# Patient Record
Sex: FEMALE | Race: BLACK OR AFRICAN AMERICAN | NOT HISPANIC OR LATINO | Employment: OTHER | URBAN - METROPOLITAN AREA
[De-identification: names, ages, dates, MRNs, and addresses within clinical notes are randomized per-mention and may not be internally consistent; named-entity substitution may affect disease eponyms.]

---

## 2019-10-25 DIAGNOSIS — Z76.89 ENCOUNTER TO ESTABLISH CARE: Primary | ICD-10-CM

## 2019-11-27 ENCOUNTER — TELEPHONE (OUTPATIENT)
Dept: INTERNAL MEDICINE | Facility: CLINIC | Age: 59
End: 2019-11-27

## 2019-11-27 NOTE — TELEPHONE ENCOUNTER
Working PHN jp. Left a message for pt to call and reschedule her appointment to establish care with Dr. Lul Alvarez.

## 2019-12-05 ENCOUNTER — OFFICE VISIT (OUTPATIENT)
Dept: INTERNAL MEDICINE | Facility: CLINIC | Age: 59
End: 2019-12-05
Payer: MEDICARE

## 2019-12-05 ENCOUNTER — LAB VISIT (OUTPATIENT)
Dept: LAB | Facility: HOSPITAL | Age: 59
End: 2019-12-05
Attending: FAMILY MEDICINE
Payer: MEDICARE

## 2019-12-05 VITALS
WEIGHT: 195.56 LBS | BODY MASS INDEX: 34.65 KG/M2 | SYSTOLIC BLOOD PRESSURE: 130 MMHG | HEIGHT: 63 IN | TEMPERATURE: 98 F | OXYGEN SATURATION: 94 % | DIASTOLIC BLOOD PRESSURE: 84 MMHG | HEART RATE: 89 BPM

## 2019-12-05 DIAGNOSIS — I25.2 OLD MYOCARDIAL INFARCTION: Chronic | ICD-10-CM

## 2019-12-05 DIAGNOSIS — I10 ESSENTIAL HYPERTENSION: Chronic | ICD-10-CM

## 2019-12-05 DIAGNOSIS — R73.9 ELEVATED SERUM GLUCOSE: ICD-10-CM

## 2019-12-05 DIAGNOSIS — Z86.010 HISTORY OF COLON POLYPS: ICD-10-CM

## 2019-12-05 DIAGNOSIS — Z01.419 ENCOUNTER FOR GYNECOLOGICAL EXAMINATION: ICD-10-CM

## 2019-12-05 DIAGNOSIS — Z12.11 SCREENING FOR COLON CANCER: ICD-10-CM

## 2019-12-05 DIAGNOSIS — Z23 NEED FOR TDAP VACCINATION: ICD-10-CM

## 2019-12-05 DIAGNOSIS — I10 ESSENTIAL HYPERTENSION: Primary | Chronic | ICD-10-CM

## 2019-12-05 PROBLEM — G89.29 CHRONIC PAIN: Status: RESOLVED | Noted: 2019-12-05 | Resolved: 2019-12-05

## 2019-12-05 PROBLEM — M25.519 SHOULDER JOINT PAIN: Status: RESOLVED | Noted: 2017-06-19 | Resolved: 2019-12-05

## 2019-12-05 PROBLEM — M54.50 LOW BACK PAIN: Status: RESOLVED | Noted: 2019-12-05 | Resolved: 2019-12-05

## 2019-12-05 PROBLEM — M54.50 LOW BACK PAIN: Status: ACTIVE | Noted: 2019-12-05

## 2019-12-05 PROBLEM — G89.29 CHRONIC PAIN: Status: ACTIVE | Noted: 2019-12-05

## 2019-12-05 PROBLEM — M25.519 SHOULDER JOINT PAIN: Status: ACTIVE | Noted: 2017-06-19

## 2019-12-05 PROBLEM — A64 SEXUALLY TRANSMITTED DISEASE: Status: RESOLVED | Noted: 2017-03-09 | Resolved: 2019-12-05

## 2019-12-05 PROBLEM — R10.2 PELVIC AND PERINEAL PAIN: Status: RESOLVED | Noted: 2017-02-13 | Resolved: 2019-12-05

## 2019-12-05 PROBLEM — A64 SEXUALLY TRANSMITTED DISEASE: Status: ACTIVE | Noted: 2017-03-09

## 2019-12-05 PROBLEM — R10.2 PELVIC AND PERINEAL PAIN: Status: ACTIVE | Noted: 2017-02-13

## 2019-12-05 PROBLEM — Z86.0100 HISTORY OF COLON POLYPS: Status: ACTIVE | Noted: 2019-12-05

## 2019-12-05 LAB
ALBUMIN SERPL BCP-MCNC: 4.4 G/DL (ref 3.5–5.2)
ALP SERPL-CCNC: 71 U/L (ref 55–135)
ALT SERPL W/O P-5'-P-CCNC: 26 U/L (ref 10–44)
ANION GAP SERPL CALC-SCNC: 11 MMOL/L (ref 8–16)
AST SERPL-CCNC: 32 U/L (ref 10–40)
BILIRUB SERPL-MCNC: 0.5 MG/DL (ref 0.1–1)
BUN SERPL-MCNC: 11 MG/DL (ref 8–23)
CALCIUM SERPL-MCNC: 10.3 MG/DL (ref 8.7–10.5)
CHLORIDE SERPL-SCNC: 101 MMOL/L (ref 95–110)
CHOLEST SERPL-MCNC: 196 MG/DL (ref 120–199)
CHOLEST/HDLC SERPL: 4.3 {RATIO} (ref 2–5)
CO2 SERPL-SCNC: 28 MMOL/L (ref 23–29)
CREAT SERPL-MCNC: 1 MG/DL (ref 0.5–1.4)
ERYTHROCYTE [DISTWIDTH] IN BLOOD BY AUTOMATED COUNT: 14.2 % (ref 11.5–14.5)
EST. GFR  (AFRICAN AMERICAN): >60 ML/MIN/1.73 M^2
EST. GFR  (NON AFRICAN AMERICAN): >60 ML/MIN/1.73 M^2
GLUCOSE SERPL-MCNC: 105 MG/DL (ref 70–110)
HCT VFR BLD AUTO: 45.1 % (ref 37–48.5)
HDLC SERPL-MCNC: 46 MG/DL (ref 40–75)
HDLC SERPL: 23.5 % (ref 20–50)
HGB BLD-MCNC: 14.4 G/DL (ref 12–16)
LDLC SERPL CALC-MCNC: 126.8 MG/DL (ref 63–159)
MCH RBC QN AUTO: 28.2 PG (ref 27–31)
MCHC RBC AUTO-ENTMCNC: 31.9 G/DL (ref 32–36)
MCV RBC AUTO: 88 FL (ref 82–98)
NONHDLC SERPL-MCNC: 150 MG/DL
PLATELET # BLD AUTO: 371 K/UL (ref 150–350)
PMV BLD AUTO: 9.8 FL (ref 9.2–12.9)
POTASSIUM SERPL-SCNC: 4.3 MMOL/L (ref 3.5–5.1)
PROT SERPL-MCNC: 8.2 G/DL (ref 6–8.4)
RBC # BLD AUTO: 5.11 M/UL (ref 4–5.4)
SODIUM SERPL-SCNC: 140 MMOL/L (ref 136–145)
TRIGL SERPL-MCNC: 116 MG/DL (ref 30–150)
WBC # BLD AUTO: 8.11 K/UL (ref 3.9–12.7)

## 2019-12-05 PROCEDURE — 36415 COLL VENOUS BLD VENIPUNCTURE: CPT

## 2019-12-05 PROCEDURE — 3075F SYST BP GE 130 - 139MM HG: CPT | Mod: CPTII,S$GLB,, | Performed by: FAMILY MEDICINE

## 2019-12-05 PROCEDURE — 99999 PR PBB SHADOW E&M-EST. PATIENT-LVL III: CPT | Mod: PBBFAC,,, | Performed by: FAMILY MEDICINE

## 2019-12-05 PROCEDURE — 99204 OFFICE O/P NEW MOD 45 MIN: CPT | Mod: S$GLB,,, | Performed by: FAMILY MEDICINE

## 2019-12-05 PROCEDURE — 80061 LIPID PANEL: CPT

## 2019-12-05 PROCEDURE — 84443 ASSAY THYROID STIM HORMONE: CPT

## 2019-12-05 PROCEDURE — 83036 HEMOGLOBIN GLYCOSYLATED A1C: CPT

## 2019-12-05 PROCEDURE — 99204 PR OFFICE/OUTPT VISIT, NEW, LEVL IV, 45-59 MIN: ICD-10-PCS | Mod: S$GLB,,, | Performed by: FAMILY MEDICINE

## 2019-12-05 PROCEDURE — 3008F BODY MASS INDEX DOCD: CPT | Mod: CPTII,S$GLB,, | Performed by: FAMILY MEDICINE

## 2019-12-05 PROCEDURE — 99499 UNLISTED E&M SERVICE: CPT | Mod: S$GLB,,, | Performed by: FAMILY MEDICINE

## 2019-12-05 PROCEDURE — 3075F PR MOST RECENT SYSTOLIC BLOOD PRESS GE 130-139MM HG: ICD-10-PCS | Mod: CPTII,S$GLB,, | Performed by: FAMILY MEDICINE

## 2019-12-05 PROCEDURE — 3079F PR MOST RECENT DIASTOLIC BLOOD PRESSURE 80-89 MM HG: ICD-10-PCS | Mod: CPTII,S$GLB,, | Performed by: FAMILY MEDICINE

## 2019-12-05 PROCEDURE — 99999 PR PBB SHADOW E&M-EST. PATIENT-LVL III: ICD-10-PCS | Mod: PBBFAC,,, | Performed by: FAMILY MEDICINE

## 2019-12-05 PROCEDURE — 85027 COMPLETE CBC AUTOMATED: CPT

## 2019-12-05 PROCEDURE — 99499 RISK ADDL DX/OHS AUDIT: ICD-10-PCS | Mod: S$GLB,,, | Performed by: FAMILY MEDICINE

## 2019-12-05 PROCEDURE — 3079F DIAST BP 80-89 MM HG: CPT | Mod: CPTII,S$GLB,, | Performed by: FAMILY MEDICINE

## 2019-12-05 PROCEDURE — 3008F PR BODY MASS INDEX (BMI) DOCUMENTED: ICD-10-PCS | Mod: CPTII,S$GLB,, | Performed by: FAMILY MEDICINE

## 2019-12-05 PROCEDURE — 80053 COMPREHEN METABOLIC PANEL: CPT

## 2019-12-05 RX ORDER — AMLODIPINE BESYLATE 5 MG/1
5 TABLET ORAL DAILY
Refills: 3 | COMMUNITY
Start: 2019-11-04 | End: 2020-02-05 | Stop reason: SDUPTHER

## 2019-12-05 RX ORDER — CARVEDILOL 25 MG/1
TABLET ORAL
COMMUNITY
End: 2020-02-05 | Stop reason: SDUPTHER

## 2019-12-05 RX ORDER — LANCETS
EACH MISCELLANEOUS
COMMUNITY
Start: 2015-04-21 | End: 2022-03-08

## 2019-12-05 RX ORDER — TRIAMTERENE AND HYDROCHLOROTHIAZIDE 37.5; 25 MG/1; MG/1
CAPSULE ORAL
COMMUNITY
End: 2020-01-29 | Stop reason: SDUPTHER

## 2019-12-05 RX ORDER — NAPROXEN SODIUM 220 MG/1
TABLET, FILM COATED ORAL
Status: ON HOLD | COMMUNITY
End: 2020-06-20

## 2019-12-05 RX ORDER — INSULIN PUMP SYRINGE, 3 ML
EACH MISCELLANEOUS
COMMUNITY
Start: 2015-04-21 | End: 2019-12-05

## 2019-12-05 NOTE — PROGRESS NOTES
Subjective:   Patient ID:  Demetrice Gaines is a 59 y.o. female.    Chief Complaint:  Establish Care    Past Medical History:   Diagnosis Date    Colon polyp     Diabetes mellitus type I     Hypertension     Myocardial infarction      Past Surgical History:   Procedure Laterality Date    Benign Cyst Right      SECTION      COLONOSCOPY       Family History   Problem Relation Age of Onset    Cancer Mother     Pacemaker/defibrilator Mother     Diabetes Father     Hypertension Sister     Hypertension Brother     Cancer Paternal Grandmother      Review of patient's allergies indicates:   Allergen Reactions    Lisinopril-hydrochlorothiazide      Other reaction(s): Reaction:Throat swelling;       Current Outpatient Medications:     amLODIPine (NORVASC) 5 MG tablet, Take 5 mg by mouth once daily., Disp: , Rfl: 3    aspirin 81 MG Chew, 1 tablet, Disp: , Rfl:     carvedilol (COREG) 25 MG tablet, One Tablet, Disp: , Rfl:     triamterene-hydrochlorothiazide 37.5-25 mg (DYAZIDE) 37.5-25 mg per capsule, 1 capsule in the morning, Disp: , Rfl:     58 yo F here to establish care.   Previous PCP Dr Charisma Vo  HTN and is on amlodipine, carvedilol, and dyazide.  History of MI? in 2018 but we do not have records. She reports that she was hospitalized for 6 days in [place] ([facility]). Had a coronary angiogram done with no stents. She had a cardiologist previously (Dr. Marie) but she does not want to go back to him. She is scheduled to establish care with a new cardiologist here.   She is not currently on a statin.  Unclear why.    She has glucose test strips in her med chart but claims she was never diagnosed with diabetes and only had elevated sugars in the hospital during her MI.   Does not check her sugars at home and cannot remember when her last A1C was.  A1c  6.9%, 2016 6.2%.    Would like to be scheduled with Gynecology.   Has a history of DUB in  where an U/S showed a thickened  "endometrial stripe but subsequent biopsies were "in the wrong area" and "insufficient sample" and patient got fed up and never returned.  Has not had breakthrough bleeding since.   Nacho Nguyen was her gynecologist at the time.    Endorses low back pain and pelvic pain, described as a heaviness, denies bleeding, fevers, weight changes, night sweats, chills, urinary/bowel changes.    Needs referral for colonoscopy due to findings of polyp previously  Has not had a pap smear since 2011.     Reported Mammogram this April and was normal.   DEXA was in 2015 and was normal.  Refuses flu shot and shingles vaccine.  Would like tetanus booster. Has not had a pap smear since 2011.         Review of Systems   Constitutional: Negative for activity change, appetite change, chills, diaphoresis, fatigue and fever.   HENT: Negative for congestion, dental problem, ear pain, postnasal drip, rhinorrhea, sinus pressure and sore throat.    Eyes: Negative for visual disturbance.   Respiratory: Positive for shortness of breath. Negative for cough, chest tightness and wheezing.    Cardiovascular: Negative for chest pain, palpitations and leg swelling.   Gastrointestinal: Negative for abdominal distention, abdominal pain, blood in stool, constipation, diarrhea, nausea and vomiting.   Endocrine: Negative for polydipsia, polyphagia and polyuria.   Genitourinary: Positive for pelvic pain. Negative for difficulty urinating, dysuria, flank pain, frequency, hematuria and urgency.   Musculoskeletal: Positive for back pain. Negative for myalgias.   Skin: Negative for rash.   Neurological: Negative for dizziness, syncope, weakness, light-headedness, numbness and headaches.   Hematological: Negative for adenopathy.   Psychiatric/Behavioral: Negative for decreased concentration, dysphoric mood and sleep disturbance. The patient is not nervous/anxious.        Objective:   /84 (BP Location: Right arm, Patient Position: Sitting, BP Method: Medium " "(Manual))   Pulse 89   Temp 98.1 °F (36.7 °C) (Oral)   Ht 5' 3" (1.6 m)   Wt 88.7 kg (195 lb 8.8 oz)   SpO2 (!) 94%   BMI 34.64 kg/m²     Physical Exam   Constitutional: Vital signs are normal. She appears well-developed and well-nourished. No distress.   HENT:   Head: Normocephalic and atraumatic.   Mouth/Throat: Oropharynx is clear and moist. No oropharyngeal exudate.   Eyes: Pupils are equal, round, and reactive to light. Conjunctivae and EOM are normal. Right conjunctiva is not injected. Left conjunctiva is not injected. No scleral icterus.   Neck: No JVD present. Carotid bruit is not present. No thyroid mass and no thyromegaly present.   Cardiovascular: Normal rate, regular rhythm and normal heart sounds. Exam reveals no gallop and no friction rub.   No murmur heard.  Pulmonary/Chest: Effort normal and breath sounds normal. No stridor. No respiratory distress. She has no wheezes. She has no rhonchi. She has no rales.   Abdominal: Soft. Bowel sounds are normal. She exhibits no distension. There is no hepatosplenomegaly. There is no tenderness. There is no rebound and no guarding.   Musculoskeletal: Normal range of motion. She exhibits no edema, tenderness or deformity.   Neurological: She displays a negative Romberg sign. Coordination and gait normal.   Skin: Skin is warm, dry and intact. No rash noted. She is not diaphoretic.   Psychiatric: She has a normal mood and affect. Judgment normal.   Nursing note and vitals reviewed.    Assessment:     1. Essential hypertension    2. Old myocardial infarction    3. Elevated serum glucose    4. Encounter for gynecological examination    5. History of colon polyps    6. Screening for colon cancer    7. Need for Tdap vaccination      Plan:   Essential hypertension  -     CBC Without Differential; Future; Expected date: 12/05/2019  -     Comprehensive metabolic panel; Future; Expected date: 12/05/2019  -     Lipid panel; Future; Expected date: 12/05/2019  -     TSH; " Future; Expected date: 12/05/2019  Controlled.  BP at goal.    Continue present medications.    Check labs above.  Treat as indicated.      Old myocardial infarction  Continue aspirin 81 mg daily  Follow up Cardiology as scheduled.    Statin if recommended by Cardiology.      Elevated serum glucose  -     Hemoglobin A1c; Future; Expected date: 12/05/2019  Treat for prediabetes diabetes if indicated.      RHM  -     Ambulatory referral to Gynecology  -     Case request GI: COLONOSCOPY  -     diphth,pertus,acell,,tetanus (BOOSTRIX) 2.5-8-5 Lf-mcg-Lf/0.5mL Syrg injection; Inject 0.5 mLs into the muscle once. for 1 dose  Dispense: 0.5 mL; Refill: 0  Declines flu vaccine  Declines shingles vaccine    Old records requested from Dr. Charisma Vo  Return to clinic 1 month    I hereby acknowledge that I am relying upon documentation authored by a medical student working under my supervision and further I hereby attest that I have verified the student documentation or findings by personally re-performing the physical exam and medical decision making activities of the Evaluation and Management service to be billed.  Lul Alvarez

## 2019-12-06 ENCOUNTER — TELEPHONE (OUTPATIENT)
Dept: ENDOSCOPY | Facility: HOSPITAL | Age: 59
End: 2019-12-06

## 2019-12-06 DIAGNOSIS — Z12.39 BREAST CANCER SCREENING: ICD-10-CM

## 2019-12-06 LAB
ESTIMATED AVG GLUCOSE: 151 MG/DL (ref 68–131)
HBA1C MFR BLD HPLC: 6.9 % (ref 4–5.6)
TSH SERPL DL<=0.005 MIU/L-ACNC: 0.42 UIU/ML (ref 0.4–4)

## 2019-12-06 RX ORDER — SODIUM, POTASSIUM,MAG SULFATES 17.5-3.13G
1 SOLUTION, RECONSTITUTED, ORAL ORAL DAILY
Qty: 1 KIT | Refills: 0 | Status: SHIPPED | OUTPATIENT
Start: 2019-12-06 | End: 2019-12-08

## 2019-12-06 NOTE — TELEPHONE ENCOUNTER

## 2019-12-12 ENCOUNTER — TELEPHONE (OUTPATIENT)
Dept: INTERNAL MEDICINE | Facility: CLINIC | Age: 59
End: 2019-12-12

## 2019-12-12 RX ORDER — INSULIN PUMP SYRINGE, 3 ML
EACH MISCELLANEOUS
Status: CANCELLED | OUTPATIENT
Start: 2019-12-12

## 2019-12-12 NOTE — TELEPHONE ENCOUNTER
----- Message from Yang Hummel sent at 12/12/2019  8:51 AM CST -----  Contact: Pt  Please give pt a call at [phone] she is returning the nurse call with her results

## 2019-12-12 NOTE — TELEPHONE ENCOUNTER
Will discuss and order if appropriate at follow up visit she should have scheduled in January  May not be covered if we only treat for Prediabetes and not Diabetes

## 2019-12-13 ENCOUNTER — TELEPHONE (OUTPATIENT)
Dept: INTERNAL MEDICINE | Facility: CLINIC | Age: 59
End: 2019-12-13

## 2019-12-13 NOTE — TELEPHONE ENCOUNTER
----- Message from Citlalli Melchor sent at 12/13/2019  4:23 PM CST -----  Contact: pt   Type:  Patient Returning Call    Who Called: pt   Who Left Message for Patient:Dr Alvarez  Does the patient know what this is regarding?:medication  Would the patient rather a call back or a response via [facility]? Call back  Best Call Back Number: [phone]  Additional Information:

## 2019-12-17 ENCOUNTER — TELEPHONE (OUTPATIENT)
Dept: CARDIOLOGY | Facility: CLINIC | Age: 59
End: 2019-12-17

## 2019-12-17 DIAGNOSIS — I10 ESSENTIAL HYPERTENSION: Primary | Chronic | ICD-10-CM

## 2019-12-17 NOTE — TELEPHONE ENCOUNTER
Left a voice mail message for return call  Attempting to secure medical records prior to visit.        ----- Message from Randee Gutiérrez sent at 12/17/2019  3:23 PM CST -----  Contact: self [phone]  .Type:  Patient Returning Call    Who Called:Demetrice Gaines  Who Left Message for Patient:Zayda  Does the patient know what this is regarding?:no  Would the patient rather a call back or a response via [facility]? Call back  Best Call Back Number:[phone]  Additional Information:

## 2019-12-17 NOTE — TELEPHONE ENCOUNTER
I called pt to see if she has seen any cardiologist in the past to request records prior to her appt if needed.   No answer. Left message.

## 2019-12-18 ENCOUNTER — OFFICE VISIT (OUTPATIENT)
Dept: CARDIOLOGY | Facility: CLINIC | Age: 59
End: 2019-12-18
Payer: MEDICARE

## 2019-12-18 ENCOUNTER — IMMUNIZATION (OUTPATIENT)
Dept: PHARMACY | Facility: CLINIC | Age: 59
End: 2019-12-18
Payer: MEDICARE

## 2019-12-18 ENCOUNTER — OFFICE VISIT (OUTPATIENT)
Dept: OBSTETRICS AND GYNECOLOGY | Facility: CLINIC | Age: 59
End: 2019-12-18
Payer: MEDICARE

## 2019-12-18 ENCOUNTER — CLINICAL SUPPORT (OUTPATIENT)
Dept: CARDIOLOGY | Facility: CLINIC | Age: 59
End: 2019-12-18
Payer: MEDICARE

## 2019-12-18 VITALS
DIASTOLIC BLOOD PRESSURE: 78 MMHG | SYSTOLIC BLOOD PRESSURE: 128 MMHG | WEIGHT: 197.56 LBS | BODY MASS INDEX: 34.99 KG/M2

## 2019-12-18 VITALS
HEART RATE: 66 BPM | DIASTOLIC BLOOD PRESSURE: 82 MMHG | BODY MASS INDEX: 34.9 KG/M2 | SYSTOLIC BLOOD PRESSURE: 118 MMHG | WEIGHT: 197 LBS

## 2019-12-18 DIAGNOSIS — I10 ESSENTIAL HYPERTENSION: Chronic | ICD-10-CM

## 2019-12-18 DIAGNOSIS — R10.2 PELVIC PAIN IN FEMALE: ICD-10-CM

## 2019-12-18 DIAGNOSIS — Z01.419 ENCOUNTER FOR WELL WOMAN EXAM WITH ROUTINE GYNECOLOGICAL EXAM: Primary | ICD-10-CM

## 2019-12-18 DIAGNOSIS — I25.2 OLD MYOCARDIAL INFARCTION: Primary | Chronic | ICD-10-CM

## 2019-12-18 PROCEDURE — 99999 PR PBB SHADOW E&M-EST. PATIENT-LVL III: ICD-10-PCS | Mod: PBBFAC,,, | Performed by: OBSTETRICS & GYNECOLOGY

## 2019-12-18 PROCEDURE — 99204 OFFICE O/P NEW MOD 45 MIN: CPT | Mod: S$GLB,,, | Performed by: INTERNAL MEDICINE

## 2019-12-18 PROCEDURE — G0101 CA SCREEN;PELVIC/BREAST EXAM: HCPCS | Mod: S$GLB,,, | Performed by: OBSTETRICS & GYNECOLOGY

## 2019-12-18 PROCEDURE — G0101 PR CA SCREEN;PELVIC/BREAST EXAM: ICD-10-PCS | Mod: S$GLB,,, | Performed by: OBSTETRICS & GYNECOLOGY

## 2019-12-18 PROCEDURE — 99499 UNLISTED E&M SERVICE: CPT | Mod: S$GLB,,, | Performed by: INTERNAL MEDICINE

## 2019-12-18 PROCEDURE — 3008F PR BODY MASS INDEX (BMI) DOCUMENTED: ICD-10-PCS | Mod: CPTII,S$GLB,, | Performed by: INTERNAL MEDICINE

## 2019-12-18 PROCEDURE — 99999 PR PBB SHADOW E&M-EST. PATIENT-LVL III: ICD-10-PCS | Mod: PBBFAC,,, | Performed by: INTERNAL MEDICINE

## 2019-12-18 PROCEDURE — 99499 RISK ADDL DX/OHS AUDIT: ICD-10-PCS | Mod: S$GLB,,, | Performed by: INTERNAL MEDICINE

## 2019-12-18 PROCEDURE — 93010 ELECTROCARDIOGRAM REPORT: CPT | Mod: S$GLB,,, | Performed by: INTERNAL MEDICINE

## 2019-12-18 PROCEDURE — 93010 EKG 12-LEAD: ICD-10-PCS | Mod: S$GLB,,, | Performed by: INTERNAL MEDICINE

## 2019-12-18 PROCEDURE — 99999 PR PBB SHADOW E&M-EST. PATIENT-LVL III: CPT | Mod: PBBFAC,,, | Performed by: OBSTETRICS & GYNECOLOGY

## 2019-12-18 PROCEDURE — 93005 EKG 12-LEAD: ICD-10-PCS | Mod: S$GLB,,, | Performed by: INTERNAL MEDICINE

## 2019-12-18 PROCEDURE — 93005 ELECTROCARDIOGRAM TRACING: CPT | Mod: S$GLB,,, | Performed by: INTERNAL MEDICINE

## 2019-12-18 PROCEDURE — 99999 PR PBB SHADOW E&M-EST. PATIENT-LVL III: CPT | Mod: PBBFAC,,, | Performed by: INTERNAL MEDICINE

## 2019-12-18 PROCEDURE — 99204 PR OFFICE/OUTPT VISIT, NEW, LEVL IV, 45-59 MIN: ICD-10-PCS | Mod: S$GLB,,, | Performed by: INTERNAL MEDICINE

## 2019-12-18 PROCEDURE — 3008F BODY MASS INDEX DOCD: CPT | Mod: CPTII,S$GLB,, | Performed by: INTERNAL MEDICINE

## 2019-12-18 RX ORDER — SODIUM, POTASSIUM,MAG SULFATES 17.5-3.13G
1 SOLUTION, RECONSTITUTED, ORAL ORAL DAILY
Qty: 1 KIT | Refills: 0 | Status: SHIPPED | OUTPATIENT
Start: 2019-12-18 | End: 2019-12-20

## 2019-12-18 NOTE — LETTER
December 22, 2019      Lul Alvarez MD  [address] [facility]  [place] [address]           [facility] [place]  [address] [facility] [place] [place] [address]  Phone: [phone]  Fax: [phone]          Patient: Demetrice Gaines   MR Number: [id number]   YOB: 1960   Date of Visit: 12/18/2019       Dear Dr. Lul Alvarez:    Thank you for referring Demetrice Gaines to me for evaluation. Attached you will find relevant portions of my assessment and plan of care.    If you have questions, please do not hesitate to call me. I look forward to following Demetrice Gaines along with you.    Sincerely,    Ines Summers MD    Enclosure  CC:  No Recipients    If you would like to receive this communication electronically, please contact [email or web address] or [phone] to request more information on [facility] access.    For providers and/or their staff who would like to refer a patient to [facility], please contact us through our one-stop-shop provider referral line, [facility], at [phone].    If you feel you have received this communication in error or would no longer like to receive these types of communications, please e-mail [email or web address]

## 2019-12-18 NOTE — PROGRESS NOTES
Subjective:   Patient ID:  Demetrice Gaines is a 59 y.o. female who presents for evaluation of Establish Care (heart attack in 2018); Hair Loss (possibly due to medication); Shortness of Breath (on exertion, still a struggle after rest, tightness around neck ); and Chest Pain (dull pain occasionally lasting few hours)      HPI  A 58 yo diabetic female  htn who had an mi in 2018 had sweating dull chest pain associated with shortness of breath was admitted on the [facility] had a 6 day stay had heart cath that was unrevealing. She has had no more similar symptoms. She however has shortness of breath and tired. She feels a choking sensation in her throat . In addition she has been having arm pain at nite and is having difficulty raising items with her hands. She gets tired easily. No exertional chest pain. She ahs insomnia not sleep well at nite sleeps during the day feels tired has gained weight.she snores a1c is borderline high.  Past Medical History:   Diagnosis Date    Colon polyp     Diabetes mellitus type I     Hypertension     Myocardial infarction        Past Surgical History:   Procedure Laterality Date    Benign Cyst Right      SECTION      COLONOSCOPY         Social History     Tobacco Use    Smoking status: Never Smoker    Smokeless tobacco: Never Used   Substance Use Topics    Alcohol use: Never     Frequency: Never    Drug use: Never       Family History   Problem Relation Age of Onset    Cancer Mother     Pacemaker/defibrilator Mother     Diabetes Father     Hypertension Sister     Hypertension Brother     Cancer Paternal Grandmother        Current Outpatient Medications   Medication Sig    amLODIPine (NORVASC) 5 MG tablet Take 5 mg by mouth once daily.    aspirin 81 MG Chew 1 tablet    carvedilol (COREG) 25 MG tablet One Tablet    lancets Misc Check blood sugar once daily.    triamterene-hydrochlorothiazide 37.5-25 mg (DYAZIDE) 37.5-25 mg per capsule 1 capsule in the  morning    sodium,potassium,mag sulfates (SUPREP BOWEL PREP KIT) 17.5-3.13-1.6 gram SolR Take 177 mLs by mouth once daily. for 2 days (Patient not taking: Reported on 12/18/2019)     No current facility-administered medications for this visit.      Current Outpatient Medications on File Prior to Visit   Medication Sig    amLODIPine (NORVASC) 5 MG tablet Take 5 mg by mouth once daily.    aspirin 81 MG Chew 1 tablet    carvedilol (COREG) 25 MG tablet One Tablet    lancets Misc Check blood sugar once daily.    triamterene-hydrochlorothiazide 37.5-25 mg (DYAZIDE) 37.5-25 mg per capsule 1 capsule in the morning     No current facility-administered medications on file prior to visit.        Review of patient's allergies indicates:   Allergen Reactions    Lisinopril-hydrochlorothiazide      Other reaction(s): Reaction:Throat swelling;       Review of Systems   Constitution: Positive for weight gain. Negative for diaphoresis and malaise/fatigue.   HENT: Negative for hoarse voice.    Eyes: Negative for double vision and visual disturbance.   Cardiovascular: Positive for chest pain. Negative for claudication, cyanosis, dyspnea on exertion, irregular heartbeat, leg swelling, near-syncope, orthopnea, palpitations, paroxysmal nocturnal dyspnea and syncope.   Respiratory: Positive for shortness of breath and snoring. Negative for cough and hemoptysis.         Choking sensation around neck.   Hematologic/Lymphatic: Negative for bleeding problem. Does not bruise/bleed easily.   Skin: Negative for color change and poor wound healing.   Musculoskeletal: Negative for muscle cramps, muscle weakness and myalgias.        Arm pain bilateral    Gastrointestinal: Negative for bloating, abdominal pain, change in bowel habit, diarrhea, heartburn, hematemesis, hematochezia, melena and nausea.   Neurological: Negative for excessive daytime sleepiness, dizziness, headaches, light-headedness, loss of balance, numbness and weakness.    Psychiatric/Behavioral: Negative for memory loss. The patient does not have insomnia.    Allergic/Immunologic: Negative for hives.       Objective:   Physical Exam   Constitutional: She is oriented to person, place, and time. She appears well-developed and well-nourished. She does not appear ill. No distress.   HENT:   Head: Normocephalic and atraumatic.   Eyes: Pupils are equal, round, and reactive to light. EOM are normal. No scleral icterus.   Neck: Normal range of motion. Neck supple. Normal carotid pulses, no hepatojugular reflux and no JVD present. Carotid bruit is not present. No tracheal deviation present. No thyromegaly present.   Cardiovascular: Normal rate, regular rhythm, normal heart sounds and normal pulses. Exam reveals no gallop and no friction rub.   No murmur heard.  Pulses:       Carotid pulses are 2+ on the right side, and 2+ on the left side.       Radial pulses are 2+ on the right side, and 2+ on the left side.        Femoral pulses are 2+ on the right side, and 2+ on the left side.       Popliteal pulses are 2+ on the right side, and 2+ on the left side.        Dorsalis pedis pulses are 2+ on the right side, and 2+ on the left side.        Posterior tibial pulses are 2+ on the right side, and 2+ on the left side.   Pulmonary/Chest: Effort normal and breath sounds normal. No respiratory distress. She has no wheezes. She has no rhonchi. She has no rales. She exhibits no tenderness.   Abdominal: Soft. Normal appearance, normal aorta and bowel sounds are normal. She exhibits no distension, no abdominal bruit, no ascites and no pulsatile midline mass. There is no hepatomegaly. There is no tenderness.   obese   Musculoskeletal: She exhibits no edema.        Right shoulder: She exhibits no deformity.   Neurological: She is alert and oriented to person, place, and time. She has normal strength. No cranial nerve deficit. Coordination normal.   Skin: Skin is warm and dry. No rash noted. She is not  diaphoretic. No cyanosis or erythema. Nails show no clubbing.   Psychiatric: She has a normal mood and affect. Her speech is normal and behavior is normal.   Nursing note and vitals reviewed.    Vitals:    12/18/19 1620   BP: 118/82   BP Location: Left arm   Patient Position: Sitting   BP Method: Medium (Manual)   Pulse: 66   Weight: 89.4 kg (197 lb)     Lab Results   Component Value Date    CHOL 196 12/05/2019    CHOL 192 08/22/2005     Lab Results   Component Value Date    HDL 46 12/05/2019    HDL 41.0 08/22/2005     Lab Results   Component Value Date    LDLCALC 126.8 12/05/2019    LDLCALC 110.0 08/22/2005     Lab Results   Component Value Date    TRIG 116 12/05/2019    TRIG 205 (H) 08/22/2005     Lab Results   Component Value Date    CHOLHDL 23.5 12/05/2019    CHOLHDL 21.4 08/22/2005       Chemistry        Component Value Date/Time     12/05/2019 1508    K 4.3 12/05/2019 1508     12/05/2019 1508    CO2 28 12/05/2019 1508    BUN 11 12/05/2019 1508    CREATININE 1.0 12/05/2019 1508     12/05/2019 1508        Component Value Date/Time    CALCIUM 10.3 12/05/2019 1508    ALKPHOS 71 12/05/2019 1508    AST 32 12/05/2019 1508    ALT 26 12/05/2019 1508    BILITOT 0.5 12/05/2019 1508    ESTGFRAFRICA >60.0 12/05/2019 1508    EGFRNONAA >60.0 12/05/2019 1508        Lab Results   Component Value Date    HGBA1C 6.9 (H) 12/05/2019       Lab Results   Component Value Date    TSH 0.421 12/05/2019     No results found for: INR, PROTIME  Lab Results   Component Value Date    WBC 8.11 12/05/2019    HGB 14.4 12/05/2019    HCT 45.1 12/05/2019    MCV 88 12/05/2019     (H) 12/05/2019     BNP  @LABRCNTIP(BNP,BNPTRIAGEBLO)@  CrCl cannot be calculated (Patient's most recent lab result is older than the maximum 7 days allowed.).  Assessment:     1. Old myocardial infarction    2. Essential hypertension      Arm pain is musculoskeletal.   Chocking sensation and shortness of breath fatigue needs better diabetes  control weight loss exercise and r/o sleep apnea and coronary ischemia.  Plan:   Continue current therapy  Cardiac low salt diet.  Risk factor modification and excercise program./weight loss  Sleep eval   Echo   Stress cardiolite.

## 2019-12-18 NOTE — PRE-PROCEDURE INSTRUCTIONS
I called patient because she was on the schedule today for a PAT visit here at the [facility], but had already been scheduled at [facility] for screening colonoscopy on February 4th. Pt says that she was unaware of scheduled PAT visit, but was aware and had already received instructions for colonoscopy. Pt requests an earlier appt and at the [facility] instead. R/s'd to December 31st with Dr. Holcomb. PAT call completed and patient educated on the bowel prep, clear liquid diet and procedure instructions. Patient has rec'd instructions for suprep prior to call, in which I reviewed with her thoroughly. Pt verbalized full understanding and informed of OTC meds to purchase. Medical history discussed and pt instructed to be NPO after 2nd bowel prep. Tentative arrival and 2nd prep times discussed. Patient will be accommodated by her  and is informed of policy to remain inhouse during entire visit. All questions and concerns addressed. Callback number provided for any future questions. Pt has history of htn/MI in 2008 and takes coreg, norvasc and triamterene and has a cardiology visit today to establish care. Pt instructed to take all blood pressure medications to bring to facility with her. Pt denies a history of DM. Will f/u at 72 hours and 1 day before procedure.

## 2019-12-18 NOTE — PROGRESS NOTES
Subjective:      Demetrice Gaines is a 59 y.o. female who presents for annual exam. The patient has no complaints today. The patient is not currently sexually active. GYN screening history: last pap: approximate date 7 years ago and was normal and last mammogram: approximate date 1 year out and was normal. The patient is not taking hormone replacement therapy. Patient denies post-menopausal vaginal bleeding.. The patient wears seatbelts: yes. The patient participates in regular exercise: yes. Has the patient ever been transfused or tattooed?: not asked. The patient reports that there is not domestic violence in her life.    Patient hasn't had a gynecological exam in about 8 years.  Patient stopped having a period at age 50.  Patient doesn not have any hot flashes or night sweats.  Patient is no longer sexually active. No vaginal dryness or vaginal pain.  No urinary leakage.  Last mammogram was this year.  Colonoscopy scheduled for 12/31/2019.  Patient sometimes feels some soreness in her left breast, especially after holding a baby up to her chest.  Patient complains of a heavy dull pain, like she is carrying rocks in her pelvis.  Patient was seen by Dr. Pabon, was told that she made have endometrial cancer, but he did not get a good endometrial sample. This was 2011.  Did not go back.  Has not had an gynecological exam since then.  Patient has not had any post menopausal bleeding since then either.                Menstrual History:  OB History    None        Menarche age: 10  No LMP recorded.         Review of Systems  Constitutional: negative  Eyes: negative  Ears, nose, mouth, throat, and face: negative  Respiratory: negative  Cardiovascular: negative  Gastrointestinal: negative  Genitourinary:negative  Integument/breast: negative  Hematologic/lymphatic: negative  Musculoskeletal:negative  Neurological: negative  Behavioral/Psych: negative  Endocrine: negative  Allergic/Immunologic: negative       Objective:      /78   Wt 89.6 kg (197 lb 8.5 oz)   BMI 34.99 kg/m²   General appearance: alert, appears stated age and cooperative  Head: Normocephalic, without obvious abnormality, atraumatic  Eyes: negative  Nose: no discharge  Neck: no adenopathy, no carotid bruit, no JVD, supple, symmetrical, trachea midline and thyroid not enlarged, symmetric, no tenderness/mass/nodules  Lungs: clear to auscultation bilaterally  Breasts: Inspection negative, No nipple retraction or dimpling, No nipple discharge or bleeding, No axillary or supraclavicular adenopathy, Normal to palpation without dominant masses  Heart: S1, S2 normal and no S3 or S4  Abdomen: soft, non-tender; bowel sounds normal; no masses,  no organomegaly  Pelvic: cervix normal in appearance, external genitalia normal, no adnexal masses or tenderness, no cervical motion tenderness, perianal skin: no external genital warts noted, rectovaginal septum normal, urethra without abnormality or discharge, uterus normal size, shape, and consistency and vagina normal without discharge  Extremities: extremities normal, atraumatic, no cyanosis or edema  Neurologic: Grossly normal      Assessment:      Menopause      Plan:      Abdominal ultrasound.  Await pap smear results.  Pelvic ultrasound.  Thin prep Pap smear.

## 2019-12-24 ENCOUNTER — TELEPHONE (OUTPATIENT)
Dept: RADIOLOGY | Facility: HOSPITAL | Age: 59
End: 2019-12-24

## 2019-12-26 ENCOUNTER — CLINICAL SUPPORT (OUTPATIENT)
Dept: CARDIOLOGY | Facility: CLINIC | Age: 59
End: 2019-12-26
Attending: INTERNAL MEDICINE
Payer: MEDICARE

## 2019-12-26 ENCOUNTER — TELEPHONE (OUTPATIENT)
Dept: CARDIOLOGY | Facility: CLINIC | Age: 59
End: 2019-12-26

## 2019-12-26 ENCOUNTER — HOSPITAL ENCOUNTER (OUTPATIENT)
Dept: RADIOLOGY | Facility: HOSPITAL | Age: 59
Discharge: HOME | End: 2019-12-26
Attending: INTERNAL MEDICINE
Payer: MEDICARE

## 2019-12-26 ENCOUNTER — HOSPITAL ENCOUNTER (OUTPATIENT)
Dept: RADIOLOGY | Facility: HOSPITAL | Age: 59
Discharge: HOME | End: 2019-12-26
Attending: OBSTETRICS & GYNECOLOGY
Payer: MEDICARE

## 2019-12-26 DIAGNOSIS — I25.2 OLD MYOCARDIAL INFARCTION: ICD-10-CM

## 2019-12-26 DIAGNOSIS — R10.2 PELVIC PAIN IN FEMALE: ICD-10-CM

## 2019-12-26 DIAGNOSIS — I10 ESSENTIAL HYPERTENSION: Chronic | ICD-10-CM

## 2019-12-26 DIAGNOSIS — I10 ESSENTIAL HYPERTENSION: ICD-10-CM

## 2019-12-26 DIAGNOSIS — I25.2 OLD MYOCARDIAL INFARCTION: Chronic | ICD-10-CM

## 2019-12-26 LAB
AORTIC VALVE REGURGITATION: NORMAL
DIASTOLIC DYSFUNCTION: NO
DIASTOLIC DYSFUNCTION: NO
ESTIMATED PA SYSTOLIC PRESSURE: 24.72
RETIRED EF AND QEF - SEE NOTES: 60 (ref 55–65)

## 2019-12-26 PROCEDURE — 93306 TTE W/DOPPLER COMPLETE: CPT | Mod: S$GLB,,, | Performed by: NUCLEAR MEDICINE

## 2019-12-26 PROCEDURE — 93015 NM MULTI PHARM STRESS CARDIAC COMPONENT: ICD-10-PCS | Mod: S$GLB,,, | Performed by: NUCLEAR MEDICINE

## 2019-12-26 PROCEDURE — 78452 HT MUSCLE IMAGE SPECT MULT: CPT | Mod: 26,,, | Performed by: NUCLEAR MEDICINE

## 2019-12-26 PROCEDURE — 76830 US PELVIS COMP WITH TRANSVAG NON-OB (XPD): ICD-10-PCS | Mod: 26,,, | Performed by: RADIOLOGY

## 2019-12-26 PROCEDURE — 78452 NM MULTI PHARM STRESS CARDIAC COMPONENT: ICD-10-PCS | Mod: 26,,, | Performed by: NUCLEAR MEDICINE

## 2019-12-26 PROCEDURE — A9502 TC99M TETROFOSMIN: HCPCS

## 2019-12-26 PROCEDURE — 76830 TRANSVAGINAL US NON-OB: CPT | Mod: 26,,, | Performed by: RADIOLOGY

## 2019-12-26 PROCEDURE — 93015 CV STRESS TEST SUPVJ I&R: CPT | Mod: S$GLB,,, | Performed by: NUCLEAR MEDICINE

## 2019-12-26 PROCEDURE — 76856 US EXAM PELVIC COMPLETE: CPT | Mod: 26,,, | Performed by: RADIOLOGY

## 2019-12-26 PROCEDURE — 76856 US PELVIS COMP WITH TRANSVAG NON-OB (XPD): ICD-10-PCS | Mod: 26,,, | Performed by: RADIOLOGY

## 2019-12-26 PROCEDURE — 76830 TRANSVAGINAL US NON-OB: CPT | Mod: TC

## 2019-12-26 PROCEDURE — 93306 2D ECHO WITH COLOR FLOW DOPPLER: ICD-10-PCS | Mod: S$GLB,,, | Performed by: NUCLEAR MEDICINE

## 2019-12-26 NOTE — TELEPHONE ENCOUNTER
The patient has been notified of this information and all questions answered. Stress test negative. Verbalizes understanding.

## 2019-12-26 NOTE — TELEPHONE ENCOUNTER
----- Message from Yang Hummel sent at 12/26/2019  4:15 PM CST -----  Contact: Pt  Please give pt a call at [phone] she is returning the nurse call with her results

## 2019-12-26 NOTE — PRE-PROCEDURE INSTRUCTIONS
Called to make 72 hour f/u call with patient, no answer. VM left to return call with any questions or concerns.

## 2019-12-30 RX ORDER — SODIUM CHLORIDE, SODIUM LACTATE, POTASSIUM CHLORIDE, CALCIUM CHLORIDE 600; 310; 30; 20 MG/100ML; MG/100ML; MG/100ML; MG/100ML
INJECTION, SOLUTION INTRAVENOUS CONTINUOUS
Status: CANCELLED | OUTPATIENT
Start: 2019-12-30

## 2019-12-30 NOTE — PRE-PROCEDURE INSTRUCTIONS
Called to notify patient of final arrival time of 0700 at [facility], but patient did not answer. VM left with final time and location. Callback number left for any questions or concerns.

## 2019-12-31 ENCOUNTER — HOSPITAL ENCOUNTER (OUTPATIENT)
Facility: HOSPITAL | Age: 59
Discharge: HOME | End: 2019-12-31
Attending: INTERNAL MEDICINE | Admitting: INTERNAL MEDICINE
Payer: MEDICARE

## 2019-12-31 ENCOUNTER — ANESTHESIA (OUTPATIENT)
Dept: ENDOSCOPY | Facility: HOSPITAL | Age: 59
End: 2019-12-31
Payer: MEDICARE

## 2019-12-31 ENCOUNTER — ANESTHESIA EVENT (OUTPATIENT)
Dept: ENDOSCOPY | Facility: HOSPITAL | Age: 59
End: 2019-12-31
Payer: MEDICARE

## 2019-12-31 VITALS
DIASTOLIC BLOOD PRESSURE: 86 MMHG | BODY MASS INDEX: 33.49 KG/M2 | TEMPERATURE: 98 F | RESPIRATION RATE: 20 BRPM | HEART RATE: 75 BPM | WEIGHT: 196.19 LBS | HEIGHT: 64 IN | OXYGEN SATURATION: 99 % | SYSTOLIC BLOOD PRESSURE: 128 MMHG

## 2019-12-31 DIAGNOSIS — Z12.11 COLON CANCER SCREENING: Primary | ICD-10-CM

## 2019-12-31 LAB — POCT GLUCOSE: 136 MG/DL (ref 70–110)

## 2019-12-31 PROCEDURE — D9220A PRA ANESTHESIA: ICD-10-PCS | Mod: CRNA,,, | Performed by: NURSE ANESTHETIST, CERTIFIED REGISTERED

## 2019-12-31 PROCEDURE — D9220A PRA ANESTHESIA: ICD-10-PCS | Mod: ANES,,, | Performed by: ANESTHESIOLOGY

## 2019-12-31 PROCEDURE — 63600175 PHARM REV CODE 636 W HCPCS: Performed by: NURSE ANESTHETIST, CERTIFIED REGISTERED

## 2019-12-31 PROCEDURE — 37000009 HC ANESTHESIA EA ADD 15 MINS: Performed by: INTERNAL MEDICINE

## 2019-12-31 PROCEDURE — G0105 COLORECTAL SCRN; HI RISK IND: HCPCS | Performed by: INTERNAL MEDICINE

## 2019-12-31 PROCEDURE — G0105 COLORECTAL SCRN; HI RISK IND: HCPCS | Mod: ,,, | Performed by: INTERNAL MEDICINE

## 2019-12-31 PROCEDURE — G0105 COLORECTAL SCRN; HI RISK IND: ICD-10-PCS | Mod: ,,, | Performed by: INTERNAL MEDICINE

## 2019-12-31 PROCEDURE — 37000008 HC ANESTHESIA 1ST 15 MINUTES: Performed by: INTERNAL MEDICINE

## 2019-12-31 PROCEDURE — D9220A PRA ANESTHESIA: Mod: CRNA,,, | Performed by: NURSE ANESTHETIST, CERTIFIED REGISTERED

## 2019-12-31 PROCEDURE — D9220A PRA ANESTHESIA: Mod: ANES,,, | Performed by: ANESTHESIOLOGY

## 2019-12-31 RX ORDER — LIDOCAINE HCL/PF 100 MG/5ML
SYRINGE (ML) INTRAVENOUS
Status: DISCONTINUED | OUTPATIENT
Start: 2019-12-31 | End: 2019-12-31

## 2019-12-31 RX ORDER — SODIUM CHLORIDE, SODIUM LACTATE, POTASSIUM CHLORIDE, CALCIUM CHLORIDE 600; 310; 30; 20 MG/100ML; MG/100ML; MG/100ML; MG/100ML
INJECTION, SOLUTION INTRAVENOUS CONTINUOUS PRN
Status: DISCONTINUED | OUTPATIENT
Start: 2019-12-31 | End: 2019-12-31

## 2019-12-31 RX ORDER — PROPOFOL 10 MG/ML
VIAL (ML) INTRAVENOUS
Status: DISCONTINUED | OUTPATIENT
Start: 2019-12-31 | End: 2019-12-31

## 2019-12-31 RX ADMIN — PROPOFOL 80 MG: 10 INJECTION, EMULSION INTRAVENOUS at 07:12

## 2019-12-31 RX ADMIN — PROPOFOL 30 MG: 10 INJECTION, EMULSION INTRAVENOUS at 07:12

## 2019-12-31 RX ADMIN — PROPOFOL 50 MG: 10 INJECTION, EMULSION INTRAVENOUS at 07:12

## 2019-12-31 RX ADMIN — SODIUM CHLORIDE, SODIUM LACTATE, POTASSIUM CHLORIDE, AND CALCIUM CHLORIDE: 600; 310; 30; 20 INJECTION, SOLUTION INTRAVENOUS at 07:12

## 2019-12-31 RX ADMIN — LIDOCAINE HYDROCHLORIDE 80 MG: 20 INJECTION, SOLUTION INTRAVENOUS at 07:12

## 2019-12-31 NOTE — DISCHARGE INSTRUCTIONS
Colonoscopy     A camera attached to a flexible tube with a viewing lens is used to take video pictures.     Colonoscopy is a test to view the inside of your lower digestive tract (colon and rectum). Sometimes it can show the last part of the small intestine (ileum). During the test, small pieces of tissue may be removed for testing. This is called a biopsy. Small growths, such as polyps, may also be removed.   Why is colonoscopy done?  The test is done to help look for colon cancer. And it can help find the source of abdominal pain, bleeding, and changes in bowel habits. It may be needed once a year, depending on factors such as your:  · Age  · Health history  · Family health history  · Symptoms  · Results from any prior colonoscopy  Risks and possible complications  These include:  · Bleeding               · A puncture or tear in the colon   · Risks of anesthesia  · A cancer lesion not being seen  Getting ready   To prepare for the test:  · Talk with your healthcare provider about the risks of the test (see below). Also ask your healthcare provider about alternatives to the test.  · Tell your healthcare provider about any medicines you take. Also tell him or her about any health conditions you may have.  · Make sure your rectum and colon are empty for the test. Follow the diet and bowel prep instructions exactly. If you dont, the test may need to be rescheduled.  · Plan for a friend or family member to drive you home after the test.     Colonoscopy provides an inside view of the entire colon.     You may discuss the results with your doctor right away or at a future visit.  During the test   The test is usually done in the hospital on an outpatient basis. This means you go home the same day. The procedure takes about 30 minutes. During that time:  · You are given relaxing (sedating) medicine through an IV line. You may be drowsy, or fully asleep.  · The healthcare provider will first give you a physical exam to  check for anal and rectal problems.  · Then the anus is lubricated and the scope inserted.  · If you are awake, you may have a feeling similar to needing to have a bowel movement. You may also feel pressure as air is pumped into the colon. Its OK to pass gas during the procedure.  · Biopsy, polyp removal, or other treatments may be done during the test.  After the test   You may have gas right after the test. It can help to try to pass it to help prevent later bloating. Your healthcare provider may discuss the results with you right away. Or you may need to schedule a follow-up visit to talk about the results. After the test, you can go back to your normal eating and other activities. You may be tired from the sedation and need to rest for a few hours.  Date Last Reviewed: 11/1/2016 © 2000-2017 [employer]. [address] [place] [address]. All rights reserved. This information is not intended as a substitute for professional medical care. Always follow your healthcare professional's instructions.                                                                                                                   Pain Post Injection     1. Side effects may include: headache, restlessness at night, weakness to upper or lower extremities (arms or legs), flushing of the face and/ or neck. None are life threatening and will subside within 2-3 days.   2. If you have SEVERE headache with nausea and extreme pain, please call office ([phone]). Unless you usually have this type of migraine headache.   3. If you develop fever (greater than 101 F) or have any redness, swelling, or drainage at the injection site(s), please call off ([phone]). This may be a sign of infection.   4. If a rash or whelps (like hives) occur, please call the office ([phone]).  5. If you are a heart patient, please watch for symptoms such as swelling in your hands and feet and shortness of breath. If any of these  symptoms occur, please notify your primary care/ heart doctor and go to the nearest emergency room.  6. If you have high blood pressure, you may experience an increase in your blood pressure over the next two weeks. Please continue to monitor your blood pressure and contact your primary care provider if your blood pressure does not return to baseline.    7. If you are a diabetic or you monitor your blood sugar, you may have an increase in your blood sugar over the next two weeks. If your blood sugar does not return to your baseline, please contact your primary care provider.  8. NO HEAT TO THE INJECTION SITE for the next 24 hours including: bath or shower, heating pad, moist heat, and / or hot tubs.   9. May use ice pack to injection site for any pain or discomfort. Apply ice pack for 20 minutes then remove for 20 minutes before re- applying to site. (recipe for homemade frozen gel pack below)  10. DO NOT DRIVE OR OPERATE HEAVY MACHINERY UNTIL TOMORROW MORNING.   11. OK to resume all medications unless otherwise directed by your doctor.  12. Injection(s) may take up to two weeks until full effects are noted.  13. You may return to normal activity/ work the following day.  14. Please do not receive a flu or pneumonia vaccine until one week after your procedure.  .  Homemade Frozen Gel Ice Pack:  1 bottle of rubbing alcohol  3 bottles of water (use rubbing alcohol bottle to measure)  2 large zip lock bags    Instructions:  1. Pour alcohol and water into zip lock bag. Make sure bag is large enough to allow for expansion.  2. Try to get as much air out of the freezer bag before sealing it shut.   3. Place the bag and its contents inside a second freezer bag to contain any leakage.   4. Leave the bag in the freezer for at least one hour.  5. When it's ready, place a towel between the gel pack and bare skin to avoid burning the skin.

## 2019-12-31 NOTE — TRANSFER OF CARE
"Anesthesia Transfer of Care Note    Patient: Demetrice Gaines    Procedure(s) Performed: Procedure(s) (LRB):  COLONOSCOPY (N/A)    Patient location: PACU    Anesthesia Type: general    Transport from OR: Transported from OR on room air with adequate spontaneous ventilation    Post pain: adequate analgesia    Post assessment: no apparent anesthetic complications and tolerated procedure well    Post vital signs: stable    Level of consciousness: lethargic    Nausea/Vomiting: no nausea/vomiting    Complications: none    Transfer of care protocol was followed      Last vitals:   Visit Vitals  BP (!) 155/96 (BP Location: Right arm, Patient Position: Sitting)   Pulse 83   Temp (P) 36.3 °C (97.4 °F) (Temporal)   Resp (P) 16   Ht 5' 4" (1.626 m)   Wt 89 kg (196 lb 3.4 oz)   SpO2 98%   Breastfeeding? No   BMI 33.68 kg/m²     "

## 2019-12-31 NOTE — PLAN OF CARE
Reviewed and completed all orders. Printed AVS. I encouraged questions, answered them thoroughly, and evaluated my instructions via teach-back method. I have disconnected patient from monitoring equipment and prepared them for discharge. Patient has met all discharge criteria at this point.

## 2019-12-31 NOTE — ANESTHESIA POSTPROCEDURE EVALUATION
Anesthesia Post Evaluation    Patient: Demetrice Gaines    Procedure(s) Performed: Procedure(s) (LRB):  COLONOSCOPY (N/A)    Final Anesthesia Type: general    Patient location during evaluation: GI PACU  Patient participation: Yes- Able to Participate  Level of consciousness: awake and alert and oriented  Post-procedure vital signs: reviewed and stable  Pain management: adequate  Airway patency: patent    PONV status at discharge: No PONV  Anesthetic complications: no      Cardiovascular status: hemodynamically stable  Respiratory status: unassisted, spontaneous ventilation and room air  Hydration status: euvolemic  Follow-up not needed.          Vitals Value Taken Time   /87 12/31/2019  7:56 AM   Temp 36.3 °C (97.4 °F) 12/31/2019  7:39 AM   Pulse 71 12/31/2019  7:58 AM   Resp 22 12/31/2019  7:58 AM   SpO2 98 % 12/31/2019  7:58 AM   Vitals shown include unvalidated device data.      No case tracking events are documented in the log.      Pain/Bryce Score: Bryce Score: 8 (12/31/2019  7:39 AM)

## 2019-12-31 NOTE — DISCHARGE SUMMARY
Endoscopy Discharge Summary      Admit Date: 12/31/2019    Discharge Date and Time:  12/31/2019 7:35 AM    Attending Physician: Ileana Holcomb MD     Discharge Physician: Ileana Holcomb MD     Principal Admitting Diagnoses: <principal problem not specified>         Discharge Diagnosis: The encounter diagnosis was Colon cancer screening.     Discharged Condition: Good    Indication for Admission: <principal problem not specified>     Hospital Course: Patient was admitted for an inpatient procedure and tolerated the procedure well with no complications.    Significant Diagnostic Studies: Colonoscopy     Pathology (if any):  Specimen (12h ago, onward)    None          Estimated Blood Loss: 0 ml.    Discussed with: patient.    Disposition: Home.    Follow Up/Patient Instructions:   Current Discharge Medication List      CONTINUE these medications which have NOT CHANGED    Details   amLODIPine (NORVASC) 5 MG tablet Take 5 mg by mouth once daily.  Refills: 3      aspirin 81 MG Chew 1 tablet      carvedilol (COREG) 25 MG tablet One Tablet      triamterene-hydrochlorothiazide 37.5-25 mg (DYAZIDE) 37.5-25 mg per capsule 1 capsule in the morning      lancets Misc Check blood sugar once daily.             Discharge Procedure Orders   Diet general     Call MD for:  temperature >100.4     Call MD for:  persistent nausea and vomiting     Call MD for:  severe uncontrolled pain     Call MD for:  difficulty breathing, headache or visual disturbances     Activity as tolerated

## 2019-12-31 NOTE — H&P
PRE PROCEDURE H&P    Patient Name: Demetrice Gaines  MRN: [id number]  : 1960  Date of Procedure:  2019  Referring Physician: Lul Alvarez MD  Primary Physician: Lul Alvarez MD  Procedure Physician: Ileana Holcomb MD       Planned Procedure: Colonoscopy  Diagnosis: previous adenomatous polyp  Chief Complaint: Same as above    HPI: Patient is an 59 y.o. female is here for the above.     Last colonoscopy:   Family history: negative   Anticoagulation: none     Past Medical History:   Past Medical History:   Diagnosis Date    Colon polyp     Diabetes mellitus type I     Hypertension     Myocardial infarction         Past Surgical History:  Past Surgical History:   Procedure Laterality Date    Benign Cyst Right      SECTION      COLONOSCOPY          Home Medications:  Prior to Admission medications    Medication Sig Start Date End Date Taking? Authorizing Provider   amLODIPine (NORVASC) 5 MG tablet Take 5 mg by mouth once daily. 19  Yes Historical Provider, MD   aspirin 81 MG Chew 1 tablet   Yes Historical Provider, MD   carvedilol (COREG) 25 MG tablet One Tablet   Yes Historical Provider, MD   triamterene-hydrochlorothiazide 37.5-25 mg (DYAZIDE) 37.5-25 mg per capsule 1 capsule in the morning   Yes Historical Provider, MD   lancets Misc Check blood sugar once daily. 4/21/15   Historical Provider, MD        Allergies:  Review of patient's allergies indicates:   Allergen Reactions    Lisinopril-hydrochlorothiazide      Other reaction(s): Reaction:Throat swelling;        Social History:   Social History     Socioeconomic History    Marital status:      Spouse name: Not on file    Number of children: Not on file    Years of education: Not on file    Highest education level: Not on file   Occupational History    Not on file   Social Needs    Financial resource strain: Not on file    Food insecurity:     Worry: Not on file     Inability: Not on file     "Transportation needs:     Medical: Not on file     Non-medical: Not on file   Tobacco Use    Smoking status: Never Smoker    Smokeless tobacco: Never Used   Substance and Sexual Activity    Alcohol use: Never     Frequency: Never    Drug use: Never    Sexual activity: Not Currently   Lifestyle    Physical activity:     Days per week: Not on file     Minutes per session: Not on file    Stress: Not on file   Relationships    Social connections:     Talks on phone: Not on file     Gets together: Not on file     Attends [employer] service: Not on file     Active member of club or organization: Not on file     Attends meetings of clubs or organizations: Not on file     Relationship status: Not on file   Other Topics Concern    Not on file   Social History Narrative    Not on file       Family History:  Family History   Problem Relation Age of Onset    Cancer Mother     Pacemaker/defibrilator Mother     Diabetes Father     Hypertension Sister     Hypertension Brother     Cancer Paternal Grandmother        ROS: No acute cardiac events, no acute respiratory complaints.     Physical Exam (all patients):    BP (!) 155/96 (BP Location: Right arm, Patient Position: Sitting)   Pulse 83   Temp 97.8 °F (36.6 °C) (Oral)   Resp 18   Ht 5' 4" (1.626 m)   Wt 89 kg (196 lb 3.4 oz)   SpO2 98%   Breastfeeding? No   BMI 33.68 kg/m²   Lungs: Clear to auscultation bilaterally, respirations unlabored  Heart: Regular rate and rhythm, S1 and S2 normal, no obvious murmurs  Abdomen:         Soft, non-tender, bowel sounds normal, no masses, no organomegaly    Lab Results   Component Value Date    WBC 8.11 12/05/2019    MCV 88 12/05/2019    RDW 14.2 12/05/2019     (H) 12/05/2019     12/05/2019    HGBA1C 6.9 (H) 12/05/2019    BUN 11 12/05/2019     12/05/2019    K 4.3 12/05/2019     12/05/2019        SEDATION PLAN: per anesthesia      History reviewed, vital signs satisfactory, cardiopulmonary status " satisfactory, sedation options, risks and plans have been discussed with the patient  All their questions were answered and the patient agrees to the sedation procedures as planned and the patient is deemed an appropriate candidate for the sedation as planned.    Procedure explained to patient, informed consent obtained and placed in chart.    Ileana Holcomb  12/31/2019  6:52 AM

## 2019-12-31 NOTE — [employer] PATIENT INSTRUCTIONS
Discharge Summary/Instructions after an Endoscopic Procedure  Patient Name: Demetrice Gaines  Patient MRN: [id number]  Patient YOB: 1960 Tuesday, December 31, 2019  Ileana Holcomb MD  RESTRICTIONS:  During your procedure today, you received medications for sedation.  These   medications may affect your judgment, balance and coordination.  Therefore,   for 24 hours, you have the following restrictions:   - DO NOT drive a car, operate machinery, make legal/financial decisions,   sign important papers or drink alcohol.    ACTIVITY:  Today: no heavy lifting, straining or running due to procedural   sedation/anesthesia.  The following day: return to full activity including work.  DIET:  Eat and drink normally unless instructed otherwise.     TREATMENT FOR COMMON SIDE EFFECTS:  - Mild abdominal pain, nausea, belching, bloating or excessive gas:  rest,   eat lightly and use a heating pad.  - Sore Throat: treat with throat lozenges and/or gargle with warm salt   water.  - Because air was used during the procedure, expelling large amounts of air   from your rectum or belching is normal.  - If a bowel prep was taken, you may not have a bowel movement for 1-3 days.    This is normal.  SYMPTOMS TO WATCH FOR AND REPORT TO YOUR PHYSICIAN:  1. Abdominal pain or bloating, other than gas cramps.  2. Chest pain.  3. Back pain.  4. Signs of infection such as: chills or fever occurring within 24 hours   after the procedure.  5. Rectal bleeding, which would show as bright red, maroon, or black stools.   (A tablespoon of blood from the rectum is not serious, especially if   hemorrhoids are present.)  6. Vomiting.  7. Weakness or dizziness.  GO DIRECTLY TO THE NEAREST EMERGENCY ROOM IF YOU HAVE ANY OF THE FOLLOWING:      Difficulty breathing              Chills and/or fever over 101 F   Persistent vomiting and/or vomiting blood   Severe abdominal pain   Severe chest pain   Black, tarry stools   Bleeding- more than one  tablespoon   Any other symptom or condition that you feel may need urgent attention  Your doctor recommends these additional instructions:  If any biopsies were taken, your doctors clinic will contact you in 1 to 2   weeks with any results.  - Patient has a contact number available for emergencies.  The signs and   symptoms of potential delayed complications were discussed with the   patient.  Return to normal activities tomorrow.  Written discharge   instructions were provided to the patient.   - Discharge patient to home (via wheelchair).   - Resume previous diet today.   - Continue present medications.   - Repeat colonoscopy in 10 years for screening purposes.  For questions, problems or results please call your physician Ileana Holcomb MD at Work:  [phone]  If you have any questions about the above instructions, call the GI   department at [phone] or call the endoscopy unit at [phone]   from 7am until 3 pm.  [facility] [place], EMERGENCY ROOM PHONE NUMBER:   [phone]  IF A COMPLICATION OR EMERGENCY SITUATION ARISES AND YOU ARE UNABLE TO REACH   YOUR PHYSICIAN - GO DIRECTLY TO THE EMERGENCY ROOM.  I have read or have had read to me these discharge instructions for my   procedure and have received a written copy.  I understand these   instructions and will follow-up with my physician if I have any questions.     __________________________________       _____________________________________  Nurse Signature                                          Patient/Designated   Responsible Party Signature  MD Ileana Pablo MD  12/31/2019 7:39:57 AM  This report has been verified and signed electronically.  [employer]

## 2019-12-31 NOTE — ANESTHESIA PREPROCEDURE EVALUATION
12/31/2019  Demetrice Gaines is a 59 y.o., female.    Anesthesia Evaluation    I have reviewed the Patient Summary Reports.    I have reviewed the Nursing Notes.   I have reviewed the Medications.     Review of Systems  Anesthesia Hx:  History of prior surgery of interest to airway management or planning:   Social:  Non-Smoker    Cardiovascular:   Hypertension Past MI  ECG has been reviewed.        Physical Exam  General:  Obesity            Mental Status:  Mental Status Findings:  Alert and Oriented, Cooperative         Anesthesia Plan  Type of Anesthesia, risks & benefits discussed:  Anesthesia Type:  general  Patient's Preference:   Intra-op Monitoring Plan: standard ASA monitors  Intra-op Monitoring Plan Comments:   Post Op Pain Control Plan:   Post Op Pain Control Plan Comments:   Induction:   IV  Beta Blocker:  Patient is on a Beta-Blocker and has received one dose within the past 24 hours (No further documentation required).       Informed Consent: Patient understands risks and agrees with Anesthesia plan.  Questions answered. Anesthesia consent signed with patient.  ASA Score: 3     Day of Surgery Review of History & Physical:    H&P update referred to the surgeon.         Ready For Surgery From Anesthesia Perspective.

## 2020-01-06 ENCOUNTER — TELEPHONE (OUTPATIENT)
Dept: OBSTETRICS AND GYNECOLOGY | Facility: CLINIC | Age: 60
End: 2020-01-06

## 2020-01-06 NOTE — TELEPHONE ENCOUNTER
----- Message from Lisandra Hanley sent at 1/6/2020  9:47 AM CST -----  Contact: pt   She's calling in regards to RS APPT       pls call pt back at [phone] (home)

## 2020-01-27 ENCOUNTER — OFFICE VISIT (OUTPATIENT)
Dept: OBSTETRICS AND GYNECOLOGY | Facility: CLINIC | Age: 60
End: 2020-01-27
Payer: MEDICARE

## 2020-01-27 VITALS
SYSTOLIC BLOOD PRESSURE: 148 MMHG | BODY MASS INDEX: 33.46 KG/M2 | HEIGHT: 64 IN | DIASTOLIC BLOOD PRESSURE: 88 MMHG | WEIGHT: 196 LBS

## 2020-01-27 DIAGNOSIS — Z12.4 PAP SMEAR FOR CERVICAL CANCER SCREENING: Primary | ICD-10-CM

## 2020-01-27 DIAGNOSIS — D21.9 FIBROIDS: ICD-10-CM

## 2020-01-27 PROCEDURE — 88175 CYTOPATH C/V AUTO FLUID REDO: CPT

## 2020-01-27 PROCEDURE — 87624 HPV HI-RISK TYP POOLED RSLT: CPT

## 2020-01-27 PROCEDURE — 3077F PR MOST RECENT SYSTOLIC BLOOD PRESSURE >= 140 MM HG: ICD-10-PCS | Mod: CPTII,S$GLB,, | Performed by: OBSTETRICS & GYNECOLOGY

## 2020-01-27 PROCEDURE — 3077F SYST BP >= 140 MM HG: CPT | Mod: CPTII,S$GLB,, | Performed by: OBSTETRICS & GYNECOLOGY

## 2020-01-27 PROCEDURE — 3079F DIAST BP 80-89 MM HG: CPT | Mod: CPTII,S$GLB,, | Performed by: OBSTETRICS & GYNECOLOGY

## 2020-01-27 PROCEDURE — 99213 OFFICE O/P EST LOW 20 MIN: CPT | Mod: S$GLB,,, | Performed by: OBSTETRICS & GYNECOLOGY

## 2020-01-27 PROCEDURE — 3008F BODY MASS INDEX DOCD: CPT | Mod: CPTII,S$GLB,, | Performed by: OBSTETRICS & GYNECOLOGY

## 2020-01-27 PROCEDURE — 3079F PR MOST RECENT DIASTOLIC BLOOD PRESSURE 80-89 MM HG: ICD-10-PCS | Mod: CPTII,S$GLB,, | Performed by: OBSTETRICS & GYNECOLOGY

## 2020-01-27 PROCEDURE — 3008F PR BODY MASS INDEX (BMI) DOCUMENTED: ICD-10-PCS | Mod: CPTII,S$GLB,, | Performed by: OBSTETRICS & GYNECOLOGY

## 2020-01-27 PROCEDURE — 99999 PR PBB SHADOW E&M-EST. PATIENT-LVL II: CPT | Mod: PBBFAC,,, | Performed by: OBSTETRICS & GYNECOLOGY

## 2020-01-27 PROCEDURE — 99999 PR PBB SHADOW E&M-EST. PATIENT-LVL II: ICD-10-PCS | Mod: PBBFAC,,, | Performed by: OBSTETRICS & GYNECOLOGY

## 2020-01-27 PROCEDURE — 99213 PR OFFICE/OUTPT VISIT, EST, LEVL III, 20-29 MIN: ICD-10-PCS | Mod: S$GLB,,, | Performed by: OBSTETRICS & GYNECOLOGY

## 2020-01-27 NOTE — PROGRESS NOTES
Subjective:       Patient ID: Demetrice Gaines is a 59 y.o. female.    Chief Complaint:  Follow-up (repeat pap)      History of Present Illness  HPI  Pt is her for follow up.  Was seeing Dr. Summers for her fibroids.  Reports she continues to have symptoms of lower abdominal/pelvic discomfort but is coping well with these symptoms.  Denies any vaginal bleeding.  Would like to discuss results and options.  Pt was also told that something happened to her last pap smear and that she needed to have another one performed.    GYN & OB History  No LMP recorded. Patient is postmenopausal.   Date of Last Pap: No result found    OB History    Para Term  AB Living   2 2 2     2   SAB TAB Ectopic Multiple Live Births           2      # Outcome Date GA Lbr Enrique/2nd Weight Sex Delivery Anes PTL Lv   2 Term     M CS-LTranv   MAGDA   1 Term     M Vag-Spont   MAGDA       Review of Systems  Review of Systems   Constitutional: Negative for activity change, appetite change, chills, fatigue, fever and unexpected weight change.   Respiratory: Negative for shortness of breath.    Cardiovascular: Negative for chest pain, palpitations and leg swelling.   Gastrointestinal: Positive for abdominal pain. Negative for bloating, blood in stool, constipation, diarrhea, nausea and vomiting.   Genitourinary: Positive for pelvic pain. Negative for dyspareunia, dysuria, flank pain, frequency, genital sores, hematuria, hot flashes, urgency, vaginal bleeding, vaginal discharge, vaginal pain, urinary incontinence, postmenopausal bleeding, vaginal dryness and vaginal odor.   Musculoskeletal: Positive for back pain.   Integumentary:  Negative for breast mass, nipple discharge, breast skin changes and breast tenderness.   Neurological: Negative for syncope and headaches.   Breast: Negative for asymmetry, lump, mass, mastodynia, nipple discharge, skin changes and tenderness          Objective:    Physical Exam:   Constitutional: She is oriented to  person, place, and time. She appears well-developed and well-nourished. No distress.       Cardiovascular: Normal rate and regular rhythm.     Pulmonary/Chest: Effort normal and breath sounds normal.        Abdominal: Soft. Bowel sounds are normal. She exhibits no distension. There is no tenderness.     Genitourinary: Vagina normal. Pelvic exam was performed with patient supine. There is no rash, tenderness, lesion or injury on the right labia. There is no rash, tenderness, lesion or injury on the left labia. Uterus is enlarged and hosting fibroids. Uterus is not tender. Cervix is normal. Right adnexum displays no mass, no tenderness and no fullness. Left adnexum displays no mass, no tenderness and no fullness. No erythema, tenderness or bleeding in the vagina. No foreign body in the vagina. No signs of injury around the vagina. No vaginal discharge found. Cervix exhibits no motion tenderness, no discharge and no friability. Additional cervical findings: pap smear done       Uterus Size: 10 cm   Musculoskeletal: Normal range of motion and moves all extremeties. She exhibits no edema or tenderness.       Neurological: She is alert and oriented to person, place, and time.    Skin: Skin is warm and dry.    Psychiatric: She has a normal mood and affect. Her behavior is normal. Thought content normal.          US pelvis 12/26/19:  Uterus: Size: 10 x 8.4 x 7.1 cm Masses: Multiple fibroids are seen.  For example, there is a 3.4 cm intramural nodule within the anterior upper body of the uterus near the junction with the fundus.  A 2nd fibroid is seen measuring 2.8 cm located in the anterior fundus.  A 3rd fibroid measuring 3.9 cm is seen arising from the posterior fundus.  Endometrium: Thickened in this post menopausal patient, measuring 8.6 mm.  Evaluation is limited.  Measurement given is transabdominal.  Right ovary: Size: 4.1 x 2.5 x 2.5 cm.  Slightly heterogeneous. No ovarian lesions are seen.  Left ovary: Could not be  visualized  Free Fluid: None.  Note is made of nabothian cysts.     Assessment:        1. Pap smear for cervical cancer screening    2. Fibroids              Plan:      Pap smear for cervical cancer screening  -     Liquid-Based Pap Smear, Screening  -     HPV High Risk Genotypes, PCR  -     Pt was counseled on cervical/vaginal screening guidelines and recommendations.  If today's pap smear result is negative, next pap smear will be due in 2023.  -     Follow up with PCP for routine health maintenance needs.    Fibroids  -     Pt was counseled on fibroids, including common signs and symptoms.  Symptoms are moderately disruptive and are well tolerated.  Pt is done with her fertility and has no desire for future childbearing.  Treatment options were reviewed with pt, including expectant vs medical vs fibroid embolization vs myomectomy vs hysterectomy.  Pt voiced understanding and desires to proceed with expectant management.      Follow up for Annual exam.

## 2020-01-28 ENCOUNTER — PATIENT MESSAGE (OUTPATIENT)
Dept: INTERNAL MEDICINE | Facility: CLINIC | Age: 60
End: 2020-01-28

## 2020-01-28 ENCOUNTER — PATIENT MESSAGE (OUTPATIENT)
Dept: CARDIOLOGY | Facility: CLINIC | Age: 60
End: 2020-01-28

## 2020-01-29 ENCOUNTER — OFFICE VISIT (OUTPATIENT)
Dept: CARDIOLOGY | Facility: CLINIC | Age: 60
End: 2020-01-29
Payer: MEDICARE

## 2020-01-29 VITALS
DIASTOLIC BLOOD PRESSURE: 98 MMHG | SYSTOLIC BLOOD PRESSURE: 132 MMHG | OXYGEN SATURATION: 97 % | HEART RATE: 75 BPM | WEIGHT: 198.44 LBS | BODY MASS INDEX: 34.06 KG/M2

## 2020-01-29 DIAGNOSIS — I10 ESSENTIAL HYPERTENSION: Primary | Chronic | ICD-10-CM

## 2020-01-29 DIAGNOSIS — Z86.010 HISTORY OF COLON POLYPS: ICD-10-CM

## 2020-01-29 DIAGNOSIS — I25.2 OLD MYOCARDIAL INFARCTION: Chronic | ICD-10-CM

## 2020-01-29 PROCEDURE — 99499 UNLISTED E&M SERVICE: CPT | Mod: S$GLB,,, | Performed by: INTERNAL MEDICINE

## 2020-01-29 PROCEDURE — 99999 PR PBB SHADOW E&M-EST. PATIENT-LVL III: CPT | Mod: PBBFAC,,, | Performed by: INTERNAL MEDICINE

## 2020-01-29 PROCEDURE — 99999 PR PBB SHADOW E&M-EST. PATIENT-LVL III: ICD-10-PCS | Mod: PBBFAC,,, | Performed by: INTERNAL MEDICINE

## 2020-01-29 PROCEDURE — 3008F BODY MASS INDEX DOCD: CPT | Mod: CPTII,S$GLB,, | Performed by: INTERNAL MEDICINE

## 2020-01-29 PROCEDURE — 3080F PR MOST RECENT DIASTOLIC BLOOD PRESSURE >= 90 MM HG: ICD-10-PCS | Mod: CPTII,S$GLB,, | Performed by: INTERNAL MEDICINE

## 2020-01-29 PROCEDURE — 3080F DIAST BP >= 90 MM HG: CPT | Mod: CPTII,S$GLB,, | Performed by: INTERNAL MEDICINE

## 2020-01-29 PROCEDURE — 3075F SYST BP GE 130 - 139MM HG: CPT | Mod: CPTII,S$GLB,, | Performed by: INTERNAL MEDICINE

## 2020-01-29 PROCEDURE — 99214 OFFICE O/P EST MOD 30 MIN: CPT | Mod: S$GLB,,, | Performed by: INTERNAL MEDICINE

## 2020-01-29 PROCEDURE — 99499 RISK ADDL DX/OHS AUDIT: ICD-10-PCS | Mod: S$GLB,,, | Performed by: INTERNAL MEDICINE

## 2020-01-29 PROCEDURE — 99214 PR OFFICE/OUTPT VISIT, EST, LEVL IV, 30-39 MIN: ICD-10-PCS | Mod: S$GLB,,, | Performed by: INTERNAL MEDICINE

## 2020-01-29 PROCEDURE — 3008F PR BODY MASS INDEX (BMI) DOCUMENTED: ICD-10-PCS | Mod: CPTII,S$GLB,, | Performed by: INTERNAL MEDICINE

## 2020-01-29 PROCEDURE — 3075F PR MOST RECENT SYSTOLIC BLOOD PRESS GE 130-139MM HG: ICD-10-PCS | Mod: CPTII,S$GLB,, | Performed by: INTERNAL MEDICINE

## 2020-01-29 RX ORDER — TRIAMTERENE AND HYDROCHLOROTHIAZIDE 37.5; 25 MG/1; MG/1
CAPSULE ORAL
Qty: 90 CAPSULE | Refills: 3 | Status: SHIPPED | OUTPATIENT
Start: 2020-01-29 | End: 2020-02-06 | Stop reason: SDUPTHER

## 2020-01-29 NOTE — PROGRESS NOTES
Subjective:   Patient ID:  Demetrice Gaines is a 59 y.o. female who presents for follow up of Follow-up (echo nm pharm stress)      HPI  A 58 yo female with diabetes htn old mi carth unrevealing is here for f/u has been seen in december for atypical symptoms.she HAd a cardiolite that was negative and had  And echo showing normal r lvf and mild ai. She ran out of her diuretics that is why bp is Elevated otherwisE no change in symptoms or ros.SLEEP STUDY PENDING. HER  REPORTS SHE SNORES.   Past Medical History:   Diagnosis Date    Colon polyp     Diabetes mellitus type I     Hypertension     Myocardial infarction        Past Surgical History:   Procedure Laterality Date    Benign Cyst Right      SECTION      COLONOSCOPY      COLONOSCOPY N/A 2019    Procedure: COLONOSCOPY;  Surgeon: Ileana Holcomb MD;  Location: [facility];  Service: Endoscopy;  Laterality: N/A;       Social History     Tobacco Use    Smoking status: Never Smoker    Smokeless tobacco: Never Used   Substance Use Topics    Alcohol use: Never     Frequency: Never    Drug use: Never       Family History   Problem Relation Age of Onset    Cancer Mother     Pacemaker/defibrilator Mother     Diabetes Father     Hypertension Sister     Hypertension Brother     Cancer Paternal Grandmother        Current Outpatient Medications   Medication Sig    amLODIPine (NORVASC) 5 MG tablet Take 5 mg by mouth once daily.    aspirin 81 MG Chew 1 tablet    carvedilol (COREG) 25 MG tablet One Tablet    lancets Misc Check blood sugar once daily.    triamterene-hydrochlorothiazide 37.5-25 mg (DYAZIDE) 37.5-25 mg per capsule 1 capsule in the morning     No current facility-administered medications for this visit.      Current Outpatient Medications on File Prior to Visit   Medication Sig    amLODIPine (NORVASC) 5 MG tablet Take 5 mg by mouth once daily.    aspirin 81 MG Chew 1 tablet    carvedilol (COREG) 25 MG tablet One Tablet     lancets Misc Check blood sugar once daily.    triamterene-hydrochlorothiazide 37.5-25 mg (DYAZIDE) 37.5-25 mg per capsule 1 capsule in the morning     No current facility-administered medications on file prior to visit.      Review of patient's allergies indicates:   Allergen Reactions    Lisinopril-hydrochlorothiazide      Other reaction(s): Reaction:Throat swelling;     ROS ALL REPORTED SYMPTOMS IMPROVED.  Constitution: Positive for weight gain. Negative for diaphoresis and malaise/fatigue.   HENT: Negative for hoarse voice.    Eyes: Negative for double vision and visual disturbance.   Cardiovascular: Positive for chest pain. Negative for claudication, cyanosis, dyspnea on exertion, irregular heartbeat, leg swelling, near-syncope, orthopnea, palpitations, paroxysmal nocturnal dyspnea and syncope.   Respiratory: Positive for shortness of breath and snoring. Negative for cough and hemoptysis.         Choking sensation around neck.   Hematologic/Lymphatic: Negative for bleeding problem. Does not bruise/bleed easily.   Skin: Negative for color change and poor wound healing.   Musculoskeletal: Negative for muscle cramps, muscle weakness and myalgias.        Arm pain bilateral    Gastrointestinal: Negative for bloating, abdominal pain, change in bowel habit, diarrhea, heartburn, hematemesis, hematochezia, melena and nausea.   Neurological: Negative for excessive daytime sleepiness, dizziness, headaches, light-headedness, loss of balance, numbness and weakness.   Psychiatric/Behavioral: Negative for memory loss. The patient does not have insomnia.    Allergic/Immunologic: Negative for hives.  Objective:   Physical Exam  Vitals:    01/29/20 1544 01/29/20 1548   BP: (!) 132/90 (!) 132/98   BP Location: Right arm Left arm   Patient Position: Sitting Sitting   BP Method: Large (Manual) Large (Manual)   Pulse: 75    SpO2: 97%    Weight: 90 kg (198 lb 6.6 oz)    Constitutional: She is oriented to person, place, and time.  She appears well-developed and well-nourished. She does not appear ill. No distress.   HENT:   Head: Normocephalic and atraumatic.   Eyes: Pupils are equal, round, and reactive to light. EOM are normal. No scleral icterus.   Neck: Normal range of motion. Neck supple. Normal carotid pulses, no hepatojugular reflux and no JVD present. Carotid bruit is not present. No tracheal deviation present. No thyromegaly present.   Cardiovascular: Normal rate, regular rhythm, normal heart sounds and normal pulses. Exam reveals no gallop and no friction rub.   No murmur heard.  Pulses:       Carotid pulses are 2+ on the right side, and 2+ on the left side.       Radial pulses are 2+ on the right side, and 2+ on the left side.        Femoral pulses are 2+ on the right side, and 2+ on the left side.       Popliteal pulses are 2+ on the right side, and 2+ on the left side.        Dorsalis pedis pulses are 2+ on the right side, and 2+ on the left side.        Posterior tibial pulses are 2+ on the right side, and 2+ on the left side.   Pulmonary/Chest: Effort normal and breath sounds normal. No respiratory distress. She has no wheezes. She has no rhonchi. She has no rales. She exhibits no tenderness.   Abdominal: Soft. Normal appearance, normal aorta and bowel sounds are normal. She exhibits no distension, no abdominal bruit, no ascites and no pulsatile midline mass. There is no hepatomegaly. There is no tenderness.   obese   Musculoskeletal: She exhibits no edema.        Right shoulder: She exhibits no deformity.   Neurological: She is alert and oriented to person, place, and time. She has normal strength. No cranial nerve deficit. Coordination normal.   Skin: Skin is warm and dry. No rash noted. She is not diaphoretic. No cyanosis or erythema. Nails show no clubbing.   Psychiatric: She has a normal mood and affect. Her speech is normal and behavior is normal.   Nursing note and vitals reviewed.  Lab Results   Component Value Date     CHOL 196 12/05/2019    CHOL 192 08/22/2005     Lab Results   Component Value Date    HDL 46 12/05/2019    HDL 41.0 08/22/2005     Lab Results   Component Value Date    LDLCALC 126.8 12/05/2019    LDLCALC 110.0 08/22/2005     Lab Results   Component Value Date    TRIG 116 12/05/2019    TRIG 205 (H) 08/22/2005     Lab Results   Component Value Date    CHOLHDL 23.5 12/05/2019    CHOLHDL 21.4 08/22/2005       Chemistry        Component Value Date/Time     12/05/2019 1508    K 4.3 12/05/2019 1508     12/05/2019 1508    CO2 28 12/05/2019 1508    BUN 11 12/05/2019 1508    CREATININE 1.0 12/05/2019 1508     12/05/2019 1508        Component Value Date/Time    CALCIUM 10.3 12/05/2019 1508    ALKPHOS 71 12/05/2019 1508    AST 32 12/05/2019 1508    ALT 26 12/05/2019 1508    BILITOT 0.5 12/05/2019 1508    ESTGFRAFRICA >60.0 12/05/2019 1508    EGFRNONAA >60.0 12/05/2019 1508          Lab Results   Component Value Date    TSH 0.421 12/05/2019     No results found for: INR, PROTIME  Lab Results   Component Value Date    WBC 8.11 12/05/2019    HGB 14.4 12/05/2019    HCT 45.1 12/05/2019    MCV 88 12/05/2019     (H) 12/05/2019     BMP  Sodium   Date Value Ref Range Status   12/05/2019 140 136 - 145 mmol/L Final     Potassium   Date Value Ref Range Status   12/05/2019 4.3 3.5 - 5.1 mmol/L Final     Chloride   Date Value Ref Range Status   12/05/2019 101 95 - 110 mmol/L Final     CO2   Date Value Ref Range Status   12/05/2019 28 23 - 29 mmol/L Final     BUN, Bld   Date Value Ref Range Status   12/05/2019 11 8 - 23 mg/dL Final     Creatinine   Date Value Ref Range Status   12/05/2019 1.0 0.5 - 1.4 mg/dL Final     Calcium   Date Value Ref Range Status   12/05/2019 10.3 8.7 - 10.5 mg/dL Final     Anion Gap   Date Value Ref Range Status   12/05/2019 11 8 - 16 mmol/L Final     eGFR if    Date Value Ref Range Status   12/05/2019 >60.0 >60 mL/min/1.73 m^2 Final     eGFR if non    Date  Value Ref Range Status   12/05/2019 >60.0 >60 mL/min/1.73 m^2 Final     Comment:     Calculation used to obtain the estimated glomerular filtration  rate (eGFR) is the CKD-EPI equation.        CrCl cannot be calculated (Patient's most recent lab result is older than the maximum 7 days allowed.).    Assessment:     1. Essential hypertension    2. Old myocardial infarction    3. History of colon polyps      NEGATIVE W/U. NEEDS COMPLIANCE NO CARDIAC SYMPTOMATOLOGY.  NEEDS HER DIURETICS REFILLED.  COUNSELED ABOUT DIET EXERCISE WEIGHT LOSS COMPLIANCE  Plan:   Continue current therapy  Cardiac low salt diet.  Risk factor modification and excercise program/ WEIGHT LOSS.  F/U PRN.

## 2020-02-03 LAB
HPV HR 12 DNA SPEC QL NAA+PROBE: NEGATIVE
HPV16 AG SPEC QL: NEGATIVE
HPV18 DNA SPEC QL NAA+PROBE: NEGATIVE

## 2020-02-05 DIAGNOSIS — I10 ESSENTIAL HYPERTENSION: Chronic | ICD-10-CM

## 2020-02-05 DIAGNOSIS — I25.2 OLD MYOCARDIAL INFARCTION: Chronic | ICD-10-CM

## 2020-02-05 NOTE — TELEPHONE ENCOUNTER
----- Message from Sim Flores sent at 2/5/2020  8:32 AM CST -----  Contact: pt  Type:  RX Refill Request    Who Called: SHAY CAST S   Refill or New Rx: refill   RX Name and Strength: amLODIPine    How is the patient currently taking it? (ex. 1XDay): 1 daily   Is this a 30 day or 90 day RX: 90 day   Preferred Pharmacy with phone number:     [facility] [address] [place] [address] ROAD  [address] [place] [address]  Phone: [phone] Fax: [phone]    Local or Mail Order: local   Ordering Provider:  Antonio   Would the patient rather a call back or a response via [facility]? Call   Best Call Back Number: [phone] (home)   Additional Information:  Pt needs this refilled to day              Type:  RX Refill Request    Who Called: SHAY CAST S   Refill or New Rx: refill   RX Name and Strength: carvedilol     How is the patient currently taking it? (ex. 1XDay): twice daily   Is this a 30 day or 90 day RX: 90 day   Preferred Pharmacy with phone number:     [address]  [address] [place] [address]  Phone: [phone] Fax: [phone]    Local or Mail Order: local   Ordering Provider:  Antonio   Would the patient rather a call back or a response via [facility]? Call   Best Call Back Number: [phone] (home)   Additional Information:                Type:  RX Refill Request    Who Called: SHAY CAST S   Refill or New Rx: refill   RX Name and Strength: triamterene-hydrochlorothiazide     How is the patient currently taking it? (ex. 1XDay): 1 daily   Is this a 30 day or 90 day RX: 90 day   Preferred Pharmacy with phone number:     [address] [address] [address]  [id number] [place] [address]  Phone: [phone] Fax: [phone]    Local or Mail Order: local   Ordering Provider:  Antonio   Would the patient rather a call back or a response via My  [facility]? Call   Best Call Back Number: [phone] (home)   Additional Information:

## 2020-02-06 ENCOUNTER — PATIENT MESSAGE (OUTPATIENT)
Dept: INTERNAL MEDICINE | Facility: CLINIC | Age: 60
End: 2020-02-06

## 2020-02-06 DIAGNOSIS — I10 ESSENTIAL HYPERTENSION: Chronic | ICD-10-CM

## 2020-02-06 DIAGNOSIS — I25.2 OLD MYOCARDIAL INFARCTION: Chronic | ICD-10-CM

## 2020-02-06 RX ORDER — CARVEDILOL 25 MG/1
25 TABLET ORAL 2 TIMES DAILY
Qty: 180 TABLET | Refills: 0 | Status: SHIPPED | OUTPATIENT
Start: 2020-02-06 | End: 2020-05-05 | Stop reason: SDUPTHER

## 2020-02-06 RX ORDER — AMLODIPINE BESYLATE 5 MG/1
5 TABLET ORAL DAILY
Qty: 90 TABLET | Refills: 0 | Status: SHIPPED | OUTPATIENT
Start: 2020-02-06 | End: 2020-05-05

## 2020-02-06 RX ORDER — TRIAMTERENE AND HYDROCHLOROTHIAZIDE 37.5; 25 MG/1; MG/1
CAPSULE ORAL
Qty: 90 CAPSULE | Refills: 3 | Status: SHIPPED | OUTPATIENT
Start: 2020-02-06 | End: 2020-02-06 | Stop reason: SDUPTHER

## 2020-02-06 RX ORDER — TRIAMTERENE AND HYDROCHLOROTHIAZIDE 37.5; 25 MG/1; MG/1
1 CAPSULE ORAL DAILY
Qty: 90 CAPSULE | Refills: 0 | Status: SHIPPED | OUTPATIENT
Start: 2020-02-06 | End: 2020-05-05 | Stop reason: SDUPTHER

## 2020-02-06 NOTE — TELEPHONE ENCOUNTER
Approved but for a three month supply only   Patient needs a follow up appointment.  No additional refills without a visit.  Please call and schedule.

## 2020-02-19 LAB
FINAL PATHOLOGIC DIAGNOSIS: NORMAL
Lab: NORMAL

## 2020-05-05 DIAGNOSIS — I25.2 OLD MYOCARDIAL INFARCTION: Chronic | ICD-10-CM

## 2020-05-05 DIAGNOSIS — I10 ESSENTIAL HYPERTENSION: Chronic | ICD-10-CM

## 2020-05-05 RX ORDER — TRIAMTERENE AND HYDROCHLOROTHIAZIDE 37.5; 25 MG/1; MG/1
1 CAPSULE ORAL DAILY
Qty: 90 CAPSULE | Refills: 0 | Status: SHIPPED | OUTPATIENT
Start: 2020-05-05 | End: 2020-05-26 | Stop reason: SDUPTHER

## 2020-05-05 RX ORDER — AMLODIPINE BESYLATE 5 MG/1
5 TABLET ORAL DAILY
Qty: 90 TABLET | Refills: 0 | Status: CANCELLED | OUTPATIENT
Start: 2020-05-05 | End: 2020-08-03

## 2020-05-05 RX ORDER — AMLODIPINE BESYLATE 5 MG/1
5 TABLET ORAL DAILY
Qty: 90 TABLET | Refills: 0 | Status: CANCELLED | OUTPATIENT
Start: 2020-05-05

## 2020-05-05 RX ORDER — CARVEDILOL 25 MG/1
25 TABLET ORAL 2 TIMES DAILY
Qty: 180 TABLET | Refills: 0 | Status: SHIPPED | OUTPATIENT
Start: 2020-05-05 | End: 2020-05-26 | Stop reason: SDUPTHER

## 2020-05-05 RX ORDER — AMLODIPINE BESYLATE 5 MG/1
TABLET ORAL
Qty: 90 TABLET | Refills: 0 | Status: SHIPPED | OUTPATIENT
Start: 2020-05-05 | End: 2020-05-26 | Stop reason: SDUPTHER

## 2020-05-05 NOTE — TELEPHONE ENCOUNTER
Spoke with pharmacist in regards to patient medication being refill, medication filled with no refills.//KM

## 2020-05-05 NOTE — TELEPHONE ENCOUNTER
Phone patient in to refill of medications and notified patient that Dr. Alvarez recommended she scheduled up a follow up appointment. Patient stated that she will call back once she knows a date.//KM

## 2020-05-26 DIAGNOSIS — I25.2 OLD MYOCARDIAL INFARCTION: Chronic | ICD-10-CM

## 2020-05-26 DIAGNOSIS — I10 ESSENTIAL HYPERTENSION: Chronic | ICD-10-CM

## 2020-05-26 RX ORDER — TRIAMTERENE AND HYDROCHLOROTHIAZIDE 37.5; 25 MG/1; MG/1
1 CAPSULE ORAL DAILY
Qty: 90 CAPSULE | Refills: 3 | Status: ON HOLD | OUTPATIENT
Start: 2020-05-26 | End: 2020-06-15 | Stop reason: HOSPADM

## 2020-05-26 RX ORDER — CARVEDILOL 25 MG/1
25 TABLET ORAL 2 TIMES DAILY
Qty: 180 TABLET | Refills: 3 | Status: SHIPPED | OUTPATIENT
Start: 2020-05-26 | End: 2020-11-16 | Stop reason: SDUPTHER

## 2020-05-26 RX ORDER — AMLODIPINE BESYLATE 5 MG/1
5 TABLET ORAL DAILY
Qty: 90 TABLET | Refills: 3 | Status: SHIPPED | OUTPATIENT
Start: 2020-05-26 | End: 2020-07-23 | Stop reason: SDUPTHER

## 2020-06-13 ENCOUNTER — HOSPITAL ENCOUNTER (OUTPATIENT)
Facility: HOSPITAL | Age: 60
Discharge: HOME | End: 2020-06-15
Attending: EMERGENCY MEDICINE | Admitting: INTERNAL MEDICINE
Payer: MEDICARE

## 2020-06-13 DIAGNOSIS — R79.89 ELEVATED TROPONIN: ICD-10-CM

## 2020-06-13 DIAGNOSIS — R06.02 SOB (SHORTNESS OF BREATH): ICD-10-CM

## 2020-06-13 DIAGNOSIS — I21.4 NSTEMI (NON-ST ELEVATED MYOCARDIAL INFARCTION): Primary | ICD-10-CM

## 2020-06-13 DIAGNOSIS — E87.70 HYPERVOLEMIA, UNSPECIFIED HYPERVOLEMIA TYPE: ICD-10-CM

## 2020-06-13 DIAGNOSIS — I50.9 ACUTE CONGESTIVE HEART FAILURE, UNSPECIFIED HEART FAILURE TYPE: ICD-10-CM

## 2020-06-13 PROBLEM — E11.9 DIABETES MELLITUS, TYPE II: Status: ACTIVE | Noted: 2020-06-13

## 2020-06-13 PROBLEM — E86.1 INTRAVASCULAR VOLUME DEPLETION: Status: ACTIVE | Noted: 2020-06-13

## 2020-06-13 LAB
ALBUMIN SERPL BCP-MCNC: 2.7 G/DL (ref 3.5–5.2)
ALP SERPL-CCNC: 182 U/L (ref 55–135)
ALT SERPL W/O P-5'-P-CCNC: 38 U/L (ref 10–44)
ANION GAP SERPL CALC-SCNC: 13 MMOL/L (ref 8–16)
ANION GAP SERPL CALC-SCNC: 16 MMOL/L (ref 8–16)
AORTIC ROOT ANNULUS: 3.29 CM
APTT BLDCRRT: 34.5 SEC (ref 21–32)
AST SERPL-CCNC: 33 U/L (ref 10–40)
AV INDEX (PROSTH): 0.86
AV MEAN GRADIENT: 3 MMHG
AV PEAK GRADIENT: 5 MMHG
AV VALVE AREA: 2.83 CM2
AV VELOCITY RATIO: 0.82
BACTERIA #/AREA URNS HPF: ABNORMAL /HPF
BASOPHILS # BLD AUTO: 0.04 K/UL (ref 0–0.2)
BASOPHILS NFR BLD: 0.4 % (ref 0–1.9)
BILIRUB SERPL-MCNC: 3.6 MG/DL (ref 0.1–1)
BILIRUB UR QL STRIP: ABNORMAL
BNP SERPL-MCNC: 1037 PG/ML (ref 0–99)
BUN SERPL-MCNC: 25 MG/DL (ref 8–23)
BUN SERPL-MCNC: 26 MG/DL (ref 8–23)
CALCIUM SERPL-MCNC: 8.3 MG/DL (ref 8.7–10.5)
CALCIUM SERPL-MCNC: 8.9 MG/DL (ref 8.7–10.5)
CHLORIDE SERPL-SCNC: 92 MMOL/L (ref 95–110)
CHLORIDE SERPL-SCNC: 96 MMOL/L (ref 95–110)
CLARITY UR: CLEAR
CO2 SERPL-SCNC: 23 MMOL/L (ref 23–29)
CO2 SERPL-SCNC: 23 MMOL/L (ref 23–29)
COLOR UR: YELLOW
CREAT SERPL-MCNC: 1.4 MG/DL (ref 0.5–1.4)
CREAT SERPL-MCNC: 1.5 MG/DL (ref 0.5–1.4)
CV ECHO LV RWT: 1.01 CM
D DIMER PPP IA.FEU-MCNC: 4.03 MG/L FEU
DIFFERENTIAL METHOD: ABNORMAL
DOP CALC AO PEAK VEL: 1.16 M/S
DOP CALC AO VTI: 19.3 CM
DOP CALC LVOT AREA: 3.3 CM2
DOP CALC LVOT DIAMETER: 2.05 CM
DOP CALC LVOT PEAK VEL: 0.95 M/S
DOP CALC LVOT STROKE VOLUME: 54.7 CM3
DOP CALCLVOT PEAK VEL VTI: 16.58 CM
ECHO LV POSTERIOR WALL: 1.65 CM (ref 0.6–1.1)
EOSINOPHIL # BLD AUTO: 0.3 K/UL (ref 0–0.5)
EOSINOPHIL NFR BLD: 2.9 % (ref 0–8)
ERYTHROCYTE [DISTWIDTH] IN BLOOD BY AUTOMATED COUNT: 14.3 % (ref 11.5–14.5)
EST. GFR  (AFRICAN AMERICAN): 43 ML/MIN/1.73 M^2
EST. GFR  (AFRICAN AMERICAN): 46 ML/MIN/1.73 M^2
EST. GFR  (NON AFRICAN AMERICAN): 37 ML/MIN/1.73 M^2
EST. GFR  (NON AFRICAN AMERICAN): 40 ML/MIN/1.73 M^2
FRACTIONAL SHORTENING: 25 % (ref 28–44)
GLUCOSE SERPL-MCNC: 152 MG/DL (ref 70–110)
GLUCOSE SERPL-MCNC: 161 MG/DL (ref 70–110)
GLUCOSE UR QL STRIP: ABNORMAL
HCT VFR BLD AUTO: 43.8 % (ref 37–48.5)
HCV AB SERPL QL IA: NEGATIVE
HGB BLD-MCNC: 14.6 G/DL (ref 12–16)
HGB UR QL STRIP: NEGATIVE
HIV 1+2 AB+HIV1 P24 AG SERPL QL IA: NEGATIVE
HYALINE CASTS #/AREA URNS LPF: 0 /LPF
IMM GRANULOCYTES # BLD AUTO: 0.13 K/UL (ref 0–0.04)
IMM GRANULOCYTES NFR BLD AUTO: 1.3 % (ref 0–0.5)
INR PPP: 1.3 (ref 0.8–1.2)
INTERVENTRICULAR SEPTUM: 2.16 CM (ref 0.6–1.1)
KETONES UR QL STRIP: ABNORMAL
LA MAJOR: 4.8 CM
LA MINOR: 4.89 CM
LA WIDTH: 3.18 CM
LACTATE SERPL-SCNC: 1.8 MMOL/L (ref 0.5–2.2)
LEFT ATRIUM SIZE: 3.63 CM
LEFT ATRIUM VOLUME INDEX: 24.3 ML/M2
LEFT ATRIUM VOLUME: 47.53 CM3
LEFT INTERNAL DIMENSION IN SYSTOLE: 2.44 CM (ref 2.1–4)
LEFT VENTRICLE DIASTOLIC VOLUME INDEX: 22.03 ML/M2
LEFT VENTRICLE DIASTOLIC VOLUME: 43.19 ML
LEFT VENTRICLE MASS INDEX: 136 G/M2
LEFT VENTRICLE SYSTOLIC VOLUME INDEX: 10.7 ML/M2
LEFT VENTRICLE SYSTOLIC VOLUME: 20.98 ML
LEFT VENTRICULAR INTERNAL DIMENSION IN DIASTOLE: 3.27 CM (ref 3.5–6)
LEFT VENTRICULAR MASS: 266.78 G
LEUKOCYTE ESTERASE UR QL STRIP: NEGATIVE
LYMPHOCYTES # BLD AUTO: 0.4 K/UL (ref 1–4.8)
LYMPHOCYTES NFR BLD: 4 % (ref 18–48)
MAGNESIUM SERPL-MCNC: 1.8 MG/DL (ref 1.6–2.6)
MCH RBC QN AUTO: 28.5 PG (ref 27–31)
MCHC RBC AUTO-ENTMCNC: 33.3 G/DL (ref 32–36)
MCV RBC AUTO: 86 FL (ref 82–98)
MICROSCOPIC COMMENT: ABNORMAL
MONOCYTES # BLD AUTO: 0.3 K/UL (ref 0.3–1)
MONOCYTES NFR BLD: 3.2 % (ref 4–15)
NEUTROPHILS # BLD AUTO: 8.9 K/UL (ref 1.8–7.7)
NEUTROPHILS NFR BLD: 88.2 % (ref 38–73)
NITRITE UR QL STRIP: POSITIVE
NRBC BLD-RTO: 0 /100 WBC
PH UR STRIP: 5 [PH] (ref 5–8)
PISA TR MAX VEL: 1.62 M/S
PLATELET # BLD AUTO: 285 K/UL (ref 150–350)
PMV BLD AUTO: 9.8 FL (ref 9.2–12.9)
POTASSIUM SERPL-SCNC: 4 MMOL/L (ref 3.5–5.1)
POTASSIUM SERPL-SCNC: 4.4 MMOL/L (ref 3.5–5.1)
PROT SERPL-MCNC: 7.1 G/DL (ref 6–8.4)
PROT UR QL STRIP: ABNORMAL
PROTHROMBIN TIME: 13.5 SEC (ref 9–12.5)
PV PEAK VELOCITY: 0.55 CM/S
RA MAJOR: 4.74 CM
RA PRESSURE: 3 MMHG
RA WIDTH: 3.29 CM
RBC # BLD AUTO: 5.12 M/UL (ref 4–5.4)
RBC #/AREA URNS HPF: 0 /HPF (ref 0–4)
RIGHT VENTRICULAR END-DIASTOLIC DIMENSION: 2.63 CM
SARS-COV-2 RDRP RESP QL NAA+PROBE: NEGATIVE
SINUS: 3.28 CM
SODIUM SERPL-SCNC: 131 MMOL/L (ref 136–145)
SODIUM SERPL-SCNC: 132 MMOL/L (ref 136–145)
SP GR UR STRIP: >=1.03 (ref 1–1.03)
TDI LATERAL: 0.09 M/S
TDI SEPTAL: 0.03 M/S
TDI: 0.06 M/S
TR MAX PG: 10 MMHG
TRICUSPID ANNULAR PLANE SYSTOLIC EXCURSION: 2.01 CM
TROPONIN I SERPL DL<=0.01 NG/ML-MCNC: 1.58 NG/ML (ref 0–0.03)
TROPONIN I SERPL DL<=0.01 NG/ML-MCNC: 1.8 NG/ML (ref 0–0.03)
TROPONIN I SERPL DL<=0.01 NG/ML-MCNC: 1.87 NG/ML (ref 0–0.03)
TSH SERPL DL<=0.005 MIU/L-ACNC: 0.82 UIU/ML (ref 0.4–4)
TV REST PULMONARY ARTERY PRESSURE: 13 MMHG
URN SPEC COLLECT METH UR: ABNORMAL
UROBILINOGEN UR STRIP-ACNC: ABNORMAL EU/DL
WBC # BLD AUTO: 10.05 K/UL (ref 3.9–12.7)
WBC #/AREA URNS HPF: 5 /HPF (ref 0–5)
WBC CLUMPS URNS QL MICRO: ABNORMAL

## 2020-06-13 PROCEDURE — 80048 BASIC METABOLIC PNL TOTAL CA: CPT

## 2020-06-13 PROCEDURE — 93010 ELECTROCARDIOGRAM REPORT: CPT | Mod: ,,, | Performed by: INTERNAL MEDICINE

## 2020-06-13 PROCEDURE — G0378 HOSPITAL OBSERVATION PER HR: HCPCS

## 2020-06-13 PROCEDURE — 99219 PR INITIAL OBSERVATION CARE,LEVL II: CPT | Mod: ,,, | Performed by: INTERNAL MEDICINE

## 2020-06-13 PROCEDURE — 25500020 PHARM REV CODE 255: Performed by: EMERGENCY MEDICINE

## 2020-06-13 PROCEDURE — 63600175 PHARM REV CODE 636 W HCPCS: Performed by: PHYSICIAN ASSISTANT

## 2020-06-13 PROCEDURE — 96361 HYDRATE IV INFUSION ADD-ON: CPT

## 2020-06-13 PROCEDURE — 25000003 PHARM REV CODE 250: Performed by: NURSE PRACTITIONER

## 2020-06-13 PROCEDURE — 25000003 PHARM REV CODE 250: Performed by: EMERGENCY MEDICINE

## 2020-06-13 PROCEDURE — 85610 PROTHROMBIN TIME: CPT

## 2020-06-13 PROCEDURE — 84484 ASSAY OF TROPONIN QUANT: CPT | Mod: 91

## 2020-06-13 PROCEDURE — 85025 COMPLETE CBC W/AUTO DIFF WBC: CPT

## 2020-06-13 PROCEDURE — 93010 EKG 12-LEAD: ICD-10-PCS | Mod: ,,, | Performed by: INTERNAL MEDICINE

## 2020-06-13 PROCEDURE — 36415 COLL VENOUS BLD VENIPUNCTURE: CPT

## 2020-06-13 PROCEDURE — 84443 ASSAY THYROID STIM HORMONE: CPT

## 2020-06-13 PROCEDURE — 84484 ASSAY OF TROPONIN QUANT: CPT

## 2020-06-13 PROCEDURE — 81000 URINALYSIS NONAUTO W/SCOPE: CPT

## 2020-06-13 PROCEDURE — 63600175 PHARM REV CODE 636 W HCPCS: Performed by: NURSE PRACTITIONER

## 2020-06-13 PROCEDURE — 96374 THER/PROPH/DIAG INJ IV PUSH: CPT | Mod: 59

## 2020-06-13 PROCEDURE — 83605 ASSAY OF LACTIC ACID: CPT

## 2020-06-13 PROCEDURE — 96376 TX/PRO/DX INJ SAME DRUG ADON: CPT

## 2020-06-13 PROCEDURE — 27000221 HC OXYGEN, UP TO 24 HOURS

## 2020-06-13 PROCEDURE — 85730 THROMBOPLASTIN TIME PARTIAL: CPT

## 2020-06-13 PROCEDURE — U0002 COVID-19 LAB TEST NON-CDC: HCPCS

## 2020-06-13 PROCEDURE — 99291 CRITICAL CARE FIRST HOUR: CPT | Mod: 25

## 2020-06-13 PROCEDURE — 96375 TX/PRO/DX INJ NEW DRUG ADDON: CPT

## 2020-06-13 PROCEDURE — 86803 HEPATITIS C AB TEST: CPT

## 2020-06-13 PROCEDURE — 83735 ASSAY OF MAGNESIUM: CPT

## 2020-06-13 PROCEDURE — 83880 ASSAY OF NATRIURETIC PEPTIDE: CPT

## 2020-06-13 PROCEDURE — 85379 FIBRIN DEGRADATION QUANT: CPT

## 2020-06-13 PROCEDURE — 96365 THER/PROPH/DIAG IV INF INIT: CPT | Mod: 59

## 2020-06-13 PROCEDURE — 99219 PR INITIAL OBSERVATION CARE,LEVL II: ICD-10-PCS | Mod: ,,, | Performed by: INTERNAL MEDICINE

## 2020-06-13 PROCEDURE — 93005 ELECTROCARDIOGRAM TRACING: CPT

## 2020-06-13 PROCEDURE — 63600175 PHARM REV CODE 636 W HCPCS: Performed by: EMERGENCY MEDICINE

## 2020-06-13 PROCEDURE — 80053 COMPREHEN METABOLIC PANEL: CPT

## 2020-06-13 PROCEDURE — 86703 HIV-1/HIV-2 1 RESULT ANTBDY: CPT

## 2020-06-13 PROCEDURE — 25000003 PHARM REV CODE 250: Performed by: PHYSICIAN ASSISTANT

## 2020-06-13 PROCEDURE — 87086 URINE CULTURE/COLONY COUNT: CPT

## 2020-06-13 RX ORDER — ASPIRIN 325 MG
325 TABLET ORAL
Status: COMPLETED | OUTPATIENT
Start: 2020-06-13 | End: 2020-06-13

## 2020-06-13 RX ORDER — FUROSEMIDE 10 MG/ML
40 INJECTION INTRAMUSCULAR; INTRAVENOUS 2 TIMES DAILY
Status: DISCONTINUED | OUTPATIENT
Start: 2020-06-13 | End: 2020-06-14

## 2020-06-13 RX ORDER — FUROSEMIDE 10 MG/ML
40 INJECTION INTRAMUSCULAR; INTRAVENOUS
Status: COMPLETED | OUTPATIENT
Start: 2020-06-13 | End: 2020-06-13

## 2020-06-13 RX ORDER — ONDANSETRON 2 MG/ML
4 INJECTION INTRAMUSCULAR; INTRAVENOUS EVERY 8 HOURS PRN
Status: DISCONTINUED | OUTPATIENT
Start: 2020-06-13 | End: 2020-06-13

## 2020-06-13 RX ORDER — INSULIN ASPART 100 [IU]/ML
0-5 INJECTION, SOLUTION INTRAVENOUS; SUBCUTANEOUS
Status: DISCONTINUED | OUTPATIENT
Start: 2020-06-13 | End: 2020-06-15 | Stop reason: HOSPADM

## 2020-06-13 RX ORDER — DIPHENHYDRAMINE HCL 25 MG
25 CAPSULE ORAL EVERY 6 HOURS PRN
Status: DISCONTINUED | OUTPATIENT
Start: 2020-06-13 | End: 2020-06-15 | Stop reason: HOSPADM

## 2020-06-13 RX ORDER — PANTOPRAZOLE SODIUM 40 MG/1
40 TABLET, DELAYED RELEASE ORAL DAILY
Status: DISCONTINUED | OUTPATIENT
Start: 2020-06-13 | End: 2020-06-15 | Stop reason: HOSPADM

## 2020-06-13 RX ORDER — HEPARIN SODIUM,PORCINE/D5W 25000/250
12 INTRAVENOUS SOLUTION INTRAVENOUS CONTINUOUS
Status: DISCONTINUED | OUTPATIENT
Start: 2020-06-13 | End: 2020-06-13

## 2020-06-13 RX ORDER — SODIUM CHLORIDE 0.9 % (FLUSH) 0.9 %
10 SYRINGE (ML) INJECTION
Status: DISCONTINUED | OUTPATIENT
Start: 2020-06-13 | End: 2020-06-15 | Stop reason: HOSPADM

## 2020-06-13 RX ORDER — GLUCAGON 1 MG
1 KIT INJECTION
Status: DISCONTINUED | OUTPATIENT
Start: 2020-06-13 | End: 2020-06-15 | Stop reason: HOSPADM

## 2020-06-13 RX ORDER — ATORVASTATIN CALCIUM 40 MG/1
40 TABLET, FILM COATED ORAL NIGHTLY
Status: DISCONTINUED | OUTPATIENT
Start: 2020-06-13 | End: 2020-06-15 | Stop reason: HOSPADM

## 2020-06-13 RX ORDER — ONDANSETRON 2 MG/ML
4 INJECTION INTRAMUSCULAR; INTRAVENOUS EVERY 8 HOURS PRN
Status: DISCONTINUED | OUTPATIENT
Start: 2020-06-13 | End: 2020-06-15 | Stop reason: HOSPADM

## 2020-06-13 RX ORDER — IBUPROFEN 200 MG
16 TABLET ORAL
Status: DISCONTINUED | OUTPATIENT
Start: 2020-06-13 | End: 2020-06-15 | Stop reason: HOSPADM

## 2020-06-13 RX ORDER — ASPIRIN 81 MG/1
81 TABLET ORAL DAILY
Status: DISCONTINUED | OUTPATIENT
Start: 2020-06-14 | End: 2020-06-15 | Stop reason: HOSPADM

## 2020-06-13 RX ORDER — ACETAMINOPHEN 325 MG/1
650 TABLET ORAL EVERY 6 HOURS PRN
Status: DISCONTINUED | OUTPATIENT
Start: 2020-06-13 | End: 2020-06-15 | Stop reason: HOSPADM

## 2020-06-13 RX ORDER — IBUPROFEN 200 MG
24 TABLET ORAL
Status: DISCONTINUED | OUTPATIENT
Start: 2020-06-13 | End: 2020-06-15 | Stop reason: HOSPADM

## 2020-06-13 RX ORDER — CARVEDILOL 12.5 MG/1
25 TABLET ORAL 2 TIMES DAILY
Status: DISCONTINUED | OUTPATIENT
Start: 2020-06-13 | End: 2020-06-15 | Stop reason: HOSPADM

## 2020-06-13 RX ADMIN — PANTOPRAZOLE SODIUM 40 MG: 40 TABLET, DELAYED RELEASE ORAL at 01:06

## 2020-06-13 RX ADMIN — CEFTRIAXONE 1 G: 1 INJECTION, SOLUTION INTRAVENOUS at 06:06

## 2020-06-13 RX ADMIN — ASPIRIN 325 MG: 325 TABLET, FILM COATED ORAL at 06:06

## 2020-06-13 RX ADMIN — ONDANSETRON 4 MG: 2 INJECTION INTRAMUSCULAR; INTRAVENOUS at 10:06

## 2020-06-13 RX ADMIN — SODIUM CHLORIDE, SODIUM LACTATE, POTASSIUM CHLORIDE, AND CALCIUM CHLORIDE 1000 ML: .6; .31; .03; .02 INJECTION, SOLUTION INTRAVENOUS at 05:06

## 2020-06-13 RX ADMIN — FUROSEMIDE 40 MG: 10 INJECTION, SOLUTION INTRAMUSCULAR; INTRAVENOUS at 06:06

## 2020-06-13 RX ADMIN — ONDANSETRON 4 MG: 2 INJECTION INTRAMUSCULAR; INTRAVENOUS at 01:06

## 2020-06-13 RX ADMIN — FUROSEMIDE 40 MG: 10 INJECTION, SOLUTION INTRAMUSCULAR; INTRAVENOUS at 07:06

## 2020-06-13 RX ADMIN — IOHEXOL 100 ML: 350 INJECTION, SOLUTION INTRAVENOUS at 07:06

## 2020-06-13 RX ADMIN — ATORVASTATIN CALCIUM 40 MG: 40 TABLET, FILM COATED ORAL at 09:06

## 2020-06-13 RX ADMIN — SODIUM CHLORIDE 1000 ML: 0.9 INJECTION, SOLUTION INTRAVENOUS at 05:06

## 2020-06-13 NOTE — ED PROVIDER NOTES
"SCRIBE #1 NOTE: I, Drea Sweet, am scribing for, and in the presence of, Sylvester Cota MD. I have scribed the HPI, ROS, and PEx.     SCRIBE #2 NOTE: I, Steffi Valentine, am scribing for, and in the presence of,  Sydnie Briscoe MD. I have scribed the remaining portions of the note not scribed by Scribe #1.      History     Chief Complaint   Patient presents with    Shortness of Breath     pt reports having sore throat since last week, nausea/vomiting, SOB and states "my heart feels different"     Nausea     Review of patient's allergies indicates:   Allergen Reactions    Aspirin     Lisinopril-hydrochlorothiazide      Other reaction(s): Reaction:Throat swelling;         History of Present Illness     HPI    6/13/2020, 4:59 AM  History obtained from the patient      History of Present Illness: Demetrice Gaines is a 60 y.o. female patient with a PMHx of colon polyp, DM, HTN, and MI who presents to the Emergency Department for evaluation of SOB which onset earlier today while she was lying down in bed. Symptoms are constant and moderate in severity. Symptoms are worse with exertion. No mitigating factors reported. Associated sxs include lightheadedness, nausea, vomiting, diarrhea, cough, dysuria, dark-colored urine, and chest discomfort. She states "my chest feels funny" and is unable to describe symptoms further. Patient also reports sore throat, congestion, HA, and generalized myalgias for 1 week. Patient denies any abdominal pain, fever, chills, diaphoresis, palpitations, leg swelling, dizziness, blood in stool, hematuria, confusion, and all other sxs at this time. Prior Tx includes Naproxen 50 mg and Nyquil with no relief of symptoms. Patient notes that she has been having difficulty doing things for herself lately due to symptoms. No further complaints or concerns at this time.       Arrival mode: Personal vehicle    PCP: Lul Alvarez MD        Past Medical History:  Past Medical History:   Diagnosis " Date    Colon polyp     Diabetes mellitus type I     Hypertension     Myocardial infarction        Past Surgical History:  Past Surgical History:   Procedure Laterality Date    Benign Cyst Right      SECTION      COLONOSCOPY      COLONOSCOPY N/A 2019    Procedure: COLONOSCOPY;  Surgeon: Ileana Holcomb MD;  Location: [facility];  Service: Endoscopy;  Laterality: N/A;         Family History:  Family History   Problem Relation Age of Onset    Cancer Mother     Pacemaker/defibrilator Mother     Diabetes Father     Hypertension Sister     Hypertension Brother     Cancer Paternal Grandmother        Social History:  Social History     Tobacco Use    Smoking status: Never Smoker    Smokeless tobacco: Never Used   Substance and Sexual Activity    Alcohol use: Never     Frequency: Never    Drug use: Never    Sexual activity: Not Currently        Review of Systems     Review of Systems   Constitutional: Negative for activity change, appetite change, chills, diaphoresis, fatigue and fever.   HENT: Positive for congestion and sore throat. Negative for ear pain, nosebleeds, rhinorrhea, sinus pain and trouble swallowing.    Eyes: Negative for pain and discharge.   Respiratory: Positive for cough and shortness of breath. Negative for chest tightness, wheezing and stridor.         (+) chest discomfort   Cardiovascular: Negative for chest pain, palpitations and leg swelling.   Gastrointestinal: Positive for diarrhea, nausea and vomiting. Negative for abdominal distention, abdominal pain, blood in stool and constipation.   Genitourinary: Positive for dysuria. Negative for difficulty urinating, flank pain, frequency, hematuria and urgency.        (+) dark-colored urine   Musculoskeletal: Positive for myalgias (generalized). Negative for arthralgias, back pain and neck pain.   Skin: Negative for pallor, rash and wound.   Neurological: Positive for light-headedness and headaches. Negative for dizziness,  syncope, weakness and numbness.   Hematological: Does not bruise/bleed easily.   Psychiatric/Behavioral: Negative for confusion and self-injury.   All other systems reviewed and are negative.     Physical Exam     Initial Vitals [06/13/20 0359]   BP Pulse Resp Temp SpO2   (!) 99/57 80 20 97.5 °F (36.4 °C) 95 %      MAP       --          Physical Exam  Nursing Notes and Vital Signs Reviewed.  Constitutional: Patient is in no acute distress. Well-developed and well-nourished.  Head: Atraumatic. Normocephalic.  Eyes: PERRL. EOM intact. Conjunctivae are not pale. No scleral icterus.  ENT: Mucous membranes are moist. Oropharynx is clear and symmetric.    Neck: Supple. Full ROM. No lymphadenopathy.  Cardiovascular: Regular rate. Regular rhythm. No murmurs, rubs, or gallops. Distal pulses are 2+ and symmetric.  Pulmonary/Chest: No respiratory distress. Clear to auscultation bilaterally. No wheezing or rales.  Abdominal: Soft and non-distended.  There is no tenderness.  No rebound, guarding, or rigidity. Good bowel sounds.  Genitourinary: No CVA tenderness  Musculoskeletal: Moves all extremities. No obvious deformities. No edema. No calf tenderness. No unilateral leg width discrepancy.   Skin: Warm and dry.  Neurological:  Alert, awake, and appropriate.  Normal speech.  No acute focal neurological deficits are appreciated.  Psychiatric: Normal affect. Good eye contact. Appropriate in content.     ED Course   Critical Care    Date/Time: 6/13/2020 5:45 AM  Performed by: Sylvester Cota MD  Authorized by: Sylvester Cota MD   Direct patient critical care time: 13 minutes  Additional history critical care time: 9 minutes  Ordering / reviewing critical care time: 16 minutes  Documentation critical care time: 7 minutes  Consulting other physicians critical care time: 0 minutes  Consult with family critical care time: 0 minutes  Other critical care time: 0 minutes  Total critical care time (exclusive of procedural time) : 45  "minutes  Critical care time was exclusive of separately billable procedures and treating other patients and teaching time.  Critical care was necessary to treat or prevent imminent or life-threatening deterioration of the following conditions: elevated troponin.  Critical care was time spent personally by me on the following activities: blood draw for specimens, development of treatment plan with patient or surrogate, discussions with consultants, interpretation of cardiac output measurements, evaluation of patient's response to treatment, examination of patient, obtaining history from patient or surrogate, ordering and performing treatments and interventions, ordering and review of laboratory studies, ordering and review of radiographic studies, pulse oximetry, re-evaluation of patient's condition and review of old charts.        ED Vital Signs:  Vitals:    06/13/20 0359 06/13/20 0415 06/13/20 0424 06/13/20 0444   BP: (!) 99/57  107/67 100/68   Pulse: 80 85 89 86   Resp: 20   (!) 26   Temp: 97.5 °F (36.4 °C)      TempSrc: Oral      SpO2: 95%  96% (!) 94%   Weight:       Height: 5' 4" (1.626 m)       06/13/20 0446 06/13/20 0455 06/13/20 0507 06/13/20 0510   BP: (!) 82/59 102/65 103/71    Pulse: 90 86 89    Resp: (!) 23 (!) 25 (!) 30    Temp:       TempSrc:       SpO2: (!) 94% 95% 95%    Weight:    91.2 kg (201 lb)   Height:        06/13/20 0601 06/13/20 0630 06/13/20 0646 06/13/20 0751   BP: (!) 116/57 98/68  105/66   Pulse: 81 83 84 88   Resp: (!) 23 (!) 24 (!) 22 (!) 26   Temp:       TempSrc:       SpO2: (!) 92% 96% 97% 97%   Weight:       Height:           Abnormal Lab Results:  Labs Reviewed   CBC W/ AUTO DIFFERENTIAL - Abnormal; Notable for the following components:       Result Value    Immature Granulocytes 1.3 (*)     Gran # (ANC) 8.9 (*)     Immature Grans (Abs) 0.13 (*)     Lymph # 0.4 (*)     Gran% 88.2 (*)     Lymph% 4.0 (*)     Mono% 3.2 (*)     All other components within normal limits   COMPREHENSIVE " METABOLIC PANEL - Abnormal; Notable for the following components:    Sodium 131 (*)     Chloride 92 (*)     Glucose 161 (*)     BUN, Bld 26 (*)     Creatinine 1.5 (*)     Albumin 2.7 (*)     Total Bilirubin 3.6 (*)     Alkaline Phosphatase 182 (*)     eGFR if  43 (*)     eGFR if non  37 (*)     All other components within normal limits   B-TYPE NATRIURETIC PEPTIDE - Abnormal; Notable for the following components:    BNP 1,037 (*)     All other components within normal limits   TROPONIN I - Abnormal; Notable for the following components:    Troponin I 1.804 (*)     All other components within normal limits   D DIMER, QUANTITATIVE - Abnormal; Notable for the following components:    D-Dimer 4.03 (*)     All other components within normal limits   URINALYSIS, REFLEX TO URINE CULTURE - Abnormal; Notable for the following components:    Specific Gravity, UA >=1.030 (*)     Protein, UA 1+ (*)     Glucose, UA Trace (*)     Ketones, UA Trace (*)     Bilirubin (UA) 3+ (*)     Nitrite, UA Positive (*)     Urobilinogen, UA 4.0-6.0 (*)     All other components within normal limits    Narrative:     Preferred Collection Type->Urine, Clean Catch  Specimen Source->Urine   BASIC METABOLIC PANEL - Abnormal; Notable for the following components:    Sodium 132 (*)     Glucose 152 (*)     BUN, Bld 25 (*)     Calcium 8.3 (*)     eGFR if  46 (*)     eGFR if non  40 (*)     All other components within normal limits   URINALYSIS MICROSCOPIC - Abnormal; Notable for the following components:    WBC Clumps, UA Occasional (*)     Bacteria Few (*)     All other components within normal limits    Narrative:     Preferred Collection Type->Urine, Clean Catch  Specimen Source->Urine   APTT - Abnormal; Notable for the following components:    aPTT 34.5 (*)     All other components within normal limits    Narrative:     (if patient is on warfarin prior to heparin therapy)   PROTIME-INR -  Abnormal; Notable for the following components:    Prothrombin Time 13.5 (*)     INR 1.3 (*)     All other components within normal limits    Narrative:     (if patient is on warfarin prior to heparin therapy)   HIV 1 / 2 ANTIBODY   HEPATITIS C ANTIBODY   SARS-COV-2 RNA AMPLIFICATION, QUAL   MAGNESIUM   TSH   LACTIC ACID, PLASMA   PROTIME-INR   APTT   CK        All Lab Results:  Results for orders placed or performed during the hospital encounter of 06/13/20   HIV 1/2 Ag/Ab (4th Gen)   Result Value Ref Range    HIV 1/2 Ag/Ab Negative Negative   Hepatitis C antibody   Result Value Ref Range    Hepatitis C Ab Negative Negative   COVID-19 Rapid Screening   Result Value Ref Range    SARS-CoV-2 RNA, Amplification, Qual Negative Negative   CBC auto differential   Result Value Ref Range    WBC 10.05 3.90 - 12.70 K/uL    RBC 5.12 4.00 - 5.40 M/uL    Hemoglobin 14.6 12.0 - 16.0 g/dL    Hematocrit 43.8 37.0 - 48.5 %    Mean Corpuscular Volume 86 82 - 98 fL    Mean Corpuscular Hemoglobin 28.5 27.0 - 31.0 pg    Mean Corpuscular Hemoglobin Conc 33.3 32.0 - 36.0 g/dL    RDW 14.3 11.5 - 14.5 %    Platelets 285 150 - 350 K/uL    MPV 9.8 9.2 - 12.9 fL    Immature Granulocytes 1.3 (H) 0.0 - 0.5 %    Gran # (ANC) 8.9 (H) 1.8 - 7.7 K/uL    Immature Grans (Abs) 0.13 (H) 0.00 - 0.04 K/uL    Lymph # 0.4 (L) 1.0 - 4.8 K/uL    Mono # 0.3 0.3 - 1.0 K/uL    Eos # 0.3 0.0 - 0.5 K/uL    Baso # 0.04 0.00 - 0.20 K/uL    nRBC 0 0 /100 WBC    Gran% 88.2 (H) 38.0 - 73.0 %    Lymph% 4.0 (L) 18.0 - 48.0 %    Mono% 3.2 (L) 4.0 - 15.0 %    Eosinophil% 2.9 0.0 - 8.0 %    Basophil% 0.4 0.0 - 1.9 %    Differential Method Automated    Comprehensive metabolic panel   Result Value Ref Range    Sodium 131 (L) 136 - 145 mmol/L    Potassium 4.0 3.5 - 5.1 mmol/L    Chloride 92 (L) 95 - 110 mmol/L    CO2 23 23 - 29 mmol/L    Glucose 161 (H) 70 - 110 mg/dL    BUN, Bld 26 (H) 8 - 23 mg/dL    Creatinine 1.5 (H) 0.5 - 1.4 mg/dL    Calcium 8.9 8.7 - 10.5 mg/dL    Total  Protein 7.1 6.0 - 8.4 g/dL    Albumin 2.7 (L) 3.5 - 5.2 g/dL    Total Bilirubin 3.6 (H) 0.1 - 1.0 mg/dL    Alkaline Phosphatase 182 (H) 55 - 135 U/L    AST 33 10 - 40 U/L    ALT 38 10 - 44 U/L    Anion Gap 16 8 - 16 mmol/L    eGFR if African American 43 (A) >60 mL/min/1.73 m^2    eGFR if non African American 37 (A) >60 mL/min/1.73 m^2   Brain natriuretic peptide   Result Value Ref Range    BNP 1,037 (H) 0 - 99 pg/mL   Troponin I   Result Value Ref Range    Troponin I 1.804 (H) 0.000 - 0.026 ng/mL   Magnesium   Result Value Ref Range    Magnesium 1.8 1.6 - 2.6 mg/dL   D dimer, quantitative   Result Value Ref Range    D-Dimer 4.03 (H) <0.50 mg/L FEU   TSH   Result Value Ref Range    TSH 0.818 0.400 - 4.000 uIU/mL   Urinalysis, Reflex to Urine Culture Urine, Clean Catch    Specimen: Urine   Result Value Ref Range    Specimen UA Urine, Clean Catch     Color, UA Yellow Yellow, Straw, [place]    Appearance, UA Clear Clear    pH, UA 5.0 5.0 - 8.0    Specific Gravity, UA >=1.030 (A) 1.005 - 1.030    Protein, UA 1+ (A) Negative    Glucose, UA Trace (A) Negative    Ketones, UA Trace (A) Negative    Bilirubin (UA) 3+ (A) Negative    Occult Blood UA Negative Negative    Nitrite, UA Positive (A) Negative    Urobilinogen, UA 4.0-6.0 (A) <2.0 EU/dL    Leukocytes, UA Negative Negative   Lactic acid, plasma   Result Value Ref Range    Lactate (Lactic Acid) 1.8 0.5 - 2.2 mmol/L   Basic metabolic panel   Result Value Ref Range    Sodium 132 (L) 136 - 145 mmol/L    Potassium 4.4 3.5 - 5.1 mmol/L    Chloride 96 95 - 110 mmol/L    CO2 23 23 - 29 mmol/L    Glucose 152 (H) 70 - 110 mg/dL    BUN, Bld 25 (H) 8 - 23 mg/dL    Creatinine 1.4 0.5 - 1.4 mg/dL    Calcium 8.3 (L) 8.7 - 10.5 mg/dL    Anion Gap 13 8 - 16 mmol/L    eGFR if African American 46 (A) >60 mL/min/1.73 m^2    eGFR if non African American 40 (A) >60 mL/min/1.73 m^2   Urinalysis Microscopic   Result Value Ref Range    RBC, UA 0 0 - 4 /hpf    WBC, UA 5 0 - 5 /hpf    WBC Clumps,  UA Occasional (A) None-Rare    Bacteria Few (A) None-Occ /hpf    Hyaline Casts, UA 0 0-1/lpf /lpf    Microscopic Comment SEE COMMENT    APTT   Result Value Ref Range    aPTT 34.5 (H) 21.0 - 32.0 sec   Protime-INR   Result Value Ref Range    Prothrombin Time 13.5 (H) 9.0 - 12.5 sec    INR 1.3 (H) 0.8 - 1.2   Echo Color Flow Doppler? Yes   Result Value Ref Range    TDI SEPTAL 0.03 m/s    LA WIDTH 3.18 cm    TDI LATERAL 0.09 m/s    PV PEAK VELOCITY 0.55 cm/s    LVIDD 3.27 (A) 3.5 - 6.0 cm    IVS 2.16 (A) 0.6 - 1.1 cm    PW 1.65 (A) 0.6 - 1.1 cm    Ao root annulus 3.29 cm    LVIDS 2.44 2.1 - 4.0 cm    FS 25 28 - 44 %    LA volume 47.53 cm3    Sinus 3.28 cm    LV mass 266.78 g    LA size 3.63 cm    RVDD 2.63 cm    TAPSE 2.01 cm    Left Ventricle Relative Wall Thickness 1.01 cm    AV mean gradient 3 mmHg    AV valve area 2.83 cm2    AV Velocity Ratio 0.82     AV index (prosthetic) 0.86     Mean e' 0.06 m/s    LVOT diameter 2.05 cm    LVOT area 3.3 cm2    LVOT peak shaun 0.95 m/s    LVOT peak VTI 16.58 cm    Ao peak shaun 1.16 m/s    Ao VTI 19.30 cm    LVOT stroke volume 54.70 cm3    AV peak gradient 5 mmHg    TR Max Shaun 1.62 m/s    LV Systolic Volume 20.98 mL    LV Systolic Volume Index 10.7 mL/m2    LV Diastolic Volume 43.19 mL    LV Diastolic Volume Index 22.03 mL/m2    LA Volume Index 24.3 mL/m2    LV Mass Index 136 g/m2    RA Major Axis 4.74 cm    Left Atrium Minor Axis 4.89 cm    Left Atrium Major Axis 4.80 cm    Triscuspid Valve Regurgitation Peak Gradient 10 mmHg    RA Width 3.29 cm         Imaging Results:  Imaging Results          CTA Chest Non-Coronary (PE Study) (Final result)  Result time 06/13/20 07:44:11    Final result by Sylvester Sands III, MD (06/13/20 07:44:11)                 Impression:      1.  The overall pattern is most suspect for congestion/edema with associated effusions.    2.  Negative for acute pulmonary emboli.  Negative for thoracic aortic aneurysm or dissection..    Other findings as  described above.    All CT scans at this facility are performed  using dose modulation techniques as appropriate to performed exam including the following:  automated exposure control; adjustment of mA and/or kV according to the patients size (this includes techniques or standardized protocols for targeted exams where dose is matched to indication/reason for exam: i.e. extremities or head);  iterative reconstruction technique.      Electronically signed by: Sylvester Sands MD  Date:    06/13/2020  Time:    07:44             Narrative:    EXAMINATION:  CTA CHEST NON CORONARY    CLINICAL HISTORY:  PE suspected, intermediate prob, positive D-dimer;    TECHNIQUE:  Axial images. Multiplanar thick slab MIP images were performed utilizing MPR reformats.    Contrast:  Omnipaque <350> <100> ml    COMPARISON:  none    FINDINGS:  Multiplanar imaging through the chest reveals no mediastinal mass or bulky adenopathy.  There are scattered small nodes.  The heart size is at least borderline to mildly enlarged.  There are small effusions greater on the right.    Within the abdomen the visualized segments of the liver and spleen are unremarkable.  There is no acute bony abnormality suggested on the scan.    The aorta shows atheromatous change but no aneurysm formation or dissection.  Pulmonary arterial segments are moderately well opacified.  No gross filling defects.    There is thickening of the interlobular septae out, greater on the right.  There is right mid to lower lung field infiltrate or congestion with adjacent atelectatic change.  Minimal/less severe changes on the left                               X-Ray Chest AP Portable (Final result)  Result time 06/13/20 08:11:09    Final result by Sylvester Sands III, MD (06/13/20 08:11:09)                 Impression:      Borderline cardiomegaly.  Cannot exclude mild congestion, greater on the right.  Follow-up recommended.      Electronically signed by: Sylvester Sands  MD  Date:    06/13/2020  Time:    08:11             Narrative:    EXAMINATION:  XR CHEST AP PORTABLE    CLINICAL HISTORY:  Suspected Covid-19 Virus Infection;    COMPARISON:  None    FINDINGS:  Heart size is borderline enlarged with mild tortuosity of the thoracic aorta.  There is accentuation of the lung markings greater on the right and greater toward the base.  Cannot exclude slight congestion.  No consolidation.  Cannot definitely visualize evidence of an effusion on this AP projection.                                 The EKG was ordered, reviewed, and independently interpreted by the ED provider.  Interpretation time: 4:15  Rate: 91 BPM  Rhythm: Sinus rhythm with marked sinus arrhythmia  Interpretation: Low voltage QRS. RBBB. Septal infarct. No STEMI.           The Emergency Provider reviewed the vital signs and test results, which are outlined above.     ED Discussion     5:59 AM: Dr. Cota transfers care of pt to Dr. Briscoe pending lab results.    6:45 AM: Dr. Briscoe is at the bedside to evaluate the patient.    7:05 AM: Discussed pt's case with Dr. Antwon Coffman (Cardiology) who recommends starting the patient on heparin and repeating the echo.    8:50 AM: Discussed pt's case with Dr. Antwon Coffman (Cardiology) at bedside who after his evaluation recommends cancelling heparin drip.    8:57 AM: Discussed case with Donna Wright NP (Hospital Medicine).Donna Wright NP agrees with current care and management of pt and accepts admission.   Admitting Service: Internal Medicine  Admitting Physician: Dr. Chris Ramires  Admit to: Observation - Telemetry     10:03 AM: Re-evaluated pt. Pt is resting comfortably and is in no acute distress.  Pt states an understanding that she will be admitted for observation.  D/w pt all pertinent results. D/w pt any concerns expressed at this time. Answered all questions. Pt expresses understanding at this time.         Medical Decision Making:   Clinical Tests:   Lab  Tests: Reviewed and Ordered  Radiological Study: Reviewed and Ordered  Medical Tests: Reviewed and Ordered           ED Medication(s):  Medications   heparin 25,000 units in dextrose 5% (100 units/ml) IV bolus from bag INITIAL BOLUS (max bolus 4000 units) (4,000 Units Intravenous Incomplete 6/13/20 0815)   heparin 25,000 units in dextrose 5% 250 mL (100 units/mL) infusion LOW INTENSITY nomogram - [facility] (has no administration in time range)   heparin 25,000 units in dextrose 5% (100 units/ml) IV bolus from bag - ADDITIONAL PRN BOLUS - 60 units/kg (max bolus 4000 units) (has no administration in time range)   heparin 25,000 units in dextrose 5% (100 units/ml) IV bolus from bag - ADDITIONAL PRN BOLUS - 30 units/kg (max bolus 4000 units) (has no administration in time range)   sodium chloride 0.9% bolus 1,000 mL (0 mLs Intravenous Stopped 6/13/20 0544)   aspirin tablet 325 mg (325 mg Oral Given 6/13/20 0605)   cefTRIAXone (ROCEPHIN) 1 g/50 mL D5W IVPB (1 g Intravenous New Bag 6/13/20 0633)   iohexoL (OMNIPAQUE 350) injection 100 mL (100 mLs Intravenous Given 6/13/20 0702)   furosemide injection 40 mg (40 mg Intravenous Given 6/13/20 0748)       New Prescriptions    No medications on file               Scribe Attestation:   Scribe #1: I performed the above scribed service and the documentation accurately describes the services I performed. I attest to the accuracy of the note.     Attending:   Physician Attestation Statement for Scribe #1: I, Sylvester Cota MD, personally performed the services described in this documentation, as scribed by Drea Sweet, in my presence, and it is both accurate and complete.       Scribe Attestation:   Scribe #2: I performed the above scribed service and the documentation accurately describes the services I performed. I attest to the accuracy of the note.    Attending Attestation:           Physician Attestation for Scribe:    Physician Attestation Statement for Scribe #2: ISydnie  MD Rachna, reviewed documentation, as scribed by Steffi Valentine in my presence, and it is both accurate and complete. I also acknowledge and confirm the content of the note done by Scribe #1.           Clinical Impression       ICD-10-CM ICD-9-CM   1. NSTEMI (non-ST elevated myocardial infarction)  I21.4 410.70   2. SOB (shortness of breath)  R06.02 786.05   3. Acute congestive heart failure, unspecified heart failure type  I50.9 428.0       Disposition:   Disposition: Placed in Observation  Condition: Stable         Sylvester Cota MD  06/22/20 0652

## 2020-06-13 NOTE — ED NOTES
purewick places between labia & connected to suction, Pt. Tolerated. Urine successfully suctioned into container.

## 2020-06-13 NOTE — ASSESSMENT & PLAN NOTE
Lab Results   Component Value Date    HGBA1C 6.9 (H) 12/05/2019   diabetic diet  accuchecks and sliding scale insulin

## 2020-06-13 NOTE — HPI
Pt is a 61 yo female with PMHx of DM, hx of MI, HTN who presents to the ED with reports of YANG onset x 2 days. Associated symptoms include generalized weakness, dry cough, heart fluttering sensation, poor appetite, nausea and dark urine. Approx 1 week ago pt developed viral symptoms including subjective fever, sore throat, headache, myalgias and head congestion. These symptoms have improved but patient has been mostly in the bed over the last week. YANG started 2 days ago. On arrival, B/P relatively low and 2 L IV fluids given. V/S on arrival: Temp 97.5, pulse 80, resp 20 and B/P 99/57, SpO2 = 95 %. CTA of Chest ruled out PE and notes congestion/edema with associated effusions (d dimer = 4.03). CXR - borderline cardiomegaly - ? Mild congestion. Labs find left shift of 88 %, Na 132, BUN 25, gluc 152, bilirubin 3.6, troponin 1.804 and BNP = 1037. U/a notes elevated specific gravity, positive nitrites, negative leukocytes and occasional WBC. Pt is placed on Observation to rule out NSTEMI, treat for volume overload and symptomatic treatment of symptoms. COVID NEGATIVE

## 2020-06-13 NOTE — HPI
Ms. Gaines is a 60 year old female patient whose current medical conditions include DM type II, history of prior MI s/p LHC which showed normal coronaries, and HTN who presented to [facility] ED today with a chief complaint of worsening SOB over the past 2-3 days. Associated symptoms included generalized weakness, dry cough, palpitations, decreased appetite, and nausea. Patient also reported experiencing subjective fever, sore throat, headache, and congestion a week prior. She denied any associated yeyo chest pain, near syncope, or syncope. Initial workup in ED revealed BNP > 1,000, troponin of 1.804, creatinine of 1.5, and Tbili of 3.6. CTA of chest negative for PE but did show bilateral effusions/congestion and patient was subsequently admitted for further evaluation and treatment. Cardiology consulted to assist with management. Patient seen and examined today in ED. Remains SOB, with conversational dyspnea noted. Denies chest pain. Previously tried taking OTC cold medicines without any relief. She reports compliance with her medications. Followed in clinic by Dr. Arango. Previously had stress test in 12/19 which was negative for ischemia/injury. Echo 12/19 showed normal EF. Chart reviewed. Troponin 1.804>1.583, echo pending. Rapid COVID-19 test negative.

## 2020-06-13 NOTE — ASSESSMENT & PLAN NOTE
-Presents with acute decompensated CHF, likely triggered by viral illness  -Lasix IV 40 mg BID  -Strict I's/O's  -Add BB when BP permits  -No ACEi/ARB given allergy  -Check echo  -Further rec's to follow

## 2020-06-13 NOTE — ASSESSMENT & PLAN NOTE
Pt has had decreased appetite for 1 week  Blood pressure borderline low on arrival  U/A notes elevated specific gravity  2 liters of IV fluid given in the ED

## 2020-06-13 NOTE — ASSESSMENT & PLAN NOTE
-Troponin 1.804>1.583  -Elevation likely secondary to demand ischemia from acute CHF  -Prior LHC in 2018 showed no significant disease per patient  -No complaints of chest pain  -Continue to trend  -Continue ASA  -Resume heparin gtt pending echo review by Dr. Coffman  -Start BB when BP permits  -No ACEi/ARB given allergy/intolerance  -Add atorvastatin 40 mg nightly  -Check echo  -Further rec's to follow

## 2020-06-13 NOTE — ASSESSMENT & PLAN NOTE
Troponin 1.804, trend troponins   Cardiology consult  Heparin infusion on hold as patient has a moderate size pericardial infusion  Serial cardiac enzymes, ASA  Continue BB  Pt is allergic to ACE/ARB (throat swelling)  Nitrates prn, Oxygen therapy to maintain sats > 92 %  Lipid panel,  Hgb A1 c pending,   2 D ECHO pending

## 2020-06-13 NOTE — ED NOTES
Pt lying in bed. Bed in lowest position. Side rails up x 2. Pt is alert and orientated. Pt is tachypenic, has course lung sounds on left lung. Pt is on 3L via Nasal canula. Pt has audible and active bowel sounds. Pt has no edema noted. Pt stated that she feeling a little better since arrival to ED. Family member is at bedside.

## 2020-06-13 NOTE — ASSESSMENT & PLAN NOTE
Blood pressure borderline low on arrival  Will hold home medications and resume when appropriate

## 2020-06-13 NOTE — ASSESSMENT & PLAN NOTE
As noted on physical exam and imaging  Telemetry monitoring, daily weights, Cardiac diet,  fluid restriction, 2 D ECHO pending, Lasix IV  Pt is allergic to ACE/ARB  Supplemental oxygen to maintain sats > 92 %

## 2020-06-13 NOTE — SUBJECTIVE & OBJECTIVE
Past Medical History:   Diagnosis Date    Colon polyp     Diabetes mellitus type I     Hypertension     Myocardial infarction        Past Surgical History:   Procedure Laterality Date    Benign Cyst Right      SECTION      COLONOSCOPY      COLONOSCOPY N/A 2019    Procedure: COLONOSCOPY;  Surgeon: Ileaan Holcomb MD;  Location: [facility];  Service: Endoscopy;  Laterality: N/A;       Review of patient's allergies indicates:   Allergen Reactions    Aspirin     Lisinopril-hydrochlorothiazide      Other reaction(s): Reaction:Throat swelling;       No current facility-administered medications on file prior to encounter.      Current Outpatient Medications on File Prior to Encounter   Medication Sig    amLODIPine (NORVASC) 5 MG tablet Take 1 tablet (5 mg total) by mouth once daily.    aspirin 81 MG Chew 1 tablet    carvediloL (COREG) 25 MG tablet Take 1 tablet (25 mg total) by mouth 2 (two) times daily.    lancets Misc Check blood sugar once daily.    triamterene-hydrochlorothiazide 37.5-25 mg (DYAZIDE) 37.5-25 mg per capsule Take 1 capsule by mouth once daily.     Family History     Problem Relation (Age of Onset)    Cancer Mother, Paternal Grandmother    Diabetes Father    Hypertension Sister, Brother    Pacemaker/defibrilator Mother        Tobacco Use    Smoking status: Never Smoker    Smokeless tobacco: Never Used   Substance and Sexual Activity    Alcohol use: Never     Frequency: Never    Drug use: Never    Sexual activity: Not Currently     Review of Systems   Constitutional: Positive for activity change, appetite change, fatigue and fever.   HENT: Negative.    Eyes: Negative.    Respiratory: Positive for cough and shortness of breath. Negative for chest tightness and wheezing.    Cardiovascular: Positive for palpitations. Negative for chest pain and leg swelling.   Gastrointestinal: Positive for diarrhea (2 episodes) and nausea. Negative for abdominal pain.   Genitourinary:  Positive for decreased urine volume.        Dark urine   Musculoskeletal: Positive for myalgias.   Skin: Negative for pallor, rash and wound.   Neurological: Positive for weakness.   Psychiatric/Behavioral: Negative.      Objective:     Vital Signs (Most Recent):  Temp: 97.5 °F (36.4 °C) (06/13/20 0359)  Pulse: 92 (06/13/20 1002)  Resp: 16 (06/13/20 0902)  BP: 117/77 (06/13/20 1002)  SpO2: 97 % (06/13/20 1002) Vital Signs (24h Range):  Temp:  [97.5 °F (36.4 °C)] 97.5 °F (36.4 °C)  Pulse:  [80-92] 92  Resp:  [16-30] 16  SpO2:  [92 %-98 %] 97 %  BP: ()/(57-77) 117/77     Weight: 91.2 kg (201 lb)  Body mass index is 34.5 kg/m².    Physical Exam  Vitals signs and nursing note reviewed.   Constitutional:       Appearance: She is well-developed. She is ill-appearing.   HENT:      Head: Normocephalic and atraumatic.      Nose: Nose normal.   Eyes:      General: No scleral icterus.     Conjunctiva/sclera: Conjunctivae normal.   Neck:      Musculoskeletal: Normal range of motion and neck supple.   Cardiovascular:      Rate and Rhythm: Normal rate and regular rhythm.      Heart sounds: No murmur. No friction rub. No gallop.    Pulmonary:      Effort: Pulmonary effort is normal.      Breath sounds: Examination of the right-lower field reveals rales. Rales present.      Comments: Mild increased work of breathing  Abdominal:      General: Bowel sounds are normal.      Palpations: Abdomen is soft.      Tenderness: There is no abdominal tenderness.   Musculoskeletal: Normal range of motion.         General: No tenderness.      Comments: Generalized weakness   Skin:     General: Skin is warm and dry.   Neurological:      General: No focal deficit present.      Mental Status: She is alert and oriented to person, place, and time.      Motor: Weakness present.   Psychiatric:         Behavior: Behavior normal.             Significant Labs:   CBC:   Recent Labs   Lab 06/13/20  0440   WBC 10.05   HGB 14.6   HCT 43.8         CMP:   Recent Labs   Lab 06/13/20  0440 06/13/20  0558   * 132*   K 4.0 4.4   CL 92* 96   CO2 23 23   * 152*   BUN 26* 25*   CREATININE 1.5* 1.4   CALCIUM 8.9 8.3*   PROT 7.1  --    ALBUMIN 2.7*  --    BILITOT 3.6*  --    ALKPHOS 182*  --    AST 33  --    ALT 38  --    ANIONGAP 16 13   EGFRNONAA 37* 40*     Cardiac Markers:   Recent Labs   Lab 06/13/20  0440   BNP 1,037*     Coagulation:   Recent Labs   Lab 06/13/20  0738   INR 1.3*   APTT 34.5*     Urine Studies:   Recent Labs   Lab 06/13/20  0533   COLORU Yellow   APPEARANCEUA Clear   PHUR 5.0   SPECGRAV >=1.030*   PROTEINUA 1+*   GLUCUA Trace*   KETONESU Trace*   BILIRUBINUA 3+*   OCCULTUA Negative   NITRITE Positive*   UROBILINOGEN 4.0-6.0*   LEUKOCYTESUR Negative   RBCUA 0   WBCUA 5   BACTERIA Few*   HYALINECASTS 0     All pertinent labs within the past 24 hours have been reviewed.    Significant Imaging: I have reviewed all pertinent imaging results/findings within the past 24 hours.

## 2020-06-13 NOTE — H&P
"[facility] Hospital Medicine  History & Physical    Patient Name: Demetrice Gaines  MRN: [id number]  Admission Date: 2020  Attending Physician: Chris Ramires, *   Primary Care Provider: Lul Alvarez MD         Patient information was obtained from patient, past medical records and ER records.     Subjective:     Principal Problem:NSTEMI (non-ST elevated myocardial infarction)    Chief Complaint:   Chief Complaint   Patient presents with    Shortness of Breath     pt reports having sore throat since last week, nausea/vomiting, SOB and states "my heart feels different"     Nausea        HPI: Pt is a 61 yo female with PMHx of DM, hx of MI, HTN who presents to the ED with reports of YANG onset x 2 days. Associated symptoms include generalized weakness, dry cough, heart fluttering sensation, poor appetite, nausea and dark urine. Approx 1 week ago pt developed viral symptoms including subjective fever, sore throat, headache, myalgias and head congestion. These symptoms have improved but patient has been mostly in the bed over the last week. YANG started 2 days ago. On arrival, B/P relatively low and 2 L IV fluids given. V/S on arrival: Temp 97.5, pulse 80, resp 20 and B/P 99/57, SpO2 = 95 %. CTA of Chest ruled out PE and notes congestion/edema with associated effusions (d dimer = 4.03). CXR - borderline cardiomegaly - ? Mild congestion. Labs find left shift of 88 %, Na 132, BUN 25, gluc 152, bilirubin 3.6, troponin 1.804 and BNP = 1037. U/a notes elevated specific gravity, positive nitrites, negative leukocytes and occasional WBC. Pt is placed on Observation to rule out NSTEMI, treat for volume overload and symptomatic treatment of symptoms. COVID NEGATIVE    Past Medical History:   Diagnosis Date    Colon polyp     Diabetes mellitus type I     Hypertension     Myocardial infarction        Past Surgical History:   Procedure Laterality Date    Benign Cyst Right      SECTION  "     COLONOSCOPY      COLONOSCOPY N/A 12/31/2019    Procedure: COLONOSCOPY;  Surgeon: Ileana Holcomb MD;  Location: [facility];  Service: Endoscopy;  Laterality: N/A;       Review of patient's allergies indicates:   Allergen Reactions    Aspirin     Lisinopril-hydrochlorothiazide      Other reaction(s): Reaction:Throat swelling;       No current facility-administered medications on file prior to encounter.      Current Outpatient Medications on File Prior to Encounter   Medication Sig    amLODIPine (NORVASC) 5 MG tablet Take 1 tablet (5 mg total) by mouth once daily.    aspirin 81 MG Chew 1 tablet    carvediloL (COREG) 25 MG tablet Take 1 tablet (25 mg total) by mouth 2 (two) times daily.    lancets Misc Check blood sugar once daily.    triamterene-hydrochlorothiazide 37.5-25 mg (DYAZIDE) 37.5-25 mg per capsule Take 1 capsule by mouth once daily.     Family History     Problem Relation (Age of Onset)    Cancer Mother, Paternal Grandmother    Diabetes Father    Hypertension Sister, Brother    Pacemaker/defibrilator Mother        Tobacco Use    Smoking status: Never Smoker    Smokeless tobacco: Never Used   Substance and Sexual Activity    Alcohol use: Never     Frequency: Never    Drug use: Never    Sexual activity: Not Currently     Review of Systems   Constitutional: Positive for activity change, appetite change, fatigue and fever.   HENT: Negative.    Eyes: Negative.    Respiratory: Positive for cough and shortness of breath. Negative for chest tightness and wheezing.    Cardiovascular: Positive for palpitations. Negative for chest pain and leg swelling.   Gastrointestinal: Positive for diarrhea (2 episodes) and nausea. Negative for abdominal pain.   Genitourinary: Positive for decreased urine volume.        Dark urine   Musculoskeletal: Positive for myalgias.   Skin: Negative for pallor, rash and wound.   Neurological: Positive for weakness.   Psychiatric/Behavioral: Negative.      Objective:      Vital Signs (Most Recent):  Temp: 97.5 °F (36.4 °C) (06/13/20 0359)  Pulse: 92 (06/13/20 1002)  Resp: 16 (06/13/20 0902)  BP: 117/77 (06/13/20 1002)  SpO2: 97 % (06/13/20 1002) Vital Signs (24h Range):  Temp:  [97.5 °F (36.4 °C)] 97.5 °F (36.4 °C)  Pulse:  [80-92] 92  Resp:  [16-30] 16  SpO2:  [92 %-98 %] 97 %  BP: ()/(57-77) 117/77     Weight: 91.2 kg (201 lb)  Body mass index is 34.5 kg/m².    Physical Exam  Vitals signs and nursing note reviewed.   Constitutional:       Appearance: She is well-developed. She is ill-appearing.   HENT:      Head: Normocephalic and atraumatic.      Nose: Nose normal.   Eyes:      General: No scleral icterus.     Conjunctiva/sclera: Conjunctivae normal.   Neck:      Musculoskeletal: Normal range of motion and neck supple.   Cardiovascular:      Rate and Rhythm: Normal rate and regular rhythm.      Heart sounds: No murmur. No friction rub. No gallop.    Pulmonary:      Effort: Pulmonary effort is normal.      Breath sounds: Examination of the right-lower field reveals rales. Rales present.      Comments: Mild increased work of breathing  Abdominal:      General: Bowel sounds are normal.      Palpations: Abdomen is soft.      Tenderness: There is no abdominal tenderness.   Musculoskeletal: Normal range of motion.         General: No tenderness.      Comments: Generalized weakness   Skin:     General: Skin is warm and dry.   Neurological:      General: No focal deficit present.      Mental Status: She is alert and oriented to person, place, and time.      Motor: Weakness present.   Psychiatric:         Behavior: Behavior normal.             Significant Labs:   CBC:   Recent Labs   Lab 06/13/20  0440   WBC 10.05   HGB 14.6   HCT 43.8        CMP:   Recent Labs   Lab 06/13/20  0440 06/13/20  0558   * 132*   K 4.0 4.4   CL 92* 96   CO2 23 23   * 152*   BUN 26* 25*   CREATININE 1.5* 1.4   CALCIUM 8.9 8.3*   PROT 7.1  --    ALBUMIN 2.7*  --    BILITOT 3.6*  --     ALKPHOS 182*  --    AST 33  --    ALT 38  --    ANIONGAP 16 13   EGFRNONAA 37* 40*     Cardiac Markers:   Recent Labs   Lab 06/13/20  0440   BNP 1,037*     Coagulation:   Recent Labs   Lab 06/13/20  0738   INR 1.3*   APTT 34.5*     Urine Studies:   Recent Labs   Lab 06/13/20  0533   COLORU Yellow   APPEARANCEUA Clear   PHUR 5.0   SPECGRAV >=1.030*   PROTEINUA 1+*   GLUCUA Trace*   KETONESU Trace*   BILIRUBINUA 3+*   OCCULTUA Negative   NITRITE Positive*   UROBILINOGEN 4.0-6.0*   LEUKOCYTESUR Negative   RBCUA 0   WBCUA 5   BACTERIA Few*   HYALINECASTS 0     All pertinent labs within the past 24 hours have been reviewed.    Significant Imaging: I have reviewed all pertinent imaging results/findings within the past 24 hours.    Assessment/Plan:     * NSTEMI (non-ST elevated myocardial infarction)  Troponin 1.804, trend troponins   Cardiology consult  Heparin infusion on hold as patient has a moderate size pericardial infusion  Serial cardiac enzymes, ASA  Continue BB  Pt is allergic to ACE/ARB (throat swelling)  Nitrates prn, Oxygen therapy to maintain sats > 92 %  Lipid panel,  Hgb A1 c pending,   2 D ECHO pending      Diabetes mellitus, type II  Lab Results   Component Value Date    HGBA1C 6.9 (H) 12/05/2019   diabetic diet  accuchecks and sliding scale insulin      Volume overload  As noted on physical exam and imaging  Telemetry monitoring, daily weights, Cardiac diet,  fluid restriction, 2 D ECHO pending, Lasix IV  Pt is allergic to ACE/ARB  Supplemental oxygen to maintain sats > 92 %        Intravascular volume depletion  Pt has had decreased appetite for 1 week  Blood pressure borderline low on arrival  U/A notes elevated specific gravity  2 liters of IV fluid given in the ED      Essential hypertension  Blood pressure borderline low on arrival  Will hold home medications and resume when appropriate        VTE Risk Mitigation (From admission, onward)         Ordered     heparin 25,000 units in dextrose 5% (100  units/ml) IV bolus from bag INITIAL BOLUS (max bolus 4000 units)  Once     Question:  Heparin Infusion Adjustment (DO NOT MODIFY ANSWER)  Answer:  [email or web address]\Images\Pharmacy\HeparinInfusions\heparin LOW INTENSITY nomogram for [facility] [id number]    06/13/20 0707     heparin 25,000 units in dextrose 5% 250 mL (100 units/mL) infusion LOW INTENSITY nomogram - [facility]  Continuous     Question:  Heparin Infusion Adjustment (DO NOT MODIFY ANSWER)  Answer:  [email or web address]\Images\Pharmacy\HeparinInfusions\heparin LOW INTENSITY nomogram for [facility] [id number]    06/13/20 0707     heparin 25,000 units in dextrose 5% (100 units/ml) IV bolus from bag - ADDITIONAL PRN BOLUS - 60 units/kg (max bolus 4000 units)  As needed (PRN)     Question:  Heparin Infusion Adjustment (DO NOT MODIFY ANSWER)  Answer:  [email or web address]\Images\Pharmacy\HeparinInfusions\heparin LOW INTENSITY nomogram for [facility] [id number]    06/13/20 0707     heparin 25,000 units in dextrose 5% (100 units/ml) IV bolus from bag - ADDITIONAL PRN BOLUS - 30 units/kg (max bolus 4000 units)  As needed (PRN)     Question:  Heparin Infusion Adjustment (DO NOT MODIFY ANSWER)  Answer:  [email or web address]\Images\Pharmacy\HeparinInfusions\heparin LOW INTENSITY nomogram for [facility] [id number]    06/13/20 0707                   Donna Wright NP  Department of Hospital Medicine   [facility]

## 2020-06-13 NOTE — PLAN OF CARE
Pt admitted this shift.  Pt is AAOX4, VSS, NSR/ST(low 100s) on tele monitor.   O2 sats wnl on 2-3L NC.   Diuresed with IV lasix; pt has good urine output.   Pt had no c/o pain.   Pt free from falls this shift; safety precautions in place.  Frequent weight shifting encouraged; pt is ambulatory with stand-by assist.  PIV remains clean, dry and intact; no complications noted.   Plan of care reviewed with pt and her  at bedside; both verbalized understanding.   Pt currently resting comfortably in bed.  Hourly rounding complete.  Will continue to monitor.

## 2020-06-13 NOTE — CONSULTS
[facility]  Cardiology  Consult Note    Patient Name: Demetrice Gaines  MRN: [id number]  Admission Date: 6/13/2020  Hospital Length of Stay: 0 days  Code Status: No Order   Attending Provider: Chris Ramires, *   Consulting Provider: Monica Abad PA-C  Primary Care Physician: Lul Alvarez MD  Principal Problem:NSTEMI (non-ST elevated myocardial infarction)    Patient information was obtained from patient, past medical records and ER records.     Inpatient consult to Cardiology  Consult performed by: Monica Abad PA-C  Consult ordered by: Sydnie Briscoe MD        Subjective:     Chief Complaint:  Shortness of breath    HPI:   Ms. Gaines is a 60 year old female patient whose current medical conditions include DM type II, history of prior MI s/p LHC which showed normal coronaries, and HTN who presented to [facility] ED today with a chief complaint of worsening SOB over the past 2-3 days. Associated symptoms included generalized weakness, dry cough, palpitations, decreased appetite, and nausea. Patient also reported experiencing subjective fever, sore throat, headache, and congestion a week prior. She denied any associated yeyo chest pain, near syncope, or syncope. Initial workup in ED revealed BNP > 1,000, troponin of 1.804, creatinine of 1.5, and Tbili of 3.6. CTA of chest negative for PE but did show bilateral effusions/congestion and patient was subsequently admitted for further evaluation and treatment. Cardiology consulted to assist with management. Patient seen and examined today in ED. Remains SOB, with conversational dyspnea noted. Denies chest pain. Previously tried taking OTC cold medicines without any relief. She reports compliance with her medications. Followed in clinic by Dr. Arango. Previously had stress test in 12/19 which was negative for ischemia/injury. Echo 12/19 showed normal EF. Chart reviewed. Troponin 1.804>1.583, echo pending. Rapid COVID-19 test  negative.    Past Medical History:   Diagnosis Date    Colon polyp     Diabetes mellitus type I     Hypertension     Myocardial infarction        Past Surgical History:   Procedure Laterality Date    Benign Cyst Right      SECTION      COLONOSCOPY      COLONOSCOPY N/A 2019    Procedure: COLONOSCOPY;  Surgeon: Ileana Holcomb MD;  Location: [facility];  Service: Endoscopy;  Laterality: N/A;       Review of patient's allergies indicates:   Allergen Reactions    Aspirin     Lisinopril-hydrochlorothiazide      Other reaction(s): Reaction:Throat swelling;       No current facility-administered medications on file prior to encounter.      Current Outpatient Medications on File Prior to Encounter   Medication Sig    amLODIPine (NORVASC) 5 MG tablet Take 1 tablet (5 mg total) by mouth once daily.    aspirin 81 MG Chew 1 tablet    carvediloL (COREG) 25 MG tablet Take 1 tablet (25 mg total) by mouth 2 (two) times daily.    lancets Misc Check blood sugar once daily.    triamterene-hydrochlorothiazide 37.5-25 mg (DYAZIDE) 37.5-25 mg per capsule Take 1 capsule by mouth once daily.     Family History     Problem Relation (Age of Onset)    Cancer Mother, Paternal Grandmother    Diabetes Father    Hypertension Sister, Brother    Pacemaker/defibrilator Mother        Tobacco Use    Smoking status: Never Smoker    Smokeless tobacco: Never Used   Substance and Sexual Activity    Alcohol use: Never     Frequency: Never    Drug use: Never    Sexual activity: Not Currently     Review of Systems   Constitution: Positive for decreased appetite and malaise/fatigue.   HENT: Positive for congestion.    Eyes: Negative.    Cardiovascular: Positive for dyspnea on exertion.   Respiratory: Positive for cough and shortness of breath.    Endocrine: Negative.    Hematologic/Lymphatic: Negative.    Skin: Negative.    Musculoskeletal: Negative.    Gastrointestinal: Negative.    Genitourinary: Negative.    Neurological:  Negative.    Psychiatric/Behavioral: Negative.    Allergic/Immunologic: Negative.      Objective:     Vital Signs (Most Recent):  Temp: 97.8 °F (36.6 °C) (06/13/20 1102)  Pulse: 91 (06/13/20 1102)  Resp: (!) 22 (06/13/20 1102)  BP: 93/66 (06/13/20 1102)  SpO2: 95 % (06/13/20 1102) Vital Signs (24h Range):  Temp:  [97.5 °F (36.4 °C)-97.8 °F (36.6 °C)] 97.8 °F (36.6 °C)  Pulse:  [80-92] 91  Resp:  [16-30] 22  SpO2:  [92 %-98 %] 95 %  BP: ()/(57-77) 93/66     Weight: 91.2 kg (201 lb)  Body mass index is 34.5 kg/m².    SpO2: 95 %  O2 Device (Oxygen Therapy): nasal cannula      Intake/Output Summary (Last 24 hours) at 6/13/2020 1216  Last data filed at 6/13/2020 0703  Gross per 24 hour   Intake 1150 ml   Output --   Net 1150 ml       Lines/Drains/Airways     Peripheral Intravenous Line                 Peripheral IV - Single Lumen 06/13/20 0440 20 G Left Antecubital less than 1 day         Peripheral IV - Single Lumen 06/13/20 0738 18 G Right Hand less than 1 day                Physical Exam   Constitutional: She is oriented to person, place, and time. She appears well-developed and well-nourished. She appears distressed.   Ill appearing  On supplemental O2   HENT:   Head: Normocephalic and atraumatic.   Eyes: Pupils are equal, round, and reactive to light. Right eye exhibits no discharge. Left eye exhibits no discharge.   Neck: Neck supple. JVD present.   Cardiovascular: Normal rate, regular rhythm, S1 normal, S2 normal and normal heart sounds.   No murmur heard.  Pulmonary/Chest: She is in respiratory distress (mild). She has no wheezes. She has rales.   Abdominal: Soft. She exhibits no distension.   Musculoskeletal:         General: No edema.   Neurological: She is alert and oriented to person, place, and time.   Skin: Skin is warm and dry. She is not diaphoretic. No erythema.   Psychiatric: She has a normal mood and affect. Her behavior is normal. Thought content normal.   Nursing note and vitals  reviewed.      Significant Labs:   CMP   Recent Labs   Lab 06/13/20  0440 06/13/20  0558   * 132*   K 4.0 4.4   CL 92* 96   CO2 23 23   * 152*   BUN 26* 25*   CREATININE 1.5* 1.4   CALCIUM 8.9 8.3*   PROT 7.1  --    ALBUMIN 2.7*  --    BILITOT 3.6*  --    ALKPHOS 182*  --    AST 33  --    ALT 38  --    ANIONGAP 16 13   ESTGFRAFRICA 43* 46*   EGFRNONAA 37* 40*   , CBC   Recent Labs   Lab 06/13/20  0440   WBC 10.05   HGB 14.6   HCT 43.8      , Troponin   Recent Labs   Lab 06/13/20  0440 06/13/20  1043   TROPONINI 1.804* 1.583*   , All pertinent lab results from the last 24 hours have been reviewed. and   Recent Lab Results       06/13/20  1043   06/13/20  0738   06/13/20  0558   06/13/20  0533   06/13/20  0455        Albumin               Alkaline Phosphatase               ALT               Anion Gap     13         Appearance, UA       Clear       aPTT   34.5  Comment:  aPTT therapeutic range = 39-69 seconds           AST               Bacteria, UA       Few       Baso #               Basophil%               Bilirubin (UA)       3+  Comment:  Positive urine bilirubin is not confirmed. Correlate with   serum bilirubin and clinical presentation.         BILIRUBIN TOTAL               BNP               BUN, Bld     25         Calcium     8.3         Chloride     96         CO2     23         Color, UA       Yellow       Creatinine     1.4         D-Dimer               Differential Method               eGFR if      46         eGFR if non      40  Comment:  Calculation used to obtain the estimated glomerular filtration  rate (eGFR) is the CKD-EPI equation.            Eos #               Eosinophil%               Glucose     152         Glucose, UA       Trace       Gran # (ANC)               Gran%               Hematocrit               Hemoglobin               Hepatitis C Ab               HIV 1/2 Ag/Ab               Hyaline Casts, UA       0       Immature Grans (Abs)                Immature Granulocytes               INR   1.3  Comment:  Coumadin Therapy:  2.0 - 3.0 for INR for all indicators except mechanical heart valves  and antiphospholipid syndromes which should use 2.5 - 3.5.             Ketones, UA       Trace       Lactate, Francois         1.8  Comment:  Falsely low lactic acid results can be found in samples   containing >=13.0 mg/dL total bilirubin and/or >=3.5 mg/dL   direct bilirubin.       Leukocytes, UA       Negative       Lymph #               Lymph%               Magnesium               MCH               MCHC               MCV               Microscopic Comment       SEE COMMENT  Comment:  Other formed elements not mentioned in the report are not   present in the microscopic examination.          Mono #               Mono%               MPV               NITRITE UA       Positive       nRBC               Occult Blood UA       Negative       pH, UA       5.0       Platelets               Potassium     4.4         PROTEIN TOTAL               Protein, UA       1+  Comment:  Recommend a 24 hour urine protein or a urine   protein/creatinine ratio if globulin induced proteinuria is  clinically suspected.         Protime   13.5           RBC               RBC, UA       0       RDW               SARS-CoV-2 RNA, Amplification, Qual               Sodium     132         Specific Gravity, UA       >=1.030       Specimen UA       Urine, Clean Catch       Troponin I 1.583  Comment:  The reference interval for Troponin I represents the 99th percentile   cutoff   for our facility and is consistent with 3rd generation assay   performance.               TSH               UROBILINOGEN UA       4.0-6.0       WBC Clumps, UA       Occasional       WBC, UA       5       WBC                                06/13/20  0440        Albumin 2.7     Alkaline Phosphatase 182     ALT 38     Anion Gap 16     Appearance, UA       aPTT       AST 33     Bacteria, UA       Baso # 0.04     Basophil% 0.4      Bilirubin (UA)       BILIRUBIN TOTAL 3.6  Comment:  For infants and newborns, interpretation of results should be based  on gestational age, weight and in agreement with clinical  observations.  Premature Infant recommended reference ranges:  Up to 24 hours.............<8.0 mg/dL  Up to 48 hours............<12.0 mg/dL  3-5 days..................<15.0 mg/dL  6-29 days.................<15.0 mg/dL       BNP 1,037  Comment:  Values of less than 100 pg/ml are consistent with non-CHF populations.     BUN, Bld 26     Calcium 8.9     Chloride 92     CO2 23     Color, UA       Creatinine 1.5     D-Dimer 4.03  Comment:  The quantitative D-dimer assay should be used as an aid in   the diagnosis of deep vein thrombosis and pulmonary embolism  in patients with the appropriate presentation and clinical  history. The upper limit of the reference interval and the clinical   cut off   point are identical. Causes of a positive (>0.50 mg/L FEU) D-Dimer   test  include, but are not limited to: DVT, PE, DIC, thrombolytic   therapy, anticoagulant therapy, recent surgery, trauma, or   pregnancy, disseminated malignancy, aortic aneurysm, cirrhosis,  and severe infection. False negative results may occur in   patients with distal DVT.       Differential Method Automated     eGFR if  43     eGFR if non  37  Comment:  Calculation used to obtain the estimated glomerular filtration  rate (eGFR) is the CKD-EPI equation.        Eos # 0.3     Eosinophil% 2.9     Glucose 161     Glucose, UA       Gran # (ANC) 8.9     Gran% 88.2     Hematocrit 43.8     Hemoglobin 14.6     Hepatitis C Ab Negative     HIV 1/2 Ag/Ab Negative     Hyaline Casts, UA       Immature Grans (Abs) 0.13  Comment:  Mild elevation in immature granulocytes is non specific and   can be seen in a variety of conditions including stress response,   acute inflammation, trauma and pregnancy. Correlation with other   laboratory and clinical findings is  essential.       Immature Granulocytes 1.3     INR       Ketones, UA       Lactate, Francois       Leukocytes, UA       Lymph # 0.4     Lymph% 4.0     Magnesium 1.8     MCH 28.5     MCHC 33.3     MCV 86     Microscopic Comment       Mono # 0.3     Mono% 3.2     MPV 9.8     NITRITE UA       nRBC 0     Occult Blood UA       pH, UA       Platelets 285     Potassium 4.0     PROTEIN TOTAL 7.1     Protein, UA       Protime       RBC 5.12     RBC, UA       RDW 14.3     SARS-CoV-2 RNA, Amplification, Qual Negative  Comment:  This test utilizes isothermal nucleic acid amplification   technology to detect the SARS-CoV-2 RdRp nucleic acid segment.   The analytical sensitivity (limit of detection) is 125 genome   equivalents/mL.   A POSITIVE result implies infection with the SARS-CoV-2 virus;  the patient is presumed to be contagious.    A NEGATIVE result means that SARS-CoV-2 nucleic acids are not  present above the limit of detection. A NEGATIVE result should be   treated as presumptive. It does not rule out the possibility of   COVID-19 and should not be the sole basis for treatment decisions.   If COVID-19 is strongly suspected based on clinical and exposure   history, re-testing using an alternate molecular assay should be   considered.   This test is only for use under the Food and Drug   Administration s Emergency Use Authorization (EUA).   Commercial kits are provided by [employer].   Performance characteristics of the EUA have been independently  verified by [facility] Department of  Pathology and Laboratory Medicine.   _________________________________________________________________  The ID NOW COVID-19 Letter of Authorization, along with the   authorized Fact Sheet for Healthcare Providers, the authorized Fact  Sheet for Patients, and authorized labeling are available on the FDA   website:  www.fda.gov/MedicalDevices/Safety/EmergencySituations/evh010840.htm       Sodium 131     Specific [place], UA        Specimen UA       Troponin I 1.804  Comment:  The reference interval for Troponin I represents the 99th percentile   cutoff   for our facility and is consistent with 3rd generation assay   performance.       TSH 0.818     UROBILINOGEN UA       WBC Clumps, UA       WBC, UA       WBC 10.05           Significant Imaging: CT scan: CT ABDOMEN PELVIS WITH CONTRAST: No results found for this visit on 06/13/20., Echocardiogram:   2D echo with color flow doppler:   Results for orders placed or performed in visit on 12/26/19   2D echo with color flow doppler   Result Value Ref Range    QEF 60 55 - 65    Diastolic Dysfunction No     Aortic Valve Regurgitation MILD     Est. PA Systolic Pressure 24.72     Narrative    Date of Procedure: 12/26/2019        TEST DESCRIPTION       Aorta: The aortic root is normal in size, measuring 3.3 cm at sinotubular junction. The proximal ascending aorta is normal in size, measuring 3.4 cm across.     Left Atrium: The left atrial volume index is normal, measuring 15.40 cc/m2.     Left Ventricle: The left ventricle is normal in size, with an end-diastolic diameter of 4.2 cm, and an end-systolic diameter of 2.4 cm. Wall thickness is mildly increased, with the septum measuring 1.5 cm and the posterior wall measuring 1.3 cm across.   Relative wall thickness was increased at 0.62, and the LV mass index was increased at 138.6 g/m2 consistent with mild concentric left ventricular hypertrophy. There are no regional wall motion abnormalities. Left ventricular systolic function appears   normal. Visually estimated ejection fraction is 60-65%. The LV Doppler derived stroke volume equals 66.0 ccs.     Diastolic indices: E wave velocity 0.7 m/s, E/A ratio 0.8,  msec., E/e' ratio(avg) 9. Diastolic function is normal.     Right Atrium: The right atrium is normal in size, measuring 4.3 cm in length and 2.9 cm in width in the apical view.     Right Ventricle: The right ventricle is normal in size  measuring 2.5 cm at the base in the apical right ventricle-focused view. Global right ventricular systolic function appears normal. Tricuspid annular plane systolic excursion (TAPSE) is 2.3 cm. The   estimated PA systolic pressure is 25 mmHg.     Aortic Valve:  Aortic valve is normal in structure with normal leaflet mobility. Additionally, there is mild aortic regurgitation.     Mitral Valve:  Mitral valve is normal in structure with normal leaflet mobility.     Tricuspid Valve:  Tricuspid valve is normal in structure with normal leaflet mobility.     Pulmonary Valve:  Pulmonary valve is normal in structure with normal leaflet mobility.     IVC: IVC is normal in size and collapses > 50% with a sniff, suggesting normal right atrial pressure of 3 mmHg.     Intracavitary: There is no evidence of pericardial effusion, intracavity mass, thrombi, or vegetation.         CONCLUSIONS     1 - Concentric hypertrophy.     2 - No wall motion abnormalities.     3 - Normal left ventricular systolic function (EF 60-65%).     4 - Normal left ventricular diastolic function.     5 - Normal right ventricular systolic function .     6 - The estimated PA systolic pressure is 25 mmHg.     7 - Mild aortic regurgitation.             This document has been electronically    SIGNED BY: Rafa Mejia MD On: 12/26/2019 13:10   , EKG: Reviewed and X-Ray: CXR: X-Ray Chest 1 View (CXR): No results found for this visit on 06/13/20. and X-Ray Chest PA and Lateral (CXR): No results found for this visit on 06/13/20.    Assessment and Plan:   Patient who presents with acute decompensated CHF, likely triggered by viral illness. Elevated troponin seems secondary to demand ischemia. Continue to trend. Optimize regimen as clinical condition permits.  Diurese. Resume heparin drip, pending echo report. Reassess in AM.    * NSTEMI (non-ST elevated myocardial infarction)  -Troponin 1.804>1.583  -Elevation likely secondary to demand ischemia from acute  CHF  -Prior C in 2018 showed no significant disease per patient  -No complaints of chest pain  -Continue to trend  -Continue ASA  -Resume heparin gtt pending echo review by Dr. Coffman  -Start BB when BP permits  -No ACEi/ARB given allergy/intolerance  -Add atorvastatin 40 mg nightly  -Check echo  -Further rec's to follow    Diabetes mellitus, type II  -Mgmt as per primary team    Volume overload  -Presents with acute decompensated CHF, likely triggered by viral illness  -Lasix IV 40 mg BID  -Strict I's/O's  -Add BB when BP permits  -No ACEi/ARB given allergy  -Check echo  -Further rec's to follow    Essential hypertension  -BP borderline low  -Resume home meds as tolerated        VTE Risk Mitigation (From admission, onward)         Ordered     heparin 25,000 units in dextrose 5% (100 units/ml) IV bolus from bag INITIAL BOLUS (max bolus 4000 units)  Once     Question:  Heparin Infusion Adjustment (DO NOT MODIFY ANSWER)  Answer:  \[email or web address]\epic\Images\Pharmacy\HeparinInfusions\heparin LOW INTENSITY nomogram for [facility] [id number]    06/13/20 0707     heparin 25,000 units in dextrose 5% 250 mL (100 units/mL) infusion LOW INTENSITY nomogram - [facility]  Continuous     Question:  Heparin Infusion Adjustment (DO NOT MODIFY ANSWER)  Answer:  \[email or web address]\epic\Images\Pharmacy\HeparinInfusions\heparin LOW INTENSITY nomogram for [facility] [id number]    06/13/20 0707     heparin 25,000 units in dextrose 5% (100 units/ml) IV bolus from bag - ADDITIONAL PRN BOLUS - 60 units/kg (max bolus 4000 units)  As needed (PRN)     Question:  Heparin Infusion Adjustment (DO NOT MODIFY ANSWER)  Answer:  \[email or web address]\epic\Images\Pharmacy\HeparinInfusions\heparin LOW INTENSITY nomogram for [facility] [id number]    06/13/20 0707     heparin 25,000 units in dextrose 5% (100 units/ml) IV bolus from bag - ADDITIONAL PRN BOLUS - 30 units/kg (max bolus 4000 units)  As needed (PRN)     Question:  Heparin Infusion Adjustment (DO NOT MODIFY ANSWER)  Answer:   [email or web address]\epic\Images\Pharmacy\HeparinInfusions\heparin LOW INTENSITY nomogram for [facility] [id number]    06/13/20 0707                Thank you for your consult. I will follow-up with patient. Please contact us if you have any additional questions.    Monica Abad PA-C  Cardiology   [facility]

## 2020-06-13 NOTE — SUBJECTIVE & OBJECTIVE
Past Medical History:   Diagnosis Date    Colon polyp     Diabetes mellitus type I     Hypertension     Myocardial infarction        Past Surgical History:   Procedure Laterality Date    Benign Cyst Right      SECTION      COLONOSCOPY      COLONOSCOPY N/A 2019    Procedure: COLONOSCOPY;  Surgeon: Ileana Holcomb MD;  Location: [facility];  Service: Endoscopy;  Laterality: N/A;       Review of patient's allergies indicates:   Allergen Reactions    Aspirin     Lisinopril-hydrochlorothiazide      Other reaction(s): Reaction:Throat swelling;       No current facility-administered medications on file prior to encounter.      Current Outpatient Medications on File Prior to Encounter   Medication Sig    amLODIPine (NORVASC) 5 MG tablet Take 1 tablet (5 mg total) by mouth once daily.    aspirin 81 MG Chew 1 tablet    carvediloL (COREG) 25 MG tablet Take 1 tablet (25 mg total) by mouth 2 (two) times daily.    lancets Misc Check blood sugar once daily.    triamterene-hydrochlorothiazide 37.5-25 mg (DYAZIDE) 37.5-25 mg per capsule Take 1 capsule by mouth once daily.     Family History     Problem Relation (Age of Onset)    Cancer Mother, Paternal Grandmother    Diabetes Father    Hypertension Sister, Brother    Pacemaker/defibrilator Mother        Tobacco Use    Smoking status: Never Smoker    Smokeless tobacco: Never Used   Substance and Sexual Activity    Alcohol use: Never     Frequency: Never    Drug use: Never    Sexual activity: Not Currently     Review of Systems   Constitution: Positive for decreased appetite and malaise/fatigue.   HENT: Positive for congestion.    Eyes: Negative.    Cardiovascular: Positive for dyspnea on exertion.   Respiratory: Positive for cough and shortness of breath.    Endocrine: Negative.    Hematologic/Lymphatic: Negative.    Skin: Negative.    Musculoskeletal: Negative.    Gastrointestinal: Negative.    Genitourinary: Negative.    Neurological: Negative.     Psychiatric/Behavioral: Negative.    Allergic/Immunologic: Negative.      Objective:     Vital Signs (Most Recent):  Temp: 97.8 °F (36.6 °C) (06/13/20 1102)  Pulse: 91 (06/13/20 1102)  Resp: (!) 22 (06/13/20 1102)  BP: 93/66 (06/13/20 1102)  SpO2: 95 % (06/13/20 1102) Vital Signs (24h Range):  Temp:  [97.5 °F (36.4 °C)-97.8 °F (36.6 °C)] 97.8 °F (36.6 °C)  Pulse:  [80-92] 91  Resp:  [16-30] 22  SpO2:  [92 %-98 %] 95 %  BP: ()/(57-77) 93/66     Weight: 91.2 kg (201 lb)  Body mass index is 34.5 kg/m².    SpO2: 95 %  O2 Device (Oxygen Therapy): nasal cannula      Intake/Output Summary (Last 24 hours) at 6/13/2020 1216  Last data filed at 6/13/2020 0703  Gross per 24 hour   Intake 1150 ml   Output --   Net 1150 ml       Lines/Drains/Airways     Peripheral Intravenous Line                 Peripheral IV - Single Lumen 06/13/20 0440 20 G Left Antecubital less than 1 day         Peripheral IV - Single Lumen 06/13/20 0738 18 G Right Hand less than 1 day                Physical Exam   Constitutional: She is oriented to person, place, and time. She appears well-developed and well-nourished. She appears distressed.   Ill appearing  On supplemental O2   HENT:   Head: Normocephalic and atraumatic.   Eyes: Pupils are equal, round, and reactive to light. Right eye exhibits no discharge. Left eye exhibits no discharge.   Neck: Neck supple. JVD present.   Cardiovascular: Normal rate, regular rhythm, S1 normal, S2 normal and normal heart sounds.   No murmur heard.  Pulmonary/Chest: She is in respiratory distress (mild). She has no wheezes. She has rales.   Abdominal: Soft. She exhibits no distension.   Musculoskeletal:         General: No edema.   Neurological: She is alert and oriented to person, place, and time.   Skin: Skin is warm and dry. She is not diaphoretic. No erythema.   Psychiatric: She has a normal mood and affect. Her behavior is normal. Thought content normal.   Nursing note and vitals reviewed.      Significant  Labs:   CMP   Recent Labs   Lab 06/13/20  0440 06/13/20  0558   * 132*   K 4.0 4.4   CL 92* 96   CO2 23 23   * 152*   BUN 26* 25*   CREATININE 1.5* 1.4   CALCIUM 8.9 8.3*   PROT 7.1  --    ALBUMIN 2.7*  --    BILITOT 3.6*  --    ALKPHOS 182*  --    AST 33  --    ALT 38  --    ANIONGAP 16 13   ESTGFRAFRICA 43* 46*   EGFRNONAA 37* 40*   , CBC   Recent Labs   Lab 06/13/20  0440   WBC 10.05   HGB 14.6   HCT 43.8      , Troponin   Recent Labs   Lab 06/13/20  0440 06/13/20  1043   TROPONINI 1.804* 1.583*   , All pertinent lab results from the last 24 hours have been reviewed. and   Recent Lab Results       06/13/20  1043   06/13/20  0738   06/13/20  0558   06/13/20  0533   06/13/20  0455        Albumin               Alkaline Phosphatase               ALT               Anion Gap     13         Appearance, UA       Clear       aPTT   34.5  Comment:  aPTT therapeutic range = 39-69 seconds           AST               Bacteria, UA       Few       Baso #               Basophil%               Bilirubin (UA)       3+  Comment:  Positive urine bilirubin is not confirmed. Correlate with   serum bilirubin and clinical presentation.         BILIRUBIN TOTAL               BNP               BUN, Bld     25         Calcium     8.3         Chloride     96         CO2     23         Color, UA       Yellow       Creatinine     1.4         D-Dimer               Differential Method               eGFR if      46         eGFR if non      40  Comment:  Calculation used to obtain the estimated glomerular filtration  rate (eGFR) is the CKD-EPI equation.            Eos #               Eosinophil%               Glucose     152         Glucose, UA       Trace       Gran # (ANC)               Gran%               Hematocrit               Hemoglobin               Hepatitis C Ab               HIV 1/2 Ag/Ab               Hyaline Casts, UA       0       Immature Grans (Abs)               Immature  Granulocytes               INR   1.3  Comment:  Coumadin Therapy:  2.0 - 3.0 for INR for all indicators except mechanical heart valves  and antiphospholipid syndromes which should use 2.5 - 3.5.             Ketones, UA       Trace       Lactate, Francois         1.8  Comment:  Falsely low lactic acid results can be found in samples   containing >=13.0 mg/dL total bilirubin and/or >=3.5 mg/dL   direct bilirubin.       Leukocytes, UA       Negative       Lymph #               Lymph%               Magnesium               MCH               MCHC               MCV               Microscopic Comment       SEE COMMENT  Comment:  Other formed elements not mentioned in the report are not   present in the microscopic examination.          Mono #               Mono%               MPV               NITRITE UA       Positive       nRBC               Occult Blood UA       Negative       pH, UA       5.0       Platelets               Potassium     4.4         PROTEIN TOTAL               Protein, UA       1+  Comment:  Recommend a 24 hour urine protein or a urine   protein/creatinine ratio if globulin induced proteinuria is  clinically suspected.         Protime   13.5           RBC               RBC, UA       0       RDW               SARS-CoV-2 RNA, Amplification, Qual               Sodium     132         Specific Gravity, UA       >=1.030       Specimen UA       Urine, Clean Catch       Troponin I 1.583  Comment:  The reference interval for Troponin I represents the 99th percentile   cutoff   for our facility and is consistent with 3rd generation assay   performance.               TSH               UROBILINOGEN UA       4.0-6.0       WBC Clumps, UA       Occasional       WBC, UA       5       WBC                                06/13/20  0440        Albumin 2.7     Alkaline Phosphatase 182     ALT 38     Anion Gap 16     Appearance, UA       aPTT       AST 33     Bacteria, UA       Baso # 0.04     Basophil% 0.4     Bilirubin (UA)        BILIRUBIN TOTAL 3.6  Comment:  For infants and newborns, interpretation of results should be based  on gestational age, weight and in agreement with clinical  observations.  Premature Infant recommended reference ranges:  Up to 24 hours.............<8.0 mg/dL  Up to 48 hours............<12.0 mg/dL  3-5 days..................<15.0 mg/dL  6-29 days.................<15.0 mg/dL       BNP 1,037  Comment:  Values of less than 100 pg/ml are consistent with non-CHF populations.     BUN, Bld 26     Calcium 8.9     Chloride 92     CO2 23     Color, UA       Creatinine 1.5     D-Dimer 4.03  Comment:  The quantitative D-dimer assay should be used as an aid in   the diagnosis of deep vein thrombosis and pulmonary embolism  in patients with the appropriate presentation and clinical  history. The upper limit of the reference interval and the clinical   cut off   point are identical. Causes of a positive (>0.50 mg/L FEU) D-Dimer   test  include, but are not limited to: DVT, PE, DIC, thrombolytic   therapy, anticoagulant therapy, recent surgery, trauma, or   pregnancy, disseminated malignancy, aortic aneurysm, cirrhosis,  and severe infection. False negative results may occur in   patients with distal DVT.       Differential Method Automated     eGFR if  43     eGFR if non  37  Comment:  Calculation used to obtain the estimated glomerular filtration  rate (eGFR) is the CKD-EPI equation.        Eos # 0.3     Eosinophil% 2.9     Glucose 161     Glucose, UA       Gran # (ANC) 8.9     Gran% 88.2     Hematocrit 43.8     Hemoglobin 14.6     Hepatitis C Ab Negative     HIV 1/2 Ag/Ab Negative     Hyaline Casts, UA       Immature Grans (Abs) 0.13  Comment:  Mild elevation in immature granulocytes is non specific and   can be seen in a variety of conditions including stress response,   acute inflammation, trauma and pregnancy. Correlation with other   laboratory and clinical findings is essential.        Immature Granulocytes 1.3     INR       Ketones, UA       Lactate, Francois       Leukocytes, UA       Lymph # 0.4     Lymph% 4.0     Magnesium 1.8     MCH 28.5     MCHC 33.3     MCV 86     Microscopic Comment       Mono # 0.3     Mono% 3.2     MPV 9.8     NITRITE UA       nRBC 0     Occult Blood UA       pH, UA       Platelets 285     Potassium 4.0     PROTEIN TOTAL 7.1     Protein, UA       Protime       RBC 5.12     RBC, UA       RDW 14.3     SARS-CoV-2 RNA, Amplification, Qual Negative  Comment:  This test utilizes isothermal nucleic acid amplification   technology to detect the SARS-CoV-2 RdRp nucleic acid segment.   The analytical sensitivity (limit of detection) is 125 genome   equivalents/mL.   A POSITIVE result implies infection with the SARS-CoV-2 virus;  the patient is presumed to be contagious.    A NEGATIVE result means that SARS-CoV-2 nucleic acids are not  present above the limit of detection. A NEGATIVE result should be   treated as presumptive. It does not rule out the possibility of   COVID-19 and should not be the sole basis for treatment decisions.   If COVID-19 is strongly suspected based on clinical and exposure   history, re-testing using an alternate molecular assay should be   considered.   This test is only for use under the Food and Drug   Administration s Emergency Use Authorization (EUA).   Commercial kits are provided by [employer].   Performance characteristics of the EUA have been independently  verified by [facility] Department of  Pathology and Laboratory Medicine.   _________________________________________________________________  The ID NOW COVID-19 Letter of Authorization, along with the   authorized Fact Sheet for Healthcare Providers, the authorized Fact  Sheet for Patients, and authorized labeling are available on the FDA   website:  www.fda.gov/MedicalDevices/Safety/EmergencySituations/vxq476041.htm       Sodium 131     Specific Gravity, UA       Specimen UA        Troponin I 1.804  Comment:  The reference interval for Troponin I represents the 99th percentile   cutoff   for our facility and is consistent with 3rd generation assay   performance.       TSH 0.818     UROBILINOGEN UA       WBC Clumps, UA       WBC, UA       WBC 10.05           Significant Imaging: CT scan: CT ABDOMEN PELVIS WITH CONTRAST: No results found for this visit on 06/13/20., Echocardiogram:   2D echo with color flow doppler:   Results for orders placed or performed in visit on 12/26/19   2D echo with color flow doppler   Result Value Ref Range    QEF 60 55 - 65    Diastolic Dysfunction No     Aortic Valve Regurgitation MILD     Est. PA Systolic Pressure 24.72     Narrative    Date of Procedure: 12/26/2019        TEST DESCRIPTION       Aorta: The aortic root is normal in size, measuring 3.3 cm at sinotubular junction. The proximal ascending aorta is normal in size, measuring 3.4 cm across.     Left Atrium: The left atrial volume index is normal, measuring 15.40 cc/m2.     Left Ventricle: The left ventricle is normal in size, with an end-diastolic diameter of 4.2 cm, and an end-systolic diameter of 2.4 cm. Wall thickness is mildly increased, with the septum measuring 1.5 cm and the posterior wall measuring 1.3 cm across.   Relative wall thickness was increased at 0.62, and the LV mass index was increased at 138.6 g/m2 consistent with mild concentric left ventricular hypertrophy. There are no regional wall motion abnormalities. Left ventricular systolic function appears   normal. Visually estimated ejection fraction is 60-65%. The LV Doppler derived stroke volume equals 66.0 ccs.     Diastolic indices: E wave velocity 0.7 m/s, E/A ratio 0.8,  msec., E/e' ratio(avg) 9. Diastolic function is normal.     Right Atrium: The right atrium is normal in size, measuring 4.3 cm in length and 2.9 cm in width in the apical view.     Right Ventricle: The right ventricle is normal in size measuring 2.5 cm at the  base in the apical right ventricle-focused view. Global right ventricular systolic function appears normal. Tricuspid annular plane systolic excursion (TAPSE) is 2.3 cm. The   estimated PA systolic pressure is 25 mmHg.     Aortic Valve:  Aortic valve is normal in structure with normal leaflet mobility. Additionally, there is mild aortic regurgitation.     Mitral Valve:  Mitral valve is normal in structure with normal leaflet mobility.     Tricuspid Valve:  Tricuspid valve is normal in structure with normal leaflet mobility.     Pulmonary Valve:  Pulmonary valve is normal in structure with normal leaflet mobility.     IVC: IVC is normal in size and collapses > 50% with a sniff, suggesting normal right atrial pressure of 3 mmHg.     Intracavitary: There is no evidence of pericardial effusion, intracavity mass, thrombi, or vegetation.         CONCLUSIONS     1 - Concentric hypertrophy.     2 - No wall motion abnormalities.     3 - Normal left ventricular systolic function (EF 60-65%).     4 - Normal left ventricular diastolic function.     5 - Normal right ventricular systolic function .     6 - The estimated PA systolic pressure is 25 mmHg.     7 - Mild aortic regurgitation.             This document has been electronically    SIGNED BY: Rafa Mejia MD On: 12/26/2019 13:10   , EKG: Reviewed and X-Ray: CXR: X-Ray Chest 1 View (CXR): No results found for this visit on 06/13/20. and X-Ray Chest PA and Lateral (CXR): No results found for this visit on 06/13/20.

## 2020-06-14 PROBLEM — R79.89 ELEVATED TROPONIN: Status: ACTIVE | Noted: 2020-06-13

## 2020-06-14 LAB
ALBUMIN SERPL BCP-MCNC: 2.5 G/DL (ref 3.5–5.2)
ALP SERPL-CCNC: 154 U/L (ref 55–135)
ALT SERPL W/O P-5'-P-CCNC: 37 U/L (ref 10–44)
ANION GAP SERPL CALC-SCNC: 16 MMOL/L (ref 8–16)
AST SERPL-CCNC: 29 U/L (ref 10–40)
BACTERIA UR CULT: NO GROWTH
BASOPHILS # BLD AUTO: 0.04 K/UL (ref 0–0.2)
BASOPHILS NFR BLD: 0.3 % (ref 0–1.9)
BILIRUB SERPL-MCNC: 1.2 MG/DL (ref 0.1–1)
BUN SERPL-MCNC: 33 MG/DL (ref 8–23)
CALCIUM SERPL-MCNC: 8.4 MG/DL (ref 8.7–10.5)
CHLORIDE SERPL-SCNC: 96 MMOL/L (ref 95–110)
CHOLEST SERPL-MCNC: 119 MG/DL (ref 120–199)
CHOLEST/HDLC SERPL: 7.9 {RATIO} (ref 2–5)
CO2 SERPL-SCNC: 22 MMOL/L (ref 23–29)
CREAT SERPL-MCNC: 1.4 MG/DL (ref 0.5–1.4)
DIFFERENTIAL METHOD: ABNORMAL
EOSINOPHIL # BLD AUTO: 0.6 K/UL (ref 0–0.5)
EOSINOPHIL NFR BLD: 4.5 % (ref 0–8)
ERYTHROCYTE [DISTWIDTH] IN BLOOD BY AUTOMATED COUNT: 14.4 % (ref 11.5–14.5)
EST. GFR  (AFRICAN AMERICAN): 46 ML/MIN/1.73 M^2
EST. GFR  (NON AFRICAN AMERICAN): 40 ML/MIN/1.73 M^2
ESTIMATED AVG GLUCOSE: 148 MG/DL (ref 68–131)
GLUCOSE SERPL-MCNC: 141 MG/DL (ref 70–110)
HBA1C MFR BLD HPLC: 6.8 % (ref 4–5.6)
HCT VFR BLD AUTO: 40.9 % (ref 37–48.5)
HDLC SERPL-MCNC: 15 MG/DL (ref 40–75)
HDLC SERPL: 12.6 % (ref 20–50)
HGB BLD-MCNC: 14 G/DL (ref 12–16)
IMM GRANULOCYTES # BLD AUTO: 0.18 K/UL (ref 0–0.04)
IMM GRANULOCYTES NFR BLD AUTO: 1.4 % (ref 0–0.5)
LDLC SERPL CALC-MCNC: 73 MG/DL (ref 63–159)
LYMPHOCYTES # BLD AUTO: 1.1 K/UL (ref 1–4.8)
LYMPHOCYTES NFR BLD: 8.5 % (ref 18–48)
MCH RBC QN AUTO: 28.6 PG (ref 27–31)
MCHC RBC AUTO-ENTMCNC: 34.2 G/DL (ref 32–36)
MCV RBC AUTO: 84 FL (ref 82–98)
MONOCYTES # BLD AUTO: 0.7 K/UL (ref 0.3–1)
MONOCYTES NFR BLD: 5.3 % (ref 4–15)
NEUTROPHILS # BLD AUTO: 10.1 K/UL (ref 1.8–7.7)
NEUTROPHILS NFR BLD: 80 % (ref 38–73)
NONHDLC SERPL-MCNC: 104 MG/DL
NRBC BLD-RTO: 0 /100 WBC
PLATELET # BLD AUTO: 332 K/UL (ref 150–350)
PMV BLD AUTO: 10 FL (ref 9.2–12.9)
POCT GLUCOSE: 141 MG/DL (ref 70–110)
POCT GLUCOSE: 157 MG/DL (ref 70–110)
POCT GLUCOSE: 162 MG/DL (ref 70–110)
POTASSIUM SERPL-SCNC: 3.8 MMOL/L (ref 3.5–5.1)
PROT SERPL-MCNC: 6.7 G/DL (ref 6–8.4)
RBC # BLD AUTO: 4.89 M/UL (ref 4–5.4)
SODIUM SERPL-SCNC: 134 MMOL/L (ref 136–145)
TRIGL SERPL-MCNC: 155 MG/DL (ref 30–150)
TROPONIN I SERPL DL<=0.01 NG/ML-MCNC: 1.69 NG/ML (ref 0–0.03)
WBC # BLD AUTO: 12.67 K/UL (ref 3.9–12.7)

## 2020-06-14 PROCEDURE — 85025 COMPLETE CBC W/AUTO DIFF WBC: CPT

## 2020-06-14 PROCEDURE — 97802 MEDICAL NUTRITION INDIV IN: CPT

## 2020-06-14 PROCEDURE — 27000221 HC OXYGEN, UP TO 24 HOURS

## 2020-06-14 PROCEDURE — 83036 HEMOGLOBIN GLYCOSYLATED A1C: CPT

## 2020-06-14 PROCEDURE — 80053 COMPREHEN METABOLIC PANEL: CPT

## 2020-06-14 PROCEDURE — 84484 ASSAY OF TROPONIN QUANT: CPT

## 2020-06-14 PROCEDURE — 25000003 PHARM REV CODE 250: Performed by: PHYSICIAN ASSISTANT

## 2020-06-14 PROCEDURE — 25000003 PHARM REV CODE 250: Performed by: NURSE PRACTITIONER

## 2020-06-14 PROCEDURE — 99225 PR SUBSEQUENT OBSERVATION CARE,LEVEL II: ICD-10-PCS | Mod: ,,, | Performed by: INTERNAL MEDICINE

## 2020-06-14 PROCEDURE — 96376 TX/PRO/DX INJ SAME DRUG ADON: CPT

## 2020-06-14 PROCEDURE — 99225 PR SUBSEQUENT OBSERVATION CARE,LEVEL II: CPT | Mod: ,,, | Performed by: INTERNAL MEDICINE

## 2020-06-14 PROCEDURE — 63600175 PHARM REV CODE 636 W HCPCS: Performed by: NURSE PRACTITIONER

## 2020-06-14 PROCEDURE — 36415 COLL VENOUS BLD VENIPUNCTURE: CPT

## 2020-06-14 PROCEDURE — 80061 LIPID PANEL: CPT

## 2020-06-14 PROCEDURE — 63600175 PHARM REV CODE 636 W HCPCS: Performed by: PHYSICIAN ASSISTANT

## 2020-06-14 PROCEDURE — G0378 HOSPITAL OBSERVATION PER HR: HCPCS

## 2020-06-14 RX ORDER — FUROSEMIDE 10 MG/ML
40 INJECTION INTRAMUSCULAR; INTRAVENOUS DAILY
Status: DISCONTINUED | OUTPATIENT
Start: 2020-06-15 | End: 2020-06-15 | Stop reason: HOSPADM

## 2020-06-14 RX ADMIN — ACETAMINOPHEN 650 MG: 325 TABLET ORAL at 10:06

## 2020-06-14 RX ADMIN — ASPIRIN 81 MG: 81 TABLET, COATED ORAL at 08:06

## 2020-06-14 RX ADMIN — PANTOPRAZOLE SODIUM 40 MG: 40 TABLET, DELAYED RELEASE ORAL at 08:06

## 2020-06-14 RX ADMIN — ATORVASTATIN CALCIUM 40 MG: 40 TABLET, FILM COATED ORAL at 09:06

## 2020-06-14 RX ADMIN — FUROSEMIDE 40 MG: 10 INJECTION, SOLUTION INTRAMUSCULAR; INTRAVENOUS at 08:06

## 2020-06-14 RX ADMIN — CEFTRIAXONE 1 G: 1 INJECTION, SOLUTION INTRAVENOUS at 06:06

## 2020-06-14 NOTE — ASSESSMENT & PLAN NOTE
-Presents with acute decompensated CHF, likely triggered by viral illness  -Lasix IV 40 mg BID  -Strict I's/O's  -Add BB when BP permits  -No ACEi/ARB given allergy  -Check echo  -Further rec's to follow    6/14/2020  -Improving  -Echo showed normal EF  -Continue additional day of IV diuresis  -Repeat BNP in AM  -Continue BB  -Strict I's/O's

## 2020-06-14 NOTE — ASSESSMENT & PLAN NOTE
Lab Results   Component Value Date    HGBA1C 6.8 (H) 06/14/2020   diabetic diet  accuchecks and sliding scale insulin

## 2020-06-14 NOTE — PLAN OF CARE
Treatment ongoing.  Pt is AAOX4, VSS, NSR on tele monitor.   Pt remained afebrile.  O2 sats wnl on 1-2L NC.  Diuresed with IV lasix; pt has good urine output.   PRN medication given for mild headache.  Pt free from falls this shift; safety precautions in place.  Frequent weight shifting encouraged; pt is ambulatory with stand-by assist.  PIV remains clean, dry and intact; no complications noted.   Plan of care reviewed with pt and her  at bedside; both verbalized understanding.   Pt currently resting comfortably in bed.  Hourly rounding complete.  Will continue to monitor

## 2020-06-14 NOTE — PLAN OF CARE
Patient in bed resting quiet. Easily aroused. AAOX4. Denies chest pain, SOB, palpitations, dizziness, weakness, numbness, tingling. No acute distress noted. Cardiac monitoring HR 90s A.fib. Ambulates with assist; unsteady gait. Complains of nausea, PRN zofran administered with full relief. Blood glucose monitoring. Plan of care reviewed, verbalized understanding. Patient educated on frequent weight shifting to prevent pressure injuries. Safety measures in place, bed in lowest position, wheels locked, call light with in reach, non-skid socks in use, SCDs in use. Bed alarm in use. Will continue to monitor.

## 2020-06-14 NOTE — PROGRESS NOTES
[facility] Hospital Medicine  Progress Note    Patient Name: Demetrice Gaines  MRN: [id number]  Patient Class: OP- Observation   Admission Date: 6/13/2020  Length of Stay: 0 days  Attending Physician: Chris Ramires, *  Primary Care Provider: Lul Alvarez MD        Subjective:     Principal Problem:Elevated troponin        HPI:  Pt is a 61 yo female with PMHx of DM, hx of MI, HTN who presents to the ED with reports of YANG onset x 2 days. Associated symptoms include generalized weakness, dry cough, heart fluttering sensation, poor appetite, nausea and dark urine. Approx 1 week ago pt developed viral symptoms including subjective fever, sore throat, headache, myalgias and head congestion. These symptoms have improved but patient has been mostly in the bed over the last week. YANG started 2 days ago. On arrival, B/P relatively low and 2 L IV fluids given. V/S on arrival: Temp 97.5, pulse 80, resp 20 and B/P 99/57, SpO2 = 95 %. CTA of Chest ruled out PE and notes congestion/edema with associated effusions (d dimer = 4.03). CXR - borderline cardiomegaly - ? Mild congestion. Labs find left shift of 88 %, Na 132, BUN 25, gluc 152, bilirubin 3.6, troponin 1.804 and BNP = 1037. U/a notes elevated specific gravity, positive nitrites, negative leukocytes and occasional WBC. Pt is placed on Observation to rule out NSTEMI, treat for volume overload and symptomatic treatment of symptoms. COVID NEGATIVE    Overview/Hospital Course:  59 y/o aaf admitted with a dx of acute on chronic CHF . She has been complaining of  Nausea  And myalgia for 1 week . The COVID-19  Test was negative . The NICHELLE show normal EF  And No wma. She  Report feeling better after  Diuresis .     Interval History:     Review of Systems   Constitutional: Positive for activity change and appetite change. Negative for fatigue and fever.   HENT: Negative.    Eyes: Negative.    Respiratory: Positive for cough and shortness of breath.  Negative for chest tightness and wheezing.    Cardiovascular: Negative for chest pain, palpitations and leg swelling.   Gastrointestinal: Positive for nausea. Negative for abdominal pain and diarrhea.   Genitourinary: Negative for decreased urine volume.        Dark urine   Musculoskeletal: Positive for myalgias.   Skin: Negative for pallor, rash and wound.   Neurological: Positive for weakness.   Psychiatric/Behavioral: Negative.      Objective:     Vital Signs (Most Recent):  Temp: 98.3 °F (36.8 °C) (06/14/20 1610)  Pulse: 87 (06/14/20 1610)  Resp: 20 (06/14/20 0730)  BP: 114/70 (06/14/20 1610)  SpO2: 95 % (06/14/20 1610) Vital Signs (24h Range):  Temp:  [98.1 °F (36.7 °C)-98.6 °F (37 °C)] 98.3 °F (36.8 °C)  Pulse:  [] 87  Resp:  [18-20] 20  SpO2:  [94 %-97 %] 95 %  BP: ()/(64-75) 114/70     Weight: 85.5 kg (188 lb 7.9 oz)  Body mass index is 32.35 kg/m².    Intake/Output Summary (Last 24 hours) at 6/14/2020 1725  Last data filed at 6/14/2020 1600  Gross per 24 hour   Intake 270 ml   Output 2250 ml   Net -1980 ml      Physical Exam  Vitals signs and nursing note reviewed.   Constitutional:       Appearance: She is well-developed. She is not ill-appearing.   HENT:      Head: Normocephalic and atraumatic.      Nose: Nose normal.   Eyes:      General: No scleral icterus.     Conjunctiva/sclera: Conjunctivae normal.   Neck:      Musculoskeletal: Normal range of motion and neck supple.   Cardiovascular:      Rate and Rhythm: Normal rate and regular rhythm.      Heart sounds: No murmur. No friction rub. No gallop.    Pulmonary:      Effort: Pulmonary effort is normal.      Breath sounds: No rales.   Abdominal:      General: Bowel sounds are normal.      Palpations: Abdomen is soft.      Tenderness: There is no abdominal tenderness.   Musculoskeletal: Normal range of motion.         General: No tenderness.      Comments: Generalized weakness   Skin:     General: Skin is warm and dry.   Neurological:       General: No focal deficit present.      Mental Status: She is alert and oriented to person, place, and time.      Motor: Weakness present.   Psychiatric:         Behavior: Behavior normal.         Significant Labs: All pertinent labs within the past 24 hours have been reviewed.    Significant Imaging: I have reviewed all pertinent imaging results/findings within the past 24 hours.      Assessment/Plan:      * Elevated troponin  Cardiology consulted . Think is related due to demand ischemia       Diabetes mellitus, type II  Lab Results   Component Value Date    HGBA1C 6.8 (H) 06/14/2020   diabetic diet  accuchecks and sliding scale insulin      Volume overload  TTE show normal EF   CTA chest was negative for PE  And show pulmonary congestion         Intravascular volume depletion  Pt has had decreased appetite for 1 week  Blood pressure borderline low on arrival  U/A notes elevated specific gravity  2 liters of IV fluid given in the ED      Essential hypertension  -Cont lasix         VTE Risk Mitigation (From admission, onward)         Ordered     IP VTE HIGH RISK PATIENT  Once      06/13/20 1244     Place sequential compression device  Until discontinued      06/13/20 1244                      Chris Ramires MD  Department of Hospital Medicine   [facility]

## 2020-06-14 NOTE — HOSPITAL COURSE
6/14/2020-Patient seen and examined today, resting in bed. Feeling better. SOB improving. Remains chest pain free. Labs reviewed. Creatinine 1.4. Troponin trending down. Echo showed normal EF, no WMA.

## 2020-06-14 NOTE — SUBJECTIVE & OBJECTIVE
Interval History:     Review of Systems   Constitutional: Positive for activity change and appetite change. Negative for fatigue and fever.   HENT: Negative.    Eyes: Negative.    Respiratory: Positive for cough and shortness of breath. Negative for chest tightness and wheezing.    Cardiovascular: Negative for chest pain, palpitations and leg swelling.   Gastrointestinal: Positive for nausea. Negative for abdominal pain and diarrhea.   Genitourinary: Negative for decreased urine volume.        Dark urine   Musculoskeletal: Positive for myalgias.   Skin: Negative for pallor, rash and wound.   Neurological: Positive for weakness.   Psychiatric/Behavioral: Negative.      Objective:     Vital Signs (Most Recent):  Temp: 98.3 °F (36.8 °C) (06/14/20 1610)  Pulse: 87 (06/14/20 1610)  Resp: 20 (06/14/20 0730)  BP: 114/70 (06/14/20 1610)  SpO2: 95 % (06/14/20 1610) Vital Signs (24h Range):  Temp:  [98.1 °F (36.7 °C)-98.6 °F (37 °C)] 98.3 °F (36.8 °C)  Pulse:  [] 87  Resp:  [18-20] 20  SpO2:  [94 %-97 %] 95 %  BP: ()/(64-75) 114/70     Weight: 85.5 kg (188 lb 7.9 oz)  Body mass index is 32.35 kg/m².    Intake/Output Summary (Last 24 hours) at 6/14/2020 1725  Last data filed at 6/14/2020 1600  Gross per 24 hour   Intake 270 ml   Output 2250 ml   Net -1980 ml      Physical Exam  Vitals signs and nursing note reviewed.   Constitutional:       Appearance: She is well-developed. She is not ill-appearing.   HENT:      Head: Normocephalic and atraumatic.      Nose: Nose normal.   Eyes:      General: No scleral icterus.     Conjunctiva/sclera: Conjunctivae normal.   Neck:      Musculoskeletal: Normal range of motion and neck supple.   Cardiovascular:      Rate and Rhythm: Normal rate and regular rhythm.      Heart sounds: No murmur. No friction rub. No gallop.    Pulmonary:      Effort: Pulmonary effort is normal.      Breath sounds: No rales.   Abdominal:      General: Bowel sounds are normal.      Palpations: Abdomen is  soft.      Tenderness: There is no abdominal tenderness.   Musculoskeletal: Normal range of motion.         General: No tenderness.      Comments: Generalized weakness   Skin:     General: Skin is warm and dry.   Neurological:      General: No focal deficit present.      Mental Status: She is alert and oriented to person, place, and time.      Motor: Weakness present.   Psychiatric:         Behavior: Behavior normal.         Significant Labs: All pertinent labs within the past 24 hours have been reviewed.    Significant Imaging: I have reviewed all pertinent imaging results/findings within the past 24 hours.

## 2020-06-14 NOTE — CONSULTS
"  [facility]  Adult Nutrition  Consult Note    SUMMARY     Recommendations    Recommendation:   1. Continue current cardiac, diabetic diet order with fluid restriction per MD   2. Addition of Boost Glucose Control BID if pt consuming 50% or less of meals.   3. RD to provide fluid and salt restricted heart failure edu on follow up    Goals:   Pt intake >/= 50% EEN/EPN by RD folllow up    Nutrition Goal Status: new  Communication of RD Recs: other (comment)(POC)    Reason for Assessment    Reason For Assessment: consult(heart failure education)  Diagnosis: other (see comments)(elevated troponin)  Relevant Medical History: HTN, DMT2, MI, colon polyp  Interdisciplinary Rounds: did not attend  General Information Comments: Pt admited with poor appetite, and nausea for 2 days PTA. Diet advanced last evening to diabetic cardiac with 1500 mL fluid restriction. COVID negative. Onsite RD will provide heart failure edu, for salt and fluid restriction. A1C of 6.8 showing good control of DMT1. NFPE not completed today 2/2 remote coverage.  Nutrition Discharge Planning: d/c on cardiac diabetic diet with fluid restricition per MD    Nutrition Risk Screen    Nutrition Risk Screen: no indicators present    Nutrition/Diet History    Food Preferences: MILAGROS  Food Allergies: NKFA  Factors Affecting Nutritional Intake: None identified at this time    Anthropometrics    Temp: 98.3 °F (36.8 °C)  Height Method: Stated  Height: 5' 4" (162.6 cm)  Height (inches): 64 in  Weight Method: Bed Scale  Weight: 85.5 kg (188 lb 7.9 oz)  Weight (lb): 188.5 lb  Ideal Body Weight (IBW), Female: 120 lb  % Ideal Body Weight, Female (lb): 157.08 %  BMI (Calculated): 32.3  BMI Grade: 30 - 34.9- obesity - grade I       Lab/Procedures/Meds    Pertinent Labs Reviewed: reviewed  Pertinent Labs Comments: Na 134, CO2 22, BUN 33, eGFR 46, Glu 144, Ca 8.4, Alk Phos 154, Alb 2.5, Bili 1.2, trig 155, Chol 119, HDL 15, A1C 6.8  Pertinent Medications " Reviewed: reviewed  Pertinent Medications Comments: aspirin, statin, carvedilol, rocephin, furosemide, pantoprazole    Physical Findings/Assessment    Davy Score: 20    Estimated/Assessed Needs    Weight Used For Calorie Calculations: 85.5 kg (188 lb 7.9 oz)  Energy Calorie Requirements (kcal): 1750  Energy Need Method: Dawson-St Jeor(x 1.25)  Protein Requirements: 86(1.0 gm/kg)  Weight Used For Protein Calculations: 85.5 kg (188 lb 7.9 oz)     Estimated Fluid Requirement Method: other (see comments)(1500 mL per MD)  RDA Method (mL): 1750         Nutrition Prescription Ordered    Current Diet Order: Diabetic, Cardiac 1500 mL    Evaluation of Received Nutrient/Fluid Intake    I/O: 290/1400  Energy Calories Required: other (see comments)(MILAGROS)  Protein Required: other (see comments)(MILAGROS)  Fluid Required: other (see comments)(MILAGROS)  Comments: LBM 6/13  Tolerance: tolerating  % Intake of Estimated Energy Needs: Other: MILAGROS  % Meal Intake: Other: MILAGROS    Nutrition Risk    Level of Risk/Frequency of Follow-up: (1x week)     Assessment and Plan    Nutrition Problem  Food and nutrition related knowledge deficet     Related to (etiology):   Heart failure     Signs and Symptoms (as evidenced by):   New onset heart failure, no prior need for fluid restriction      Interventions:  Nutrition Education     Nutrition Diagnosis Status:   New         Monitor and Evaluation    Food and Nutrient Intake: energy intake, food and beverage intake  Food and Nutrient Adminstration: diet order  Knowledge/Beliefs/Attitudes: food and nutrition knowledge/skill  Physical Activity and Function: nutrition-related ADLs and IADLs  Anthropometric Measurements: weight, weight change, body mass index  Biochemical Data, Medical Tests and Procedures: electrolyte and renal panel, gastrointestinal profile, glucose/endocrine profile, inflammatory profile, lipid profile     Malnutrition Assessment     NFPE not completed today 2/2 remote coverage             Nutrition Follow-Up    RD Follow-up?: Yes

## 2020-06-14 NOTE — SUBJECTIVE & OBJECTIVE
Review of Systems   Constitution: Positive for malaise/fatigue.   HENT: Negative.    Eyes: Negative.    Cardiovascular: Positive for dyspnea on exertion (improving).   Respiratory: Positive for cough and shortness of breath.    Endocrine: Negative.    Hematologic/Lymphatic: Negative.    Skin: Negative.    Musculoskeletal: Negative.    Psychiatric/Behavioral: Negative.      Objective:     Vital Signs (Most Recent):  Temp: 98.6 °F (37 °C) (06/14/20 0730)  Pulse: 91 (06/14/20 1101)  Resp: 20 (06/14/20 0730)  BP: 106/67 (06/14/20 0730)  SpO2: 97 % (06/14/20 0730) Vital Signs (24h Range):  Temp:  [98.1 °F (36.7 °C)-98.6 °F (37 °C)] 98.6 °F (37 °C)  Pulse:  [] 91  Resp:  [18-20] 20  SpO2:  [94 %-97 %] 97 %  BP: ()/(60-75) 106/67     Weight: 85.5 kg (188 lb 7.9 oz)  Body mass index is 32.35 kg/m².     SpO2: 97 %  O2 Device (Oxygen Therapy): nasal cannula      Intake/Output Summary (Last 24 hours) at 6/14/2020 1154  Last data filed at 6/14/2020 0500  Gross per 24 hour   Intake 240 ml   Output 1400 ml   Net -1160 ml       Lines/Drains/Airways     Peripheral Intravenous Line                 Peripheral IV - Single Lumen 06/13/20 0440 20 G Left Antecubital 1 day         Peripheral IV - Single Lumen 06/13/20 0738 18 G Right Hand 1 day                Physical Exam   Constitutional: She is oriented to person, place, and time. She appears well-developed and well-nourished. No distress.   On supplemental O2   HENT:   Head: Normocephalic and atraumatic.   Eyes: Pupils are equal, round, and reactive to light. Right eye exhibits no discharge. Left eye exhibits no discharge.   Neck: Neck supple. No JVD present.   Cardiovascular: Normal rate, regular rhythm, S1 normal, S2 normal and normal heart sounds.   No murmur heard.  Pulmonary/Chest: Effort normal. No respiratory distress.   Faint rales left base; diminished BS right base   Abdominal: Soft. She exhibits no distension.   Musculoskeletal:         General: No edema.    Neurological: She is alert and oriented to person, place, and time.   Skin: Skin is warm and dry. She is not diaphoretic. No erythema.   Psychiatric: She has a normal mood and affect. Her behavior is normal. Thought content normal.   Nursing note and vitals reviewed.      Significant Labs:   CMP   Recent Labs   Lab 06/13/20  0440 06/13/20  0558 06/14/20  0416   * 132* 134*   K 4.0 4.4 3.8   CL 92* 96 96   CO2 23 23 22*   * 152* 141*   BUN 26* 25* 33*   CREATININE 1.5* 1.4 1.4   CALCIUM 8.9 8.3* 8.4*   PROT 7.1  --  6.7   ALBUMIN 2.7*  --  2.5*   BILITOT 3.6*  --  1.2*   ALKPHOS 182*  --  154*   AST 33  --  29   ALT 38  --  37   ANIONGAP 16 13 16   ESTGFRAFRICA 43* 46* 46*   EGFRNONAA 37* 40* 40*   , CBC   Recent Labs   Lab 06/13/20  0440 06/14/20  0416   WBC 10.05 12.67   HGB 14.6 14.0   HCT 43.8 40.9    332   , Troponin   Recent Labs   Lab 06/13/20  1043 06/13/20  1643 06/14/20  0822   TROPONINI 1.583* 1.870* 1.687*    and All pertinent lab results from the last 24 hours have been reviewed.    Significant Imaging: Echocardiogram:   2D echo with color flow doppler:   Results for orders placed or performed in visit on 12/26/19   2D echo with color flow doppler   Result Value Ref Range    QEF 60 55 - 65    Diastolic Dysfunction No     Aortic Valve Regurgitation MILD     Est. PA Systolic Pressure 24.72     Narrative    Date of Procedure: 12/26/2019        TEST DESCRIPTION       Aorta: The aortic root is normal in size, measuring 3.3 cm at sinotubular junction. The proximal ascending aorta is normal in size, measuring 3.4 cm across.     Left Atrium: The left atrial volume index is normal, measuring 15.40 cc/m2.     Left Ventricle: The left ventricle is normal in size, with an end-diastolic diameter of 4.2 cm, and an end-systolic diameter of 2.4 cm. Wall thickness is mildly increased, with the septum measuring 1.5 cm and the posterior wall measuring 1.3 cm across.   Relative wall thickness was  increased at 0.62, and the LV mass index was increased at 138.6 g/m2 consistent with mild concentric left ventricular hypertrophy. There are no regional wall motion abnormalities. Left ventricular systolic function appears   normal. Visually estimated ejection fraction is 60-65%. The LV Doppler derived stroke volume equals 66.0 ccs.     Diastolic indices: E wave velocity 0.7 m/s, E/A ratio 0.8,  msec., E/e' ratio(avg) 9. Diastolic function is normal.     Right Atrium: The right atrium is normal in size, measuring 4.3 cm in length and 2.9 cm in width in the apical view.     Right Ventricle: The right ventricle is normal in size measuring 2.5 cm at the base in the apical right ventricle-focused view. Global right ventricular systolic function appears normal. Tricuspid annular plane systolic excursion (TAPSE) is 2.3 cm. The   estimated PA systolic pressure is 25 mmHg.     Aortic Valve:  Aortic valve is normal in structure with normal leaflet mobility. Additionally, there is mild aortic regurgitation.     Mitral Valve:  Mitral valve is normal in structure with normal leaflet mobility.     Tricuspid Valve:  Tricuspid valve is normal in structure with normal leaflet mobility.     Pulmonary Valve:  Pulmonary valve is normal in structure with normal leaflet mobility.     IVC: IVC is normal in size and collapses > 50% with a sniff, suggesting normal right atrial pressure of 3 mmHg.     Intracavitary: There is no evidence of pericardial effusion, intracavity mass, thrombi, or vegetation.         CONCLUSIONS     1 - Concentric hypertrophy.     2 - No wall motion abnormalities.     3 - Normal left ventricular systolic function (EF 60-65%).     4 - Normal left ventricular diastolic function.     5 - Normal right ventricular systolic function .     6 - The estimated PA systolic pressure is 25 mmHg.     7 - Mild aortic regurgitation.             This document has been electronically    SIGNED BY: Rafa Mejia MD On:  12/26/2019 13:10   , EKG: Reviewed and X-Ray: CXR: X-Ray Chest 1 View (CXR): No results found for this visit on 06/13/20. and X-Ray Chest PA and Lateral (CXR): No results found for this visit on 06/13/20.

## 2020-06-14 NOTE — PLAN OF CARE
Recommendation:   1. Continue current cardiac, diabetic diet order with fluid restriction per MD   2. Addition of Boost Glucose Control BID if pt consuming 50% or less of meals.   3. RD to provide fluid and salt restricted heart failure edu on follow up    Goals:   Pt intake >/= 50% EEN/EPN by EDD hussein up    Nutrition Goal Status: new  Communication of EDD Recs: other (comment)(POC)

## 2020-06-14 NOTE — PROGRESS NOTES
[facility]  Cardiology  Progress Note    Patient Name: Demetrice Gaines  MRN: [id number]  Admission Date: 6/13/2020  Hospital Length of Stay: 0 days  Code Status: Full Code   Attending Physician: Chris Ramires, *   Primary Care Physician: Lul Alvarez MD  Expected Discharge Date:   Principal Problem:Elevated troponin    Subjective:   HPI:  Ms. Gaines is a 60 year old female patient whose current medical conditions include DM type II, history of prior MI s/p LHC which showed normal coronaries, and HTN who presented to [facility] ED today with a chief complaint of worsening SOB over the past 2-3 days. Associated symptoms included generalized weakness, dry cough, palpitations, decreased appetite, and nausea. Patient also reported experiencing subjective fever, sore throat, headache, and congestion a week prior. She denied any associated yeyo chest pain, near syncope, or syncope. Initial workup in ED revealed BNP > 1,000, troponin of 1.804, creatinine of 1.5, and Tbili of 3.6. CTA of chest negative for PE but did show bilateral effusions/congestion and patient was subsequently admitted for further evaluation and treatment. Cardiology consulted to assist with management. Patient seen and examined today in ED. Remains SOB, with conversational dyspnea noted. Denies chest pain. Previously tried taking OTC cold medicines without any relief. She reports compliance with her medications. Followed in clinic by Dr. Arango. Previously had stress test in 12/19 which was negative for ischemia/injury. Echo 12/19 showed normal EF. Chart reviewed. Troponin 1.804>1.583, echo pending. Rapid COVID-19 test negative.    Hospital Course:   6/14/2020-Patient seen and examined today, resting in bed. Feeling better. SOB improving. Remains chest pain free. Labs reviewed. Creatinine 1.4. Troponin trending down. Echo showed normal EF, no WMA.        Review of Systems   Constitution: Positive for malaise/fatigue.   HENT:  Negative.    Eyes: Negative.    Cardiovascular: Positive for dyspnea on exertion (improving).   Respiratory: Positive for cough and shortness of breath.    Endocrine: Negative.    Hematologic/Lymphatic: Negative.    Skin: Negative.    Musculoskeletal: Negative.    Psychiatric/Behavioral: Negative.      Objective:     Vital Signs (Most Recent):  Temp: 98.6 °F (37 °C) (06/14/20 0730)  Pulse: 91 (06/14/20 1101)  Resp: 20 (06/14/20 0730)  BP: 106/67 (06/14/20 0730)  SpO2: 97 % (06/14/20 0730) Vital Signs (24h Range):  Temp:  [98.1 °F (36.7 °C)-98.6 °F (37 °C)] 98.6 °F (37 °C)  Pulse:  [] 91  Resp:  [18-20] 20  SpO2:  [94 %-97 %] 97 %  BP: ()/(60-75) 106/67     Weight: 85.5 kg (188 lb 7.9 oz)  Body mass index is 32.35 kg/m².     SpO2: 97 %  O2 Device (Oxygen Therapy): nasal cannula      Intake/Output Summary (Last 24 hours) at 6/14/2020 1154  Last data filed at 6/14/2020 0500  Gross per 24 hour   Intake 240 ml   Output 1400 ml   Net -1160 ml       Lines/Drains/Airways     Peripheral Intravenous Line                 Peripheral IV - Single Lumen 06/13/20 0440 20 G Left Antecubital 1 day         Peripheral IV - Single Lumen 06/13/20 0738 18 G Right Hand 1 day                Physical Exam   Constitutional: She is oriented to person, place, and time. She appears well-developed and well-nourished. No distress.   On supplemental O2   HENT:   Head: Normocephalic and atraumatic.   Eyes: Pupils are equal, round, and reactive to light. Right eye exhibits no discharge. Left eye exhibits no discharge.   Neck: Neck supple. No JVD present.   Cardiovascular: Normal rate, regular rhythm, S1 normal, S2 normal and normal heart sounds.   No murmur heard.  Pulmonary/Chest: Effort normal. No respiratory distress.   Faint rales left base; diminished BS right base   Abdominal: Soft. She exhibits no distension.   Musculoskeletal:         General: No edema.   Neurological: She is alert and oriented to person, place, and time.   Skin:  Skin is warm and dry. She is not diaphoretic. No erythema.   Psychiatric: She has a normal mood and affect. Her behavior is normal. Thought content normal.   Nursing note and vitals reviewed.      Significant Labs:   CMP   Recent Labs   Lab 06/13/20  0440 06/13/20  0558 06/14/20  0416   * 132* 134*   K 4.0 4.4 3.8   CL 92* 96 96   CO2 23 23 22*   * 152* 141*   BUN 26* 25* 33*   CREATININE 1.5* 1.4 1.4   CALCIUM 8.9 8.3* 8.4*   PROT 7.1  --  6.7   ALBUMIN 2.7*  --  2.5*   BILITOT 3.6*  --  1.2*   ALKPHOS 182*  --  154*   AST 33  --  29   ALT 38  --  37   ANIONGAP 16 13 16   ESTGFRAFRICA 43* 46* 46*   EGFRNONAA 37* 40* 40*   , CBC   Recent Labs   Lab 06/13/20  0440 06/14/20  0416   WBC 10.05 12.67   HGB 14.6 14.0   HCT 43.8 40.9    332   , Troponin   Recent Labs   Lab 06/13/20  1043 06/13/20  1643 06/14/20  0822   TROPONINI 1.583* 1.870* 1.687*    and All pertinent lab results from the last 24 hours have been reviewed.    Significant Imaging: Echocardiogram:   2D echo with color flow doppler:   Results for orders placed or performed in visit on 12/26/19   2D echo with color flow doppler   Result Value Ref Range    QEF 60 55 - 65    Diastolic Dysfunction No     Aortic Valve Regurgitation MILD     Est. PA Systolic Pressure 24.72     Narrative    Date of Procedure: 12/26/2019        TEST DESCRIPTION       Aorta: The aortic root is normal in size, measuring 3.3 cm at sinotubular junction. The proximal ascending aorta is normal in size, measuring 3.4 cm across.     Left Atrium: The left atrial volume index is normal, measuring 15.40 cc/m2.     Left Ventricle: The left ventricle is normal in size, with an end-diastolic diameter of 4.2 cm, and an end-systolic diameter of 2.4 cm. Wall thickness is mildly increased, with the septum measuring 1.5 cm and the posterior wall measuring 1.3 cm across.   Relative wall thickness was increased at 0.62, and the LV mass index was increased at 138.6 g/m2 consistent  with mild concentric left ventricular hypertrophy. There are no regional wall motion abnormalities. Left ventricular systolic function appears   normal. Visually estimated ejection fraction is 60-65%. The LV Doppler derived stroke volume equals 66.0 ccs.     Diastolic indices: E wave velocity 0.7 m/s, E/A ratio 0.8,  msec., E/e' ratio(avg) 9. Diastolic function is normal.     Right Atrium: The right atrium is normal in size, measuring 4.3 cm in length and 2.9 cm in width in the apical view.     Right Ventricle: The right ventricle is normal in size measuring 2.5 cm at the base in the apical right ventricle-focused view. Global right ventricular systolic function appears normal. Tricuspid annular plane systolic excursion (TAPSE) is 2.3 cm. The   estimated PA systolic pressure is 25 mmHg.     Aortic Valve:  Aortic valve is normal in structure with normal leaflet mobility. Additionally, there is mild aortic regurgitation.     Mitral Valve:  Mitral valve is normal in structure with normal leaflet mobility.     Tricuspid Valve:  Tricuspid valve is normal in structure with normal leaflet mobility.     Pulmonary Valve:  Pulmonary valve is normal in structure with normal leaflet mobility.     IVC: IVC is normal in size and collapses > 50% with a sniff, suggesting normal right atrial pressure of 3 mmHg.     Intracavitary: There is no evidence of pericardial effusion, intracavity mass, thrombi, or vegetation.         CONCLUSIONS     1 - Concentric hypertrophy.     2 - No wall motion abnormalities.     3 - Normal left ventricular systolic function (EF 60-65%).     4 - Normal left ventricular diastolic function.     5 - Normal right ventricular systolic function .     6 - The estimated PA systolic pressure is 25 mmHg.     7 - Mild aortic regurgitation.             This document has been electronically    SIGNED BY: Rafa Mejia MD On: 12/26/2019 13:10   , EKG: Reviewed and X-Ray: CXR: X-Ray Chest 1 View (CXR): No  results found for this visit on 06/13/20. and X-Ray Chest PA and Lateral (CXR): No results found for this visit on 06/13/20.    Assessment and Plan:   Patient who presents with elevated troponin in setting of decompensated diastolic CHF and recent viral illness. Improving, continue IV diuresis and other meds. IP vs OP ischemic evaluation with MPI stress test.     * Elevated troponin  -Troponin 1.804>1.583  -Elevation likely secondary to demand ischemia from acute CHF  -Prior [facility] in 2018 showed no significant disease per patient  -No complaints of chest pain  -Continue to trend  -Continue ASA  -Resume heparin gtt pending echo review by Dr. Coffman  -Start BB when BP permits  -No ACEi/ARB given allergy/intolerance  -Add atorvastatin 40 mg nightly  -Check echo  -Further rec's to follow    6/14/2020  -Stable overnight, SOB improving with diuresis  -No chest pain  -Continue ASA, BB, statin  -NO ACEi/ARB due to severe allergy  -Echo showed normal EF, no WMA  -Elevated troponin secondary to demand ischemia from acute CHF/viral illness  -Ischemic evaluation IP vs OP with MPI stress test     Diabetes mellitus, type II  -Mgmt as per primary team    Volume overload  -Presents with acute decompensated CHF, likely triggered by viral illness  -Lasix IV 40 mg BID  -Strict I's/O's  -Add BB when BP permits  -No ACEi/ARB given allergy  -Check echo  -Further rec's to follow    6/14/2020  -Improving  -Echo showed normal EF  -Continue additional day of IV diuresis  -Repeat BNP in AM  -Continue BB  -Strict I's/O's    Essential hypertension  -BP borderline low  -Resume home meds as tolerated        VTE Risk Mitigation (From admission, onward)         Ordered     IP VTE HIGH RISK PATIENT  Once      06/13/20 1244     Place sequential compression device  Until discontinued      06/13/20 1244                Monica Abad PA-C  Cardiology  [facility]

## 2020-06-14 NOTE — HOSPITAL COURSE
59 y/o aaf admitted with a dx of acute on chronic CHF . She has been complaining of  Nausea  And myalgia for 1 week . The COVID-19  Test was negative . The NICHELLE show normal EF  And No wma. She  Report feeling better after  Diuresis .   6/15 Pt was seen and examined at bedside . She  Was determined to be suitable for d/c  -She was  Started on lasix with an improvement  of her respiratory sx.  -She was started o rocephin since admission . The CXR sow RLL infiltrate . She was d/con doxycycline .  -She did not qualify for home o2  -She tolerate a diet before d/c

## 2020-06-14 NOTE — ASSESSMENT & PLAN NOTE
-Troponin 1.804>1.583  -Elevation likely secondary to demand ischemia from acute CHF  -Prior LHC in 2018 showed no significant disease per patient  -No complaints of chest pain  -Continue to trend  -Continue ASA  -Resume heparin gtt pending echo review by Dr. Coffman  -Start BB when BP permits  -No ACEi/ARB given allergy/intolerance  -Add atorvastatin 40 mg nightly  -Check echo  -Further rec's to follow    6/14/2020  -Stable overnight, SOB improving with diuresis  -No chest pain  -Continue ASA, BB, statin  -NO ACEi/ARB due to severe allergy  -Echo showed normal EF, no WMA  -Elevated troponin secondary to demand ischemia from acute CHF/viral illness  -Ischemic evaluation IP vs OP with MPI stress test

## 2020-06-15 VITALS
RESPIRATION RATE: 18 BRPM | BODY MASS INDEX: 31.69 KG/M2 | TEMPERATURE: 99 F | DIASTOLIC BLOOD PRESSURE: 82 MMHG | HEART RATE: 92 BPM | OXYGEN SATURATION: 93 % | SYSTOLIC BLOOD PRESSURE: 136 MMHG | WEIGHT: 185.63 LBS | HEIGHT: 64 IN

## 2020-06-15 PROBLEM — E87.70 VOLUME OVERLOAD: Status: RESOLVED | Noted: 2020-06-13 | Resolved: 2020-06-15

## 2020-06-15 PROBLEM — R79.89 ELEVATED TROPONIN: Status: RESOLVED | Noted: 2020-06-13 | Resolved: 2020-06-15

## 2020-06-15 LAB
ALBUMIN SERPL BCP-MCNC: 2.4 G/DL (ref 3.5–5.2)
ALP SERPL-CCNC: 133 U/L (ref 55–135)
ALT SERPL W/O P-5'-P-CCNC: 33 U/L (ref 10–44)
ANION GAP SERPL CALC-SCNC: 12 MMOL/L (ref 8–16)
AST SERPL-CCNC: 23 U/L (ref 10–40)
BASOPHILS # BLD AUTO: 0.06 K/UL (ref 0–0.2)
BASOPHILS NFR BLD: 0.4 % (ref 0–1.9)
BILIRUB SERPL-MCNC: 0.8 MG/DL (ref 0.1–1)
BNP SERPL-MCNC: 357 PG/ML (ref 0–99)
BUN SERPL-MCNC: 25 MG/DL (ref 8–23)
CALCIUM SERPL-MCNC: 8.3 MG/DL (ref 8.7–10.5)
CHLORIDE SERPL-SCNC: 96 MMOL/L (ref 95–110)
CO2 SERPL-SCNC: 27 MMOL/L (ref 23–29)
CREAT SERPL-MCNC: 1 MG/DL (ref 0.5–1.4)
DIFFERENTIAL METHOD: ABNORMAL
EOSINOPHIL # BLD AUTO: 0.7 K/UL (ref 0–0.5)
EOSINOPHIL NFR BLD: 5 % (ref 0–8)
ERYTHROCYTE [DISTWIDTH] IN BLOOD BY AUTOMATED COUNT: 14.6 % (ref 11.5–14.5)
EST. GFR  (AFRICAN AMERICAN): >60 ML/MIN/1.73 M^2
EST. GFR  (NON AFRICAN AMERICAN): >60 ML/MIN/1.73 M^2
GLUCOSE SERPL-MCNC: 139 MG/DL (ref 70–110)
HCT VFR BLD AUTO: 38.7 % (ref 37–48.5)
HGB BLD-MCNC: 12.8 G/DL (ref 12–16)
IMM GRANULOCYTES # BLD AUTO: 0.58 K/UL (ref 0–0.04)
IMM GRANULOCYTES NFR BLD AUTO: 4.2 % (ref 0–0.5)
LYMPHOCYTES # BLD AUTO: 1.6 K/UL (ref 1–4.8)
LYMPHOCYTES NFR BLD: 11.4 % (ref 18–48)
MAGNESIUM SERPL-MCNC: 2.4 MG/DL (ref 1.6–2.6)
MCH RBC QN AUTO: 28.1 PG (ref 27–31)
MCHC RBC AUTO-ENTMCNC: 33.1 G/DL (ref 32–36)
MCV RBC AUTO: 85 FL (ref 82–98)
MONOCYTES # BLD AUTO: 0.8 K/UL (ref 0.3–1)
MONOCYTES NFR BLD: 5.7 % (ref 4–15)
NEUTROPHILS # BLD AUTO: 10 K/UL (ref 1.8–7.7)
NEUTROPHILS NFR BLD: 73.3 % (ref 38–73)
NRBC BLD-RTO: 0 /100 WBC
PLATELET # BLD AUTO: 329 K/UL (ref 150–350)
PMV BLD AUTO: 9.4 FL (ref 9.2–12.9)
POCT GLUCOSE: 103 MG/DL (ref 70–110)
POCT GLUCOSE: 149 MG/DL (ref 70–110)
POCT GLUCOSE: 166 MG/DL (ref 70–110)
POTASSIUM SERPL-SCNC: 3.2 MMOL/L (ref 3.5–5.1)
PROT SERPL-MCNC: 6.6 G/DL (ref 6–8.4)
RBC # BLD AUTO: 4.55 M/UL (ref 4–5.4)
SODIUM SERPL-SCNC: 135 MMOL/L (ref 136–145)
WBC # BLD AUTO: 13.68 K/UL (ref 3.9–12.7)

## 2020-06-15 PROCEDURE — 25000003 PHARM REV CODE 250: Performed by: NURSE PRACTITIONER

## 2020-06-15 PROCEDURE — G0378 HOSPITAL OBSERVATION PER HR: HCPCS

## 2020-06-15 PROCEDURE — 83735 ASSAY OF MAGNESIUM: CPT

## 2020-06-15 PROCEDURE — 80053 COMPREHEN METABOLIC PANEL: CPT

## 2020-06-15 PROCEDURE — 36415 COLL VENOUS BLD VENIPUNCTURE: CPT

## 2020-06-15 PROCEDURE — 63600175 PHARM REV CODE 636 W HCPCS: Performed by: INTERNAL MEDICINE

## 2020-06-15 PROCEDURE — 63600175 PHARM REV CODE 636 W HCPCS: Performed by: NURSE PRACTITIONER

## 2020-06-15 PROCEDURE — 85025 COMPLETE CBC W/AUTO DIFF WBC: CPT

## 2020-06-15 PROCEDURE — 25000003 PHARM REV CODE 250: Performed by: PHYSICIAN ASSISTANT

## 2020-06-15 PROCEDURE — 25000003 PHARM REV CODE 250: Performed by: INTERNAL MEDICINE

## 2020-06-15 PROCEDURE — 83880 ASSAY OF NATRIURETIC PEPTIDE: CPT

## 2020-06-15 PROCEDURE — 96376 TX/PRO/DX INJ SAME DRUG ADON: CPT

## 2020-06-15 RX ORDER — BUTALBITAL, ACETAMINOPHEN AND CAFFEINE 50; 325; 40 MG/1; MG/1; MG/1
1 TABLET ORAL EVERY 6 HOURS PRN
Status: DISCONTINUED | OUTPATIENT
Start: 2020-06-15 | End: 2020-06-15 | Stop reason: HOSPADM

## 2020-06-15 RX ORDER — ATORVASTATIN CALCIUM 40 MG/1
40 TABLET, FILM COATED ORAL NIGHTLY
Qty: 90 TABLET | Refills: 1 | Status: SHIPPED | OUTPATIENT
Start: 2020-06-15 | End: 2020-07-23

## 2020-06-15 RX ORDER — POTASSIUM CHLORIDE 20 MEQ/1
40 TABLET, EXTENDED RELEASE ORAL ONCE
Status: COMPLETED | OUTPATIENT
Start: 2020-06-15 | End: 2020-06-15

## 2020-06-15 RX ORDER — FUROSEMIDE 20 MG/1
20 TABLET ORAL
Qty: 28 TABLET | Refills: 1 | Status: SHIPPED | OUTPATIENT
Start: 2020-06-15 | End: 2020-11-16

## 2020-06-15 RX ORDER — ASPIRIN 81 MG/1
81 TABLET ORAL DAILY
Qty: 90 TABLET | Refills: 1 | Status: SHIPPED | OUTPATIENT
Start: 2020-06-16 | End: 2022-03-08 | Stop reason: SDUPTHER

## 2020-06-15 RX ORDER — DOXYCYCLINE 100 MG/1
100 CAPSULE ORAL EVERY 12 HOURS
Qty: 10 CAPSULE | Refills: 0 | Status: ON HOLD | OUTPATIENT
Start: 2020-06-15 | End: 2020-06-22 | Stop reason: HOSPADM

## 2020-06-15 RX ORDER — BUTALBITAL, ACETAMINOPHEN AND CAFFEINE 50; 325; 40 MG/1; MG/1; MG/1
1 TABLET ORAL EVERY 6 HOURS PRN
Qty: 20 TABLET | Refills: 0 | Status: SHIPPED | OUTPATIENT
Start: 2020-06-15 | End: 2020-07-10 | Stop reason: SDUPTHER

## 2020-06-15 RX ADMIN — CARVEDILOL 25 MG: 12.5 TABLET, FILM COATED ORAL at 09:06

## 2020-06-15 RX ADMIN — ASPIRIN 81 MG: 81 TABLET, COATED ORAL at 09:06

## 2020-06-15 RX ADMIN — PANTOPRAZOLE SODIUM 40 MG: 40 TABLET, DELAYED RELEASE ORAL at 09:06

## 2020-06-15 RX ADMIN — BUTALBITAL, ACETAMINOPHEN AND CAFFEINE 1 TABLET: 50; 325; 40 TABLET ORAL at 08:06

## 2020-06-15 RX ADMIN — POTASSIUM CHLORIDE 40 MEQ: 20 TABLET, EXTENDED RELEASE ORAL at 12:06

## 2020-06-15 RX ADMIN — FUROSEMIDE 40 MG: 10 INJECTION, SOLUTION INTRAMUSCULAR; INTRAVENOUS at 09:06

## 2020-06-15 RX ADMIN — CEFTRIAXONE 1 G: 1 INJECTION, SOLUTION INTRAVENOUS at 06:06

## 2020-06-15 NOTE — NURSING
Attempted to assess for possible diabetes needs following chart review  She reports is not a diabetic  No further action required

## 2020-06-15 NOTE — PLAN OF CARE
Jun22 Established Patient Visit with Lul Alvarez MD  Monday Jun 22, 2020 10:00 AM  Arrive at check-in approximately 15 minutes before your scheduled appointment time. Bring all outside medical records and imaging, along with a list of your current medications and insurance card.  2nd Floor - From [address], take the [facility] exit ([address]). Enter the facility from the [address] [facility]  [address] [facility] [address]  [phone]      06/15/20 1442   Final Note   Assessment Type Final Discharge Note   Anticipated Discharge Disposition Home   Hospital Follow Up  Appt(s) scheduled? Yes   Right Care Referral Info   Post Acute Recommendation No Care

## 2020-06-15 NOTE — PLAN OF CARE
CM met with patient at the bedside to assess for discharge needs.  Patient lives at home with her  and is independent with needs.  Her  can help her at home if needed.  She does not anticipate any discharge needs at this time.  CM provided a transitional care folder, information on advanced directives, information on pharmacy bedside delivery, and discharge planning begins on admission with contact information for any needs/questions.     D/C Plan: Home  PCP: Dr Alvarez  Preferred Pharmacy: [facility]  Discharge transportation: son  My Melaniener: Active  Pharmacy Bedside Delivery: yes       06/15/20 1039   Discharge Assessment   Assessment Type Discharge Planning Assessment   Confirmed/corrected address and phone number on facesheet? Yes   Assessment information obtained from? Patient;Caregiver;Medical Record   Expected Length of Stay (days)   (2-3)   Communicated expected length of stay with patient/caregiver yes   Prior to hospitilization cognitive status: Alert/Oriented   Prior to hospitalization functional status: Independent   Current cognitive status: Alert/Oriented   Current Functional Status: Independent   Facility Arrived From: Home   Lives With spouse   Able to Return to Prior Arrangements yes   Is patient able to care for self after discharge? Yes   Who are your caregiver(s) and their phone number(s)? Nacho Gaines, spouse [phone]   Patient's perception of discharge disposition home or selfcare   Patient currently being followed by outpatient case management? No   Patient currently receives any other outside agency services? No   Equipment Currently Used at Home none   Do you have any problems affording any of your prescribed medications? No   Is the patient taking medications as prescribed? yes   Does the patient have transportation home? Yes   Transportation Anticipated family or friend will provide   Dialysis Name and Scheduled days NA   Does the patient receive services at the  [facility]? No   Discharge Plan A Home with family   Discharge Plan B Home with family   DME Needed Upon Discharge  none   Patient/Family in Agreement with Plan yes

## 2020-06-15 NOTE — NURSING
Discharge summary, health teachings and instructions given to patient with spouse at bedside--verbalized understanding. IV removed-no complications noted. Tele monitor removed and returned to the monitor room. All questions answered with regards to discharge instructions. Reminded of the importance of follow-up appointments--patient to call for an appointment. Prescribed meds at bedside. Left the unit per wheelchair with prescribed meds, personal belongings at hand accompanied by spouse.

## 2020-06-15 NOTE — PLAN OF CARE
POC reviewed, verbalized understanding. Pt remained free from falls, fall precautions in place. Pt is NSR on monitor, 8648. VSS. Complains of headache, but refused tylenol. PIV intact. Spouse at bedside. 2L NC. Home O2 eval today. Refused coreg. Call bell and personal belongings within reach. Hourly rounding complete. Reminded to call for assistance. Will continue to monitor.     less than 2 seconds

## 2020-06-15 NOTE — PROGRESS NOTES
Home Oxygen Evaluation    Date Performed: 6/15/2020    1) Patient's Home O2 Sat on room air, while at rest: 93%.        If O2 sats on room air at rest are 88% or below, patient qualifies. No additional testing needed. Document N/A in steps 2 and 3. If 89% or above, complete steps 2.      2) Patient's O2 Sat on room air while exercisin%.        If O2 sats on room air while exercising remain 89% or above patient does not qualify, no further testing needed Document N/A in step 3. If O2 sats on room air while exercising are 88% or below, continue to step 3.      3) Patient's O2 Sat while exercising on O2:  at  LPM         (Must show improvement from #2 for patients to qualify)    If O2 sats improve on oxygen, patient qualifies for portable oxygen. If not, the patient does not qualify.

## 2020-06-18 ENCOUNTER — PATIENT MESSAGE (OUTPATIENT)
Dept: INTERNAL MEDICINE | Facility: CLINIC | Age: 60
End: 2020-06-18

## 2020-06-18 NOTE — DISCHARGE SUMMARY
[facility] Hospital Medicine  Discharge Summary      Patient Name: Demetrice Gaines  MRN: [id number]  Admission Date: 6/13/2020  Hospital Length of Stay: 0 days  Discharge Date and Time: 6/15/2020  1:05 PM  Attending Physician: No att. providers found   Discharging Provider: Chris Ramires MD  Primary Care Provider: Lul Alvarez MD      HPI:   Pt is a 59 yo female with PMHx of DM, hx of MI, HTN who presents to the ED with reports of YANG onset x 2 days. Associated symptoms include generalized weakness, dry cough, heart fluttering sensation, poor appetite, nausea and dark urine. Approx 1 week ago pt developed viral symptoms including subjective fever, sore throat, headache, myalgias and head congestion. These symptoms have improved but patient has been mostly in the bed over the last week. YANG started 2 days ago. On arrival, B/P relatively low and 2 L IV fluids given. V/S on arrival: Temp 97.5, pulse 80, resp 20 and B/P 99/57, SpO2 = 95 %. CTA of Chest ruled out PE and notes congestion/edema with associated effusions (d dimer = 4.03). CXR - borderline cardiomegaly - ? Mild congestion. Labs find left shift of 88 %, Na 132, BUN 25, gluc 152, bilirubin 3.6, troponin 1.804 and BNP = 1037. U/a notes elevated specific gravity, positive nitrites, negative leukocytes and occasional WBC. Pt is placed on Observation to rule out NSTEMI, treat for volume overload and symptomatic treatment of symptoms. COVID NEGATIVE    * No surgery found *      Hospital Course:   59 y/o aaf admitted with a dx of acute on chronic CHF . She has been complaining of  Nausea  And myalgia for 1 week . The COVID-19  Test was negative . The NICHELLE show normal EF  And No wma. She  Report feeling better after  Diuresis .   6/15 Pt was seen and examined at bedside . She  Was determined to be suitable for d/c  -She was  Started on lasix with an improvement  of her respiratory sx.  -She was started o rocephin since admission . The  CXR sow RLL infiltrate . She was d/con doxycycline .  -She did not qualify for home o2  -She tolerate a diet before d/c        Consults:   Consults (From admission, onward)        Status Ordering Provider     Inpatient consult to Cardiology  Once     Provider:  Antwon Coffman MD    Completed REX ARREOLA     Inpatient consult to Registered Dietitian/Nutritionist  Once     Provider:  (Not yet assigned)    Completed PAULINO HUTSON     Inpatient consult to Social Work/Case Management  Once     Provider:  (Not yet assigned)    Completed PAULINO HUTSON          No new Assessment & Plan notes have been filed under this hospital service since the last note was generated.  Service: Hospital Medicine    Final Active Diagnoses:    Diagnosis Date Noted POA    Diabetes mellitus, type II [E11.9] 06/13/2020 Yes    Essential hypertension [I10] 02/13/2017 Yes     Chronic      Problems Resolved During this Admission:    Diagnosis Date Noted Date Resolved POA    PRINCIPAL PROBLEM:  Elevated troponin [R79.89] 06/13/2020 06/15/2020 Yes    Volume overload [E87.70] 06/13/2020 06/15/2020 Yes       Discharged Condition: stable    Disposition: Home or Self Care    Follow Up:  Follow-up Information     Lul Alvarez MD In 1 week.    Specialty: Family Medicine  Contact information:  [address]  [place] [address]  [phone]             Antwon Coffman MD In 2 weeks.    Specialties: Cardiology, Cardiovascular Disease  Contact information:  [address]  [place] [address]  [phone]                 Patient Instructions:      Diet Cardiac     Notify your health care provider if you experience any of the following:  temperature >100.4     Notify your health care provider if you experience any of the following:  persistent nausea and vomiting or diarrhea     Notify your health care provider if you experience any of the following:  severe uncontrolled pain     Notify your health care provider if you experience  any of the following:  redness, tenderness, or signs of infection (pain, swelling, redness, odor or green/yellow discharge around incision site)     Notify your health care provider if you experience any of the following:  difficulty breathing or increased cough     Notify your health care provider if you experience any of the following:  severe persistent headache     Notify your health care provider if you experience any of the following:  worsening rash     Notify your health care provider if you experience any of the following:  persistent dizziness, light-headedness, or visual disturbances     Notify your health care provider if you experience any of the following:  increased confusion or weakness     Notify your health care provider if you experience any of the following:     Activity as tolerated       Significant Diagnostic Studies: Labs: BMP: No results for input(s): GLU, NA, K, CL, CO2, BUN, CREATININE, CALCIUM, MG in the last 48 hours., CMP No results for input(s): NA, K, CL, CO2, GLU, BUN, CREATININE, CALCIUM, PROT, ALBUMIN, BILITOT, ALKPHOS, AST, ALT, ANIONGAP, ESTGFRAFRICA, EGFRNONAA in the last 48 hours. and CBC No results for input(s): WBC, HGB, HCT, PLT in the last 48 hours.  Microbiology: Blood Culture No results found for: LABBLOO    Pending Diagnostic Studies:     None         Medications:  Reconciled Home Medications:      Medication List      START taking these medications    atorvastatin 40 MG tablet  Commonly known as: LIPITOR  Take 1 tablet (40 mg total) by mouth every evening.     butalbital-acetaminophen-caffeine -40 mg -40 mg per tablet  Commonly known as: FIORICET, ESGIC  Take 1 tablet by mouth every 6 (six) hours as needed for Headaches.     doxycycline 100 MG Cap  Commonly known as: VIBRAMYCIN  Take 1 capsule (100 mg total) by mouth every 12 (twelve) hours. for 5 days     furosemide 20 MG tablet  Commonly known as: LASIX  Take 1 tablet (20 mg total) by mouth every Mon, Wed,  Fri.        CHANGE how you take these medications    * aspirin 81 MG Chew  1 tablet  What changed: Another medication with the same name was added. Make sure you understand how and when to take each.     * aspirin 81 MG EC tablet  Commonly known as: ECOTRIN  Take 1 tablet (81 mg total) by mouth once daily.  What changed: You were already taking a medication with the same name, and this prescription was added. Make sure you understand how and when to take each.         * This list has 2 medication(s) that are the same as other medications prescribed for you. Read the directions carefully, and ask your doctor or other care provider to review them with you.            CONTINUE taking these medications    amLODIPine 5 MG tablet  Commonly known as: NORVASC  Take 1 tablet (5 mg total) by mouth once daily.     carvediloL 25 MG tablet  Commonly known as: COREG  Take 1 tablet (25 mg total) by mouth 2 (two) times daily.     lancets Misc  Check blood sugar once daily.        STOP taking these medications    triamterene-hydrochlorothiazide 37.5-25 mg 37.5-25 mg per capsule  Commonly known as: DYAZIDE            Indwelling Lines/Drains at time of discharge:   Lines/Drains/Airways     None                 Time spent on the discharge of patient: 35 minutes  Patient was seen and examined on the date of discharge and determined to be suitable for discharge.         Chris Ramires MD  Department of Hospital Medicine  [facility]

## 2020-06-19 ENCOUNTER — HOSPITAL ENCOUNTER (OUTPATIENT)
Facility: HOSPITAL | Age: 60
Discharge: HOME | End: 2020-06-22
Attending: EMERGENCY MEDICINE | Admitting: INTERNAL MEDICINE
Payer: MEDICARE

## 2020-06-19 DIAGNOSIS — R07.9 CHEST PAIN: ICD-10-CM

## 2020-06-19 DIAGNOSIS — R05.9 COUGH: ICD-10-CM

## 2020-06-19 DIAGNOSIS — J18.9 PNEUMONIA OF RIGHT LOWER LOBE DUE TO INFECTIOUS ORGANISM: Primary | ICD-10-CM

## 2020-06-19 LAB
ALBUMIN SERPL BCP-MCNC: 2.6 G/DL (ref 3.5–5.2)
ALP SERPL-CCNC: 175 U/L (ref 55–135)
ALT SERPL W/O P-5'-P-CCNC: 39 U/L (ref 10–44)
ANION GAP SERPL CALC-SCNC: 12 MMOL/L (ref 8–16)
ANISOCYTOSIS BLD QL SMEAR: SLIGHT
AST SERPL-CCNC: 42 U/L (ref 10–40)
BASOPHILS # BLD AUTO: ABNORMAL K/UL (ref 0–0.2)
BASOPHILS NFR BLD: 0 % (ref 0–1.9)
BILIRUB SERPL-MCNC: 0.6 MG/DL (ref 0.1–1)
BILIRUB UR QL STRIP: NEGATIVE
BUN SERPL-MCNC: 7 MG/DL (ref 8–23)
CALCIUM SERPL-MCNC: 8.9 MG/DL (ref 8.7–10.5)
CHLORIDE SERPL-SCNC: 97 MMOL/L (ref 95–110)
CLARITY UR: CLEAR
CO2 SERPL-SCNC: 26 MMOL/L (ref 23–29)
COLOR UR: YELLOW
CREAT SERPL-MCNC: 0.8 MG/DL (ref 0.5–1.4)
DACRYOCYTES BLD QL SMEAR: ABNORMAL
DIFFERENTIAL METHOD: ABNORMAL
EOSINOPHIL # BLD AUTO: ABNORMAL K/UL (ref 0–0.5)
EOSINOPHIL NFR BLD: 10 % (ref 0–8)
ERYTHROCYTE [DISTWIDTH] IN BLOOD BY AUTOMATED COUNT: 14.6 % (ref 11.5–14.5)
EST. GFR  (AFRICAN AMERICAN): >60 ML/MIN/1.73 M^2
EST. GFR  (NON AFRICAN AMERICAN): >60 ML/MIN/1.73 M^2
GLUCOSE SERPL-MCNC: 140 MG/DL (ref 70–110)
GLUCOSE UR QL STRIP: NEGATIVE
HCT VFR BLD AUTO: 38.8 % (ref 37–48.5)
HGB BLD-MCNC: 12.6 G/DL (ref 12–16)
HGB UR QL STRIP: NEGATIVE
IMM GRANULOCYTES # BLD AUTO: ABNORMAL K/UL (ref 0–0.04)
IMM GRANULOCYTES NFR BLD AUTO: ABNORMAL % (ref 0–0.5)
KETONES UR QL STRIP: ABNORMAL
LEUKOCYTE ESTERASE UR QL STRIP: NEGATIVE
LIPASE SERPL-CCNC: 85 U/L (ref 4–60)
LYMPHOCYTES # BLD AUTO: ABNORMAL K/UL (ref 1–4.8)
LYMPHOCYTES NFR BLD: 15 % (ref 18–48)
MCH RBC QN AUTO: 28.4 PG (ref 27–31)
MCHC RBC AUTO-ENTMCNC: 32.5 G/DL (ref 32–36)
MCV RBC AUTO: 87 FL (ref 82–98)
METAMYELOCYTES NFR BLD MANUAL: 4 %
MONOCYTES # BLD AUTO: ABNORMAL K/UL (ref 0.3–1)
MONOCYTES NFR BLD: 6 % (ref 4–15)
MYELOCYTES NFR BLD MANUAL: 2 %
NEUTROPHILS NFR BLD: 60 % (ref 38–73)
NEUTS BAND NFR BLD MANUAL: 3 %
NITRITE UR QL STRIP: NEGATIVE
NRBC BLD-RTO: 0 /100 WBC
PH UR STRIP: 7 [PH] (ref 5–8)
PLATELET # BLD AUTO: 405 K/UL (ref 150–350)
PLATELET BLD QL SMEAR: ABNORMAL
PMV BLD AUTO: 9.2 FL (ref 9.2–12.9)
POIKILOCYTOSIS BLD QL SMEAR: SLIGHT
POLYCHROMASIA BLD QL SMEAR: ABNORMAL
POTASSIUM SERPL-SCNC: 3.6 MMOL/L (ref 3.5–5.1)
PROT SERPL-MCNC: 7.5 G/DL (ref 6–8.4)
PROT UR QL STRIP: NEGATIVE
RBC # BLD AUTO: 4.44 M/UL (ref 4–5.4)
SARS-COV-2 RDRP RESP QL NAA+PROBE: NEGATIVE
SODIUM SERPL-SCNC: 135 MMOL/L (ref 136–145)
SP GR UR STRIP: 1.01 (ref 1–1.03)
URN SPEC COLLECT METH UR: ABNORMAL
UROBILINOGEN UR STRIP-ACNC: NEGATIVE EU/DL
WBC # BLD AUTO: 22.35 K/UL (ref 3.9–12.7)
WBC TOXIC VACUOLES BLD QL SMEAR: PRESENT

## 2020-06-19 PROCEDURE — 63600175 PHARM REV CODE 636 W HCPCS: Performed by: EMERGENCY MEDICINE

## 2020-06-19 PROCEDURE — 93005 ELECTROCARDIOGRAM TRACING: CPT

## 2020-06-19 PROCEDURE — 99285 EMERGENCY DEPT VISIT HI MDM: CPT | Mod: 25

## 2020-06-19 PROCEDURE — U0002 COVID-19 LAB TEST NON-CDC: HCPCS

## 2020-06-19 PROCEDURE — 96361 HYDRATE IV INFUSION ADD-ON: CPT

## 2020-06-19 PROCEDURE — 93010 ELECTROCARDIOGRAM REPORT: CPT | Mod: ,,, | Performed by: INTERNAL MEDICINE

## 2020-06-19 PROCEDURE — 96374 THER/PROPH/DIAG INJ IV PUSH: CPT

## 2020-06-19 PROCEDURE — 93010 EKG 12-LEAD: ICD-10-PCS | Mod: ,,, | Performed by: INTERNAL MEDICINE

## 2020-06-19 PROCEDURE — 81003 URINALYSIS AUTO W/O SCOPE: CPT

## 2020-06-19 PROCEDURE — 96375 TX/PRO/DX INJ NEW DRUG ADDON: CPT | Mod: 59

## 2020-06-19 PROCEDURE — 85007 BL SMEAR W/DIFF WBC COUNT: CPT | Mod: NCS

## 2020-06-19 PROCEDURE — 83690 ASSAY OF LIPASE: CPT

## 2020-06-19 PROCEDURE — 85027 COMPLETE CBC AUTOMATED: CPT

## 2020-06-19 PROCEDURE — 25000003 PHARM REV CODE 250: Performed by: EMERGENCY MEDICINE

## 2020-06-19 PROCEDURE — 80053 COMPREHEN METABOLIC PANEL: CPT

## 2020-06-19 RX ORDER — ONDANSETRON 2 MG/ML
4 INJECTION INTRAMUSCULAR; INTRAVENOUS
Status: COMPLETED | OUTPATIENT
Start: 2020-06-19 | End: 2020-06-19

## 2020-06-19 RX ORDER — KETOROLAC TROMETHAMINE 30 MG/ML
15 INJECTION, SOLUTION INTRAMUSCULAR; INTRAVENOUS
Status: COMPLETED | OUTPATIENT
Start: 2020-06-19 | End: 2020-06-19

## 2020-06-19 RX ADMIN — SODIUM CHLORIDE 1000 ML: 0.9 INJECTION, SOLUTION INTRAVENOUS at 10:06

## 2020-06-19 RX ADMIN — ONDANSETRON 4 MG: 2 INJECTION INTRAMUSCULAR; INTRAVENOUS at 10:06

## 2020-06-19 RX ADMIN — KETOROLAC TROMETHAMINE 15 MG: 30 INJECTION, SOLUTION INTRAMUSCULAR at 10:06

## 2020-06-20 PROBLEM — I51.7 CARDIOMEGALY: Status: ACTIVE | Noted: 2020-06-20

## 2020-06-20 PROBLEM — J18.9 PNEUMONIA OF RIGHT LOWER LOBE DUE TO INFECTIOUS ORGANISM: Status: ACTIVE | Noted: 2020-06-20

## 2020-06-20 PROBLEM — J90 BILATERAL PLEURAL EFFUSION: Status: ACTIVE | Noted: 2020-06-20

## 2020-06-20 PROBLEM — E87.1 HYPONATREMIA: Status: ACTIVE | Noted: 2020-06-20

## 2020-06-20 PROBLEM — I25.10 CAD (CORONARY ARTERY DISEASE): Status: ACTIVE | Noted: 2020-06-20

## 2020-06-20 PROBLEM — R11.2 NAUSEA WITH VOMITING: Status: ACTIVE | Noted: 2020-06-20

## 2020-06-20 PROBLEM — E43 SEVERE MALNUTRITION: Status: ACTIVE | Noted: 2020-06-20

## 2020-06-20 PROBLEM — R74.8 ELEVATED LIPASE: Status: ACTIVE | Noted: 2020-06-20

## 2020-06-20 LAB
BNP SERPL-MCNC: 293 PG/ML (ref 0–99)
CK SERPL-CCNC: 54 U/L (ref 20–180)
POCT GLUCOSE: 117 MG/DL (ref 70–110)
POCT GLUCOSE: 125 MG/DL (ref 70–110)
POCT GLUCOSE: 134 MG/DL (ref 70–110)
POCT GLUCOSE: 146 MG/DL (ref 70–110)
PREALB SERPL-MCNC: 8 MG/DL (ref 20–43)
TROPONIN I SERPL DL<=0.01 NG/ML-MCNC: 0.04 NG/ML (ref 0–0.03)
TROPONIN I SERPL DL<=0.01 NG/ML-MCNC: 0.05 NG/ML (ref 0–0.03)
TROPONIN I SERPL DL<=0.01 NG/ML-MCNC: 0.07 NG/ML (ref 0–0.03)

## 2020-06-20 PROCEDURE — 25500020 PHARM REV CODE 255: Performed by: EMERGENCY MEDICINE

## 2020-06-20 PROCEDURE — 84134 ASSAY OF PREALBUMIN: CPT

## 2020-06-20 PROCEDURE — 63600175 PHARM REV CODE 636 W HCPCS: Performed by: NURSE PRACTITIONER

## 2020-06-20 PROCEDURE — 96376 TX/PRO/DX INJ SAME DRUG ADON: CPT

## 2020-06-20 PROCEDURE — 82550 ASSAY OF CK (CPK): CPT

## 2020-06-20 PROCEDURE — 87040 BLOOD CULTURE FOR BACTERIA: CPT

## 2020-06-20 PROCEDURE — 96372 THER/PROPH/DIAG INJ SC/IM: CPT

## 2020-06-20 PROCEDURE — 96361 HYDRATE IV INFUSION ADD-ON: CPT

## 2020-06-20 PROCEDURE — 96372 THER/PROPH/DIAG INJ SC/IM: CPT | Mod: 59

## 2020-06-20 PROCEDURE — 36415 COLL VENOUS BLD VENIPUNCTURE: CPT

## 2020-06-20 PROCEDURE — 63600175 PHARM REV CODE 636 W HCPCS: Performed by: PHYSICIAN ASSISTANT

## 2020-06-20 PROCEDURE — 84484 ASSAY OF TROPONIN QUANT: CPT | Mod: 91

## 2020-06-20 PROCEDURE — 96375 TX/PRO/DX INJ NEW DRUG ADDON: CPT

## 2020-06-20 PROCEDURE — G0378 HOSPITAL OBSERVATION PER HR: HCPCS

## 2020-06-20 PROCEDURE — 25000003 PHARM REV CODE 250: Performed by: PHYSICIAN ASSISTANT

## 2020-06-20 PROCEDURE — 83880 ASSAY OF NATRIURETIC PEPTIDE: CPT

## 2020-06-20 PROCEDURE — 84484 ASSAY OF TROPONIN QUANT: CPT

## 2020-06-20 RX ORDER — AMLODIPINE BESYLATE 5 MG/1
5 TABLET ORAL DAILY
Status: DISCONTINUED | OUTPATIENT
Start: 2020-06-20 | End: 2020-06-22 | Stop reason: HOSPADM

## 2020-06-20 RX ORDER — KETOROLAC TROMETHAMINE 30 MG/ML
15 INJECTION, SOLUTION INTRAMUSCULAR; INTRAVENOUS ONCE
Status: COMPLETED | OUTPATIENT
Start: 2020-06-20 | End: 2020-06-20

## 2020-06-20 RX ORDER — CARVEDILOL 12.5 MG/1
25 TABLET ORAL 2 TIMES DAILY
Status: DISCONTINUED | OUTPATIENT
Start: 2020-06-20 | End: 2020-06-22 | Stop reason: HOSPADM

## 2020-06-20 RX ORDER — SODIUM CHLORIDE 9 MG/ML
INJECTION, SOLUTION INTRAVENOUS CONTINUOUS
Status: DISCONTINUED | OUTPATIENT
Start: 2020-06-20 | End: 2020-06-20

## 2020-06-20 RX ORDER — GLUCAGON 1 MG
1 KIT INJECTION
Status: DISCONTINUED | OUTPATIENT
Start: 2020-06-20 | End: 2020-06-22 | Stop reason: HOSPADM

## 2020-06-20 RX ORDER — ASPIRIN 81 MG/1
81 TABLET ORAL DAILY
Status: DISCONTINUED | OUTPATIENT
Start: 2020-06-20 | End: 2020-06-22 | Stop reason: HOSPADM

## 2020-06-20 RX ORDER — INSULIN ASPART 100 [IU]/ML
0-5 INJECTION, SOLUTION INTRAVENOUS; SUBCUTANEOUS
Status: DISCONTINUED | OUTPATIENT
Start: 2020-06-20 | End: 2020-06-22 | Stop reason: HOSPADM

## 2020-06-20 RX ORDER — ACETAMINOPHEN 325 MG/1
650 TABLET ORAL EVERY 6 HOURS PRN
Status: DISCONTINUED | OUTPATIENT
Start: 2020-06-20 | End: 2020-06-22 | Stop reason: HOSPADM

## 2020-06-20 RX ORDER — ONDANSETRON 2 MG/ML
4 INJECTION INTRAMUSCULAR; INTRAVENOUS EVERY 6 HOURS PRN
Status: DISCONTINUED | OUTPATIENT
Start: 2020-06-20 | End: 2020-06-22 | Stop reason: HOSPADM

## 2020-06-20 RX ORDER — ENOXAPARIN SODIUM 100 MG/ML
40 INJECTION SUBCUTANEOUS EVERY 24 HOURS
Status: DISCONTINUED | OUTPATIENT
Start: 2020-06-20 | End: 2020-06-22 | Stop reason: HOSPADM

## 2020-06-20 RX ORDER — HYDRALAZINE HYDROCHLORIDE 20 MG/ML
10 INJECTION INTRAMUSCULAR; INTRAVENOUS EVERY 6 HOURS PRN
Status: DISCONTINUED | OUTPATIENT
Start: 2020-06-20 | End: 2020-06-22 | Stop reason: HOSPADM

## 2020-06-20 RX ORDER — IBUPROFEN 200 MG
16 TABLET ORAL
Status: DISCONTINUED | OUTPATIENT
Start: 2020-06-20 | End: 2020-06-22 | Stop reason: HOSPADM

## 2020-06-20 RX ORDER — IPRATROPIUM BROMIDE AND ALBUTEROL SULFATE 2.5; .5 MG/3ML; MG/3ML
3 SOLUTION RESPIRATORY (INHALATION) EVERY 6 HOURS PRN
Status: DISCONTINUED | OUTPATIENT
Start: 2020-06-20 | End: 2020-06-22 | Stop reason: HOSPADM

## 2020-06-20 RX ORDER — ATORVASTATIN CALCIUM 40 MG/1
40 TABLET, FILM COATED ORAL NIGHTLY
Status: DISCONTINUED | OUTPATIENT
Start: 2020-06-20 | End: 2020-06-22 | Stop reason: HOSPADM

## 2020-06-20 RX ORDER — LEVOFLOXACIN 5 MG/ML
750 INJECTION, SOLUTION INTRAVENOUS
Status: DISCONTINUED | OUTPATIENT
Start: 2020-06-20 | End: 2020-06-22 | Stop reason: HOSPADM

## 2020-06-20 RX ORDER — HYDROCODONE BITARTRATE AND ACETAMINOPHEN 5; 325 MG/1; MG/1
1 TABLET ORAL EVERY 6 HOURS PRN
Status: DISCONTINUED | OUTPATIENT
Start: 2020-06-20 | End: 2020-06-22 | Stop reason: HOSPADM

## 2020-06-20 RX ORDER — IBUPROFEN 200 MG
24 TABLET ORAL
Status: DISCONTINUED | OUTPATIENT
Start: 2020-06-20 | End: 2020-06-22 | Stop reason: HOSPADM

## 2020-06-20 RX ADMIN — HYDROCODONE BITARTRATE AND ACETAMINOPHEN 1 TABLET: 5; 325 TABLET ORAL at 06:06

## 2020-06-20 RX ADMIN — CARVEDILOL 25 MG: 12.5 TABLET, FILM COATED ORAL at 09:06

## 2020-06-20 RX ADMIN — ENOXAPARIN SODIUM 40 MG: 100 INJECTION SUBCUTANEOUS at 05:06

## 2020-06-20 RX ADMIN — SODIUM CHLORIDE: 0.9 INJECTION, SOLUTION INTRAVENOUS at 03:06

## 2020-06-20 RX ADMIN — IOHEXOL 100 ML: 350 INJECTION, SOLUTION INTRAVENOUS at 12:06

## 2020-06-20 RX ADMIN — AMLODIPINE BESYLATE 5 MG: 5 TABLET ORAL at 09:06

## 2020-06-20 RX ADMIN — ASPIRIN 81 MG: 81 TABLET, COATED ORAL at 09:06

## 2020-06-20 RX ADMIN — LEVOFLOXACIN 750 MG: 750 INJECTION, SOLUTION INTRAVENOUS at 01:06

## 2020-06-20 RX ADMIN — ATORVASTATIN CALCIUM 40 MG: 40 TABLET, FILM COATED ORAL at 09:06

## 2020-06-20 RX ADMIN — KETOROLAC TROMETHAMINE 15 MG: 30 INJECTION, SOLUTION INTRAMUSCULAR at 10:06

## 2020-06-20 NOTE — PROGRESS NOTES
Patient seen and examined. Complained of  headache earlier. Resolved after a dose of Iv toradol.No further complaints of nausea or vomiting for now. Continue Iv Abx for pneumonia. Repeat labs in am.     Awake, alert, oriented person, place, time, and situation  HRR  BBS diminished  Abdomen soft, nontender, nondistended, bowel sounds present  Extremities no edema

## 2020-06-20 NOTE — PLAN OF CARE
Recommendations    Recommendation:  1. Continue Diabetic Diet.  2. If N/V persists consider Boost Breeze with meals.  3. RD to provide Diabetic diet education at time of RD f/u.     Goals: % of EEN and EPN at RD f/u  Nutrition Goal Status: new  Communication of RD Recs: Enoc GAMBINO, MS, RD, LDN

## 2020-06-20 NOTE — ASSESSMENT & PLAN NOTE
-Unclear if infiltrate represents a new or untreated pneumonia.   -IV antibiotics, inhaled medications and oxygen therapy as needed.   -Check sputum culture.

## 2020-06-20 NOTE — SUBJECTIVE & OBJECTIVE
Past Medical History:   Diagnosis Date    CAD (coronary artery disease)     Colon polyp     Diabetes mellitus type I     Hypertension        Past Surgical History:   Procedure Laterality Date    Benign Cyst Right      SECTION      COLONOSCOPY      COLONOSCOPY N/A 2019    Procedure: COLONOSCOPY;  Surgeon: Ileana Holcomb MD;  Location: [facility];  Service: Endoscopy;  Laterality: N/A;       Review of patient's allergies indicates:   Allergen Reactions    Lisinopril-hydrochlorothiazide      Other reaction(s): Reaction:Throat swelling;       No current facility-administered medications on file prior to encounter.      Current Outpatient Medications on File Prior to Encounter   Medication Sig    amLODIPine (NORVASC) 5 MG tablet Take 1 tablet (5 mg total) by mouth once daily.    aspirin (ECOTRIN) 81 MG EC tablet Take 1 tablet (81 mg total) by mouth once daily.    atorvastatin (LIPITOR) 40 MG tablet Take 1 tablet (40 mg total) by mouth every evening.    carvediloL (COREG) 25 MG tablet Take 1 tablet (25 mg total) by mouth 2 (two) times daily.    doxycycline (VIBRAMYCIN) 100 MG Cap Take 1 capsule (100 mg total) by mouth every 12 (twelve) hours. for 5 days    furosemide (LASIX) 20 MG tablet Take 1 tablet (20 mg total) by mouth every Mon, Wed, Fri.    aspirin 81 MG Chew 1 tablet    butalbital-acetaminophen-caffeine -40 mg (FIORICET, ESGIC) -40 mg per tablet Take 1 tablet by mouth every 6 (six) hours as needed for Headaches.    lancets Misc Check blood sugar once daily.     Family History     Problem Relation (Age of Onset)    Cancer Mother, Paternal Grandmother    Diabetes Father    Hypertension Sister, Brother    Pacemaker/defibrilator Mother        Tobacco Use    Smoking status: Never Smoker    Smokeless tobacco: Never Used   Substance and Sexual Activity    Alcohol use: Never     Frequency: Never    Drug use: Never    Sexual activity: Not Currently     Review of Systems    Constitutional: Positive for appetite change (decreased). Negative for chills, diaphoresis, fatigue and fever.   HENT: Negative for congestion, ear pain, mouth sores, sore throat and trouble swallowing.    Eyes: Negative for pain and visual disturbance.   Respiratory: Positive for cough and shortness of breath. Negative for chest tightness.    Cardiovascular: Negative for chest pain, palpitations and leg swelling.   Gastrointestinal: Positive for nausea and vomiting. Negative for abdominal pain and constipation.   Endocrine: Negative for cold intolerance, heat intolerance, polydipsia and polyuria.   Genitourinary: Negative for dysuria, frequency and hematuria.   Musculoskeletal: Positive for arthralgias. Negative for back pain, myalgias and neck pain.   Skin: Negative for pallor, rash and wound.   Allergic/Immunologic: Negative for environmental allergies and immunocompromised state.   Neurological: Positive for headaches. Negative for dizziness, seizures, syncope, weakness and numbness.   Hematological: Negative for adenopathy. Does not bruise/bleed easily.   Psychiatric/Behavioral: Negative for agitation, confusion and sleep disturbance.     Objective:     Vital Signs (Most Recent):  Temp: 97.8 °F (36.6 °C) (06/20/20 0233)  Pulse: 84 (06/20/20 0317)  Resp: 18 (06/20/20 0233)  BP: (!) 168/93 (06/20/20 0317)  SpO2: 96 % (06/20/20 0233) Vital Signs (24h Range):  Temp:  [97.8 °F (36.6 °C)-98.9 °F (37.2 °C)] 97.8 °F (36.6 °C)  Pulse:  [79-93] 84  Resp:  [18-24] 18  SpO2:  [94 %-96 %] 96 %  BP: (160-178)/(85-99) 168/93     Weight: 92.5 kg (203 lb 14.4 oz)  Body mass index is 35 kg/m².    Physical Exam  Vitals signs and nursing note reviewed.   Constitutional:       General: She is not in acute distress.     Appearance: She is well-developed.   HENT:      Head: Normocephalic and atraumatic.   Eyes:      Conjunctiva/sclera: Conjunctivae normal.      Comments: PERRL   Neck:      Musculoskeletal: Neck supple.       Vascular: No JVD.   Cardiovascular:      Rate and Rhythm: Normal rate and regular rhythm.      Heart sounds: Normal heart sounds.   Pulmonary:      Effort: Pulmonary effort is normal.      Breath sounds: Normal breath sounds.   Abdominal:      General: Bowel sounds are normal. There is no distension.      Palpations: Abdomen is soft.      Tenderness: There is no abdominal tenderness.   Musculoskeletal: Normal range of motion.   Skin:     General: Skin is warm and dry.   Neurological:      Mental Status: She is alert and oriented to person, place, and time.   Psychiatric:         Behavior: Behavior normal.         Thought Content: Thought content normal.             Significant Labs:   CBC:   Recent Labs   Lab 06/19/20  2229   WBC 22.35*   HGB 12.6   HCT 38.8   *     CMP:   Recent Labs   Lab 06/19/20  2229   *   K 3.6   CL 97   CO2 26   *   BUN 7*   CREATININE 0.8   CALCIUM 8.9   PROT 7.5   ALBUMIN 2.6*   BILITOT 0.6   ALKPHOS 175*   AST 42*   ALT 39   ANIONGAP 12   EGFRNONAA >60     All pertinent labs within the past 24 hours have been reviewed.    Significant Imaging: I have reviewed all pertinent imaging results/findings within the past 24 hours.

## 2020-06-20 NOTE — PLAN OF CARE
IVF started during shift. NSR on cardiac monitor. Supplies at bedside for res. Sputum sample.   Fall precautions in place. Call light and personal items within reach. Educated patient on side effects of medication administered. Pt verbalized understanding. 24 hour order check done.  Will continue to monitor.

## 2020-06-20 NOTE — PLAN OF CARE
Pt remains free from falls/injuries this shift. Safety precautions maintained. Pain managed with IV medication. No s/s of acute distress noted. Will continue to monitor. Chart check completed.

## 2020-06-20 NOTE — ED PROVIDER NOTES
"SCRIBE #1 NOTE: I, Torsten Stanley, am scribing for, and in the presence of, Donal Ames Jr., MD. I have scribed the HPI, ROS, and PEx.     SCRIBE #2 NOTE: I, Nabil Gotti, am scribing for, and in the presence of,  Alycia Villafuerte MD. I have scribed the remaining portions of the note not scribed by Scribe #1.     History      Chief Complaint   Patient presents with    General Illness     pt reports generalized body pain "all over", since DC from hospital with a heart attack; pt reports cough onset before hospitalization       Review of patient's allergies indicates:   Allergen Reactions    Lisinopril-hydrochlorothiazide      Other reaction(s): Reaction:Throat swelling;        HPI   HPI    2020, 10:06 PM   History obtained from the patient      History of Present Illness: Demetrice Gaines is a 60 y.o. female patient who presents to the Emergency Department for general illness, onset 6 days PTA. Pt reports cough, rhinorrhea, headache, n/v, and generalized abdominal pain. Pt was discharged on 20 after being admitted for an NSTEMI. Symptoms are constant and moderate in severity. No mitigating or exacerbating factors reported. Patient denies any fever, chills, SOB, CP, weakness, numbness, dizziness, and all other sxs at this time. No prior Tx reported. No further complaints or concerns at this time.     Arrival mode: Personal vehicle    PCP: Lul Alvarez MD       Past Medical History:  Past Medical History:   Diagnosis Date    CAD (coronary artery disease)     Colon polyp     Diabetes mellitus type I     Hypertension        Past Surgical History:  Past Surgical History:   Procedure Laterality Date    Benign Cyst Right      SECTION      COLONOSCOPY      COLONOSCOPY N/A 2019    Procedure: COLONOSCOPY;  Surgeon: Ileana Holcomb MD;  Location: [facility];  Service: Endoscopy;  Laterality: N/A;         Family History:  Family History   Problem Relation Age of Onset    Cancer " Mother     Pacemaker/defibrilator Mother     Diabetes Father     Hypertension Sister     Hypertension Brother     Cancer Paternal Grandmother        Social History:  Social History     Tobacco Use    Smoking status: Never Smoker    Smokeless tobacco: Never Used   Substance and Sexual Activity    Alcohol use: Never     Frequency: Never    Drug use: Never    Sexual activity: Not Currently       ROS   Review of Systems   Constitutional: Negative for chills, diaphoresis, fatigue and fever.   HENT: Positive for rhinorrhea. Negative for sore throat.    Respiratory: Positive for cough. Negative for shortness of breath.    Cardiovascular: Negative for chest pain.   Gastrointestinal: Positive for abdominal pain (generalized), nausea and vomiting. Negative for diarrhea.   Genitourinary: Negative for dysuria.   Musculoskeletal: Negative for back pain.   Skin: Negative for rash.   Neurological: Positive for headaches. Negative for dizziness, seizures, weakness, light-headedness and numbness.   Hematological: Does not bruise/bleed easily.   All other systems reviewed and are negative.    Physical Exam      Initial Vitals [06/19/20 2150]   BP Pulse Resp Temp SpO2   (!) 175/85 93 20 98.7 °F (37.1 °C) (!) 94 %      MAP       --          Physical Exam  Nursing Notes and Vital Signs Reviewed.  Constitutional: Patient is in no acute distress. Well-developed and well-nourished.  Head: Atraumatic. Normocephalic.  Eyes: PERRL. EOM intact. Conjunctivae are not pale. No scleral icterus.  ENT: Mucous membranes are moist. Oropharynx is clear and symmetric.    Neck: Supple. Full ROM. No lymphadenopathy.  Cardiovascular: Regular rate. Regular rhythm. No murmurs, rubs, or gallops. Distal pulses are 2+ and symmetric.  Pulmonary/Chest: No respiratory distress. Clear to auscultation bilaterally. No wheezing or rales.  Abdominal: Soft and non-distended.  There is no tenderness.  No rebound, guarding, or rigidity.   Musculoskeletal: Moves  "all extremities. No obvious deformities. No edema.  Skin: Warm and dry.  Neurological:  Alert, awake, and appropriate.  Normal speech.  No acute focal neurological deficits are appreciated.  Psychiatric: Normal affect. Good eye contact. Appropriate in content.    ED Course    Procedures  ED Vital Signs:  Vitals:    06/20/20 0233 06/20/20 0245 06/20/20 0317 06/20/20 0339   BP: (!) 175/99 (!) 175/94 (!) 168/93 (!) 172/91   Pulse: 87 81 84 84   Resp: 18   16   Temp: 97.8 °F (36.6 °C)      TempSrc: Oral   Oral   SpO2: 96%      Weight:       Height: 5' 4" (1.626 m)       06/20/20 0340 06/20/20 0423 06/20/20 0530 06/20/20 0709   BP:  (!) 165/89  (!) 154/85   Pulse:  80 92 79   Resp:    16   Temp:    97.6 °F (36.4 °C)   TempSrc:    Oral   SpO2: (!) 94%   95%   Weight:       Height:        06/20/20 0948 06/20/20 1115 06/20/20 1319 06/20/20 1520   BP:       Pulse: 91 76 82 77   Resp:       Temp:       TempSrc:       SpO2:       Weight:       Height:        06/20/20 1704 06/20/20 1728 06/20/20 1915   BP: (!) 157/89  (!) 143/83   Pulse: 82 81 80   Resp: 20  14   Temp: 99.3 °F (37.4 °C)  98.5 °F (36.9 °C)   TempSrc: Oral  Oral   SpO2: 95%  (!) 92%   Weight:      Height:          Abnormal Lab Results:  Labs Reviewed   CBC W/ AUTO DIFFERENTIAL - Abnormal; Notable for the following components:       Result Value    WBC 22.35 (*)     RDW 14.6 (*)     Platelets 405 (*)     Lymph% 15.0 (*)     Eosinophil% 10.0 (*)     Platelet Estimate Increased (*)     All other components within normal limits   COMPREHENSIVE METABOLIC PANEL - Abnormal; Notable for the following components:    Sodium 135 (*)     Glucose 140 (*)     BUN, Bld 7 (*)     Albumin 2.6 (*)     Alkaline Phosphatase 175 (*)     AST 42 (*)     All other components within normal limits   LIPASE - Abnormal; Notable for the following components:    Lipase 85 (*)     All other components within normal limits   URINALYSIS, REFLEX TO URINE CULTURE - Abnormal; Notable for the " following components:    Ketones, UA Trace (*)     All other components within normal limits    Narrative:     Preferred Collection Type->Urine, Clean Catch  Specimen Source->Urine   B-TYPE NATRIURETIC PEPTIDE - Abnormal; Notable for the following components:     (*)     All other components within normal limits   TROPONIN I - Abnormal; Notable for the following components:    Troponin I 0.073 (*)     All other components within normal limits   SARS-COV-2 RNA AMPLIFICATION, QUAL        All Lab Results:  Results for orders placed or performed during the hospital encounter of 06/19/20   Blood culture    Specimen: Peripheral, Antecubital, Left; Blood   Result Value Ref Range    Blood Culture, Routine No Growth to date    Blood culture    Specimen: Peripheral, Hand, Left; Blood   Result Value Ref Range    Blood Culture, Routine No Growth to date    CBC auto differential   Result Value Ref Range    WBC 22.35 (H) 3.90 - 12.70 K/uL    RBC 4.44 4.00 - 5.40 M/uL    Hemoglobin 12.6 12.0 - 16.0 g/dL    Hematocrit 38.8 37.0 - 48.5 %    Mean Corpuscular Volume 87 82 - 98 fL    Mean Corpuscular Hemoglobin 28.4 27.0 - 31.0 pg    Mean Corpuscular Hemoglobin Conc 32.5 32.0 - 36.0 g/dL    RDW 14.6 (H) 11.5 - 14.5 %    Platelets 405 (H) 150 - 350 K/uL    MPV 9.2 9.2 - 12.9 fL    Immature Granulocytes CANCELED 0.0 - 0.5 %    Immature Grans (Abs) CANCELED 0.00 - 0.04 K/uL    Lymph # CANCELED 1.0 - 4.8 K/uL    Mono # CANCELED 0.3 - 1.0 K/uL    Eos # CANCELED 0.0 - 0.5 K/uL    Baso # CANCELED 0.00 - 0.20 K/uL    nRBC 0 0 /100 WBC    Gran% 60.0 38.0 - 73.0 %    Lymph% 15.0 (L) 18.0 - 48.0 %    Mono% 6.0 4.0 - 15.0 %    Eosinophil% 10.0 (H) 0.0 - 8.0 %    Basophil% 0.0 0.0 - 1.9 %    Bands 3.0 %    Metamyelocytes 4.0 %    Myelocytes 2.0 %    Platelet Estimate Increased (A)     Aniso Slight     Poik Slight     Poly Occasional     Tear Drop Cells Occasional     Vacuolated Granulocytes Present     Differential Method Manual     Comprehensive metabolic panel   Result Value Ref Range    Sodium 135 (L) 136 - 145 mmol/L    Potassium 3.6 3.5 - 5.1 mmol/L    Chloride 97 95 - 110 mmol/L    CO2 26 23 - 29 mmol/L    Glucose 140 (H) 70 - 110 mg/dL    BUN, Bld 7 (L) 8 - 23 mg/dL    Creatinine 0.8 0.5 - 1.4 mg/dL    Calcium 8.9 8.7 - 10.5 mg/dL    Total Protein 7.5 6.0 - 8.4 g/dL    Albumin 2.6 (L) 3.5 - 5.2 g/dL    Total Bilirubin 0.6 0.1 - 1.0 mg/dL    Alkaline Phosphatase 175 (H) 55 - 135 U/L    AST 42 (H) 10 - 40 U/L    ALT 39 10 - 44 U/L    Anion Gap 12 8 - 16 mmol/L    eGFR if African American >60 >60 mL/min/1.73 m^2    eGFR if non African American >60 >60 mL/min/1.73 m^2   Lipase   Result Value Ref Range    Lipase 85 (H) 4 - 60 U/L   Urinalysis, Reflex to Urine Culture Urine, Clean Catch    Specimen: Urine   Result Value Ref Range    Specimen UA Urine, Clean Catch     Color, UA Yellow Yellow, Straw, [place]    Appearance, UA Clear Clear    pH, UA 7.0 5.0 - 8.0    Specific Gravity, UA 1.010 1.005 - 1.030    Protein, UA Negative Negative    Glucose, UA Negative Negative    Ketones, UA Trace (A) Negative    Bilirubin (UA) Negative Negative    Occult Blood UA Negative Negative    Nitrite, UA Negative Negative    Urobilinogen, UA Negative <2.0 EU/dL    Leukocytes, UA Negative Negative   COVID-19 Rapid Screening   Result Value Ref Range    SARS-CoV-2 RNA, Amplification, Qual Negative Negative   Brain natriuretic peptide   Result Value Ref Range     (H) 0 - 99 pg/mL   Troponin I   Result Value Ref Range    Troponin I 0.073 (H) 0.000 - 0.026 ng/mL   CK   Result Value Ref Range    CPK 54 20 - 180 U/L   Prealbumin   Result Value Ref Range    Prealbumin 8 (L) 20 - 43 mg/dL   Troponin I   Result Value Ref Range    Troponin I 0.045 (H) 0.000 - 0.026 ng/mL   Troponin I   Result Value Ref Range    Troponin I 0.047 (H) 0.000 - 0.026 ng/mL   POCT glucose   Result Value Ref Range    POCT Glucose 117 (H) 70 - 110 mg/dL   POCT glucose   Result Value Ref  Range    POCT Glucose 146 (H) 70 - 110 mg/dL   POCT glucose   Result Value Ref Range    POCT Glucose 125 (H) 70 - 110 mg/dL   POCT glucose   Result Value Ref Range    POCT Glucose 134 (H) 70 - 110 mg/dL     Imaging Results:  Imaging Results          CTA Chest Non-Coronary - PE Study (Final result)  Result time 06/20/20 08:40:18    Final result by Valdez Lira Jr., MD (06/20/20 08:40:18)                 Impression:      1. No evidence of pulmonary embolism.  2. Bilateral pleural effusions, right larger than left, with overlying atelectasis of the lungs.  3. Cardiomegaly.      Electronically signed by: Valdez Lira Jr., MD  Date:    06/20/2020  Time:    08:40             Narrative:    EXAMINATION:  CTA CHEST NON CORONARY    CLINICAL HISTORY:  PE suspected, high pretest prob;    TECHNIQUE:  CT angiogram of the chest protocol performed after the IV administration of 100 mL Omnipaque 350. 3D reconstructions/reformats/MIPs obtained. All CT scans at this facility use dose modulation, iterative reconstruction, and/or weight based dosing when appropriate to reduce radiation dose to as low as reasonably achievable.    COMPARISON:  None    FINDINGS:  There is no evidence of pulmonary embolism. Small bilateral pleural effusions.  This is greatest on the right.  Overlying atelectasis of portions of the right middle lobe, right lower lobe, and left lower lobe.  No pleural thickening.  Normal size lymph nodes within the chest.  Normal caliber of the aorta.  Heart is enlarged.  No pericardial effusion.  No acute osseous abnormalities.  Limited images through the upper abdomen demonstrate no acute findings.                               X-Ray Chest PA And Lateral (Final result)  Result time 06/19/20 23:09:32    Final result by Paulino Loya III, MD (06/19/20 23:09:32)                 Impression:      See above.      Electronically signed by: Paulino Loya MD  Date:    06/19/2020  Time:    23:09             Narrative:     EXAMINATION:  XR CHEST PA AND LATERAL    CLINICAL HISTORY:  Cough    COMPARISON:  06/15/2020    FINDINGS:  Stable cardiomegaly.  There has been mild interval increase in size of a small right pleural effusion and adjacent infiltrate or compressive atelectasis in the right lung base.  There is a new trace left pleural effusion.  The remainder of the lungs appear clear of active disease.  No pneumothorax identified.  No acute osseous abnormality identified.                            12:39 AM: Per STAT radiology, pt's CT chest results: 1. No pulmonary embolus. 2. Small left and moderate to large right pleural effusions with adjacent atelectasis/consolidation.        The EKG was ordered, reviewed, and independently interpreted by the ED provider.  Interpretation time: 21:56  Rate: 91 BPM  Rhythm: normal sinus rhythm  Interpretation: No acute ST changes. No STEMI.           The Emergency Provider reviewed the vital signs and test results, which are outlined above.    ED Discussion     11:39 PM: Dr. Ames transfers care of pt to Dr. Villafuerte pending imaging results.    12:48 AM: Discussed case with DEBBIE Timmons ([facility]). Dr. Coto agrees with current care and management of pt and accepts admission.   Admitting Service: Hospital Medicine  Admit to: obs / med tele    Re-evaluated pt. I have discussed test results, shared treatment plan, and the need for admission with patient and family at bedside. Pt and family express understanding at this time and agree with all information. All questions answered. Pt and family have no further questions or concerns at this time. Pt is ready for admit.           ED Medication(s):  Medications   levoFLOXacin 750 mg/150 mL IVPB 750 mg (750 mg Intravenous New Bag 6/20/20 0152)   hydrALAZINE injection 10 mg (10 mg Intravenous Not Given 6/20/20 0310)   amLODIPine tablet 5 mg (5 mg Oral Given 6/20/20 0948)   aspirin EC tablet 81 mg (81 mg Oral Given 6/20/20 0948)    atorvastatin tablet 40 mg (40 mg Oral Given 6/20/20 2113)   carvediloL tablet 25 mg (25 mg Oral Given 6/20/20 2113)   acetaminophen tablet 650 mg (has no administration in time range)   HYDROcodone-acetaminophen 5-325 mg per tablet 1 tablet (1 tablet Oral Given 6/20/20 1801)   albuterol-ipratropium 2.5 mg-0.5 mg/3 mL nebulizer solution 3 mL (has no administration in time range)   enoxaparin injection 40 mg (40 mg Subcutaneous Given 6/20/20 1728)   ondansetron injection 4 mg (has no administration in time range)   promethazine (PHENERGAN) 6.25 mg in dextrose 5 % 50 mL IVPB (has no administration in time range)   glucose chewable tablet 16 g (has no administration in time range)   glucose chewable tablet 24 g (has no administration in time range)   dextrose 50% injection 12.5 g (has no administration in time range)   dextrose 50% injection 25 g (has no administration in time range)   glucagon (human recombinant) injection 1 mg (has no administration in time range)   sodium chloride 0.9% bolus 1,000 mL (0 mLs Intravenous Stopped 6/20/20 0032)   ondansetron injection 4 mg (4 mg Intravenous Given 6/19/20 2237)   ketorolac injection 15 mg (15 mg Intravenous Given 6/19/20 2234)   iohexoL (OMNIPAQUE 350) injection 100 mL (100 mLs Intravenous Given 6/20/20 0003)   ketorolac injection 15 mg (15 mg Intravenous Given 6/20/20 1000)          Current Discharge Medication List            Medical Decision Making    Medical Decision Making:   Clinical Tests:   Lab Tests: Ordered and Reviewed  Radiological Study: Ordered and Reviewed  Medical Tests: Ordered and Reviewed           Scribe Attestation:   Scribe #1: I performed the above scribed service and the documentation accurately describes the services I performed. I attest to the accuracy of the note.    Attending:   Physician Attestation Statement for Scribe #1: I, Donal Ames Jr., MD, personally performed the services described in this documentation, as scribed by Torsten  Jaden, in my presence, and it is both accurate and complete.       Scribe Attestation:   Scribe #2: I performed the above scribed service and the documentation accurately describes the services I performed. I attest to the accuracy of the note.    Attending Attestation:           Physician Attestation for Scribe:    Physician Attestation Statement for Scribe #2: I, Alycia Villafuerte MD, reviewed documentation, as scribed by Nabil Gotti in my presence, and it is both accurate and complete. I also acknowledge and confirm the content of the note done by Scribe #1.          Clinical Impression       ICD-10-CM ICD-9-CM   1. Pneumonia of right lower lobe due to infectious organism  J18.1 486   2. Chest pain  R07.9 786.50   3. Cough  R05 786.2       Disposition:   Disposition: Placed in Observation  Condition: Fair         Alycia Villafuerte MD  06/20/20 7126

## 2020-06-20 NOTE — CONSULTS
"  [facility] -   Adult Nutrition  Consult Note    SUMMARY     Recommendations    Recommendation:  1. Continue Diabetic Diet.  2. If N/V persists consider Boost Breeze with meals.  3. RD to provide Diabetic diet education at time of RD f/u.     Goals: % of EEN and EPN at RD f/u  Nutrition Goal Status: new  Communication of RD Recs: POC, Sticky    Reason for Assessment    Reason For Assessment: consult  Diagnosis:Pneumonia, N/V  Relevant Medical History: T2DM, HTN, CAD  Interdisciplinary Rounds: did not attend  General Information Comments:   6/20/20: RD consulted for severe malnutrition. Pt diagnosed with severe malnutrition per NP on 6/20/20. Patient reports a weight gain over the last few months due to COVID, but complains of a lack of appetite over the last 2 days 2' N/V. She reports good intake this AM; PO intake 100% of breakfast and no associated N/V. She states she usually eats fast food and has someone bring her groceries when at home. Noted she is hyponatremic at time of visit. Elevated A1C at time of visit. RD to educate on diabetic diet at f/u when medically appropriate.   Nutrition Discharge Planning: Diabetic   Nutrition Risk Screen    Nutrition Risk Screen: no indicators present    Nutrition/Diet History         Anthropometrics    Temp: 97.6 °F (36.4 °C)  Height Method: Stated  Height: 5' 4" (162.6 cm)  Height (inches): 64 in  Weight Method: Bed Scale  Weight: 92.5 kg (203 lb 14.4 oz)  Weight (lb): 203.9 lb  Ideal Body Weight (IBW), Female: 120 lb  BMI Grade: 35 - 39.9 - obesity - grade II       Lab/Procedures/Meds    Pertinent Labs Reviewed:  BMP  Lab Results   Component Value Date     (L) 06/19/2020    K 3.6 06/19/2020    CL 97 06/19/2020    CO2 26 06/19/2020    BUN 7 (L) 06/19/2020    CREATININE 0.8 06/19/2020    CALCIUM 8.9 06/19/2020    ANIONGAP 12 06/19/2020    ESTGFRAFRICA >60 06/19/2020    EGFRNONAA >60 06/19/2020     Lab Results   Component Value Date    CALCIUM 8.9 " 06/19/2020     Lab Results   Component Value Date    ALBUMIN 2.6 (L) 06/19/2020       Lab Results   Component Value Date    HGBA1C 6.8 (H) 06/14/2020       Pertinent Medications Reviewed: Amlodipine, Levofloxacin    Physical Findings/Assessment     No evidence of wasting at this time.     Estimated/Assessed Needs    Weight Used For Calorie Calculations: 90.5 kg (199 lb 8.3 oz)  Energy Calorie Requirements (kcal): 1450  Energy Need Method: [place]-St Jeor  Protein Requirements: 92-112g (1.0-1.2 g/kg)  Weight Used For Protein Calculations: 92.5 kg (203 lb 14.8 oz)     Estimated Fluid Requirement Method: RDA Method  RDA Method (mL): 1450  CHO Requirement: 181 g      Nutrition Prescription Ordered    Current Diet Order: Diabetic    Evaluation of Received Nutrient/Fluid Intake    I/O: .     Intake/Output Summary (Last 24 hours) at 6/20/2020 1200  Last data filed at 6/20/2020 0600  Gross per 24 hour   Intake 1463.75 ml   Output --   Net 1463.75 ml   % Intake of Estimated Energy Needs: 75 - 100 %  % Meal Intake: 75 - 100 %    Nutrition Risk    Level of Risk/Frequency of Follow-up: high     Assessment and Plan    Nutrition Problem  Altered nutrition related value- A1C    Related to (etiology):   Endocrine dysfunction- T2DM    Signs and Symptoms (as evidenced by):   [place] A1C- 6.8    Interventions/Recommendations (treatment strategy):  Nutrition Education Content    Nutrition Diagnosis Status:   New         Monitor and Evaluation          Malnutrition Assessment       Patient does not meet ASPEN/AND malnutrition risk criteria at this time. RD to reassess at f/u.     Nutrition Follow-Up    RD Follow-up?: Yes     Phoebe Cortez RD, LDN

## 2020-06-20 NOTE — H&P
"[facility] Hospital Medicine  History & Physical    Patient Name: Demetrice Gaines  MRN: [id number]  Admission Date: 6/19/2020  Attending Physician: Nacho Coto MD   Primary Care Provider: Lul Alvarez MD         Patient information was obtained from patient, past medical records and ER records.     Subjective:     Principal Problem:Pneumonia of right lower lobe due to infectious organism    Chief Complaint:   Chief Complaint   Patient presents with    General Illness     pt reports generalized body pain "all over", since DC from hospital with a heart attack; pt reports cough onset before hospitalization        HPI: Demetrice Gaines is a 60 year old female with DM II, CAD and hypertension who presented to the ED with multiple complaints including fatigue and bodyaches that began several days prior to presentation. She specifically notes pain in her legs, right pelvis and headache. The patient also reports nausea, vomiting, decreased appetite, cough and shortness of breath that began prior to discharge from her recent hospitalization. She was hospitalized from 6/13/20 to 6/15/20 during which time ACS was ruled out and she was treated for volume overload as well as possible right lower lobe pneumonia and discharged on doxycycline. She states that her SOB has improved over time. It is positional and with exertion. The patient denies diarrhea, fever, chills and COVID contacts. In the ED, the patient was hypertensive. Labs were significant for a WBC count of 22,350, BNP Of 293 which is decreased from 1037 during her prior hospitalization and albumin of 2.6. Initial troponin was 0.073 which is down from previous elevation. Official read of a CTA chest shows a persistent right lower lobe infiltrate. Code status was discussed with the patient. She is a DNR. Her  is her surrogate medical decision maker.     Past Medical History:   Diagnosis Date    CAD (coronary artery disease)     Colon " polyp     Diabetes mellitus type I     Hypertension        Past Surgical History:   Procedure Laterality Date    Benign Cyst Right      SECTION      COLONOSCOPY      COLONOSCOPY N/A 2019    Procedure: COLONOSCOPY;  Surgeon: Ileana Holcomb MD;  Location: [facility];  Service: Endoscopy;  Laterality: N/A;       Review of patient's allergies indicates:   Allergen Reactions    Lisinopril-hydrochlorothiazide      Other reaction(s): Reaction:Throat swelling;       No current facility-administered medications on file prior to encounter.      Current Outpatient Medications on File Prior to Encounter   Medication Sig    amLODIPine (NORVASC) 5 MG tablet Take 1 tablet (5 mg total) by mouth once daily.    aspirin (ECOTRIN) 81 MG EC tablet Take 1 tablet (81 mg total) by mouth once daily.    atorvastatin (LIPITOR) 40 MG tablet Take 1 tablet (40 mg total) by mouth every evening.    carvediloL (COREG) 25 MG tablet Take 1 tablet (25 mg total) by mouth 2 (two) times daily.    doxycycline (VIBRAMYCIN) 100 MG Cap Take 1 capsule (100 mg total) by mouth every 12 (twelve) hours. for 5 days    furosemide (LASIX) 20 MG tablet Take 1 tablet (20 mg total) by mouth every Mon, Wed, Fri.    aspirin 81 MG Chew 1 tablet    butalbital-acetaminophen-caffeine -40 mg (FIORICET, ESGIC) -40 mg per tablet Take 1 tablet by mouth every 6 (six) hours as needed for Headaches.    lancets Misc Check blood sugar once daily.     Family History     Problem Relation (Age of Onset)    Cancer Mother, Paternal Grandmother    Diabetes Father    Hypertension Sister, Brother    Pacemaker/defibrilator Mother        Tobacco Use    Smoking status: Never Smoker    Smokeless tobacco: Never Used   Substance and Sexual Activity    Alcohol use: Never     Frequency: Never    Drug use: Never    Sexual activity: Not Currently     Review of Systems   Constitutional: Positive for appetite change (decreased). Negative for chills,  diaphoresis, fatigue and fever.   HENT: Negative for congestion, ear pain, mouth sores, sore throat and trouble swallowing.    Eyes: Negative for pain and visual disturbance.   Respiratory: Positive for cough and shortness of breath. Negative for chest tightness.    Cardiovascular: Negative for chest pain, palpitations and leg swelling.   Gastrointestinal: Positive for nausea and vomiting. Negative for abdominal pain and constipation.   Endocrine: Negative for cold intolerance, heat intolerance, polydipsia and polyuria.   Genitourinary: Negative for dysuria, frequency and hematuria.   Musculoskeletal: Positive for arthralgias. Negative for back pain, myalgias and neck pain.   Skin: Negative for pallor, rash and wound.   Allergic/Immunologic: Negative for environmental allergies and immunocompromised state.   Neurological: Positive for headaches. Negative for dizziness, seizures, syncope, weakness and numbness.   Hematological: Negative for adenopathy. Does not bruise/bleed easily.   Psychiatric/Behavioral: Negative for agitation, confusion and sleep disturbance.     Objective:     Vital Signs (Most Recent):  Temp: 97.8 °F (36.6 °C) (06/20/20 0233)  Pulse: 84 (06/20/20 0317)  Resp: 18 (06/20/20 0233)  BP: (!) 168/93 (06/20/20 0317)  SpO2: 96 % (06/20/20 0233) Vital Signs (24h Range):  Temp:  [97.8 °F (36.6 °C)-98.9 °F (37.2 °C)] 97.8 °F (36.6 °C)  Pulse:  [79-93] 84  Resp:  [18-24] 18  SpO2:  [94 %-96 %] 96 %  BP: (160-178)/(85-99) 168/93     Weight: 92.5 kg (203 lb 14.4 oz)  Body mass index is 35 kg/m².    Physical Exam  Vitals signs and nursing note reviewed.   Constitutional:       General: She is not in acute distress.     Appearance: She is well-developed.   HENT:      Head: Normocephalic and atraumatic.   Eyes:      Conjunctiva/sclera: Conjunctivae normal.      Comments: PERRL   Neck:      Musculoskeletal: Neck supple.      Vascular: No JVD.   Cardiovascular:      Rate and Rhythm: Normal rate and regular  rhythm.      Heart sounds: Normal heart sounds.   Pulmonary:      Effort: Pulmonary effort is normal.      Breath sounds: Normal breath sounds.   Abdominal:      General: Bowel sounds are normal. There is no distension.      Palpations: Abdomen is soft.      Tenderness: There is no abdominal tenderness.   Musculoskeletal: Normal range of motion.   Skin:     General: Skin is warm and dry.   Neurological:      Mental Status: She is alert and oriented to person, place, and time.   Psychiatric:         Behavior: Behavior normal.         Thought Content: Thought content normal.             Significant Labs:   CBC:   Recent Labs   Lab 06/19/20  2229   WBC 22.35*   HGB 12.6   HCT 38.8   *     CMP:   Recent Labs   Lab 06/19/20  2229   *   K 3.6   CL 97   CO2 26   *   BUN 7*   CREATININE 0.8   CALCIUM 8.9   PROT 7.5   ALBUMIN 2.6*   BILITOT 0.6   ALKPHOS 175*   AST 42*   ALT 39   ANIONGAP 12   EGFRNONAA >60     All pertinent labs within the past 24 hours have been reviewed.    Significant Imaging: I have reviewed all pertinent imaging results/findings within the past 24 hours.    Assessment/Plan:     * Pneumonia of right lower lobe due to infectious organism  -Unclear if infiltrate represents a new or untreated pneumonia.   -IV antibiotics, inhaled medications and oxygen therapy as needed.   -Check sputum culture.         Nausea with vomiting  -Symptomatic care.   -Consider imaging in symptoms persist.       Intravascular volume depletion  -Due to GI losses, decreased appetite and diuresis.   -Hemoconcentration may be the source of the patient's leukocytosis.   -Cautious IV fluids.   -Monitor.       Elevated troponin  -Likely trending down from recent elevation.   -Continue ASA, statin and Coreg.       Diabetes mellitus, type II  -NISS.   -ADA diet.   -Hemoglobin A1C was 6.8 on 6/14/20.     Uncontrolled hypertension  -Continue Norvasc and Coreg.   -Hold Lasix due to volume depletion.   -Hydralazine as  needed.       Severe malnutrition  -Check a prealbumin.   -Treat nausea and vomiting.       VTE Risk Mitigation (From admission, onward)         Ordered     enoxaparin injection 40 mg  Every 24 hours      06/20/20 0334     Place sequential compression device  Until discontinued      06/20/20 0334                   DEBBIE Timmons  Department of Hospital Medicine   [facility]

## 2020-06-20 NOTE — HPI
Demetrice Gaines is a 60 year old female with DM II, CAD and hypertension who presented to the ED with multiple complaints including fatigue and bodyaches that began several days prior to presentation. She specifically notes pain in her legs, right pelvis and headache. The patient also reports nausea, vomiting, decreased appetite, cough and shortness of breath that began prior to discharge from her recent hospitalization. She was hospitalized from 6/13/20 to 6/15/20 during which time ACS was ruled out and she was treated for volume overload as well as possible right lower lobe pneumonia and discharged on doxycycline. She states that her SOB has improved over time. It is positional and with exertion. The patient denies diarrhea, fever, chills and COVID contacts. In the ED, the patient was hypertensive. Labs were significant for a WBC count of 22,350, BNP Of 293 which is decreased from 1037 during her prior hospitalization and albumin of 2.6. Initial troponin was 0.073 which is down from previous elevation. Official read of a CTA chest shows a persistent right lower lobe infiltrate. Code status was discussed with the patient. She is a DNR. Her  is her surrogate medical decision maker.

## 2020-06-20 NOTE — ASSESSMENT & PLAN NOTE
-Due to GI losses, decreased appetite and diuresis.   -Hemoconcentration may be the source of the patient's leukocytosis.   -Cautious IV fluids.   -Monitor.

## 2020-06-21 PROBLEM — R11.2 NAUSEA WITH VOMITING: Status: RESOLVED | Noted: 2020-06-20 | Resolved: 2020-06-21

## 2020-06-21 PROBLEM — R51.9 HEADACHE: Status: ACTIVE | Noted: 2020-06-21

## 2020-06-21 PROBLEM — E87.1 HYPONATREMIA: Status: RESOLVED | Noted: 2020-06-20 | Resolved: 2020-06-21

## 2020-06-21 LAB
ANION GAP SERPL CALC-SCNC: 8 MMOL/L (ref 8–16)
ANISOCYTOSIS BLD QL SMEAR: SLIGHT
BASOPHILS # BLD AUTO: 0.16 K/UL (ref 0–0.2)
BASOPHILS NFR BLD: 0.8 % (ref 0–1.9)
BNP SERPL-MCNC: 489 PG/ML (ref 0–99)
BUN SERPL-MCNC: 9 MG/DL (ref 8–23)
CALCIUM SERPL-MCNC: 8.5 MG/DL (ref 8.7–10.5)
CHLORIDE SERPL-SCNC: 101 MMOL/L (ref 95–110)
CO2 SERPL-SCNC: 26 MMOL/L (ref 23–29)
CREAT SERPL-MCNC: 0.8 MG/DL (ref 0.5–1.4)
DACRYOCYTES BLD QL SMEAR: ABNORMAL
DIFFERENTIAL METHOD: ABNORMAL
EOSINOPHIL # BLD AUTO: 2.9 K/UL (ref 0–0.5)
EOSINOPHIL NFR BLD: 15.1 % (ref 0–8)
ERYTHROCYTE [DISTWIDTH] IN BLOOD BY AUTOMATED COUNT: 14.6 % (ref 11.5–14.5)
EST. GFR  (AFRICAN AMERICAN): >60 ML/MIN/1.73 M^2
EST. GFR  (NON AFRICAN AMERICAN): >60 ML/MIN/1.73 M^2
GLUCOSE SERPL-MCNC: 150 MG/DL (ref 70–110)
HCT VFR BLD AUTO: 36.6 % (ref 37–48.5)
HGB BLD-MCNC: 11.4 G/DL (ref 12–16)
IMM GRANULOCYTES # BLD AUTO: 0.7 K/UL (ref 0–0.04)
IMM GRANULOCYTES NFR BLD AUTO: 3.7 % (ref 0–0.5)
LYMPHOCYTES # BLD AUTO: 1.9 K/UL (ref 1–4.8)
LYMPHOCYTES NFR BLD: 10.2 % (ref 18–48)
MAGNESIUM SERPL-MCNC: 1.7 MG/DL (ref 1.6–2.6)
MCH RBC QN AUTO: 27.9 PG (ref 27–31)
MCHC RBC AUTO-ENTMCNC: 31.1 G/DL (ref 32–36)
MCV RBC AUTO: 90 FL (ref 82–98)
MONOCYTES # BLD AUTO: 1.4 K/UL (ref 0.3–1)
MONOCYTES NFR BLD: 7.1 % (ref 4–15)
NEUTROPHILS # BLD AUTO: 12 K/UL (ref 1.8–7.7)
NEUTROPHILS NFR BLD: 63.1 % (ref 38–73)
NRBC BLD-RTO: 0 /100 WBC
OVALOCYTES BLD QL SMEAR: ABNORMAL
PHOSPHATE SERPL-MCNC: 3.6 MG/DL (ref 2.7–4.5)
PLATELET # BLD AUTO: 403 K/UL (ref 150–350)
PLATELET BLD QL SMEAR: ABNORMAL
PMV BLD AUTO: 9.3 FL (ref 9.2–12.9)
POCT GLUCOSE: 110 MG/DL (ref 70–110)
POCT GLUCOSE: 119 MG/DL (ref 70–110)
POCT GLUCOSE: 134 MG/DL (ref 70–110)
POCT GLUCOSE: 144 MG/DL (ref 70–110)
POIKILOCYTOSIS BLD QL SMEAR: SLIGHT
POLYCHROMASIA BLD QL SMEAR: ABNORMAL
POTASSIUM SERPL-SCNC: 3.9 MMOL/L (ref 3.5–5.1)
RBC # BLD AUTO: 4.08 M/UL (ref 4–5.4)
SODIUM SERPL-SCNC: 135 MMOL/L (ref 136–145)
WBC # BLD AUTO: 19.07 K/UL (ref 3.9–12.7)

## 2020-06-21 PROCEDURE — 96376 TX/PRO/DX INJ SAME DRUG ADON: CPT

## 2020-06-21 PROCEDURE — 63600175 PHARM REV CODE 636 W HCPCS: Performed by: PHYSICIAN ASSISTANT

## 2020-06-21 PROCEDURE — 85025 COMPLETE CBC W/AUTO DIFF WBC: CPT

## 2020-06-21 PROCEDURE — 94640 AIRWAY INHALATION TREATMENT: CPT

## 2020-06-21 PROCEDURE — 84100 ASSAY OF PHOSPHORUS: CPT

## 2020-06-21 PROCEDURE — 83735 ASSAY OF MAGNESIUM: CPT

## 2020-06-21 PROCEDURE — G0378 HOSPITAL OBSERVATION PER HR: HCPCS

## 2020-06-21 PROCEDURE — 25000003 PHARM REV CODE 250: Performed by: NURSE PRACTITIONER

## 2020-06-21 PROCEDURE — 80048 BASIC METABOLIC PNL TOTAL CA: CPT

## 2020-06-21 PROCEDURE — 36415 COLL VENOUS BLD VENIPUNCTURE: CPT

## 2020-06-21 PROCEDURE — 83880 ASSAY OF NATRIURETIC PEPTIDE: CPT

## 2020-06-21 PROCEDURE — 25000242 PHARM REV CODE 250 ALT 637 W/ HCPCS: Performed by: NURSE PRACTITIONER

## 2020-06-21 PROCEDURE — 63600175 PHARM REV CODE 636 W HCPCS: Performed by: NURSE PRACTITIONER

## 2020-06-21 PROCEDURE — 25000003 PHARM REV CODE 250: Performed by: PHYSICIAN ASSISTANT

## 2020-06-21 PROCEDURE — 96372 THER/PROPH/DIAG INJ SC/IM: CPT | Mod: 59

## 2020-06-21 RX ORDER — CYANOCOBALAMIN 1000 UG/ML
1000 INJECTION, SOLUTION INTRAMUSCULAR; SUBCUTANEOUS DAILY
Status: DISCONTINUED | OUTPATIENT
Start: 2020-06-21 | End: 2020-06-22 | Stop reason: HOSPADM

## 2020-06-21 RX ORDER — BUDESONIDE 0.5 MG/2ML
0.5 INHALANT ORAL EVERY 12 HOURS
Status: DISCONTINUED | OUTPATIENT
Start: 2020-06-21 | End: 2020-06-22 | Stop reason: HOSPADM

## 2020-06-21 RX ORDER — ARFORMOTEROL TARTRATE 15 UG/2ML
15 SOLUTION RESPIRATORY (INHALATION) 2 TIMES DAILY
Status: DISCONTINUED | OUTPATIENT
Start: 2020-06-21 | End: 2020-06-22 | Stop reason: HOSPADM

## 2020-06-21 RX ORDER — ASCORBIC ACID 500 MG
1000 TABLET ORAL DAILY
Status: DISCONTINUED | OUTPATIENT
Start: 2020-06-21 | End: 2020-06-22 | Stop reason: HOSPADM

## 2020-06-21 RX ADMIN — ARFORMOTEROL TARTRATE 15 MCG: 15 SOLUTION RESPIRATORY (INHALATION) at 08:06

## 2020-06-21 RX ADMIN — CARVEDILOL 25 MG: 12.5 TABLET, FILM COATED ORAL at 08:06

## 2020-06-21 RX ADMIN — OXYCODONE HYDROCHLORIDE AND ACETAMINOPHEN 1000 MG: 500 TABLET ORAL at 11:06

## 2020-06-21 RX ADMIN — HYDROCODONE BITARTRATE AND ACETAMINOPHEN 1 TABLET: 5; 325 TABLET ORAL at 12:06

## 2020-06-21 RX ADMIN — ASPIRIN 81 MG: 81 TABLET, COATED ORAL at 09:06

## 2020-06-21 RX ADMIN — CARVEDILOL 25 MG: 12.5 TABLET, FILM COATED ORAL at 09:06

## 2020-06-21 RX ADMIN — ATORVASTATIN CALCIUM 40 MG: 40 TABLET, FILM COATED ORAL at 08:06

## 2020-06-21 RX ADMIN — BUDESONIDE 0.5 MG: 0.5 SUSPENSION RESPIRATORY (INHALATION) at 08:06

## 2020-06-21 RX ADMIN — LEVOFLOXACIN 750 MG: 750 INJECTION, SOLUTION INTRAVENOUS at 01:06

## 2020-06-21 RX ADMIN — AMLODIPINE BESYLATE 5 MG: 5 TABLET ORAL at 09:06

## 2020-06-21 RX ADMIN — HYDROCODONE BITARTRATE AND ACETAMINOPHEN 1 TABLET: 5; 325 TABLET ORAL at 06:06

## 2020-06-21 RX ADMIN — HYDROCODONE BITARTRATE AND ACETAMINOPHEN 1 TABLET: 5; 325 TABLET ORAL at 07:06

## 2020-06-21 RX ADMIN — CYANOCOBALAMIN 1000 MCG: 1000 INJECTION, SOLUTION INTRAMUSCULAR; SUBCUTANEOUS at 11:06

## 2020-06-21 RX ADMIN — ENOXAPARIN SODIUM 40 MG: 100 INJECTION SUBCUTANEOUS at 04:06

## 2020-06-21 NOTE — HOSPITAL COURSE
Patient was admitted for pneumonia under the care of hospital medicine. She was started on IV abx. Patient c/o headache X 2 days - relieved temporarily by pain medication. After discussion, patient usu drinks 6+ sodas/day - Patient given Diet coke and no more c/o headache. WBC decreasing. Will likely dc home tomorrow. 06/22: Patient with stable VS and labs. WBC continues to trend down. PO levaquin and albuterol inhaler will be delivered bedside. Patient instructed on using inhaler and verbalized understanding.

## 2020-06-21 NOTE — ASSESSMENT & PLAN NOTE
-Likely trending down from recent elevation.   -Continue ASA, statin and Coreg.   06/21:  --trending down  --ASA, statin, coreg  --denies chest pain

## 2020-06-21 NOTE — ASSESSMENT & PLAN NOTE
--pt c/o headache X 2 days, only temporarily relieved with norco  --given diet coke (caffiene), headache resolved.   --pt reports she drinks 6+ diet cokes/day

## 2020-06-21 NOTE — SUBJECTIVE & OBJECTIVE
Interval History: Patient c/o headache X 2 days - relieved temporarily by pain medication. After discussion, patient usu drinks 6+ sodas/day - Patient given Diet coke and no more c/o headache. WBC decreasing. Will likely dc home tomorrow.    Review of Systems   Constitutional: Positive for fatigue. Negative for chills and fever.   Respiratory: Negative for cough and shortness of breath.    Cardiovascular: Negative for chest pain, palpitations and leg swelling.   Gastrointestinal: Negative for abdominal pain, diarrhea, nausea and vomiting.   Genitourinary: Negative for dysuria, frequency and hematuria.   Musculoskeletal: Positive for arthralgias. Negative for back pain, myalgias and neck pain.   Skin: Negative for pallor, rash and wound.   Allergic/Immunologic: Negative.    Neurological: Positive for weakness and headaches. Negative for seizures and numbness.   Hematological: Negative.    Psychiatric/Behavioral: Negative for agitation, behavioral problems and confusion. The patient is not nervous/anxious.      Objective:     Vital Signs (Most Recent):  Temp: 99 °F (37.2 °C) (06/21/20 1716)  Pulse: 76 (06/21/20 1716)  Resp: 20 (06/21/20 1716)  BP: (!) 155/87 (06/21/20 1716)  SpO2: 96 % (06/21/20 1716) Vital Signs (24h Range):  Temp:  [97.8 °F (36.6 °C)-99 °F (37.2 °C)] 99 °F (37.2 °C)  Pulse:  [68-86] 76  Resp:  [14-20] 20  SpO2:  [92 %-96 %] 96 %  BP: (138-156)/(82-92) 155/87     Weight: 92.5 kg (203 lb 14.4 oz)  Body mass index is 35 kg/m².    Intake/Output Summary (Last 24 hours) at 6/21/2020 1801  Last data filed at 6/21/2020 0114  Gross per 24 hour   Intake 150 ml   Output --   Net 150 ml      Physical Exam  Vitals signs and nursing note reviewed.   Constitutional:       General: She is not in acute distress.     Appearance: Normal appearance. She is well-developed.   HENT:      Head: Normocephalic and atraumatic.      Mouth/Throat:      Pharynx: Oropharynx is clear.   Eyes:      General: No scleral icterus.      Extraocular Movements: Extraocular movements intact.      Conjunctiva/sclera: Conjunctivae normal.      Comments: PERRL   Neck:      Musculoskeletal: Neck supple.      Vascular: No JVD.   Cardiovascular:      Rate and Rhythm: Normal rate and regular rhythm.      Heart sounds: Normal heart sounds.   Pulmonary:      Effort: Pulmonary effort is normal.      Breath sounds: Normal breath sounds.   Abdominal:      General: Bowel sounds are normal. There is no distension.      Palpations: Abdomen is soft.      Tenderness: There is no abdominal tenderness.   Genitourinary:     Comments: deferred  Musculoskeletal: Normal range of motion.   Skin:     General: Skin is warm and dry.   Neurological:      Mental Status: She is alert and oriented to person, place, and time.   Psychiatric:         Behavior: Behavior normal.         Thought Content: Thought content normal.         Significant Labs:   Recent Lab Results       06/21/20  1649   06/21/20  1136   06/21/20  0511   06/21/20  0508   06/20/20  2115        Anion Gap     8         Aniso     Slight         Baso #     0.16         Basophil%     0.8         BNP     489  Comment:  Values of less than 100 pg/ml are consistent with non-CHF populations.         BUN, Bld     9         Calcium     8.5         Chloride     101         CO2     26         Creatinine     0.8         Differential Method     Automated         eGFR if      >60         eGFR if non      >60  Comment:  Calculation used to obtain the estimated glomerular filtration  rate (eGFR) is the CKD-EPI equation.            Eos #     2.9         Eosinophil%     15.1         Glucose     150         Gran # (ANC)     12.0         Gran%     63.1         Hematocrit     36.6         Hemoglobin     11.4         Immature Grans (Abs)     0.70  Comment:  Mild elevation in immature granulocytes is non specific and   can be seen in a variety of conditions including stress response,   acute inflammation,  trauma and pregnancy. Correlation with other   laboratory and clinical findings is essential.           Immature Granulocytes     3.7         Lymph #     1.9         Lymph%     10.2         Magnesium     1.7         MCH     27.9         MCHC     31.1         MCV     90         Mono #     1.4         Mono%     7.1         MPV     9.3         nRBC     0         Ovalocytes     Occasional         Phosphorus     3.6         Platelet Estimate     Increased         Platelets     403         POCT Glucose 110 119   144 134     Poik     Slight         Poly     Occasional         Potassium     3.9         RBC     4.08         RDW     14.6         Sodium     135         Tear Drop Cells     Occasional         WBC     19.07                            All pertinent labs within the past 24 hours have been reviewed.    Significant Imaging: I have reviewed all pertinent imaging results/findings within the past 24 hours.

## 2020-06-21 NOTE — PLAN OF CARE
Pt remains free from falls/injuries this shift. Safety precautions maintained. Pain managed with oral medications. No s/s of acute distress noted. Will continue to monitor. Chart check completed.

## 2020-06-21 NOTE — PROGRESS NOTES
[facility] Hospital Medicine  Progress Note    Patient Name: Demetrice Gaines  MRN: [id number]  Patient Class: OP- Observation   Admission Date: 6/19/2020  Length of Stay: 0 days  Attending Physician: Salvador Dale MD  Primary Care Provider: Lul Alvarez MD        Subjective:     Principal Problem:Pneumonia of right lower lobe due to infectious organism        HPI:  Demetrice Gaines is a 60 year old female with DM II, CAD and hypertension who presented to the ED with multiple complaints including fatigue and bodyaches that began several days prior to presentation. She specifically notes pain in her legs, right pelvis and headache. The patient also reports nausea, vomiting, decreased appetite, cough and shortness of breath that began prior to discharge from her recent hospitalization. She was hospitalized from 6/13/20 to 6/15/20 during which time ACS was ruled out and she was treated for volume overload as well as possible right lower lobe pneumonia and discharged on doxycycline. She states that her SOB has improved over time. It is positional and with exertion. The patient denies diarrhea, fever, chills and COVID contacts. In the ED, the patient was hypertensive. Labs were significant for a WBC count of 22,350, BNP Of 293 which is decreased from 1037 during her prior hospitalization and albumin of 2.6. Initial troponin was 0.073 which is down from previous elevation. Official read of a CTA chest shows a persistent right lower lobe infiltrate. Code status was discussed with the patient. She is a DNR. Her  is her surrogate medical decision maker.     Overview/Hospital Course:  Patient was admitted for pneumonia under the care of hospital medicine. She was started on IV abx.    Interval History: Patient c/o headache X 2 days - relieved temporarily by pain medication. After discussion, patient usu drinks 6+ sodas/day - Patient given Diet coke and no more c/o headache. WBC decreasing. Will  likely dc home tomorrow.    Review of Systems   Constitutional: Positive for fatigue. Negative for chills and fever.   Respiratory: Negative for cough and shortness of breath.    Cardiovascular: Negative for chest pain, palpitations and leg swelling.   Gastrointestinal: Negative for abdominal pain, diarrhea, nausea and vomiting.   Genitourinary: Negative for dysuria, frequency and hematuria.   Musculoskeletal: Positive for arthralgias. Negative for back pain, myalgias and neck pain.   Skin: Negative for pallor, rash and wound.   Allergic/Immunologic: Negative.    Neurological: Positive for weakness and headaches. Negative for seizures and numbness.   Hematological: Negative.    Psychiatric/Behavioral: Negative for agitation, behavioral problems and confusion. The patient is not nervous/anxious.      Objective:     Vital Signs (Most Recent):  Temp: 99 °F (37.2 °C) (06/21/20 1716)  Pulse: 76 (06/21/20 1716)  Resp: 20 (06/21/20 1716)  BP: (!) 155/87 (06/21/20 1716)  SpO2: 96 % (06/21/20 1716) Vital Signs (24h Range):  Temp:  [97.8 °F (36.6 °C)-99 °F (37.2 °C)] 99 °F (37.2 °C)  Pulse:  [68-86] 76  Resp:  [14-20] 20  SpO2:  [92 %-96 %] 96 %  BP: (138-156)/(82-92) 155/87     Weight: 92.5 kg (203 lb 14.4 oz)  Body mass index is 35 kg/m².    Intake/Output Summary (Last 24 hours) at 6/21/2020 1801  Last data filed at 6/21/2020 0114  Gross per 24 hour   Intake 150 ml   Output --   Net 150 ml      Physical Exam  Vitals signs and nursing note reviewed.   Constitutional:       General: She is not in acute distress.     Appearance: Normal appearance. She is well-developed.   HENT:      Head: Normocephalic and atraumatic.      Mouth/Throat:      Pharynx: Oropharynx is clear.   Eyes:      General: No scleral icterus.     Extraocular Movements: Extraocular movements intact.      Conjunctiva/sclera: Conjunctivae normal.      Comments: PERRL   Neck:      Musculoskeletal: Neck supple.      Vascular: No JVD.   Cardiovascular:      Rate  and Rhythm: Normal rate and regular rhythm.      Heart sounds: Normal heart sounds.   Pulmonary:      Effort: Pulmonary effort is normal.      Breath sounds: Normal breath sounds.   Abdominal:      General: Bowel sounds are normal. There is no distension.      Palpations: Abdomen is soft.      Tenderness: There is no abdominal tenderness.   Genitourinary:     Comments: deferred  Musculoskeletal: Normal range of motion.   Skin:     General: Skin is warm and dry.   Neurological:      Mental Status: She is alert and oriented to person, place, and time.   Psychiatric:         Behavior: Behavior normal.         Thought Content: Thought content normal.         Significant Labs:   Recent Lab Results       06/21/20  1649   06/21/20  1136   06/21/20  0511   06/21/20  0508   06/20/20  2115        Anion Gap     8         Aniso     Slight         Baso #     0.16         Basophil%     0.8         BNP     489  Comment:  Values of less than 100 pg/ml are consistent with non-CHF populations.         BUN, Bld     9         Calcium     8.5         Chloride     101         CO2     26         Creatinine     0.8         Differential Method     Automated         eGFR if      >60         eGFR if non      >60  Comment:  Calculation used to obtain the estimated glomerular filtration  rate (eGFR) is the CKD-EPI equation.            Eos #     2.9         Eosinophil%     15.1         Glucose     150         Gran # (ANC)     12.0         Gran%     63.1         Hematocrit     36.6         Hemoglobin     11.4         Immature Grans (Abs)     0.70  Comment:  Mild elevation in immature granulocytes is non specific and   can be seen in a variety of conditions including stress response,   acute inflammation, trauma and pregnancy. Correlation with other   laboratory and clinical findings is essential.           Immature Granulocytes     3.7         Lymph #     1.9         Lymph%     10.2         Magnesium     1.7          MCH     27.9         MCHC     31.1         MCV     90         Mono #     1.4         Mono%     7.1         MPV     9.3         nRBC     0         Ovalocytes     Occasional         Phosphorus     3.6         Platelet Estimate     Increased         Platelets     403         POCT Glucose 110 119   144 134     Poik     Slight         Poly     Occasional         Potassium     3.9         RBC     4.08         RDW     14.6         Sodium     135         Tear Drop Cells     Occasional         WBC     19.07                            All pertinent labs within the past 24 hours have been reviewed.    Significant Imaging: I have reviewed all pertinent imaging results/findings within the past 24 hours.      Assessment/Plan:      * Pneumonia of right lower lobe due to infectious organism  -Unclear if infiltrate represents a new or untreated pneumonia.   -IV antibiotics, inhaled medications and oxygen therapy as needed.   -Check sputum culture.   06/21:  --sputum cx pending  --continue IV levofloxacin  --add breathing tx: duo nebs, budesonide, brovana  --supplemental oxygen to maintain sats >92%          Headache  --pt c/o headache X 2 days, only temporarily relieved with norco  --given diet coke (caffiene), headache resolved.   --pt reports she drinks 6+ diet cokes/day      Bilateral pleural effusion  Right > Left    Severe malnutrition  -Check a prealbumin.   -Treat nausea and vomiting.       Diabetes mellitus, type II  -NISS.   -ADA diet.   -Hemoglobin A1C was 6.8 on 6/14/20.     Intravascular volume depletion  -Due to GI losses, decreased appetite and diuresis.   -Hemoconcentration may be the source of the patient's leukocytosis.   -Cautious IV fluids.   -Monitor.       Elevated troponin  -Likely trending down from recent elevation.   -Continue ASA, statin and Coreg.   06/21:  --trending down  --ASA, statin, coreg  --denies chest pain      Uncontrolled hypertension  -Continue Norvasc and Coreg.   -Hold Lasix due to volume  depletion.   -Hydralazine as needed.       VTE Risk Mitigation (From admission, onward)         Ordered     enoxaparin injection 40 mg  Every 24 hours      06/20/20 0334     Place sequential compression device  Until discontinued      06/20/20 0334                      MARC Higgins  Department of Hospital Medicine   [facility]

## 2020-06-21 NOTE — ASSESSMENT & PLAN NOTE
-Unclear if infiltrate represents a new or untreated pneumonia.   -IV antibiotics, inhaled medications and oxygen therapy as needed.   -Check sputum culture.   06/21:  --sputum cx pending  --continue IV levofloxacin  --add breathing tx: duo nebs, budesonide, brovana  --supplemental oxygen to maintain sats >92%

## 2020-06-22 VITALS
TEMPERATURE: 99 F | RESPIRATION RATE: 16 BRPM | HEIGHT: 64 IN | HEART RATE: 80 BPM | WEIGHT: 203.88 LBS | BODY MASS INDEX: 34.81 KG/M2 | SYSTOLIC BLOOD PRESSURE: 147 MMHG | OXYGEN SATURATION: 93 % | DIASTOLIC BLOOD PRESSURE: 78 MMHG

## 2020-06-22 PROBLEM — R74.8 ELEVATED LIPASE: Status: RESOLVED | Noted: 2020-06-20 | Resolved: 2020-06-22

## 2020-06-22 PROBLEM — R51.9 HEADACHE: Status: RESOLVED | Noted: 2020-06-21 | Resolved: 2020-06-22

## 2020-06-22 PROBLEM — E86.1 INTRAVASCULAR VOLUME DEPLETION: Status: RESOLVED | Noted: 2020-06-13 | Resolved: 2020-06-22

## 2020-06-22 LAB
ANION GAP SERPL CALC-SCNC: 9 MMOL/L (ref 8–16)
BASOPHILS # BLD AUTO: 0.16 K/UL (ref 0–0.2)
BASOPHILS NFR BLD: 0.9 % (ref 0–1.9)
BUN SERPL-MCNC: 6 MG/DL (ref 8–23)
CALCIUM SERPL-MCNC: 8.6 MG/DL (ref 8.7–10.5)
CHLORIDE SERPL-SCNC: 100 MMOL/L (ref 95–110)
CO2 SERPL-SCNC: 25 MMOL/L (ref 23–29)
CREAT SERPL-MCNC: 0.7 MG/DL (ref 0.5–1.4)
DIFFERENTIAL METHOD: ABNORMAL
EOSINOPHIL # BLD AUTO: 2.5 K/UL (ref 0–0.5)
EOSINOPHIL NFR BLD: 14.6 % (ref 0–8)
ERYTHROCYTE [DISTWIDTH] IN BLOOD BY AUTOMATED COUNT: 14.6 % (ref 11.5–14.5)
EST. GFR  (AFRICAN AMERICAN): >60 ML/MIN/1.73 M^2
EST. GFR  (NON AFRICAN AMERICAN): >60 ML/MIN/1.73 M^2
GLUCOSE SERPL-MCNC: 149 MG/DL (ref 70–110)
HCT VFR BLD AUTO: 39.1 % (ref 37–48.5)
HGB BLD-MCNC: 12.2 G/DL (ref 12–16)
IMM GRANULOCYTES # BLD AUTO: 0.41 K/UL (ref 0–0.04)
IMM GRANULOCYTES NFR BLD AUTO: 2.4 % (ref 0–0.5)
LIPASE SERPL-CCNC: 50 U/L (ref 4–60)
LYMPHOCYTES # BLD AUTO: 1.9 K/UL (ref 1–4.8)
LYMPHOCYTES NFR BLD: 11.4 % (ref 18–48)
MAGNESIUM SERPL-MCNC: 1.8 MG/DL (ref 1.6–2.6)
MCH RBC QN AUTO: 27.7 PG (ref 27–31)
MCHC RBC AUTO-ENTMCNC: 31.2 G/DL (ref 32–36)
MCV RBC AUTO: 89 FL (ref 82–98)
MONOCYTES # BLD AUTO: 1.4 K/UL (ref 0.3–1)
MONOCYTES NFR BLD: 8.5 % (ref 4–15)
NEUTROPHILS # BLD AUTO: 10.5 K/UL (ref 1.8–7.7)
NEUTROPHILS NFR BLD: 62.2 % (ref 38–73)
NRBC BLD-RTO: 0 /100 WBC
PHOSPHATE SERPL-MCNC: 4 MG/DL (ref 2.7–4.5)
PLATELET # BLD AUTO: 464 K/UL (ref 150–350)
PMV BLD AUTO: 9.2 FL (ref 9.2–12.9)
POCT GLUCOSE: 136 MG/DL (ref 70–110)
POCT GLUCOSE: 150 MG/DL (ref 70–110)
POTASSIUM SERPL-SCNC: 4 MMOL/L (ref 3.5–5.1)
PROCALCITONIN SERPL IA-MCNC: 0.17 NG/ML
RBC # BLD AUTO: 4.4 M/UL (ref 4–5.4)
SODIUM SERPL-SCNC: 134 MMOL/L (ref 136–145)
WBC # BLD AUTO: 16.96 K/UL (ref 3.9–12.7)

## 2020-06-22 PROCEDURE — 84145 PROCALCITONIN (PCT): CPT

## 2020-06-22 PROCEDURE — 25000242 PHARM REV CODE 250 ALT 637 W/ HCPCS: Performed by: NURSE PRACTITIONER

## 2020-06-22 PROCEDURE — G0378 HOSPITAL OBSERVATION PER HR: HCPCS

## 2020-06-22 PROCEDURE — 63600175 PHARM REV CODE 636 W HCPCS: Performed by: PHYSICIAN ASSISTANT

## 2020-06-22 PROCEDURE — 36415 COLL VENOUS BLD VENIPUNCTURE: CPT

## 2020-06-22 PROCEDURE — 83690 ASSAY OF LIPASE: CPT

## 2020-06-22 PROCEDURE — 85025 COMPLETE CBC W/AUTO DIFF WBC: CPT

## 2020-06-22 PROCEDURE — 84100 ASSAY OF PHOSPHORUS: CPT

## 2020-06-22 PROCEDURE — 63600175 PHARM REV CODE 636 W HCPCS: Performed by: NURSE PRACTITIONER

## 2020-06-22 PROCEDURE — 25000003 PHARM REV CODE 250: Performed by: NURSE PRACTITIONER

## 2020-06-22 PROCEDURE — 94640 AIRWAY INHALATION TREATMENT: CPT

## 2020-06-22 PROCEDURE — 83735 ASSAY OF MAGNESIUM: CPT

## 2020-06-22 PROCEDURE — 96376 TX/PRO/DX INJ SAME DRUG ADON: CPT

## 2020-06-22 PROCEDURE — 25000003 PHARM REV CODE 250: Performed by: PHYSICIAN ASSISTANT

## 2020-06-22 PROCEDURE — 80048 BASIC METABOLIC PNL TOTAL CA: CPT

## 2020-06-22 PROCEDURE — 96372 THER/PROPH/DIAG INJ SC/IM: CPT | Mod: 59

## 2020-06-22 PROCEDURE — 94761 N-INVAS EAR/PLS OXIMETRY MLT: CPT

## 2020-06-22 RX ORDER — LEVOFLOXACIN 750 MG/1
750 TABLET ORAL DAILY
Qty: 3 TABLET | Refills: 0 | Status: SHIPPED | OUTPATIENT
Start: 2020-06-22 | End: 2020-09-21

## 2020-06-22 RX ORDER — ALBUTEROL SULFATE 90 UG/1
2 AEROSOL, METERED RESPIRATORY (INHALATION) EVERY 6 HOURS PRN
Qty: 8.5 G | Refills: 0 | Status: SHIPPED | OUTPATIENT
Start: 2020-06-22 | End: 2020-11-16

## 2020-06-22 RX ADMIN — LEVOFLOXACIN 750 MG: 750 INJECTION, SOLUTION INTRAVENOUS at 03:06

## 2020-06-22 RX ADMIN — ASPIRIN 81 MG: 81 TABLET, COATED ORAL at 08:06

## 2020-06-22 RX ADMIN — ARFORMOTEROL TARTRATE 15 MCG: 15 SOLUTION RESPIRATORY (INHALATION) at 07:06

## 2020-06-22 RX ADMIN — AMLODIPINE BESYLATE 5 MG: 5 TABLET ORAL at 08:06

## 2020-06-22 RX ADMIN — OXYCODONE HYDROCHLORIDE AND ACETAMINOPHEN 1000 MG: 500 TABLET ORAL at 08:06

## 2020-06-22 RX ADMIN — CYANOCOBALAMIN 1000 MCG: 1000 INJECTION, SOLUTION INTRAMUSCULAR; SUBCUTANEOUS at 08:06

## 2020-06-22 RX ADMIN — CARVEDILOL 25 MG: 12.5 TABLET, FILM COATED ORAL at 08:06

## 2020-06-22 RX ADMIN — BUDESONIDE 0.5 MG: 0.5 SUSPENSION RESPIRATORY (INHALATION) at 07:06

## 2020-06-22 NOTE — DISCHARGE SUMMARY
[facility] Hospital Medicine  Discharge Summary      Patient Name: Demetrice Gaines  MRN: [id number]  Admission Date: 6/19/2020  Hospital Length of Stay: 0 days  Discharge Date and Time:  06/22/2020 12:10 PM  Attending Physician: Salvador Dale MD   Discharging Provider: MARC Higgins  Primary Care Provider: Lul Alvarez MD      HPI:   Demetrice Gaines is a 60 year old female with DM II, CAD and hypertension who presented to the ED with multiple complaints including fatigue and bodyaches that began several days prior to presentation. She specifically notes pain in her legs, right pelvis and headache. The patient also reports nausea, vomiting, decreased appetite, cough and shortness of breath that began prior to discharge from her recent hospitalization. She was hospitalized from 6/13/20 to 6/15/20 during which time ACS was ruled out and she was treated for volume overload as well as possible right lower lobe pneumonia and discharged on doxycycline. She states that her SOB has improved over time. It is positional and with exertion. The patient denies diarrhea, fever, chills and COVID contacts. In the ED, the patient was hypertensive. Labs were significant for a WBC count of 22,350, BNP Of 293 which is decreased from 1037 during her prior hospitalization and albumin of 2.6. Initial troponin was 0.073 which is down from previous elevation. Official read of a CTA chest shows a persistent right lower lobe infiltrate. Code status was discussed with the patient. She is a DNR. Her  is her surrogate medical decision maker.     * No surgery found *      Hospital Course:   Patient was admitted for pneumonia under the care of hospital medicine. She was started on IV abx. Patient c/o headache X 2 days - relieved temporarily by pain medication. After discussion, patient usu drinks 6+ sodas/day - Patient given Diet coke and no more c/o headache. WBC decreasing. Will likely dc home tomorrow.  06/22: Patient with stable VS and labs. WBC continues to trend down. PO levaquin and albuterol inhaler will be delivered bedside. Patient instructed on using inhaler and verbalized understanding.     Consults:   Consults (From admission, onward)        Status Ordering Provider     Inpatient consult to Registered Dietitian/Nutritionist  Once     Provider:  (Not yet assigned)    Completed GURPREET SELLERS          No new Assessment & Plan notes have been filed under this hospital service since the last note was generated.  Service: Hospital Medicine    Final Active Diagnoses:    Diagnosis Date Noted POA    PRINCIPAL PROBLEM:  Pneumonia of right lower lobe due to infectious organism [J18.1] 06/20/2020 Yes    Severe malnutrition [E43] 06/20/2020 Yes    Bilateral pleural effusion [J90] 06/20/2020 Yes    Cardiomegaly [I51.7] 06/20/2020 Yes    CAD (coronary artery disease) [I25.10] 06/20/2020 Yes    Diabetes mellitus, type II [E11.9] 06/13/2020 Yes    Elevated troponin [R79.89] 06/13/2020 Yes    Uncontrolled hypertension [I10] 02/13/2017 Yes      Problems Resolved During this Admission:    Diagnosis Date Noted Date Resolved POA    Headache [R51] 06/21/2020 06/22/2020 Yes    Nausea with vomiting [R11.2] 06/20/2020 06/21/2020 Yes    Hyponatremia [E87.1] 06/20/2020 06/21/2020 Yes    Elevated lipase [R74.8] 06/20/2020 06/22/2020 Yes    Intravascular volume depletion [E86.1] 06/13/2020 06/22/2020 Yes       Discharged Condition: stable    Disposition: Home or Self Care    Follow Up:  Follow-up Information     Lul Alvarez MD In 1 week.    Specialty: Family Medicine  Why: hospital follow up  Contact information:  [address]  [place] [address] [phone]                 Patient Instructions:      Diet Cardiac     Notify your health care provider if you experience any of the following:  temperature >100.4     Notify your health care provider if you experience any of the following:  severe  uncontrolled pain     Notify your health care provider if you experience any of the following:  difficulty breathing or increased cough     Activity as tolerated       Significant Diagnostic Studies: Labs:   BMP:   Recent Labs   Lab 06/21/20  0511 06/22/20  0539   * 149*   * 134*   K 3.9 4.0    100   CO2 26 25   BUN 9 6*   CREATININE 0.8 0.7   CALCIUM 8.5* 8.6*   MG 1.7 1.8   , CMP   Recent Labs   Lab 06/21/20  0511 06/22/20  0539   * 134*   K 3.9 4.0    100   CO2 26 25   * 149*   BUN 9 6*   CREATININE 0.8 0.7   CALCIUM 8.5* 8.6*   ANIONGAP 8 9   ESTGFRAFRICA >60 >60   EGFRNONAA >60 >60   , CBC   Recent Labs   Lab 06/21/20  0511 06/22/20  0540   WBC 19.07* 16.96*   HGB 11.4* 12.2   HCT 36.6* 39.1   * 464*    and All labs within the past 24 hours have been reviewed    Pending Diagnostic Studies:     None         Medications:  Reconciled Home Medications:      Medication List      START taking these medications    albuterol 90 mcg/actuation inhaler  Commonly known as: PROVENTIL HFA  Inhale 2 puffs into the lungs every 6 (six) hours as needed for Wheezing. Rescue     levoFLOXacin 750 MG tablet  Commonly known as: LEVAQUIN  Take 1 tablet (750 mg total) by mouth once daily.        CHANGE how you take these medications    aspirin 81 MG EC tablet  Commonly known as: ECOTRIN  Take 1 tablet (81 mg total) by mouth once daily.  What changed: Another medication with the same name was removed. Continue taking this medication, and follow the directions you see here.        CONTINUE taking these medications    amLODIPine 5 MG tablet  Commonly known as: NORVASC  Take 1 tablet (5 mg total) by mouth once daily.     atorvastatin 40 MG tablet  Commonly known as: LIPITOR  Take 1 tablet (40 mg total) by mouth every evening.     butalbital-acetaminophen-caffeine -40 mg -40 mg per tablet  Commonly known as: FIORICET, ESGIC  Take 1 tablet by mouth every 6 (six) hours as needed for  Headaches.     carvediloL 25 MG tablet  Commonly known as: COREG  Take 1 tablet (25 mg total) by mouth 2 (two) times daily.     furosemide 20 MG tablet  Commonly known as: LASIX  Take 1 tablet (20 mg total) by mouth every Mon, Wed, Fri.     lancets Misc  Check blood sugar once daily.        STOP taking these medications    doxycycline 100 MG Cap  Commonly known as: VIBRAMYCIN            Indwelling Lines/Drains at time of discharge:   Lines/Drains/Airways     None                 Time spent on the discharge of patient: 50 minutes  Patient was seen and examined on the date of discharge and determined to be suitable for discharge.         CHEVY Higgins-C  Department of Hospital Medicine  [facility]

## 2020-06-22 NOTE — SUBJECTIVE & OBJECTIVE
Interval History: Patient to [place] home today    Review of Systems   Constitutional: Positive for fatigue. Negative for chills and fever.   Respiratory: Negative for cough and shortness of breath.    Cardiovascular: Negative for chest pain, palpitations and leg swelling.   Gastrointestinal: Negative for abdominal pain, diarrhea, nausea and vomiting.   Genitourinary: Negative for dysuria, frequency and hematuria.   Musculoskeletal: Positive for arthralgias. Negative for back pain, myalgias and neck pain.   Skin: Negative for pallor, rash and wound.   Allergic/Immunologic: Negative.    Neurological: Positive for weakness and headaches. Negative for seizures and numbness.   Hematological: Negative.    Psychiatric/Behavioral: Negative for agitation, behavioral problems and confusion. The patient is not nervous/anxious.      Objective:     Vital Signs (Most Recent):  Temp: 99.1 °F (37.3 °C) (06/22/20 0717)  Pulse: 80 (06/22/20 0717)  Resp: 16 (06/22/20 0717)  BP: (!) 147/78 (06/22/20 0717)  SpO2: (!) 93 % (06/22/20 0717) Vital Signs (24h Range):  Temp:  [97.8 °F (36.6 °C)-99.9 °F (37.7 °C)] 99.1 °F (37.3 °C)  Pulse:  [68-84] 80  Resp:  [16-20] 16  SpO2:  [92 %-96 %] 93 %  BP: (140-160)/(77-91) 147/78     Weight: 92.5 kg (203 lb 14.4 oz)  Body mass index is 35 kg/m².    Intake/Output Summary (Last 24 hours) at 6/22/2020 0843  Last data filed at 6/22/2020 0328  Gross per 24 hour   Intake 150 ml   Output --   Net 150 ml      Physical Exam  Vitals signs and nursing note reviewed.   Constitutional:       General: She is not in acute distress.     Appearance: Normal appearance. She is well-developed.   HENT:      Head: Normocephalic and atraumatic.      Mouth/Throat:      Pharynx: Oropharynx is clear.   Eyes:      General: No scleral icterus.     Extraocular Movements: Extraocular movements intact.      Conjunctiva/sclera: Conjunctivae normal.      Comments: PERRL   Neck:      Musculoskeletal: Neck supple.      Vascular: No JVD.    Cardiovascular:      Rate and Rhythm: Normal rate and regular rhythm.      Heart sounds: Normal heart sounds.   Pulmonary:      Effort: Pulmonary effort is normal.      Breath sounds: Normal breath sounds.   Abdominal:      General: Bowel sounds are normal. There is no distension.      Palpations: Abdomen is soft.      Tenderness: There is no abdominal tenderness.   Genitourinary:     Comments: deferred  Musculoskeletal: Normal range of motion.   Skin:     General: Skin is warm and dry.   Neurological:      Mental Status: She is alert and oriented to person, place, and time.   Psychiatric:         Behavior: Behavior normal.         Thought Content: Thought content normal.         Significant Labs:   Recent Lab Results       06/22/20  1113   06/22/20  0911   06/22/20  0540   06/22/20  0539   06/22/20  0517        Procalcitonin   0.17  Comment:  A concentration < 0.25 ng/mL represents a low risk bacterial   infection.  Procalcitonin may not be accurate among patients with localized   infection, recent trauma or major surgery, immunosuppressed state,   invasive fungal infection, renal dysfunction. Decisions regarding   initiation or continuation of antibiotic therapy should not be based   solely on procalcitonin levels.             Anion Gap       9       Baso #     0.16         Basophil%     0.9         BUN, Bld       6       Calcium       8.6       Chloride       100       CO2       25       Creatinine       0.7       Differential Method     Automated         eGFR if        >60       eGFR if non        >60  Comment:  Calculation used to obtain the estimated glomerular filtration  rate (eGFR) is the CKD-EPI equation.          Eos #     2.5         Eosinophil%     14.6         Glucose       149       Gran # (ANC)     10.5         Gran%     62.2         Hematocrit     39.1         Hemoglobin     12.2         Immature Grans (Abs)     0.41  Comment:  Mild elevation in immature  granulocytes is non specific and   can be seen in a variety of conditions including stress response,   acute inflammation, trauma and pregnancy. Correlation with other   laboratory and clinical findings is essential.           Immature Granulocytes     2.4         Lipase       50       Lymph #     1.9         Lymph%     11.4         Magnesium       1.8       MCH     27.7         MCHC     31.2         MCV     89         Mono #     1.4         Mono%     8.5         MPV     9.2         nRBC     0         Phosphorus       4.0       Platelets     464         POCT Glucose 136       150     Potassium       4.0       RBC     4.40         RDW     14.6         Sodium       134       WBC     16.96                          06/21/20 2004 06/21/20  1649        Procalcitonin         Anion Gap         Baso #         Basophil%         BUN, Bld         Calcium         Chloride         CO2         Creatinine         Differential Method         eGFR if          eGFR if non          Eos #         Eosinophil%         Glucose         Gran # (ANC)         Gran%         Hematocrit         Hemoglobin         Immature Grans (Abs)         Immature Granulocytes         Lipase         Lymph #         Lymph%         Magnesium         MCH         MCHC         MCV         Mono #         Mono%         MPV         nRBC         Phosphorus         Platelets         POCT Glucose 134 110     Potassium         RBC         RDW         Sodium         WBC             All pertinent labs within the past 24 hours have been reviewed.    Significant Imaging: I have reviewed all pertinent imaging results/findings within the past 24 hours.

## 2020-06-22 NOTE — PLAN OF CARE
No acute events overnight. Pt denies SOB. On room air tolerating well. Afebrile. Continues to c/o HA with adequate relief from prn medication. IV abx infused as ordered. NSR on the monitor. Ulices WDL. Chart reviewed. Will monitor.

## 2020-06-25 LAB
BACTERIA BLD CULT: NORMAL
BACTERIA BLD CULT: NORMAL

## 2020-06-26 NOTE — TELEPHONE ENCOUNTER
Since message, patient was readmitted / evaluated in the hospital.    Please follow-up with her and see how she is doing.

## 2020-07-02 ENCOUNTER — TELEPHONE (OUTPATIENT)
Dept: CARDIOLOGY | Facility: CLINIC | Age: 60
End: 2020-07-02

## 2020-07-09 ENCOUNTER — TELEPHONE (OUTPATIENT)
Dept: OPHTHALMOLOGY | Facility: CLINIC | Age: 60
End: 2020-07-09

## 2020-07-09 ENCOUNTER — PATIENT OUTREACH (OUTPATIENT)
Dept: ADMINISTRATIVE | Facility: HOSPITAL | Age: 60
End: 2020-07-09

## 2020-07-09 DIAGNOSIS — I10 UNCONTROLLED HYPERTENSION: Primary | ICD-10-CM

## 2020-07-09 NOTE — PROGRESS NOTES
Pre Visit Chart Audit Complete: Spoke with pt regarding overdue HM.     Health Maintenance: Updated  Immunizations: Reconciled  Care Everywhere: Abstracted  [facility] Search:No New Results    Health Maintenance Due   Topic    Foot Exam  DUE    Urine Microalbumin DUE    Pneumococcal Vaccine (Medium Risk) (1 of 1 - PPSV23)    Mammogram IAN Needed    Shingles Vaccine (1 of 2)    Eye Exam Requested to be scheduled       IAN: Pt thinks she had Mammogram done within the last year. Is unsure where but will bring info with her to her appt.    Care Team: Updated   Digital Medicine:Not Indicated   OPCM: Not idnicated   AWV: Declined, needs appts on same day in afternoon. Has 2 appts already scheduled. She will defer for now.    CC Note: Updated   Eye Exam: Pt wants to be scheduled 2pm 7/23 with Dr. Nielsen. Unable to schedule d/t private slot, called ext [phone], no answer. Sent message for staff to assist with scheduling.

## 2020-07-09 NOTE — TELEPHONE ENCOUNTER
Left VM stating that she is scheduled with  at 2 on 7/23 and to please arrive on time so she can be on time for her next appt at 2:40

## 2020-07-09 NOTE — TELEPHONE ENCOUNTER
----- Message from Donna Smith LPN sent at 7/9/2020 11:46 AM CDT -----  Good Morning, Can you assist pt in scheduling Diabetic eye exam with Dr. Ellison? Pt requesting 7/23/2020 @ 2pm. D/T transportation she is requesting appts on same day. She has 2 appts scheduled that day already. Thank You

## 2020-07-09 NOTE — Clinical Note
Good Morning,     Can you assist pt in scheduling Diabetic eye exam with Dr. Ellison? Pt requesting 7/23/2020 @ 2pm. D/T transportation she is requesting appts on same day. She has 2 appts scheduled that day already.     Thank You

## 2020-07-10 RX ORDER — BUTALBITAL, ACETAMINOPHEN AND CAFFEINE 50; 325; 40 MG/1; MG/1; MG/1
1 TABLET ORAL EVERY 6 HOURS PRN
Qty: 20 TABLET | Refills: 0 | Status: SHIPPED | OUTPATIENT
Start: 2020-07-10 | End: 2020-08-09

## 2020-07-17 DIAGNOSIS — E11.9 TYPE 2 DIABETES MELLITUS WITHOUT COMPLICATION: ICD-10-CM

## 2020-07-23 ENCOUNTER — OFFICE VISIT (OUTPATIENT)
Dept: CARDIOLOGY | Facility: CLINIC | Age: 60
End: 2020-07-23
Payer: MEDICARE

## 2020-07-23 VITALS
DIASTOLIC BLOOD PRESSURE: 82 MMHG | BODY MASS INDEX: 32.2 KG/M2 | SYSTOLIC BLOOD PRESSURE: 142 MMHG | HEART RATE: 90 BPM | WEIGHT: 187.63 LBS | OXYGEN SATURATION: 97 %

## 2020-07-23 DIAGNOSIS — I10 UNCONTROLLED HYPERTENSION: ICD-10-CM

## 2020-07-23 DIAGNOSIS — I25.10 CORONARY ARTERY DISEASE, ANGINA PRESENCE UNSPECIFIED, UNSPECIFIED VESSEL OR LESION TYPE, UNSPECIFIED WHETHER NATIVE OR TRANSPLANTED HEART: ICD-10-CM

## 2020-07-23 DIAGNOSIS — R79.89 ELEVATED TROPONIN: Primary | ICD-10-CM

## 2020-07-23 DIAGNOSIS — I50.32 CHRONIC DIASTOLIC CONGESTIVE HEART FAILURE: ICD-10-CM

## 2020-07-23 PROCEDURE — 3077F PR MOST RECENT SYSTOLIC BLOOD PRESSURE >= 140 MM HG: ICD-10-PCS | Mod: CPTII,S$GLB,, | Performed by: INTERNAL MEDICINE

## 2020-07-23 PROCEDURE — 99999 PR PBB SHADOW E&M-EST. PATIENT-LVL III: ICD-10-PCS | Mod: PBBFAC,,, | Performed by: INTERNAL MEDICINE

## 2020-07-23 PROCEDURE — 3008F BODY MASS INDEX DOCD: CPT | Mod: CPTII,S$GLB,, | Performed by: INTERNAL MEDICINE

## 2020-07-23 PROCEDURE — 99499 UNLISTED E&M SERVICE: CPT | Mod: S$GLB,,, | Performed by: INTERNAL MEDICINE

## 2020-07-23 PROCEDURE — 3079F DIAST BP 80-89 MM HG: CPT | Mod: CPTII,S$GLB,, | Performed by: INTERNAL MEDICINE

## 2020-07-23 PROCEDURE — 99499 RISK ADDL DX/OHS AUDIT: ICD-10-PCS | Mod: S$GLB,,, | Performed by: INTERNAL MEDICINE

## 2020-07-23 PROCEDURE — 99215 OFFICE O/P EST HI 40 MIN: CPT | Mod: S$GLB,,, | Performed by: INTERNAL MEDICINE

## 2020-07-23 PROCEDURE — 99999 PR PBB SHADOW E&M-EST. PATIENT-LVL III: CPT | Mod: PBBFAC,,, | Performed by: INTERNAL MEDICINE

## 2020-07-23 PROCEDURE — 3077F SYST BP >= 140 MM HG: CPT | Mod: CPTII,S$GLB,, | Performed by: INTERNAL MEDICINE

## 2020-07-23 PROCEDURE — 3008F PR BODY MASS INDEX (BMI) DOCUMENTED: ICD-10-PCS | Mod: CPTII,S$GLB,, | Performed by: INTERNAL MEDICINE

## 2020-07-23 PROCEDURE — 99215 PR OFFICE/OUTPT VISIT, EST, LEVL V, 40-54 MIN: ICD-10-PCS | Mod: S$GLB,,, | Performed by: INTERNAL MEDICINE

## 2020-07-23 PROCEDURE — 3079F PR MOST RECENT DIASTOLIC BLOOD PRESSURE 80-89 MM HG: ICD-10-PCS | Mod: CPTII,S$GLB,, | Performed by: INTERNAL MEDICINE

## 2020-07-23 RX ORDER — AMLODIPINE BESYLATE 10 MG/1
10 TABLET ORAL DAILY
Qty: 30 TABLET | Refills: 5 | Status: SHIPPED | OUTPATIENT
Start: 2020-07-23 | End: 2020-11-16 | Stop reason: SDUPTHER

## 2020-07-23 NOTE — PROGRESS NOTES
Subjective:   Patient ID:  Demetrice Gaines is a 60 y.o. female who presents for follow up of No chief complaint on file.        59 yo female, came in for hospitalization f/u  PMH DM type II, history of prior MI s/p C which showed normal coronaries, and HTN.    admitted for weakness and palpitation. BNP > 1,000, troponin of 1.804, creatinine of 1.5, and Tbili of 3.6. CTA of chest negative for PE.Stress test in  which was negative for ischemia/injury. Echo  showed normal EF. COVID  negative. Rx as CHF exacerbation and demand ischemia.  Today states that has had chest pain twice after discharged in . Occurred at rest  Lasted seconds. Dull and pressure. No radiating pain. No exercise related pain. NO SOB palpitation and orthopnea  No smoking/dronking  BP A1c LDL controlled        Past Medical History:   Diagnosis Date    CAD (coronary artery disease)     Colon polyp     Diabetes mellitus type I     Hypertension     Hyponatremia 2020       Past Surgical History:   Procedure Laterality Date    Benign Cyst Right      SECTION      COLONOSCOPY      COLONOSCOPY N/A 2019    Procedure: COLONOSCOPY;  Surgeon: Ileana Holcomb MD;  [facility];  Service: Endoscopy;  Laterality: N/A;       Social History     Tobacco Use    Smoking status: Never Smoker    Smokeless tobacco: Never Used   Substance Use Topics    Alcohol use: Never     Frequency: Never    Drug use: Never       Family History   Problem Relation Age of Onset    Cancer Mother     Pacemaker/defibrilator Mother     Diabetes Father     Hypertension Sister     Hypertension Brother     Cancer Paternal Grandmother          Review of Systems   Constitution: Negative for decreased appetite, diaphoresis, fever, malaise/fatigue and night sweats.   HENT: Negative for nosebleeds.    Eyes: Negative for blurred vision and double vision.   Cardiovascular: Positive for dyspnea on exertion. Negative for chest  pain, claudication, irregular heartbeat, leg swelling, near-syncope, orthopnea, palpitations, paroxysmal nocturnal dyspnea and syncope.   Respiratory: Negative for cough, shortness of breath, sleep disturbances due to breathing, snoring, sputum production and wheezing.    Endocrine: Negative for cold intolerance and polyuria.   Hematologic/Lymphatic: Does not bruise/bleed easily.   Skin: Negative for rash.   Musculoskeletal: Negative for back pain, falls, joint pain, joint swelling and neck pain.   Gastrointestinal: Negative for abdominal pain, heartburn, nausea and vomiting.   Genitourinary: Negative for dysuria, frequency and hematuria.   Neurological: Negative for difficulty with concentration, dizziness, focal weakness, headaches, light-headedness, numbness, seizures and weakness.   Psychiatric/Behavioral: Negative for depression, memory loss and substance abuse. The patient does not have insomnia.    Allergic/Immunologic: Negative for HIV exposure and hives.       Objective:   Physical Exam   Constitutional: She is oriented to person, place, and time. She appears well-nourished.   HENT:   Head: Normocephalic.   Eyes: Pupils are equal, round, and reactive to light.   Neck: Normal carotid pulses and no JVD present. Carotid bruit is not present. No thyromegaly present.   Cardiovascular: Normal rate, regular rhythm, normal heart sounds and normal pulses.  No extrasystoles are present. PMI is not displaced. Exam reveals no gallop and no S3.   No murmur heard.  Pulmonary/Chest: Breath sounds normal. No stridor. No respiratory distress.   Abdominal: Soft. Bowel sounds are normal. There is no abdominal tenderness. There is no rebound.   Musculoskeletal: Normal range of motion.   Neurological: She is alert and oriented to person, place, and time.   Skin: Skin is intact. No rash noted.   Psychiatric: Her behavior is normal.       Lab Results   Component Value Date    CHOL 119 (L) 06/14/2020    CHOL 196 12/05/2019    CHOL  192 08/22/2005     Lab Results   Component Value Date    HDL 15 (L) 06/14/2020    HDL 46 12/05/2019    HDL 41.0 08/22/2005     Lab Results   Component Value Date    LDLCALC 73.0 06/14/2020    LDLCALC 126.8 12/05/2019    LDLCALC 110.0 08/22/2005     Lab Results   Component Value Date    TRIG 155 (H) 06/14/2020    TRIG 116 12/05/2019    TRIG 205 (H) 08/22/2005     Lab Results   Component Value Date    CHOLHDL 12.6 (L) 06/14/2020    CHOLHDL 23.5 12/05/2019    CHOLHDL 21.4 08/22/2005       Chemistry        Component Value Date/Time     (L) 06/22/2020 0539    K 4.0 06/22/2020 0539     06/22/2020 0539    CO2 25 06/22/2020 0539    BUN 6 (L) 06/22/2020 0539    CREATININE 0.7 06/22/2020 0539     (H) 06/22/2020 0539        Component Value Date/Time    CALCIUM 8.6 (L) 06/22/2020 0539    ALKPHOS 175 (H) 06/19/2020 2229    AST 42 (H) 06/19/2020 2229    ALT 39 06/19/2020 2229    BILITOT 0.6 06/19/2020 2229    ESTGFRAFRICA >60 06/22/2020 0539    EGFRNONAA >60 06/22/2020 0539          Lab Results   Component Value Date    HGBA1C 6.8 (H) 06/14/2020     Lab Results   Component Value Date    TSH 0.818 06/13/2020     Lab Results   Component Value Date    INR 1.3 (H) 06/13/2020     Lab Results   Component Value Date    WBC 16.96 (H) 06/22/2020    HGB 12.2 06/22/2020    HCT 39.1 06/22/2020    MCV 89 06/22/2020     (H) 06/22/2020     BMP  Sodium   Date Value Ref Range Status   06/22/2020 134 (L) 136 - 145 mmol/L Final     Potassium   Date Value Ref Range Status   06/22/2020 4.0 3.5 - 5.1 mmol/L Final     Chloride   Date Value Ref Range Status   06/22/2020 100 95 - 110 mmol/L Final     CO2   Date Value Ref Range Status   06/22/2020 25 23 - 29 mmol/L Final     BUN, Bld   Date Value Ref Range Status   06/22/2020 6 (L) 8 - 23 mg/dL Final     Creatinine   Date Value Ref Range Status   06/22/2020 0.7 0.5 - 1.4 mg/dL Final     Calcium   Date Value Ref Range Status   06/22/2020 8.6 (L) 8.7 - 10.5 mg/dL Final     Anion  Gap   Date Value Ref Range Status   06/22/2020 9 8 - 16 mmol/L Final     eGFR if    Date Value Ref Range Status   06/22/2020 >60 >60 mL/min/1.73 m^2 Final     eGFR if non    Date Value Ref Range Status   06/22/2020 >60 >60 mL/min/1.73 m^2 Final     Comment:     Calculation used to obtain the estimated glomerular filtration  rate (eGFR) is the CKD-EPI equation.        BNP  @LABRCNTIP(BNP,BNPTRIAGEBLO)@  @LABRCNTIP(troponini)@  CrCl cannot be calculated (Patient's most recent lab result is older than the maximum 7 days allowed.).  No results found in the last 24 hours.  No results found in the last 24 hours.  No results found in the last 24 hours.    Assessment:      1. Elevated troponin    2. Chronic diastolic congestive heart failure    3. Coronary artery disease, angina presence unspecified, unspecified vessel or lesion type, unspecified whether native or transplanted heart    4. Uncontrolled hypertension        Plan:   Treadmill stress ekg ordered  Ok  to d/c lipitor  Increase Amlodpine to 10 mg for HTN  Continue ASA Coreg and lasix as needed  Counseled DASH  Check Lipid profile in 6 months  Recommend heart-healthy diet, weight control and regular exercise.  Fabiola. Risk modification.   RTC in 6 months    I have reviewed all pertinent labs and cardiac studies independently. Plans and recommendations have been formulated under my direct supervision. All questions answered and patient voiced understanding.   If symptoms persist go to the ED

## 2020-07-27 ENCOUNTER — PATIENT MESSAGE (OUTPATIENT)
Dept: INTERNAL MEDICINE | Facility: CLINIC | Age: 60
End: 2020-07-27

## 2020-07-27 ENCOUNTER — HOSPITAL ENCOUNTER (OUTPATIENT)
Dept: CARDIOLOGY | Facility: HOSPITAL | Age: 60
Discharge: HOME | End: 2020-07-27
Attending: INTERNAL MEDICINE
Payer: MEDICARE

## 2020-07-27 DIAGNOSIS — R79.89 ELEVATED TROPONIN: ICD-10-CM

## 2020-07-27 DIAGNOSIS — I25.10 CORONARY ARTERY DISEASE, ANGINA PRESENCE UNSPECIFIED, UNSPECIFIED VESSEL OR LESION TYPE, UNSPECIFIED WHETHER NATIVE OR TRANSPLANTED HEART: ICD-10-CM

## 2020-07-27 LAB
CV STRESS BASE HR: 87 BPM
DIASTOLIC BLOOD PRESSURE: 87 MMHG
OHS CV CPX 1 MINUTE RECOVERY HEART RATE: 137 BPM
OHS CV CPX 85 PERCENT MAX PREDICTED HEART RATE MALE: 129
OHS CV CPX ESTIMATED METS: 7
OHS CV CPX MAX PREDICTED HEART RATE: 151
OHS CV CPX PATIENT IS FEMALE: 1
OHS CV CPX PATIENT IS MALE: 0
OHS CV CPX PEAK DIASTOLIC BLOOD PRESSURE: 116 MMHG
OHS CV CPX PEAK HEAR RATE: 146 BPM
OHS CV CPX PEAK RATE PRESSURE PRODUCT: NORMAL
OHS CV CPX PEAK SYSTOLIC BLOOD PRESSURE: 209 MMHG
OHS CV CPX PERCENT MAX PREDICTED HEART RATE ACHIEVED: 96
OHS CV CPX RATE PRESSURE PRODUCT PRESENTING: NORMAL
STRESS ECHO POST EXERCISE DUR MIN: 6 MINUTES
STRESS ECHO POST EXERCISE DUR SEC: 0 SECONDS
SYSTOLIC BLOOD PRESSURE: 122 MMHG

## 2020-07-27 PROCEDURE — 93018 CV STRESS TEST I&R ONLY: CPT | Mod: ,,, | Performed by: INTERNAL MEDICINE

## 2020-07-27 PROCEDURE — 93018 EXERCISE STRESS - EKG (CUPID ONLY): ICD-10-PCS | Mod: ,,, | Performed by: INTERNAL MEDICINE

## 2020-07-27 PROCEDURE — 93017 CV STRESS TEST TRACING ONLY: CPT

## 2020-07-27 PROCEDURE — 93016 EXERCISE STRESS - EKG (CUPID ONLY): ICD-10-PCS | Mod: ,,, | Performed by: INTERNAL MEDICINE

## 2020-07-27 PROCEDURE — 93016 CV STRESS TEST SUPVJ ONLY: CPT | Mod: ,,, | Performed by: INTERNAL MEDICINE

## 2020-07-28 ENCOUNTER — TELEPHONE (OUTPATIENT)
Dept: CARDIOLOGY | Facility: CLINIC | Age: 60
End: 2020-07-28

## 2020-07-28 NOTE — TELEPHONE ENCOUNTER
Patient contact, left voicemail to return the call to the office.  Stress test showed no ischemia

## 2020-09-17 ENCOUNTER — PATIENT OUTREACH (OUTPATIENT)
Dept: ADMINISTRATIVE | Facility: HOSPITAL | Age: 60
End: 2020-09-17

## 2020-09-17 NOTE — PROGRESS NOTES
I have contacted patient to schedule awv. Virtual appointment scheduled 9-21-20 with Stefany Polo.

## 2020-09-21 ENCOUNTER — TELEPHONE (OUTPATIENT)
Dept: INTERNAL MEDICINE | Facility: CLINIC | Age: 60
End: 2020-09-21

## 2020-09-21 ENCOUNTER — OFFICE VISIT (OUTPATIENT)
Dept: INTERNAL MEDICINE | Facility: CLINIC | Age: 60
End: 2020-09-21
Payer: MEDICARE

## 2020-09-21 ENCOUNTER — PATIENT MESSAGE (OUTPATIENT)
Dept: INTERNAL MEDICINE | Facility: CLINIC | Age: 60
End: 2020-09-21

## 2020-09-21 DIAGNOSIS — K04.7 DENTAL ABSCESS: Primary | ICD-10-CM

## 2020-09-21 DIAGNOSIS — I50.32 CHRONIC DIASTOLIC CONGESTIVE HEART FAILURE: ICD-10-CM

## 2020-09-21 DIAGNOSIS — Z12.31 ENCOUNTER FOR SCREENING MAMMOGRAM FOR MALIGNANT NEOPLASM OF BREAST: ICD-10-CM

## 2020-09-21 DIAGNOSIS — E11.9 TYPE 2 DIABETES MELLITUS WITHOUT COMPLICATION, WITHOUT LONG-TERM CURRENT USE OF INSULIN: ICD-10-CM

## 2020-09-21 DIAGNOSIS — Z12.39 SCREENING FOR BREAST CANCER: ICD-10-CM

## 2020-09-21 PROBLEM — J18.9 PNEUMONIA OF RIGHT LOWER LOBE DUE TO INFECTIOUS ORGANISM: Status: RESOLVED | Noted: 2020-06-20 | Resolved: 2020-09-21

## 2020-09-21 PROBLEM — J90 BILATERAL PLEURAL EFFUSION: Status: RESOLVED | Noted: 2020-06-20 | Resolved: 2020-09-21

## 2020-09-21 PROCEDURE — 99442 PR PHYSICIAN TELEPHONE EVALUATION 11-20 MIN: CPT | Mod: 95,,, | Performed by: PHYSICIAN ASSISTANT

## 2020-09-21 PROCEDURE — 99442 PR PHYSICIAN TELEPHONE EVALUATION 11-20 MIN: ICD-10-PCS | Mod: 95,,, | Performed by: PHYSICIAN ASSISTANT

## 2020-09-21 RX ORDER — AMOXICILLIN AND CLAVULANATE POTASSIUM 875; 125 MG/1; MG/1
1 TABLET, FILM COATED ORAL 2 TIMES DAILY
Qty: 20 TABLET | Refills: 0 | Status: SHIPPED | OUTPATIENT
Start: 2020-09-21 | End: 2020-10-01

## 2020-09-21 NOTE — TELEPHONE ENCOUNTER
----- Message from Preethi Hand sent at 9/21/2020  1:59 PM CDT -----  Regarding: Virtual visit  Contact: Patient  Patient is online and waiting on virtual visit to begin, Please call to advise at Ph .[phone] (home)

## 2020-09-21 NOTE — PROGRESS NOTES
Established Patient - Audio Only Telehealth Visit     The patient location is: at home in [place], [place]  The chief complaint leading to consultation is: dental abscess   Visit type: Virtual visit with audio only (telephone)  Total time spent with patient: 12 minutes       The reason for the audio only service rather than synchronous audio and video virtual visit was related to technical difficulties or patient preference/necessity.     Each patient to whom I provide medical services by telemedicine is:  (1) informed of the relationship between the physician and patient and the respective role of any other health care provider with respect to management of the patient; and (2) notified that they may decline to receive medical services by telemedicine and may withdraw from such care at any time. Patient verbally consented to receive this service via voice-only telephone call.        This service was not originating from a related E/M service provided within the previous 7 days nor will  to an E/M service or procedure within the next 24 hours or my soonest available appointment.  Prevailing standard of care was able to be met in this audio-only visit.        Subjective:      Patient ID: Demetrice Gaines is a 60 y.o. female.    Chief Complaint: Dental Pain    Pain on her right upper and lower teeth. Feels an abscess on her lower teeth.    Dental Pain   This is a new problem. Episode onset: 2 days. The problem occurs constantly. The problem has been gradually worsening. Associated symptoms include facial pain, oral bleeding (when brushing teeth) and sinus pressure. Pertinent negatives include no difficulty swallowing, fever or thermal sensitivity. Treatments tried: tramadol.     Patient Active Problem List   Diagnosis    Uncontrolled hypertension    Old myocardial infarction    History of colon polyps    Elevated troponin    Diabetes mellitus, type II    Pneumonia of right lower lobe due to infectious  organism    Severe malnutrition    Bilateral pleural effusion    Cardiomegaly    CAD (coronary artery disease)    Chronic diastolic congestive heart failure       Current Outpatient Medications:     albuterol (PROVENTIL HFA) 90 mcg/actuation inhaler, Inhale 2 puffs into the lungs every 6 (six) hours as needed for Wheezing. Rescue (Patient not taking: Reported on 7/23/2020), Disp: 8.5 g, Rfl: 0    amLODIPine (NORVASC) 10 MG tablet, Take 1 tablet (10 mg total) by mouth once daily., Disp: 30 tablet, Rfl: 5    amoxicillin-clavulanate 875-125mg (AUGMENTIN) 875-125 mg per tablet, Take 1 tablet by mouth 2 (two) times daily. for 10 days, Disp: 20 tablet, Rfl: 0    aspirin (ECOTRIN) 81 MG EC tablet, Take 1 tablet (81 mg total) by mouth once daily., Disp: 90 tablet, Rfl: 1    carvediloL (COREG) 25 MG tablet, Take 1 tablet (25 mg total) by mouth 2 (two) times daily., Disp: 180 tablet, Rfl: 3    furosemide (LASIX) 20 MG tablet, Take 1 tablet (20 mg total) by mouth every Mon, Wed, Fri., Disp: 28 tablet, Rfl: 1    lancets Misc, Check blood sugar once daily., Disp: , Rfl:     Health Maintenance Due   Topic Date Due    Foot Exam  02/17/1968    Pneumococcal Vaccine (Medium Risk) (1 of 1 - PPSV23) 02/17/1977    High Dose Statin  02/17/1979    Mammogram  02/17/1998    Shingles Vaccine (1 of 2) 02/17/2008    Eye Exam  06/14/2017    Influenza Vaccine (1) 08/01/2020       Review of Systems   Constitutional: Negative for activity change, appetite change, chills, diaphoresis, fatigue, fever and unexpected weight change.   HENT: Positive for dental problem, facial swelling, sinus pressure and sinus pain. Negative for congestion, drooling, ear discharge, ear pain, hearing loss, mouth sores, nosebleeds, postnasal drip, rhinorrhea, sore throat, trouble swallowing and voice change.    Eyes: Negative.  Negative for visual disturbance.   Respiratory: Negative.  Negative for cough, choking, chest tightness and shortness of  breath.    Cardiovascular: Negative for chest pain, palpitations and leg swelling.   Gastrointestinal: Negative for abdominal distention, abdominal pain, blood in stool, constipation, diarrhea, nausea and vomiting.   Endocrine: Negative for cold intolerance, heat intolerance, polydipsia and polyuria.   Genitourinary: Negative.  Negative for difficulty urinating and frequency.   Musculoskeletal: Negative for arthralgias, back pain, gait problem, joint swelling and myalgias.   Skin: Negative for color change, pallor, rash and wound.   Neurological: Negative for dizziness, tremors, weakness, light-headedness, numbness and headaches.   Hematological: Negative for adenopathy.   Psychiatric/Behavioral: Negative for behavioral problems, confusion, self-injury, sleep disturbance and suicidal ideas. The patient is not nervous/anxious.      Objective:   There were no vitals taken for this visit.    Physical Exam    Assessment:     1. Dental abscess    2. Screening for breast cancer    3. Type 2 diabetes mellitus without complication, without long-term current use of insulin    4. Encounter for screening mammogram for malignant neoplasm of breast     5. Chronic diastolic congestive heart failure      Plan:   Dental abscess  -     amoxicillin-clavulanate 875-125mg (AUGMENTIN) 875-125 mg per tablet; Take 1 tablet by mouth 2 (two) times daily. for 10 days  Dispense: 20 tablet; Refill: 0  -follow up with dentist asap    Screening for breast cancer  -     Mammo Digital Screening Bilat; Future; Expected date: 09/21/2020    Type 2 diabetes mellitus without complication, without long-term current use of insulin  -     CBC auto differential; Future; Expected date: 09/21/2020  -     Comprehensive metabolic panel; Future  -     Hemoglobin A1C; Future; Expected date: 09/21/2020  -     Lipid Panel; Future; Expected date: 09/21/2020    Encounter for screening mammogram for malignant neoplasm of breast   -     Mammo Digital Screening Bilat;  Future; Expected date: 09/21/2020    -pt needs to come in for a recheck and to do her annual exam with labs. Will schedule labs to be done prior to her follow up.     Chronic diastolic congestive heart failure  --Stable and controlled. Continue current treatment plan as previously prescribed with your cardiologist.     Follow up in about 1 week (around 9/28/2020), or if symptoms worsen or fail to improve.

## 2020-09-21 NOTE — TELEPHONE ENCOUNTER
Pt has an appt scheduled for a awv for a virtual visit, unfortunately awv can not be done as a virtual visit.Please contact the pt to reschedule or make corrections thank you

## 2020-09-21 NOTE — PATIENT INSTRUCTIONS
Dental Abscess  An abscess is a sac of pus. A dental abscess forms when a tooth or the tissue around it becomes infected with bacteria. The bacteria can enter through a cavity or a crack in a tooth. It can also infect the gum tissue or bone around a tooth. An untreated abscess can cause the loss of the tooth. It can even spread to other parts of the body and become life-threatening.    Symptoms of a dental abscess   Signs of a dental abscess include:  · Toothache, often severe  · Tooth pain with hot, cold, or pressure  · Pain in the gums, cheek, or jaw  · Bad breath or bitter taste in the mouth  · Trouble swallowing or opening the mouth  · Fever  · Swollen or enlarged glands in the neck  Diagnosing a dental abscess  An abscess is diagnosed by looking at your teeth and gums. You will be told if any tests, like dental X-rays, are needed.  Treating a dental abscess  Treatments for a dental abscess may include the following:  · Antibiotic medicines to treat the underlying infection.  · Pain relievers to help you feel more comfortable. Your health care provider may prescribe a medicine for you. Or, use over-the-counter pain relievers, like acetaminophen or ibuprofen.  · Warm saltwater rinses to soothe discomfort and help clear away pus.  · Root canal surgery if needed to save the tooth. With a root canal, the infected part of the tooth is removed. A special substance is then used to fill the empty space in the tooth.  · Drainage of the abscess if needed. Incisions are made to allow the infected material to drain from the tooth.  · Removal of the tooth in cases of severe infection that cant be treated another way.  If the infection is severe, has spread, or doesnt respond to treatment, you may need to be admitted to a hospital.        When to call the dentist  Call your dentist right away if you have any of the following:  · Fever of 100.4°F (38°C) or higher  · Increased pain, redness, drainage, or swelling in the  treated area  · Swelling of the face or jawbone  · Pain that cannot be controlled with medicines   Preventing dental abscess  To prevent another abscess in the future, keep your teeth clean and healthy. Brush twice a day and floss at least once daily. See your dentist for regular tooth cleanings. And avoid sugary foods and drinks that can lead to tooth decay.  Date Last Reviewed: 7/14/2015  © 4914-1250 [employer]. [address] [place] [address], [place] [address]. All rights reserved. This information is not intended as a substitute for professional medical care. Always follow your healthcare professional's instructions.

## 2020-09-28 ENCOUNTER — LAB VISIT (OUTPATIENT)
Dept: LAB | Facility: HOSPITAL | Age: 60
End: 2020-09-28
Attending: PHYSICIAN ASSISTANT
Payer: MEDICARE

## 2020-09-28 DIAGNOSIS — E11.9 TYPE 2 DIABETES MELLITUS WITHOUT COMPLICATION, WITHOUT LONG-TERM CURRENT USE OF INSULIN: ICD-10-CM

## 2020-09-28 LAB
ALBUMIN SERPL BCP-MCNC: 4.2 G/DL (ref 3.5–5.2)
ALP SERPL-CCNC: 61 U/L (ref 55–135)
ALT SERPL W/O P-5'-P-CCNC: 20 U/L (ref 10–44)
ANION GAP SERPL CALC-SCNC: 11 MMOL/L (ref 8–16)
AST SERPL-CCNC: 21 U/L (ref 10–40)
BASOPHILS # BLD AUTO: 0.07 K/UL (ref 0–0.2)
BASOPHILS NFR BLD: 1 % (ref 0–1.9)
BILIRUB SERPL-MCNC: 0.5 MG/DL (ref 0.1–1)
BUN SERPL-MCNC: 15 MG/DL (ref 8–23)
CALCIUM SERPL-MCNC: 9.6 MG/DL (ref 8.7–10.5)
CHLORIDE SERPL-SCNC: 100 MMOL/L (ref 95–110)
CHOLEST SERPL-MCNC: 198 MG/DL (ref 120–199)
CHOLEST/HDLC SERPL: 4.5 {RATIO} (ref 2–5)
CO2 SERPL-SCNC: 27 MMOL/L (ref 23–29)
CREAT SERPL-MCNC: 0.9 MG/DL (ref 0.5–1.4)
DIFFERENTIAL METHOD: ABNORMAL
EOSINOPHIL # BLD AUTO: 0.2 K/UL (ref 0–0.5)
EOSINOPHIL NFR BLD: 2.2 % (ref 0–8)
ERYTHROCYTE [DISTWIDTH] IN BLOOD BY AUTOMATED COUNT: 14.5 % (ref 11.5–14.5)
EST. GFR  (AFRICAN AMERICAN): >60 ML/MIN/1.73 M^2
EST. GFR  (NON AFRICAN AMERICAN): >60 ML/MIN/1.73 M^2
GLUCOSE SERPL-MCNC: 117 MG/DL (ref 70–110)
HCT VFR BLD AUTO: 42.8 % (ref 37–48.5)
HDLC SERPL-MCNC: 44 MG/DL (ref 40–75)
HDLC SERPL: 22.2 % (ref 20–50)
HGB BLD-MCNC: 14.3 G/DL (ref 12–16)
IMM GRANULOCYTES # BLD AUTO: 0.02 K/UL (ref 0–0.04)
IMM GRANULOCYTES NFR BLD AUTO: 0.3 % (ref 0–0.5)
LDLC SERPL CALC-MCNC: 126 MG/DL (ref 63–159)
LYMPHOCYTES # BLD AUTO: 2.9 K/UL (ref 1–4.8)
LYMPHOCYTES NFR BLD: 39.3 % (ref 18–48)
MCH RBC QN AUTO: 28.8 PG (ref 27–31)
MCHC RBC AUTO-ENTMCNC: 33.4 G/DL (ref 32–36)
MCV RBC AUTO: 86 FL (ref 82–98)
MONOCYTES # BLD AUTO: 0.7 K/UL (ref 0.3–1)
MONOCYTES NFR BLD: 9.3 % (ref 4–15)
NEUTROPHILS # BLD AUTO: 3.5 K/UL (ref 1.8–7.7)
NEUTROPHILS NFR BLD: 48.2 % (ref 38–73)
NONHDLC SERPL-MCNC: 154 MG/DL
NRBC BLD-RTO: 0 /100 WBC
PLATELET # BLD AUTO: 338 K/UL (ref 150–350)
PMV BLD AUTO: 8.6 FL (ref 9.2–12.9)
POTASSIUM SERPL-SCNC: 4 MMOL/L (ref 3.5–5.1)
PROT SERPL-MCNC: 8 G/DL (ref 6–8.4)
RBC # BLD AUTO: 4.96 M/UL (ref 4–5.4)
SODIUM SERPL-SCNC: 138 MMOL/L (ref 136–145)
TRIGL SERPL-MCNC: 140 MG/DL (ref 30–150)
WBC # BLD AUTO: 7.3 K/UL (ref 3.9–12.7)

## 2020-09-28 PROCEDURE — 36415 COLL VENOUS BLD VENIPUNCTURE: CPT

## 2020-09-28 PROCEDURE — 83036 HEMOGLOBIN GLYCOSYLATED A1C: CPT

## 2020-09-28 PROCEDURE — 85025 COMPLETE CBC W/AUTO DIFF WBC: CPT

## 2020-09-28 PROCEDURE — 80053 COMPREHEN METABOLIC PANEL: CPT

## 2020-09-28 PROCEDURE — 80061 LIPID PANEL: CPT

## 2020-09-29 LAB
ESTIMATED AVG GLUCOSE: 143 MG/DL (ref 68–131)
HBA1C MFR BLD HPLC: 6.6 % (ref 4–5.6)

## 2020-10-06 ENCOUNTER — PATIENT MESSAGE (OUTPATIENT)
Dept: ADMINISTRATIVE | Facility: HOSPITAL | Age: 60
End: 2020-10-06

## 2020-10-19 ENCOUNTER — PATIENT OUTREACH (OUTPATIENT)
Dept: ADMINISTRATIVE | Facility: HOSPITAL | Age: 60
End: 2020-10-19

## 2020-10-19 NOTE — PROGRESS NOTES
Last PCP visit 10 months ago. Pt due for HTN F/U    Called pt to discuss scheduling. No answer, left message.

## 2020-10-26 ENCOUNTER — PATIENT OUTREACH (OUTPATIENT)
Dept: ADMINISTRATIVE | Facility: OTHER | Age: 60
End: 2020-10-26

## 2020-11-04 ENCOUNTER — TELEPHONE (OUTPATIENT)
Dept: ADMINISTRATIVE | Facility: HOSPITAL | Age: 60
End: 2020-11-04

## 2020-11-16 ENCOUNTER — OFFICE VISIT (OUTPATIENT)
Dept: INTERNAL MEDICINE | Facility: CLINIC | Age: 60
End: 2020-11-16
Payer: MEDICARE

## 2020-11-16 VITALS
HEART RATE: 81 BPM | OXYGEN SATURATION: 98 % | TEMPERATURE: 98 F | DIASTOLIC BLOOD PRESSURE: 64 MMHG | HEIGHT: 64 IN | SYSTOLIC BLOOD PRESSURE: 130 MMHG | WEIGHT: 196.88 LBS | BODY MASS INDEX: 33.61 KG/M2

## 2020-11-16 DIAGNOSIS — Z23 NEED FOR INFLUENZA VACCINATION: ICD-10-CM

## 2020-11-16 DIAGNOSIS — I25.2 OLD MYOCARDIAL INFARCTION: ICD-10-CM

## 2020-11-16 DIAGNOSIS — I10 ESSENTIAL HYPERTENSION: ICD-10-CM

## 2020-11-16 DIAGNOSIS — E11.9 TYPE 2 DIABETES MELLITUS WITHOUT COMPLICATION, WITHOUT LONG-TERM CURRENT USE OF INSULIN: Primary | ICD-10-CM

## 2020-11-16 DIAGNOSIS — I50.32 CHRONIC DIASTOLIC CONGESTIVE HEART FAILURE: ICD-10-CM

## 2020-11-16 DIAGNOSIS — Z23 NEED FOR PNEUMOCOCCAL VACCINATION: ICD-10-CM

## 2020-11-16 PROCEDURE — 3044F PR MOST RECENT HEMOGLOBIN A1C LEVEL <7.0%: ICD-10-PCS | Mod: CPTII,S$GLB,, | Performed by: FAMILY MEDICINE

## 2020-11-16 PROCEDURE — 90686 IIV4 VACC NO PRSV 0.5 ML IM: CPT | Mod: S$GLB,,, | Performed by: FAMILY MEDICINE

## 2020-11-16 PROCEDURE — 3075F PR MOST RECENT SYSTOLIC BLOOD PRESS GE 130-139MM HG: ICD-10-PCS | Mod: CPTII,S$GLB,, | Performed by: FAMILY MEDICINE

## 2020-11-16 PROCEDURE — G0009 ADMIN PNEUMOCOCCAL VACCINE: HCPCS | Mod: S$GLB,,, | Performed by: FAMILY MEDICINE

## 2020-11-16 PROCEDURE — 99214 PR OFFICE/OUTPT VISIT, EST, LEVL IV, 30-39 MIN: ICD-10-PCS | Mod: 25,S$GLB,, | Performed by: FAMILY MEDICINE

## 2020-11-16 PROCEDURE — 99999 PR PBB SHADOW E&M-EST. PATIENT-LVL III: ICD-10-PCS | Mod: PBBFAC,,, | Performed by: FAMILY MEDICINE

## 2020-11-16 PROCEDURE — G0009 PNEUMOCOCCAL POLYSACCHARIDE VACCINE 23-VALENT =>2YO SQ IM: ICD-10-PCS | Mod: S$GLB,,, | Performed by: FAMILY MEDICINE

## 2020-11-16 PROCEDURE — 3078F DIAST BP <80 MM HG: CPT | Mod: CPTII,S$GLB,, | Performed by: FAMILY MEDICINE

## 2020-11-16 PROCEDURE — 90732 PPSV23 VACC 2 YRS+ SUBQ/IM: CPT | Mod: S$GLB,,, | Performed by: FAMILY MEDICINE

## 2020-11-16 PROCEDURE — G0008 FLU VACCINE (QUAD) GREATER THAN OR EQUAL TO 3YO PRESERVATIVE FREE IM: ICD-10-PCS | Mod: S$GLB,,, | Performed by: FAMILY MEDICINE

## 2020-11-16 PROCEDURE — 90686 FLU VACCINE (QUAD) GREATER THAN OR EQUAL TO 3YO PRESERVATIVE FREE IM: ICD-10-PCS | Mod: S$GLB,,, | Performed by: FAMILY MEDICINE

## 2020-11-16 PROCEDURE — 3044F HG A1C LEVEL LT 7.0%: CPT | Mod: CPTII,S$GLB,, | Performed by: FAMILY MEDICINE

## 2020-11-16 PROCEDURE — 1126F AMNT PAIN NOTED NONE PRSNT: CPT | Mod: S$GLB,,, | Performed by: FAMILY MEDICINE

## 2020-11-16 PROCEDURE — 99214 OFFICE O/P EST MOD 30 MIN: CPT | Mod: 25,S$GLB,, | Performed by: FAMILY MEDICINE

## 2020-11-16 PROCEDURE — 99999 PR PBB SHADOW E&M-EST. PATIENT-LVL III: CPT | Mod: PBBFAC,,, | Performed by: FAMILY MEDICINE

## 2020-11-16 PROCEDURE — 3008F BODY MASS INDEX DOCD: CPT | Mod: CPTII,S$GLB,, | Performed by: FAMILY MEDICINE

## 2020-11-16 PROCEDURE — 3075F SYST BP GE 130 - 139MM HG: CPT | Mod: CPTII,S$GLB,, | Performed by: FAMILY MEDICINE

## 2020-11-16 PROCEDURE — 3078F PR MOST RECENT DIASTOLIC BLOOD PRESSURE < 80 MM HG: ICD-10-PCS | Mod: CPTII,S$GLB,, | Performed by: FAMILY MEDICINE

## 2020-11-16 PROCEDURE — G0008 ADMIN INFLUENZA VIRUS VAC: HCPCS | Mod: S$GLB,,, | Performed by: FAMILY MEDICINE

## 2020-11-16 PROCEDURE — 1126F PR PAIN SEVERITY QUANTIFIED, NO PAIN PRESENT: ICD-10-PCS | Mod: S$GLB,,, | Performed by: FAMILY MEDICINE

## 2020-11-16 PROCEDURE — 3008F PR BODY MASS INDEX (BMI) DOCUMENTED: ICD-10-PCS | Mod: CPTII,S$GLB,, | Performed by: FAMILY MEDICINE

## 2020-11-16 PROCEDURE — 90732 PNEUMOCOCCAL POLYSACCHARIDE VACCINE 23-VALENT =>2YO SQ IM: ICD-10-PCS | Mod: S$GLB,,, | Performed by: FAMILY MEDICINE

## 2020-11-16 RX ORDER — TRIAMTERENE AND HYDROCHLOROTHIAZIDE 37.5; 25 MG/1; MG/1
1 CAPSULE ORAL EVERY MORNING
COMMUNITY
Start: 2020-08-28 | End: 2020-11-16 | Stop reason: SDUPTHER

## 2020-11-16 RX ORDER — TRIAMTERENE AND HYDROCHLOROTHIAZIDE 37.5; 25 MG/1; MG/1
1 CAPSULE ORAL EVERY MORNING
Qty: 90 CAPSULE | Refills: 3 | Status: SHIPPED | OUTPATIENT
Start: 2020-11-16 | End: 2022-01-26

## 2020-11-16 RX ORDER — AMLODIPINE BESYLATE 10 MG/1
10 TABLET ORAL DAILY
Qty: 90 TABLET | Refills: 3 | Status: SHIPPED | OUTPATIENT
Start: 2020-11-16 | End: 2021-06-10 | Stop reason: SDUPTHER

## 2020-11-16 RX ORDER — CARVEDILOL 25 MG/1
25 TABLET ORAL 2 TIMES DAILY
Qty: 180 TABLET | Refills: 3 | Status: SHIPPED | OUTPATIENT
Start: 2020-11-16 | End: 2021-06-01

## 2020-11-16 NOTE — PROGRESS NOTES
Subjective:   Patient ID: Demetrice Gaines is a 60 y.o. female.  Chief Complaint:  Annual Exam    Patient presents for annual physical exam/ overdue follow-up visit.    Last visit December 2019   Labs showed:   Normal CBC, CMP, TSH.    A1c 6.9%    with 10 year risk approximating 8%.  Admitted hospital June 2020 for pneumonia with possible NSTEMI.    Medical history for:  Hypertension.  Amlodipine 10 mg daily, Coreg 25 mg twice a day, Dyazide 37.5/25 mg daily.  Reports compliance.  Denies side effects.  No shortness of breath or swelling.   Recent CMP stable.   Questionable history of myocardial infarction.  Followed by cards.  September 2020 lipid panel with .  Not presently on statin.  Unclear why.  Denies recurrent chest pain.   Diabetes/prediabetes.  September 2020 A1c 6.6%.  Denies symptoms hypo or hyperglycemia.  All A1c less than 7 personIf treated as diabetic due for foot and eye exam.    Routine health maintenance needs include:  Pneumonia,, shingles, flu vaccine  Mammogram         Current Outpatient Medications:     amLODIPine (NORVASC) 10 MG tablet, Take 1 tablet (10 mg total) by mouth once daily., Disp: 90 tablet, Rfl: 3    aspirin (ECOTRIN) 81 MG EC tablet, Take 1 tablet (81 mg total) by mouth once daily., Disp: 90 tablet, Rfl: 1    carvediloL (COREG) 25 MG tablet, Take 1 tablet (25 mg total) by mouth 2 (two) times daily., Disp: 180 tablet, Rfl: 3    lancets Misc, Check blood sugar once daily., Disp: , Rfl:     triamterene-hydrochlorothiazide 37.5-25 mg (DYAZIDE) 37.5-25 mg per capsule, Take 1 capsule by mouth every morning., Disp: 90 capsule, Rfl: 3    Review of Systems   Constitutional: Negative for chills, diaphoresis, fatigue and fever.   Eyes: Negative for visual disturbance.   Respiratory: Negative for cough, chest tightness, shortness of breath and wheezing.    Cardiovascular: Negative for chest pain, palpitations and leg swelling.   Gastrointestinal: Negative for abdominal  "distention, abdominal pain, constipation, diarrhea, nausea and vomiting.   Endocrine: Negative for polydipsia, polyphagia and polyuria.   Genitourinary: Negative for difficulty urinating, dysuria, flank pain, frequency, hematuria and urgency.   Musculoskeletal: Negative for myalgias.   Skin: Negative for rash.   Neurological: Negative for dizziness, syncope, weakness, light-headedness, numbness and headaches.   Psychiatric/Behavioral: Negative for sleep disturbance. The patient is not nervous/anxious.      Objective:   /64 (BP Location: Left arm, Patient Position: Sitting, BP Method: Large (Manual))   Pulse 81   Temp 98.3 °F (36.8 °C) (Tympanic)   Ht 5' 4" (1.626 m)   Wt 89.3 kg (196 lb 13.9 oz)   SpO2 98%   BMI 33.79 kg/m²     Physical Exam  Vitals signs and nursing note reviewed.   Constitutional:       Appearance: Normal appearance. She is well-developed and normal weight.   Eyes:      General: No scleral icterus.     Conjunctiva/sclera: Conjunctivae normal.      Right eye: Right conjunctiva is not injected.      Left eye: Left conjunctiva is not injected.   Neck:      Thyroid: No thyroid mass, thyromegaly or thyroid tenderness.      Vascular: No carotid bruit or JVD.   Cardiovascular:      Rate and Rhythm: Normal rate and regular rhythm.      Pulses:           Radial pulses are 2+ on the right side and 2+ on the left side.        Dorsalis pedis pulses are 2+ on the right side and 2+ on the left side.        Posterior tibial pulses are 2+ on the right side and 2+ on the left side.      Heart sounds: Normal heart sounds. No murmur. No friction rub. No gallop.    Pulmonary:      Effort: Pulmonary effort is normal.      Breath sounds: Normal breath sounds. No wheezing, rhonchi or rales.   Abdominal:      General: There is no distension.      Palpations: Abdomen is soft. There is no hepatomegaly.      Tenderness: There is no abdominal tenderness. There is no guarding or rebound.   Musculoskeletal:      " Right lower leg: No edema.      Left lower leg: No edema.      Right foot: Normal range of motion. No deformity or bunion.      Left foot: Normal range of motion. No deformity or bunion.   Feet:      Right foot:      Protective Sensation: 5 sites tested. 5 sites sensed.      Skin integrity: No ulcer, blister, skin breakdown, erythema, warmth, callus, dry skin or fissure.      Toenail Condition: Right toenails are normal.      Left foot:      Protective Sensation: 5 sites tested. 5 sites sensed.      Skin integrity: No ulcer, blister, skin breakdown, erythema, warmth, callus, dry skin or fissure.      Toenail Condition: Left toenails are normal.   Skin:     General: Skin is warm and dry.      Capillary Refill: Capillary refill takes less than 2 seconds.      Findings: No rash.   Neurological:      Mental Status: She is alert.      Coordination: Coordination is intact. Coordination normal.      Gait: Gait is intact. Gait normal.   Psychiatric:         Attention and Perception: Attention normal.         Mood and Affect: Mood and affect normal.         Speech: Speech normal.         Behavior: Behavior normal. Behavior is cooperative.         Thought Content: Thought content normal.         Cognition and Memory: Cognition normal.         Judgment: Judgment normal.       Assessment:       ICD-10-CM ICD-9-CM   1. Type 2 diabetes mellitus without complication, without long-term current use of insulin  E11.9 250.00   2. Essential hypertension  I10 401.9   3. Chronic diastolic congestive heart failure  I50.32 428.32     428.0   4. Old myocardial infarction  I25.2 412   5. Need for pneumococcal vaccination  Z23 V03.82   6. Need for influenza vaccination  Z23 V04.81     Plan:   Type 2 diabetes mellitus without complication, without long-term current use of insulin  A1c less than 7%.    Okay to remain off medication.    Declines referral for eye exam today.      Essential hypertension  Chronic diastolic congestive heart  failure  -     amLODIPine (NORVASC) 10 MG tablet; Take 1 tablet (10 mg total) by mouth once daily.  Dispense: 90 tablet; Refill: 3  -     carvediloL (COREG) 25 MG tablet; Take 1 tablet (25 mg total) by mouth 2 (two) times daily.  Dispense: 180 tablet; Refill: 3  -     triamterene-hydrochlorothiazide 37.5-25 mg (DYAZIDE) 37.5-25 mg per capsule; Take 1 capsule by mouth every morning.  Dispense: 90 capsule; Refill: 3  Controlled.  BP at goal.    Continue current medications.      Old myocardial infarction  Based on potential diabetes, history probable STEMI, in increase 10 year cardiovascular risk on lipid panel recommend she start statin.    Patient declines at this time   Plans to rediscuss with cardiology at next visit    RHM  -     Pneumococcal Polysaccharide Vaccine (23 Valent) (SQ/IM)  -     Influenza - Quadrivalent (PF)  Plans to obtain shingles vaccine through pharmacy.    Mammogram scheduled for 12/9/2020     Return to clinic 6 months sooner as needed

## 2020-11-24 ENCOUNTER — OFFICE VISIT (OUTPATIENT)
Dept: CARDIOLOGY | Facility: CLINIC | Age: 60
End: 2020-11-24
Payer: MEDICARE

## 2020-11-24 ENCOUNTER — OFFICE VISIT (OUTPATIENT)
Dept: OPHTHALMOLOGY | Facility: CLINIC | Age: 60
End: 2020-11-24
Payer: MEDICARE

## 2020-11-24 VITALS
SYSTOLIC BLOOD PRESSURE: 126 MMHG | RESPIRATION RATE: 16 BRPM | DIASTOLIC BLOOD PRESSURE: 92 MMHG | HEIGHT: 64 IN | OXYGEN SATURATION: 97 % | BODY MASS INDEX: 33.27 KG/M2 | WEIGHT: 194.88 LBS | HEART RATE: 79 BPM

## 2020-11-24 DIAGNOSIS — R73.03 PRE-DIABETES: ICD-10-CM

## 2020-11-24 DIAGNOSIS — I51.7 CARDIOMEGALY: ICD-10-CM

## 2020-11-24 DIAGNOSIS — I25.2 OLD MYOCARDIAL INFARCTION: Chronic | ICD-10-CM

## 2020-11-24 DIAGNOSIS — H35.371 EPIRETINAL MEMBRANE (ERM) OF RIGHT EYE: Primary | ICD-10-CM

## 2020-11-24 DIAGNOSIS — I10 UNCONTROLLED HYPERTENSION: ICD-10-CM

## 2020-11-24 DIAGNOSIS — Z83.511 FAMILY HISTORY OF GLAUCOMA: ICD-10-CM

## 2020-11-24 DIAGNOSIS — I25.10 CORONARY ARTERY DISEASE, ANGINA PRESENCE UNSPECIFIED, UNSPECIFIED VESSEL OR LESION TYPE, UNSPECIFIED WHETHER NATIVE OR TRANSPLANTED HEART: ICD-10-CM

## 2020-11-24 DIAGNOSIS — I50.32 CHRONIC DIASTOLIC CONGESTIVE HEART FAILURE: Primary | ICD-10-CM

## 2020-11-24 PROCEDURE — 99999 PR PBB SHADOW E&M-EST. PATIENT-LVL III: CPT | Mod: PBBFAC,,, | Performed by: INTERNAL MEDICINE

## 2020-11-24 PROCEDURE — 99214 OFFICE O/P EST MOD 30 MIN: CPT | Mod: S$GLB,,, | Performed by: INTERNAL MEDICINE

## 2020-11-24 PROCEDURE — 99999 PR PBB SHADOW E&M-EST. PATIENT-LVL II: CPT | Mod: PBBFAC,,, | Performed by: OPHTHALMOLOGY

## 2020-11-24 PROCEDURE — 3074F SYST BP LT 130 MM HG: CPT | Mod: CPTII,S$GLB,, | Performed by: INTERNAL MEDICINE

## 2020-11-24 PROCEDURE — 1126F AMNT PAIN NOTED NONE PRSNT: CPT | Mod: S$GLB,,, | Performed by: INTERNAL MEDICINE

## 2020-11-24 PROCEDURE — 99999 PR PBB SHADOW E&M-EST. PATIENT-LVL III: ICD-10-PCS | Mod: PBBFAC,,, | Performed by: INTERNAL MEDICINE

## 2020-11-24 PROCEDURE — 2023F PR DILATED RETINAL EXAM W/O EVID OF RETINOPATHY: ICD-10-PCS | Mod: S$GLB,,, | Performed by: OPHTHALMOLOGY

## 2020-11-24 PROCEDURE — 3008F BODY MASS INDEX DOCD: CPT | Mod: CPTII,S$GLB,, | Performed by: INTERNAL MEDICINE

## 2020-11-24 PROCEDURE — 99214 PR OFFICE/OUTPT VISIT, EST, LEVL IV, 30-39 MIN: ICD-10-PCS | Mod: S$GLB,,, | Performed by: INTERNAL MEDICINE

## 2020-11-24 PROCEDURE — 1126F PR PAIN SEVERITY QUANTIFIED, NO PAIN PRESENT: ICD-10-PCS | Mod: S$GLB,,, | Performed by: INTERNAL MEDICINE

## 2020-11-24 PROCEDURE — 3080F DIAST BP >= 90 MM HG: CPT | Mod: CPTII,S$GLB,, | Performed by: INTERNAL MEDICINE

## 2020-11-24 PROCEDURE — 2023F DILAT RTA XM W/O RTNOPTHY: CPT | Mod: S$GLB,,, | Performed by: OPHTHALMOLOGY

## 2020-11-24 PROCEDURE — 3008F PR BODY MASS INDEX (BMI) DOCUMENTED: ICD-10-PCS | Mod: CPTII,S$GLB,, | Performed by: INTERNAL MEDICINE

## 2020-11-24 PROCEDURE — 92004 COMPRE OPH EXAM NEW PT 1/>: CPT | Mod: S$GLB,,, | Performed by: OPHTHALMOLOGY

## 2020-11-24 PROCEDURE — 3074F PR MOST RECENT SYSTOLIC BLOOD PRESSURE < 130 MM HG: ICD-10-PCS | Mod: CPTII,S$GLB,, | Performed by: INTERNAL MEDICINE

## 2020-11-24 PROCEDURE — 3080F PR MOST RECENT DIASTOLIC BLOOD PRESSURE >= 90 MM HG: ICD-10-PCS | Mod: CPTII,S$GLB,, | Performed by: INTERNAL MEDICINE

## 2020-11-24 PROCEDURE — 92004 PR EYE EXAM, NEW PATIENT,COMPREHESV: ICD-10-PCS | Mod: S$GLB,,, | Performed by: OPHTHALMOLOGY

## 2020-11-24 PROCEDURE — 99999 PR PBB SHADOW E&M-EST. PATIENT-LVL II: ICD-10-PCS | Mod: PBBFAC,,, | Performed by: OPHTHALMOLOGY

## 2020-11-24 RX ORDER — OLMESARTAN MEDOXOMIL 40 MG/1
40 TABLET ORAL DAILY
Qty: 30 TABLET | Refills: 5 | Status: SHIPPED | OUTPATIENT
Start: 2020-11-24 | End: 2021-01-05 | Stop reason: SDUPTHER

## 2020-11-24 NOTE — PATIENT INSTRUCTIONS
Now taking coreg 25 mg once a day  Wean off 1 pill on M W F for a week, then 1/2 pill on M W F for a week, then stop

## 2020-11-24 NOTE — PROGRESS NOTES
Subjective:   Patient ID:  Demetrice Gaines is a 60 y.o. female who presents for follow up of Follow-up (pt wants to know if she can change her carvedilol due to hair loss)        59 yo female, came in for hospitalization f/u  PMH DM type II, CAD history of prior MI s/p C which showed normal coronaries in  OSH,  and HTN.    admitted for weakness and palpitation. BNP > 1,000, troponin of 1.804, creatinine of 1.5, and Tbili of 3.6. CTA of chest negative for PE.Stress test in  which was negative for ischemia/injury. Echo  showed normal EF. COVID  negative. Rx as CHF exacerbation and demand ischemia.     ECHO normal EF, LVH mild AI and small pericardial effusion   ETT METS 9 and peak  mmHG and no ischemia  States that has had chest pain twice after discharged in . Occurred at rest  Lasted seconds. Dull and pressure. No radiating pain. No exercise related pain. NO SOB palpitation and orthopnea  No smoking/dronking  BP A1c LDL controlled              Past Medical History:   Diagnosis Date    Bilateral pleural effusion 2020    CAD (coronary artery disease)     Colon polyp     Diabetes mellitus type I     Hypertension     Hyponatremia 2020       Past Surgical History:   Procedure Laterality Date    Benign Cyst Right      SECTION      COLONOSCOPY      COLONOSCOPY N/A 2019    Procedure: COLONOSCOPY;  Surgeon: Ileana Holcomb MD;  Location[facility];  Service: Endoscopy;  Laterality: N/A;       Social History     Tobacco Use    Smoking status: Never Smoker    Smokeless tobacco: Never Used   Substance Use Topics    Alcohol use: Never     Frequency: Never    Drug use: Never       Family History   Problem Relation Age of Onset    Cancer Mother     Pacemaker/defibrilator Mother     Glaucoma Mother     Diabetes Father     Hypertension Sister     Glaucoma Sister     Hypertension Brother     Cancer Paternal Grandmother           Review of Systems   Constitution: Negative for decreased appetite, diaphoresis, fever, malaise/fatigue and night sweats.   HENT: Negative for nosebleeds.    Eyes: Negative for blurred vision and double vision.   Cardiovascular: Positive for dyspnea on exertion. Negative for chest pain, claudication, irregular heartbeat, leg swelling, near-syncope, orthopnea, palpitations, paroxysmal nocturnal dyspnea and syncope.   Respiratory: Negative for cough, shortness of breath, sleep disturbances due to breathing, snoring, sputum production and wheezing.    Endocrine: Negative for cold intolerance and polyuria.   Hematologic/Lymphatic: Does not bruise/bleed easily.   Skin: Negative for rash.   Musculoskeletal: Negative for back pain, falls, joint pain, joint swelling and neck pain.   Gastrointestinal: Negative for abdominal pain, heartburn, nausea and vomiting.   Genitourinary: Negative for dysuria, frequency and hematuria.   Neurological: Negative for difficulty with concentration, dizziness, focal weakness, headaches, light-headedness, numbness, seizures and weakness.   Psychiatric/Behavioral: Negative for depression, memory loss and substance abuse. The patient does not have insomnia.    Allergic/Immunologic: Negative for HIV exposure and hives.       Objective:   Physical Exam   Constitutional: She is oriented to person, place, and time. She appears well-nourished.   HENT:   Head: Normocephalic.   Eyes: Pupils are equal, round, and reactive to light.   Neck: Normal carotid pulses and no JVD present. Carotid bruit is not present. No thyromegaly present.   Cardiovascular: Normal rate, regular rhythm, normal heart sounds and normal pulses.  No extrasystoles are present. PMI is not displaced. Exam reveals no gallop and no S3.   No murmur heard.  Pulmonary/Chest: Breath sounds normal. No stridor. No respiratory distress.   Abdominal: Soft. Bowel sounds are normal. There is no abdominal tenderness. There is no rebound.    Musculoskeletal: Normal range of motion.   Neurological: She is alert and oriented to person, place, and time.   Skin: Skin is intact. No rash noted.   Psychiatric: Her behavior is normal.       Lab Results   Component Value Date    CHOL 198 09/28/2020    CHOL 119 (L) 06/14/2020    CHOL 196 12/05/2019     Lab Results   Component Value Date    HDL 44 09/28/2020    HDL 15 (L) 06/14/2020    HDL 46 12/05/2019     Lab Results   Component Value Date    LDLCALC 126.0 09/28/2020    LDLCALC 73.0 06/14/2020    LDLCALC 126.8 12/05/2019     Lab Results   Component Value Date    TRIG 140 09/28/2020    TRIG 155 (H) 06/14/2020    TRIG 116 12/05/2019     Lab Results   Component Value Date    CHOLHDL 22.2 09/28/2020    CHOLHDL 12.6 (L) 06/14/2020    CHOLHDL 23.5 12/05/2019       Chemistry        Component Value Date/Time     09/28/2020 1119    K 4.0 09/28/2020 1119     09/28/2020 1119    CO2 27 09/28/2020 1119    BUN 15 09/28/2020 1119    CREATININE 0.9 09/28/2020 1119     (H) 09/28/2020 1119        Component Value Date/Time    CALCIUM 9.6 09/28/2020 1119    ALKPHOS 61 09/28/2020 1119    AST 21 09/28/2020 1119    ALT 20 09/28/2020 1119    BILITOT 0.5 09/28/2020 1119    ESTGFRAFRICA >60 09/28/2020 1119    EGFRNONAA >60 09/28/2020 1119          Lab Results   Component Value Date    HGBA1C 6.6 (H) 09/28/2020     Lab Results   Component Value Date    TSH 0.818 06/13/2020     Lab Results   Component Value Date    INR 1.3 (H) 06/13/2020     Lab Results   Component Value Date    WBC 7.30 09/28/2020    HGB 14.3 09/28/2020    HCT 42.8 09/28/2020    MCV 86 09/28/2020     09/28/2020     BMP  Sodium   Date Value Ref Range Status   09/28/2020 138 136 - 145 mmol/L Final     Potassium   Date Value Ref Range Status   09/28/2020 4.0 3.5 - 5.1 mmol/L Final     Chloride   Date Value Ref Range Status   09/28/2020 100 95 - 110 mmol/L Final     CO2   Date Value Ref Range Status   09/28/2020 27 23 - 29 mmol/L Final     BUN    Date Value Ref Range Status   09/28/2020 15 8 - 23 mg/dL Final     Creatinine   Date Value Ref Range Status   09/28/2020 0.9 0.5 - 1.4 mg/dL Final     Calcium   Date Value Ref Range Status   09/28/2020 9.6 8.7 - 10.5 mg/dL Final     Anion Gap   Date Value Ref Range Status   09/28/2020 11 8 - 16 mmol/L Final     eGFR if    Date Value Ref Range Status   09/28/2020 >60 >60 mL/min/1.73 m^2 Final     eGFR if non    Date Value Ref Range Status   09/28/2020 >60 >60 mL/min/1.73 m^2 Final     Comment:     Calculation used to obtain the estimated glomerular filtration  rate (eGFR) is the CKD-EPI equation.        BNP  @LABRCNTIP(BNP,BNPTRIAGEBLO)@  @LABRCNTIP(troponini)@  CrCl cannot be calculated (Patient's most recent lab result is older than the maximum 7 days allowed.).  No results found in the last 24 hours.  No results found in the last 24 hours.  No results found in the last 24 hours.    Assessment:      1. Chronic diastolic congestive heart failure    2. Coronary artery disease, angina presence unspecified, unspecified vessel or lesion type, unspecified whether native or transplanted heart    3. Cardiomegaly    4. Uncontrolled hypertension    5. Old myocardial infarction    6. Pre-diabetes        Plan:   Wean off coreg due to hair loss  Add Benicar 40 mg daily  Continue amlodipine 10 mg and Dayzide  DM Rx per PCP  Counseled DASH  Check Lipid profile in 6 months  Recommend heart-healthy diet, weight control and regular exercise.  Fabiola. Risk modification.   I have reviewed all pertinent labs and cardiac studies independently. Plans and recommendations have been formulated under my direct supervision. All questions answered and patient voiced understanding.   If symptoms persist go to the ED  RTC in 3 months

## 2020-11-24 NOTE — PROGRESS NOTES
===============================  Demetrice Gaines,  11/24/2020 today   60 y.o. female   Last visit [facility]: :Visit date not found   Last visit eye dept. Visit date not found  [facility]:  Uncorrected distance visual acuity was 20/20 in the right eye and 20/20 in the left eye.  Tonometry     Tonometry (Applanation, 2:43 PM)       Right Left    Pressure 18 19               Not recorded        Manifest Refraction     Manifest Refraction       Sphere Cylinder Axis Dist [facility]    Right -0.25   20/20    Left -0.50 +0.25 180 20/20               Not recorded        Chief Complaint   Patient presents with    Diabetic Eye Exam     Last A!C 6.6 09/29/2020       ________________  11/24/2020 today  HPI     Diabetic Eye Exam      Additional comments: Last A!C 6.6 09/29/2020              Comments     Patient is here to establish care, patient was diagnosed with  Pre   diabetes approximately one year ago, patient never checks b.s.  But states   her vision does fluctuarte.      NP  +DM 2019 approx.  +FamHx-glaucoma- mom &Sis          Last edited by LUIS MANUEL Luz on 11/24/2020  2:43 PM. (History)      Problem List Items Addressed This Visit        Eye/Vision problems    Epiretinal membrane (ERM) of right eye - Primary       Other    Family history of glaucoma    Pre-diabetes      Fhx glaucoma   OU/and RNFL today  ERM noted by OCT today-follow  RTC 1 year              ===============================

## 2021-01-04 ENCOUNTER — PATIENT OUTREACH (OUTPATIENT)
Dept: ADMINISTRATIVE | Facility: HOSPITAL | Age: 61
End: 2021-01-04

## 2021-01-05 RX ORDER — OLMESARTAN MEDOXOMIL 40 MG/1
40 TABLET ORAL DAILY
Qty: 90 TABLET | Refills: 2 | Status: SHIPPED | OUTPATIENT
Start: 2021-01-05 | End: 2021-06-10 | Stop reason: SDUPTHER

## 2021-04-19 ENCOUNTER — PATIENT OUTREACH (OUTPATIENT)
Dept: ADMINISTRATIVE | Facility: HOSPITAL | Age: 61
End: 2021-04-19

## 2021-04-23 ENCOUNTER — PATIENT OUTREACH (OUTPATIENT)
Dept: ADMINISTRATIVE | Facility: HOSPITAL | Age: 61
End: 2021-04-23

## 2021-04-28 ENCOUNTER — PATIENT MESSAGE (OUTPATIENT)
Dept: RESEARCH | Facility: HOSPITAL | Age: 61
End: 2021-04-28

## 2021-05-07 ENCOUNTER — PATIENT OUTREACH (OUTPATIENT)
Dept: ADMINISTRATIVE | Facility: HOSPITAL | Age: 61
End: 2021-05-07

## 2021-05-07 DIAGNOSIS — Z12.31 ENCOUNTER FOR SCREENING MAMMOGRAM FOR MALIGNANT NEOPLASM OF BREAST: Primary | ICD-10-CM

## 2021-05-17 ENCOUNTER — OFFICE VISIT (OUTPATIENT)
Dept: INTERNAL MEDICINE | Facility: CLINIC | Age: 61
End: 2021-05-17
Payer: MEDICARE

## 2021-05-17 ENCOUNTER — HOSPITAL ENCOUNTER (OUTPATIENT)
Dept: RADIOLOGY | Facility: HOSPITAL | Age: 61
Discharge: HOME | End: 2021-05-17
Attending: FAMILY MEDICINE
Payer: MEDICARE

## 2021-05-17 VITALS
OXYGEN SATURATION: 96 % | DIASTOLIC BLOOD PRESSURE: 78 MMHG | BODY MASS INDEX: 33.98 KG/M2 | WEIGHT: 199.06 LBS | SYSTOLIC BLOOD PRESSURE: 118 MMHG | HEART RATE: 113 BPM | HEIGHT: 64 IN | TEMPERATURE: 99 F

## 2021-05-17 DIAGNOSIS — I25.2 OLD MYOCARDIAL INFARCTION: ICD-10-CM

## 2021-05-17 DIAGNOSIS — E11.9 TYPE 2 DIABETES MELLITUS WITHOUT COMPLICATION, WITHOUT LONG-TERM CURRENT USE OF INSULIN: ICD-10-CM

## 2021-05-17 DIAGNOSIS — Z12.31 ENCOUNTER FOR SCREENING MAMMOGRAM FOR MALIGNANT NEOPLASM OF BREAST: ICD-10-CM

## 2021-05-17 DIAGNOSIS — M19.071 ARTHRITIS OF MIDTARSAL JOINT OF RIGHT FOOT: Primary | ICD-10-CM

## 2021-05-17 DIAGNOSIS — E11.59 HYPERTENSION ASSOCIATED WITH DIABETES: ICD-10-CM

## 2021-05-17 DIAGNOSIS — I15.2 HYPERTENSION ASSOCIATED WITH DIABETES: ICD-10-CM

## 2021-05-17 DIAGNOSIS — M79.671 PAIN OF RIGHT MIDFOOT: ICD-10-CM

## 2021-05-17 DIAGNOSIS — I50.32 CHRONIC DIASTOLIC CONGESTIVE HEART FAILURE: ICD-10-CM

## 2021-05-17 PROCEDURE — 73630 XR FOOT COMPLETE 3 VIEW RIGHT: ICD-10-PCS | Mod: 26,RT,, | Performed by: RADIOLOGY

## 2021-05-17 PROCEDURE — 99999 PR PBB SHADOW E&M-EST. PATIENT-LVL IV: CPT | Mod: PBBFAC,,, | Performed by: FAMILY MEDICINE

## 2021-05-17 PROCEDURE — 99999 PR PBB SHADOW E&M-EST. PATIENT-LVL IV: ICD-10-PCS | Mod: PBBFAC,,, | Performed by: FAMILY MEDICINE

## 2021-05-17 PROCEDURE — 99214 PR OFFICE/OUTPT VISIT, EST, LEVL IV, 30-39 MIN: ICD-10-PCS | Mod: S$GLB,,, | Performed by: FAMILY MEDICINE

## 2021-05-17 PROCEDURE — 3072F PR LOW RISK FOR RETINOPATHY: ICD-10-PCS | Mod: S$GLB,,, | Performed by: FAMILY MEDICINE

## 2021-05-17 PROCEDURE — 73630 X-RAY EXAM OF FOOT: CPT | Mod: 26,RT,, | Performed by: RADIOLOGY

## 2021-05-17 PROCEDURE — 99214 OFFICE O/P EST MOD 30 MIN: CPT | Mod: S$GLB,,, | Performed by: FAMILY MEDICINE

## 2021-05-17 PROCEDURE — 3008F PR BODY MASS INDEX (BMI) DOCUMENTED: ICD-10-PCS | Mod: CPTII,S$GLB,, | Performed by: FAMILY MEDICINE

## 2021-05-17 PROCEDURE — 73630 X-RAY EXAM OF FOOT: CPT | Mod: TC,RT

## 2021-05-17 PROCEDURE — 3072F LOW RISK FOR RETINOPATHY: CPT | Mod: S$GLB,,, | Performed by: FAMILY MEDICINE

## 2021-05-17 PROCEDURE — 99499 UNLISTED E&M SERVICE: CPT | Mod: S$GLB,,, | Performed by: FAMILY MEDICINE

## 2021-05-17 PROCEDURE — 1125F AMNT PAIN NOTED PAIN PRSNT: CPT | Mod: S$GLB,,, | Performed by: FAMILY MEDICINE

## 2021-05-17 PROCEDURE — 99499 RISK ADDL DX/OHS AUDIT: ICD-10-PCS | Mod: S$GLB,,, | Performed by: FAMILY MEDICINE

## 2021-05-17 PROCEDURE — 3008F BODY MASS INDEX DOCD: CPT | Mod: CPTII,S$GLB,, | Performed by: FAMILY MEDICINE

## 2021-05-17 PROCEDURE — 1125F PR PAIN SEVERITY QUANTIFIED, PAIN PRESENT: ICD-10-PCS | Mod: S$GLB,,, | Performed by: FAMILY MEDICINE

## 2021-05-17 RX ORDER — PREDNISONE 50 MG/1
50 TABLET ORAL DAILY
Qty: 5 TABLET | Refills: 0 | Status: SHIPPED | OUTPATIENT
Start: 2021-05-17 | End: 2021-05-22

## 2021-05-24 ENCOUNTER — PATIENT MESSAGE (OUTPATIENT)
Dept: INTERNAL MEDICINE | Facility: CLINIC | Age: 61
End: 2021-05-24

## 2021-05-25 ENCOUNTER — HOSPITAL ENCOUNTER (OUTPATIENT)
Dept: RADIOLOGY | Facility: HOSPITAL | Age: 61
Discharge: HOME | End: 2021-05-25
Attending: FAMILY MEDICINE
Payer: MEDICARE

## 2021-05-25 DIAGNOSIS — Z12.31 ENCOUNTER FOR SCREENING MAMMOGRAM FOR MALIGNANT NEOPLASM OF BREAST: ICD-10-CM

## 2021-05-25 PROCEDURE — 77063 BREAST TOMOSYNTHESIS BI: CPT | Mod: 26,,, | Performed by: RADIOLOGY

## 2021-05-25 PROCEDURE — 77067 SCR MAMMO BI INCL CAD: CPT | Mod: TC

## 2021-05-25 PROCEDURE — 77067 SCR MAMMO BI INCL CAD: CPT | Mod: 26,,, | Performed by: RADIOLOGY

## 2021-05-25 PROCEDURE — 77063 MAMMO DIGITAL SCREENING BILAT WITH TOMO: ICD-10-PCS | Mod: 26,,, | Performed by: RADIOLOGY

## 2021-05-25 PROCEDURE — 77067 MAMMO DIGITAL SCREENING BILAT WITH TOMO: ICD-10-PCS | Mod: 26,,, | Performed by: RADIOLOGY

## 2021-05-28 DIAGNOSIS — E11.59 HYPERTENSION ASSOCIATED WITH DIABETES: Primary | ICD-10-CM

## 2021-05-28 DIAGNOSIS — I15.2 HYPERTENSION ASSOCIATED WITH DIABETES: Primary | ICD-10-CM

## 2021-06-01 ENCOUNTER — OFFICE VISIT (OUTPATIENT)
Dept: CARDIOLOGY | Facility: CLINIC | Age: 61
End: 2021-06-01
Payer: MEDICARE

## 2021-06-01 ENCOUNTER — HOSPITAL ENCOUNTER (OUTPATIENT)
Dept: CARDIOLOGY | Facility: HOSPITAL | Age: 61
Discharge: HOME | End: 2021-06-01
Attending: INTERNAL MEDICINE
Payer: MEDICARE

## 2021-06-01 VITALS
BODY MASS INDEX: 33.99 KG/M2 | HEART RATE: 96 BPM | WEIGHT: 198 LBS | OXYGEN SATURATION: 97 % | DIASTOLIC BLOOD PRESSURE: 84 MMHG | SYSTOLIC BLOOD PRESSURE: 142 MMHG

## 2021-06-01 DIAGNOSIS — E11.9 TYPE 2 DIABETES MELLITUS WITHOUT COMPLICATION, WITHOUT LONG-TERM CURRENT USE OF INSULIN: ICD-10-CM

## 2021-06-01 DIAGNOSIS — I25.10 CORONARY ARTERY DISEASE, ANGINA PRESENCE UNSPECIFIED, UNSPECIFIED VESSEL OR LESION TYPE, UNSPECIFIED WHETHER NATIVE OR TRANSPLANTED HEART: Primary | ICD-10-CM

## 2021-06-01 DIAGNOSIS — I25.10 CORONARY ARTERY DISEASE, ANGINA PRESENCE UNSPECIFIED, UNSPECIFIED VESSEL OR LESION TYPE, UNSPECIFIED WHETHER NATIVE OR TRANSPLANTED HEART: ICD-10-CM

## 2021-06-01 DIAGNOSIS — E78.2 MIXED HYPERLIPIDEMIA: ICD-10-CM

## 2021-06-01 DIAGNOSIS — I15.2 HYPERTENSION ASSOCIATED WITH DIABETES: ICD-10-CM

## 2021-06-01 DIAGNOSIS — I50.32 CHRONIC DIASTOLIC CONGESTIVE HEART FAILURE: ICD-10-CM

## 2021-06-01 DIAGNOSIS — E11.59 HYPERTENSION ASSOCIATED WITH DIABETES: ICD-10-CM

## 2021-06-01 DIAGNOSIS — R06.02 SOB (SHORTNESS OF BREATH): ICD-10-CM

## 2021-06-01 PROCEDURE — 99214 OFFICE O/P EST MOD 30 MIN: CPT | Mod: S$GLB,,, | Performed by: INTERNAL MEDICINE

## 2021-06-01 PROCEDURE — 3008F BODY MASS INDEX DOCD: CPT | Mod: CPTII,S$GLB,, | Performed by: INTERNAL MEDICINE

## 2021-06-01 PROCEDURE — 99999 PR PBB SHADOW E&M-EST. PATIENT-LVL III: CPT | Mod: PBBFAC,,, | Performed by: INTERNAL MEDICINE

## 2021-06-01 PROCEDURE — 93010 EKG 12-LEAD: ICD-10-PCS | Mod: ,,, | Performed by: INTERNAL MEDICINE

## 2021-06-01 PROCEDURE — 3051F HG A1C>EQUAL 7.0%<8.0%: CPT | Mod: CPTII,S$GLB,, | Performed by: INTERNAL MEDICINE

## 2021-06-01 PROCEDURE — 3008F PR BODY MASS INDEX (BMI) DOCUMENTED: ICD-10-PCS | Mod: CPTII,S$GLB,, | Performed by: INTERNAL MEDICINE

## 2021-06-01 PROCEDURE — 3072F LOW RISK FOR RETINOPATHY: CPT | Mod: S$GLB,,, | Performed by: INTERNAL MEDICINE

## 2021-06-01 PROCEDURE — 93010 ELECTROCARDIOGRAM REPORT: CPT | Mod: ,,, | Performed by: INTERNAL MEDICINE

## 2021-06-01 PROCEDURE — 99214 PR OFFICE/OUTPT VISIT, EST, LEVL IV, 30-39 MIN: ICD-10-PCS | Mod: S$GLB,,, | Performed by: INTERNAL MEDICINE

## 2021-06-01 PROCEDURE — 93005 ELECTROCARDIOGRAM TRACING: CPT

## 2021-06-01 PROCEDURE — 3072F PR LOW RISK FOR RETINOPATHY: ICD-10-PCS | Mod: S$GLB,,, | Performed by: INTERNAL MEDICINE

## 2021-06-01 PROCEDURE — 3051F PR MOST RECENT HEMOGLOBIN A1C LEVEL 7.0 - < 8.0%: ICD-10-PCS | Mod: CPTII,S$GLB,, | Performed by: INTERNAL MEDICINE

## 2021-06-01 PROCEDURE — 99999 PR PBB SHADOW E&M-EST. PATIENT-LVL III: ICD-10-PCS | Mod: PBBFAC,,, | Performed by: INTERNAL MEDICINE

## 2021-06-10 ENCOUNTER — HOSPITAL ENCOUNTER (OUTPATIENT)
Dept: RADIOLOGY | Facility: HOSPITAL | Age: 61
Discharge: HOME | End: 2021-06-10
Attending: PODIATRIST
Payer: MEDICARE

## 2021-06-10 ENCOUNTER — OFFICE VISIT (OUTPATIENT)
Dept: PODIATRY | Facility: CLINIC | Age: 61
End: 2021-06-10
Payer: MEDICARE

## 2021-06-10 ENCOUNTER — PATIENT MESSAGE (OUTPATIENT)
Dept: INTERNAL MEDICINE | Facility: CLINIC | Age: 61
End: 2021-06-10

## 2021-06-10 VITALS
DIASTOLIC BLOOD PRESSURE: 87 MMHG | BODY MASS INDEX: 33.8 KG/M2 | SYSTOLIC BLOOD PRESSURE: 123 MMHG | WEIGHT: 198 LBS | HEIGHT: 64 IN | HEART RATE: 100 BPM

## 2021-06-10 DIAGNOSIS — I10 ESSENTIAL HYPERTENSION: ICD-10-CM

## 2021-06-10 DIAGNOSIS — E11.9 TYPE 2 DIABETES MELLITUS WITHOUT COMPLICATION, WITHOUT LONG-TERM CURRENT USE OF INSULIN: ICD-10-CM

## 2021-06-10 DIAGNOSIS — M79.672 LEFT FOOT PAIN: ICD-10-CM

## 2021-06-10 DIAGNOSIS — M19.079 ARTHRITIS OF FOOT: Primary | ICD-10-CM

## 2021-06-10 PROCEDURE — 99203 PR OFFICE/OUTPT VISIT, NEW, LEVL III, 30-44 MIN: ICD-10-PCS | Mod: S$GLB,,, | Performed by: PODIATRIST

## 2021-06-10 PROCEDURE — 73630 XR FOOT COMPLETE 3 VIEW LEFT: ICD-10-PCS | Mod: 26,LT,, | Performed by: RADIOLOGY

## 2021-06-10 PROCEDURE — 3008F BODY MASS INDEX DOCD: CPT | Mod: CPTII,S$GLB,, | Performed by: PODIATRIST

## 2021-06-10 PROCEDURE — 99999 PR PBB SHADOW E&M-EST. PATIENT-LVL III: ICD-10-PCS | Mod: PBBFAC,,, | Performed by: PODIATRIST

## 2021-06-10 PROCEDURE — 3072F LOW RISK FOR RETINOPATHY: CPT | Mod: S$GLB,,, | Performed by: PODIATRIST

## 2021-06-10 PROCEDURE — 1125F AMNT PAIN NOTED PAIN PRSNT: CPT | Mod: S$GLB,,, | Performed by: PODIATRIST

## 2021-06-10 PROCEDURE — 1125F PR PAIN SEVERITY QUANTIFIED, PAIN PRESENT: ICD-10-PCS | Mod: S$GLB,,, | Performed by: PODIATRIST

## 2021-06-10 PROCEDURE — 99999 PR PBB SHADOW E&M-EST. PATIENT-LVL III: CPT | Mod: PBBFAC,,, | Performed by: PODIATRIST

## 2021-06-10 PROCEDURE — 73630 X-RAY EXAM OF FOOT: CPT | Mod: TC,LT

## 2021-06-10 PROCEDURE — 99499 RISK ADDL DX/OHS AUDIT: ICD-10-PCS | Mod: S$GLB,,, | Performed by: PODIATRIST

## 2021-06-10 PROCEDURE — 73630 X-RAY EXAM OF FOOT: CPT | Mod: 26,LT,, | Performed by: RADIOLOGY

## 2021-06-10 PROCEDURE — 99203 OFFICE O/P NEW LOW 30 MIN: CPT | Mod: S$GLB,,, | Performed by: PODIATRIST

## 2021-06-10 PROCEDURE — 3051F HG A1C>EQUAL 7.0%<8.0%: CPT | Mod: CPTII,S$GLB,, | Performed by: PODIATRIST

## 2021-06-10 PROCEDURE — 3008F PR BODY MASS INDEX (BMI) DOCUMENTED: ICD-10-PCS | Mod: CPTII,S$GLB,, | Performed by: PODIATRIST

## 2021-06-10 PROCEDURE — 3051F PR MOST RECENT HEMOGLOBIN A1C LEVEL 7.0 - < 8.0%: ICD-10-PCS | Mod: CPTII,S$GLB,, | Performed by: PODIATRIST

## 2021-06-10 PROCEDURE — 99499 UNLISTED E&M SERVICE: CPT | Mod: S$GLB,,, | Performed by: PODIATRIST

## 2021-06-10 PROCEDURE — 3072F PR LOW RISK FOR RETINOPATHY: ICD-10-PCS | Mod: S$GLB,,, | Performed by: PODIATRIST

## 2021-06-10 RX ORDER — CELECOXIB 100 MG/1
100 CAPSULE ORAL 2 TIMES DAILY
Qty: 14 CAPSULE | Refills: 0 | Status: SHIPPED | OUTPATIENT
Start: 2021-06-10 | End: 2021-06-17

## 2021-06-10 RX ORDER — OLMESARTAN MEDOXOMIL 40 MG/1
40 TABLET ORAL DAILY
Qty: 90 TABLET | Refills: 3 | Status: SHIPPED | OUTPATIENT
Start: 2021-06-10 | End: 2022-01-31

## 2021-06-10 RX ORDER — AMLODIPINE BESYLATE 10 MG/1
10 TABLET ORAL DAILY
Qty: 90 TABLET | Refills: 3 | Status: SHIPPED | OUTPATIENT
Start: 2021-06-10 | End: 2022-01-31

## 2021-07-23 ENCOUNTER — PATIENT OUTREACH (OUTPATIENT)
Dept: ADMINISTRATIVE | Facility: OTHER | Age: 61
End: 2021-07-23

## 2021-12-30 ENCOUNTER — PATIENT OUTREACH (OUTPATIENT)
Dept: ADMINISTRATIVE | Facility: OTHER | Age: 61
End: 2021-12-30
Payer: MEDICARE

## 2021-12-30 DIAGNOSIS — E11.9 DIABETES MELLITUS WITHOUT COMPLICATION: Primary | ICD-10-CM

## 2022-01-05 ENCOUNTER — OFFICE VISIT (OUTPATIENT)
Dept: PODIATRY | Facility: CLINIC | Age: 62
End: 2022-01-05
Payer: MEDICARE

## 2022-01-05 DIAGNOSIS — E11.9 TYPE 2 DIABETES MELLITUS WITHOUT COMPLICATION, WITHOUT LONG-TERM CURRENT USE OF INSULIN: ICD-10-CM

## 2022-01-05 DIAGNOSIS — M19.079 ARTHRITIS OF FOOT: Primary | ICD-10-CM

## 2022-01-05 PROCEDURE — 99999 PR PBB SHADOW E&M-EST. PATIENT-LVL II: ICD-10-PCS | Mod: PBBFAC,HCNC,, | Performed by: PODIATRIST

## 2022-01-05 PROCEDURE — 99999 PR PBB SHADOW E&M-EST. PATIENT-LVL II: CPT | Mod: PBBFAC,HCNC,, | Performed by: PODIATRIST

## 2022-01-05 PROCEDURE — 3051F PR MOST RECENT HEMOGLOBIN A1C LEVEL 7.0 - < 8.0%: ICD-10-PCS | Mod: HCNC,CPTII,S$GLB, | Performed by: PODIATRIST

## 2022-01-05 PROCEDURE — 1159F MED LIST DOCD IN RCRD: CPT | Mod: HCNC,CPTII,S$GLB, | Performed by: PODIATRIST

## 2022-01-05 PROCEDURE — 99499 RISK ADDL DX/OHS AUDIT: ICD-10-PCS | Mod: S$GLB,,, | Performed by: PODIATRIST

## 2022-01-05 PROCEDURE — 1160F RVW MEDS BY RX/DR IN RCRD: CPT | Mod: HCNC,CPTII,S$GLB, | Performed by: PODIATRIST

## 2022-01-05 PROCEDURE — 99499 UNLISTED E&M SERVICE: CPT | Mod: S$GLB,,, | Performed by: PODIATRIST

## 2022-01-05 PROCEDURE — 99214 OFFICE O/P EST MOD 30 MIN: CPT | Mod: HCNC,S$GLB,, | Performed by: PODIATRIST

## 2022-01-05 PROCEDURE — 1160F PR REVIEW ALL MEDS BY PRESCRIBER/CLIN PHARMACIST DOCUMENTED: ICD-10-PCS | Mod: HCNC,CPTII,S$GLB, | Performed by: PODIATRIST

## 2022-01-05 PROCEDURE — 1159F PR MEDICATION LIST DOCUMENTED IN MEDICAL RECORD: ICD-10-PCS | Mod: HCNC,CPTII,S$GLB, | Performed by: PODIATRIST

## 2022-01-05 PROCEDURE — 99214 PR OFFICE/OUTPT VISIT, EST, LEVL IV, 30-39 MIN: ICD-10-PCS | Mod: HCNC,S$GLB,, | Performed by: PODIATRIST

## 2022-01-05 PROCEDURE — 3051F HG A1C>EQUAL 7.0%<8.0%: CPT | Mod: HCNC,CPTII,S$GLB, | Performed by: PODIATRIST

## 2022-01-05 NOTE — PROGRESS NOTES
Subjective:       Patient ID: Demetrice Gaines is a 61 y.o. female.    Chief Complaint: Foot Pain (Patient is a diabetic and denies pain at present. She states she has bilateral dorsal foot pain that radiates to the toes. She describes the pain as sharp, dull and electrical at times. She is wearing closed toe shoes. )      HPI:  Demetrice Gaines presents to the office afternoon, with complaints of discomfort right and left foot at the 1st metatarsal cuneiform joint.  She relates 0/10 pain today but states this does cause sharp shooting sensation to the dorsal aspect of the feet at times.  She denies any recent trauma or injury.  She does have history of diabetes mellitus type 2 without complications.  Patient's PMD is Lul Alvarez MD.     Review of patient's allergies indicates:   Allergen Reactions    Coreg [carvedilol]      Hair loss    Lisinopril-hydrochlorothiazide      Other reaction(s): Reaction:Throat swelling;       Past Medical History:   Diagnosis Date    Bilateral pleural effusion 2020    CAD (coronary artery disease)     Colon polyp     Diabetes mellitus type I     Hypertension     Hyponatremia 2020       Family History   Problem Relation Age of Onset    Cancer Mother     Pacemaker/defibrilator Mother     Glaucoma Mother     Diabetes Father     Hypertension Sister     Glaucoma Sister     Hypertension Brother     Cancer Paternal Grandmother        Social History     Socioeconomic History    Marital status:    Tobacco Use    Smoking status: Never Smoker    Smokeless tobacco: Never Used   Substance and Sexual Activity    Alcohol use: Never    Drug use: Never    Sexual activity: Not Currently       Past Surgical History:   Procedure Laterality Date    Benign Cyst Right     BREAST CYST EXCISION Right     pt states benign     SECTION      COLONOSCOPY      COLONOSCOPY N/A 2019    Procedure: COLONOSCOPY;  Surgeon: Ileana Holcomb MD;   Location: [facility];  Service: Endoscopy;  Laterality: N/A;       Review of Systems       Objective:   There were no vitals taken for this visit.    X-Ray Foot Complete Left  Narrative: EXAMINATION:  XR FOOT COMPLETE 3 VIEW LEFT    CLINICAL HISTORY:  .  Pain in left foot    TECHNIQUE:  AP, lateral and oblique views of the left foot were performed.    COMPARISON:  None    FINDINGS:  There is bunion formation with metatarsus varus and hallux valgus.  Degenerative changes are noted at the mid tarsal joints.  A small heel spur is noted.  Soft tissue swelling or foreign bodies are not seen.  A fracture is not noted.  Impression: Bunion formation with metatarsus varus and hallux valgus.  Degenerative changes at the mid tarsal joints.  Small heel    Electronically signed by: Sylvester Martinez MD  Date:    06/11/2021  Time:    08:37       Physical Exam  LOWER EXTREMITY PHYSICAL EXAMINATION    VASCULAR: The right DP pulse is 2/4 and the left DP is 2/4. The right PT pulse is 2/4 and the left PT pulse is 2/4. Proximal to distal, warm to warm. No dependent rubor or elevation palor is noted. Capillary refill time is less than 3 seconds. Hair growth is appreciated to the dorsal foot and digits.    NEUROLOGY: Proprioception is intact. Sensation to light touch is intact. Protective sensation is intact via 5.07 [place] Emily monofilament. Straight leg raise is negative. Negative Tinel's Sign and negative Valleix sign. No neurological sensations with compression of the area of Esparza's Nerve in the area of the Abductor Hallucis muscle belly. Upon palpation of the interspaces, there are no neurological sensations stated that radiate proximal or distal. Upon compression of the metatarsal heads from medial to lateral, no neurological sensations or symptoms are stated.    DERMATOLOGY: Skin is supple, dry and intact. No ecchymosis is noted. No hypertrophic skin formation. No erythema or cellulitis is noted.     ORTHOPEDIC:  Pain on  palpation to the dorsal aspect of the right and left foot at the 1st tarsometatarsal joint.  There is crepitus noted with dorsal plantar translation of the joint which is reproduced when patient symptoms.  There is also some spurring present to the dorsal aspect of the 1st metatarsal base which is reproducing patient's symptoms.    Assessment:     1. Arthritis of foot    2. Type 2 diabetes mellitus without complication, without long-term current use of insulin        Plan:     Arthritis of foot    Type 2 diabetes mellitus without complication, without long-term current use of insulin      Thorough discussion is had with the patient this afternoon, concerning the diagnosis, its etiology, and the treatment algorithm at present.    Discussed treatment options regarding arthritis of the 1st tarsometatarsal joint of the right foot and left foot.  We discussed steroid injection to help decrease pain inflammation at this area versus orthotic therapy to elevate the medial longitudinal arch and reduce trampoline effect and translation of the mid tarsal joint causing patient's pain and discomfort.    A prescription was provided to the patient for orthotics.  We discussed the importance of wearing the orthotics to help elevate the arch which will allow for tension to be lessened to the plantar fascia and posterior heel cord.  Discussed the importance of the break-in period with the inserts by wearing them 2-3 hours for the 1st day and increasing their use an hour each day until able to tolerate a full work period.    Thorough discussion is had with the patient this afternoon, concerning the diagnosis, its etiology, and the treatment algorithm at present.  Greater than 50% of this visit spent on counseling and coordination of care. Greater than 15 minutes of a 20 minute appointment spent on education about the diabetic foot, neuropathy, and prevention of limb loss.  Shoe inspection. Diabetic Foot Education. Patient reminded of  the importance of good nutrition and blood sugar control to help prevent podiatric complications of diabetes. Patient instructed on proper foot hygeine. We discussed wearing proper and supportive shoe gear, daily foot inspections, never walking barefooted or sock footed, never putting sharp instruments to feet which can cause major complications associated with infection, ulcers, lacerations.          No future appointments.

## 2022-01-26 DIAGNOSIS — I10 ESSENTIAL HYPERTENSION: ICD-10-CM

## 2022-01-26 RX ORDER — TRIAMTERENE AND HYDROCHLOROTHIAZIDE 37.5; 25 MG/1; MG/1
1 CAPSULE ORAL EVERY MORNING
Qty: 90 CAPSULE | Refills: 0 | Status: SHIPPED | OUTPATIENT
Start: 2022-01-26 | End: 2022-03-08 | Stop reason: SDUPTHER

## 2022-01-26 RX ORDER — CARVEDILOL 25 MG/1
25 TABLET ORAL 2 TIMES DAILY
COMMUNITY
End: 2022-03-08

## 2022-01-26 NOTE — TELEPHONE ENCOUNTER
Care Due:                  Date            Visit Type   Department     Provider  --------------------------------------------------------------------------------                                             [facility]  Last Visit: 05-      None         MEDICINE       Lul Alvarez  Next Visit: None Scheduled  None         None Found                                                            Last  Test          Frequency    Reason                     Performed    Due Date  --------------------------------------------------------------------------------    CMP.........  12 months..  olmesartan...............  Not Found    Overdue    Powered by [employer]. Reference number: [id number].   1/26/2022 12:36:06 PM CST

## 2022-02-02 ENCOUNTER — PATIENT OUTREACH (OUTPATIENT)
Dept: ADMINISTRATIVE | Facility: OTHER | Age: 62
End: 2022-02-02
Payer: MEDICARE

## 2022-03-04 DIAGNOSIS — R06.02 SOB (SHORTNESS OF BREATH): Primary | ICD-10-CM

## 2022-03-04 DIAGNOSIS — I50.32 CHRONIC DIASTOLIC CONGESTIVE HEART FAILURE: ICD-10-CM

## 2022-03-08 ENCOUNTER — PATIENT OUTREACH (OUTPATIENT)
Dept: ADMINISTRATIVE | Facility: OTHER | Age: 62
End: 2022-03-08
Payer: MEDICARE

## 2022-03-08 ENCOUNTER — HOSPITAL ENCOUNTER (OUTPATIENT)
Dept: CARDIOLOGY | Facility: HOSPITAL | Age: 62
Discharge: HOME | End: 2022-03-08
Attending: INTERNAL MEDICINE
Payer: MEDICARE

## 2022-03-08 ENCOUNTER — OFFICE VISIT (OUTPATIENT)
Dept: OBSTETRICS AND GYNECOLOGY | Facility: CLINIC | Age: 62
End: 2022-03-08
Payer: MEDICARE

## 2022-03-08 ENCOUNTER — OFFICE VISIT (OUTPATIENT)
Dept: CARDIOLOGY | Facility: CLINIC | Age: 62
End: 2022-03-08
Payer: MEDICARE

## 2022-03-08 ENCOUNTER — OFFICE VISIT (OUTPATIENT)
Dept: INTERNAL MEDICINE | Facility: CLINIC | Age: 62
End: 2022-03-08
Payer: MEDICARE

## 2022-03-08 ENCOUNTER — OFFICE VISIT (OUTPATIENT)
Dept: DERMATOLOGY | Facility: CLINIC | Age: 62
End: 2022-03-08
Payer: MEDICARE

## 2022-03-08 VITALS
SYSTOLIC BLOOD PRESSURE: 192 MMHG | OXYGEN SATURATION: 98 % | DIASTOLIC BLOOD PRESSURE: 124 MMHG | WEIGHT: 198 LBS | BODY MASS INDEX: 33.99 KG/M2 | HEART RATE: 83 BPM

## 2022-03-08 VITALS
DIASTOLIC BLOOD PRESSURE: 90 MMHG | WEIGHT: 199.06 LBS | BODY MASS INDEX: 33.98 KG/M2 | SYSTOLIC BLOOD PRESSURE: 140 MMHG | HEIGHT: 64 IN

## 2022-03-08 VITALS
WEIGHT: 199.5 LBS | BODY MASS INDEX: 34.06 KG/M2 | SYSTOLIC BLOOD PRESSURE: 150 MMHG | HEIGHT: 64 IN | DIASTOLIC BLOOD PRESSURE: 92 MMHG | HEART RATE: 60 BPM | OXYGEN SATURATION: 98 %

## 2022-03-08 DIAGNOSIS — Z00.00 WELL WOMAN EXAM WITHOUT GYNECOLOGICAL EXAM: Primary | ICD-10-CM

## 2022-03-08 DIAGNOSIS — I25.2 OLD MYOCARDIAL INFARCTION: ICD-10-CM

## 2022-03-08 DIAGNOSIS — R06.02 SOB (SHORTNESS OF BREATH): ICD-10-CM

## 2022-03-08 DIAGNOSIS — I51.7 CARDIOMEGALY: ICD-10-CM

## 2022-03-08 DIAGNOSIS — L30.9 ECZEMA, UNSPECIFIED TYPE: ICD-10-CM

## 2022-03-08 DIAGNOSIS — I50.32 CHRONIC DIASTOLIC CONGESTIVE HEART FAILURE: Chronic | ICD-10-CM

## 2022-03-08 DIAGNOSIS — I50.32 CHRONIC DIASTOLIC CONGESTIVE HEART FAILURE: ICD-10-CM

## 2022-03-08 DIAGNOSIS — D21.9 FIBROIDS: ICD-10-CM

## 2022-03-08 DIAGNOSIS — E11.59 HYPERTENSION ASSOCIATED WITH DIABETES: ICD-10-CM

## 2022-03-08 DIAGNOSIS — E11.9 TYPE 2 DIABETES MELLITUS WITHOUT COMPLICATION, WITHOUT LONG-TERM CURRENT USE OF INSULIN: Chronic | ICD-10-CM

## 2022-03-08 DIAGNOSIS — I15.2 HYPERTENSION ASSOCIATED WITH DIABETES: ICD-10-CM

## 2022-03-08 DIAGNOSIS — R19.5 DARK STOOLS: ICD-10-CM

## 2022-03-08 DIAGNOSIS — I25.10 CORONARY ARTERY DISEASE INVOLVING NATIVE CORONARY ARTERY OF NATIVE HEART WITHOUT ANGINA PECTORIS: Primary | Chronic | ICD-10-CM

## 2022-03-08 DIAGNOSIS — L65.9 HAIR LOSS: Primary | ICD-10-CM

## 2022-03-08 DIAGNOSIS — Z12.31 ENCOUNTER FOR SCREENING MAMMOGRAM FOR MALIGNANT NEOPLASM OF BREAST: ICD-10-CM

## 2022-03-08 DIAGNOSIS — Z00.00 ANNUAL PHYSICAL EXAM: Primary | ICD-10-CM

## 2022-03-08 DIAGNOSIS — I15.2 HYPERTENSION ASSOCIATED WITH DIABETES: Chronic | ICD-10-CM

## 2022-03-08 DIAGNOSIS — E11.9 TYPE 2 DIABETES MELLITUS WITHOUT COMPLICATION, WITHOUT LONG-TERM CURRENT USE OF INSULIN: ICD-10-CM

## 2022-03-08 DIAGNOSIS — E11.59 HYPERTENSION ASSOCIATED WITH DIABETES: Chronic | ICD-10-CM

## 2022-03-08 DIAGNOSIS — I25.10 CORONARY ARTERY DISEASE INVOLVING NATIVE CORONARY ARTERY OF NATIVE HEART WITHOUT ANGINA PECTORIS: ICD-10-CM

## 2022-03-08 PROBLEM — Z83.511 FAMILY HISTORY OF GLAUCOMA: Status: RESOLVED | Noted: 2020-11-24 | Resolved: 2022-03-08

## 2022-03-08 PROBLEM — E43 SEVERE MALNUTRITION: Status: RESOLVED | Noted: 2020-06-20 | Resolved: 2022-03-08

## 2022-03-08 PROBLEM — R79.89 ELEVATED TROPONIN: Status: RESOLVED | Noted: 2020-06-13 | Resolved: 2022-03-08

## 2022-03-08 PROBLEM — R73.03 PRE-DIABETES: Status: RESOLVED | Noted: 2020-11-24 | Resolved: 2022-03-08

## 2022-03-08 PROCEDURE — 99999 PR PBB SHADOW E&M-EST. PATIENT-LVL IV: ICD-10-PCS | Mod: PBBFAC,,, | Performed by: FAMILY MEDICINE

## 2022-03-08 PROCEDURE — 4010F ACE/ARB THERAPY RXD/TAKEN: CPT | Mod: CPTII,S$GLB,, | Performed by: OBSTETRICS & GYNECOLOGY

## 2022-03-08 PROCEDURE — 3008F PR BODY MASS INDEX (BMI) DOCUMENTED: ICD-10-PCS | Mod: CPTII,S$GLB,, | Performed by: OBSTETRICS & GYNECOLOGY

## 2022-03-08 PROCEDURE — 1160F PR REVIEW ALL MEDS BY PRESCRIBER/CLIN PHARMACIST DOCUMENTED: ICD-10-PCS | Mod: CPTII,S$GLB,, | Performed by: FAMILY MEDICINE

## 2022-03-08 PROCEDURE — 3080F PR MOST RECENT DIASTOLIC BLOOD PRESSURE >= 90 MM HG: ICD-10-PCS | Mod: CPTII,S$GLB,, | Performed by: FAMILY MEDICINE

## 2022-03-08 PROCEDURE — 4010F ACE/ARB THERAPY RXD/TAKEN: CPT | Mod: CPTII,S$GLB,, | Performed by: FAMILY MEDICINE

## 2022-03-08 PROCEDURE — 3008F PR BODY MASS INDEX (BMI) DOCUMENTED: ICD-10-PCS | Mod: CPTII,S$GLB,, | Performed by: INTERNAL MEDICINE

## 2022-03-08 PROCEDURE — 3080F DIAST BP >= 90 MM HG: CPT | Mod: CPTII,S$GLB,, | Performed by: OBSTETRICS & GYNECOLOGY

## 2022-03-08 PROCEDURE — 4010F PR ACE/ARB THEARPY RXD/TAKEN: ICD-10-PCS | Mod: CPTII,S$GLB,, | Performed by: OBSTETRICS & GYNECOLOGY

## 2022-03-08 PROCEDURE — 3051F HG A1C>EQUAL 7.0%<8.0%: CPT | Mod: CPTII,S$GLB,, | Performed by: INTERNAL MEDICINE

## 2022-03-08 PROCEDURE — 1159F MED LIST DOCD IN RCRD: CPT | Mod: CPTII,S$GLB,, | Performed by: OBSTETRICS & GYNECOLOGY

## 2022-03-08 PROCEDURE — 3080F DIAST BP >= 90 MM HG: CPT | Mod: CPTII,S$GLB,, | Performed by: FAMILY MEDICINE

## 2022-03-08 PROCEDURE — 3052F HG A1C>EQUAL 8.0%<EQUAL 9.0%: CPT | Mod: CPTII,S$GLB,, | Performed by: STUDENT IN AN ORGANIZED HEALTH CARE EDUCATION/TRAINING PROGRAM

## 2022-03-08 PROCEDURE — 1160F PR REVIEW ALL MEDS BY PRESCRIBER/CLIN PHARMACIST DOCUMENTED: ICD-10-PCS | Mod: CPTII,S$GLB,, | Performed by: INTERNAL MEDICINE

## 2022-03-08 PROCEDURE — 3077F PR MOST RECENT SYSTOLIC BLOOD PRESSURE >= 140 MM HG: ICD-10-PCS | Mod: CPTII,S$GLB,, | Performed by: INTERNAL MEDICINE

## 2022-03-08 PROCEDURE — 3080F PR MOST RECENT DIASTOLIC BLOOD PRESSURE >= 90 MM HG: ICD-10-PCS | Mod: CPTII,S$GLB,, | Performed by: INTERNAL MEDICINE

## 2022-03-08 PROCEDURE — 99999 PR PBB SHADOW E&M-EST. PATIENT-LVL III: CPT | Mod: PBBFAC,,, | Performed by: STUDENT IN AN ORGANIZED HEALTH CARE EDUCATION/TRAINING PROGRAM

## 2022-03-08 PROCEDURE — 3008F BODY MASS INDEX DOCD: CPT | Mod: CPTII,S$GLB,, | Performed by: INTERNAL MEDICINE

## 2022-03-08 PROCEDURE — 3077F SYST BP >= 140 MM HG: CPT | Mod: CPTII,S$GLB,, | Performed by: FAMILY MEDICINE

## 2022-03-08 PROCEDURE — 1160F PR REVIEW ALL MEDS BY PRESCRIBER/CLIN PHARMACIST DOCUMENTED: ICD-10-PCS | Mod: CPTII,S$GLB,, | Performed by: STUDENT IN AN ORGANIZED HEALTH CARE EDUCATION/TRAINING PROGRAM

## 2022-03-08 PROCEDURE — 3080F PR MOST RECENT DIASTOLIC BLOOD PRESSURE >= 90 MM HG: ICD-10-PCS | Mod: CPTII,S$GLB,, | Performed by: OBSTETRICS & GYNECOLOGY

## 2022-03-08 PROCEDURE — 1160F RVW MEDS BY RX/DR IN RCRD: CPT | Mod: CPTII,S$GLB,, | Performed by: INTERNAL MEDICINE

## 2022-03-08 PROCEDURE — 3052F HG A1C>EQUAL 8.0%<EQUAL 9.0%: CPT | Mod: CPTII,S$GLB,, | Performed by: FAMILY MEDICINE

## 2022-03-08 PROCEDURE — 93005 ELECTROCARDIOGRAM TRACING: CPT

## 2022-03-08 PROCEDURE — 99214 PR OFFICE/OUTPT VISIT, EST, LEVL IV, 30-39 MIN: ICD-10-PCS | Mod: S$GLB,,, | Performed by: FAMILY MEDICINE

## 2022-03-08 PROCEDURE — 99499 UNLISTED E&M SERVICE: CPT | Mod: S$GLB,,, | Performed by: FAMILY MEDICINE

## 2022-03-08 PROCEDURE — 1159F PR MEDICATION LIST DOCUMENTED IN MEDICAL RECORD: ICD-10-PCS | Mod: CPTII,S$GLB,, | Performed by: OBSTETRICS & GYNECOLOGY

## 2022-03-08 PROCEDURE — 93010 EKG 12-LEAD: ICD-10-PCS | Mod: ,,, | Performed by: STUDENT IN AN ORGANIZED HEALTH CARE EDUCATION/TRAINING PROGRAM

## 2022-03-08 PROCEDURE — 1159F MED LIST DOCD IN RCRD: CPT | Mod: CPTII,S$GLB,, | Performed by: INTERNAL MEDICINE

## 2022-03-08 PROCEDURE — 3077F SYST BP >= 140 MM HG: CPT | Mod: CPTII,S$GLB,, | Performed by: OBSTETRICS & GYNECOLOGY

## 2022-03-08 PROCEDURE — 1160F PR REVIEW ALL MEDS BY PRESCRIBER/CLIN PHARMACIST DOCUMENTED: ICD-10-PCS | Mod: CPTII,S$GLB,, | Performed by: OBSTETRICS & GYNECOLOGY

## 2022-03-08 PROCEDURE — 99499 RISK ADDL DX/OHS AUDIT: ICD-10-PCS | Mod: S$GLB,,, | Performed by: FAMILY MEDICINE

## 2022-03-08 PROCEDURE — 99999 PR PBB SHADOW E&M-EST. PATIENT-LVL IV: CPT | Mod: PBBFAC,,, | Performed by: FAMILY MEDICINE

## 2022-03-08 PROCEDURE — 99204 PR OFFICE/OUTPT VISIT, NEW, LEVL IV, 45-59 MIN: ICD-10-PCS | Mod: S$GLB,,, | Performed by: STUDENT IN AN ORGANIZED HEALTH CARE EDUCATION/TRAINING PROGRAM

## 2022-03-08 PROCEDURE — 1159F PR MEDICATION LIST DOCUMENTED IN MEDICAL RECORD: ICD-10-PCS | Mod: CPTII,S$GLB,, | Performed by: FAMILY MEDICINE

## 2022-03-08 PROCEDURE — 3077F PR MOST RECENT SYSTOLIC BLOOD PRESSURE >= 140 MM HG: ICD-10-PCS | Mod: CPTII,S$GLB,, | Performed by: FAMILY MEDICINE

## 2022-03-08 PROCEDURE — 99999 PR PBB SHADOW E&M-EST. PATIENT-LVL IV: ICD-10-PCS | Mod: PBBFAC,,, | Performed by: INTERNAL MEDICINE

## 2022-03-08 PROCEDURE — 4010F PR ACE/ARB THEARPY RXD/TAKEN: ICD-10-PCS | Mod: CPTII,S$GLB,, | Performed by: STUDENT IN AN ORGANIZED HEALTH CARE EDUCATION/TRAINING PROGRAM

## 2022-03-08 PROCEDURE — 1159F PR MEDICATION LIST DOCUMENTED IN MEDICAL RECORD: ICD-10-PCS | Mod: CPTII,S$GLB,, | Performed by: STUDENT IN AN ORGANIZED HEALTH CARE EDUCATION/TRAINING PROGRAM

## 2022-03-08 PROCEDURE — 3080F DIAST BP >= 90 MM HG: CPT | Mod: CPTII,S$GLB,, | Performed by: INTERNAL MEDICINE

## 2022-03-08 PROCEDURE — 3051F PR MOST RECENT HEMOGLOBIN A1C LEVEL 7.0 - < 8.0%: ICD-10-PCS | Mod: CPTII,S$GLB,, | Performed by: INTERNAL MEDICINE

## 2022-03-08 PROCEDURE — 4010F PR ACE/ARB THEARPY RXD/TAKEN: ICD-10-PCS | Mod: CPTII,S$GLB,, | Performed by: FAMILY MEDICINE

## 2022-03-08 PROCEDURE — 1160F RVW MEDS BY RX/DR IN RCRD: CPT | Mod: CPTII,S$GLB,, | Performed by: OBSTETRICS & GYNECOLOGY

## 2022-03-08 PROCEDURE — 3052F PR MOST RECENT HEMOGLOBIN A1C LEVEL 8.0 - < 9.0%: ICD-10-PCS | Mod: CPTII,S$GLB,, | Performed by: FAMILY MEDICINE

## 2022-03-08 PROCEDURE — G0101 PR CA SCREEN;PELVIC/BREAST EXAM: ICD-10-PCS | Mod: S$GLB,,, | Performed by: OBSTETRICS & GYNECOLOGY

## 2022-03-08 PROCEDURE — 3052F PR MOST RECENT HEMOGLOBIN A1C LEVEL 8.0 - < 9.0%: ICD-10-PCS | Mod: CPTII,S$GLB,, | Performed by: STUDENT IN AN ORGANIZED HEALTH CARE EDUCATION/TRAINING PROGRAM

## 2022-03-08 PROCEDURE — 99214 OFFICE O/P EST MOD 30 MIN: CPT | Mod: S$GLB,,, | Performed by: INTERNAL MEDICINE

## 2022-03-08 PROCEDURE — 99214 PR OFFICE/OUTPT VISIT, EST, LEVL IV, 30-39 MIN: ICD-10-PCS | Mod: S$GLB,,, | Performed by: INTERNAL MEDICINE

## 2022-03-08 PROCEDURE — 4010F PR ACE/ARB THEARPY RXD/TAKEN: ICD-10-PCS | Mod: CPTII,S$GLB,, | Performed by: INTERNAL MEDICINE

## 2022-03-08 PROCEDURE — 3077F SYST BP >= 140 MM HG: CPT | Mod: CPTII,S$GLB,, | Performed by: INTERNAL MEDICINE

## 2022-03-08 PROCEDURE — 1159F MED LIST DOCD IN RCRD: CPT | Mod: CPTII,S$GLB,, | Performed by: FAMILY MEDICINE

## 2022-03-08 PROCEDURE — 1160F RVW MEDS BY RX/DR IN RCRD: CPT | Mod: CPTII,S$GLB,, | Performed by: STUDENT IN AN ORGANIZED HEALTH CARE EDUCATION/TRAINING PROGRAM

## 2022-03-08 PROCEDURE — 1160F RVW MEDS BY RX/DR IN RCRD: CPT | Mod: CPTII,S$GLB,, | Performed by: FAMILY MEDICINE

## 2022-03-08 PROCEDURE — 99999 PR PBB SHADOW E&M-EST. PATIENT-LVL IV: CPT | Mod: PBBFAC,,, | Performed by: INTERNAL MEDICINE

## 2022-03-08 PROCEDURE — 3008F PR BODY MASS INDEX (BMI) DOCUMENTED: ICD-10-PCS | Mod: CPTII,S$GLB,, | Performed by: FAMILY MEDICINE

## 2022-03-08 PROCEDURE — 1159F PR MEDICATION LIST DOCUMENTED IN MEDICAL RECORD: ICD-10-PCS | Mod: CPTII,S$GLB,, | Performed by: INTERNAL MEDICINE

## 2022-03-08 PROCEDURE — G0101 CA SCREEN;PELVIC/BREAST EXAM: HCPCS | Mod: S$GLB,,, | Performed by: OBSTETRICS & GYNECOLOGY

## 2022-03-08 PROCEDURE — 1159F MED LIST DOCD IN RCRD: CPT | Mod: CPTII,S$GLB,, | Performed by: STUDENT IN AN ORGANIZED HEALTH CARE EDUCATION/TRAINING PROGRAM

## 2022-03-08 PROCEDURE — 3077F PR MOST RECENT SYSTOLIC BLOOD PRESSURE >= 140 MM HG: ICD-10-PCS | Mod: CPTII,S$GLB,, | Performed by: OBSTETRICS & GYNECOLOGY

## 2022-03-08 PROCEDURE — 99999 PR PBB SHADOW E&M-EST. PATIENT-LVL III: ICD-10-PCS | Mod: PBBFAC,,, | Performed by: STUDENT IN AN ORGANIZED HEALTH CARE EDUCATION/TRAINING PROGRAM

## 2022-03-08 PROCEDURE — 99214 OFFICE O/P EST MOD 30 MIN: CPT | Mod: S$GLB,,, | Performed by: FAMILY MEDICINE

## 2022-03-08 PROCEDURE — 4010F ACE/ARB THERAPY RXD/TAKEN: CPT | Mod: CPTII,S$GLB,, | Performed by: STUDENT IN AN ORGANIZED HEALTH CARE EDUCATION/TRAINING PROGRAM

## 2022-03-08 PROCEDURE — 99999 PR PBB SHADOW E&M-EST. PATIENT-LVL IV: CPT | Mod: PBBFAC,,, | Performed by: OBSTETRICS & GYNECOLOGY

## 2022-03-08 PROCEDURE — 4010F ACE/ARB THERAPY RXD/TAKEN: CPT | Mod: CPTII,S$GLB,, | Performed by: INTERNAL MEDICINE

## 2022-03-08 PROCEDURE — 93010 ELECTROCARDIOGRAM REPORT: CPT | Mod: ,,, | Performed by: STUDENT IN AN ORGANIZED HEALTH CARE EDUCATION/TRAINING PROGRAM

## 2022-03-08 PROCEDURE — 3008F BODY MASS INDEX DOCD: CPT | Mod: CPTII,S$GLB,, | Performed by: OBSTETRICS & GYNECOLOGY

## 2022-03-08 PROCEDURE — 99999 PR PBB SHADOW E&M-EST. PATIENT-LVL IV: ICD-10-PCS | Mod: PBBFAC,,, | Performed by: OBSTETRICS & GYNECOLOGY

## 2022-03-08 PROCEDURE — 3008F BODY MASS INDEX DOCD: CPT | Mod: CPTII,S$GLB,, | Performed by: FAMILY MEDICINE

## 2022-03-08 PROCEDURE — 99204 OFFICE O/P NEW MOD 45 MIN: CPT | Mod: S$GLB,,, | Performed by: STUDENT IN AN ORGANIZED HEALTH CARE EDUCATION/TRAINING PROGRAM

## 2022-03-08 RX ORDER — TRIAMCINOLONE ACETONIDE 1 MG/G
OINTMENT TOPICAL 2 TIMES DAILY
Qty: 80 G | Refills: 1 | Status: SHIPPED | OUTPATIENT
Start: 2022-03-08 | End: 2023-03-17

## 2022-03-08 RX ORDER — SPIRONOLACTONE 100 MG/1
100 TABLET, FILM COATED ORAL DAILY
Qty: 30 TABLET | Refills: 5 | Status: SHIPPED | OUTPATIENT
Start: 2022-03-08 | End: 2022-03-08

## 2022-03-08 RX ORDER — METFORMIN HYDROCHLORIDE 500 MG/1
500 TABLET ORAL 2 TIMES DAILY WITH MEALS
Qty: 180 TABLET | Refills: 3 | Status: SHIPPED | OUTPATIENT
Start: 2022-03-08 | End: 2022-06-17 | Stop reason: ALTCHOICE

## 2022-03-08 RX ORDER — TRIAMTERENE AND HYDROCHLOROTHIAZIDE 37.5; 25 MG/1; MG/1
1 CAPSULE ORAL EVERY MORNING
Qty: 90 CAPSULE | Refills: 3 | Status: SHIPPED | OUTPATIENT
Start: 2022-03-08 | End: 2022-03-14

## 2022-03-08 RX ORDER — HYDRALAZINE HYDROCHLORIDE 50 MG/1
50 TABLET, FILM COATED ORAL 3 TIMES DAILY
Qty: 90 TABLET | Refills: 11 | Status: SHIPPED | OUTPATIENT
Start: 2022-03-08 | End: 2022-06-17 | Stop reason: ALTCHOICE

## 2022-03-08 RX ORDER — ASPIRIN 81 MG/1
81 TABLET ORAL DAILY
Qty: 90 TABLET | Refills: 3
Start: 2022-03-08 | End: 2023-08-23 | Stop reason: SDUPTHER

## 2022-03-08 RX ORDER — ATORVASTATIN CALCIUM 20 MG/1
20 TABLET, FILM COATED ORAL DAILY
Qty: 90 TABLET | Refills: 3 | Status: SHIPPED | OUTPATIENT
Start: 2022-03-08 | End: 2023-03-17

## 2022-03-08 NOTE — PROGRESS NOTES
Subjective:       Patient ID: Demetrice Gaines is a 62 y.o. female.    Chief Complaint:  Annual Exam and Fibroids      History of Present Illness  HPI  Annual Exam-Postmenopausal  Patient presents for annual exam. The patient has no complaints today. The patient is not sexually active. GYN screening history: last pap: approximate date  and was normal and last mammogram: approximate date  and was normal. The patient is not taking hormone replacement therapy. Patient denies post-menopausal vaginal bleeding. The patient wears seatbelts: yes. The patient participates in regular exercise: no. Has the patient ever been transfused or tattooed?: no. The patient reports that there is not domestic violence in her life.  Pt reports history of fibroids and has been having increasing lower back pains.  Desires to evaluate fibroids.    GYN & OB History  No LMP recorded. Patient is postmenopausal.   Date of Last Pap: 2020    OB History    Para Term  AB Living   2 2 2     2   SAB IAB Ectopic Multiple Live Births           2      # Outcome Date GA Lbr Enrique/2nd Weight Sex Delivery Anes PTL Lv   2 Term     M CS-LTranv   MAGDA   1 Term     M Vag-Spont   MAGDA       Review of Systems  Review of Systems   Constitutional: Negative for activity change, appetite change, chills, fatigue, fever and unexpected weight change.   Respiratory: Negative for shortness of breath.    Cardiovascular: Negative for chest pain, palpitations and leg swelling.   Gastrointestinal: Negative for abdominal pain, bloating, blood in stool, constipation, diarrhea, nausea and vomiting.   Genitourinary: Negative for dysuria, flank pain, frequency, genital sores, hematuria, hot flashes, pelvic pain, urgency, vaginal bleeding, vaginal discharge, vaginal pain, urinary incontinence, vaginal dryness and vaginal odor.   Musculoskeletal: Positive for back pain.   Integumentary:  Negative for breast mass, nipple discharge, breast skin changes and  breast tenderness.   Neurological: Negative for syncope and headaches.   Breast: Negative for asymmetry, lump, mass, mastodynia, nipple discharge, skin changes and tenderness          Objective:    Physical Exam:   Constitutional: She is oriented to person, place, and time. She appears well-developed and well-nourished. No distress.    HENT:   Head: Normocephalic and atraumatic.    Eyes: Pupils are equal, round, and reactive to light. EOM are normal.     Cardiovascular: Normal rate, regular rhythm and normal heart sounds.     Pulmonary/Chest: Effort normal and breath sounds normal.        Abdominal: Soft. Bowel sounds are normal. She exhibits no distension. There is no abdominal tenderness.     Genitourinary:    Vagina, uterus, right adnexa and left adnexa normal.      Pelvic exam was performed with patient supine.   There is no rash, tenderness, lesion or injury on the right labia. There is no rash, tenderness, lesion or injury on the left labia. Cervix is normal. Right adnexum displays no mass, no tenderness and no fullness. Left adnexum displays no mass, no tenderness and no fullness. No erythema,  no vaginal discharge, tenderness or bleeding in the vagina.    No foreign body in the vagina.      No signs of injury in the vagina.   Cervix exhibits no motion tenderness, no discharge and no friability. Uterus is not deviated, not enlarged and not tender.           Musculoskeletal: Normal range of motion and moves all extremeties. No tenderness or edema.       Neurological: She is alert and oriented to person, place, and time.    Skin: Skin is warm and dry.    Psychiatric: She has a normal mood and affect. Her behavior is normal. Thought content normal.          Assessment:        1. Well woman exam without gynecological exam    2. Fibroids    3. Encounter for screening mammogram for malignant neoplasm of breast               Plan:      Well woman exam without gynecological exam  -     Mammo Digital Screening Bilat;  Future; Expected date: 03/08/2022  -     Pt was counseled on cervical/vaginal screening guidelines and recommendations.  Last pap NILM HPV neg on 2020.  As per current ASCCP guidelines, next pap is due 2023.  -     Pt was advised on current breast cancer screening recommendations.  Pt desires to proceed with screening MMG.  -     Follow up with PCP for routine health maintenance needs.    Fibroids  -     US Pelvis Comp with Transvag NON-OB (xpd; Future; Expected date: 03/08/2022  -     Exam today unremarkable.  Although fibroids can be associated with back pain symptoms, it is more likely that pt's symptoms are due to chronic back issues.  Will await ultrasound for interval comparison.  Recommend follow up with PCP for management of back pain if no significant changes on results.      Follow up in about 1 year (around 3/8/2023).

## 2022-03-08 NOTE — PROGRESS NOTES
Patient Information  Name: Demetrice Gaines  : 1960  MRN: [id number]     Referring Physician:  Dr. Hugo   Primary Care Physician:  Dr. Lul Alvarez MD   Date of Visit: 2022      Subjective:       Demetrice Gaines is a 62 y.o. female who presents for   Chief Complaint   Patient presents with    Hair Loss     X years. Tx none( otc shampoos)      History of Present Illness: The patient presents with chief complaint of hair loss .  Location:scalp   Duration: 3 years   Signs/Symptoms: hair thinning and shedding  Prior treatments: otc shampoos     Denies hx of surgeries, major weight loss, dietary changes. Denies any itching. Reports sister has similar pattern of hair loss.     Also c/o rash on bilateral dorsal hands. Itching. No tx tried.    Patient was last seen:Visit date not found     Prior notes by myself reviewed.   Clinical documentation obtained by nursing staff reviewed.    Review of Systems   Skin: Negative for itching and rash.        Objective:    Physical Exam   Constitutional: She appears well-developed and well-nourished. No distress.   Neurological: She is alert and oriented to person, place, and time. She is not disoriented.   Psychiatric: She has a normal mood and affect.   Skin:   Areas Examined (abnormalities noted in diagram):   Scalp / Hair Palpated and Inspected  Head / Face Inspection Performed  RUE Inspected  LUE Inspection Performed                  Diagram Legend     Erythematous scaling macule/papule c/w actinic keratosis       Vascular papule c/w angioma      Pigmented verrucoid papule/plaque c/w seborrheic keratosis      Yellow umbilicated papule c/w sebaceous hyperplasia      Irregularly shaped tan macule c/w lentigo     1-2 mm smooth white papules consistent with Milia      Movable subcutaneous cyst with punctum c/w epidermal inclusion cyst      Subcutaneous movable cyst c/w pilar cyst      Firm pink to brown papule c/w dermatofibroma      Pedunculated fleshy papule(s)  c/w skin tag(s)      Evenly pigmented macule c/w junctional nevus     Mildly variegated pigmented, slightly irregular-bordered macule c/w mildly atypical nevus      Flesh colored to evenly pigmented papule c/w intradermal nevus       Pink pearly papule/plaque c/w basal cell carcinoma      Erythematous hyperkeratotic cursted plaque c/w SCC      Surgical scar with no sign of skin cancer recurrence      Open and closed comedones      Inflammatory papules and pustules      Verrucoid papule consistent consistent with wart     Erythematous eczematous patches and plaques     Dystrophic onycholytic nail with subungual debris c/w onychomycosis     Umbilicated papule    Erythematous-base heme-crusted tan verrucoid plaque consistent with inflamed seborrheic keratosis     Erythematous Silvery Scaling Plaque c/w Psoriasis     See annotation      No images are attached to the encounter or orders placed in the encounter.    [] Data reviewed  [] Independent review of test  [] Management discussed with another provider    Assessment / Plan:        Hair loss, suspect FPHL  -     ZINC; Future; Expected date: 03/08/2022  -     spironolactone (ALDACTONE) 100 MG tablet; Take 1 tablet (100 mg total) by mouth once daily.  Dispense: 30 tablet; Refill: 5  - Recommend topical rogaine  - Plan to repeat potassium in 6 months    Eczema, unspecified type  -     triamcinolone acetonide 0.1% (KENALOG) 0.1 % ointment; Apply topically 2 (two) times daily. Use up to 2 weeks at a time.  Dispense: 80 g; Refill: 1  Counseling on topical steroids:  Patient counseled that the prolonged use of topical steroids can result in the increased appearance superficial blood vessels (telangiectasias) lightening (hypopigmentation), and   thinning of the skin ( atrophy).  Patient understands to avoid using high potency steroids in skin folds, the groin or the face.  The patient verbalized understanding of proper use and possible adverse effects of topical steroids.   All patient's questions and concerns were addressed.             LOS NUMBER AND COMPLEXITY OF PROBLEMS    COMPLEXITY OF DATA RISK TOTAL TIME (m)   01233  [id number] [] 1 self-limited or minor problem [] Minimal to none [] No treatment recommended or patient to monitor 15-29  10-19   67556  04591 Low  [] 2 or > self limited or minor problems  [] 1 stable chronic illness  [] 1 acute, uncomplicated illness or injury Limited (2)  [] Prior external notes from each unique source  [] Review result of each unique test  [x] Order each unique test []  Low  OTC medications, minor skin biopsy 30-44  20-29   43185  [id number] Moderate  [x]  1 or > chronic illness with progression, exacerbation or SE of treatment  []  2 or more stable chronic illnesses  []  1 acute illness with systemic symptoms  []  1 acute complicated injury  []  1 undiagnosed new problem with uncertain prognosis Moderate (1/3 below)  []  3 or more data items        *Now includes assessment requiring independent historian  []  Independent interpretation of a test  []  Discuss management/test with another provider Moderate  [x]  Prescription drug mgmt  []  Minor surgery with risk discussed  []  Mgmt limited by social determinates 45-59  30-39   22088  78773 High  []  1 or more chronic illness with severe exacerbation, progression or SE of treatment  []  1 acute or chronic illness/injury that poses a threat to life or bodily function Extensive (2/3 below)  []  3 or more data items        *Now includes assessment requiring independent historian.  []  Independent interpretation of a test  []  Discuss management/test with another provider High  []  Major surgery with risk discussed  []  Drug therapy requiring intensive monitoring for toxicity  []  Hospitalization  []  Decision for DNR 60-74  40-54      Follow up in about 6 months (around 9/8/2022).    Adela Burks MD, FAAD  [facility]

## 2022-03-08 NOTE — PROGRESS NOTES
Subjective:   Patient ID:  Demetrice Gaines is a 62 y.o. female who presents for follow up of No chief complaint on file.      63 yo female, came in for 6 months  PMH DM type II, CAD history of prior MI s/p [facility] which showed normal coronaries in  OSH,  and HTN.    admitted for weakness and palpitation. BNP > 1,000, troponin of 1.804, creatinine of 1.5, and Tbili of 3.6. CTA of chest negative for PE.Stress test in  which was negative for ischemia/injury. Echo  showed normal EF. COVID 19 negative. Rx as CHF exacerbation and demand ischemia.     ECHO normal EF, LVH mild AI and small pericardial effusion   ETT METS 9 and peak  mmHG and no ischemia  States that has had chest pain twice after discharged in . Occurred at rest  Lasted seconds. Dull and pressure. No radiating pain. No exercise related pain. NO SOB palpitation and orthopnea  No smoking/dronking  BP A1c LDL controlled     visit  Not taking the med this morning. BP controlled at home  No chest pain dyspnea faint, dizziness and syncope.  Foot OA  No regular exercise. Sedentary style.    Interval history   BP high in the office, and rechecked 170/110 mmHg, pt denied chest pain dyspnea headache palpitation and dizziness  Not took med this morning  ekg NSR  Lisinopril caused throat edema and Coreg caused hair loss                Past Medical History:   Diagnosis Date    Bilateral pleural effusion 2020    CAD (coronary artery disease)     Colon polyp     Diabetes mellitus type I     Hypertension     Hyponatremia 2020       Past Surgical History:   Procedure Laterality Date    Benign Cyst Right     BREAST CYST EXCISION Right     pt states benign     SECTION      COLONOSCOPY      COLONOSCOPY N/A 2019    Procedure: COLONOSCOPY;  Surgeon: Ileana Holcomb MD;  Location: [facility];  Service: Endoscopy;  Laterality: N/A;       Social History     Tobacco Use    Smoking status:  Never Smoker    Smokeless tobacco: Never Used   Substance Use Topics    Alcohol use: Never    Drug use: Never       Family History   Problem Relation Age of Onset    Cancer Mother     Pacemaker/defibrilator Mother     Glaucoma Mother     Diabetes Father     Hypertension Sister     Glaucoma Sister     Hypertension Brother     Cancer Paternal Grandmother          Review of Systems   Constitutional: Negative for decreased appetite, diaphoresis, fever, malaise/fatigue and night sweats.   HENT: Negative for nosebleeds.    Eyes: Negative for blurred vision and double vision.   Cardiovascular: Positive for dyspnea on exertion. Negative for chest pain, claudication, irregular heartbeat, leg swelling, near-syncope, orthopnea, palpitations, paroxysmal nocturnal dyspnea and syncope.   Respiratory: Negative for cough, shortness of breath, sleep disturbances due to breathing, snoring, sputum production and wheezing.    Endocrine: Negative for cold intolerance and polyuria.   Hematologic/Lymphatic: Does not bruise/bleed easily.   Skin: Negative for rash.   Musculoskeletal: Negative for back pain, falls, joint pain, joint swelling and neck pain.   Gastrointestinal: Negative for abdominal pain, heartburn, nausea and vomiting.   Genitourinary: Negative for dysuria, frequency and hematuria.   Neurological: Negative for difficulty with concentration, dizziness, focal weakness, headaches, light-headedness, numbness, seizures and weakness.   Psychiatric/Behavioral: Negative for depression, memory loss and substance abuse. The patient does not have insomnia.    Allergic/Immunologic: Negative for HIV exposure and hives.       Objective:   Physical Exam  HENT:      Head: Normocephalic.   Eyes:      Pupils: Pupils are equal, round, and reactive to light.   Neck:      Thyroid: No thyromegaly.      Vascular: Normal carotid pulses. No carotid bruit or JVD.   Cardiovascular:      Rate and Rhythm: Normal rate and regular rhythm.  No  extrasystoles are present.     Chest Wall: PMI is not displaced.      Pulses: Normal pulses.      Heart sounds: Normal heart sounds. No murmur heard.    No gallop. No S3 sounds.   Pulmonary:      Effort: No respiratory distress.      Breath sounds: Normal breath sounds. No stridor.   Abdominal:      General: Bowel sounds are normal.      Palpations: Abdomen is soft.      Tenderness: There is no abdominal tenderness. There is no rebound.   Musculoskeletal:         General: Normal range of motion.   Skin:     Findings: No rash.   Neurological:      Mental Status: She is alert and oriented to person, place, and time.   Psychiatric:         Behavior: Behavior normal.         Lab Results   Component Value Date    CHOL 198 09/28/2020    CHOL 119 (L) 06/14/2020    CHOL 196 12/05/2019     Lab Results   Component Value Date    HDL 44 09/28/2020    HDL 15 (L) 06/14/2020    HDL 46 12/05/2019     Lab Results   Component Value Date    LDLCALC 126.0 09/28/2020    LDLCALC 73.0 06/14/2020    LDLCALC 126.8 12/05/2019     Lab Results   Component Value Date    TRIG 140 09/28/2020    TRIG 155 (H) 06/14/2020    TRIG 116 12/05/2019     Lab Results   Component Value Date    CHOLHDL 22.2 09/28/2020    CHOLHDL 12.6 (L) 06/14/2020    CHOLHDL 23.5 12/05/2019       Chemistry        Component Value Date/Time     09/28/2020 1119    K 4.0 09/28/2020 1119     09/28/2020 1119    CO2 27 09/28/2020 1119    BUN 15 09/28/2020 1119    CREATININE 0.9 09/28/2020 1119     (H) 09/28/2020 1119        Component Value Date/Time    CALCIUM 9.6 09/28/2020 1119    ALKPHOS 61 09/28/2020 1119    AST 21 09/28/2020 1119    ALT 20 09/28/2020 1119    BILITOT 0.5 09/28/2020 1119    ESTGFRAFRICA >60 09/28/2020 1119    EGFRNONAA >60 09/28/2020 1119          Lab Results   Component Value Date    HGBA1C 7.8 (H) 05/25/2021     Lab Results   Component Value Date    TSH 0.818 06/13/2020     Lab Results   Component Value Date    INR 1.3 (H) 06/13/2020     Lab  Results   Component Value Date    WBC 7.30 09/28/2020    HGB 14.3 09/28/2020    HCT 42.8 09/28/2020    MCV 86 09/28/2020     09/28/2020     BMP  Sodium   Date Value Ref Range Status   09/28/2020 138 136 - 145 mmol/L Final     Potassium   Date Value Ref Range Status   09/28/2020 4.0 3.5 - 5.1 mmol/L Final     Chloride   Date Value Ref Range Status   09/28/2020 100 95 - 110 mmol/L Final     CO2   Date Value Ref Range Status   09/28/2020 27 23 - 29 mmol/L Final     BUN   Date Value Ref Range Status   09/28/2020 15 8 - 23 mg/dL Final     Creatinine   Date Value Ref Range Status   09/28/2020 0.9 0.5 - 1.4 mg/dL Final     Calcium   Date Value Ref Range Status   09/28/2020 9.6 8.7 - 10.5 mg/dL Final     Anion Gap   Date Value Ref Range Status   09/28/2020 11 8 - 16 mmol/L Final     eGFR if    Date Value Ref Range Status   09/28/2020 >60 >60 mL/min/1.73 m^2 Final     eGFR if non    Date Value Ref Range Status   09/28/2020 >60 >60 mL/min/1.73 m^2 Final     Comment:     Calculation used to obtain the estimated glomerular filtration  rate (eGFR) is the CKD-EPI equation.        BNP  @LABRCNTIP(BNP,BNPTRIAGEBLO)@  @LABRCNTIP(troponini)@  CrCl cannot be calculated (Patient's most recent lab result is older than the maximum 7 days allowed.).  No results found in the last 24 hours.  No results found in the last 24 hours.  No results found in the last 24 hours.    Assessment:      1. Coronary artery disease involving native coronary artery of native heart without angina pectoris    2. Chronic diastolic congestive heart failure    3. Hypertension associated with diabetes    4. Type 2 diabetes mellitus without complication, without long-term current use of insulin        Plan:   Add Hydralazine 50 mg tid for HTN  Continue olmesartan dyazide and amlodipine  Check BP at home  DM Rx per PCP  Counseled DASH  Check Lipid profile in 6 months  Recommend heart-healthy diet, weight control and regular  exercise.  Fabiola. Risk modification.   I have reviewed all pertinent labs and cardiac studies independently. Plans and recommendations have been formulated under my direct supervision. All questions answered and patient voiced understanding.   If symptoms persist go to the ED  RTC in 6 weeks

## 2022-03-08 NOTE — PROGRESS NOTES
Subjective:   Patient ID: Demetrice Gaines is a 62 y.o. female.  Chief Complaint:  Follow-up    Patient presents for annual physical exam/ overdue follow-up visit.         Medical history:  - Hypertension, CHF, and cardiomegaly..  Amlodipine 10 mg daily,Benicar 40 mg daily, hydralazine 50 mg 3 times a day, Aldactone 100 mg daily, and Dyazide 37.5/25 mg daily.  Reports compliance.  Denies side effects.  No shortness of breath or swelling.    - coronary artery disease with history of myocardial infarction.  Followed by Cardiology.  On aspirin 81 mg and Lipitor 20 mg daily.  Reports compliance.  Denies side effects.  Denies chest pain or claudication.  Needs repeat lipid panel  - Diabetes/prediabetes.  Most recent A1c 7.8%.  On metformin 500 mg twice a day.  Reports compliance.  Denies side effects. Denies symptoms hypo or hyperglycemia.  Needs repeat A1c in eye exam.      Routine health maintenance needs include:  - Shingles vaccine  - Flu vaccine    Complains today of dark black stools  No abdominal pain or cramping  No weakness or lightheadedness  No significant NSAID use  Not on any current iron supplement or taking Pepto-Bismol on a regular basis  Colonoscopy 2019 normal  No previous EGD on file            Current Outpatient Medications:     amLODIPine (NORVASC) 10 MG tablet, Take 1 tablet (10 mg total) by mouth once daily., Disp: 90 tablet, Rfl: 2    olmesartan (BENICAR) 40 MG tablet, Take 1 tablet (40 mg total) by mouth once daily., Disp: 90 tablet, Rfl: 2    aspirin (ECOTRIN) 81 MG EC tablet, Take 1 tablet (81 mg total) by mouth once daily., Disp: 90 tablet, Rfl: 3    atorvastatin (LIPITOR) 20 MG tablet, Take 1 tablet (20 mg total) by mouth once daily., Disp: 90 tablet, Rfl: 3    hydrALAZINE (APRESOLINE) 50 MG tablet, Take 1 tablet (50 mg total) by mouth 3 (three) times daily., Disp: 90 tablet, Rfl: 11    metFORMIN (GLUCOPHAGE) 500 MG tablet, Take 1 tablet (500 mg total) by mouth 2 (two) times daily with  "meals., Disp: 180 tablet, Rfl: 3    spironolactone (ALDACTONE) 100 MG tablet, TAKE 1 TABLET BY MOUTH ONCE DAILY, Disp: 30 tablet, Rfl: 5    triamcinolone acetonide 0.1% (KENALOG) 0.1 % ointment, Apply topically 2 (two) times daily. Use up to 2 weeks at a time., Disp: 80 g, Rfl: 1    triamterene-hydrochlorothiazide 37.5-25 mg (DYAZIDE) 37.5-25 mg per capsule, Take 1 capsule by mouth every morning., Disp: 90 capsule, Rfl: 3    Review of Systems   Constitutional: Negative for chills, diaphoresis, fatigue and fever.   Eyes: Negative for visual disturbance.   Respiratory: Negative for cough, chest tightness, shortness of breath and wheezing.    Cardiovascular: Negative for chest pain, palpitations and leg swelling.   Gastrointestinal: Positive for blood in stool. Negative for abdominal distention, abdominal pain, constipation, diarrhea, nausea and vomiting.   Endocrine: Negative for polydipsia, polyphagia and polyuria.   Genitourinary: Negative for difficulty urinating, dysuria, flank pain, frequency, hematuria and urgency.   Musculoskeletal: Negative for myalgias.   Skin: Negative for rash.   Neurological: Negative for dizziness, syncope, weakness, light-headedness, numbness and headaches.   Psychiatric/Behavioral: Negative for sleep disturbance. The patient is not nervous/anxious.      Objective:   BP (!) 150/92   Pulse 60   Ht 5' 4" (1.626 m)   Wt 90.5 kg (199 lb 8.3 oz)   SpO2 98%   BMI 34.25 kg/m²     Physical Exam  Vitals and nursing note reviewed.   Constitutional:       Appearance: Normal appearance. She is well-developed. She is obese.   Eyes:      General: No scleral icterus.     Conjunctiva/sclera: Conjunctivae normal.   Neck:      Thyroid: No thyroid mass, thyromegaly or thyroid tenderness.      Vascular: Normal carotid pulses. No carotid bruit or JVD.   Cardiovascular:      Rate and Rhythm: Normal rate and regular rhythm.      Pulses:           Radial pulses are 2+ on the right side and 2+ on the " left side.      Heart sounds: Normal heart sounds. No murmur heard.    No friction rub. No gallop.   Pulmonary:      Effort: Pulmonary effort is normal.      Breath sounds: Normal breath sounds. No wheezing, rhonchi or rales.   Abdominal:      General: There is no distension.      Palpations: Abdomen is soft. There is no hepatomegaly.      Tenderness: There is no abdominal tenderness. There is no right CVA tenderness, left CVA tenderness, guarding or rebound.   Musculoskeletal:      Right lower leg: No edema.      Left lower leg: No edema.   Skin:     General: Skin is warm and dry.      Capillary Refill: Capillary refill takes less than 2 seconds.      Findings: No rash.   Neurological:      Mental Status: She is alert.      Coordination: Coordination is intact.      Gait: Gait is intact.   Psychiatric:         Attention and Perception: Attention normal.         Mood and Affect: Mood and affect normal.         Speech: Speech normal.         Behavior: Behavior normal.         Thought Content: Thought content normal.         Cognition and Memory: Cognition normal.         Judgment: Judgment normal.       Assessment:       ICD-10-CM ICD-9-CM   1. Annual physical exam  Z00.00 V70.0   2. Type 2 diabetes mellitus without complication, without long-term current use of insulin  E11.9 250.00   3. Hypertension associated with diabetes  E11.59 250.80    I15.2 401.9   4. Chronic diastolic congestive heart failure  I50.32 428.32     428.0   5. Cardiomegaly  I51.7 429.3   6. Coronary artery disease involving native coronary artery of native heart without angina pectoris  I25.10 414.01   7. Old myocardial infarction  I25.2 412   8. Dark stools  R19.5 792.1     Plan:   Annual physical exam  -     CBC Without Differential; Future; Expected date: 03/08/2022  -     Comprehensive Metabolic Panel; Future; Expected date: 03/08/2022  -     Lipid Panel; Future; Expected date: 03/08/2022  -     TSH; Future; Expected date: 03/08/2022  -      Hemoglobin A1C; Future; Expected date: 03/08/2022  Blood pressure elevated.  Body mass 34.  Remainder exam acceptable.  Check labs.  Treat as indicated.  Colon cancer screening up-to-date  Mammogram due May 2022  Tetanus booster up-to-date  Considering shingles vaccine through pharmacy  Declines flu vaccine    Type 2 diabetes mellitus without complication, without long-term current use of insulin  -     metFORMIN (GLUCOPHAGE) 500 MG tablet; Take 1 tablet (500 mg total) by mouth 2 (two) times daily with meals.  Dispense: 180 tablet; Refill: 3  -     Ambulatory referral/consult to Ophthalmology; Future; Expected date: 03/15/2022  If A1c less than 8%, continue metformin 500 mg twice a day  If A1c greater than 8%, will need additional medication in addition to metformin 500 mg twice a day    Hypertension associated with diabetes  Chronic diastolic congestive heart failure  Cardiomegaly  -     triamterene-hydrochlorothiazide 37.5-25 mg (DYAZIDE) 37.5-25 mg per capsule; Take 1 capsule by mouth every morning.  Dispense: 90 capsule; Refill: 3  Blood pressure elevated.  Questionable compliance.  Might not be currently taking Maxzide.  Reviewed 5 medication she should be taking  Reassess blood pressure 1 month  CHF and cardiomegaly stable with no signs of volume overload    Coronary artery disease involving native coronary artery of native heart without angina pectoris  Old myocardial infarction  -     aspirin (ECOTRIN) 81 MG EC tablet; Take 1 tablet (81 mg total) by mouth once daily.  Dispense: 90 tablet; Refill: 3  -     atorvastatin (LIPITOR) 20 MG tablet; Take 1 tablet (20 mg total) by mouth once daily.  Dispense: 90 tablet; Refill: 3  Stable.  Asymptomatic.  Check lipid panel  Adjust Lipitor 20 mg daily if needed    Dark stools  -     Ambulatory referral/consult to Gastroenterology; Future; Expected date: 03/15/2022  Referral to GI    Follow up with any/all specialists scheduled  Return to clinic 4-6 weeks for follow-up  on blood pressure

## 2022-03-10 ENCOUNTER — PATIENT MESSAGE (OUTPATIENT)
Dept: DERMATOLOGY | Facility: CLINIC | Age: 62
End: 2022-03-10
Payer: MEDICARE

## 2022-03-10 ENCOUNTER — TELEPHONE (OUTPATIENT)
Dept: CARDIOLOGY | Facility: CLINIC | Age: 62
End: 2022-03-10
Payer: MEDICARE

## 2022-03-10 NOTE — TELEPHONE ENCOUNTER
----- Message from Caitlyn Bob MA sent at 3/9/2022  9:20 AM CST -----  Contact: pt  Please advise.  ----- Message -----  From: Luna Fields  Sent: 3/9/2022   8:28 AM CST  To: Augustus Kapoor Staff, Vitaliy AGARWAL Staff    Per pt she prefers  Aldactone 100mg instead of Hydralazine 50mg, please call into [facility], no additional info given and can be reached at [phone]///[email or web address]

## 2022-03-14 DIAGNOSIS — L65.9 HAIR LOSS: ICD-10-CM

## 2022-03-14 RX ORDER — SPIRONOLACTONE 100 MG/1
100 TABLET, FILM COATED ORAL DAILY
Qty: 30 TABLET | Refills: 5 | Status: SHIPPED | OUTPATIENT
Start: 2022-03-14 | End: 2022-03-24 | Stop reason: SDUPTHER

## 2022-03-23 ENCOUNTER — PATIENT MESSAGE (OUTPATIENT)
Dept: DERMATOLOGY | Facility: CLINIC | Age: 62
End: 2022-03-23
Payer: MEDICARE

## 2022-03-23 ENCOUNTER — PATIENT MESSAGE (OUTPATIENT)
Dept: INTERNAL MEDICINE | Facility: CLINIC | Age: 62
End: 2022-03-23
Payer: MEDICARE

## 2022-03-24 DIAGNOSIS — M25.562 CHRONIC PAIN OF BOTH KNEES: Primary | ICD-10-CM

## 2022-03-24 DIAGNOSIS — M25.561 CHRONIC PAIN OF BOTH KNEES: Primary | ICD-10-CM

## 2022-03-24 DIAGNOSIS — G89.29 CHRONIC PAIN OF BOTH KNEES: Primary | ICD-10-CM

## 2022-03-24 DIAGNOSIS — L65.9 HAIR LOSS: ICD-10-CM

## 2022-03-24 RX ORDER — SPIRONOLACTONE 100 MG/1
100 TABLET, FILM COATED ORAL DAILY
Qty: 30 TABLET | Refills: 5 | Status: SHIPPED | OUTPATIENT
Start: 2022-03-24 | End: 2022-10-21

## 2022-04-03 ENCOUNTER — PATIENT MESSAGE (OUTPATIENT)
Dept: DERMATOLOGY | Facility: CLINIC | Age: 62
End: 2022-04-03
Payer: MEDICARE

## 2022-04-04 ENCOUNTER — HOSPITAL ENCOUNTER (EMERGENCY)
Facility: HOSPITAL | Age: 62
Discharge: HOME | End: 2022-04-04
Attending: EMERGENCY MEDICINE
Payer: MEDICARE

## 2022-04-04 VITALS
HEIGHT: 64 IN | HEART RATE: 93 BPM | DIASTOLIC BLOOD PRESSURE: 85 MMHG | BODY MASS INDEX: 36.77 KG/M2 | WEIGHT: 215.38 LBS | SYSTOLIC BLOOD PRESSURE: 132 MMHG | OXYGEN SATURATION: 99 % | TEMPERATURE: 98 F | RESPIRATION RATE: 20 BRPM

## 2022-04-04 DIAGNOSIS — J68.0 PNEUMONITIS DUE TO FUMES: ICD-10-CM

## 2022-04-04 DIAGNOSIS — R07.9 CHEST PAIN: ICD-10-CM

## 2022-04-04 DIAGNOSIS — R06.02 SOB (SHORTNESS OF BREATH): ICD-10-CM

## 2022-04-04 DIAGNOSIS — R52 PAIN: ICD-10-CM

## 2022-04-04 DIAGNOSIS — Z77.098 EXPOSURE TO CHEMICAL INHALATION: Primary | ICD-10-CM

## 2022-04-04 DIAGNOSIS — J04.0 LARYNGITIS: ICD-10-CM

## 2022-04-04 LAB
ALBUMIN SERPL BCP-MCNC: 4.3 G/DL (ref 3.5–5.2)
ALP SERPL-CCNC: 61 U/L (ref 55–135)
ALT SERPL W/O P-5'-P-CCNC: 33 U/L (ref 10–44)
ANION GAP SERPL CALC-SCNC: 14 MMOL/L (ref 8–16)
AST SERPL-CCNC: 38 U/L (ref 10–40)
BASOPHILS # BLD AUTO: 0.05 K/UL (ref 0–0.2)
BASOPHILS NFR BLD: 0.6 % (ref 0–1.9)
BILIRUB SERPL-MCNC: 0.4 MG/DL (ref 0.1–1)
BNP SERPL-MCNC: <10 PG/ML (ref 0–99)
BUN SERPL-MCNC: 22 MG/DL (ref 8–23)
CALCIUM SERPL-MCNC: 9.2 MG/DL (ref 8.7–10.5)
CHLORIDE SERPL-SCNC: 102 MMOL/L (ref 95–110)
CO2 SERPL-SCNC: 20 MMOL/L (ref 23–29)
CREAT SERPL-MCNC: 1.5 MG/DL (ref 0.5–1.4)
DIFFERENTIAL METHOD: ABNORMAL
EOSINOPHIL # BLD AUTO: 0.3 K/UL (ref 0–0.5)
EOSINOPHIL NFR BLD: 3.2 % (ref 0–8)
ERYTHROCYTE [DISTWIDTH] IN BLOOD BY AUTOMATED COUNT: 14.2 % (ref 11.5–14.5)
EST. GFR  (AFRICAN AMERICAN): 42 ML/MIN/1.73 M^2
EST. GFR  (NON AFRICAN AMERICAN): 37 ML/MIN/1.73 M^2
GLUCOSE SERPL-MCNC: 202 MG/DL (ref 70–110)
HCT VFR BLD AUTO: 39.7 % (ref 37–48.5)
HCV AB SERPL QL IA: NEGATIVE
HEP C VIRUS HOLD SPECIMEN: NORMAL
HGB BLD-MCNC: 13.3 G/DL (ref 12–16)
HIV 1+2 AB+HIV1 P24 AG SERPL QL IA: NEGATIVE
IMM GRANULOCYTES # BLD AUTO: 0.03 K/UL (ref 0–0.04)
IMM GRANULOCYTES NFR BLD AUTO: 0.3 % (ref 0–0.5)
LYMPHOCYTES # BLD AUTO: 3 K/UL (ref 1–4.8)
LYMPHOCYTES NFR BLD: 34.3 % (ref 18–48)
MCH RBC QN AUTO: 28.2 PG (ref 27–31)
MCHC RBC AUTO-ENTMCNC: 33.5 G/DL (ref 32–36)
MCV RBC AUTO: 84 FL (ref 82–98)
MONOCYTES # BLD AUTO: 0.9 K/UL (ref 0.3–1)
MONOCYTES NFR BLD: 10.3 % (ref 4–15)
NEUTROPHILS # BLD AUTO: 4.6 K/UL (ref 1.8–7.7)
NEUTROPHILS NFR BLD: 51.3 % (ref 38–73)
NRBC BLD-RTO: 0 /100 WBC
PLATELET # BLD AUTO: 405 K/UL (ref 150–450)
PMV BLD AUTO: 9.1 FL (ref 9.2–12.9)
POTASSIUM SERPL-SCNC: 4.4 MMOL/L (ref 3.5–5.1)
PROT SERPL-MCNC: 8.6 G/DL (ref 6–8.4)
RBC # BLD AUTO: 4.71 M/UL (ref 4–5.4)
SODIUM SERPL-SCNC: 136 MMOL/L (ref 136–145)
TROPONIN I SERPL DL<=0.01 NG/ML-MCNC: <0.006 NG/ML (ref 0–0.03)
WBC # BLD AUTO: 8.87 K/UL (ref 3.9–12.7)

## 2022-04-04 PROCEDURE — 84484 ASSAY OF TROPONIN QUANT: CPT | Performed by: EMERGENCY MEDICINE

## 2022-04-04 PROCEDURE — 87389 HIV-1 AG W/HIV-1&-2 AB AG IA: CPT | Performed by: EMERGENCY MEDICINE

## 2022-04-04 PROCEDURE — 93010 EKG 12-LEAD: ICD-10-PCS | Mod: ,,, | Performed by: INTERNAL MEDICINE

## 2022-04-04 PROCEDURE — 83880 ASSAY OF NATRIURETIC PEPTIDE: CPT | Performed by: EMERGENCY MEDICINE

## 2022-04-04 PROCEDURE — 25000003 PHARM REV CODE 250: Performed by: EMERGENCY MEDICINE

## 2022-04-04 PROCEDURE — 85025 COMPLETE CBC W/AUTO DIFF WBC: CPT | Performed by: EMERGENCY MEDICINE

## 2022-04-04 PROCEDURE — 99285 EMERGENCY DEPT VISIT HI MDM: CPT | Mod: 25

## 2022-04-04 PROCEDURE — 96374 THER/PROPH/DIAG INJ IV PUSH: CPT

## 2022-04-04 PROCEDURE — 80053 COMPREHEN METABOLIC PANEL: CPT | Performed by: EMERGENCY MEDICINE

## 2022-04-04 PROCEDURE — 86803 HEPATITIS C AB TEST: CPT | Performed by: EMERGENCY MEDICINE

## 2022-04-04 PROCEDURE — 96375 TX/PRO/DX INJ NEW DRUG ADDON: CPT

## 2022-04-04 PROCEDURE — 93010 ELECTROCARDIOGRAM REPORT: CPT | Mod: ,,, | Performed by: INTERNAL MEDICINE

## 2022-04-04 PROCEDURE — 63600175 PHARM REV CODE 636 W HCPCS: Performed by: EMERGENCY MEDICINE

## 2022-04-04 PROCEDURE — 96361 HYDRATE IV INFUSION ADD-ON: CPT

## 2022-04-04 PROCEDURE — 93005 ELECTROCARDIOGRAM TRACING: CPT

## 2022-04-04 RX ORDER — PREDNISONE 50 MG/1
50 TABLET ORAL DAILY
Qty: 5 TABLET | Refills: 0 | Status: SHIPPED | OUTPATIENT
Start: 2022-04-04 | End: 2022-04-09

## 2022-04-04 RX ORDER — KETOROLAC TROMETHAMINE 30 MG/ML
15 INJECTION, SOLUTION INTRAMUSCULAR; INTRAVENOUS
Status: COMPLETED | OUTPATIENT
Start: 2022-04-04 | End: 2022-04-04

## 2022-04-04 RX ORDER — DIPHENHYDRAMINE HYDROCHLORIDE 50 MG/ML
50 INJECTION INTRAMUSCULAR; INTRAVENOUS
Status: COMPLETED | OUTPATIENT
Start: 2022-04-04 | End: 2022-04-04

## 2022-04-04 RX ORDER — FAMOTIDINE 20 MG/1
20 TABLET, FILM COATED ORAL 2 TIMES DAILY
Qty: 20 TABLET | Refills: 0 | Status: SHIPPED | OUTPATIENT
Start: 2022-04-04 | End: 2023-03-17

## 2022-04-04 RX ORDER — FAMOTIDINE 10 MG/ML
20 INJECTION INTRAVENOUS
Status: COMPLETED | OUTPATIENT
Start: 2022-04-04 | End: 2022-04-04

## 2022-04-04 RX ORDER — IBUPROFEN 600 MG/1
600 TABLET ORAL EVERY 6 HOURS PRN
Qty: 20 TABLET | Refills: 0 | Status: SHIPPED | OUTPATIENT
Start: 2022-04-04 | End: 2022-06-17 | Stop reason: ALTCHOICE

## 2022-04-04 RX ORDER — DEXAMETHASONE SODIUM PHOSPHATE 4 MG/ML
4 INJECTION, SOLUTION INTRA-ARTICULAR; INTRALESIONAL; INTRAMUSCULAR; INTRAVENOUS; SOFT TISSUE
Status: COMPLETED | OUTPATIENT
Start: 2022-04-04 | End: 2022-04-04

## 2022-04-04 RX ADMIN — DEXAMETHASONE SODIUM PHOSPHATE 4 MG: 4 INJECTION INTRA-ARTICULAR; INTRALESIONAL; INTRAMUSCULAR; INTRAVENOUS; SOFT TISSUE at 09:04

## 2022-04-04 RX ADMIN — KETOROLAC TROMETHAMINE 15 MG: 30 INJECTION, SOLUTION INTRAMUSCULAR at 09:04

## 2022-04-04 RX ADMIN — FAMOTIDINE 20 MG: 10 INJECTION INTRAVENOUS at 09:04

## 2022-04-04 RX ADMIN — DIPHENHYDRAMINE HYDROCHLORIDE 50 MG: 50 INJECTION, SOLUTION INTRAMUSCULAR; INTRAVENOUS at 09:04

## 2022-04-04 RX ADMIN — SODIUM CHLORIDE 1000 ML: 0.9 INJECTION, SOLUTION INTRAVENOUS at 09:04

## 2022-04-05 NOTE — ED PROVIDER NOTES
"SCRIBE #1 NOTE: I, Donna Malhotra, am scribing for, and in the presence of, Toni Patel Do, MD. I have scribed the entire note.       History     Chief Complaint   Patient presents with    Shortness of Breath     Mixed bleach and ammonia last night; began with sob after and has gotten worse; obvious difficulty breathing     Review of patient's allergies indicates:   Allergen Reactions    Coreg [carvedilol]      Hair loss    Lisinopril-hydrochlorothiazide      Other reaction(s): Reaction:Throat swelling;         History of Present Illness     HPI    2022, 9:24 PM  History obtained from the patient      History of Present Illness: Demetrice Gaines is a 62 y.o. female patient with a PMHx of HTN who presents to the Emergency Department for evaluation of SOB which onset several hours pta. Pt mixed bleach and ammonia last night and stared to feel SOB. Pt c/o SOB and "feeling like throat is closing" at bedside.  She reports coughing all night last night.  Has mild sore throat at this time and she is hoarse.   Symptoms are constant and moderate in severity. No mitigating or exacerbating factors reported. Associated sxs include sore throat along with lots of coughing and sneezing last night. Patient denies any fever, chills, CP, HA, dizziness, and all other sxs at this time. No prior Tx reported. No further complaints or concerns at this time.       Arrival mode: Personal vehicle      PCP: Lul Alvarez MD        Past Medical History:  Past Medical History:   Diagnosis Date    Bilateral pleural effusion 2020    CAD (coronary artery disease)     Colon polyp     Diabetes mellitus type I     Hypertension     Hyponatremia 2020       Past Surgical History:  Past Surgical History:   Procedure Laterality Date    Benign Cyst Right     BREAST CYST EXCISION Right     pt states benign     SECTION      COLONOSCOPY      COLONOSCOPY N/A 2019    Procedure: COLONOSCOPY;  Surgeon: Ileana" BRICE Holcomb MD;  Location: [facility];  Service: Endoscopy;  Laterality: N/A;         Family History:  Family History   Problem Relation Age of Onset    Cancer Mother     Pacemaker/defibrilator Mother     Glaucoma Mother     Diabetes Father     Hypertension Sister     Glaucoma Sister     Hypertension Brother     Cancer Paternal Grandmother        Social History:  Social History     Tobacco Use    Smoking status: Never Smoker    Smokeless tobacco: Never Used   Substance and Sexual Activity    Alcohol use: Never    Drug use: Never    Sexual activity: Not Currently        Review of Systems     Review of Systems   Constitutional: Negative for chills and fever.   HENT: Positive for sneezing. Negative for sore throat.    Respiratory: Positive for cough and shortness of breath.    Cardiovascular: Negative for chest pain.   Gastrointestinal: Negative for nausea.   Genitourinary: Negative for dysuria.   Musculoskeletal: Negative for back pain.   Skin: Negative for rash.   Neurological: Negative for dizziness, weakness and headaches.   Hematological: Does not bruise/bleed easily.   All other systems reviewed and are negative.       Physical Exam     Initial Vitals [04/04/22 2022]   BP Pulse Resp Temp SpO2   (!) 177/98 (!) 121 (!) 30 98.2 °F (36.8 °C) 95 %      MAP       --          Physical Exam  Nursing Notes and Vital Signs Reviewed.  Constitutional: Patient is in no acute distress. Well-developed and well-nourished.  Head: Atraumatic. Normocephalic.  Eyes: PERRL. EOM intact. Conjunctivae are not pale. No scleral icterus.  ENT: Mucous membranes are moist. Oropharynx is clear and symmetric. No lymph node swelling or neck swelling. No Yeison's angina Tracheae is midline.  Mallampati score is 2. Pt is hoarse  Neck: Supple. Full ROM. No lymphadenopathy.  Cardiovascular: Regular rate. Regular rhythm. No murmurs, rubs, or gallops. Distal pulses are 2+ and symmetric.  Pulmonary/Chest: No respiratory distress. Clear to  "auscultation bilaterally. No wheezing or rales.  Abdominal: Soft and non-distended.  There is no tenderness.  No rebound, guarding, or rigidity. Good bowel sounds.  Genitourinary: No CVA tenderness  Musculoskeletal: Moves all extremities. No obvious deformities. No edema. No calf tenderness.  Skin: Warm and dry.  Neurological:  Alert, awake, and appropriate.  Normal speech.  No acute focal neurological deficits are appreciated.  Psychiatric: Normal affect. Good eye contact. Appropriate in content.     ED Course   Procedures  ED Vital Signs:  Vitals:    04/04/22 2022 04/04/22 2217 04/04/22 2300 04/04/22 2338   BP: (!) 177/98 (!) 147/88 132/85 132/85   Pulse: (!) 121 85 93 93   Resp: (!) 30  (!) 24 20   Temp: 98.2 °F (36.8 °C) 97.9 °F (36.6 °C)  98 °F (36.7 °C)   TempSrc: Oral Oral     SpO2: 95% 100% 99% 99%   Weight: 97.7 kg (215 lb 6.2 oz)      Height: 5' 4" (1.626 m)          Abnormal Lab Results:  Labs Reviewed   CBC W/ AUTO DIFFERENTIAL - Abnormal; Notable for the following components:       Result Value    MPV 9.1 (*)     All other components within normal limits    Narrative:     Release to patient->Immediate   COMPREHENSIVE METABOLIC PANEL - Abnormal; Notable for the following components:    CO2 20 (*)     Glucose 202 (*)     Creatinine 1.5 (*)     Total Protein 8.6 (*)     eGFR if  42 (*)     eGFR if non  37 (*)     All other components within normal limits    Narrative:     Release to patient->Immediate   HIV 1 / 2 ANTIBODY    Narrative:     Release to patient->Immediate   HEPATITIS C ANTIBODY    Narrative:     Release to patient->Immediate   HEP C VIRUS HOLD SPECIMEN    Narrative:     Release to patient->Immediate   TROPONIN I    Narrative:     Release to patient->Immediate   B-TYPE NATRIURETIC PEPTIDE    Narrative:     Release to patient->Immediate        All Lab Results:  Results for orders placed or performed during the hospital encounter of 04/04/22   HIV 1/2 Ag/Ab (4th " Gen)   Result Value Ref Range    HIV 1/2 Ag/Ab Negative Negative   Hepatitis C Antibody   Result Value Ref Range    Hepatitis C Ab Negative Negative   HCV Virus Hold Specimen   Result Value Ref Range    HEP C Virus Hold Specimen Hold for HCV sendout    CBC auto differential   Result Value Ref Range    WBC 8.87 3.90 - 12.70 K/uL    RBC 4.71 4.00 - 5.40 M/uL    Hemoglobin 13.3 12.0 - 16.0 g/dL    Hematocrit 39.7 37.0 - 48.5 %    MCV 84 82 - 98 fL    MCH 28.2 27.0 - 31.0 pg    MCHC 33.5 32.0 - 36.0 g/dL    RDW 14.2 11.5 - 14.5 %    Platelets 405 150 - 450 K/uL    MPV 9.1 (L) 9.2 - 12.9 fL    Immature Granulocytes 0.3 0.0 - 0.5 %    Gran # (ANC) 4.6 1.8 - 7.7 K/uL    Immature Grans (Abs) 0.03 0.00 - 0.04 K/uL    Lymph # 3.0 1.0 - 4.8 K/uL    Mono # 0.9 0.3 - 1.0 K/uL    Eos # 0.3 0.0 - 0.5 K/uL    Baso # 0.05 0.00 - 0.20 K/uL    nRBC 0 0 /100 WBC    Gran % 51.3 38.0 - 73.0 %    Lymph % 34.3 18.0 - 48.0 %    Mono % 10.3 4.0 - 15.0 %    Eosinophil % 3.2 0.0 - 8.0 %    Basophil % 0.6 0.0 - 1.9 %    Differential Method Automated    Comprehensive metabolic panel   Result Value Ref Range    Sodium 136 136 - 145 mmol/L    Potassium 4.4 3.5 - 5.1 mmol/L    Chloride 102 95 - 110 mmol/L    CO2 20 (L) 23 - 29 mmol/L    Glucose 202 (H) 70 - 110 mg/dL    BUN 22 8 - 23 mg/dL    Creatinine 1.5 (H) 0.5 - 1.4 mg/dL    Calcium 9.2 8.7 - 10.5 mg/dL    Total Protein 8.6 (H) 6.0 - 8.4 g/dL    Albumin 4.3 3.5 - 5.2 g/dL    Total Bilirubin 0.4 0.1 - 1.0 mg/dL    Alkaline Phosphatase 61 55 - 135 U/L    AST 38 10 - 40 U/L    ALT 33 10 - 44 U/L    Anion Gap 14 8 - 16 mmol/L    eGFR if African American 42 (A) >60 mL/min/1.73 m^2    eGFR if non African American 37 (A) >60 mL/min/1.73 m^2   Troponin I #1   Result Value Ref Range    Troponin I <0.006 0.000 - 0.026 ng/mL   BNP   Result Value Ref Range    BNP <10 0 - 99 pg/mL       Imaging Results:  Imaging Results          X-Ray Neck Soft Tissue (Final result)  Result time 04/04/22 22:40:15    Final  result by Terrell Valdez MD (04/04/22 22:40:15)                 Impression:      No definite acute abnormality.  Further evaluation as clinically warranted.      Electronically signed by: Terrell Valdez  Date:    04/04/2022  Time:    22:40             Narrative:    EXAMINATION:  XR NECK SOFT TISSUE    CLINICAL HISTORY:  Pain, unspecified    TECHNIQUE:  AP and lateral soft tissue views the neck were performed.    COMPARISON:  None.    FINDINGS:  Cervical spine degenerative changes.    No prevertebral soft tissue swelling.  No radiopaque foreign body identified.                               X-Ray Chest AP Portable (Final result)  Result time 04/04/22 21:37:22    Final result by Terrell Valdez MD (04/04/22 21:37:22)                 Impression:      No acute abnormality.      Electronically signed by: Terrell Valdez  Date:    04/04/2022  Time:    21:37             Narrative:    EXAMINATION:  XR CHEST AP PORTABLE    CLINICAL HISTORY:  Chest Pain;    TECHNIQUE:  Single frontal view of the chest was performed.    COMPARISON:  Multiple priors.    FINDINGS:  The lungs are clear, with normal appearance of pulmonary vasculature and no pleural effusion or pneumothorax.    The cardiac silhouette is normal in size. The hilar and mediastinal contours are unremarkable.  Aortic atherosclerosis.    Bones are intact.                                 The EKG was ordered, reviewed, and independently interpreted by the ED provider.  Interpretation time: 2021  Rate: 110 BPM  Rhythm: sinus tachycardia  Interpretation: Anterior infarct, age undetermined. No STEMI.             The Emergency Provider reviewed the vital signs and test results, which are outlined above.     ED Discussion     10:00 PM: Pt states she is feeling better. Throat does not feel tight anymore, but still feels voice is hoarse due to coughing last night.     11:01 PM: Reassessed pt at this time. Discussed with pt all pertinent ED information and results. Discussed pt  dx and plan of tx. Gave pt all f/u and return to the ED instructions. All questions and concerns were addressed at this time. Pt expresses understanding of information and instructions, and is comfortable with plan to discharge. Pt is stable for discharge.    I discussed with patient and/or family/caretaker that evaluation in the ED does not suggest any emergent or life threatening medical conditions requiring immediate intervention beyond what was provided in the ED, and I believe patient is safe for discharge.  Regardless, an unremarkable evaluation in the ED does not preclude the development or presence of a serious of life threatening condition. As such, patient was instructed to return immediately for any worsening or change in current symptoms.       Medical Decision Making:   Clinical Tests:   Lab Tests: Ordered and Reviewed  Radiological Study: Ordered and Reviewed  Medical Tests: Ordered and Reviewed           ED Medication(s):  Medications   dexamethasone injection 4 mg (4 mg Intravenous Given 4/4/22 2131)   famotidine (PF) injection 20 mg (20 mg Intravenous Given 4/4/22 2135)   diphenhydrAMINE injection 50 mg (50 mg Intravenous Given 4/4/22 2132)   ketorolac injection 15 mg (15 mg Intravenous Given 4/4/22 2129)   sodium chloride 0.9% bolus 1,000 mL (0 mLs Intravenous Stopped 4/4/22 2335)       Discharge Medication List as of 4/4/2022 10:54 PM      START taking these medications    Details   famotidine (PEPCID) 20 MG tablet Take 1 tablet (20 mg total) by mouth 2 (two) times daily., Starting Mon 4/4/2022, Until Tue 4/4/2023, Normal      ibuprofen (ADVIL,MOTRIN) 600 MG tablet Take 1 tablet (600 mg total) by mouth every 6 (six) hours as needed for Pain., Starting Mon 4/4/2022, Normal      predniSONE (DELTASONE) 50 MG Tab Take 1 tablet (50 mg total) by mouth once daily. for 5 days, Starting Mon 4/4/2022, Until Sat 4/9/2022, Normal              Follow-up Information     Lul Alvarez MD In 1 day.     Specialty: Family Medicine  Contact information:  [address]  [place] [address]  [phone]                             Scribe Attestation:   Scribe #1: I performed the above scribed service and the documentation accurately describes the services I performed. I attest to the accuracy of the note.     Attending:   Physician Attestation Statement for Scribe #1: I, Toni Patel Do, MD, personally performed the services described in this documentation, as scribed by Donna Malhotra, in my presence, and it is both accurate and complete.           Clinical Impression       ICD-10-CM ICD-9-CM   1. Exposure to chemical inhalation  Z77.098 V87.39   2. SOB (shortness of breath)  R06.02 786.05   3. Chest pain  R07.9 786.50   4. Pain  R52 780.96   5. Laryngitis  J04.0 464.00   6. Pneumonitis due to fumes  J68.0 506.0       Disposition:   Disposition: Discharged  Condition: Stable       Toni Patel Do, MD  04/05/22 0003

## 2022-04-05 NOTE — ED NOTES
Pt reports that she was cleaning when she mixed bleach, ammonia, and pine soil together. Pt reports that since then she has been experiencing a violent cough, some shortness of breath, and some swelling in the throat. Pt spo2 stable, on continuous monitoring.

## 2022-06-17 ENCOUNTER — OFFICE VISIT (OUTPATIENT)
Dept: PRIMARY CARE CLINIC | Facility: CLINIC | Age: 62
End: 2022-06-17
Payer: MEDICARE

## 2022-06-17 ENCOUNTER — TELEPHONE (OUTPATIENT)
Dept: PRIMARY CARE CLINIC | Facility: CLINIC | Age: 62
End: 2022-06-17
Payer: MEDICARE

## 2022-06-17 ENCOUNTER — TELEPHONE (OUTPATIENT)
Dept: PRIMARY CARE CLINIC | Facility: CLINIC | Age: 62
End: 2022-06-17

## 2022-06-17 VITALS
TEMPERATURE: 98 F | SYSTOLIC BLOOD PRESSURE: 122 MMHG | HEART RATE: 85 BPM | WEIGHT: 189 LBS | BODY MASS INDEX: 32.27 KG/M2 | HEIGHT: 64 IN | OXYGEN SATURATION: 98 % | DIASTOLIC BLOOD PRESSURE: 84 MMHG

## 2022-06-17 DIAGNOSIS — G89.29 CHRONIC PAIN OF LEFT KNEE: ICD-10-CM

## 2022-06-17 DIAGNOSIS — L65.9 ALOPECIA: ICD-10-CM

## 2022-06-17 DIAGNOSIS — M70.50 PES ANSERINE BURSITIS: ICD-10-CM

## 2022-06-17 DIAGNOSIS — H53.8 BLURRY VISION, BILATERAL: ICD-10-CM

## 2022-06-17 DIAGNOSIS — I25.10 CORONARY ARTERY DISEASE INVOLVING NATIVE CORONARY ARTERY OF NATIVE HEART WITHOUT ANGINA PECTORIS: ICD-10-CM

## 2022-06-17 DIAGNOSIS — I10 ESSENTIAL HYPERTENSION: Primary | ICD-10-CM

## 2022-06-17 DIAGNOSIS — M25.562 CHRONIC PAIN OF LEFT KNEE: ICD-10-CM

## 2022-06-17 DIAGNOSIS — E11.65 UNCONTROLLED TYPE 2 DIABETES MELLITUS WITH HYPERGLYCEMIA: ICD-10-CM

## 2022-06-17 PROCEDURE — 3008F PR BODY MASS INDEX (BMI) DOCUMENTED: ICD-10-PCS | Mod: CPTII,S$GLB,, | Performed by: FAMILY MEDICINE

## 2022-06-17 PROCEDURE — 99999 PR PBB SHADOW E&M-EST. PATIENT-LVL V: CPT | Mod: PBBFAC,,, | Performed by: FAMILY MEDICINE

## 2022-06-17 PROCEDURE — 4010F ACE/ARB THERAPY RXD/TAKEN: CPT | Mod: CPTII,S$GLB,, | Performed by: FAMILY MEDICINE

## 2022-06-17 PROCEDURE — 99214 PR OFFICE/OUTPT VISIT, EST, LEVL IV, 30-39 MIN: ICD-10-PCS | Mod: S$GLB,,, | Performed by: FAMILY MEDICINE

## 2022-06-17 PROCEDURE — 3079F DIAST BP 80-89 MM HG: CPT | Mod: CPTII,S$GLB,, | Performed by: FAMILY MEDICINE

## 2022-06-17 PROCEDURE — 99214 OFFICE O/P EST MOD 30 MIN: CPT | Mod: S$GLB,,, | Performed by: FAMILY MEDICINE

## 2022-06-17 PROCEDURE — 99999 PR PBB SHADOW E&M-EST. PATIENT-LVL V: ICD-10-PCS | Mod: PBBFAC,,, | Performed by: FAMILY MEDICINE

## 2022-06-17 PROCEDURE — 4010F PR ACE/ARB THEARPY RXD/TAKEN: ICD-10-PCS | Mod: CPTII,S$GLB,, | Performed by: FAMILY MEDICINE

## 2022-06-17 PROCEDURE — 3079F PR MOST RECENT DIASTOLIC BLOOD PRESSURE 80-89 MM HG: ICD-10-PCS | Mod: CPTII,S$GLB,, | Performed by: FAMILY MEDICINE

## 2022-06-17 PROCEDURE — 1159F MED LIST DOCD IN RCRD: CPT | Mod: CPTII,S$GLB,, | Performed by: FAMILY MEDICINE

## 2022-06-17 PROCEDURE — 99499 UNLISTED E&M SERVICE: CPT | Mod: S$GLB,,, | Performed by: FAMILY MEDICINE

## 2022-06-17 PROCEDURE — 99499 RISK ADDL DX/OHS AUDIT: ICD-10-PCS | Mod: S$GLB,,, | Performed by: FAMILY MEDICINE

## 2022-06-17 PROCEDURE — 1159F PR MEDICATION LIST DOCUMENTED IN MEDICAL RECORD: ICD-10-PCS | Mod: CPTII,S$GLB,, | Performed by: FAMILY MEDICINE

## 2022-06-17 PROCEDURE — 3074F PR MOST RECENT SYSTOLIC BLOOD PRESSURE < 130 MM HG: ICD-10-PCS | Mod: CPTII,S$GLB,, | Performed by: FAMILY MEDICINE

## 2022-06-17 PROCEDURE — 3008F BODY MASS INDEX DOCD: CPT | Mod: CPTII,S$GLB,, | Performed by: FAMILY MEDICINE

## 2022-06-17 PROCEDURE — 3074F SYST BP LT 130 MM HG: CPT | Mod: CPTII,S$GLB,, | Performed by: FAMILY MEDICINE

## 2022-06-17 PROCEDURE — 3052F PR MOST RECENT HEMOGLOBIN A1C LEVEL 8.0 - < 9.0%: ICD-10-PCS | Mod: CPTII,S$GLB,, | Performed by: FAMILY MEDICINE

## 2022-06-17 PROCEDURE — 3052F HG A1C>EQUAL 8.0%<EQUAL 9.0%: CPT | Mod: CPTII,S$GLB,, | Performed by: FAMILY MEDICINE

## 2022-06-17 RX ORDER — METFORMIN HYDROCHLORIDE 1000 MG/1
1000 TABLET, FILM COATED, EXTENDED RELEASE ORAL
Qty: 90 TABLET | Refills: 1 | Status: SHIPPED | OUTPATIENT
Start: 2022-06-17 | End: 2023-07-18 | Stop reason: SDUPTHER

## 2022-06-17 RX ORDER — IBUPROFEN 600 MG/1
600 TABLET ORAL EVERY 6 HOURS PRN
Qty: 40 TABLET | Refills: 0 | Status: SHIPPED | OUTPATIENT
Start: 2022-06-17 | End: 2022-06-27

## 2022-06-17 RX ORDER — AMLODIPINE AND OLMESARTAN MEDOXOMIL 10; 40 MG/1; MG/1
1 TABLET ORAL DAILY
Qty: 90 TABLET | Refills: 1 | Status: SHIPPED | OUTPATIENT
Start: 2022-06-17 | End: 2023-07-18 | Stop reason: SDUPTHER

## 2022-06-17 RX ORDER — TRIAMTERENE AND HYDROCHLOROTHIAZIDE 37.5; 25 MG/1; MG/1
1 CAPSULE ORAL EVERY MORNING
COMMUNITY
End: 2022-08-01 | Stop reason: SDUPTHER

## 2022-06-17 NOTE — PROGRESS NOTES
Subjective:       Patient ID: Demetrice Gaines is a 62 y.o. female.    Chief Complaint: Establish Care and Hypertension (Dizzy spells, light head and light spots in eyes,knee pain and swelling)      HPI   History of Present Illness:   Demetrice Gaines 62 y.o. female presents today with   She is worried about her medications, thinks it is too much. Complaint of dizziness-light headed.   C/O blurry vision-lens are cloudy.  Dizzy spells, light head and light spots in   Eyes, Left knee pain and swelling   Left knee pain: present for over a year. Worsening. And now giving up on her. Xray-not done. Ortho referrral: did not go.  Past Medical History:   Diagnosis Date    Bilateral pleural effusion 6/20/2020    CAD (coronary artery disease)     Colon polyp     Diabetes mellitus type I     Hypertension     Hyponatremia 6/20/2020     Family History   Problem Relation Age of Onset    Cancer Mother     Pacemaker/defibrilator Mother     Glaucoma Mother     Diabetes Father     Hypertension Sister     Glaucoma Sister     Hypertension Brother     Cancer Paternal Grandmother      Social History     Socioeconomic History    Marital status:    Tobacco Use    Smoking status: Never Smoker    Smokeless tobacco: Never Used   Substance and Sexual Activity    Alcohol use: Never    Drug use: Never    Sexual activity: Not Currently     Outpatient Encounter Medications as of 6/17/2022   Medication Sig Dispense Refill    aspirin (ECOTRIN) 81 MG EC tablet Take 1 tablet (81 mg total) by mouth once daily. 90 tablet 3    spironolactone (ALDACTONE) 100 MG tablet Take 1 tablet (100 mg total) by mouth once daily. 30 tablet 5    triamterene-hydrochlorothiazide 37.5-25 mg (DYAZIDE) 37.5-25 mg per capsule Take 1 capsule by mouth every morning.      [DISCONTINUED] amLODIPine (NORVASC) 10 MG tablet Take 1 tablet (10 mg total) by mouth once daily. 90 tablet 2    [DISCONTINUED] metFORMIN (GLUCOPHAGE) 500 MG tablet Take 1  "tablet (500 mg total) by mouth 2 (two) times daily with meals. 180 tablet 3    [DISCONTINUED] olmesartan (BENICAR) 40 MG tablet Take 1 tablet (40 mg total) by mouth once daily. 90 tablet 2    amlodipine-olmesartan (LEATHA) 10-40 mg per tablet Take 1 tablet by mouth once daily. 90 tablet 1    atorvastatin (LIPITOR) 20 MG tablet Take 1 tablet (20 mg total) by mouth once daily. (Patient not taking: Reported on 6/17/2022) 90 tablet 3    famotidine (PEPCID) 20 MG tablet Take 1 tablet (20 mg total) by mouth 2 (two) times daily. (Patient not taking: Reported on 6/17/2022) 20 tablet 0    ibuprofen (ADVIL,MOTRIN) 600 MG tablet Take 1 tablet (600 mg total) by mouth every 6 (six) hours as needed for Pain. 40 tablet 0    metFORMIN (GLUMETZA) 1000 MG (MOD) 24hr tablet Take 1 tablet (1,000 mg total) by mouth daily with breakfast. 90 tablet 1    triamcinolone acetonide 0.1% (KENALOG) 0.1 % ointment Apply topically 2 (two) times daily. Use up to 2 weeks at a time. 80 g 1    [DISCONTINUED] hydrALAZINE (APRESOLINE) 50 MG tablet Take 1 tablet (50 mg total) by mouth 3 (three) times daily. 90 tablet 11    [DISCONTINUED] ibuprofen (ADVIL,MOTRIN) 600 MG tablet Take 1 tablet (600 mg total) by mouth every 6 (six) hours as needed for Pain. 20 tablet 0     No facility-administered encounter medications on file as of 6/17/2022.       Review of Systems    Review of Systems      A complete 10 point ROS was completed and are positive as per above HPI.    Otherwise negative for fever, diplopia, chest pain, shortness of breath, vomiting, blood in urine, joint pain, skin rash, seizures and unusual bleeding.     Objective:      /84 (BP Location: Right arm, Patient Position: Sitting, BP Method: Medium (Manual))   Pulse 85   Temp 97.5 °F (36.4 °C) (Temporal)   Ht 5' 4" (1.626 m)   Wt 85.7 kg (189 lb)   SpO2 98%   BMI 32.44 kg/m²   Physical Exam    CONSTITUTIONAL: No apparent distress. Appears comfortable.  CARDIOVASCULAR: No perioral " cyanosis  PULMONARY: Breathing unlabored. No retractions Chest expansion grossly normal.  PSYCHIATRIC: Alert and conversant and grossly oriented. Mood is grossly neutral. Affect appropriate.   NEUROLOGIC: No focal sensory deficits reported.   Results for orders placed or performed during the hospital encounter of 04/04/22   HIV 1/2 Ag/Ab (4th Gen)   Result Value Ref Range    HIV 1/2 Ag/Ab Negative Negative   Hepatitis C Antibody   Result Value Ref Range    Hepatitis C Ab Negative Negative   HCV Virus Hold Specimen   Result Value Ref Range    HEP C Virus Hold Specimen Hold for HCV sendout    CBC auto differential   Result Value Ref Range    WBC 8.87 3.90 - 12.70 K/uL    RBC 4.71 4.00 - 5.40 M/uL    Hemoglobin 13.3 12.0 - 16.0 g/dL    Hematocrit 39.7 37.0 - 48.5 %    MCV 84 82 - 98 fL    MCH 28.2 27.0 - 31.0 pg    MCHC 33.5 32.0 - 36.0 g/dL    RDW 14.2 11.5 - 14.5 %    Platelets 405 150 - 450 K/uL    MPV 9.1 (L) 9.2 - 12.9 fL    Immature Granulocytes 0.3 0.0 - 0.5 %    Gran # (ANC) 4.6 1.8 - 7.7 K/uL    Immature Grans (Abs) 0.03 0.00 - 0.04 K/uL    Lymph # 3.0 1.0 - 4.8 K/uL    Mono # 0.9 0.3 - 1.0 K/uL    Eos # 0.3 0.0 - 0.5 K/uL    Baso # 0.05 0.00 - 0.20 K/uL    nRBC 0 0 /100 WBC    Gran % 51.3 38.0 - 73.0 %    Lymph % 34.3 18.0 - 48.0 %    Mono % 10.3 4.0 - 15.0 %    Eosinophil % 3.2 0.0 - 8.0 %    Basophil % 0.6 0.0 - 1.9 %    Differential Method Automated    Comprehensive metabolic panel   Result Value Ref Range    Sodium 136 136 - 145 mmol/L    Potassium 4.4 3.5 - 5.1 mmol/L    Chloride 102 95 - 110 mmol/L    CO2 20 (L) 23 - 29 mmol/L    Glucose 202 (H) 70 - 110 mg/dL    BUN 22 8 - 23 mg/dL    Creatinine 1.5 (H) 0.5 - 1.4 mg/dL    Calcium 9.2 8.7 - 10.5 mg/dL    Total Protein 8.6 (H) 6.0 - 8.4 g/dL    Albumin 4.3 3.5 - 5.2 g/dL    Total Bilirubin 0.4 0.1 - 1.0 mg/dL    Alkaline Phosphatase 61 55 - 135 U/L    AST 38 10 - 40 U/L    ALT 33 10 - 44 U/L    Anion Gap 14 8 - 16 mmol/L    eGFR if African American 42  (A) >60 mL/min/1.73 m^2    eGFR if non African American 37 (A) >60 mL/min/1.73 m^2   Troponin I #1   Result Value Ref Range    Troponin I <0.006 0.000 - 0.026 ng/mL   BNP   Result Value Ref Range    BNP <10 0 - 99 pg/mL     Assessment:       1. Essential hypertension    2. Blurry vision, bilateral    3. Coronary artery disease involving native coronary artery of native heart without angina pectoris    4. Uncontrolled type 2 diabetes mellitus with hyperglycemia    5. Chronic pain of left knee    6. Pes anserine bursitis    7. Alopecia        Plan:   Essential hypertension  -     amlodipine-olmesartan (LEATHA) 10-40 mg per tablet; Take 1 tablet by mouth once daily.  Dispense: 90 tablet; Refill: 1    Blurry vision, bilateral  -     Ambulatory referral/consult to Optometry; Future; Expected date: 06/24/2022    Coronary artery disease involving native coronary artery of native heart without angina pectoris  -     EKG 12-lead; Future; Expected date: 06/17/2022    Uncontrolled type 2 diabetes mellitus with hyperglycemia  -     metFORMIN (GLUMETZA) 1000 MG (MOD) 24hr tablet; Take 1 tablet (1,000 mg total) by mouth daily with breakfast.  Dispense: 90 tablet; Refill: 1  -     Hemoglobin A1C; Future; Expected date: 06/17/2022    Chronic pain of left knee  -     X-ray Knee Ortho Left; Future; Expected date: 06/17/2022  -     Ambulatory referral/consult to Physical/Occupational Therapy; Future; Expected date: 06/24/2022    Pes anserine bursitis  -     Ambulatory referral/consult to Physical/Occupational Therapy; Future; Expected date: 06/24/2022      Sxs likely due to Uncontrolled BG which will be worse if steroid is given for the knee, therefore will check A1c, review and add to her hyperglycemic and consider injection. Meanwhile, ibuprofen prn as requested and PT.     HTN meds combined for complaince.    Treatment options and alternatives were discussed with the patient. Patient was given ample time to ask questions. All  questions were answered. Voices understanding and acceptance of this advice. Will call back if any further questions or concerns.    Sandra Bean MD

## 2022-06-17 NOTE — TELEPHONE ENCOUNTER
Returned call to patient regarding forms. No answer unable to leave message. Per Dr Bean patient must provide a blank form that is to be completed by the provider..     ----- Message from Marc Wagner sent at 6/17/2022  3:29 PM CDT -----  Good afternoon,     I just spoke with patient to schedule Mammo, she would like to know if the paper work she dropped off today for her son's extended leave will be completed by Monday? Her appointments are scheduled around her son because he's her transportation. Please advise.

## 2022-06-19 ENCOUNTER — PATIENT MESSAGE (OUTPATIENT)
Dept: PRIMARY CARE CLINIC | Facility: CLINIC | Age: 62
End: 2022-06-19
Payer: MEDICARE

## 2022-06-20 ENCOUNTER — TELEPHONE (OUTPATIENT)
Dept: PRIMARY CARE CLINIC | Facility: CLINIC | Age: 62
End: 2022-06-20
Payer: MEDICARE

## 2022-06-23 ENCOUNTER — PATIENT MESSAGE (OUTPATIENT)
Dept: PRIMARY CARE CLINIC | Facility: CLINIC | Age: 62
End: 2022-06-23
Payer: MEDICARE

## 2022-06-24 ENCOUNTER — PATIENT OUTREACH (OUTPATIENT)
Dept: ADMINISTRATIVE | Facility: HOSPITAL | Age: 62
End: 2022-06-24
Payer: MEDICARE

## 2022-06-27 ENCOUNTER — TELEPHONE (OUTPATIENT)
Dept: PRIMARY CARE CLINIC | Facility: CLINIC | Age: 62
End: 2022-06-27
Payer: MEDICARE

## 2022-08-01 RX ORDER — TRIAMTERENE AND HYDROCHLOROTHIAZIDE 37.5; 25 MG/1; MG/1
1 CAPSULE ORAL EVERY MORNING
Qty: 30 CAPSULE | Refills: 2 | Status: SHIPPED | OUTPATIENT
Start: 2022-08-01 | End: 2023-03-17

## 2022-08-01 NOTE — TELEPHONE ENCOUNTER
----- Message from Citlalli Garcia sent at 7/29/2022  3:38 PM CDT -----  Contact: Phoebe  .Type:  RX Refill Request    Who Called: Phoebe   Refill or New Rx:  refill  RX Name and Strength:triamterene-hydrochlorothiazide 37.5-25 mg (DYAZIDE) 37.5-25 mg per capsule    How is the patient currently taking it? (ex. 1XDay):  I  s this a 30 day or 90 day RX:    Preferred Pharmacy with phone number:   [facility] (Now [facility]) - [place], [place] - [address]  [address] [address]  Phone: [phone] Fax: [phone]     Local or Mail Order: mail     Ordering Provider: Antonio     Would the patient rather a call back or a response via [facility]? Call     Best Call Back Number: [phone]     Additional Information:  the caller is form [facility] calling for a receipt status on a fax send on 07/22/2022. Thanks        
Refill Routing Note   Medication(s) are not appropriate for processing by [facility] for the following reason(s):      - Required laboratory values are abnormal    [facility] action(s):  Defer          Medication reconciliation completed: No     Appointments  past 12m or future 3m with PCP    Date Provider   Last Visit   3/8/2022 Lul Alvarez MD   Next Visit   Visit date not found Lul Alvarez MD   ED visits in past 90 days: 0        Note composed:11:37 AM 08/01/2022          
20-Mar-2020 20:09

## 2022-08-09 ENCOUNTER — PATIENT MESSAGE (OUTPATIENT)
Dept: ADMINISTRATIVE | Facility: HOSPITAL | Age: 62
End: 2022-08-09
Payer: MEDICARE

## 2022-08-17 ENCOUNTER — PATIENT OUTREACH (OUTPATIENT)
Dept: ADMINISTRATIVE | Facility: HOSPITAL | Age: 62
End: 2022-08-17
Payer: MEDICARE

## 2022-08-17 NOTE — PROGRESS NOTES
[facility] Report:  Pt on amlodipine-olmesartan, [facility] informs never picked up it was cancelled due to either high price or pharmacy transfer, called Pt to verify Pt informs she gets it from [facility] mailed to her 2 weeks ago at [facility].  No DM urine or Nephro appt found for 2022.

## 2022-08-17 NOTE — PROGRESS NOTES
[employer] [facility] Med [facility] Report: Per chart review, patient is taking Metformin 1000 mg, 90 day supply dispensed on 6/17/22.

## 2022-08-19 ENCOUNTER — PATIENT MESSAGE (OUTPATIENT)
Dept: ADMINISTRATIVE | Facility: HOSPITAL | Age: 62
End: 2022-08-19
Payer: MEDICARE

## 2022-08-19 ENCOUNTER — PATIENT OUTREACH (OUTPATIENT)
Dept: ADMINISTRATIVE | Facility: HOSPITAL | Age: 62
End: 2022-08-19
Payer: MEDICARE

## 2022-09-06 ENCOUNTER — PATIENT MESSAGE (OUTPATIENT)
Dept: ADMINISTRATIVE | Facility: HOSPITAL | Age: 62
End: 2022-09-06
Payer: MEDICARE

## 2022-09-07 DIAGNOSIS — E11.9 TYPE 2 DIABETES MELLITUS WITHOUT COMPLICATION: ICD-10-CM

## 2022-10-04 ENCOUNTER — PATIENT MESSAGE (OUTPATIENT)
Dept: ADMINISTRATIVE | Facility: HOSPITAL | Age: 62
End: 2022-10-04
Payer: MEDICARE

## 2022-10-11 ENCOUNTER — PATIENT OUTREACH (OUTPATIENT)
Dept: ADMINISTRATIVE | Facility: HOSPITAL | Age: 62
End: 2022-10-11
Payer: MEDICARE

## 2022-10-11 NOTE — PROGRESS NOTES
Working Medication Compliance Report:     Spoke with [facility], they state metformin was never picked up from 06/17/2022. It was transferred out, they did not state which pharm it was transferred to. Called pt to discuss, tigre message.

## 2022-10-12 ENCOUNTER — PATIENT OUTREACH (OUTPATIENT)
Dept: ADMINISTRATIVE | Facility: HOSPITAL | Age: 62
End: 2022-10-12
Payer: MEDICARE

## 2022-10-12 NOTE — PROGRESS NOTES
Working the [employer] eye Report: Called patient to discuss scheduling an eye exam. Left a message

## 2022-10-18 ENCOUNTER — PATIENT OUTREACH (OUTPATIENT)
Dept: ADMINISTRATIVE | Facility: HOSPITAL | Age: 62
End: 2022-10-18
Payer: MEDICARE

## 2022-10-18 ENCOUNTER — PATIENT MESSAGE (OUTPATIENT)
Dept: ADMINISTRATIVE | Facility: HOSPITAL | Age: 62
End: 2022-10-18
Payer: MEDICARE

## 2022-10-18 NOTE — PROGRESS NOTES
OHN A1c Report: Patient notified to schedule overdue recheck of Hemoglobin A1c. Patient states she is out of town but will call back to schedule appointment when she returns.

## 2022-11-16 ENCOUNTER — PATIENT OUTREACH (OUTPATIENT)
Dept: ADMINISTRATIVE | Facility: HOSPITAL | Age: 62
End: 2022-11-16
Payer: MEDICARE

## 2022-11-16 NOTE — PROGRESS NOTES
A1C REPORT: Called and spoke with patient. Notified that she is due for labs and appt with PCP. She declined scheduling at this time. She stated that she is still in [facility]. That once she comes back in to town that she will call back to get scheduled.

## 2022-12-06 ENCOUNTER — PATIENT MESSAGE (OUTPATIENT)
Dept: ADMINISTRATIVE | Facility: HOSPITAL | Age: 62
End: 2022-12-06
Payer: MEDICARE

## 2022-12-14 ENCOUNTER — PATIENT MESSAGE (OUTPATIENT)
Dept: INTERNAL MEDICINE | Facility: CLINIC | Age: 62
End: 2022-12-14
Payer: MEDICARE

## 2022-12-20 ENCOUNTER — PATIENT OUTREACH (OUTPATIENT)
Dept: ADMINISTRATIVE | Facility: HOSPITAL | Age: 62
End: 2022-12-20
Payer: MEDICARE

## 2022-12-30 ENCOUNTER — TELEPHONE (OUTPATIENT)
Dept: DERMATOLOGY | Facility: CLINIC | Age: 62
End: 2022-12-30
Payer: MEDICARE

## 2022-12-30 NOTE — TELEPHONE ENCOUNTER
Called patient in regards to appointment being canceled. Due to location change of that day. Offered patient to give me and call back in regards to rescheduling

## 2023-01-13 ENCOUNTER — PATIENT MESSAGE (OUTPATIENT)
Dept: ADMINISTRATIVE | Facility: HOSPITAL | Age: 63
End: 2023-01-13
Payer: MEDICARE

## 2023-01-13 ENCOUNTER — PATIENT OUTREACH (OUTPATIENT)
Dept: ADMINISTRATIVE | Facility: HOSPITAL | Age: 63
End: 2023-01-13
Payer: MEDICARE

## 2023-01-13 NOTE — PROGRESS NOTES
BR DM eye exam:  Per chart review last DM eye exam 11/24/20, attempted to contact pt in regards to scheduling exam, no ans, lvm, will also send pt portal message.

## 2023-01-25 ENCOUNTER — PATIENT MESSAGE (OUTPATIENT)
Dept: ADMINISTRATIVE | Facility: HOSPITAL | Age: 63
End: 2023-01-25
Payer: MEDICARE

## 2023-02-15 ENCOUNTER — TELEPHONE (OUTPATIENT)
Dept: FAMILY MEDICINE | Facility: CLINIC | Age: 63
End: 2023-02-15
Payer: MEDICARE

## 2023-02-15 NOTE — TELEPHONE ENCOUNTER
Spoke to patient and advised that we can order xray at visit tomorrow if provider feels that is necessary. Verbalized understanding.

## 2023-02-15 NOTE — TELEPHONE ENCOUNTER
----- Message from Antoinette Colin sent at 2/15/2023  4:07 PM CST -----  Contact: self  Type: Sooner Appointment Request        Caller is requesting a sooner appointment. Caller declined first available appointment listed below. Caller will not accept being placed on the waitlist and is requesting a message be sent to doctor.        Name of Caller: Patient   Best Call Back Number: [phone]  Additional Information: Patient is being seen tomorrow as a NP. Pt fell 2 weeks ago and wants to have her left leg X-ray'd seen a doctor in [facility] but is now in [place] and states her leg is still hurting. Plz put in a  order for pt to be X-rayed. Thanks

## 2023-02-16 ENCOUNTER — PATIENT MESSAGE (OUTPATIENT)
Dept: INTERNAL MEDICINE | Facility: CLINIC | Age: 63
End: 2023-02-16
Payer: MEDICARE

## 2023-02-16 ENCOUNTER — TELEPHONE (OUTPATIENT)
Dept: ORTHOPEDICS | Facility: CLINIC | Age: 63
End: 2023-02-16
Payer: MEDICARE

## 2023-02-16 ENCOUNTER — OFFICE VISIT (OUTPATIENT)
Dept: FAMILY MEDICINE | Facility: CLINIC | Age: 63
End: 2023-02-16
Payer: MEDICARE

## 2023-02-16 ENCOUNTER — HOSPITAL ENCOUNTER (OUTPATIENT)
Dept: RADIOLOGY | Facility: HOSPITAL | Age: 63
Discharge: HOME | End: 2023-02-16
Attending: STUDENT IN AN ORGANIZED HEALTH CARE EDUCATION/TRAINING PROGRAM
Payer: MEDICARE

## 2023-02-16 VITALS
HEIGHT: 64 IN | SYSTOLIC BLOOD PRESSURE: 124 MMHG | BODY MASS INDEX: 32.14 KG/M2 | RESPIRATION RATE: 18 BRPM | WEIGHT: 188.25 LBS | HEART RATE: 88 BPM | OXYGEN SATURATION: 96 % | DIASTOLIC BLOOD PRESSURE: 78 MMHG

## 2023-02-16 DIAGNOSIS — M79.605 LEFT LEG PAIN: Primary | ICD-10-CM

## 2023-02-16 DIAGNOSIS — Z00.00 ANNUAL PHYSICAL EXAM: Primary | ICD-10-CM

## 2023-02-16 DIAGNOSIS — M85.671 OTHER CYST OF BONE, RIGHT ANKLE AND FOOT: ICD-10-CM
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
## 2023-02-16 DIAGNOSIS — Z86.39 PERSONAL HISTORY OF OTHER ENDOCRINE, NUTRITIONAL AND METABOLIC DISEASE: ICD-10-CM

## 2023-02-16 DIAGNOSIS — I25.10 CORONARY ARTERY DISEASE INVOLVING NATIVE CORONARY ARTERY OF NATIVE HEART WITHOUT ANGINA PECTORIS: Chronic | ICD-10-CM

## 2023-02-16 DIAGNOSIS — M25.462 EFFUSION OF LEFT KNEE: ICD-10-CM

## 2023-02-16 DIAGNOSIS — M79.605 LEFT LEG PAIN: ICD-10-CM

## 2023-02-16 DIAGNOSIS — E11.9 TYPE 2 DIABETES MELLITUS WITHOUT COMPLICATION, WITHOUT LONG-TERM CURRENT USE OF INSULIN: Chronic | ICD-10-CM

## 2023-02-16 PROCEDURE — 3008F BODY MASS INDEX DOCD: CPT | Mod: CPTII,S$GLB,, | Performed by: STUDENT IN AN ORGANIZED HEALTH CARE EDUCATION/TRAINING PROGRAM

## 2023-02-16 PROCEDURE — 99214 PR OFFICE/OUTPT VISIT, EST, LEVL IV, 30-39 MIN: ICD-10-PCS | Mod: S$GLB,,, | Performed by: STUDENT IN AN ORGANIZED HEALTH CARE EDUCATION/TRAINING PROGRAM

## 2023-02-16 PROCEDURE — 3074F PR MOST RECENT SYSTOLIC BLOOD PRESSURE < 130 MM HG: ICD-10-PCS | Mod: CPTII,S$GLB,, | Performed by: STUDENT IN AN ORGANIZED HEALTH CARE EDUCATION/TRAINING PROGRAM

## 2023-02-16 PROCEDURE — 3008F PR BODY MASS INDEX (BMI) DOCUMENTED: ICD-10-PCS | Mod: CPTII,S$GLB,, | Performed by: STUDENT IN AN ORGANIZED HEALTH CARE EDUCATION/TRAINING PROGRAM

## 2023-02-16 PROCEDURE — 73610 XR ANKLE COMPLETE 3 VIEW LEFT: ICD-10-PCS | Mod: 26,LT,, | Performed by: RADIOLOGY

## 2023-02-16 PROCEDURE — 73562 X-RAY EXAM OF KNEE 3: CPT | Mod: 26,LT,, | Performed by: RADIOLOGY

## 2023-02-16 PROCEDURE — 3074F SYST BP LT 130 MM HG: CPT | Mod: CPTII,S$GLB,, | Performed by: STUDENT IN AN ORGANIZED HEALTH CARE EDUCATION/TRAINING PROGRAM

## 2023-02-16 PROCEDURE — 73610 X-RAY EXAM OF ANKLE: CPT | Mod: TC,PN,LT

## 2023-02-16 PROCEDURE — 99214 OFFICE O/P EST MOD 30 MIN: CPT | Mod: S$GLB,,, | Performed by: STUDENT IN AN ORGANIZED HEALTH CARE EDUCATION/TRAINING PROGRAM

## 2023-02-16 PROCEDURE — 73590 X-RAY EXAM OF LOWER LEG: CPT | Mod: TC,PN,LT

## 2023-02-16 PROCEDURE — 3078F PR MOST RECENT DIASTOLIC BLOOD PRESSURE < 80 MM HG: ICD-10-PCS | Mod: CPTII,S$GLB,, | Performed by: STUDENT IN AN ORGANIZED HEALTH CARE EDUCATION/TRAINING PROGRAM

## 2023-02-16 PROCEDURE — 73610 X-RAY EXAM OF ANKLE: CPT | Mod: 26,LT,, | Performed by: RADIOLOGY

## 2023-02-16 PROCEDURE — 3078F DIAST BP <80 MM HG: CPT | Mod: CPTII,S$GLB,, | Performed by: STUDENT IN AN ORGANIZED HEALTH CARE EDUCATION/TRAINING PROGRAM

## 2023-02-16 PROCEDURE — 99999 PR PBB SHADOW E&M-EST. PATIENT-LVL V: CPT | Mod: PBBFAC,,, | Performed by: STUDENT IN AN ORGANIZED HEALTH CARE EDUCATION/TRAINING PROGRAM

## 2023-02-16 PROCEDURE — 99999 PR PBB SHADOW E&M-EST. PATIENT-LVL V: ICD-10-PCS | Mod: PBBFAC,,, | Performed by: STUDENT IN AN ORGANIZED HEALTH CARE EDUCATION/TRAINING PROGRAM

## 2023-02-16 PROCEDURE — 1160F PR REVIEW ALL MEDS BY PRESCRIBER/CLIN PHARMACIST DOCUMENTED: ICD-10-PCS | Mod: CPTII,S$GLB,, | Performed by: STUDENT IN AN ORGANIZED HEALTH CARE EDUCATION/TRAINING PROGRAM

## 2023-02-16 PROCEDURE — 73562 X-RAY EXAM OF KNEE 3: CPT | Mod: TC,PN,LT

## 2023-02-16 PROCEDURE — 1159F PR MEDICATION LIST DOCUMENTED IN MEDICAL RECORD: ICD-10-PCS | Mod: CPTII,S$GLB,, | Performed by: STUDENT IN AN ORGANIZED HEALTH CARE EDUCATION/TRAINING PROGRAM

## 2023-02-16 PROCEDURE — 73562 XR KNEE 3 VIEW LEFT: ICD-10-PCS | Mod: 26,LT,, | Performed by: RADIOLOGY

## 2023-02-16 PROCEDURE — 73590 X-RAY EXAM OF LOWER LEG: CPT | Mod: 26,LT,, | Performed by: RADIOLOGY

## 2023-02-16 PROCEDURE — 73590 XR TIBIA FIBULA 2 VIEW LEFT: ICD-10-PCS | Mod: 26,LT,, | Performed by: RADIOLOGY

## 2023-02-16 PROCEDURE — 1159F MED LIST DOCD IN RCRD: CPT | Mod: CPTII,S$GLB,, | Performed by: STUDENT IN AN ORGANIZED HEALTH CARE EDUCATION/TRAINING PROGRAM

## 2023-02-16 PROCEDURE — 1160F RVW MEDS BY RX/DR IN RCRD: CPT | Mod: CPTII,S$GLB,, | Performed by: STUDENT IN AN ORGANIZED HEALTH CARE EDUCATION/TRAINING PROGRAM

## 2023-02-16 RX ORDER — METHYLPREDNISOLONE 4 MG/1
TABLET ORAL
Qty: 21 EACH | Refills: 0 | Status: SHIPPED | OUTPATIENT
Start: 2023-02-16 | End: 2023-03-09

## 2023-02-16 NOTE — PROGRESS NOTES
Plan:      Demetrice was seen today for leg pain.    Diagnoses and all orders for this visit:    Left leg pain  -     X-Ray Tibia Fibula 2 View Left; Future  -     X-Ray Knee 3 View Left; Future  -     X-Ray Ankle Complete 3 View Left; Future  -     methylPREDNISolone (MEDROL DOSEPACK) 4 mg tablet; use as directed  -     Ambulatory referral/consult to Orthopedics; Future    Fall from ground level    Effusion of left knee  -     methylPREDNISolone (MEDROL DOSEPACK) 4 mg tablet; use as directed  -     Ambulatory referral/consult to Orthopedics; Future    Other cyst of bone, right ankle and foot  -     methylPREDNISolone (MEDROL DOSEPACK) 4 mg tablet; use as directed  -     Ambulatory referral/consult to Podiatry; Future      Follow up in about 2 years (around 2/16/2025), or if symptoms worsen or fail to improve.    Courtney Ochoa MD  02/16/2023    Subjective:      Patient ID: Demetrice Gaines is a 62 y.o. female    Chief Complaint   Patient presents with    Leg Pain     Pt stated she fell 2 weeks ago      HPI  62 y.o. female with a PMHx as documented below presents to clinic today for the following:    Pt reports left leg pain from the left knee towards the ankle for the past 2 weeks after ground level fall (tripped while walking). No LOC, no head trauma. Pt reports pain as throbbing w/ intermittent stabbing. Associated symptoms include generalized swelling of the knee and ankle. Additionally, pt reports swelling and discomfort on the top of the right foot. She has been icing on/off every 20 minutes without significant relief.    Review of Systems   Constitutional:  Negative for chills and fever.   Respiratory:  Negative for shortness of breath.    Cardiovascular:  Negative for chest pain.   Gastrointestinal:  Negative for abdominal pain, constipation, diarrhea, nausea and vomiting.   Genitourinary:  Negative for dysuria.   Musculoskeletal:  Positive for falls, joint pain and myalgias.     Current Outpatient  "Medications   Medication Instructions    amlodipine-olmesartan (LEATHA) 10-40 mg per tablet 1 tablet, Oral, Daily    aspirin (ECOTRIN) 81 mg, Oral, Daily    atorvastatin (LIPITOR) 20 mg, Oral, Daily    famotidine (PEPCID) 20 mg, Oral, 2 times daily    metFORMIN (GLUMETZA) 1,000 mg, Oral, With breakfast    methylPREDNISolone (MEDROL DOSEPACK) 4 mg tablet use as directed    spironolactone (ALDACTONE) 100 MG tablet TAKE 1 TABLET EVERY DAY    triamcinolone acetonide 0.1% (KENALOG) 0.1 % ointment Topical (Top), 2 times daily, Use up to 2 weeks at a time.    triamterene-hydrochlorothiazide 37.5-25 mg (DYAZIDE) 37.5-25 mg per capsule 1 capsule, Oral, Every morning      Past Medical History:   Diagnosis Date    Bilateral pleural effusion 6/20/2020    CAD (coronary artery disease)     Colon polyp     Diabetes mellitus type I     Hypertension     Hyponatremia 6/20/2020      Objective:      Vitals:    02/16/23 0837   BP: 124/78   BP Location: Right arm   Patient Position: Sitting   Pulse: 88   Resp: 18   SpO2: 96%   Weight: 85.4 kg (188 lb 4.4 oz)   Height: 5' 4" (1.626 m)     Body mass index is 32.32 kg/m².    Physical Exam  Vitals reviewed.   Constitutional:       General: She is not in acute distress.  HENT:      Head: Normocephalic and atraumatic.   Cardiovascular:      Rate and Rhythm: Normal rate.      Pulses:           Dorsalis pedis pulses are 2+ on the right side and 2+ on the left side.   Pulmonary:      Effort: Pulmonary effort is normal. No respiratory distress.   Musculoskeletal:      Left knee: Swelling, effusion (medial joint line) and bony tenderness present. Decreased range of motion. Tenderness present over the medial joint line.      Right foot: Normal range of motion.      Left foot: Normal range of motion.        Legs:         Feet:       Comments: Localized area of swelling (non-fluctuant) with overlying, healing abrasion as noted on diagram.   Feet:      Right foot:      Skin integrity: Skin integrity " normal.      Left foot:      Skin integrity: Skin integrity normal.      Comments: 2-3 cm, non-mobile, well-circumscribed area of swelling on the dorsum of foot.  Neurological:      General: No focal deficit present.      Mental Status: She is alert and oriented to person, place, and time. Mental status is at baseline.     Assessment:       1. Left leg pain    2. Fall from ground level    3. Effusion of left knee    4. Other cyst of bone, right ankle and foot        Courtney Ochoa MD  [facility]  Office: [phone]   Fax: [phone]  02/16/2023      Disclaimer: This note was partly generated using dictation software which may occasionally result in transcription errors.    Total time spent on this encounter includes face to face time and non-face to face time preparing to see the patient (eg, review of tests), obtaining and/or reviewing separately obtained history, documenting clinical information in the electronic or other health record, independently interpreting results, and communicating results to the patient/family/caregiver, or care coordinator.

## 2023-02-16 NOTE — TELEPHONE ENCOUNTER
Additional labs ordered  Please schedule   When you inform patient that there scheduled, please verify if she wants me or Dr. Bean to be her PCP

## 2023-02-17 ENCOUNTER — TELEPHONE (OUTPATIENT)
Dept: PRIMARY CARE CLINIC | Facility: CLINIC | Age: 63
End: 2023-02-17
Payer: MEDICARE

## 2023-02-17 NOTE — PROGRESS NOTES
Patient, Demetrice Gaines (MRN #[id number]), presented with a recent Estimated Glumerular Filtration Rate (EGFR) between 30 and 45 consistent with the definition of chronic kidney disease stage 3 - moderate (ICD10 - N18.3).    eGFR if    Date Value Ref Range Status   04/04/2022 42 (A) >60 mL/min/1.73 m^2 Final       The patient's chronic kidney disease stage 3 was monitored, evaluated, addressed and/or treated. This addendum to the medical record is made on 02/17/2023.

## 2023-02-17 NOTE — TELEPHONE ENCOUNTER
Patient called to schedule appt , care gaps reviewed with patient during call patient stated that she will call back and schedule her appt when she can be available

## 2023-02-22 ENCOUNTER — PATIENT OUTREACH (OUTPATIENT)
Dept: ADMINISTRATIVE | Facility: HOSPITAL | Age: 63
End: 2023-02-22
Payer: MEDICARE

## 2023-03-17 ENCOUNTER — OFFICE VISIT (OUTPATIENT)
Dept: FAMILY MEDICINE | Facility: CLINIC | Age: 63
End: 2023-03-17
Payer: MEDICARE

## 2023-03-17 ENCOUNTER — HOSPITAL ENCOUNTER (OUTPATIENT)
Dept: RADIOLOGY | Facility: HOSPITAL | Age: 63
Discharge: HOME | End: 2023-03-17
Payer: MEDICARE

## 2023-03-17 VITALS
DIASTOLIC BLOOD PRESSURE: 78 MMHG | SYSTOLIC BLOOD PRESSURE: 106 MMHG | BODY MASS INDEX: 32.29 KG/M2 | HEIGHT: 64 IN | WEIGHT: 189.13 LBS | HEART RATE: 76 BPM

## 2023-03-17 DIAGNOSIS — M25.551 RIGHT HIP PAIN: ICD-10-CM

## 2023-03-17 DIAGNOSIS — Z12.31 ENCOUNTER FOR SCREENING MAMMOGRAM FOR MALIGNANT NEOPLASM OF BREAST: ICD-10-CM

## 2023-03-17 DIAGNOSIS — E11.9 TYPE 2 DIABETES MELLITUS WITHOUT COMPLICATION, WITHOUT LONG-TERM CURRENT USE OF INSULIN: Chronic | ICD-10-CM

## 2023-03-17 DIAGNOSIS — Z78.0 ASYMPTOMATIC MENOPAUSAL STATE: ICD-10-CM

## 2023-03-17 DIAGNOSIS — Z13.820 OSTEOPOROSIS SCREENING: ICD-10-CM

## 2023-03-17 DIAGNOSIS — K92.1 BLOOD IN STOOL: ICD-10-CM

## 2023-03-17 DIAGNOSIS — M85.671 OTHER CYST OF BONE, RIGHT ANKLE AND FOOT: ICD-10-CM

## 2023-03-17 DIAGNOSIS — M79.605 LEFT LEG PAIN: Primary | ICD-10-CM
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
## 2023-03-17 PROCEDURE — 73502 X-RAY EXAM HIP UNI 2-3 VIEWS: CPT | Mod: TC,PN,RT

## 2023-03-17 PROCEDURE — 3061F PR NEG MICROALBUMINURIA RESULT DOCUMENTED/REVIEW: ICD-10-PCS | Mod: CPTII,S$GLB,,

## 2023-03-17 PROCEDURE — 99999 PR PBB SHADOW E&M-EST. PATIENT-LVL IV: ICD-10-PCS | Mod: PBBFAC,,,

## 2023-03-17 PROCEDURE — 1159F MED LIST DOCD IN RCRD: CPT | Mod: CPTII,S$GLB,,

## 2023-03-17 PROCEDURE — 99999 PR PBB SHADOW E&M-EST. PATIENT-LVL IV: CPT | Mod: PBBFAC,,,

## 2023-03-17 PROCEDURE — 3078F DIAST BP <80 MM HG: CPT | Mod: CPTII,S$GLB,,

## 2023-03-17 PROCEDURE — 3078F PR MOST RECENT DIASTOLIC BLOOD PRESSURE < 80 MM HG: ICD-10-PCS | Mod: CPTII,S$GLB,,

## 2023-03-17 PROCEDURE — 3051F PR MOST RECENT HEMOGLOBIN A1C LEVEL 7.0 - < 8.0%: ICD-10-PCS | Mod: CPTII,S$GLB,,

## 2023-03-17 PROCEDURE — 99214 PR OFFICE/OUTPT VISIT, EST, LEVL IV, 30-39 MIN: ICD-10-PCS | Mod: S$GLB,,,

## 2023-03-17 PROCEDURE — 73502 XR HIP WITH PELVIS WHEN PERFORMED, 2 OR 3  VIEWS RIGHT: ICD-10-PCS | Mod: 26,RT,, | Performed by: RADIOLOGY

## 2023-03-17 PROCEDURE — 3288F PR FALLS RISK ASSESSMENT DOCUMENTED: ICD-10-PCS | Mod: CPTII,S$GLB,,

## 2023-03-17 PROCEDURE — 3074F SYST BP LT 130 MM HG: CPT | Mod: CPTII,S$GLB,,

## 2023-03-17 PROCEDURE — 1100F PR PT FALLS ASSESS DOC 2+ FALLS/FALL W/INJURY/YR: ICD-10-PCS | Mod: CPTII,S$GLB,,

## 2023-03-17 PROCEDURE — 1159F PR MEDICATION LIST DOCUMENTED IN MEDICAL RECORD: ICD-10-PCS | Mod: CPTII,S$GLB,,

## 2023-03-17 PROCEDURE — 1100F PTFALLS ASSESS-DOCD GE2>/YR: CPT | Mod: CPTII,S$GLB,,

## 2023-03-17 PROCEDURE — 99499 UNLISTED E&M SERVICE: CPT | Mod: S$GLB,,,

## 2023-03-17 PROCEDURE — 99214 OFFICE O/P EST MOD 30 MIN: CPT | Mod: S$GLB,,,

## 2023-03-17 PROCEDURE — 3008F PR BODY MASS INDEX (BMI) DOCUMENTED: ICD-10-PCS | Mod: CPTII,S$GLB,,

## 2023-03-17 PROCEDURE — 3066F NEPHROPATHY DOC TX: CPT | Mod: CPTII,S$GLB,,

## 2023-03-17 PROCEDURE — 3051F HG A1C>EQUAL 7.0%<8.0%: CPT | Mod: CPTII,S$GLB,,

## 2023-03-17 PROCEDURE — 73502 X-RAY EXAM HIP UNI 2-3 VIEWS: CPT | Mod: 26,RT,, | Performed by: RADIOLOGY

## 2023-03-17 PROCEDURE — 3066F PR DOCUMENTATION OF TREATMENT FOR NEPHROPATHY: ICD-10-PCS | Mod: CPTII,S$GLB,,

## 2023-03-17 PROCEDURE — 3008F BODY MASS INDEX DOCD: CPT | Mod: CPTII,S$GLB,,

## 2023-03-17 PROCEDURE — 3288F FALL RISK ASSESSMENT DOCD: CPT | Mod: CPTII,S$GLB,,

## 2023-03-17 PROCEDURE — 3061F NEG MICROALBUMINURIA REV: CPT | Mod: CPTII,S$GLB,,

## 2023-03-17 PROCEDURE — 99499 RISK ADDL DX/OHS AUDIT: ICD-10-PCS | Mod: S$GLB,,,

## 2023-03-17 PROCEDURE — 3074F PR MOST RECENT SYSTOLIC BLOOD PRESSURE < 130 MM HG: ICD-10-PCS | Mod: CPTII,S$GLB,,

## 2023-03-17 NOTE — PROGRESS NOTES
[facility]  [address] [place] [address]    Subjective      History of Present Illness:     Demetrice Gaines is a(n) 63 y.o. female with past medical history as noted below who presents to the clinic today for follow up of left leg pain. She is also requesting lab work    Problem 1: Left leg pain  She was seen last month by Dr. Ochoa for left leg pain and feet pain after a fall from ground level. Radiographs negative for acute changes, referred to ortho and podiatry. +right hip pain that she attributes to changes in gait since left leg and foot pain. Today her left leg pain has improved since last visit, she still needs to see ortho and podiatry. No new falls      She also reports blood in her stool that has been present for 1 year. She reported this problem to her PCP and was referred to GI, did not follow through on this appointment. She is still seeing dark red blood in stool, +occasional constipation but no other GI complaints. No fevers or unintentional weight loss      Healthcare Maintenance:    DEXA: ordered  Mammogram: ordered   Diabetic Eye Exam: would like to wait on this     Requesting lab work     Problem List:   Patient Active Problem List   Diagnosis    Old myocardial infarction    History of colon polyps    Type 2 diabetes mellitus without complication, without long-term current use of insulin    Cardiomegaly    Coronary artery disease involving native coronary artery of native heart without angina pectoris    Chronic diastolic congestive heart failure    Epiretinal membrane (ERM) of right eye    Hypertension associated with diabetes       Current Outpatient Medications:   Current Outpatient Medications   Medication Instructions    amlodipine-olmesartan (LEATHA) 10-40 mg per tablet 1 tablet, Oral, Daily    aspirin (ECOTRIN) 81 mg, Oral, Daily    atorvastatin (LIPITOR) 20 mg, Oral, Daily    famotidine (PEPCID) 20 mg, Oral, 2 times daily     metFORMIN (GLUMETZA) 1,000 mg, Oral, With breakfast    spironolactone (ALDACTONE) 100 MG tablet TAKE 1 TABLET EVERY DAY    triamcinolone acetonide 0.1% (KENALOG) 0.1 % ointment Topical (Top), 2 times daily, Use up to 2 weeks at a time.    triamterene-hydrochlorothiazide 37.5-25 mg (DYAZIDE) 37.5-25 mg per capsule 1 capsule, Oral, Every morning       Surgical History:   Past Surgical History:   Procedure Laterality Date    Benign Cyst Right     BREAST CYST EXCISION Right 1986    pt states benign     SECTION      COLONOSCOPY      COLONOSCOPY N/A 2019    Procedure: COLONOSCOPY;  Surgeon: Ileana Holcomb MD;  Location: [facility];  Service: Endoscopy;  Laterality: N/A;       Family History:   Family History   Problem Relation Age of Onset    Cancer Mother     Pacemaker/defibrilator Mother     Glaucoma Mother     Diabetes Father     Hypertension Sister     Glaucoma Sister     Hypertension Brother     Cancer Paternal Grandmother        Allergies:   Review of patient's allergies indicates:   Allergen Reactions    Coreg [carvedilol]      Hair loss    Lisinopril-hydrochlorothiazide      Other reaction(s): Reaction:Throat swelling;       Tobacco Status:   Tobacco Use: Low Risk     Smoking Tobacco Use: Never    Smokeless Tobacco Use: Never    Passive Exposure: Not on file       Sexual Activity:   Social History     Substance and Sexual Activity   Sexual Activity Not Currently       Alcohol Use:   Social History     Substance and Sexual Activity   Alcohol Use Never       Depression Screening:   Depression Patient Health Questionnaire 2023 3/8/2022   Over the last two weeks how often have you been bothered by little interest or pleasure in doing things Not at all Not at all   Over the last two weeks how often have you been bothered by feeling down, depressed or hopeless Not at all Not at all   PHQ-2 Total Score 0 0      No flowsheet data found.      Objective       Vitals:    23 0848   BP: 106/78  "  Pulse: 76   Weight: 85.8 kg (189 lb 2.5 oz)   Height: 5' 4" (1.626 m)       Review of Systems   Constitutional:  Negative for fever and weight loss.   Cardiovascular:  Negative for chest pain.   Gastrointestinal:  Positive for blood in stool (x 1 year).   Musculoskeletal:  Positive for back pain (chronic, lower back pain) and joint pain. Negative for falls.     Physical Exam  Constitutional:       General: She is not in acute distress.     Appearance: Normal appearance.   HENT:      Head: Normocephalic and atraumatic.   Cardiovascular:      Rate and Rhythm: Normal rate and regular rhythm.      Heart sounds: Normal heart sounds. No murmur heard.  Pulmonary:      Effort: Pulmonary effort is normal. No respiratory distress.      Breath sounds: Normal breath sounds. No wheezing.   Abdominal:      General: Abdomen is flat. Bowel sounds are normal. There is no distension.      Palpations: Abdomen is soft.      Tenderness: There is no abdominal tenderness. There is no guarding or rebound.   Musculoskeletal:      Cervical back: Normal range of motion.      Lumbar back: Normal. No tenderness or bony tenderness.      Right hip: Normal. No deformity, tenderness, bony tenderness or crepitus. Normal range of motion. Normal strength.        Legs:       Comments: Left knee TTP and bilateral feet TTP in areas noted above. Ankle and knee ROM preserved bilaterally   Skin:     General: Skin is warm.   Neurological:      Mental Status: She is alert and oriented to person, place, and time.   Psychiatric:         Behavior: Behavior normal.           Assessment and Plan:    1. Left leg pain    2. Encounter for screening mammogram for malignant neoplasm of breast  -     Mammo Digital Screening Bilat w/ Cash; Future; Expected date: 03/17/2023    3. Osteoporosis screening  -     DXA Bone Density Axial Skeleton 1 or more sites; Future; Expected date: 03/17/2023    4. Type 2 diabetes mellitus without complication, without long-term current " use of insulin  -     Ambulatory referral/consult to Optometry; Future; Expected date: 03/24/2023    5. Asymptomatic menopausal state  -     DXA Bone Density Axial Skeleton 1 or more sites; Future; Expected date: 03/17/2023    6. Blood in stool  -     Ambulatory referral/consult to Gastroenterology; Future; Expected date: 03/24/2023    7. Right hip pain  -     X-Ray Hip 2 or 3 views Right (with Pelvis when performed); Future; Expected date: 03/17/2023    8. Fall from ground level    9. Other cyst of bone, right ankle and foot        Summary:    Demetrice Gaines presented today for follow up of left leg pain and foot pain after a fall from ground level about 6 weeks ago.     She was seen by Dr. Ochoa last month, referred to ortho and podiatry, still needs to schedule this appointment. Schedule today    Also with some right hip pain since her fall. X-ray today    Blood in stool x 1 year, repeat referral to GI placed     Labs already ordered by PCP, can have these done today or when fasting    Healthcare maintenance items addressed as above     Report to ER with severe symptoms     Offered to schedule appointment to establish care here if more convenient than [facility], she declined, recommended following up with PCP in 3 months or less     Follow up: No follow-ups on file.      Jennifer Cordova PA-C

## 2023-03-20 ENCOUNTER — LAB VISIT (OUTPATIENT)
Dept: LAB | Facility: HOSPITAL | Age: 63
End: 2023-03-20
Attending: FAMILY MEDICINE
Payer: MEDICARE

## 2023-03-20 DIAGNOSIS — E11.9 TYPE 2 DIABETES MELLITUS WITHOUT COMPLICATION, WITHOUT LONG-TERM CURRENT USE OF INSULIN: Chronic | ICD-10-CM

## 2023-03-20 DIAGNOSIS — Z00.00 ANNUAL PHYSICAL EXAM: ICD-10-CM

## 2023-03-20 DIAGNOSIS — I25.10 CORONARY ARTERY DISEASE INVOLVING NATIVE CORONARY ARTERY OF NATIVE HEART WITHOUT ANGINA PECTORIS: Chronic | ICD-10-CM

## 2023-03-20 DIAGNOSIS — Z86.39 PERSONAL HISTORY OF OTHER ENDOCRINE, NUTRITIONAL AND METABOLIC DISEASE: ICD-10-CM

## 2023-03-20 LAB
ALBUMIN SERPL BCP-MCNC: 4.3 G/DL (ref 3.5–5.2)
ALP SERPL-CCNC: 73 U/L (ref 55–135)
ALT SERPL W/O P-5'-P-CCNC: 24 U/L (ref 10–44)
ANION GAP SERPL CALC-SCNC: 10 MMOL/L (ref 8–16)
AST SERPL-CCNC: 23 U/L (ref 10–40)
BILIRUB SERPL-MCNC: 0.6 MG/DL (ref 0.1–1)
BUN SERPL-MCNC: 5 MG/DL (ref 8–23)
CALCIUM SERPL-MCNC: 10.2 MG/DL (ref 8.7–10.5)
CHLORIDE SERPL-SCNC: 95 MMOL/L (ref 95–110)
CHOLEST SERPL-MCNC: 166 MG/DL (ref 120–199)
CHOLEST/HDLC SERPL: 3.5 {RATIO} (ref 2–5)
CO2 SERPL-SCNC: 29 MMOL/L (ref 23–29)
CREAT SERPL-MCNC: 0.8 MG/DL (ref 0.5–1.4)
EST. GFR  (NO RACE VARIABLE): >60 ML/MIN/1.73 M^2
GLUCOSE SERPL-MCNC: 97 MG/DL (ref 70–110)
HDLC SERPL-MCNC: 47 MG/DL (ref 40–75)
HDLC SERPL: 28.3 % (ref 20–50)
LDLC SERPL CALC-MCNC: 101.2 MG/DL (ref 63–159)
NONHDLC SERPL-MCNC: 119 MG/DL
POTASSIUM SERPL-SCNC: 3.9 MMOL/L (ref 3.5–5.1)
PROT SERPL-MCNC: 7.7 G/DL (ref 6–8.4)
SODIUM SERPL-SCNC: 134 MMOL/L (ref 136–145)
TRIGL SERPL-MCNC: 89 MG/DL (ref 30–150)
TSH SERPL DL<=0.005 MIU/L-ACNC: 0.76 UIU/ML (ref 0.4–4)

## 2023-03-20 PROCEDURE — 84443 ASSAY THYROID STIM HORMONE: CPT | Performed by: FAMILY MEDICINE

## 2023-03-20 PROCEDURE — 80061 LIPID PANEL: CPT | Performed by: FAMILY MEDICINE

## 2023-03-20 PROCEDURE — 80053 COMPREHEN METABOLIC PANEL: CPT | Performed by: FAMILY MEDICINE

## 2023-03-20 PROCEDURE — 85027 COMPLETE CBC AUTOMATED: CPT | Performed by: FAMILY MEDICINE

## 2023-03-20 PROCEDURE — 36415 COLL VENOUS BLD VENIPUNCTURE: CPT | Mod: PN | Performed by: FAMILY MEDICINE

## 2023-03-21 LAB
ERYTHROCYTE [DISTWIDTH] IN BLOOD BY AUTOMATED COUNT: 13.5 % (ref 11.5–14.5)
HCT VFR BLD AUTO: 44.3 % (ref 37–48.5)
HGB BLD-MCNC: 14.6 G/DL (ref 12–16)
MCH RBC QN AUTO: 28.3 PG (ref 27–31)
MCHC RBC AUTO-ENTMCNC: 33 G/DL (ref 32–36)
MCV RBC AUTO: 86 FL (ref 82–98)
PLATELET # BLD AUTO: 346 K/UL (ref 150–450)
PMV BLD AUTO: 9.9 FL (ref 9.2–12.9)
RBC # BLD AUTO: 5.16 M/UL (ref 4–5.4)
WBC # BLD AUTO: 7.39 K/UL (ref 3.9–12.7)

## 2023-04-03 NOTE — PRE-PROCEDURE INSTRUCTIONS
Request for suprep routed to GI NP for approval and Suprep instructions sent to patient's email ([email or web address]) at her request.    no

## 2023-04-18 ENCOUNTER — TELEPHONE (OUTPATIENT)
Dept: PODIATRY | Facility: CLINIC | Age: 63
End: 2023-04-18
Payer: MEDICARE

## 2023-05-23 ENCOUNTER — PES CALL (OUTPATIENT)
Dept: ADMINISTRATIVE | Facility: CLINIC | Age: 63
End: 2023-05-23
Payer: MEDICARE

## 2023-07-18 ENCOUNTER — LAB VISIT (OUTPATIENT)
Dept: LAB | Facility: HOSPITAL | Age: 63
End: 2023-07-18
Attending: PHYSICIAN ASSISTANT
Payer: MEDICARE

## 2023-07-18 ENCOUNTER — OFFICE VISIT (OUTPATIENT)
Dept: INTERNAL MEDICINE | Facility: CLINIC | Age: 63
End: 2023-07-18
Payer: MEDICARE

## 2023-07-18 VITALS
DIASTOLIC BLOOD PRESSURE: 100 MMHG | HEART RATE: 84 BPM | HEIGHT: 64 IN | TEMPERATURE: 97 F | BODY MASS INDEX: 33.64 KG/M2 | OXYGEN SATURATION: 98 % | SYSTOLIC BLOOD PRESSURE: 150 MMHG | WEIGHT: 197.06 LBS

## 2023-07-18 DIAGNOSIS — E11.59 HYPERTENSION ASSOCIATED WITH DIABETES: Chronic | ICD-10-CM

## 2023-07-18 DIAGNOSIS — I25.10 CORONARY ARTERY DISEASE INVOLVING NATIVE CORONARY ARTERY OF NATIVE HEART WITHOUT ANGINA PECTORIS: Chronic | ICD-10-CM

## 2023-07-18 DIAGNOSIS — E11.9 TYPE 2 DIABETES MELLITUS WITHOUT COMPLICATION, WITHOUT LONG-TERM CURRENT USE OF INSULIN: Chronic | ICD-10-CM

## 2023-07-18 DIAGNOSIS — I50.32 CHRONIC DIASTOLIC CONGESTIVE HEART FAILURE: Chronic | ICD-10-CM

## 2023-07-18 DIAGNOSIS — E11.9 TYPE 2 DIABETES MELLITUS WITHOUT COMPLICATION, WITHOUT LONG-TERM CURRENT USE OF INSULIN: Primary | Chronic | ICD-10-CM

## 2023-07-18 DIAGNOSIS — Z78.0 ASYMPTOMATIC MENOPAUSAL STATE: ICD-10-CM

## 2023-07-18 DIAGNOSIS — Z12.31 ENCOUNTER FOR SCREENING MAMMOGRAM FOR MALIGNANT NEOPLASM OF BREAST: ICD-10-CM

## 2023-07-18 DIAGNOSIS — I15.2 HYPERTENSION ASSOCIATED WITH DIABETES: Chronic | ICD-10-CM

## 2023-07-18 DIAGNOSIS — I10 ESSENTIAL HYPERTENSION: ICD-10-CM

## 2023-07-18 LAB
ANION GAP SERPL CALC-SCNC: 13 MMOL/L (ref 8–16)
BUN SERPL-MCNC: 10 MG/DL (ref 8–23)
CALCIUM SERPL-MCNC: 9.9 MG/DL (ref 8.7–10.5)
CHLORIDE SERPL-SCNC: 102 MMOL/L (ref 95–110)
CO2 SERPL-SCNC: 24 MMOL/L (ref 23–29)
CREAT SERPL-MCNC: 0.8 MG/DL (ref 0.5–1.4)
EST. GFR  (NO RACE VARIABLE): >60 ML/MIN/1.73 M^2
ESTIMATED AVG GLUCOSE: 166 MG/DL (ref 68–131)
GLUCOSE SERPL-MCNC: 103 MG/DL (ref 70–110)
HBA1C MFR BLD: 7.4 % (ref 4–5.6)
POTASSIUM SERPL-SCNC: 4.5 MMOL/L (ref 3.5–5.1)
SODIUM SERPL-SCNC: 139 MMOL/L (ref 136–145)

## 2023-07-18 PROCEDURE — 3051F PR MOST RECENT HEMOGLOBIN A1C LEVEL 7.0 - < 8.0%: ICD-10-PCS | Mod: CPTII,S$GLB,, | Performed by: PHYSICIAN ASSISTANT

## 2023-07-18 PROCEDURE — 3061F NEG MICROALBUMINURIA REV: CPT | Mod: CPTII,S$GLB,, | Performed by: PHYSICIAN ASSISTANT

## 2023-07-18 PROCEDURE — 3061F PR NEG MICROALBUMINURIA RESULT DOCUMENTED/REVIEW: ICD-10-PCS | Mod: CPTII,S$GLB,, | Performed by: PHYSICIAN ASSISTANT

## 2023-07-18 PROCEDURE — 3080F PR MOST RECENT DIASTOLIC BLOOD PRESSURE >= 90 MM HG: ICD-10-PCS | Mod: CPTII,S$GLB,, | Performed by: PHYSICIAN ASSISTANT

## 2023-07-18 PROCEDURE — 3080F DIAST BP >= 90 MM HG: CPT | Mod: CPTII,S$GLB,, | Performed by: PHYSICIAN ASSISTANT

## 2023-07-18 PROCEDURE — 1159F PR MEDICATION LIST DOCUMENTED IN MEDICAL RECORD: ICD-10-PCS | Mod: CPTII,S$GLB,, | Performed by: PHYSICIAN ASSISTANT

## 2023-07-18 PROCEDURE — 1160F RVW MEDS BY RX/DR IN RCRD: CPT | Mod: CPTII,S$GLB,, | Performed by: PHYSICIAN ASSISTANT

## 2023-07-18 PROCEDURE — 99214 PR OFFICE/OUTPT VISIT, EST, LEVL IV, 30-39 MIN: ICD-10-PCS | Mod: S$GLB,,, | Performed by: PHYSICIAN ASSISTANT

## 2023-07-18 PROCEDURE — 1159F MED LIST DOCD IN RCRD: CPT | Mod: CPTII,S$GLB,, | Performed by: PHYSICIAN ASSISTANT

## 2023-07-18 PROCEDURE — 3077F SYST BP >= 140 MM HG: CPT | Mod: CPTII,S$GLB,, | Performed by: PHYSICIAN ASSISTANT

## 2023-07-18 PROCEDURE — 3077F PR MOST RECENT SYSTOLIC BLOOD PRESSURE >= 140 MM HG: ICD-10-PCS | Mod: CPTII,S$GLB,, | Performed by: PHYSICIAN ASSISTANT

## 2023-07-18 PROCEDURE — 99999 PR PBB SHADOW E&M-EST. PATIENT-LVL V: ICD-10-PCS | Mod: PBBFAC,,, | Performed by: PHYSICIAN ASSISTANT

## 2023-07-18 PROCEDURE — 3066F PR DOCUMENTATION OF TREATMENT FOR NEPHROPATHY: ICD-10-PCS | Mod: CPTII,S$GLB,, | Performed by: PHYSICIAN ASSISTANT

## 2023-07-18 PROCEDURE — 4010F PR ACE/ARB THEARPY RXD/TAKEN: ICD-10-PCS | Mod: CPTII,S$GLB,, | Performed by: PHYSICIAN ASSISTANT

## 2023-07-18 PROCEDURE — 1160F PR REVIEW ALL MEDS BY PRESCRIBER/CLIN PHARMACIST DOCUMENTED: ICD-10-PCS | Mod: CPTII,S$GLB,, | Performed by: PHYSICIAN ASSISTANT

## 2023-07-18 PROCEDURE — 3051F HG A1C>EQUAL 7.0%<8.0%: CPT | Mod: CPTII,S$GLB,, | Performed by: PHYSICIAN ASSISTANT

## 2023-07-18 PROCEDURE — 3008F PR BODY MASS INDEX (BMI) DOCUMENTED: ICD-10-PCS | Mod: CPTII,S$GLB,, | Performed by: PHYSICIAN ASSISTANT

## 2023-07-18 PROCEDURE — 83036 HEMOGLOBIN GLYCOSYLATED A1C: CPT | Performed by: PHYSICIAN ASSISTANT

## 2023-07-18 PROCEDURE — 99214 OFFICE O/P EST MOD 30 MIN: CPT | Mod: S$GLB,,, | Performed by: PHYSICIAN ASSISTANT

## 2023-07-18 PROCEDURE — 99999 PR PBB SHADOW E&M-EST. PATIENT-LVL V: CPT | Mod: PBBFAC,,, | Performed by: PHYSICIAN ASSISTANT

## 2023-07-18 PROCEDURE — 3008F BODY MASS INDEX DOCD: CPT | Mod: CPTII,S$GLB,, | Performed by: PHYSICIAN ASSISTANT

## 2023-07-18 PROCEDURE — 80048 BASIC METABOLIC PNL TOTAL CA: CPT | Performed by: PHYSICIAN ASSISTANT

## 2023-07-18 PROCEDURE — 3066F NEPHROPATHY DOC TX: CPT | Mod: CPTII,S$GLB,, | Performed by: PHYSICIAN ASSISTANT

## 2023-07-18 PROCEDURE — 4010F ACE/ARB THERAPY RXD/TAKEN: CPT | Mod: CPTII,S$GLB,, | Performed by: PHYSICIAN ASSISTANT

## 2023-07-18 PROCEDURE — 36415 COLL VENOUS BLD VENIPUNCTURE: CPT | Performed by: PHYSICIAN ASSISTANT

## 2023-07-18 RX ORDER — SEMAGLUTIDE 0.68 MG/ML
INJECTION, SOLUTION SUBCUTANEOUS
Qty: 3 ML | Refills: 0 | Status: SHIPPED | OUTPATIENT
Start: 2023-07-18 | End: 2023-08-15

## 2023-07-18 RX ORDER — LANCETS
EACH MISCELLANEOUS
Qty: 100 EACH | Refills: 6 | Status: SHIPPED | OUTPATIENT
Start: 2023-07-18 | End: 2024-01-24

## 2023-07-18 RX ORDER — AMLODIPINE AND OLMESARTAN MEDOXOMIL 10; 40 MG/1; MG/1
1 TABLET ORAL DAILY
Qty: 90 TABLET | Refills: 1 | Status: SHIPPED | OUTPATIENT
Start: 2023-07-18 | End: 2023-10-17 | Stop reason: SDUPTHER

## 2023-07-18 RX ORDER — METFORMIN HYDROCHLORIDE 1000 MG/1
1000 TABLET, FILM COATED, EXTENDED RELEASE ORAL
Qty: 90 TABLET | Refills: 0 | Status: SHIPPED | OUTPATIENT
Start: 2023-07-18 | End: 2023-08-23

## 2023-07-18 NOTE — PROGRESS NOTES
Subjective:      Patient ID: Demetrice Gaines is a 63 y.o. female.    Chief Complaint: Medication Refill    HPI  Here today to discuss current medications and possibly make some changes. Would like to stop metformin and take ozempic instead. Denies any fam hx of med thyroid ca or MEN. Denies any personal history of acute pancreatitis.     Overdue for mammogram, eye exam, dexa, and foot exam.   OVerdue for cardiology follow up.     Would like to stop the spironolactone. Worried it is causing memory issues. Prescribed for hair loss by derm but has not been helping.   Has not been taking the leatha because ran out about 1-2 weeks ago.     Patient Active Problem List   Diagnosis    Old myocardial infarction    History of colon polyps    Type 2 diabetes mellitus without complication, without long-term current use of insulin    Cardiomegaly    Coronary artery disease involving native coronary artery of native heart without angina pectoris    Chronic diastolic congestive heart failure    Epiretinal membrane (ERM) of right eye    Hypertension associated with diabetes         Current Outpatient Medications:     amlodipine-olmesartan (LEATHA) 10-40 mg per tablet, Take 1 tablet by mouth once daily., Disp: 90 tablet, Rfl: 1    aspirin (ECOTRIN) 81 MG EC tablet, Take 1 tablet (81 mg total) by mouth once daily., Disp: 90 tablet, Rfl: 3    blood sugar diagnostic (ONETOUCH VERIO TEST STRIPS) Strp, Use to check blood sugar twice daily, Disp: 100 each, Rfl: 6    lancets (ONETOUCH ULTRASOFT LANCETS) Misc, Use to check blood sugar twice daily, Disp: 100 each, Rfl: 6    metFORMIN (GLUMETZA) 1000 MG (MOD) 24hr tablet, Take 1 tablet (1,000 mg total) by mouth daily with breakfast., Disp: 90 tablet, Rfl: 0    semaglutide (OZEMPIC) 0.25 mg or 0.5 mg (2 mg/3 mL) pen injector, Inject 0.25 mg into the skin every 7 days for 14 days, THEN 0.5 mg every 7 days for 14 days., Disp: 3 mL, Rfl: 0    spironolactone (ALDACTONE) 100 MG tablet, TAKE 1  "TABLET EVERY DAY, Disp: 90 tablet, Rfl: 1    Health Maintenance Due   Topic Date Due    Aspirin/Antiplatelet Therapy  Never done    High Dose Statin  Never done    DEXA Scan  03/19/2018    Eye Exam  11/24/2021    Mammogram  05/25/2022    Foot Exam  01/05/2023       Review of Systems   Constitutional:  Negative for activity change, appetite change, chills, diaphoresis, fatigue, fever and unexpected weight change.   HENT: Negative.  Negative for congestion, hearing loss, postnasal drip, rhinorrhea, sore throat, trouble swallowing and voice change.    Eyes: Negative.  Negative for visual disturbance.   Respiratory: Negative.  Negative for cough, choking, chest tightness and shortness of breath.    Cardiovascular:  Negative for chest pain, palpitations and leg swelling.   Gastrointestinal:  Negative for abdominal distention, abdominal pain, blood in stool, constipation, diarrhea, nausea and vomiting.   Endocrine: Negative for cold intolerance, heat intolerance, polydipsia and polyuria.   Genitourinary: Negative.  Negative for difficulty urinating and frequency.   Musculoskeletal:  Negative for arthralgias, back pain, gait problem, joint swelling and myalgias.   Skin:  Negative for color change, pallor, rash and wound.   Neurological:  Negative for dizziness, tremors, weakness, light-headedness, numbness and headaches.   Hematological:  Negative for adenopathy.   Psychiatric/Behavioral:  Negative for behavioral problems, confusion, self-injury, sleep disturbance and suicidal ideas. The patient is not nervous/anxious.    Objective:   BP (!) 150/100 (BP Location: Left arm, Patient Position: Sitting, BP Method: Large (Manual))   Pulse 84   Temp 97.1 °F (36.2 °C) (Tympanic)   Ht 5' 4" (1.626 m)   Wt 89.4 kg (197 lb 1.5 oz)   SpO2 98%   BMI 33.83 kg/m²     Physical Exam  Vitals reviewed.   Constitutional:       General: She is not in acute distress.     Appearance: Normal appearance. She is well-developed. She is not " ill-appearing, toxic-appearing or diaphoretic.   HENT:      Head: Normocephalic and atraumatic.      Right Ear: External ear normal.      Left Ear: External ear normal.      Nose: Nose normal.   Eyes:      Conjunctiva/sclera: Conjunctivae normal.      Pupils: Pupils are equal, round, and reactive to light.   Cardiovascular:      Rate and Rhythm: Normal rate and regular rhythm.      Heart sounds: Normal heart sounds. No murmur heard.    No friction rub. No gallop.   Pulmonary:      Effort: Pulmonary effort is normal. No respiratory distress.      Breath sounds: Normal breath sounds. No wheezing or rales.   Chest:      Chest wall: No tenderness.   Abdominal:      General: There is no distension.      Palpations: Abdomen is soft.      Tenderness: There is no abdominal tenderness.   Musculoskeletal:         General: Normal range of motion.      Cervical back: Normal range of motion and neck supple.   Lymphadenopathy:      Cervical: No cervical adenopathy.   Skin:     General: Skin is warm and dry.      Capillary Refill: Capillary refill takes less than 2 seconds.      Findings: No rash.   Neurological:      Mental Status: She is alert and oriented to person, place, and time.      Motor: No weakness.      Coordination: Coordination normal.      Gait: Gait normal.   Psychiatric:         Mood and Affect: Mood normal.         Behavior: Behavior normal.         Thought Content: Thought content normal.         Judgment: Judgment normal.       Assessment:     1. Type 2 diabetes mellitus without complication, without long-term current use of insulin    2. Hypertension associated with diabetes    3. Chronic diastolic congestive heart failure    4. Coronary artery disease involving native coronary artery of native heart without angina pectoris    5. Essential hypertension    6. Asymptomatic menopausal state    7. Encounter for screening mammogram for malignant neoplasm of breast      Plan:   Type 2 diabetes mellitus without  complication, without long-term current use of insulin  -     Basic Metabolic Panel; Future; Expected date: 07/18/2023  -     Hemoglobin A1C; Future; Expected date: 07/18/2023  -     semaglutide (OZEMPIC) 0.25 mg or 0.5 mg (2 mg/3 mL) pen injector; Inject 0.25 mg into the skin every 7 days for 14 days, THEN 0.5 mg every 7 days for 14 days.  Dispense: 3 mL; Refill: 0  -     metFORMIN (GLUMETZA) 1000 MG (MOD) 24hr tablet; Take 1 tablet (1,000 mg total) by mouth daily with breakfast.  Dispense: 90 tablet; Refill: 0  -     blood sugar diagnostic (ONETOUCH VERIO TEST STRIPS) Strp; Use to check blood sugar twice daily  Dispense: 100 each; Refill: 6  -     lancets (ONETOUCH ULTRASOFT LANCETS) Misc; Use to check blood sugar twice daily  Dispense: 100 each; Refill: 6  -     Ambulatory referral/consult to Optometry; Future; Expected date: 07/25/2023    Hypertension associated with diabetes    Chronic diastolic congestive heart failure  -     Ambulatory referral/consult to Cardiology    Coronary artery disease involving native coronary artery of native heart without angina pectoris  -     Ambulatory referral/consult to Cardiology  -     semaglutide (OZEMPIC) 0.25 mg or 0.5 mg (2 mg/3 mL) pen injector; Inject 0.25 mg into the skin every 7 days for 14 days, THEN 0.5 mg every 7 days for 14 days.  Dispense: 3 mL; Refill: 0    Essential hypertension  -     amlodipine-olmesartan (LEATHA) 10-40 mg per tablet; Take 1 tablet by mouth once daily.  Dispense: 90 tablet; Refill: 1    Asymptomatic menopausal state  -     DXA Bone Density Axial Skeleton 1 or more sites; Future; Expected date: 07/18/2023    Encounter for screening mammogram for malignant neoplasm of breast  -     Mammo Digital Screening Bilat; Future; Expected date: 07/18/2023    -ok to stop spironolactone.   -overdue for visit with cardiology, will schedule.   -restart leatha  -start ozempeic. Cont metformin for now. Once we have a higher dose of ozempic we can stop the  metformin. SE discussed in detail.     Follow up in about 4 weeks (around 8/15/2023), or if symptoms worsen or fail to improve.       Anant RICKETTS

## 2023-08-03 DIAGNOSIS — Z00.00 ROUTINE HEALTH MAINTENANCE: ICD-10-CM

## 2023-08-03 DIAGNOSIS — I50.32 CHRONIC DIASTOLIC CONGESTIVE HEART FAILURE: Primary | Chronic | ICD-10-CM

## 2023-08-10 ENCOUNTER — OFFICE VISIT (OUTPATIENT)
Dept: DERMATOLOGY | Facility: CLINIC | Age: 63
End: 2023-08-10
Payer: MEDICARE

## 2023-08-10 DIAGNOSIS — L65.9 ALOPECIA: Primary | ICD-10-CM

## 2023-08-10 PROCEDURE — 3061F PR NEG MICROALBUMINURIA RESULT DOCUMENTED/REVIEW: ICD-10-PCS | Mod: CPTII,S$GLB,, | Performed by: STUDENT IN AN ORGANIZED HEALTH CARE EDUCATION/TRAINING PROGRAM

## 2023-08-10 PROCEDURE — 3051F PR MOST RECENT HEMOGLOBIN A1C LEVEL 7.0 - < 8.0%: ICD-10-PCS | Mod: CPTII,S$GLB,, | Performed by: STUDENT IN AN ORGANIZED HEALTH CARE EDUCATION/TRAINING PROGRAM

## 2023-08-10 PROCEDURE — 3051F HG A1C>EQUAL 7.0%<8.0%: CPT | Mod: CPTII,S$GLB,, | Performed by: STUDENT IN AN ORGANIZED HEALTH CARE EDUCATION/TRAINING PROGRAM

## 2023-08-10 PROCEDURE — 1160F RVW MEDS BY RX/DR IN RCRD: CPT | Mod: CPTII,S$GLB,, | Performed by: STUDENT IN AN ORGANIZED HEALTH CARE EDUCATION/TRAINING PROGRAM

## 2023-08-10 PROCEDURE — 99999 PR PBB SHADOW E&M-EST. PATIENT-LVL II: CPT | Mod: PBBFAC,,, | Performed by: STUDENT IN AN ORGANIZED HEALTH CARE EDUCATION/TRAINING PROGRAM

## 2023-08-10 PROCEDURE — 99214 PR OFFICE/OUTPT VISIT, EST, LEVL IV, 30-39 MIN: ICD-10-PCS | Mod: S$GLB,,, | Performed by: STUDENT IN AN ORGANIZED HEALTH CARE EDUCATION/TRAINING PROGRAM

## 2023-08-10 PROCEDURE — 1159F MED LIST DOCD IN RCRD: CPT | Mod: CPTII,S$GLB,, | Performed by: STUDENT IN AN ORGANIZED HEALTH CARE EDUCATION/TRAINING PROGRAM

## 2023-08-10 PROCEDURE — 1159F PR MEDICATION LIST DOCUMENTED IN MEDICAL RECORD: ICD-10-PCS | Mod: CPTII,S$GLB,, | Performed by: STUDENT IN AN ORGANIZED HEALTH CARE EDUCATION/TRAINING PROGRAM

## 2023-08-10 PROCEDURE — 4010F PR ACE/ARB THEARPY RXD/TAKEN: ICD-10-PCS | Mod: CPTII,S$GLB,, | Performed by: STUDENT IN AN ORGANIZED HEALTH CARE EDUCATION/TRAINING PROGRAM

## 2023-08-10 PROCEDURE — 1160F PR REVIEW ALL MEDS BY PRESCRIBER/CLIN PHARMACIST DOCUMENTED: ICD-10-PCS | Mod: CPTII,S$GLB,, | Performed by: STUDENT IN AN ORGANIZED HEALTH CARE EDUCATION/TRAINING PROGRAM

## 2023-08-10 PROCEDURE — 4010F ACE/ARB THERAPY RXD/TAKEN: CPT | Mod: CPTII,S$GLB,, | Performed by: STUDENT IN AN ORGANIZED HEALTH CARE EDUCATION/TRAINING PROGRAM

## 2023-08-10 PROCEDURE — 3061F NEG MICROALBUMINURIA REV: CPT | Mod: CPTII,S$GLB,, | Performed by: STUDENT IN AN ORGANIZED HEALTH CARE EDUCATION/TRAINING PROGRAM

## 2023-08-10 PROCEDURE — 99214 OFFICE O/P EST MOD 30 MIN: CPT | Mod: S$GLB,,, | Performed by: STUDENT IN AN ORGANIZED HEALTH CARE EDUCATION/TRAINING PROGRAM

## 2023-08-10 PROCEDURE — 3066F NEPHROPATHY DOC TX: CPT | Mod: CPTII,S$GLB,, | Performed by: STUDENT IN AN ORGANIZED HEALTH CARE EDUCATION/TRAINING PROGRAM

## 2023-08-10 PROCEDURE — 99999 PR PBB SHADOW E&M-EST. PATIENT-LVL II: ICD-10-PCS | Mod: PBBFAC,,, | Performed by: STUDENT IN AN ORGANIZED HEALTH CARE EDUCATION/TRAINING PROGRAM

## 2023-08-10 PROCEDURE — 3066F PR DOCUMENTATION OF TREATMENT FOR NEPHROPATHY: ICD-10-PCS | Mod: CPTII,S$GLB,, | Performed by: STUDENT IN AN ORGANIZED HEALTH CARE EDUCATION/TRAINING PROGRAM

## 2023-08-10 RX ORDER — FINASTERIDE 1 MG/1
1 TABLET, FILM COATED ORAL DAILY
Qty: 30 TABLET | Refills: 5 | Status: SHIPPED | OUTPATIENT
Start: 2023-08-10 | End: 2023-08-23

## 2023-08-10 NOTE — PROGRESS NOTES
Patient Information  Name: Demetrice Gaines  : 1960  MRN: [id number]     Referring Physician:  Dr. Fountain ref. provider found   Primary Care Physician:  Lul Garcia MD   Date of Visit: 08/10/2023      Subjective:       Demetrice Gaines is a 63 y.o. female who presents for   Chief Complaint   Patient presents with    Follow-up     Hair loss. Non improving. Minoxidil.      HPI  Patient with hx of alopecia, here for follow up. She was previously prescribed oral spironolactone however had to discontinue due to side effects. She then had been using topical minoxidil x 4 months without improvement. Interested in another option.    Patient was last seen:Visit date not found     Prior notes by myself reviewed.   Clinical documentation obtained by nursing staff reviewed.    Review of Systems   Skin:  Negative for itching and rash.        Objective:    Physical Exam   Constitutional: She appears well-developed and well-nourished. No distress.   Neurological: She is alert and oriented to person, place, and time. She is not disoriented.   Psychiatric: She has a normal mood and affect.   Skin:   Areas Examined (abnormalities noted in diagram):   Scalp / Hair Palpated and Inspected              Diagram Legend     Erythematous scaling macule/papule c/w actinic keratosis       Vascular papule c/w angioma      Pigmented verrucoid papule/plaque c/w seborrheic keratosis      Yellow umbilicated papule c/w sebaceous hyperplasia      Irregularly shaped tan macule c/w lentigo     1-2 mm smooth white papules consistent with Milia      Movable subcutaneous cyst with punctum c/w epidermal inclusion cyst      Subcutaneous movable cyst c/w pilar cyst      Firm pink to brown papule c/w dermatofibroma      Pedunculated fleshy papule(s) c/w skin tag(s)      Evenly pigmented macule c/w junctional nevus     Mildly variegated pigmented, slightly irregular-bordered macule c/w mildly atypical nevus      Flesh colored to evenly  pigmented papule c/w intradermal nevus       Pink pearly papule/plaque c/w basal cell carcinoma      Erythematous hyperkeratotic cursted plaque c/w SCC      Surgical scar with no sign of skin cancer recurrence      Open and closed comedones      Inflammatory papules and pustules      Verrucoid papule consistent consistent with wart     Erythematous eczematous patches and plaques     Dystrophic onycholytic nail with subungual debris c/w onychomycosis     Umbilicated papule    Erythematous-base heme-crusted tan verrucoid plaque consistent with inflamed seborrheic keratosis     Erythematous Silvery Scaling Plaque c/w Psoriasis     See annotation      No images are attached to the encounter or orders placed in the encounter.    [] Data reviewed  [] Independent review of test  [] Management discussed with another provider    Assessment / Plan:        Alopecia  -     finasteride (PROPECIA) 1 mg tablet; Take 1 tablet (1 mg total) by mouth once daily.  Dispense: 30 tablet; Refill: 5  -Discussed benefits and risk of Propecia including but not limited sexual dysfunction (approx 1%). There is also a not-well understood syndrome that is very rare: Post-finasteride syndrome that can result in irreversible sexual dysfunction including low libido.   - Will try trial of propecia given inability to tolerate spironolactone           LOS NUMBER AND COMPLEXITY OF PROBLEMS    COMPLEXITY OF DATA RISK TOTAL TIME (m)   02050  04092 [] 1 self-limited or minor problem [x] Minimal to none [] No treatment recommended or patient to monitor 15-29  10-19   51096  55787 Low  [] 2 or > self limited or minor problems  [] 1 stable chronic illness  [] 1 acute, uncomplicated illness or injury Limited (2)  [] Prior external notes from each unique source  [] Review result of each unique test  [] Order each unique test []  Low  OTC medications, minor skin biopsy 30-44  20-29   50918  62723 Moderate  [x]  1 or > chronic illness with progression,  exacerbation or SE of treatment  []  2 or more stable chronic illnesses  []  1 acute illness with systemic symptoms  []  1 acute complicated injury  []  1 undiagnosed new problem with uncertain prognosis Moderate (1/3 below)  []  3 or more data items        *Now includes assessment requiring independent historian  []  Independent interpretation of a test  []  Discuss management/test with another provider Moderate  [x]  Prescription drug mgmt  []  Minor surgery with risk discussed  []  Mgmt limited by social determinates 45-59  30-39   96411  64491 High  []  1 or more chronic illness with severe exacerbation, progression or SE of treatment  []  1 acute or chronic illness/injury that poses a threat to life or bodily function Extensive (2/3 below)  []  3 or more data items        *Now includes assessment requiring independent historian.  []  Independent interpretation of a test  []  Discuss management/test with another provider High  []  Major surgery with risk discussed  []  Drug therapy requiring intensive monitoring for toxicity  []  Hospitalization  []  Decision for DNR 60-74  40-54      No follow-ups on file.    Adela Burks MD, FAAD  [facility]

## 2023-08-17 NOTE — PROGRESS NOTES
===============================  Date today is 8/22/2023  Demetrice Gaines is a 63 y.o. female  Last visit [facility]: :11/24/2020   Last visit eye dept. Visit date not found    Uncorrected distance visual acuity was 20/25 in the right eye and 20/20 in the left eye.  Not recorded       Not recorded       Not recorded       Not recorded       Chief Complaint   Patient presents with    Diabetes     Diabetic eye exam       Problem List Items Addressed This Visit          Eye/Vision problems    Type 2 diabetes mellitus without complication, without long-term current use of insulin - Primary (Chronic)    Relevant Orders    Posterior Segment OCT Retina-Both eyes (Completed)    Epiretinal membrane (ERM) of right eye    Relevant Orders    Posterior Segment OCT Retina-Both eyes (Completed)     Instructed to call 24/7 for any worsening of vision, visual distortion or pain.  Check OU independently daily.    Gave my office and personal cell phone number.  ________________  8/22/2023 today  Demetrice Gaines      DM no retinopathy  OCT no change in ERM OD, OS ok  Clear lens OU  C/d 0.5  No GLP 1 associated ME  Ok to follow yearly    RTC 1 year  Instructed to call 24/7 for any worsening of vision or symptoms. Check OU daily.   Gave my office and cell phone number.    =============================

## 2023-08-21 ENCOUNTER — PATIENT OUTREACH (OUTPATIENT)
Dept: ADMINISTRATIVE | Facility: HOSPITAL | Age: 63
End: 2023-08-21
Payer: MEDICARE

## 2023-08-22 ENCOUNTER — HOSPITAL ENCOUNTER (OUTPATIENT)
Dept: RADIOLOGY | Facility: HOSPITAL | Age: 63
Discharge: HOME | End: 2023-08-22
Attending: PHYSICIAN ASSISTANT
Payer: MEDICARE

## 2023-08-22 ENCOUNTER — OFFICE VISIT (OUTPATIENT)
Dept: CARDIOLOGY | Facility: CLINIC | Age: 63
End: 2023-08-22
Payer: MEDICARE

## 2023-08-22 ENCOUNTER — HOSPITAL ENCOUNTER (OUTPATIENT)
Dept: CARDIOLOGY | Facility: HOSPITAL | Age: 63
Discharge: HOME | End: 2023-08-22
Attending: INTERNAL MEDICINE
Payer: MEDICARE

## 2023-08-22 ENCOUNTER — OFFICE VISIT (OUTPATIENT)
Dept: OPHTHALMOLOGY | Facility: CLINIC | Age: 63
End: 2023-08-22
Payer: MEDICARE

## 2023-08-22 VITALS
SYSTOLIC BLOOD PRESSURE: 142 MMHG | DIASTOLIC BLOOD PRESSURE: 94 MMHG | OXYGEN SATURATION: 98 % | HEART RATE: 88 BPM | BODY MASS INDEX: 33.42 KG/M2 | WEIGHT: 195.75 LBS | HEIGHT: 64 IN

## 2023-08-22 VITALS — HEIGHT: 64 IN | BODY MASS INDEX: 33.49 KG/M2 | WEIGHT: 196.19 LBS

## 2023-08-22 DIAGNOSIS — E11.9 TYPE 2 DIABETES MELLITUS WITHOUT COMPLICATION, WITHOUT LONG-TERM CURRENT USE OF INSULIN: Chronic | ICD-10-CM

## 2023-08-22 DIAGNOSIS — I25.10 CORONARY ARTERY DISEASE INVOLVING NATIVE CORONARY ARTERY OF NATIVE HEART WITHOUT ANGINA PECTORIS: Primary | Chronic | ICD-10-CM

## 2023-08-22 DIAGNOSIS — H35.371 EPIRETINAL MEMBRANE (ERM) OF RIGHT EYE: ICD-10-CM

## 2023-08-22 DIAGNOSIS — I15.2 HYPERTENSION ASSOCIATED WITH DIABETES: Chronic | ICD-10-CM

## 2023-08-22 DIAGNOSIS — Z12.31 ENCOUNTER FOR SCREENING MAMMOGRAM FOR MALIGNANT NEOPLASM OF BREAST: ICD-10-CM

## 2023-08-22 DIAGNOSIS — I25.2 OLD MYOCARDIAL INFARCTION: Chronic | ICD-10-CM

## 2023-08-22 DIAGNOSIS — E11.59 HYPERTENSION ASSOCIATED WITH DIABETES: Chronic | ICD-10-CM

## 2023-08-22 DIAGNOSIS — E11.9 TYPE 2 DIABETES MELLITUS WITHOUT COMPLICATION, WITHOUT LONG-TERM CURRENT USE OF INSULIN: Primary | Chronic | ICD-10-CM

## 2023-08-22 DIAGNOSIS — I50.32 CHRONIC DIASTOLIC CONGESTIVE HEART FAILURE: Chronic | ICD-10-CM

## 2023-08-22 DIAGNOSIS — Z00.00 ROUTINE HEALTH MAINTENANCE: ICD-10-CM

## 2023-08-22 PROCEDURE — 1160F PR REVIEW ALL MEDS BY PRESCRIBER/CLIN PHARMACIST DOCUMENTED: ICD-10-PCS | Mod: CPTII,S$GLB,, | Performed by: OPHTHALMOLOGY

## 2023-08-22 PROCEDURE — 3066F NEPHROPATHY DOC TX: CPT | Mod: CPTII,S$GLB,, | Performed by: INTERNAL MEDICINE

## 2023-08-22 PROCEDURE — 3061F PR NEG MICROALBUMINURIA RESULT DOCUMENTED/REVIEW: ICD-10-PCS | Mod: CPTII,S$GLB,, | Performed by: INTERNAL MEDICINE

## 2023-08-22 PROCEDURE — 3080F PR MOST RECENT DIASTOLIC BLOOD PRESSURE >= 90 MM HG: ICD-10-PCS | Mod: CPTII,S$GLB,, | Performed by: INTERNAL MEDICINE

## 2023-08-22 PROCEDURE — 92134 POSTERIOR SEGMENT OCT RETINA (OCULAR COHERENCE TOMOGRAPHY)-BOTH EYES: ICD-10-PCS | Mod: S$GLB,,, | Performed by: OPHTHALMOLOGY

## 2023-08-22 PROCEDURE — 2023F PR DILATED RETINAL EXAM W/O EVID OF RETINOPATHY: ICD-10-PCS | Mod: CPTII,S$GLB,, | Performed by: OPHTHALMOLOGY

## 2023-08-22 PROCEDURE — 93010 EKG 12-LEAD: ICD-10-PCS | Mod: ,,, | Performed by: INTERNAL MEDICINE

## 2023-08-22 PROCEDURE — 3066F PR DOCUMENTATION OF TREATMENT FOR NEPHROPATHY: ICD-10-PCS | Mod: CPTII,S$GLB,, | Performed by: OPHTHALMOLOGY

## 2023-08-22 PROCEDURE — 1160F RVW MEDS BY RX/DR IN RCRD: CPT | Mod: CPTII,S$GLB,, | Performed by: INTERNAL MEDICINE

## 2023-08-22 PROCEDURE — 3077F SYST BP >= 140 MM HG: CPT | Mod: CPTII,S$GLB,, | Performed by: INTERNAL MEDICINE

## 2023-08-22 PROCEDURE — 1159F PR MEDICATION LIST DOCUMENTED IN MEDICAL RECORD: ICD-10-PCS | Mod: CPTII,S$GLB,, | Performed by: OPHTHALMOLOGY

## 2023-08-22 PROCEDURE — 3061F NEG MICROALBUMINURIA REV: CPT | Mod: CPTII,S$GLB,, | Performed by: OPHTHALMOLOGY

## 2023-08-22 PROCEDURE — 1159F PR MEDICATION LIST DOCUMENTED IN MEDICAL RECORD: ICD-10-PCS | Mod: CPTII,S$GLB,, | Performed by: INTERNAL MEDICINE

## 2023-08-22 PROCEDURE — 4010F PR ACE/ARB THEARPY RXD/TAKEN: ICD-10-PCS | Mod: CPTII,S$GLB,, | Performed by: INTERNAL MEDICINE

## 2023-08-22 PROCEDURE — 77067 MAMMO DIGITAL SCREENING BILAT WITH TOMO: ICD-10-PCS | Mod: 26,,, | Performed by: RADIOLOGY

## 2023-08-22 PROCEDURE — 99999 PR PBB SHADOW E&M-EST. PATIENT-LVL III: ICD-10-PCS | Mod: PBBFAC,,, | Performed by: INTERNAL MEDICINE

## 2023-08-22 PROCEDURE — 3061F NEG MICROALBUMINURIA REV: CPT | Mod: CPTII,S$GLB,, | Performed by: INTERNAL MEDICINE

## 2023-08-22 PROCEDURE — 4010F ACE/ARB THERAPY RXD/TAKEN: CPT | Mod: CPTII,S$GLB,, | Performed by: INTERNAL MEDICINE

## 2023-08-22 PROCEDURE — 3080F DIAST BP >= 90 MM HG: CPT | Mod: CPTII,S$GLB,, | Performed by: INTERNAL MEDICINE

## 2023-08-22 PROCEDURE — 77063 MAMMO DIGITAL SCREENING BILAT WITH TOMO: ICD-10-PCS | Mod: 26,,, | Performed by: RADIOLOGY

## 2023-08-22 PROCEDURE — 3051F PR MOST RECENT HEMOGLOBIN A1C LEVEL 7.0 - < 8.0%: ICD-10-PCS | Mod: CPTII,S$GLB,, | Performed by: OPHTHALMOLOGY

## 2023-08-22 PROCEDURE — 3051F HG A1C>EQUAL 7.0%<8.0%: CPT | Mod: CPTII,S$GLB,, | Performed by: INTERNAL MEDICINE

## 2023-08-22 PROCEDURE — 77067 SCR MAMMO BI INCL CAD: CPT | Mod: TC

## 2023-08-22 PROCEDURE — 3066F PR DOCUMENTATION OF TREATMENT FOR NEPHROPATHY: ICD-10-PCS | Mod: CPTII,S$GLB,, | Performed by: INTERNAL MEDICINE

## 2023-08-22 PROCEDURE — 77067 SCR MAMMO BI INCL CAD: CPT | Mod: 26,,, | Performed by: RADIOLOGY

## 2023-08-22 PROCEDURE — 1160F RVW MEDS BY RX/DR IN RCRD: CPT | Mod: CPTII,S$GLB,, | Performed by: OPHTHALMOLOGY

## 2023-08-22 PROCEDURE — 99999 PR PBB SHADOW E&M-EST. PATIENT-LVL III: CPT | Mod: PBBFAC,,, | Performed by: INTERNAL MEDICINE

## 2023-08-22 PROCEDURE — 1160F PR REVIEW ALL MEDS BY PRESCRIBER/CLIN PHARMACIST DOCUMENTED: ICD-10-PCS | Mod: CPTII,S$GLB,, | Performed by: INTERNAL MEDICINE

## 2023-08-22 PROCEDURE — 92014 PR EYE EXAM, EST PATIENT,COMPREHESV: ICD-10-PCS | Mod: S$GLB,,, | Performed by: OPHTHALMOLOGY

## 2023-08-22 PROCEDURE — 92014 COMPRE OPH EXAM EST PT 1/>: CPT | Mod: S$GLB,,, | Performed by: OPHTHALMOLOGY

## 2023-08-22 PROCEDURE — 4010F PR ACE/ARB THEARPY RXD/TAKEN: ICD-10-PCS | Mod: CPTII,S$GLB,, | Performed by: OPHTHALMOLOGY

## 2023-08-22 PROCEDURE — 1159F MED LIST DOCD IN RCRD: CPT | Mod: CPTII,S$GLB,, | Performed by: INTERNAL MEDICINE

## 2023-08-22 PROCEDURE — 3008F BODY MASS INDEX DOCD: CPT | Mod: CPTII,S$GLB,, | Performed by: INTERNAL MEDICINE

## 2023-08-22 PROCEDURE — 3061F PR NEG MICROALBUMINURIA RESULT DOCUMENTED/REVIEW: ICD-10-PCS | Mod: CPTII,S$GLB,, | Performed by: OPHTHALMOLOGY

## 2023-08-22 PROCEDURE — 3051F PR MOST RECENT HEMOGLOBIN A1C LEVEL 7.0 - < 8.0%: ICD-10-PCS | Mod: CPTII,S$GLB,, | Performed by: INTERNAL MEDICINE

## 2023-08-22 PROCEDURE — 77063 BREAST TOMOSYNTHESIS BI: CPT | Mod: 26,,, | Performed by: RADIOLOGY

## 2023-08-22 PROCEDURE — 3077F PR MOST RECENT SYSTOLIC BLOOD PRESSURE >= 140 MM HG: ICD-10-PCS | Mod: CPTII,S$GLB,, | Performed by: INTERNAL MEDICINE

## 2023-08-22 PROCEDURE — 93010 ELECTROCARDIOGRAM REPORT: CPT | Mod: ,,, | Performed by: INTERNAL MEDICINE

## 2023-08-22 PROCEDURE — 3288F FALL RISK ASSESSMENT DOCD: CPT | Mod: CPTII,S$GLB,, | Performed by: INTERNAL MEDICINE

## 2023-08-22 PROCEDURE — 92134 CPTRZ OPH DX IMG PST SGM RTA: CPT | Mod: S$GLB,,, | Performed by: OPHTHALMOLOGY

## 2023-08-22 PROCEDURE — 4010F ACE/ARB THERAPY RXD/TAKEN: CPT | Mod: CPTII,S$GLB,, | Performed by: OPHTHALMOLOGY

## 2023-08-22 PROCEDURE — 99214 OFFICE O/P EST MOD 30 MIN: CPT | Mod: S$GLB,,, | Performed by: INTERNAL MEDICINE

## 2023-08-22 PROCEDURE — 1101F PR PT FALLS ASSESS DOC 0-1 FALLS W/OUT INJ PAST YR: ICD-10-PCS | Mod: CPTII,S$GLB,, | Performed by: INTERNAL MEDICINE

## 2023-08-22 PROCEDURE — 3288F PR FALLS RISK ASSESSMENT DOCUMENTED: ICD-10-PCS | Mod: CPTII,S$GLB,, | Performed by: INTERNAL MEDICINE

## 2023-08-22 PROCEDURE — 99999 PR PBB SHADOW E&M-EST. PATIENT-LVL III: ICD-10-PCS | Mod: PBBFAC,,, | Performed by: OPHTHALMOLOGY

## 2023-08-22 PROCEDURE — 93005 ELECTROCARDIOGRAM TRACING: CPT

## 2023-08-22 PROCEDURE — 1101F PT FALLS ASSESS-DOCD LE1/YR: CPT | Mod: CPTII,S$GLB,, | Performed by: INTERNAL MEDICINE

## 2023-08-22 PROCEDURE — 3051F HG A1C>EQUAL 7.0%<8.0%: CPT | Mod: CPTII,S$GLB,, | Performed by: OPHTHALMOLOGY

## 2023-08-22 PROCEDURE — 2023F DILAT RTA XM W/O RTNOPTHY: CPT | Mod: CPTII,S$GLB,, | Performed by: OPHTHALMOLOGY

## 2023-08-22 PROCEDURE — 99214 PR OFFICE/OUTPT VISIT, EST, LEVL IV, 30-39 MIN: ICD-10-PCS | Mod: S$GLB,,, | Performed by: INTERNAL MEDICINE

## 2023-08-22 PROCEDURE — 1159F MED LIST DOCD IN RCRD: CPT | Mod: CPTII,S$GLB,, | Performed by: OPHTHALMOLOGY

## 2023-08-22 PROCEDURE — 3066F NEPHROPATHY DOC TX: CPT | Mod: CPTII,S$GLB,, | Performed by: OPHTHALMOLOGY

## 2023-08-22 PROCEDURE — 99999 PR PBB SHADOW E&M-EST. PATIENT-LVL III: CPT | Mod: PBBFAC,,, | Performed by: OPHTHALMOLOGY

## 2023-08-22 PROCEDURE — 3008F PR BODY MASS INDEX (BMI) DOCUMENTED: ICD-10-PCS | Mod: CPTII,S$GLB,, | Performed by: INTERNAL MEDICINE

## 2023-08-22 RX ORDER — CHLORTHALIDONE 25 MG/1
25 TABLET ORAL DAILY
Qty: 30 TABLET | Refills: 11 | Status: SHIPPED | OUTPATIENT
Start: 2023-08-22 | End: 2023-10-17 | Stop reason: SDUPTHER

## 2023-08-22 RX ORDER — SEMAGLUTIDE 1.34 MG/ML
0.5 INJECTION, SOLUTION SUBCUTANEOUS
COMMUNITY
End: 2023-08-23 | Stop reason: SDUPTHER

## 2023-08-22 NOTE — PROGRESS NOTES
Subjective:   Patient ID:  Demetrice Gaines is a 63 y.o. female who presents for follow up of No chief complaint on file.      63 yo female, came in for 12 months  PMH DM type II, CAD history of prior MI s/p [facility] which showed normal coronaries in  OSH,  and HTN.    admitted for weakness and palpitation. BNP > 1,000, troponin of 1.804, creatinine of 1.5, and Tbili of 3.6. CTA of chest negative for PE.Stress test in  which was negative for ischemia/injury. Echo  showed normal EF. COVID 19 negative. Rx as CHF exacerbation and demand ischemia.     ECHO normal EF, LVH mild AI and small pericardial effusion   ETT METS 9 and peak  mmHG and no ischemia  States that has had chest pain twice after discharged in . Occurred at rest  Lasted seconds. Dull and pressure. No radiating pain. No exercise related pain. NO SOB palpitation and orthopnea  No smoking/dronking  BP A1c LDL controlled     visit  Not taking the med this morning. BP controlled at home  No chest pain dyspnea faint, dizziness and syncope.  Foot OA  No regular exercise. Sedentary style.     visit  BP high in the office, and rechecked 170/110 mmHg, pt denied chest pain dyspnea headache palpitation and dizziness  Not took med this morning  ekg NSR  Lisinopril caused throat edema and Coreg caused hair loss    Interval history  BP high. On ozempic Rx for 1 m. Weight loss 3 lbs  Allergic to multiple HTN meds.,  No chest pain dyspnea dizziness faint and palpitation                  Past Medical History:   Diagnosis Date    Bilateral pleural effusion 2020    CAD (coronary artery disease)     Colon polyp     Diabetes mellitus type I     Hypertension     Hyponatremia 2020       Past Surgical History:   Procedure Laterality Date    Benign Cyst Right     BREAST CYST EXCISION Right     pt states benign     SECTION      COLONOSCOPY      COLONOSCOPY N/A 2019    Procedure: COLONOSCOPY;   Surgeon: Ileana Holcomb MD;  Location: [facility];  Service: Endoscopy;  Laterality: N/A;       Social History     Tobacco Use    Smoking status: Never    Smokeless tobacco: Never   Substance Use Topics    Alcohol use: Never    Drug use: Never       Family History   Problem Relation Age of Onset    Cancer Mother     Pacemaker/defibrilator Mother     Glaucoma Mother     Diabetes Father     Hypertension Sister     Glaucoma Sister     Breast cancer Paternal Grandmother     Cancer Paternal Grandmother     Hypertension Brother          ROS    Objective:   Physical Exam  HENT:      Head: Normocephalic.   Eyes:      Pupils: Pupils are equal, round, and reactive to light.   Neck:      Thyroid: No thyromegaly.      Vascular: Normal carotid pulses. No carotid bruit or JVD.   Cardiovascular:      Rate and Rhythm: Normal rate and regular rhythm. No extrasystoles are present.     Chest Wall: PMI is not displaced.      Pulses: Normal pulses.      Heart sounds: Normal heart sounds. No murmur heard.     No gallop. No S3 sounds.   Pulmonary:      Effort: No respiratory distress.      Breath sounds: Normal breath sounds. No stridor.   Abdominal:      General: Bowel sounds are normal.      Palpations: Abdomen is soft.      Tenderness: There is no abdominal tenderness. There is no rebound.   Musculoskeletal:         General: Normal range of motion.   Skin:     Findings: No rash.   Neurological:      Mental Status: She is alert and oriented to person, place, and time.   Psychiatric:         Behavior: Behavior normal.         Lab Results   Component Value Date    CHOL 166 03/20/2023    CHOL 179 03/08/2022    CHOL 198 09/28/2020     Lab Results   Component Value Date    HDL 47 03/20/2023    HDL 45 03/08/2022    HDL 44 09/28/2020     Lab Results   Component Value Date    LDLCALC 101.2 03/20/2023    LDLCALC 117.8 03/08/2022    LDLCALC 126.0 09/28/2020     Lab Results   Component Value Date    TRIG 89 03/20/2023    TRIG 81 03/08/2022     TRIG 140 09/28/2020     Lab Results   Component Value Date    CHOLHDL 28.3 03/20/2023    CHOLHDL 25.1 03/08/2022    CHOLHDL 22.2 09/28/2020       Chemistry        Component Value Date/Time     07/18/2023 1221    K 4.5 07/18/2023 1221     07/18/2023 1221    CO2 24 07/18/2023 1221    BUN 10 07/18/2023 1221    CREATININE 0.8 07/18/2023 1221     07/18/2023 1221        Component Value Date/Time    CALCIUM 9.9 07/18/2023 1221    ALKPHOS 73 03/20/2023 1251    AST 23 03/20/2023 1251    ALT 24 03/20/2023 1251    BILITOT 0.6 03/20/2023 1251    ESTGFRAFRICA 42 (A) 04/04/2022 2105    EGFRNONAA 37 (A) 04/04/2022 2105          Lab Results   Component Value Date    HGBA1C 7.4 (H) 07/18/2023     Lab Results   Component Value Date    TSH 0.762 03/20/2023     Lab Results   Component Value Date    INR 1.3 (H) 06/13/2020     Lab Results   Component Value Date    WBC 7.39 03/20/2023    HGB 14.6 03/20/2023    HCT 44.3 03/20/2023    MCV 86 03/20/2023     03/20/2023     BMP  Sodium   Date Value Ref Range Status   07/18/2023 139 136 - 145 mmol/L Final     Potassium   Date Value Ref Range Status   07/18/2023 4.5 3.5 - 5.1 mmol/L Final     Chloride   Date Value Ref Range Status   07/18/2023 102 95 - 110 mmol/L Final     CO2   Date Value Ref Range Status   07/18/2023 24 23 - 29 mmol/L Final     BUN   Date Value Ref Range Status   07/18/2023 10 8 - 23 mg/dL Final     Creatinine   Date Value Ref Range Status   07/18/2023 0.8 0.5 - 1.4 mg/dL Final     Calcium   Date Value Ref Range Status   07/18/2023 9.9 8.7 - 10.5 mg/dL Final     Anion Gap   Date Value Ref Range Status   07/18/2023 13 8 - 16 mmol/L Final     eGFR if    Date Value Ref Range Status   04/04/2022 42 (A) >60 mL/min/1.73 m^2 Final     eGFR if non    Date Value Ref Range Status   04/04/2022 37 (A) >60 mL/min/1.73 m^2 Final     Comment:     Calculation used to obtain the estimated glomerular filtration  rate (eGFR) is the  CKD-EPI equation.        BNP  @LABRCNTIP(BNP,BNPTRIAGEBLO)@  @LABRCNTIP(troponini)@  CrCl cannot be calculated (Patient's most recent lab result is older than the maximum 7 days allowed.).  No results found in the last 24 hours.  No results found in the last 24 hours.  No results found in the last 24 hours.    Assessment:      1. Coronary artery disease involving native coronary artery of native heart without angina pectoris    2. Hypertension associated with diabetes    3. Old myocardial infarction    4. Type 2 diabetes mellitus without complication, without long-term current use of insulin      BP high   LDL and BS controlled    Plan:   Continue Amlodipine olmesartan   Add Chorthalidone for HTN  Continue ASA 81m g  DM Rx per PCP  Counseled DASH  Check Lipid profile with PCP in 6 months  Recommend heart-healthy diet, weight control and regular exercise.  Fabiola. Risk modification.   I have reviewed all pertinent labs and cardiac studies independently. Plans and recommendations have been formulated under my direct supervision. All questions answered and patient voiced understanding.   If symptoms persist go to the ED  RTC in 3 months

## 2023-08-23 ENCOUNTER — OFFICE VISIT (OUTPATIENT)
Dept: INTERNAL MEDICINE | Facility: CLINIC | Age: 63
End: 2023-08-23
Payer: MEDICARE

## 2023-08-23 ENCOUNTER — TELEPHONE (OUTPATIENT)
Dept: RADIOLOGY | Facility: HOSPITAL | Age: 63
End: 2023-08-23
Payer: MEDICARE

## 2023-08-23 VITALS
SYSTOLIC BLOOD PRESSURE: 142 MMHG | HEIGHT: 64 IN | BODY MASS INDEX: 33.31 KG/M2 | TEMPERATURE: 96 F | HEART RATE: 105 BPM | DIASTOLIC BLOOD PRESSURE: 102 MMHG | OXYGEN SATURATION: 99 % | WEIGHT: 195.13 LBS

## 2023-08-23 DIAGNOSIS — I25.2 HISTORY OF MI (MYOCARDIAL INFARCTION): ICD-10-CM

## 2023-08-23 DIAGNOSIS — I25.10 CORONARY ARTERY DISEASE INVOLVING NATIVE CORONARY ARTERY OF NATIVE HEART WITHOUT ANGINA PECTORIS: Chronic | ICD-10-CM

## 2023-08-23 DIAGNOSIS — I70.0 AORTIC ATHEROSCLEROSIS: ICD-10-CM

## 2023-08-23 DIAGNOSIS — E11.59 HYPERTENSION ASSOCIATED WITH DIABETES: Chronic | ICD-10-CM

## 2023-08-23 DIAGNOSIS — I50.32 CHRONIC DIASTOLIC CONGESTIVE HEART FAILURE: Chronic | ICD-10-CM

## 2023-08-23 DIAGNOSIS — I15.2 HYPERTENSION ASSOCIATED WITH DIABETES: Chronic | ICD-10-CM

## 2023-08-23 DIAGNOSIS — I51.7 CARDIOMEGALY: Chronic | ICD-10-CM

## 2023-08-23 DIAGNOSIS — Z53.20 REFUSAL OF STATIN MEDICATION BY PATIENT: ICD-10-CM

## 2023-08-23 DIAGNOSIS — E11.59 TYPE 2 DIABETES MELLITUS WITH OTHER CIRCULATORY COMPLICATION, WITHOUT LONG-TERM CURRENT USE OF INSULIN: Primary | ICD-10-CM

## 2023-08-23 PROCEDURE — 1160F PR REVIEW ALL MEDS BY PRESCRIBER/CLIN PHARMACIST DOCUMENTED: ICD-10-PCS | Mod: CPTII,S$GLB,, | Performed by: FAMILY MEDICINE

## 2023-08-23 PROCEDURE — 3061F NEG MICROALBUMINURIA REV: CPT | Mod: CPTII,S$GLB,, | Performed by: FAMILY MEDICINE

## 2023-08-23 PROCEDURE — 99999 PR PBB SHADOW E&M-EST. PATIENT-LVL III: CPT | Mod: PBBFAC,,, | Performed by: FAMILY MEDICINE

## 2023-08-23 PROCEDURE — 3066F PR DOCUMENTATION OF TREATMENT FOR NEPHROPATHY: ICD-10-PCS | Mod: CPTII,S$GLB,, | Performed by: FAMILY MEDICINE

## 2023-08-23 PROCEDURE — 4010F PR ACE/ARB THEARPY RXD/TAKEN: ICD-10-PCS | Mod: CPTII,S$GLB,, | Performed by: FAMILY MEDICINE

## 2023-08-23 PROCEDURE — 3061F PR NEG MICROALBUMINURIA RESULT DOCUMENTED/REVIEW: ICD-10-PCS | Mod: CPTII,S$GLB,, | Performed by: FAMILY MEDICINE

## 2023-08-23 PROCEDURE — 3051F HG A1C>EQUAL 7.0%<8.0%: CPT | Mod: CPTII,S$GLB,, | Performed by: FAMILY MEDICINE

## 2023-08-23 PROCEDURE — 99214 PR OFFICE/OUTPT VISIT, EST, LEVL IV, 30-39 MIN: ICD-10-PCS | Mod: S$GLB,,, | Performed by: FAMILY MEDICINE

## 2023-08-23 PROCEDURE — 3008F BODY MASS INDEX DOCD: CPT | Mod: CPTII,S$GLB,, | Performed by: FAMILY MEDICINE

## 2023-08-23 PROCEDURE — 4010F ACE/ARB THERAPY RXD/TAKEN: CPT | Mod: CPTII,S$GLB,, | Performed by: FAMILY MEDICINE

## 2023-08-23 PROCEDURE — 3077F PR MOST RECENT SYSTOLIC BLOOD PRESSURE >= 140 MM HG: ICD-10-PCS | Mod: CPTII,S$GLB,, | Performed by: FAMILY MEDICINE

## 2023-08-23 PROCEDURE — 3080F DIAST BP >= 90 MM HG: CPT | Mod: CPTII,S$GLB,, | Performed by: FAMILY MEDICINE

## 2023-08-23 PROCEDURE — 1159F PR MEDICATION LIST DOCUMENTED IN MEDICAL RECORD: ICD-10-PCS | Mod: CPTII,S$GLB,, | Performed by: FAMILY MEDICINE

## 2023-08-23 PROCEDURE — 3077F SYST BP >= 140 MM HG: CPT | Mod: CPTII,S$GLB,, | Performed by: FAMILY MEDICINE

## 2023-08-23 PROCEDURE — 3051F PR MOST RECENT HEMOGLOBIN A1C LEVEL 7.0 - < 8.0%: ICD-10-PCS | Mod: CPTII,S$GLB,, | Performed by: FAMILY MEDICINE

## 2023-08-23 PROCEDURE — 99999 PR PBB SHADOW E&M-EST. PATIENT-LVL III: ICD-10-PCS | Mod: PBBFAC,,, | Performed by: FAMILY MEDICINE

## 2023-08-23 PROCEDURE — 3066F NEPHROPATHY DOC TX: CPT | Mod: CPTII,S$GLB,, | Performed by: FAMILY MEDICINE

## 2023-08-23 PROCEDURE — 3080F PR MOST RECENT DIASTOLIC BLOOD PRESSURE >= 90 MM HG: ICD-10-PCS | Mod: CPTII,S$GLB,, | Performed by: FAMILY MEDICINE

## 2023-08-23 PROCEDURE — 99214 OFFICE O/P EST MOD 30 MIN: CPT | Mod: S$GLB,,, | Performed by: FAMILY MEDICINE

## 2023-08-23 PROCEDURE — 1159F MED LIST DOCD IN RCRD: CPT | Mod: CPTII,S$GLB,, | Performed by: FAMILY MEDICINE

## 2023-08-23 PROCEDURE — 3008F PR BODY MASS INDEX (BMI) DOCUMENTED: ICD-10-PCS | Mod: CPTII,S$GLB,, | Performed by: FAMILY MEDICINE

## 2023-08-23 PROCEDURE — 1160F RVW MEDS BY RX/DR IN RCRD: CPT | Mod: CPTII,S$GLB,, | Performed by: FAMILY MEDICINE

## 2023-08-23 RX ORDER — ASPIRIN 81 MG/1
81 TABLET ORAL DAILY
Qty: 90 TABLET | Refills: 3
Start: 2023-08-23 | End: 2023-12-04

## 2023-08-23 RX ORDER — SEMAGLUTIDE 1.34 MG/ML
0.5 INJECTION, SOLUTION SUBCUTANEOUS
Qty: 4.5 ML | Refills: 3 | Status: SHIPPED | OUTPATIENT
Start: 2023-08-23 | End: 2023-08-31

## 2023-08-23 NOTE — PROGRESS NOTES
Subjective:   Patient ID: Demetrice Gaines is a 63 y.o. female.  Chief Complaint:  No chief complaint on file.    Presents for follow-up on diabetes and hypertension     Last visit July 2023   A1c 7.4%, BMP normal   Bone density normal   Blood pressure not controlled.  Restarted Leatha 10/40 mg daily.  Spironolactone discontinued.  Referred to Cardiology.  Had visit blood pressure elevated despite compliance with Leatha, so prescribed chlorthalidone 25 mg daily but patient has not started the prescription yet.  Denies any shortness breast swelling.  For her diabetes which was controlled, started Ozempic.  Now on 0.5 mg weekly dose.  Reports compliance.  Denies side effects.  Continued glue meds 1000 mg daily, but questions if she can now discontinue medication.  Denies any symptoms hypo hyperglycemia.  Additional history for coronary artery disease with MI. on aspirin 81 mg daily.  Refuses statin.  Denies any chest pain claudication.      No new complaints today          Current Outpatient Medications:     amlodipine-olmesartan (LEATHA) 10-40 mg per tablet, Take 1 tablet by mouth once daily., Disp: 90 tablet, Rfl: 1    blood sugar diagnostic (ONETOUCH VERIO TEST STRIPS) Strp, Use to check blood sugar twice daily, Disp: 100 each, Rfl: 6    chlorthalidone (HYGROTEN) 25 MG Tab, Take 1 tablet (25 mg total) by mouth once daily., Disp: 30 tablet, Rfl: 11    lancets (ONETOUCH ULTRASOFT LANCETS) Misc, Use to check blood sugar twice daily, Disp: 100 each, Rfl: 6    aspirin (ECOTRIN) 81 MG EC tablet, Take 1 tablet (81 mg total) by mouth once daily., Disp: 90 tablet, Rfl: 3    semaglutide (OZEMPIC) 0.25 mg or 0.5 mg(2 mg/1.5 mL) pen injector, Inject 0.5 mg into the skin every 7 days. Once a week., Disp: 4.5 mL, Rfl: 3    Review of Systems   Constitutional:  Negative for chills, diaphoresis, fatigue and fever.   Eyes:  Negative for visual disturbance.   Respiratory:  Negative for cough, chest tightness, shortness of breath and  "wheezing.    Cardiovascular:  Negative for chest pain, palpitations and leg swelling.   Gastrointestinal:  Negative for abdominal distention, abdominal pain, constipation, diarrhea, nausea and vomiting.   Endocrine: Negative for polydipsia, polyphagia and polyuria.   Genitourinary:  Negative for difficulty urinating, dysuria, flank pain, frequency, hematuria and urgency.   Musculoskeletal:  Negative for myalgias.   Skin:  Negative for rash.   Neurological:  Negative for dizziness, syncope, weakness, light-headedness, numbness and headaches.   Psychiatric/Behavioral:  Negative for sleep disturbance. The patient is not nervous/anxious.        Objective:   BP (!) 142/102 (BP Location: Left arm, Patient Position: Sitting, BP Method: Large (Manual))   Pulse 105   Temp 96.4 °F (35.8 °C) (Tympanic)   Ht 5' 4" (1.626 m)   Wt 88.5 kg (195 lb 1.7 oz)   SpO2 99%   BMI 33.49 kg/m²     Physical Exam  Vitals and nursing note reviewed.   Constitutional:       Appearance: Normal appearance. She is well-developed. She is obese.      Comments:   Blood pressure remains elevated   Cardiovascular:      Rate and Rhythm: Normal rate and regular rhythm.   Pulmonary:      Effort: Pulmonary effort is normal.   Musculoskeletal:      Right lower leg: No edema.      Left lower leg: No edema.   Skin:     Findings: No rash.   Neurological:      Mental Status: She is alert.   Psychiatric:         Mood and Affect: Mood and affect normal.         Assessment:       ICD-10-CM ICD-9-CM   1. Type 2 diabetes mellitus with other circulatory complication, without long-term current use of insulin  E11.59 250.70   2. Hypertension associated with diabetes  E11.59 250.80    I15.2 401.9   3. Chronic diastolic congestive heart failure  I50.32 428.32     428.0   4. Cardiomegaly  I51.7 429.3   5. Coronary artery disease involving native coronary artery of native heart without angina pectoris  I25.10 414.01   6. Aortic atherosclerosis  I70.0 440.0   7. " History of MI (myocardial infarction)  I25.2 412   8. Refusal of statin medication by patient  Z53.20 V64.2     Plan:   Type 2 diabetes mellitus with other circulatory complication, without long-term current use of insulin  -     semaglutide (OZEMPIC) 0.25 mg or 0.5 mg(2 mg/1.5 mL) pen injector; Inject 0.5 mg into the skin every 7 days. Once a week.  Dispense: 4.5 mL; Refill: 3  -     Hemoglobin A1C; Future; Expected date: 11/23/2023  Controlled.  Stable.  Asymptomatic.  A1c less than 8%.    Continue Ozempic 0.5 mg weekly injection   Okay to discontinue metformin 1000 mg daily   Recheck A1c 3 months   Eye exam up-to-date  Follow-up Podiatry as planned     Hypertension associated with diabetes  Chronic diastolic congestive heart failure  Cardiomegaly  Uncontrolled.  BP elevated.  Asymptomatic.    Noncompliance with medical regimen   Continue Elisabet 10/40 mg daily   Start chlorthalidone 25 mg daily as recommended by Cardiology     Coronary artery disease involving native coronary artery of native heart without angina pectoris  Aortic atherosclerosis  History of MI (myocardial infarction)  Refusal of statin medication by patient  -     aspirin (ECOTRIN) 81 MG EC tablet; Take 1 tablet (81 mg total) by mouth once daily.  Dispense: 90 tablet; Refill: 3  Rediscussed statin indication to decrease significant cardiovascular risk due to her diabetes and to prevent another heart attack   Patient continues to refused/declined treatment   Also offered potential Repatha injections, but patient refused/declined treatment     Follow-up cardiology as scheduled     Return to clinic 3 months or sooner if needed

## 2023-08-24 ENCOUNTER — TELEPHONE (OUTPATIENT)
Dept: RADIOLOGY | Facility: HOSPITAL | Age: 63
End: 2023-08-24
Payer: MEDICARE

## 2023-08-24 ENCOUNTER — HOSPITAL ENCOUNTER (OUTPATIENT)
Dept: RADIOLOGY | Facility: HOSPITAL | Age: 63
Discharge: HOME | End: 2023-08-24
Attending: PHYSICIAN ASSISTANT
Payer: MEDICARE

## 2023-08-24 DIAGNOSIS — R92.8 ABNORMAL MAMMOGRAM: Primary | ICD-10-CM

## 2023-08-24 DIAGNOSIS — R92.8 ABNORMAL MAMMOGRAM: ICD-10-CM

## 2023-08-24 PROCEDURE — 77065 DX MAMMO INCL CAD UNI: CPT | Mod: TC,LT

## 2023-08-24 PROCEDURE — 77065 DX MAMMO INCL CAD UNI: CPT | Mod: 26,LT,, | Performed by: RADIOLOGY

## 2023-08-24 PROCEDURE — 77065 MAMMO DIGITAL DIAGNOSTIC LEFT WITH TOMO: ICD-10-PCS | Mod: 26,LT,, | Performed by: RADIOLOGY

## 2023-08-24 PROCEDURE — 77061 BREAST TOMOSYNTHESIS UNI: CPT | Mod: 26,LT,, | Performed by: RADIOLOGY

## 2023-08-24 PROCEDURE — 77061 MAMMO DIGITAL DIAGNOSTIC LEFT WITH TOMO: ICD-10-PCS | Mod: 26,LT,, | Performed by: RADIOLOGY

## 2023-08-24 NOTE — TELEPHONE ENCOUNTER
Stereotactic core biopsy scheduled for 8/29/23 at 10:30, arrival time 10am.  Biopsy instructions given with understandings verbalized. Patient has my contact information.

## 2023-08-29 ENCOUNTER — HOSPITAL ENCOUNTER (OUTPATIENT)
Dept: RADIOLOGY | Facility: HOSPITAL | Age: 63
Discharge: HOME | End: 2023-08-29
Attending: PHYSICIAN ASSISTANT
Payer: MEDICARE

## 2023-08-29 DIAGNOSIS — R92.8 ABNORMAL MAMMOGRAM: ICD-10-CM

## 2023-08-29 PROCEDURE — 88342 IMHCHEM/IMCYTCHM 1ST ANTB: CPT | Mod: 26,59,, | Performed by: PATHOLOGY

## 2023-08-29 PROCEDURE — A4648 IMPLANTABLE TISSUE MARKER: HCPCS

## 2023-08-29 PROCEDURE — 88341 IMHCHEM/IMCYTCHM EA ADD ANTB: CPT | Mod: 59 | Performed by: PATHOLOGY

## 2023-08-29 PROCEDURE — 88305 TISSUE EXAM BY PATHOLOGIST: ICD-10-PCS | Mod: 26,,, | Performed by: PATHOLOGY

## 2023-08-29 PROCEDURE — 88342 CHG IMMUNOCYTOCHEMISTRY: ICD-10-PCS | Mod: 26,59,, | Performed by: PATHOLOGY

## 2023-08-29 PROCEDURE — 88342 IMHCHEM/IMCYTCHM 1ST ANTB: CPT | Performed by: PATHOLOGY

## 2023-08-29 PROCEDURE — 88305 TISSUE EXAM BY PATHOLOGIST: CPT | Performed by: PATHOLOGY

## 2023-08-29 PROCEDURE — 88360 TUMOR IMMUNOHISTOCHEM/MANUAL: CPT | Mod: 26,,, | Performed by: PATHOLOGY

## 2023-08-29 PROCEDURE — 88360 TUMOR IMMUNOHISTOCHEM/MANUAL: CPT | Performed by: PATHOLOGY

## 2023-08-29 PROCEDURE — 88360 PR  TUMOR IMMUNOHISTOCHEM/MANUAL: ICD-10-PCS | Mod: 26,,, | Performed by: PATHOLOGY

## 2023-08-29 PROCEDURE — 88305 TISSUE EXAM BY PATHOLOGIST: CPT | Mod: 26,,, | Performed by: PATHOLOGY

## 2023-08-29 PROCEDURE — 19081 MAMMO BREAST STEREOTACTIC BREAST BIOPSY LEFT: ICD-10-PCS | Mod: LT,,, | Performed by: RADIOLOGY

## 2023-08-29 PROCEDURE — 88341 IMHCHEM/IMCYTCHM EA ADD ANTB: CPT | Mod: 26,59,, | Performed by: PATHOLOGY

## 2023-08-29 PROCEDURE — 19081 BX BREAST 1ST LESION STRTCTC: CPT | Mod: LT,,, | Performed by: RADIOLOGY

## 2023-08-29 PROCEDURE — 88341 PR IHC OR ICC EACH ADD'L SINGLE ANTIBODY  STAINPR: ICD-10-PCS | Mod: 26,59,, | Performed by: PATHOLOGY

## 2023-08-29 NOTE — NURSING
Pressure held on left breast biopsy site for 10 mins, hemostasis was achieved, steri strips were applied, and wound was covered with 4x4 guaze and a tegaderm.  Dressing clean, dry and intact with no drainage noted.  Discharge instructions given verbally and in writing, patient voiced understandings.  Patient discharged and accompanied by family member.

## 2023-08-31 ENCOUNTER — PATIENT MESSAGE (OUTPATIENT)
Dept: INTERNAL MEDICINE | Facility: CLINIC | Age: 63
End: 2023-08-31
Payer: MEDICARE

## 2023-08-31 DIAGNOSIS — E11.59 TYPE 2 DIABETES MELLITUS WITH OTHER CIRCULATORY COMPLICATION, WITHOUT LONG-TERM CURRENT USE OF INSULIN: ICD-10-CM

## 2023-08-31 RX ORDER — SEMAGLUTIDE 1.34 MG/ML
1 INJECTION, SOLUTION SUBCUTANEOUS
Qty: 9 ML | Refills: 3 | Status: SHIPPED | OUTPATIENT
Start: 2023-08-31 | End: 2023-12-04 | Stop reason: SDUPTHER

## 2023-09-06 LAB
FINAL PATHOLOGIC DIAGNOSIS: NORMAL
GROSS: NORMAL
Lab: NORMAL
SUPPLEMENTAL DIAGNOSIS: NORMAL

## 2023-09-07 ENCOUNTER — TELEPHONE (OUTPATIENT)
Dept: SURGERY | Facility: CLINIC | Age: 63
End: 2023-09-07
Payer: MEDICARE

## 2023-09-07 NOTE — TELEPHONE ENCOUNTER
Spoke with patient regarding breast biopsy results- pt verbalized understanding and has reviewed pathology report. Pt scheduled to see Dr Josue 9/11 at 9AM at [facility], encouraged to bring a support person(s) and educated on expectations of the visit. She denied any other questions at this time and given my direct contact information should she need anything.     ----- Message from Karl Fitch DO sent at 9/5/2023 12:18 PM CDT -----  Malignant and concordant.      Thank you.

## 2023-09-08 PROBLEM — D05.12 BREAST NEOPLASM, TIS (DCIS), LEFT: Status: ACTIVE | Noted: 2023-09-08

## 2023-09-08 NOTE — PROGRESS NOTES
Breast Surgical Oncology  [place]    Date of Service: 2023    SUBJECTIVE:   Chief complaint: left breast cancer    HISTORY OF PRESENT ILLNESS:   Demetrice Gaines is a 63 y.o. female who is kindly referred by Dr. Lul Alvarez for left breast cancer.    A left breast abnormality was identified on routine screening mammography.  Focused mammographic evaluation revealed 17 mm x 8 mm fine linear or fine-linear branching calcifications in a linear distribution seen in the lower outer quadrant of the left breast in the posterior depth. She denies breast concerns such as masses, skin changes, nipple discharge, nipple retraction or lumps under the arm.  She has had longstanding left breast pain that slightly improves with caffeine reduction. She currently drinks 3 cokes/day. She underwent a right breast biopsy in .     Her breast cancer risk factor profile is as follows: Menarche at 10, Menopause at 48.  She is . Age at first live birth was 21. Family history of cancer is as follows: paternal grandmother with breast cancer at unknown age,  at unknown age; mother with multiple myeloma.    FAMILY HISTORY:     Family History   Problem Relation Age of Onset    Cancer Mother     Pacemaker/defibrilator Mother     Glaucoma Mother     Arthritis Mother     Heart disease Mother         It seems that this condition runs in my family on my mother's side    Hypertension Mother     Diabetes Father     Hypertension Sister     Glaucoma Sister     Breast cancer Paternal Grandmother     Cancer Paternal Grandmother     Hypertension Brother     COPD Sister     COPD Brother     Hypertension Brother     Hypertension Sister         PAST MEDICAL HISTORY:     Past Medical History:   Diagnosis Date    Bilateral pleural effusion 2020    CAD (coronary artery disease)     Colon polyp     Diabetes mellitus type I     Hypertension     Hyponatremia 2020       SURGICAL HISTORY:     Past Surgical History:   Procedure  Laterality Date    Benign Cyst Right     BREAST CYST EXCISION Right     pt states benign     SECTION      COLONOSCOPY      COLONOSCOPY N/A 2019    Procedure: COLONOSCOPY;  Surgeon: Ileana Holcomb MD;  Location: [facility];  Service: Endoscopy;  Laterality: N/A;       SOCIAL HISTORY:     Social History     Tobacco Use    Smoking status: Never    Smokeless tobacco: Never    Tobacco comments:     NEVER   Substance Use Topics    Alcohol use: Never    Drug use: Never        MEDICATIONS/ALLERGIES:     Current Outpatient Medications:     amlodipine-olmesartan (LEATHA) 10-40 mg per tablet, Take 1 tablet by mouth once daily., Disp: 90 tablet, Rfl: 1    aspirin (ECOTRIN) 81 MG EC tablet, Take 1 tablet (81 mg total) by mouth once daily., Disp: 90 tablet, Rfl: 3    blood sugar diagnostic (ONETOUCH VERIO TEST STRIPS) Strp, Use to check blood sugar twice daily, Disp: 100 each, Rfl: 6    chlorthalidone (HYGROTEN) 25 MG Tab, Take 1 tablet (25 mg total) by mouth once daily., Disp: 30 tablet, Rfl: 11    lancets (ONETOUCH ULTRASOFT LANCETS) Misc, Use to check blood sugar twice daily, Disp: 100 each, Rfl: 6    semaglutide (OZEMPIC) 1 mg/dose (4 mg/3 mL), Inject 1 mg into the skin every 7 days., Disp: 9 mL, Rfl: 3  Review of patient's allergies indicates:   Allergen Reactions    Aldactone [spironolactone]      Memory impair and breast soreness    Coreg [carvedilol]      Hair loss    Lisinopril-hydrochlorothiazide      Other reaction(s): Reaction:Throat swelling;       REVIEW OF SYSTEMS:   I have reviewed 12 systems, including 2 points per system. Pertinent reported positives are: none    PHYSICAL EXAM:   General: The patient appears well and is in no acute distress.     Chaperon present for examination.   BREAST EXAM  No Asymmetry  Right:  - Mass: No  - Skin change: No  - Nipple Discharge: No  - Nipple retraction: No  - Axillary LAD: No  Left:   - Mass: 1 cm vague nodularity 10:00, 8 CMFN consistent with a post biopsy  hematoma  - Skin change: superficial ecchymosis UIQ  - Nipple Discharge: No  - Nipple retraction: No  - Axillary LAD: No    IMAGING:   Images were personally reviewed.     Results for orders placed during the hospital encounter of 08/24/23    Mammo Digital Diagnostic Left with Cash    Narrative  Result:  Mammo Digital Diagnostic Left with Cash    History:  Patient is 63 y.o. and is seen for diagnostic imaging.    Films Compared:  Compared to: 08/22/2023 Mammo Digital Screening Bilat w/ Cash, 05/25/2021 Mammo Digital Screening Bilat w/ Cash, and 11/05/2014 Mammo Digital Screening Bilat    Findings:  This procedure was performed using tomosynthesis. Computer-aided detection was utilized in the interpretation of this examination.  The left breast has scattered areas of fibroglandular density.    There are 17 mm x 8 mm fine linear or fine-linear branching calcifications in a linear distribution seen in the lower outer quadrant of the left breast in the posterior depth.    Impression  Left  Calcifications: Left breast 17 mm x 8 mm calcifications at the lower outer posterior position. Assessment: 4B - Suspicious finding. Stereotactic Biopsy is recommended.    BI-RADS Category:  Overall: 4 - Suspicious      Recommendation:  Stereotactic Biopsy is recommended.      Your estimated lifetime risk of breast cancer (to age 85) based on Tyrer-Cuzick risk assessment model is Tyrer-Cuzick: 7.76 %. According to the American Cancer Society, patients with a lifetime breast cancer risk of 20% or higher might benefit from supplemental screening tests.    PATHOLOGY:     Lab Results   Component Value Date    FPATHDX  08/29/2023     Breast, left, calcifications,  core biopsy  Ductal carcinoma in Situ (DCIS), high nuclear grade solid pattern with scattered central necrosis.  DCIS measures 3 mm (0.3 cm) in greatest linear dimension within the core biopsy specimen.  Microcalcifications are not identified in initial levels - Note:  Additional  levels will be completed to confirm microcalcifications and results will follow in a supplemental report.  Immunohistochemical stains for hormonal receptors are completed on the tumor cells and reveal the following:    Estrogen receptor:  Positive; strong nuclear staining over 95% of DCIS cells.  Progesterone receptor:  Immunostain for progesterone receptor will be completed and results will follow in a supplemental report.     All immunohistochemical stains have satisfactory positive and negative controls.    Comment:  Immunohistochemical stains are completed on the left breast core biopsy and reveal loss of staining in areas of DCIS with CK5/6.  The ductal epithelial cells are positive for cytokeratin 7.  Immunostain for p63 shows positive staining around all in Situ   ductal elements.  Stains have satisfactory positive and negative controls.  This staining profile supports the above diagnosis.    This case was also reviewed by Dr. JAYME Whittington, who concurs with the above diagnosis.         ASSESSMENT:     1. Breast neoplasm, Tis (DCIS), left          PLAN:     Demetrice Gaines is a 63 y.o. female who is new diagnosis of Stage 0 (Tis N0 Mx) LEFT Breast Ductal  Carcinoma in Situ, Grade 3, ER 95%, IL x%. I have reviewed her imaging and pathology reports with her and I have provided her with copies. Today, we reviewed reviewed the guidelines of the National Comprehensive Cancer Network for her diagnosis.    I am referring her for genetic counseling based on her diagnosis and family history.      We have discussed the surgical choices of lumpectomy with radiation and mastectomy with or without radiation.  I have explained that the survival is the same, regardless of the surgery chosen.  We have discussed that the local recurrence rate following mastectomy is 2-5%.  I have explained that the local recurrence rate was slightly higher following breast conservation in the large trials that compared mastectomy and breast  conservation. However, with current medical therapies, local recurrence has been reduced to as low as 6%. I did review with her the general schedule and side effects of radiation. She will be referred to radiation oncology. We briefly discussed reconstruction options, and the [facility] breast cancer treatment brochure was provided.    We reviewed the need for sentinel lymph node biopsy to further stage the axilla.       Because her cancer is hormone receptor positive, she will be recommended for endocrine therapy.  The decision for or against adjuvant chemotherapy will be made following surgery. She understands that she will be referred to a medical oncologist to discuss these points further.    She is interested in mastectomy. A referral for plastic surgery has been placed. Because she is interested in bilateral mastectomy, a breast MRI has been ordered.     I have provided her with general information regarding the supportive services available here including oncology social work, patient navigation, nutritional services, preoperative and postoperative physical therapy programs, and genetic counseling.      I spent a total of 60 minutes on this visit. This includes face to face time and non-face to face time preparing to see the patient (eg, review of tests), obtaining and/or reviewing separately obtained history, documenting clinical information in the electronic or other health record, independently interpreting results and communicating results to the patient/family/caregiver, or care coordinator.          Hemalatha Josue M.D.

## 2023-09-11 ENCOUNTER — OFFICE VISIT (OUTPATIENT)
Dept: SURGERY | Facility: CLINIC | Age: 63
End: 2023-09-11
Payer: MEDICARE

## 2023-09-11 ENCOUNTER — DOCUMENTATION ONLY (OUTPATIENT)
Dept: SURGERY | Facility: CLINIC | Age: 63
End: 2023-09-11

## 2023-09-11 DIAGNOSIS — D05.12 BREAST NEOPLASM, TIS (DCIS), LEFT: Primary | ICD-10-CM

## 2023-09-11 PROCEDURE — 4010F ACE/ARB THERAPY RXD/TAKEN: CPT | Mod: CPTII,S$GLB,, | Performed by: SURGERY

## 2023-09-11 PROCEDURE — 99205 OFFICE O/P NEW HI 60 MIN: CPT | Mod: S$GLB,,, | Performed by: SURGERY

## 2023-09-11 PROCEDURE — 3066F PR DOCUMENTATION OF TREATMENT FOR NEPHROPATHY: ICD-10-PCS | Mod: CPTII,S$GLB,, | Performed by: SURGERY

## 2023-09-11 PROCEDURE — 3061F PR NEG MICROALBUMINURIA RESULT DOCUMENTED/REVIEW: ICD-10-PCS | Mod: CPTII,S$GLB,, | Performed by: SURGERY

## 2023-09-11 PROCEDURE — 99205 PR OFFICE/OUTPT VISIT, NEW, LEVL V, 60-74 MIN: ICD-10-PCS | Mod: S$GLB,,, | Performed by: SURGERY

## 2023-09-11 PROCEDURE — 3066F NEPHROPATHY DOC TX: CPT | Mod: CPTII,S$GLB,, | Performed by: SURGERY

## 2023-09-11 PROCEDURE — 3051F PR MOST RECENT HEMOGLOBIN A1C LEVEL 7.0 - < 8.0%: ICD-10-PCS | Mod: CPTII,S$GLB,, | Performed by: SURGERY

## 2023-09-11 PROCEDURE — 3061F NEG MICROALBUMINURIA REV: CPT | Mod: CPTII,S$GLB,, | Performed by: SURGERY

## 2023-09-11 PROCEDURE — 4010F PR ACE/ARB THEARPY RXD/TAKEN: ICD-10-PCS | Mod: CPTII,S$GLB,, | Performed by: SURGERY

## 2023-09-11 PROCEDURE — 3051F HG A1C>EQUAL 7.0%<8.0%: CPT | Mod: CPTII,S$GLB,, | Performed by: SURGERY

## 2023-09-11 NOTE — PROGRESS NOTES
Genetic Note  Patient presents for genetic counseling and testing. Pt is referred by Dr. Hemalatha Josue    2023- A left breast abnormality was identified on routine screening mammography.  Focused mammographic evaluation revealed 17 mm x 8 mm fine linear or fine-linear branching calcifications in a linear distribution seen in the lower outer quadrant of the left breast in the posterior depth. She denies breast concerns such as masses, skin changes, nipple discharge, nipple retraction or lumps under the arm.  She has had longstanding left breast pain that slightly improves with caffeine reduction. She currently drinks 3 cokes/day. She underwent a right breast biopsy in .     2023   Breast, left, calcifications,  core biopsy   Ductal carcinoma in Situ (DCIS), high nuclear grade solid pattern with scattered central necrosis.   DCIS measures 3 mm (0.3 cm) in greatest linear dimension within the core biopsy specimen.   Microcalcifications are not identified in initial levels - Note:  Additional levels will be completed to confirm microcalcifications and results will follow in a supplemental report.   Immunohistochemical stains for hormonal receptors are completed on the tumor cells and reveal the following:     Estrogen receptor:  Positive; strong nuclear staining over 95% of DCIS cells.   Progesterone receptor:  Immunostain for progesterone receptor will be completed and results will follow in a supplemental report.     Her breast cancer risk factor profile is as follows: Menarche at 10, Menopause at 48.  She is . Age at first live birth was 21. Family history of cancer is as follows: paternal grandmother with breast cancer at unknown age,  at unknown age; mother with multiple myeloma.       Germline cancer-genetic testing is the testing of genes associated with cancer, known as cancer susceptibility genes.  Just as these genes are inherited from parents, mutations in these genes can be  inherited, as well.  A mutation in a cancer susceptibility gene adversely affects the gene's ability to prevent cancer; therefore, carriers of cancer susceptibility gene mutations may be at increased risk for certain cancers.     A small percentage of cancers are caused by an cancer susceptibility gene mutation, meaning the cancer is genetic/hereditary; rather, most cancers are sporadic.  Causes of sporadic cancers may include environmental risk factors, lifestyle risk factors, and non-modifiable risk factors.  It is important to note that members of a family often share not only their genetics but also risk factors including environmental and lifestyle risk factors.     Results of genetic testing include positive, negative, and variant of unknown significance (VUS).  A positive result indicates the presence of at least one clinically significant mutation, and the patient's specific cancer risks vary depending upon the tumor-suppressor gene(s) in which there is/are a mutation(s).  With a positive result, in some cases, depending upon the specific result and the patient's clinical history, modified management may be recommended, including measures for risk reduction and/or surveillance; however, modified management is not always an option.  A negative result indicates that no clinically significant mutations were identified in the gene(s) tested.  A VUS indicates that there is not presently enough data for the laboratory to make a determination as to whether the variant is clinically significant; VUSs are not typically acted upon clinically.       The ability to interpret the meaning of a negative genetic testing result in genes associated with cancer with which the patient has not personally been affected, when done prior to testing the appropriate affected relative(s), is significantly limited.  A negative result in the patient does not indicate that she cannot develop cancer, and, in fact, the patient may even be  at increased risk for cancer based on shared risk factors with affected relatives.  The most informative candidates for initial genetic testing in a family are those who have been affected with cancer.     Modified management may also be recommended, even with a result of no or unknown significance, based upon risk assessment that incorporates the family history.       Sometimes, depending upon the genetic testing result and the cancer diagnosis, additional/modified treatments may be an option, though this is not guaranteed.     If pt tests positive for a mutation, her first-degree relatives would each have a 50% chance of having the same mutation, and other, more distantly related blood-relatives would also be at risk of having the same mutation.       The Genetic Information Nondiscrimination Act (SAMANTHA) is [place] federal legislation that provides some protections against use of an individual's genetic information by their health insurer and by their employer.  Title I of SAMANTHA prohibits most health insurers from utilizing an individual's genetic information to make decisions regarding insurance eligibility, coverage, underwriting, or premium charges.  Title II of SAMANTHA prohibits covered entities, which include employers, employment agencies, labor organizations, and joint labor-management training and apprenticeship programs, from requesting, requiring, or disclosing the genetic information of employees and applicants.  SAMANTHA also prohibits health insurers and employers from asking or mandating that an individual take a genetic test.  SAMANTHA does not protect individuals from genetic discrimination toward health insurance obtained through a job with the  or through the Federal Employees Health Benefits Plan; from genetic discrimination by employers with fewer than 15 employees; or from genetic discrimination by any other type of policies/entities, including but not limited to life insurance, disability insurance,  long-term care insurance,  benefits, and  Health Services benefits.     An outside laboratory would perform the testing after a blood sample is collected.  With genetic testing, there is a potential for the patient to incur out-of-pocket costs.  Results can take several weeks.  Post-test genetic counseling can be conducted once the genetic testing results are available.     Questions were encouraged and answered to the patient's satisfaction, and she verbalized understanding of information and agreement with the plan.        ASSESSMENT/PLAN      A cancer-genetic evaluation and pre-test genetic counseling were conducted. Based on the information provided by pt, her personal and/or family history is suggestive of a potential potential hereditary predisposition to cancer. The recommendation is to proceed with germline testing to include the genes commonly associated with hereditary breast cancer, including BRCA1 and BRCA2.  Pt was given the option of proceeding with testing now, deferring testing to a later date, or declining testing and opted to proceed.     Genetic test: [facility] STAT breast cancer panel and 84 gene multicancer panel  Specimen type: blood   Collection: By [facility] on..    Consent: The patient is to provide her written informed consent.    Results are expected approximately 2-3 weeks after the genetic testing laboratory receives the specimen.       Encounter for non-procreative genetic counseling  Family history of breast cancer  Personal history of breast cancer  Proceed with germline genetic testing.  Follow up with results.     REFERENCES      National Comprehensive Cancer Network (NCCN). (2021). Genetic/familial high-risk assessment: Breast, ovarian, and pancreatic. NCCN Clinical Practice Guidelines in Oncology (NCCN Guidelines), Version 1.2022.  National Comprehensive Cancer Network (NCCN). (2021). Genetic/familial high-risk assessment: Colorectal. NCCN Clinical Practice  Guidelines in Oncology (NCCN Guidelines), Version 1.2021.  National Comprehensive Cancer Network (NCCN). (2021). Prostate cancer. NCCN Clinical Practice Guidelines in Oncology (NCCN Guidelines), Version 1.2022.    60 min spent reviewing pt record, completing application forms for testing, explaining risk vs benefits of testing and signing consent and answering all questions related to her recent diagnosis and planned treatment

## 2023-09-12 ENCOUNTER — OFFICE VISIT (OUTPATIENT)
Dept: OBSTETRICS AND GYNECOLOGY | Facility: CLINIC | Age: 63
End: 2023-09-12
Payer: MEDICARE

## 2023-09-12 VITALS
DIASTOLIC BLOOD PRESSURE: 78 MMHG | HEIGHT: 64 IN | WEIGHT: 188.69 LBS | BODY MASS INDEX: 32.21 KG/M2 | SYSTOLIC BLOOD PRESSURE: 128 MMHG

## 2023-09-12 DIAGNOSIS — D05.12 BREAST NEOPLASM, TIS (DCIS), LEFT: ICD-10-CM

## 2023-09-12 DIAGNOSIS — Z00.00 WELL WOMAN EXAM WITHOUT GYNECOLOGICAL EXAM: Primary | ICD-10-CM

## 2023-09-12 PROCEDURE — 3008F PR BODY MASS INDEX (BMI) DOCUMENTED: ICD-10-PCS | Mod: CPTII,S$GLB,, | Performed by: OBSTETRICS & GYNECOLOGY

## 2023-09-12 PROCEDURE — 99999 PR PBB SHADOW E&M-EST. PATIENT-LVL II: CPT | Mod: PBBFAC,,, | Performed by: OBSTETRICS & GYNECOLOGY

## 2023-09-12 PROCEDURE — 3051F PR MOST RECENT HEMOGLOBIN A1C LEVEL 7.0 - < 8.0%: ICD-10-PCS | Mod: CPTII,S$GLB,, | Performed by: OBSTETRICS & GYNECOLOGY

## 2023-09-12 PROCEDURE — 99999 PR PBB SHADOW E&M-EST. PATIENT-LVL II: ICD-10-PCS | Mod: PBBFAC,,, | Performed by: OBSTETRICS & GYNECOLOGY

## 2023-09-12 PROCEDURE — 3078F DIAST BP <80 MM HG: CPT | Mod: CPTII,S$GLB,, | Performed by: OBSTETRICS & GYNECOLOGY

## 2023-09-12 PROCEDURE — 3051F HG A1C>EQUAL 7.0%<8.0%: CPT | Mod: CPTII,S$GLB,, | Performed by: OBSTETRICS & GYNECOLOGY

## 2023-09-12 PROCEDURE — 1101F PT FALLS ASSESS-DOCD LE1/YR: CPT | Mod: CPTII,S$GLB,, | Performed by: OBSTETRICS & GYNECOLOGY

## 2023-09-12 PROCEDURE — 88175 CYTOPATH C/V AUTO FLUID REDO: CPT | Performed by: OBSTETRICS & GYNECOLOGY

## 2023-09-12 PROCEDURE — 4010F ACE/ARB THERAPY RXD/TAKEN: CPT | Mod: CPTII,S$GLB,, | Performed by: OBSTETRICS & GYNECOLOGY

## 2023-09-12 PROCEDURE — 1159F PR MEDICATION LIST DOCUMENTED IN MEDICAL RECORD: ICD-10-PCS | Mod: CPTII,S$GLB,, | Performed by: OBSTETRICS & GYNECOLOGY

## 2023-09-12 PROCEDURE — 1160F RVW MEDS BY RX/DR IN RCRD: CPT | Mod: CPTII,S$GLB,, | Performed by: OBSTETRICS & GYNECOLOGY

## 2023-09-12 PROCEDURE — 4010F PR ACE/ARB THEARPY RXD/TAKEN: ICD-10-PCS | Mod: CPTII,S$GLB,, | Performed by: OBSTETRICS & GYNECOLOGY

## 2023-09-12 PROCEDURE — 3074F PR MOST RECENT SYSTOLIC BLOOD PRESSURE < 130 MM HG: ICD-10-PCS | Mod: CPTII,S$GLB,, | Performed by: OBSTETRICS & GYNECOLOGY

## 2023-09-12 PROCEDURE — G0101 CA SCREEN;PELVIC/BREAST EXAM: HCPCS | Mod: GZ,S$GLB,, | Performed by: OBSTETRICS & GYNECOLOGY

## 2023-09-12 PROCEDURE — 3074F SYST BP LT 130 MM HG: CPT | Mod: CPTII,S$GLB,, | Performed by: OBSTETRICS & GYNECOLOGY

## 2023-09-12 PROCEDURE — 3061F NEG MICROALBUMINURIA REV: CPT | Mod: CPTII,S$GLB,, | Performed by: OBSTETRICS & GYNECOLOGY

## 2023-09-12 PROCEDURE — 87624 HPV HI-RISK TYP POOLED RSLT: CPT | Performed by: OBSTETRICS & GYNECOLOGY

## 2023-09-12 PROCEDURE — 3078F PR MOST RECENT DIASTOLIC BLOOD PRESSURE < 80 MM HG: ICD-10-PCS | Mod: CPTII,S$GLB,, | Performed by: OBSTETRICS & GYNECOLOGY

## 2023-09-12 PROCEDURE — 3061F PR NEG MICROALBUMINURIA RESULT DOCUMENTED/REVIEW: ICD-10-PCS | Mod: CPTII,S$GLB,, | Performed by: OBSTETRICS & GYNECOLOGY

## 2023-09-12 PROCEDURE — 1101F PR PT FALLS ASSESS DOC 0-1 FALLS W/OUT INJ PAST YR: ICD-10-PCS | Mod: CPTII,S$GLB,, | Performed by: OBSTETRICS & GYNECOLOGY

## 2023-09-12 PROCEDURE — 1160F PR REVIEW ALL MEDS BY PRESCRIBER/CLIN PHARMACIST DOCUMENTED: ICD-10-PCS | Mod: CPTII,S$GLB,, | Performed by: OBSTETRICS & GYNECOLOGY

## 2023-09-12 PROCEDURE — 3066F NEPHROPATHY DOC TX: CPT | Mod: CPTII,S$GLB,, | Performed by: OBSTETRICS & GYNECOLOGY

## 2023-09-12 PROCEDURE — 3008F BODY MASS INDEX DOCD: CPT | Mod: CPTII,S$GLB,, | Performed by: OBSTETRICS & GYNECOLOGY

## 2023-09-12 PROCEDURE — 3288F PR FALLS RISK ASSESSMENT DOCUMENTED: ICD-10-PCS | Mod: CPTII,S$GLB,, | Performed by: OBSTETRICS & GYNECOLOGY

## 2023-09-12 PROCEDURE — 1159F MED LIST DOCD IN RCRD: CPT | Mod: CPTII,S$GLB,, | Performed by: OBSTETRICS & GYNECOLOGY

## 2023-09-12 PROCEDURE — G0101 PR CA SCREEN;PELVIC/BREAST EXAM: ICD-10-PCS | Mod: GZ,S$GLB,, | Performed by: OBSTETRICS & GYNECOLOGY

## 2023-09-12 PROCEDURE — 3066F PR DOCUMENTATION OF TREATMENT FOR NEPHROPATHY: ICD-10-PCS | Mod: CPTII,S$GLB,, | Performed by: OBSTETRICS & GYNECOLOGY

## 2023-09-12 PROCEDURE — 3288F FALL RISK ASSESSMENT DOCD: CPT | Mod: CPTII,S$GLB,, | Performed by: OBSTETRICS & GYNECOLOGY

## 2023-09-12 NOTE — PROGRESS NOTES
Subjective:      Patient ID: Demetrice Gaines is a 63 y.o. female.    Chief Complaint:  Annual Exam      History of Present Illness  HPI  Annual Exam-Postmenopausal  Patient presents for annual exam. The patient has no complaints today. The patient is not sexually active. GYN screening history: last pap: patient does not recall when last pap was and last mammogram: breast cancer left breast . The patient is not taking hormone replacement therapy. Patient denies post-menopausal vaginal bleeding. The patient wears seatbelts: yes. The patient participates in regular exercise: no. Has the patient ever been transfused or tattooed?: no. The patient reports that there is not domestic violence in her life.  Has not had a pap in over 20 yrs.    GYN & OB History  No LMP recorded. Patient is postmenopausal.   Date of Last Pap: 2020    OB History    Para Term  AB Living   2 2 2     2   SAB IAB Ectopic Multiple Live Births           2      # Outcome Date GA Lbr Enrique/2nd Weight Sex Delivery Anes PTL Lv   2 Term     M CS-LTranv   MAGDA   1 Term     M Vag-Spont   MAGDA       Review of Systems  Review of Systems   Constitutional:  Negative for activity change, appetite change, chills, fatigue, fever and unexpected weight change.   Respiratory:  Negative for shortness of breath.    Cardiovascular:  Negative for chest pain, palpitations and leg swelling.   Gastrointestinal:  Negative for abdominal pain, bloating, blood in stool, constipation, diarrhea, nausea and vomiting.   Genitourinary:  Negative for dyspareunia, dysuria, flank pain, frequency, genital sores, hematuria, hot flashes, pelvic pain, urgency, vaginal bleeding, vaginal discharge, vaginal pain, urinary incontinence, postcoital bleeding, vaginal dryness and vaginal odor.   Musculoskeletal:  Negative for back pain.   Integumentary:  Negative for breast mass, nipple discharge, breast skin changes and breast tenderness.   Neurological:  Negative for syncope  and headaches.   Breast: Negative for asymmetry, lump, mass, mastodynia, nipple discharge, skin changes and tenderness         Objective:     Physical Exam:   Constitutional: She is oriented to person, place, and time. She appears well-developed and well-nourished. No distress.    HENT:   Head: Normocephalic and atraumatic.    Eyes: Pupils are equal, round, and reactive to light. EOM are normal.     Cardiovascular:  Normal rate, regular rhythm and normal heart sounds.             Pulmonary/Chest: Effort normal and breath sounds normal.        Abdominal: Soft. Bowel sounds are normal. She exhibits no distension. There is no abdominal tenderness.     Genitourinary:    Vagina, uterus, right adnexa and left adnexa normal.      Pelvic exam was performed with patient supine.   There is no rash, tenderness, lesion or injury on the right labia. There is no rash, tenderness, lesion or injury on the left labia. Cervix is normal. Right adnexum displays no mass, no tenderness and no fullness. Left adnexum displays no mass, no tenderness and no fullness. No erythema,  no vaginal discharge, tenderness or bleeding in the vagina.    No foreign body in the vagina.      No signs of injury in the vagina.   Cervix exhibits no motion tenderness, no discharge and no friability. Uterus is not deviated, not enlarged and not tender.           Musculoskeletal: Normal range of motion and moves all extremeties. No tenderness or edema.       Neurological: She is alert and oriented to person, place, and time.    Skin: Skin is warm and dry.    Psychiatric: She has a normal mood and affect. Her behavior is normal. Thought content normal.         Assessment:     1. Well woman exam without gynecological exam    2. Breast neoplasm, Tis (DCIS), left             Plan:     Well woman exam without gynecological exam  -     Liquid-Based Pap Smear, Screening  -     HPV High Risk Genotypes, PCR  -     Pt was counseled on cervical/vaginal screening  guidelines and recommendations.  If today's pap smear result is negative, next pap smear will be due in 1 yr (breast CA).  -     Follow up with PCP for routine health maintenance needs.    Breast neoplasm, Tis (DCIS), left  -     Followed by Oncology.      Follow up in about 1 year (around 9/12/2024).

## 2023-09-12 NOTE — NURSING
Nurse Navigator Note:     Met with patient, her spouse and son during her consult with Dr. Josue, including treatment options, referrals, diagnosis, and future plans for workup. Patient and I went through the new patient booklet, explained some of the information and why it is provided.   Specifically discussed shared services listed in booklet and how to request referral. Discussed the role of the nurse navigator and how I can help her through her breast cancer journey.     Patient was given a copy of her appointments, Dr. Josue's card, and my card. Encouraged her to call me if she has any questions or concerns or would like to schedule any additional appointments. Verbalized understanding of all information.      Pt verbalized wanting bilateral mastectomies with plastic reconstruction- pt referred to Dr. Keron Lynn for plastic surgery consultation. We discussed operation, recovery, and follow up care needed. We discussed that we would order home health to help with drains, etc. Referral placed for physical therapy, requested to be seen at the [facility]. Pt wants genetic counseling, scheduled with Krysta Roland NP 9/13- given [employer] pamphlet. Pt aware that should she proceed with bilateral procedures, she would need a breast MRI- ordered and scheduled for a later date.     Oncology Navigation   Intake  Date of Diagnosis: 08/29/23  Cancer Type: Breast  Internal / External Referral: Internal  Date of Referral: 09/08/23  Initial Nurse Navigator Contact: 09/08/23  Referral to Initial Contact Timeline (days): 0  Date Worked: 09/11/23  First Appointment Available: 09/11/23  Appointment Date: 09/11/23  First Available Date vs. Scheduled Date (days): 0  Multiple appointments: No     Treatment  Current Status: Staging work-up  Pending: Done    Surgery: Planned  Surgical Oncologist: Hemalatha Josue MD  Plastic Surgeon: Keron Lynn MD  Type of Surgery: Bilateral mastectomy with left sentinel lymph node  "biopsy  Consult Date: 23          Procedures: MRI; Genetic test  Genetic Testing Date Sent: 23    General Referrals: Physical Therapy; Plastic Surgery; Home Health  Physical Therapy Referral Date: 23    ER: Positive  [place]: Positive       Support Systems: Spouse/significant other; Children  Barriers of Care: Barriers to Care "Assessment completed-no barriers noted"     Acuity  Stage: 0  Surgical Procedure Complexity: 2  Treatment Tolerability: Has not started treatment yet/treatment fully completed and side effects resolved  ECO  Comorbidities in Medical History: 1  Hospitalization Within the Past Month: 0   Needed: 0  Support: 0  Verbalizes Financial Concerns: 0  Transportation: 0  Psychological Factors (+1 each): Emotional during conversation  History of noncompliance/frequent no shows and cancellations: 0  Verbalizes the need for more education: 1  Other Factors (+1 for Each): 0  Navigation Acuity: 5     Follow Up  No follow-ups on file.       " Sotyktu Pregnancy And Lactation Text: There is insufficient data to evaluate whether or not Sotyktu is safe to use during pregnancy.   It is not known if Sotyktu passes into breast milk and whether or not it is safe to use when breastfeeding.

## 2023-09-13 ENCOUNTER — HOSPITAL ENCOUNTER (OUTPATIENT)
Dept: RADIOLOGY | Facility: HOSPITAL | Age: 63
Discharge: HOME | End: 2023-09-13
Attending: SURGERY
Payer: MEDICARE

## 2023-09-13 ENCOUNTER — OFFICE VISIT (OUTPATIENT)
Dept: SURGERY | Facility: CLINIC | Age: 63
End: 2023-09-13
Payer: MEDICARE

## 2023-09-13 DIAGNOSIS — Z13.79 GENETIC TESTING: ICD-10-CM

## 2023-09-13 DIAGNOSIS — D05.12 BREAST NEOPLASM, TIS (DCIS), LEFT: Primary | ICD-10-CM

## 2023-09-13 DIAGNOSIS — D05.12 BREAST NEOPLASM, TIS (DCIS), LEFT: ICD-10-CM

## 2023-09-13 DIAGNOSIS — R59.0 LOCALIZED ENLARGED LYMPH NODES: ICD-10-CM

## 2023-09-13 PROCEDURE — 25500020 PHARM REV CODE 255: Performed by: SURGERY

## 2023-09-13 PROCEDURE — 3061F NEG MICROALBUMINURIA REV: CPT | Mod: CPTII,S$GLB,, | Performed by: NURSE PRACTITIONER

## 2023-09-13 PROCEDURE — 4010F PR ACE/ARB THEARPY RXD/TAKEN: ICD-10-PCS | Mod: CPTII,S$GLB,, | Performed by: NURSE PRACTITIONER

## 2023-09-13 PROCEDURE — 99205 OFFICE O/P NEW HI 60 MIN: CPT | Mod: S$GLB,,, | Performed by: NURSE PRACTITIONER

## 2023-09-13 PROCEDURE — 1160F PR REVIEW ALL MEDS BY PRESCRIBER/CLIN PHARMACIST DOCUMENTED: ICD-10-PCS | Mod: CPTII,S$GLB,, | Performed by: NURSE PRACTITIONER

## 2023-09-13 PROCEDURE — 77049 MRI BREAST W/WO CONTRAST, W/CAD, BILATERAL: ICD-10-PCS | Mod: 26,,, | Performed by: RADIOLOGY

## 2023-09-13 PROCEDURE — 1159F MED LIST DOCD IN RCRD: CPT | Mod: CPTII,S$GLB,, | Performed by: NURSE PRACTITIONER

## 2023-09-13 PROCEDURE — 3061F PR NEG MICROALBUMINURIA RESULT DOCUMENTED/REVIEW: ICD-10-PCS | Mod: CPTII,S$GLB,, | Performed by: NURSE PRACTITIONER

## 2023-09-13 PROCEDURE — 4010F ACE/ARB THERAPY RXD/TAKEN: CPT | Mod: CPTII,S$GLB,, | Performed by: NURSE PRACTITIONER

## 2023-09-13 PROCEDURE — 99999 PR PBB SHADOW E&M-EST. PATIENT-LVL I: ICD-10-PCS | Mod: PBBFAC,,, | Performed by: NURSE PRACTITIONER

## 2023-09-13 PROCEDURE — 77049 MRI BREAST C-+ W/CAD BI: CPT | Mod: TC

## 2023-09-13 PROCEDURE — A9577 INJ MULTIHANCE: HCPCS | Performed by: SURGERY

## 2023-09-13 PROCEDURE — 99205 PR OFFICE/OUTPT VISIT, NEW, LEVL V, 60-74 MIN: ICD-10-PCS | Mod: S$GLB,,, | Performed by: NURSE PRACTITIONER

## 2023-09-13 PROCEDURE — 3066F NEPHROPATHY DOC TX: CPT | Mod: CPTII,S$GLB,, | Performed by: NURSE PRACTITIONER

## 2023-09-13 PROCEDURE — 1159F PR MEDICATION LIST DOCUMENTED IN MEDICAL RECORD: ICD-10-PCS | Mod: CPTII,S$GLB,, | Performed by: NURSE PRACTITIONER

## 2023-09-13 PROCEDURE — 1160F RVW MEDS BY RX/DR IN RCRD: CPT | Mod: CPTII,S$GLB,, | Performed by: NURSE PRACTITIONER

## 2023-09-13 PROCEDURE — 3051F HG A1C>EQUAL 7.0%<8.0%: CPT | Mod: CPTII,S$GLB,, | Performed by: NURSE PRACTITIONER

## 2023-09-13 PROCEDURE — 99999 PR PBB SHADOW E&M-EST. PATIENT-LVL I: CPT | Mod: PBBFAC,,, | Performed by: NURSE PRACTITIONER

## 2023-09-13 PROCEDURE — 3051F PR MOST RECENT HEMOGLOBIN A1C LEVEL 7.0 - < 8.0%: ICD-10-PCS | Mod: CPTII,S$GLB,, | Performed by: NURSE PRACTITIONER

## 2023-09-13 PROCEDURE — 77049 MRI BREAST C-+ W/CAD BI: CPT | Mod: 26,,, | Performed by: RADIOLOGY

## 2023-09-13 PROCEDURE — 3066F PR DOCUMENTATION OF TREATMENT FOR NEPHROPATHY: ICD-10-PCS | Mod: CPTII,S$GLB,, | Performed by: NURSE PRACTITIONER

## 2023-09-13 RX ADMIN — GADOBENATE DIMEGLUMINE 15 ML: 529 INJECTION, SOLUTION INTRAVENOUS at 12:09

## 2023-09-14 ENCOUNTER — HOSPITAL ENCOUNTER (OUTPATIENT)
Dept: RADIOLOGY | Facility: HOSPITAL | Age: 63
Discharge: HOME | End: 2023-09-14
Attending: SURGERY
Payer: MEDICARE

## 2023-09-14 DIAGNOSIS — R59.0 LOCALIZED ENLARGED LYMPH NODES: ICD-10-CM

## 2023-09-14 DIAGNOSIS — D05.12 BREAST NEOPLASM, TIS (DCIS), LEFT: Primary | ICD-10-CM

## 2023-09-14 DIAGNOSIS — D05.12 BREAST NEOPLASM, TIS (DCIS), LEFT: ICD-10-CM

## 2023-09-14 PROCEDURE — 76882 US AXILLA ONLY (BREAST IMAGING) LEFT: ICD-10-PCS | Mod: 26,LT,, | Performed by: STUDENT IN AN ORGANIZED HEALTH CARE EDUCATION/TRAINING PROGRAM

## 2023-09-14 PROCEDURE — 76882 US LMTD JT/FCL EVL NVASC XTR: CPT | Mod: 26,LT,, | Performed by: STUDENT IN AN ORGANIZED HEALTH CARE EDUCATION/TRAINING PROGRAM

## 2023-09-14 PROCEDURE — 76882 US LMTD JT/FCL EVL NVASC XTR: CPT | Mod: TC,LT

## 2023-09-15 LAB
FINAL PATHOLOGIC DIAGNOSIS: NORMAL
Lab: NORMAL

## 2023-09-18 ENCOUNTER — HOSPITAL ENCOUNTER (OUTPATIENT)
Dept: RADIOLOGY | Facility: HOSPITAL | Age: 63
Discharge: HOME | End: 2023-09-18
Attending: SURGERY
Payer: MEDICARE

## 2023-09-18 DIAGNOSIS — D05.12 BREAST NEOPLASM, TIS (DCIS), LEFT: ICD-10-CM

## 2023-09-18 PROCEDURE — A4648 IMPLANTABLE TISSUE MARKER: HCPCS

## 2023-09-18 PROCEDURE — 38505 NEEDLE BIOPSY LYMPH NODES: CPT | Mod: LT,,, | Performed by: RADIOLOGY

## 2023-09-18 PROCEDURE — 88342 IMHCHEM/IMCYTCHM 1ST ANTB: CPT | Performed by: STUDENT IN AN ORGANIZED HEALTH CARE EDUCATION/TRAINING PROGRAM

## 2023-09-18 PROCEDURE — 77065 DX MAMMO INCL CAD UNI: CPT | Mod: TC,LT

## 2023-09-18 PROCEDURE — 88305 TISSUE EXAM BY PATHOLOGIST: CPT | Mod: 26,,, | Performed by: STUDENT IN AN ORGANIZED HEALTH CARE EDUCATION/TRAINING PROGRAM

## 2023-09-18 PROCEDURE — 88305 TISSUE EXAM BY PATHOLOGIST: CPT | Performed by: STUDENT IN AN ORGANIZED HEALTH CARE EDUCATION/TRAINING PROGRAM

## 2023-09-18 PROCEDURE — 38505 US BIOPSY LYMPH NODE AXILLA: ICD-10-PCS | Mod: LT,,, | Performed by: RADIOLOGY

## 2023-09-18 PROCEDURE — 88305 TISSUE EXAM BY PATHOLOGIST: ICD-10-PCS | Mod: 26,,, | Performed by: STUDENT IN AN ORGANIZED HEALTH CARE EDUCATION/TRAINING PROGRAM

## 2023-09-18 PROCEDURE — 88342 IMHCHEM/IMCYTCHM 1ST ANTB: CPT | Mod: 26,,, | Performed by: STUDENT IN AN ORGANIZED HEALTH CARE EDUCATION/TRAINING PROGRAM

## 2023-09-18 PROCEDURE — 77065 MAMMO DIGITAL DIAGNOSTIC LEFT: ICD-10-PCS | Mod: 26,LT,, | Performed by: RADIOLOGY

## 2023-09-18 PROCEDURE — 77065 DX MAMMO INCL CAD UNI: CPT | Mod: 26,LT,, | Performed by: RADIOLOGY

## 2023-09-18 PROCEDURE — 88342 CHG IMMUNOCYTOCHEMISTRY: ICD-10-PCS | Mod: 26,,, | Performed by: STUDENT IN AN ORGANIZED HEALTH CARE EDUCATION/TRAINING PROGRAM

## 2023-09-18 NOTE — NURSING
Pressure held on left axilla biopsy site for 10 mins, hemostasis was achieved, steri strips were applied, and wound was covered with 4x4 guaze and a tegaderm.  Dressing clean, dry and intact with no drainage noted.  Discharge instructions given verbally and in writing, patient voiced understandings.  Patient discharged and accompanied by family member.

## 2023-09-21 ENCOUNTER — PATIENT MESSAGE (OUTPATIENT)
Dept: SURGERY | Facility: CLINIC | Age: 63
End: 2023-09-21
Payer: MEDICARE

## 2023-09-21 LAB
FINAL PATHOLOGIC DIAGNOSIS: NORMAL
GROSS: NORMAL
Lab: NORMAL

## 2023-09-21 NOTE — PROGRESS NOTES
The axillary lymph node biopsy is benign and concordant.  There is however a 9 mm mass seen on prior MRI and ultrasound approximately 1.5 cm posterior to the previous biopsy site in the left breast which demonstrate suspicious features.  This lesion would likely be included in the resection margins however additional biopsy could be performed as clinically warranted.

## 2023-09-25 ENCOUNTER — DOCUMENTATION ONLY (OUTPATIENT)
Dept: SURGERY | Facility: CLINIC | Age: 63
End: 2023-09-25
Payer: MEDICARE

## 2023-09-27 ENCOUNTER — PATIENT MESSAGE (OUTPATIENT)
Dept: ADMINISTRATIVE | Facility: CLINIC | Age: 63
End: 2023-09-27
Payer: MEDICARE

## 2023-09-28 ENCOUNTER — TELEPHONE (OUTPATIENT)
Dept: ENDOSCOPY | Facility: HOSPITAL | Age: 63
End: 2023-09-28
Payer: MEDICARE

## 2023-09-28 DIAGNOSIS — D05.12 BREAST NEOPLASM, TIS (DCIS), LEFT: Primary | ICD-10-CM

## 2023-09-28 NOTE — TELEPHONE ENCOUNTER
Patient calling to schedule colonoscopy. Patient does not have any orders for colonoscopy. Per medical record last colonoscopy done on 12/31/2019 with recommended repeat in 10 years. Patient informed of this information. Patient verbalized understanding.

## 2023-09-29 ENCOUNTER — TELEPHONE (OUTPATIENT)
Dept: RADIOLOGY | Facility: HOSPITAL | Age: 63
End: 2023-09-29
Payer: MEDICARE

## 2023-09-29 DIAGNOSIS — D05.12 BREAST NEOPLASM, TIS (DCIS), LEFT: Primary | ICD-10-CM

## 2023-09-29 NOTE — TELEPHONE ENCOUNTER
Ultrasound guided biopsy scheduled for 10/4/23 at 1pm, arrival time 12:30pm. Biopsy instructions given with understandings verbalized. Patient has my contact information.

## 2023-10-02 ENCOUNTER — DOCUMENTATION ONLY (OUTPATIENT)
Dept: SURGERY | Facility: CLINIC | Age: 63
End: 2023-10-02
Payer: MEDICARE

## 2023-10-02 DIAGNOSIS — D05.12 BREAST NEOPLASM, TIS (DCIS), LEFT: Primary | ICD-10-CM

## 2023-10-02 NOTE — PROGRESS NOTES
Tried to reach pt by phone to discuss 84 gene multicancer panel - left message for pt to call to discuss. Negative for mutation. 84 gene multicancer panel - MLH1 variant of undetermined significance. Results forwarded to Hemalatha Josue MD and Minoo Coleman RN

## 2023-10-03 ENCOUNTER — TELEPHONE (OUTPATIENT)
Dept: SURGERY | Facility: CLINIC | Age: 63
End: 2023-10-03
Payer: MEDICARE

## 2023-10-03 ENCOUNTER — CLINICAL SUPPORT (OUTPATIENT)
Dept: REHABILITATION | Facility: HOSPITAL | Age: 63
End: 2023-10-03
Attending: SURGERY
Payer: MEDICARE

## 2023-10-03 DIAGNOSIS — Z91.89 AT RISK FOR SELF CARE DEFICIT: ICD-10-CM

## 2023-10-03 DIAGNOSIS — D05.12 BREAST NEOPLASM, TIS (DCIS), LEFT: Primary | ICD-10-CM

## 2023-10-03 DIAGNOSIS — D05.12 BREAST NEOPLASM, TIS (DCIS), LEFT: ICD-10-CM

## 2023-10-03 DIAGNOSIS — Z01.818 PRE-OP EXAM: Primary | ICD-10-CM

## 2023-10-03 DIAGNOSIS — Z71.9 ENCOUNTER FOR EDUCATION: ICD-10-CM

## 2023-10-03 PROCEDURE — 97165 OT EVAL LOW COMPLEX 30 MIN: CPT

## 2023-10-03 PROCEDURE — 97110 THERAPEUTIC EXERCISES: CPT

## 2023-10-03 RX ORDER — SODIUM CHLORIDE 9 MG/ML
INJECTION, SOLUTION INTRAVENOUS CONTINUOUS
Status: CANCELLED | OUTPATIENT
Start: 2023-10-03

## 2023-10-03 NOTE — PLAN OF CARE
[facility]   Outpatient Occupational Therapy Breast Cancer Pre-Operative Evaluation     Date: 10/3/2023  Name: Demetrice Gaines  Clinic Number: [id number]    Therapy Diagnosis:   Encounter Diagnoses   Name Primary?    Breast neoplasm, Tis (DCIS), left     Encounter for education     At risk for self care deficit      Physician: Hemalatha Josue MD    Physician Orders: Occupational Therapy to Evaluate and Treat  Medical Diagnosis: D05.12 (ICD-10-CM) - Breast neoplasm, Tis (DCIS), left    Evaluation Date: 10/3/2023  Insurance Authorization Period Expiration: 10/3/2023 - 11/28/2023  Plan of Care Certification Period: 10/3/2023 - 4/3/2024  Progress Note Due: N/A     Date of Return to MD: N/A    Visit # / Visits authorized: 1/1  FOTO: 1/3    Precautions:  Standard    Time In: 8:10  Time Out: 8:50  Total Appointment Time (timed & untimed codes): 40 minutes    Subjective           Date of Onset: Diagnosed in August  History of Current Condition:   Demetrice is a 63 y.o. female that presents to [facility] secondary to diagnosis of Breast neoplasm, Tis (DCIS), left breast cancer.  Patient has elected to have a bilateral mastectomy on 10/18/2023      Patient presents today to perform baseline measurements pre-surgery to be able to detect lymphedema post surgery, upper extremity muscle testing, postural and range of motion assessment along with education of risk of lymphedema and surgical precautions post surgery. Circumferential measurements will also be taken pre-surgery of bilateral upper extremities for early detection of lymphedema post surgery. Patient will also be instructed in exercises to perform pre-surgery to insure best outcomes post surgery.       Medical Diagnosis: Breast neoplasm, Tis (DCIS), left  Surgical Procedure and Date: Bilateral Mastectomy with tissue expanders for reconstruction, 10/18/2023  Chemotherapy: TBD  Radiation: TBD      Involved Side:  Left  Hand Dominance: Right    Previous Therapy: None    Patients Goals: Patient reported goals are to learn necessary education for post-surgery care, exercises and decrease risk for lymphedema and axillary cording for return to prior functional level post surgery.     Falls: None    Pain:  Pain Related Behaviors Observed: Yes   Functional Pain Scale Rating 0-10:   Average: 3/10   Best: 0/10   Worst: 4/10   Location: Left Shoulder  Description: Aching  Aggravating Factors: Lifting  Easing Factors: rest    Occupation:    Working Presently: Retired    Functional Limitations/Social History:    Current Level of Function: Independent with all ADL, IADL, community mobility and functional activities.    Limitation of Functional Status as follows:   ADLs/IADLs: See Below in Objective Section    Nutrition:  Normal  Home/Living Environment : lives with their spouse  Home Access: Single Story  Leisure: [employer]   DME: none       Past Medical History/Physical Systems Review:     Past Medical History:  Demetrice Gaines  has a past medical history of Bilateral pleural effusion, CAD (coronary artery disease), Colon polyp, Diabetes mellitus type I, Hypertension, and Hyponatremia.    Past Surgical History:  Demetrice Gaines  has a past surgical history that includes Benign Cyst (Right);  section; Colonoscopy; Colonoscopy (N/A, 2019); and Breast cyst excision (Right, ).    Current Medications:  Demetrice has a current medication list which includes the following prescription(s): amlodipine-olmesartan, aspirin, blood sugar diagnostic, chlorthalidone, lancets, and ozempic.    Allergies:  Review of patient's allergies indicates:   Allergen Reactions    Aldactone [spironolactone]      Memory impair and breast soreness    Coreg [carvedilol]      Hair loss    Lisinopril-hydrochlorothiazide      Other reaction(s): Reaction:Throat swelling;                    Objective     Activities of Daily  "Living:    Activity of Daily Living  10/3/2023   Feeding Independent   Grooming    Hygiene    Toileting    Upper Body Dressing    Lower Body Dressing    Bathing        Instrumental Activities of Daily Living:    Instrumental Activity of Daily Living 10/3/2023   Homecare Independent   Cooking    Laundry    Driving    Yard Work    Use of Telephone    Money Management    Medication Management    Handwriting    Technology Use        Functional Mobility:    Functional Mobility 10/3/2023   Bed mobility Independent   Roll to left    Roll to right    Supine to prone    Scooting to edge of bed    Supine to sit    Sit to supine    Transfers to bed    Transfers to toilet    Sit to Stand    Stand Pivot    Car Transfers        Gait Assessment:   -    Assistive Devices Used: None  -    Assistance: Independent  -    Distance: Community distances     Endurance Deficit: none    Posture/Alignment :  Postural examination/scapula alignment: Within normal limits   Joint integrity: Within functional limits   Skin integrity: Intact  Edema: None noted    Range of Motion:    Joint Evaluation  AROM  10/3/2023 AROM  10/3/2023    Left Right   Shoulder Flexion 0-180 Within normal limits  Within normal limits    Shoulder Abduction 0-180     Shoulder External Rotation 0-90     Shoulder Internal Rotation 0-90     Shoulder Extension 0-60     Shoulder Horizontal Adduction 0-90     Shoulder Internal Rotation/ 90 Degrees      Shoulder External Rotation/ 90 Degrees          Strength:    Strength 10/3/2023 10/3/2023    Left Right   Shoulder Flexion 4+/5 5/5   Shoulder Abduction     Shoulder External Rotation      Shoulder Internal Rotation     Shoulder Extension     Shoulder Horizontal Adduction     Elbow Flexion 5/5 5/5   Elbow Extension       Baseline Measurements of bilateral upper extremities for early detection of Lymphedema:     LANDMARK RIGHT LEFT   E + 8" 38 cm 36 cm   E + 6" 35 cm 33 cm   E + 4" 32.5 cm 31 cm   E + 2" 31 cm 30 cm   Elbow 28 cm " "26.5 cm   W+ 8" 28 cm 26.5 cm   W +  6" 25 cm 24 cm   W + 4" 22 cm 21 cm   Wrist 16.5 cm 16 cm   DPC 20 cm 19.5 cm   IP Thumb 6.5 cm 6.5 cm     Coordination:   -     Fine Motor Coordination: intact  -     Upper Extremity Coordination: intact  -     Lower Extremity Coordination: intact    Sensation:  Demetrice  reports slightly diminished sensation at finger tips     -    Light touch: bilateral intact    -    Sharp/Dull: bilateral intact    -    Proprioception: bilateral intact    Mental status : within normal limits     Treatment and Patient Education     Total Time Separate from Evaluation: 15 minutes    Patient participated in self care/home management for 5 minutes including the following:     -    Role of Occupational Therapy in multidisciplinary team, goals for Occupational Therapy  -    Patient was educated in lymphedema etiology and management plans.    -    Patient was provided with written risk reductions and precautions for managing lymphedema  -    Patient was educated on axillary cording and how to identify  -    Reviewed CECELIA drain care instructions.     Range of Motion/ Lifting Precautions post surgery - until drains have been removed:  -    Do not lift affected arm above 90 degrees of shoulder flexion  -    Do not lift over 5 lbs  -    Do not pull or push heavy objects  -    Do not sleep on your stomach or surgery side     Patient was instructed in and performed therapeutic exercise for 10 minutes for postural correction and alignment, stretching and soft tissue mobility.   Exercises included:     - exaggerated deep breathing and relaxation  - scapular retractions  - fist making/ball squeezes  - elbow flexion/extension  - wall walks flexion/abduction (max range shoulder level)  - pendulums      Patient was able to demonstrate and report understanding and performance    Written Home Exercises Provided: yes.  Exercises were reviewed and Demetrice was able to demonstrate them prior to the end of the session.  " Demetrice demonstrated good  understanding of the education provided.     See EMR under Patient Instructions for exercises provided 10/3/2023.    Assessment     This is a 63 y.o. female referred to outpatient occupational therapy and presents with a medical diagnosis of Breast neoplasm, Tis (DCIS), left breast cancer and was seen today pre-operatively to assess strength and range of motion of bilateral upper extremities, to take baseline circumferential measurements of bilateral upper extremities to aid in the early detection of lymphedema, educated on axillary cording and aid in its detection and provide patient education on exercises/precautions post breast surgery. Patient does not exhibit any range of motion impairments    Anticipated barriers to Occupational Therapy: Pain     Plan of care discussed with patient: Yes  Patient's spiritual, cultural and educational needs considered and patient is agreeable to the plan of care and goals as stated below:     Medical Necessity is demonstrated by the following  Occupational Profile/History  Co-morbidities and personal factors that may impact the plan of care [x] LOW: Brief chart review  [] MODERATE: Expanded chart review   [] HIGH: Extensive chart review    Moderate / High Support Documentation:   Past Medical History:   Diagnosis Date    Bilateral pleural effusion 6/20/2020    CAD (coronary artery disease)     Colon polyp     Diabetes mellitus type I     Hypertension     Hyponatremia 6/20/2020        Examination  Performance deficits relating to physical, cognitive or psychosocial skills that result in activity limitations and/or participation restrictions  [x] LOW: addressing 1-3 Performance deficits  [] MODERATE: 3-5 Performance deficits  [] HIGH: 5+ Performance deficits (please support below)    Moderate / High Support Documentation:    Physical:  Pain    Cognitive:  No Deficits    Psychosocial:    No Deficits     Treatment Options [x] LOW: Multiple options  []  MODERATE: Several options  [] HIGH: Limited options      Decision Making/ Complexity Score: low          Plan     Schedule patient for follow up with Occupational therapy post surgery. Goals for therapy post surgery will be established at that time.     Niya Paris OT ,OTD, OTR/L

## 2023-10-03 NOTE — TELEPHONE ENCOUNTER
----- Message from Antwon Coffman MD sent at 10/3/2023  2:29 PM CDT -----  Regarding: RE: Breast surgery clearance    Elevated periop risk of CV events for non-high risk procedure.  Ok to proceed the scheduled procedure without further cardiac study.    ----- Message -----  From: Minoo Coleman RN  Sent: 10/3/2023  10:54 AM CDT  To: Antwon Coffman MD; Hemalatha Josue MD  Subject: Breast surgery clearance                         Dr. Coffman,     This mutual patient is scheduled for bilateral mastectomies with reconstruction on 10/25/23 for her new breast diagnosis. We are seeking cardiac clearance for her procedure under general anesthesia. I have her scheduled for pre-op labs tomorrow 10/4/23.     Please let us know if she needs to be seen in your clinic for evaluation before surgery, thanks so much!!     Minoo Coleman   Breast Oncology Navigation   [phone]

## 2023-10-04 ENCOUNTER — HOSPITAL ENCOUNTER (OUTPATIENT)
Dept: RADIOLOGY | Facility: HOSPITAL | Age: 63
Discharge: HOME | End: 2023-10-04
Attending: SURGERY
Payer: MEDICARE

## 2023-10-04 DIAGNOSIS — D05.12 BREAST NEOPLASM, TIS (DCIS), LEFT: ICD-10-CM

## 2023-10-04 DIAGNOSIS — R92.8 ABNORMAL MAMMOGRAM: ICD-10-CM

## 2023-10-04 PROCEDURE — 88305 TISSUE EXAM BY PATHOLOGIST: CPT | Performed by: PATHOLOGY

## 2023-10-04 PROCEDURE — 88305 TISSUE EXAM BY PATHOLOGIST: CPT | Mod: 26,,, | Performed by: PATHOLOGY

## 2023-10-04 PROCEDURE — 88360 TUMOR IMMUNOHISTOCHEM/MANUAL: CPT | Performed by: PATHOLOGY

## 2023-10-04 PROCEDURE — 19083 US BREAST BIOPSY WITH IMAGING 1ST SITE LEFT: ICD-10-PCS | Mod: LT,,, | Performed by: RADIOLOGY

## 2023-10-04 PROCEDURE — 71045 X-RAY EXAM CHEST 1 VIEW: CPT | Mod: 26,,, | Performed by: RADIOLOGY

## 2023-10-04 PROCEDURE — 77065 DX MAMMO INCL CAD UNI: CPT | Mod: TC,LT

## 2023-10-04 PROCEDURE — 88341 IMHCHEM/IMCYTCHM EA ADD ANTB: CPT | Performed by: PATHOLOGY

## 2023-10-04 PROCEDURE — 19083 BX BREAST 1ST LESION US IMAG: CPT | Mod: LT

## 2023-10-04 PROCEDURE — 77065 DX MAMMO INCL CAD UNI: CPT | Mod: 26,LT,, | Performed by: RADIOLOGY

## 2023-10-04 PROCEDURE — 88305 TISSUE EXAM BY PATHOLOGIST: ICD-10-PCS | Mod: 26,,, | Performed by: PATHOLOGY

## 2023-10-04 PROCEDURE — 88360 TUMOR IMMUNOHISTOCHEM/MANUAL: CPT | Mod: 26,,, | Performed by: PATHOLOGY

## 2023-10-04 PROCEDURE — 71045 XR CHEST 1 VIEW: ICD-10-PCS | Mod: 26,,, | Performed by: RADIOLOGY

## 2023-10-04 PROCEDURE — 88342 CHG IMMUNOCYTOCHEMISTRY: ICD-10-PCS | Mod: 26,XU,, | Performed by: PATHOLOGY

## 2023-10-04 PROCEDURE — 71045 X-RAY EXAM CHEST 1 VIEW: CPT | Mod: TC

## 2023-10-04 PROCEDURE — 19083 BX BREAST 1ST LESION US IMAG: CPT | Mod: LT,,, | Performed by: RADIOLOGY

## 2023-10-04 PROCEDURE — 88342 IMHCHEM/IMCYTCHM 1ST ANTB: CPT | Mod: 59 | Performed by: PATHOLOGY

## 2023-10-04 PROCEDURE — 88341 PR IHC OR ICC EACH ADD'L SINGLE ANTIBODY  STAINPR: ICD-10-PCS | Mod: 26,59,, | Performed by: PATHOLOGY

## 2023-10-04 PROCEDURE — 77065 MAMMO DIGITAL DIAGNOSTIC LEFT: ICD-10-PCS | Mod: 26,LT,, | Performed by: RADIOLOGY

## 2023-10-04 PROCEDURE — 88360 PR  TUMOR IMMUNOHISTOCHEM/MANUAL: ICD-10-PCS | Mod: 26,,, | Performed by: PATHOLOGY

## 2023-10-04 PROCEDURE — 88342 IMHCHEM/IMCYTCHM 1ST ANTB: CPT | Mod: 26,XU,, | Performed by: PATHOLOGY

## 2023-10-04 PROCEDURE — 88341 IMHCHEM/IMCYTCHM EA ADD ANTB: CPT | Mod: 26,59,, | Performed by: PATHOLOGY

## 2023-10-09 LAB
FINAL PATHOLOGIC DIAGNOSIS: NORMAL
GROSS: NORMAL
Lab: NORMAL
MICROSCOPIC EXAM: NORMAL

## 2023-10-10 DIAGNOSIS — D05.12 BREAST NEOPLASM, TIS (DCIS), LEFT: Primary | ICD-10-CM

## 2023-10-17 ENCOUNTER — OFFICE VISIT (OUTPATIENT)
Dept: INTERNAL MEDICINE | Facility: CLINIC | Age: 63
End: 2023-10-17
Payer: MEDICARE

## 2023-10-17 VITALS
BODY MASS INDEX: 32.27 KG/M2 | HEART RATE: 90 BPM | SYSTOLIC BLOOD PRESSURE: 150 MMHG | WEIGHT: 188 LBS | OXYGEN SATURATION: 98 % | TEMPERATURE: 98 F | DIASTOLIC BLOOD PRESSURE: 95 MMHG | RESPIRATION RATE: 18 BRPM

## 2023-10-17 DIAGNOSIS — I15.2 HYPERTENSION ASSOCIATED WITH DIABETES: Chronic | ICD-10-CM

## 2023-10-17 DIAGNOSIS — Z01.818 PRE-OP EXAM: ICD-10-CM

## 2023-10-17 DIAGNOSIS — I10 ESSENTIAL HYPERTENSION: ICD-10-CM

## 2023-10-17 DIAGNOSIS — Z17.0 MALIGNANT NEOPLASM OF UPPER-OUTER QUADRANT OF LEFT BREAST IN FEMALE, ESTROGEN RECEPTOR POSITIVE: ICD-10-CM

## 2023-10-17 DIAGNOSIS — C50.412 MALIGNANT NEOPLASM OF UPPER-OUTER QUADRANT OF LEFT BREAST IN FEMALE, ESTROGEN RECEPTOR POSITIVE: ICD-10-CM

## 2023-10-17 DIAGNOSIS — I25.2 OLD MYOCARDIAL INFARCTION: Chronic | ICD-10-CM

## 2023-10-17 DIAGNOSIS — I50.32 CHRONIC DIASTOLIC CONGESTIVE HEART FAILURE: Chronic | ICD-10-CM

## 2023-10-17 DIAGNOSIS — E11.59 HYPERTENSION ASSOCIATED WITH DIABETES: Chronic | ICD-10-CM

## 2023-10-17 DIAGNOSIS — E11.59 TYPE 2 DIABETES MELLITUS WITH OTHER CIRCULATORY COMPLICATION, WITHOUT LONG-TERM CURRENT USE OF INSULIN: ICD-10-CM

## 2023-10-17 LAB
ESTIMATED AVG GLUCOSE: 137 MG/DL (ref 68–131)
HBA1C MFR BLD: 6.4 % (ref 4–5.6)

## 2023-10-17 PROCEDURE — 83036 HEMOGLOBIN GLYCOSYLATED A1C: CPT | Performed by: PHYSICIAN ASSISTANT

## 2023-10-17 PROCEDURE — 3008F PR BODY MASS INDEX (BMI) DOCUMENTED: ICD-10-PCS | Mod: CPTII,S$GLB,, | Performed by: ANESTHESIOLOGY

## 2023-10-17 PROCEDURE — 3066F PR DOCUMENTATION OF TREATMENT FOR NEPHROPATHY: ICD-10-PCS | Mod: CPTII,S$GLB,, | Performed by: ANESTHESIOLOGY

## 2023-10-17 PROCEDURE — 3080F DIAST BP >= 90 MM HG: CPT | Mod: CPTII,S$GLB,, | Performed by: ANESTHESIOLOGY

## 2023-10-17 PROCEDURE — 3077F SYST BP >= 140 MM HG: CPT | Mod: CPTII,S$GLB,, | Performed by: ANESTHESIOLOGY

## 2023-10-17 PROCEDURE — 4010F PR ACE/ARB THEARPY RXD/TAKEN: ICD-10-PCS | Mod: CPTII,S$GLB,, | Performed by: ANESTHESIOLOGY

## 2023-10-17 PROCEDURE — 4010F ACE/ARB THERAPY RXD/TAKEN: CPT | Mod: CPTII,S$GLB,, | Performed by: ANESTHESIOLOGY

## 2023-10-17 PROCEDURE — 3044F PR MOST RECENT HEMOGLOBIN A1C LEVEL <7.0%: ICD-10-PCS | Mod: CPTII,S$GLB,, | Performed by: ANESTHESIOLOGY

## 2023-10-17 PROCEDURE — 3061F PR NEG MICROALBUMINURIA RESULT DOCUMENTED/REVIEW: ICD-10-PCS | Mod: CPTII,S$GLB,, | Performed by: ANESTHESIOLOGY

## 2023-10-17 PROCEDURE — 99999 PR PBB SHADOW E&M-EST. PATIENT-LVL III: CPT | Mod: PBBFAC,,,

## 2023-10-17 PROCEDURE — 3008F BODY MASS INDEX DOCD: CPT | Mod: CPTII,S$GLB,, | Performed by: ANESTHESIOLOGY

## 2023-10-17 PROCEDURE — 99499 NO LOS: ICD-10-PCS | Mod: S$GLB,,, | Performed by: ANESTHESIOLOGY

## 2023-10-17 PROCEDURE — 3077F PR MOST RECENT SYSTOLIC BLOOD PRESSURE >= 140 MM HG: ICD-10-PCS | Mod: CPTII,S$GLB,, | Performed by: ANESTHESIOLOGY

## 2023-10-17 PROCEDURE — 3061F NEG MICROALBUMINURIA REV: CPT | Mod: CPTII,S$GLB,, | Performed by: ANESTHESIOLOGY

## 2023-10-17 PROCEDURE — 99999 PR PBB SHADOW E&M-EST. PATIENT-LVL III: ICD-10-PCS | Mod: PBBFAC,,,

## 2023-10-17 PROCEDURE — 3066F NEPHROPATHY DOC TX: CPT | Mod: CPTII,S$GLB,, | Performed by: ANESTHESIOLOGY

## 2023-10-17 PROCEDURE — 99499 UNLISTED E&M SERVICE: CPT | Mod: S$GLB,,, | Performed by: ANESTHESIOLOGY

## 2023-10-17 PROCEDURE — 3080F PR MOST RECENT DIASTOLIC BLOOD PRESSURE >= 90 MM HG: ICD-10-PCS | Mod: CPTII,S$GLB,, | Performed by: ANESTHESIOLOGY

## 2023-10-17 PROCEDURE — 3044F HG A1C LEVEL LT 7.0%: CPT | Mod: CPTII,S$GLB,, | Performed by: ANESTHESIOLOGY

## 2023-10-17 NOTE — ASSESSMENT & PLAN NOTE
- mildly elevated in clinic, will have pt monitor BP at home and folow up with PCP if readings consistently >140/90  - continue home medications  - keep follow-up with primary care

## 2023-10-17 NOTE — ASSESSMENT & PLAN NOTE
Patient presents at the request of Dr. Gatica who plans on performing a simple mastectomy on October 25th  - Known risk factors for perioperative complications: Coronary disease  Congestive heart failure    Difficulty with intubation is not anticipated.    Cardiac Risk Estimation:  Per Revised Cardiac Risk Index patient is a Class III risk with a 10% risk of a major cardiac event.      1.) Preoperative workup as follows: ECG, hematocrit, electrolytes, creatinine, glucose, liver function studies.  2.) Change in medication regimen before surgery: Hold NSAIDs 7 days preop.  3.) Prophylaxis for cardiac events with perioperative beta-blockers: not indicated.  4.) Invasive hemodynamic monitoring perioperatively: not indicated.  5.) Deep vein thrombosis prophylaxis postoperatively: intermittent pneumatic compression boots and regimen to be chosen by surgical team.  6.) Surveillance for postoperative MI with ECG immediately postoperatively and on postoperati ve days 1 and 2 AND troponin levels 24 hours postoperatively and on day 4 or hospital discharge (whichever comes first): not indicated.  7.) Current medications which may produce withdrawal symptoms if withheld perioperatively: none  8.) Other measures: pt cleared by cards 10/3/23 telephone conversation

## 2023-10-17 NOTE — ASSESSMENT & PLAN NOTE
A1c 7.4 in July of 2023  - repeat pending  - hold Ozempic 1 week prior to scheduled procedure; last dose 10/6/23  -keep follow-up with primary care

## 2023-10-17 NOTE — DISCHARGE INSTRUCTIONS
To confirm, your doctor has instructed you: Surgery is scheduled for 10/24/23.   Pre admit office will call the afternoon prior to surgery between 1PM and 3PM with arrival time.    Surgery will be at [facility],  The address is [address] [address]. [place] [address].      IMPORTANT INSTRUCTIONS!    Do not eat or drink after 12 midnight, including water. Do not smoke or use chewing tobacco after 12 midnight!  OK to brush teeth, but no gum, candy, or mints!      *Take only these medicines with a small swallow of water-morning of surgery*     none         ____ Stop taking all vitamins, herbal supplements, Aspirin, & NSAIDS (Ibuprofen, Advil, Aleve) 7 days prior to surgery, as these can thin your blood.    ____ Weight loss medication, such as Adipex and Phentermine, must be stopped 14 days prior to surgery, no exceptions!    *Diabetic Patients: If you take diabetic or weight loss medication, do NOT take morning of surgery unless instructed by Doctor. Metformin to be stopped 24 hrs prior to surgery. DO NOT take short-acting insulin the day of surgery. Only take HALF of your regular dose of long-acting insulin the night before surgery, unless instructed otherwise. Blood sugars will be checked in pre-op.   ~Ozempic/Mounjaro/Wegovy/Trulicity/Semaglutide injections must be stopped 7 days prior to surgery.     Please notify MD office if you develop an active infection, are prescribed antibiotics by someone other than the surgeon doing your surgery, or visit urgent care/ER.      Bathing Instructions:   The night before surgery and the morning prior to coming to the hospital:    - Shower & rinse your body as usual with anti-bacterial Soap (Dial or Lever 2000)   -Hibiclens (if indicated) use AFTER anti-bacterial soap; 1 packet PM/1 packet in AM on surgical site only   -Do not use hibiclens on your head, face, or genitals.    -Do not wash with anti-bacterial soap after you use the hibiclens.    -Do not shave  surgical site 5-7 days prior to surgery.    -Pubic hair 7 days prior to surgery (OBGYN/Urology only).       Additional Instructions:   __ No powder, lotions, creams, or body spray to skin   __ No deodorant if you are having: breast procedure, PORT, or upper shoulder surgery!   __ No nail polish or artificial nails       **SURGERY WILL BE CANCELLED IF ARTIFICIAL/NAIL POLISH IS PRESENT!!!**  __ Please remove all piercings and leave all jewelry at home.    **SURGERY WILL BE CANCELLED IF PIERCINGS ARE PRESENT!!!**    __ Dentures, Hearing Aids and Contact Lens need to be removed prior to the start of surgery.    __ Avoid Alcoholic beverages 3 days prior to surgery, as it can thin the blood.  __ Females: may need to give a urine sample the morning of surgery;   **Please ask  for a specimen cup if you need to use the restroom prior to being called into pre-op.**  __ Males: Stop ED medications (Viagra, Cialis) 24 hrs prior to surgery.  __ Wear clean, loose-fitting clothing. Allow for dressings/bandages/surgical equipment   __ You must have transportation, and they MUST stay the entire time.   __  Bring photo ID and insurance information to hospital [facility] Visitor/Ride Policy:   Only 1 adult allowed (over the age of 18) to accompany you and MUST STAY through the entire length of admission.     -Must have a ride home from a responsible adult that you know and trust.    -Medical Transport, Uber or Lyft can only be used if patient has a responsible adult to accompany them during ride home.    ~Your ride MUST STAY the entire time until you are discharged~        Post-Op Instructions: You will receive surgery post-op instructions by your Discharge Nurse prior to going home.   Surgical Site Infection:   Prevention of surgical site infections:   -Keep incisions clean and dry.   -Do not soak/submerge incisions in water until completely healed.   -Do not apply lotions, powders, creams, or deodorants to site.   -Always  make sure hands are cleaned with antibacterial soap/ alcohol-based  prior to touching the surgical site.       Signs and symptoms:               -Redness and pain around the area where you had surgery               -Drainage of cloudy fluid from your surgical wound               -Fever over 100.4 or chills     >>>Call Surgeon office/on-call Surgeon if you experience any of these signs & symptoms post-surgery @ [phone].    *If you are running late or have questions the morning of surgery before 7AM, please call the Pre-OP Department @ [phone].    *Please Call [facility] Pre-Admit Department for surgery instruction questions:  [phone]    *Payment questions:  [phone]    *Billing questions:  [phone]

## 2023-10-17 NOTE — ASSESSMENT & PLAN NOTE
Prior MI in2/2018   [facility] which showed normal coronaries in  ; no stents placed   Patient followed by cardiology Dr. Coffman cleared 10/3/23 at  Elevated periop risk of CV events for non-high risk procedure.Ok to proceed the scheduled procedure without further cardiac study.

## 2023-10-17 NOTE — ASSESSMENT & PLAN NOTE
- appears compensated  - no LE edema, no orthopnea , no Sob with activity   - 2020 admit for CHF   - keep follow up with cards

## 2023-10-18 DIAGNOSIS — D05.12 BREAST NEOPLASM, TIS (DCIS), LEFT: Primary | ICD-10-CM

## 2023-10-18 DIAGNOSIS — Z17.0 MALIGNANT NEOPLASM OF UPPER-OUTER QUADRANT OF LEFT BREAST IN FEMALE, ESTROGEN RECEPTOR POSITIVE: ICD-10-CM

## 2023-10-18 DIAGNOSIS — C50.412 MALIGNANT NEOPLASM OF UPPER-OUTER QUADRANT OF LEFT BREAST IN FEMALE, ESTROGEN RECEPTOR POSITIVE: ICD-10-CM

## 2023-10-18 RX ORDER — CHLORTHALIDONE 25 MG/1
25 TABLET ORAL DAILY
Qty: 90 TABLET | Refills: 3 | Status: SHIPPED | OUTPATIENT
Start: 2023-10-18 | End: 2023-12-04 | Stop reason: SDUPTHER

## 2023-10-18 RX ORDER — AMLODIPINE AND OLMESARTAN MEDOXOMIL 10; 40 MG/1; MG/1
1 TABLET ORAL DAILY
Qty: 90 TABLET | Refills: 1 | Status: SHIPPED | OUTPATIENT
Start: 2023-10-18 | End: 2023-12-04 | Stop reason: SDUPTHER

## 2023-10-19 ENCOUNTER — OFFICE VISIT (OUTPATIENT)
Dept: SURGERY | Facility: CLINIC | Age: 63
End: 2023-10-19
Payer: MEDICARE

## 2023-10-19 DIAGNOSIS — Z17.0 MALIGNANT NEOPLASM OF UPPER-OUTER QUADRANT OF LEFT BREAST IN FEMALE, ESTROGEN RECEPTOR POSITIVE: Primary | ICD-10-CM

## 2023-10-19 DIAGNOSIS — C50.412 MALIGNANT NEOPLASM OF UPPER-OUTER QUADRANT OF LEFT BREAST IN FEMALE, ESTROGEN RECEPTOR POSITIVE: Primary | ICD-10-CM

## 2023-10-19 PROBLEM — I70.0 AORTIC ATHEROSCLEROSIS: Chronic | Status: ACTIVE | Noted: 2023-08-23

## 2023-10-19 PROBLEM — E11.59 TYPE 2 DIABETES MELLITUS WITH CIRCULATORY DISORDER, WITHOUT LONG-TERM CURRENT USE OF INSULIN: Chronic | Status: ACTIVE | Noted: 2020-06-13

## 2023-10-19 PROCEDURE — 3061F PR NEG MICROALBUMINURIA RESULT DOCUMENTED/REVIEW: ICD-10-PCS | Mod: CPTII,S$GLB,, | Performed by: SURGERY

## 2023-10-19 PROCEDURE — 4010F PR ACE/ARB THEARPY RXD/TAKEN: ICD-10-PCS | Mod: CPTII,S$GLB,, | Performed by: SURGERY

## 2023-10-19 PROCEDURE — 99215 OFFICE O/P EST HI 40 MIN: CPT | Mod: S$GLB,,, | Performed by: SURGERY

## 2023-10-19 PROCEDURE — 4010F ACE/ARB THERAPY RXD/TAKEN: CPT | Mod: CPTII,S$GLB,, | Performed by: SURGERY

## 2023-10-19 PROCEDURE — 3044F HG A1C LEVEL LT 7.0%: CPT | Mod: CPTII,S$GLB,, | Performed by: SURGERY

## 2023-10-19 PROCEDURE — 3061F NEG MICROALBUMINURIA REV: CPT | Mod: CPTII,S$GLB,, | Performed by: SURGERY

## 2023-10-19 PROCEDURE — 99215 PR OFFICE/OUTPT VISIT, EST, LEVL V, 40-54 MIN: ICD-10-PCS | Mod: S$GLB,,, | Performed by: SURGERY

## 2023-10-19 PROCEDURE — 3066F PR DOCUMENTATION OF TREATMENT FOR NEPHROPATHY: ICD-10-PCS | Mod: CPTII,S$GLB,, | Performed by: SURGERY

## 2023-10-19 PROCEDURE — 3044F PR MOST RECENT HEMOGLOBIN A1C LEVEL <7.0%: ICD-10-PCS | Mod: CPTII,S$GLB,, | Performed by: SURGERY

## 2023-10-19 PROCEDURE — 3066F NEPHROPATHY DOC TX: CPT | Mod: CPTII,S$GLB,, | Performed by: SURGERY

## 2023-10-19 NOTE — H&P (VIEW-ONLY)
Breast Surgical Oncology  [place]    Date of Service: 10/18/2023    SUBJECTIVE:   Chief complaint: left breast cancer    HISTORY OF PRESENT ILLNESS:   Demetrice Gaines is a 63 y.o. female who is kindly referred by Dr. Lul Alvarez for left breast cancer.    23  A left breast abnormality was identified on routine screening mammography.  Focused mammographic evaluation revealed 17 mm x 8 mm fine linear or fine-linear branching calcifications in a linear distribution seen in the lower outer quadrant of the left breast in the posterior depth. She denies breast concerns such as masses, skin changes, nipple discharge, nipple retraction or lumps under the arm.  She has had longstanding left breast pain that slightly improves with caffeine reduction. She currently drinks 3 cokes/day. She underwent a right breast biopsy in .     10/17/23  She underwent core needle biopsy of a suspicious left axillary LN which was negative. Second look ultrasound by radiology at the site of her stereotactic biopsy was concerning for a mass. Core needle biopsy confirmed hormone receptor positive, [id number] negative invasive ductal carcinoma. Her genetic testing is negative for a pathogenic mutation. She has seen Dr. Lynn to discuss reconstruction options.     Her breast cancer risk factor profile is as follows: Menarche at 10, Menopause at 48.  She is . Age at first live birth was 21. Family history of cancer is as follows: paternal grandmother with breast cancer at unknown age,  at unknown age; mother with multiple myeloma.    FAMILY HISTORY:     Family History   Problem Relation Age of Onset    Cancer Mother     Pacemaker/defibrilator Mother     Glaucoma Mother     Arthritis Mother     Heart disease Mother         It seems that this condition runs in my family on my mother's side    Hypertension Mother     Diabetes Father     Hypertension Sister     Glaucoma Sister     Breast cancer Paternal Grandmother      Cancer Paternal Grandmother     Hypertension Brother     COPD Sister     COPD Brother     Hypertension Brother     Hypertension Sister         PAST MEDICAL HISTORY:     Past Medical History:   Diagnosis Date    Bilateral pleural effusion 2020    CAD (coronary artery disease)     CHF (congestive heart failure)     Colon polyp     Diabetes mellitus, type 2     Hypertension     Hyponatremia 2020    Malignant neoplasm of upper-outer quadrant of left breast in female, estrogen receptor positive 10/17/2023    Myocardial infarction        SURGICAL HISTORY:     Past Surgical History:   Procedure Laterality Date    Benign Cyst Right     BREAST CYST EXCISION Right 1986    pt states benign     SECTION      COLONOSCOPY      COLONOSCOPY N/A 2019    Procedure: COLONOSCOPY;  Surgeon: Ileana Holcomb MD;  Location: [facility];  Service: Endoscopy;  Laterality: N/A;       SOCIAL HISTORY:     Social History     Tobacco Use    Smoking status: Never    Smokeless tobacco: Never    Tobacco comments:     NEVER   Substance Use Topics    Alcohol use: Never    Drug use: Never        MEDICATIONS/ALLERGIES:     Current Outpatient Medications:     amlodipine-olmesartan (LEATHA) 10-40 mg per tablet, Take 1 tablet by mouth once daily., Disp: 90 tablet, Rfl: 1    aspirin (ECOTRIN) 81 MG EC tablet, Take 1 tablet (81 mg total) by mouth once daily. (Patient not taking: Reported on 2023), Disp: 90 tablet, Rfl: 3    blood sugar diagnostic (ONETOUCH VERIO TEST STRIPS) Strp, Use to check blood sugar twice daily, Disp: 100 each, Rfl: 6    chlorthalidone (HYGROTEN) 25 MG Tab, Take 1 tablet (25 mg total) by mouth once daily., Disp: 90 tablet, Rfl: 3    lancets (ONETOUCH ULTRASOFT LANCETS) Misc, Use to check blood sugar twice daily, Disp: 100 each, Rfl: 6    semaglutide (OZEMPIC) 1 mg/dose (4 mg/3 mL), Inject 1 mg into the skin every 7 days., Disp: 9 mL, Rfl: 3  Review of patient's allergies indicates:   Allergen Reactions     Aldactone [spironolactone]      Memory impair and breast soreness    Coreg [carvedilol]      Hair loss    Lisinopril-hydrochlorothiazide      Other reaction(s): Reaction:Throat swelling;       REVIEW OF SYSTEMS:   I have reviewed 12 systems, including 2 points per system. Pertinent reported positives are: none    PHYSICAL EXAM:   General: The patient appears well and is in no acute distress.     Chaperon present for examination.   BREAST EXAM  No Asymmetry  Right:  - Mass: No  - Skin change: No  - Nipple Discharge: No  - Nipple retraction: No  - Axillary LAD: No  Left:   - Mass: No  - Skin change: No  - Nipple Discharge: No  - Nipple retraction: No  - Axillary LAD: No    IMAGING:   Images were personally reviewed.     Results for orders placed during the hospital encounter of 08/24/23    Mammo Digital Diagnostic Left with Cash    Narrative  Result:  Mammo Digital Diagnostic Left with Cash    History:  Patient is 63 y.o. and is seen for diagnostic imaging.    Films Compared:  Compared to: 08/22/2023 Mammo Digital Screening Bilat w/ Cash, 05/25/2021 Mammo Digital Screening Bilat w/ Cash, and 11/05/2014 Mammo Digital Screening Bilat    Findings:  This procedure was performed using tomosynthesis. Computer-aided detection was utilized in the interpretation of this examination.  The left breast has scattered areas of fibroglandular density.    There are 17 mm x 8 mm fine linear or fine-linear branching calcifications in a linear distribution seen in the lower outer quadrant of the left breast in the posterior depth.    Impression  Left  Calcifications: Left breast 17 mm x 8 mm calcifications at the lower outer posterior position. Assessment: 4B - Suspicious finding. Stereotactic Biopsy is recommended.    BI-RADS Category:  Overall: 4 - Suspicious      Recommendation:  Stereotactic Biopsy is recommended.      Your estimated lifetime risk of breast cancer (to age 85) based on Tyrer-Cuzick risk assessment model is  Sussy: 7.76 %. According to the American Cancer Society, patients with a lifetime breast cancer risk of 20% or higher might benefit from supplemental screening tests.    PATHOLOGY:     Lab Results   Component Value Date    FPATHDX  10/04/2023     LEFT AXILLA, NEEDLE CORE BIOPSIES:  -  Fibroadipose tissue with invasive ductal carcinoma, [place] histologic grade 3 (see comment).          Glandular (acinar)/tubular differentiation:  Score 3.            Nuclear pleomorphism:  Score 3.            Mitotic rate:  Score 3.            Overall grade:  Grade 3 (score 9).            Size of invasive carcinoma as measured from the H&E slides:  9 mm.  -  No ductal carcinoma in situ identified.  -  No benign breast ducts or lobules are present.    BREAST BIOMARKER RESULTS:  Estrogen receptor (ER) status:  Positive.         Percentage of cells with nuclear positivity:  70%.         Average intensity of staining: Strong to moderate.    Progesterone receptor (PgR) status:  Positive.       Percentage of cells with nuclear positivity:  80%.         Average intensity of staining:  Strong to moderate.  HER2 by IHC:  Negative (score 0).    Ki-67:  90%.    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  The patient also underwent a left axilla needle core biopsy in September 2023, case HGS-, which revealed   benign lymphoid tissue with no evidence of metastatic carcinoma.  The current biopsies of the left axilla reveal small foci of tissue which are suggestive of, but not diagnostic of, lymph node tissue.  This most likely represents metastatic carcinoma to   a left axillary lymph node which has been almost completely replaced by tumor, with extranodal extension.  The lack of benign breast ducts and lobules and DCIS support this diagnosis.  However, the possibility of a separate primary tumor arising within   the axilla cannot be  entirely excluded.  Clinical and radiographic correlation are recommended.  Dr. Sulma Thrasher has reviewed this case and concurs in the diagnosis.         ASSESSMENT:     1. Malignant neoplasm of upper-outer quadrant of left breast in female, estrogen receptor positive          PLAN:     Demetrice Gaines is a 63 y.o. female who is new diagnosis of Stage IA (T1b N0 Mx) LEFT Breast Ductal  Carcinoma in Situ, Grade 3, ER 70%, TN 80%, [id number] 0, with a Ki-67 of 90%.      We have discussed the surgical choices of lumpectomy with radiation and mastectomy with or without radiation.  I have explained that the survival is the same, regardless of the surgery chosen.  We have discussed that the local recurrence rate following mastectomy is 2-5%.  I have explained that the local recurrence rate was slightly higher following breast conservation in the large trials that compared mastectomy and breast conservation. However, with current medical therapies, local recurrence has been reduced to as low as 6%. I did review with her the general schedule and side effects of radiation. She will be referred to radiation oncology. We briefly discussed reconstruction options, and the [facility] breast cancer treatment brochure was provided.    We reviewed the need for sentinel lymph node biopsy to further stage the axilla.       Because her cancer is hormone receptor positive, she will be recommended for endocrine therapy.  The decision for or against adjuvant chemotherapy will be made following surgery. She understands that she will be referred to a medical oncologist to discuss these points further.    She is scheduled for a bilateral mastectomy with left sentinel lymph node biopsy. She will have concurrent reconstruction by Dr. Lynn. The risks of surgery were discussed with the patient, including pain, bleeding, infections, scarring, cosmetic deformity, numbness, necrosis / loss of skin and the nipple areolar complex, lymphedema,  injury to adjacent structures such as nerves that affect movement of the arm, need for additional surgery for margins or lymph nodes, need for additional treatments and recurrence.  She understands that if sentinel lymph node biopsy shows metastasis, a completion axillary dissection may be done.  She has provided informed consent.  She will be scheduled at the next available operating room time.     I spent a total of 45 minutes on this visit. This includes face to face time and non-face to face time preparing to see the patient (eg, review of tests), obtaining and/or reviewing separately obtained history, documenting clinical information in the electronic or other health record, independently interpreting results and communicating results to the patient/family/caregiver, or care coordinator.        Hemalatha Josue M.D.

## 2023-10-19 NOTE — PROGRESS NOTES
Breast Surgical Oncology  [place]    Date of Service: 10/18/2023    SUBJECTIVE:   Chief complaint: left breast cancer    HISTORY OF PRESENT ILLNESS:   Demetrice Gaines is a 63 y.o. female who is kindly referred by Dr. Lul Alvarez for left breast cancer.    23  A left breast abnormality was identified on routine screening mammography.  Focused mammographic evaluation revealed 17 mm x 8 mm fine linear or fine-linear branching calcifications in a linear distribution seen in the lower outer quadrant of the left breast in the posterior depth. She denies breast concerns such as masses, skin changes, nipple discharge, nipple retraction or lumps under the arm.  She has had longstanding left breast pain that slightly improves with caffeine reduction. She currently drinks 3 cokes/day. She underwent a right breast biopsy in .     10/17/23  She underwent core needle biopsy of a suspicious left axillary LN which was negative. Second look ultrasound by radiology at the site of her stereotactic biopsy was concerning for a mass. Core needle biopsy confirmed hormone receptor positive, [id number] negative invasive ductal carcinoma. Her genetic testing is negative for a pathogenic mutation. She has seen Dr. Lynn to discuss reconstruction options.     Her breast cancer risk factor profile is as follows: Menarche at 10, Menopause at 48.  She is . Age at first live birth was 21. Family history of cancer is as follows: paternal grandmother with breast cancer at unknown age,  at unknown age; mother with multiple myeloma.    FAMILY HISTORY:     Family History   Problem Relation Age of Onset    Cancer Mother     Pacemaker/defibrilator Mother     Glaucoma Mother     Arthritis Mother     Heart disease Mother         It seems that this condition runs in my family on my mother's side    Hypertension Mother     Diabetes Father     Hypertension Sister     Glaucoma Sister     Breast cancer Paternal Grandmother      Cancer Paternal Grandmother     Hypertension Brother     COPD Sister     COPD Brother     Hypertension Brother     Hypertension Sister         PAST MEDICAL HISTORY:     Past Medical History:   Diagnosis Date    Bilateral pleural effusion 2020    CAD (coronary artery disease)     CHF (congestive heart failure)     Colon polyp     Diabetes mellitus, type 2     Hypertension     Hyponatremia 2020    Malignant neoplasm of upper-outer quadrant of left breast in female, estrogen receptor positive 10/17/2023    Myocardial infarction        SURGICAL HISTORY:     Past Surgical History:   Procedure Laterality Date    Benign Cyst Right     BREAST CYST EXCISION Right 1986    pt states benign     SECTION      COLONOSCOPY      COLONOSCOPY N/A 2019    Procedure: COLONOSCOPY;  Surgeon: Ileana Holcomb MD;  [facility];  Service: Endoscopy;  Laterality: N/A;       SOCIAL HISTORY:     Social History     Tobacco Use    Smoking status: Never    Smokeless tobacco: Never    Tobacco comments:     NEVER   Substance Use Topics    Alcohol use: Never    Drug use: Never        MEDICATIONS/ALLERGIES:     Current Outpatient Medications:     amlodipine-olmesartan (LEATHA) 10-40 mg per tablet, Take 1 tablet by mouth once daily., Disp: 90 tablet, Rfl: 1    aspirin (ECOTRIN) 81 MG EC tablet, Take 1 tablet (81 mg total) by mouth once daily. (Patient not taking: Reported on 2023), Disp: 90 tablet, Rfl: 3    blood sugar diagnostic (ONETOUCH VERIO TEST STRIPS) Strp, Use to check blood sugar twice daily, Disp: 100 each, Rfl: 6    chlorthalidone (HYGROTEN) 25 MG Tab, Take 1 tablet (25 mg total) by mouth once daily., Disp: 90 tablet, Rfl: 3    lancets (ONETOUCH ULTRASOFT LANCETS) Misc, Use to check blood sugar twice daily, Disp: 100 each, Rfl: 6    semaglutide (OZEMPIC) 1 mg/dose (4 mg/3 mL), Inject 1 mg into the skin every 7 days., Disp: 9 mL, Rfl: 3  Review of patient's allergies indicates:   Allergen Reactions     Aldactone [spironolactone]      Memory impair and breast soreness    Coreg [carvedilol]      Hair loss    Lisinopril-hydrochlorothiazide      Other reaction(s): Reaction:Throat swelling;       REVIEW OF SYSTEMS:   I have reviewed 12 systems, including 2 points per system. Pertinent reported positives are: none    PHYSICAL EXAM:   General: The patient appears well and is in no acute distress.     Chaperon present for examination.   BREAST EXAM  No Asymmetry  Right:  - Mass: No  - Skin change: No  - Nipple Discharge: No  - Nipple retraction: No  - Axillary LAD: No  Left:   - Mass: No  - Skin change: No  - Nipple Discharge: No  - Nipple retraction: No  - Axillary LAD: No    IMAGING:   Images were personally reviewed.     Results for orders placed during the hospital encounter of 08/24/23    Mammo Digital Diagnostic Left with Cash    Narrative  Result:  Mammo Digital Diagnostic Left with Cash    History:  Patient is 63 y.o. and is seen for diagnostic imaging.    Films Compared:  Compared to: 08/22/2023 Mammo Digital Screening Bilat w/ Cash, 05/25/2021 Mammo Digital Screening Bilat w/ Cash, and 11/05/2014 Mammo Digital Screening Bilat    Findings:  This procedure was performed using tomosynthesis. Computer-aided detection was utilized in the interpretation of this examination.  The left breast has scattered areas of fibroglandular density.    There are 17 mm x 8 mm fine linear or fine-linear branching calcifications in a linear distribution seen in the lower outer quadrant of the left breast in the posterior depth.    Impression  Left  Calcifications: Left breast 17 mm x 8 mm calcifications at the lower outer posterior position. Assessment: 4B - Suspicious finding. Stereotactic Biopsy is recommended.    BI-RADS Category:  Overall: 4 - Suspicious      Recommendation:  Stereotactic Biopsy is recommended.      Your estimated lifetime risk of breast cancer (to age 85) based on Tyrer-Cuzick risk assessment model is  Sussy: 7.76 %. According to the American Cancer Society, patients with a lifetime breast cancer risk of 20% or higher might benefit from supplemental screening tests.    PATHOLOGY:     Lab Results   Component Value Date    FPATHDX  10/04/2023     LEFT AXILLA, NEEDLE CORE BIOPSIES:  -  Fibroadipose tissue with invasive ductal carcinoma, [place] histologic grade 3 (see comment).          Glandular (acinar)/tubular differentiation:  Score 3.            Nuclear pleomorphism:  Score 3.            Mitotic rate:  Score 3.            Overall grade:  Grade 3 (score 9).            Size of invasive carcinoma as measured from the H&E slides:  9 mm.  -  No ductal carcinoma in situ identified.  -  No benign breast ducts or lobules are present.    BREAST BIOMARKER RESULTS:  Estrogen receptor (ER) status:  Positive.         Percentage of cells with nuclear positivity:  70%.         Average intensity of staining: Strong to moderate.    Progesterone receptor (PgR) status:  Positive.       Percentage of cells with nuclear positivity:  80%.         Average intensity of staining:  Strong to moderate.  HER2 by IHC:  Negative (score 0).    Ki-67:  90%.    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  The patient also underwent a left axilla needle core biopsy in September 2023, case HGS-, which revealed   benign lymphoid tissue with no evidence of metastatic carcinoma.  The current biopsies of the left axilla reveal small foci of tissue which are suggestive of, but not diagnostic of, lymph node tissue.  This most likely represents metastatic carcinoma to   a left axillary lymph node which has been almost completely replaced by tumor, with extranodal extension.  The lack of benign breast ducts and lobules and DCIS support this diagnosis.  However, the possibility of a separate primary tumor arising within   the axilla cannot be  entirely excluded.  Clinical and radiographic correlation are recommended.  Dr. Sulma Thrasher has reviewed this case and concurs in the diagnosis.         ASSESSMENT:     1. Malignant neoplasm of upper-outer quadrant of left breast in female, estrogen receptor positive          PLAN:     Demetrice Gaines is a 63 y.o. female who is new diagnosis of Stage IA (T1b N0 Mx) LEFT Breast Ductal  Carcinoma in Situ, Grade 3, ER 70%, OH 80%, [id number] 0, with a Ki-67 of 90%.      We have discussed the surgical choices of lumpectomy with radiation and mastectomy with or without radiation.  I have explained that the survival is the same, regardless of the surgery chosen.  We have discussed that the local recurrence rate following mastectomy is 2-5%.  I have explained that the local recurrence rate was slightly higher following breast conservation in the large trials that compared mastectomy and breast conservation. However, with current medical therapies, local recurrence has been reduced to as low as 6%. I did review with her the general schedule and side effects of radiation. She will be referred to radiation oncology. We briefly discussed reconstruction options, and the [facility] breast cancer treatment brochure was provided.    We reviewed the need for sentinel lymph node biopsy to further stage the axilla.       Because her cancer is hormone receptor positive, she will be recommended for endocrine therapy.  The decision for or against adjuvant chemotherapy will be made following surgery. She understands that she will be referred to a medical oncologist to discuss these points further.    She is scheduled for a bilateral mastectomy with left sentinel lymph node biopsy. She will have concurrent reconstruction by Dr. Lynn. The risks of surgery were discussed with the patient, including pain, bleeding, infections, scarring, cosmetic deformity, numbness, necrosis / loss of skin and the nipple areolar complex, lymphedema,  injury to adjacent structures such as nerves that affect movement of the arm, need for additional surgery for margins or lymph nodes, need for additional treatments and recurrence.  She understands that if sentinel lymph node biopsy shows metastasis, a completion axillary dissection may be done.  She has provided informed consent.  She will be scheduled at the next available operating room time.     I spent a total of 45 minutes on this visit. This includes face to face time and non-face to face time preparing to see the patient (eg, review of tests), obtaining and/or reviewing separately obtained history, documenting clinical information in the electronic or other health record, independently interpreting results and communicating results to the patient/family/caregiver, or care coordinator.        Hemalatha Josue M.D.

## 2023-10-23 ENCOUNTER — ANESTHESIA EVENT (OUTPATIENT)
Dept: SURGERY | Facility: HOSPITAL | Age: 63
End: 2023-10-23
Payer: MEDICARE

## 2023-10-23 NOTE — ANESTHESIA PREPROCEDURE EVALUATION
10/23/2023  Demetrice Gaines is a 63 y.o., female.  Past Medical History:   Diagnosis Date    Bilateral pleural effusion 2020    CAD (coronary artery disease)     CHF (congestive heart failure)     Colon polyp     Diabetes mellitus, type 2     Hypertension     Hyponatremia 2020    Malignant neoplasm of upper-outer quadrant of left breast in female, estrogen receptor positive 10/17/2023    Myocardial infarction      Past Surgical History:   Procedure Laterality Date    Benign Cyst Right     BREAST CYST EXCISION Right     pt states benign     SECTION      COLONOSCOPY      COLONOSCOPY N/A 2019    Procedure: COLONOSCOPY;  Surgeon: Ileana Holcomb MD;  Location: [facility];  Service: Endoscopy;  Laterality: N/A;            Pre-op Assessment    I have reviewed the Patient Summary Reports.    I have reviewed the NPO Status.   I have reviewed the Medications.     Review of Systems  Anesthesia Hx:  History of prior surgery of interest to airway management or planning:  Denies Personal Hx of Anesthesia complications.   Hematology/Oncology:  Hematology Normal      Current/Recent Cancer. Breast   Cardiovascular:   Hypertension Past MI CAD   CHF    Pulmonary:  Pulmonary Normal    Renal/:  Renal/ Normal     Hepatic/GI:  Hepatic/GI Normal    Endocrine:   Diabetes        Physical Exam  General: Well nourished and Cooperative    Airway:  Mallampati: II   Mouth Opening: Normal  TM Distance: Normal  Tongue: Normal, Large  Neck ROM: Normal ROM    Dental:    Chest/Lungs:  Normal Respiratory Rate        Anesthesia Plan  Type of Anesthesia, risks & benefits discussed:    Anesthesia Type: Gen ETT  Intra-op Monitoring Plan: Standard ASA Monitors  Post Op Pain Control Plan: multimodal analgesia and IV/PO Opioids PRN  Induction:  IV  Informed Consent: Informed consent signed with the  Patient and all parties understand the risks and agree with anesthesia plan.  All questions answered. Patient consented to blood products? No  ASA Score: 3  Day of Surgery Review of History & Physical: H&P Update referred to the surgeon/provider.    Ready For Surgery From Anesthesia Perspective.     .

## 2023-10-24 ENCOUNTER — PATIENT MESSAGE (OUTPATIENT)
Dept: PREADMISSION TESTING | Facility: HOSPITAL | Age: 63
End: 2023-10-24
Payer: MEDICARE

## 2023-10-24 ENCOUNTER — HOSPITAL ENCOUNTER (OUTPATIENT)
Dept: RADIOLOGY | Facility: HOSPITAL | Age: 63
Discharge: HOME | End: 2023-10-24
Attending: SURGERY
Payer: MEDICARE

## 2023-10-24 DIAGNOSIS — D05.12 BREAST NEOPLASM, TIS (DCIS), LEFT: ICD-10-CM

## 2023-10-24 PROCEDURE — 38792 RA TRACER ID OF SENTINL NODE: CPT | Mod: LT,,, | Performed by: RADIOLOGY

## 2023-10-24 PROCEDURE — 38792 NM SENTINEL LYMPH NODE INJECTION ONLY_RAD PERFORMED: ICD-10-PCS | Mod: LT,,, | Performed by: RADIOLOGY

## 2023-10-24 PROCEDURE — 38792 RA TRACER ID OF SENTINL NODE: CPT | Mod: TC

## 2023-10-25 ENCOUNTER — ANESTHESIA (OUTPATIENT)
Dept: SURGERY | Facility: HOSPITAL | Age: 63
End: 2023-10-25
Payer: MEDICARE

## 2023-10-25 ENCOUNTER — HOSPITAL ENCOUNTER (OUTPATIENT)
Dept: RADIOLOGY | Facility: HOSPITAL | Age: 63
Discharge: HOME | End: 2023-10-25
Attending: SURGERY | Admitting: SURGERY
Payer: MEDICARE

## 2023-10-25 ENCOUNTER — HOSPITAL ENCOUNTER (OUTPATIENT)
Facility: HOSPITAL | Age: 63
Discharge: HOME | End: 2023-10-26
Attending: SURGERY | Admitting: SURGERY
Payer: MEDICARE

## 2023-10-25 DIAGNOSIS — D05.12 DUCTAL CARCINOMA IN SITU (DCIS) OF LEFT BREAST: ICD-10-CM

## 2023-10-25 DIAGNOSIS — C50.412 MALIGNANT NEOPLASM OF UPPER-OUTER QUADRANT OF LEFT BREAST IN FEMALE, ESTROGEN RECEPTOR POSITIVE: Primary | Chronic | ICD-10-CM

## 2023-10-25 DIAGNOSIS — D05.12 BREAST NEOPLASM, TIS (DCIS), LEFT: ICD-10-CM

## 2023-10-25 DIAGNOSIS — Z17.0 MALIGNANT NEOPLASM OF UPPER-OUTER QUADRANT OF LEFT BREAST IN FEMALE, ESTROGEN RECEPTOR POSITIVE: Primary | Chronic | ICD-10-CM

## 2023-10-25 LAB
POCT GLUCOSE: 102 MG/DL (ref 70–110)
POCT GLUCOSE: 157 MG/DL (ref 70–110)
POCT GLUCOSE: 163 MG/DL (ref 70–110)
POCT GLUCOSE: 175 MG/DL (ref 70–110)

## 2023-10-25 PROCEDURE — 88342 CHG IMMUNOCYTOCHEMISTRY: ICD-10-PCS | Mod: 26,,, | Performed by: PATHOLOGY

## 2023-10-25 PROCEDURE — 27201423 OPTIME MED/SURG SUP & DEVICES STERILE SUPPLY: Performed by: SURGERY

## 2023-10-25 PROCEDURE — 38525 PR BIOPSY/REM LYMPH NODES, AXILLARY: ICD-10-PCS | Mod: 51,LT,, | Performed by: SURGERY

## 2023-10-25 PROCEDURE — 76098 X-RAY EXAM SURGICAL SPECIMEN: CPT | Mod: 26,,, | Performed by: SURGERY

## 2023-10-25 PROCEDURE — 19303 PR MASTECTOMY, SIMPLE, COMPLETE: ICD-10-PCS | Mod: 50,22,, | Performed by: SURGERY

## 2023-10-25 PROCEDURE — C1789 PROSTHESIS, BREAST, IMP: HCPCS | Performed by: SURGERY

## 2023-10-25 PROCEDURE — 63600175 PHARM REV CODE 636 W HCPCS: Performed by: NURSE ANESTHETIST, CERTIFIED REGISTERED

## 2023-10-25 PROCEDURE — 88307 PR  SURG PATH,LEVEL V: ICD-10-PCS | Mod: 26,,, | Performed by: PATHOLOGY

## 2023-10-25 PROCEDURE — 25000003 PHARM REV CODE 250: Performed by: NURSE ANESTHETIST, CERTIFIED REGISTERED

## 2023-10-25 PROCEDURE — 37000009 HC ANESTHESIA EA ADD 15 MINS: Performed by: SURGERY

## 2023-10-25 PROCEDURE — 63600175 PHARM REV CODE 636 W HCPCS: Mod: JG | Performed by: SURGERY

## 2023-10-25 PROCEDURE — 76098 X-RAY EXAM SURGICAL SPECIMEN: CPT | Mod: TC

## 2023-10-25 PROCEDURE — 88342 IMHCHEM/IMCYTCHM 1ST ANTB: CPT | Mod: 26,,, | Performed by: PATHOLOGY

## 2023-10-25 PROCEDURE — D9220A PRA ANESTHESIA: ICD-10-PCS | Mod: ,,, | Performed by: NURSE ANESTHETIST, CERTIFIED REGISTERED

## 2023-10-25 PROCEDURE — 25000003 PHARM REV CODE 250: Performed by: SURGERY

## 2023-10-25 PROCEDURE — 38900 PR INTRAOPERATIVE SENTINEL LYMPH NODE ID W DYE INJECTION: ICD-10-PCS | Mod: LT,,, | Performed by: SURGERY

## 2023-10-25 PROCEDURE — 63600175 PHARM REV CODE 636 W HCPCS: Performed by: SURGERY

## 2023-10-25 PROCEDURE — 88307 TISSUE EXAM BY PATHOLOGIST: CPT | Mod: 26,,, | Performed by: PATHOLOGY

## 2023-10-25 PROCEDURE — 25000003 PHARM REV CODE 250: Performed by: ANESTHESIOLOGY

## 2023-10-25 PROCEDURE — 76098 PR  X-RAY EXAM, BREAST SPECIMEN: ICD-10-PCS | Mod: 26,,, | Performed by: SURGERY

## 2023-10-25 PROCEDURE — 38900 IO MAP OF SENT LYMPH NODE: CPT | Mod: LT,,, | Performed by: SURGERY

## 2023-10-25 PROCEDURE — 71000039 HC RECOVERY, EACH ADD'L HOUR: Performed by: SURGERY

## 2023-10-25 PROCEDURE — 88341 IMHCHEM/IMCYTCHM EA ADD ANTB: CPT | Mod: 59 | Performed by: PATHOLOGY

## 2023-10-25 PROCEDURE — 37000008 HC ANESTHESIA 1ST 15 MINUTES: Performed by: SURGERY

## 2023-10-25 PROCEDURE — 88341 PR IHC OR ICC EACH ADD'L SINGLE ANTIBODY  STAINPR: ICD-10-PCS | Mod: 26,,, | Performed by: PATHOLOGY

## 2023-10-25 PROCEDURE — C1819 TISSUE LOCALIZATION-EXCISION: HCPCS | Performed by: SURGERY

## 2023-10-25 PROCEDURE — 88341 IMHCHEM/IMCYTCHM EA ADD ANTB: CPT | Mod: 26,,, | Performed by: PATHOLOGY

## 2023-10-25 PROCEDURE — 82962 GLUCOSE BLOOD TEST: CPT | Performed by: SURGERY

## 2023-10-25 PROCEDURE — 63600175 PHARM REV CODE 636 W HCPCS: Performed by: ANESTHESIOLOGY

## 2023-10-25 PROCEDURE — 36000707: Performed by: SURGERY

## 2023-10-25 PROCEDURE — 36000706: Performed by: SURGERY

## 2023-10-25 PROCEDURE — 71045 X-RAY EXAM CHEST 1 VIEW: CPT | Mod: TC

## 2023-10-25 PROCEDURE — 88342 IMHCHEM/IMCYTCHM 1ST ANTB: CPT | Performed by: PATHOLOGY

## 2023-10-25 PROCEDURE — C1729 CATH, DRAINAGE: HCPCS | Performed by: SURGERY

## 2023-10-25 PROCEDURE — 88307 TISSUE EXAM BY PATHOLOGIST: CPT | Performed by: PATHOLOGY

## 2023-10-25 PROCEDURE — 94799 UNLISTED PULMONARY SVC/PX: CPT

## 2023-10-25 PROCEDURE — 71000033 HC RECOVERY, INTIAL HOUR: Performed by: SURGERY

## 2023-10-25 PROCEDURE — 19303 MAST SIMPLE COMPLETE: CPT | Mod: 50,22,, | Performed by: SURGERY

## 2023-10-25 PROCEDURE — 38525 BIOPSY/REMOVAL LYMPH NODES: CPT | Mod: 51,LT,, | Performed by: SURGERY

## 2023-10-25 PROCEDURE — D9220A PRA ANESTHESIA: Mod: ,,, | Performed by: NURSE ANESTHETIST, CERTIFIED REGISTERED

## 2023-10-25 DEVICE — IMPLANTABLE DEVICE: Type: IMPLANTABLE DEVICE | Site: BREAST | Status: FUNCTIONAL

## 2023-10-25 RX ORDER — CEFAZOLIN SODIUM 2 G/50ML
2 SOLUTION INTRAVENOUS
Status: COMPLETED | OUTPATIENT
Start: 2023-10-25 | End: 2023-10-25

## 2023-10-25 RX ORDER — OXYCODONE HYDROCHLORIDE 5 MG/1
5 TABLET ORAL EVERY 4 HOURS PRN
Status: DISCONTINUED | OUTPATIENT
Start: 2023-10-25 | End: 2023-10-26 | Stop reason: HOSPADM

## 2023-10-25 RX ORDER — TRAMADOL HYDROCHLORIDE 50 MG/1
50 TABLET ORAL EVERY 4 HOURS PRN
Status: DISCONTINUED | OUTPATIENT
Start: 2023-10-25 | End: 2023-10-26 | Stop reason: HOSPADM

## 2023-10-25 RX ORDER — NEOSTIGMINE METHYLSULFATE 0.5 MG/ML
INJECTION, SOLUTION INTRAVENOUS
Status: DISCONTINUED | OUTPATIENT
Start: 2023-10-25 | End: 2023-10-25

## 2023-10-25 RX ORDER — HYDRALAZINE HYDROCHLORIDE 20 MG/ML
10 INJECTION INTRAMUSCULAR; INTRAVENOUS EVERY 6 HOURS PRN
Status: DISCONTINUED | OUTPATIENT
Start: 2023-10-25 | End: 2023-10-26 | Stop reason: HOSPADM

## 2023-10-25 RX ORDER — OXYCODONE HYDROCHLORIDE 5 MG/1
10 TABLET ORAL EVERY 4 HOURS PRN
Status: DISCONTINUED | OUTPATIENT
Start: 2023-10-25 | End: 2023-10-26 | Stop reason: HOSPADM

## 2023-10-25 RX ORDER — DEXMEDETOMIDINE HYDROCHLORIDE 100 UG/ML
INJECTION, SOLUTION INTRAVENOUS
Status: DISCONTINUED | OUTPATIENT
Start: 2023-10-25 | End: 2023-10-25

## 2023-10-25 RX ORDER — HYDROCODONE BITARTRATE AND ACETAMINOPHEN 5; 325 MG/1; MG/1
1 TABLET ORAL EVERY 4 HOURS PRN
Status: DISCONTINUED | OUTPATIENT
Start: 2023-10-25 | End: 2023-10-26 | Stop reason: HOSPADM

## 2023-10-25 RX ORDER — CLINDAMYCIN HYDROCHLORIDE 150 MG/1
300 CAPSULE ORAL
Status: DISCONTINUED | OUTPATIENT
Start: 2023-10-25 | End: 2023-10-25

## 2023-10-25 RX ORDER — BUPIVACAINE HYDROCHLORIDE 2.5 MG/ML
INJECTION, SOLUTION EPIDURAL; INFILTRATION; INTRACAUDAL
Status: DISCONTINUED | OUTPATIENT
Start: 2023-10-25 | End: 2023-10-25 | Stop reason: HOSPADM

## 2023-10-25 RX ORDER — ETOMIDATE 2 MG/ML
INJECTION INTRAVENOUS
Status: DISCONTINUED | OUTPATIENT
Start: 2023-10-25 | End: 2023-10-25

## 2023-10-25 RX ORDER — AMOXICILLIN 250 MG
1 CAPSULE ORAL 2 TIMES DAILY
Status: DISCONTINUED | OUTPATIENT
Start: 2023-10-25 | End: 2023-10-26 | Stop reason: HOSPADM

## 2023-10-25 RX ORDER — CYCLOBENZAPRINE HCL 5 MG
10 TABLET ORAL 3 TIMES DAILY PRN
Status: DISCONTINUED | OUTPATIENT
Start: 2023-10-25 | End: 2023-10-26 | Stop reason: HOSPADM

## 2023-10-25 RX ORDER — SUCCINYLCHOLINE CHLORIDE 20 MG/ML
INJECTION INTRAMUSCULAR; INTRAVENOUS
Status: DISCONTINUED | OUTPATIENT
Start: 2023-10-25 | End: 2023-10-25

## 2023-10-25 RX ORDER — LIDOCAINE HYDROCHLORIDE 20 MG/ML
INJECTION INTRAVENOUS
Status: DISCONTINUED | OUTPATIENT
Start: 2023-10-25 | End: 2023-10-25

## 2023-10-25 RX ORDER — GLUCAGON 1 MG
1 KIT INJECTION
Status: DISCONTINUED | OUTPATIENT
Start: 2023-10-25 | End: 2023-10-26 | Stop reason: HOSPADM

## 2023-10-25 RX ORDER — IBUPROFEN 200 MG
24 TABLET ORAL
Status: DISCONTINUED | OUTPATIENT
Start: 2023-10-25 | End: 2023-10-26 | Stop reason: HOSPADM

## 2023-10-25 RX ORDER — DEXAMETHASONE SODIUM PHOSPHATE 4 MG/ML
INJECTION, SOLUTION INTRA-ARTICULAR; INTRALESIONAL; INTRAMUSCULAR; INTRAVENOUS; SOFT TISSUE
Status: DISCONTINUED | OUTPATIENT
Start: 2023-10-25 | End: 2023-10-25

## 2023-10-25 RX ORDER — FENTANYL CITRATE 50 UG/ML
INJECTION, SOLUTION INTRAMUSCULAR; INTRAVENOUS
Status: DISCONTINUED | OUTPATIENT
Start: 2023-10-25 | End: 2023-10-25

## 2023-10-25 RX ORDER — ACETAMINOPHEN 10 MG/ML
1000 INJECTION, SOLUTION INTRAVENOUS ONCE
Status: COMPLETED | OUTPATIENT
Start: 2023-10-25 | End: 2023-10-25

## 2023-10-25 RX ORDER — SODIUM CHLORIDE 9 MG/ML
INJECTION, SOLUTION INTRAVENOUS CONTINUOUS
Status: DISCONTINUED | OUTPATIENT
Start: 2023-10-25 | End: 2023-10-25

## 2023-10-25 RX ORDER — MEPERIDINE HYDROCHLORIDE 25 MG/ML
12.5 INJECTION INTRAMUSCULAR; INTRAVENOUS; SUBCUTANEOUS ONCE AS NEEDED
Status: COMPLETED | OUTPATIENT
Start: 2023-10-25 | End: 2023-10-25

## 2023-10-25 RX ORDER — ONDANSETRON 4 MG/1
8 TABLET, ORALLY DISINTEGRATING ORAL EVERY 8 HOURS PRN
Status: DISCONTINUED | OUTPATIENT
Start: 2023-10-25 | End: 2023-10-26 | Stop reason: HOSPADM

## 2023-10-25 RX ORDER — MORPHINE SULFATE 10 MG/ML
3 INJECTION INTRAMUSCULAR; INTRAVENOUS; SUBCUTANEOUS
Status: DISCONTINUED | OUTPATIENT
Start: 2023-10-25 | End: 2023-10-26 | Stop reason: HOSPADM

## 2023-10-25 RX ORDER — ROCURONIUM BROMIDE 10 MG/ML
INJECTION, SOLUTION INTRAVENOUS
Status: DISCONTINUED | OUTPATIENT
Start: 2023-10-25 | End: 2023-10-25

## 2023-10-25 RX ORDER — CEFAZOLIN SODIUM 2 G/50ML
2 SOLUTION INTRAVENOUS
Status: DISCONTINUED | OUTPATIENT
Start: 2023-10-25 | End: 2023-10-25

## 2023-10-25 RX ORDER — INSULIN ASPART 100 [IU]/ML
0-10 INJECTION, SOLUTION INTRAVENOUS; SUBCUTANEOUS
Status: DISCONTINUED | OUTPATIENT
Start: 2023-10-25 | End: 2023-10-26 | Stop reason: HOSPADM

## 2023-10-25 RX ORDER — ONDANSETRON 2 MG/ML
4 INJECTION INTRAMUSCULAR; INTRAVENOUS DAILY PRN
Status: DISCONTINUED | OUTPATIENT
Start: 2023-10-25 | End: 2023-10-25

## 2023-10-25 RX ORDER — EPHEDRINE SULFATE 50 MG/ML
INJECTION, SOLUTION INTRAVENOUS
Status: DISCONTINUED | OUTPATIENT
Start: 2023-10-25 | End: 2023-10-25

## 2023-10-25 RX ORDER — ISOSULFAN BLUE 50 MG/5ML
INJECTION, SOLUTION SUBCUTANEOUS
Status: DISPENSED
Start: 2023-10-25 | End: 2023-10-25

## 2023-10-25 RX ORDER — GENTAMICIN SULFATE 40 MG/ML
INJECTION, SOLUTION INTRAMUSCULAR; INTRAVENOUS
Status: DISCONTINUED | OUTPATIENT
Start: 2023-10-25 | End: 2023-10-25 | Stop reason: HOSPADM

## 2023-10-25 RX ORDER — FENTANYL CITRATE 50 UG/ML
25 INJECTION, SOLUTION INTRAMUSCULAR; INTRAVENOUS EVERY 5 MIN PRN
Status: DISCONTINUED | OUTPATIENT
Start: 2023-10-25 | End: 2023-10-25

## 2023-10-25 RX ORDER — IBUPROFEN 200 MG
16 TABLET ORAL
Status: DISCONTINUED | OUTPATIENT
Start: 2023-10-25 | End: 2023-10-26 | Stop reason: HOSPADM

## 2023-10-25 RX ORDER — TALC
6 POWDER (GRAM) TOPICAL NIGHTLY PRN
Status: DISCONTINUED | OUTPATIENT
Start: 2023-10-25 | End: 2023-10-26 | Stop reason: HOSPADM

## 2023-10-25 RX ORDER — OXYCODONE AND ACETAMINOPHEN 5; 325 MG/1; MG/1
1 TABLET ORAL
Status: DISCONTINUED | OUTPATIENT
Start: 2023-10-25 | End: 2023-10-25

## 2023-10-25 RX ORDER — EPINEPHRINE 1 MG/ML
INJECTION, SOLUTION, CONCENTRATE INTRAVENOUS
Status: DISCONTINUED | OUTPATIENT
Start: 2023-10-25 | End: 2023-10-25 | Stop reason: HOSPADM

## 2023-10-25 RX ORDER — PROPOFOL 10 MG/ML
VIAL (ML) INTRAVENOUS
Status: DISCONTINUED | OUTPATIENT
Start: 2023-10-25 | End: 2023-10-25

## 2023-10-25 RX ORDER — ACETAMINOPHEN 325 MG/1
650 TABLET ORAL EVERY 6 HOURS PRN
Status: DISCONTINUED | OUTPATIENT
Start: 2023-10-25 | End: 2023-10-26 | Stop reason: HOSPADM

## 2023-10-25 RX ORDER — CHLORHEXIDINE GLUCONATE ORAL RINSE 1.2 MG/ML
10 SOLUTION DENTAL 2 TIMES DAILY
Status: DISCONTINUED | OUTPATIENT
Start: 2023-10-25 | End: 2023-10-26 | Stop reason: HOSPADM

## 2023-10-25 RX ORDER — DIPHENHYDRAMINE HYDROCHLORIDE 50 MG/ML
25 INJECTION INTRAMUSCULAR; INTRAVENOUS EVERY 4 HOURS PRN
Status: DISCONTINUED | OUTPATIENT
Start: 2023-10-25 | End: 2023-10-26 | Stop reason: HOSPADM

## 2023-10-25 RX ORDER — ISOSULFAN BLUE 50 MG/5ML
INJECTION, SOLUTION SUBCUTANEOUS
Status: DISCONTINUED | OUTPATIENT
Start: 2023-10-25 | End: 2023-10-25 | Stop reason: HOSPADM

## 2023-10-25 RX ORDER — BUPIVACAINE HYDROCHLORIDE 2.5 MG/ML
INJECTION, SOLUTION EPIDURAL; INFILTRATION; INTRACAUDAL
Status: DISPENSED
Start: 2023-10-25 | End: 2023-10-25

## 2023-10-25 RX ORDER — EPINEPHRINE 1 MG/ML
INJECTION, SOLUTION, CONCENTRATE INTRAVENOUS
Status: DISPENSED
Start: 2023-10-25 | End: 2023-10-25

## 2023-10-25 RX ORDER — ACETAMINOPHEN 10 MG/ML
INJECTION, SOLUTION INTRAVENOUS
Status: DISCONTINUED | OUTPATIENT
Start: 2023-10-25 | End: 2023-10-25

## 2023-10-25 RX ORDER — ONDANSETRON 2 MG/ML
INJECTION INTRAMUSCULAR; INTRAVENOUS
Status: DISCONTINUED | OUTPATIENT
Start: 2023-10-25 | End: 2023-10-25

## 2023-10-25 RX ORDER — MIDAZOLAM HYDROCHLORIDE 1 MG/ML
INJECTION INTRAMUSCULAR; INTRAVENOUS
Status: DISCONTINUED | OUTPATIENT
Start: 2023-10-25 | End: 2023-10-25

## 2023-10-25 RX ORDER — SODIUM CHLORIDE 9 MG/ML
INJECTION, SOLUTION INTRAVENOUS CONTINUOUS
Status: DISCONTINUED | OUTPATIENT
Start: 2023-10-25 | End: 2023-10-26 | Stop reason: HOSPADM

## 2023-10-25 RX ORDER — GENTAMICIN SULFATE 40 MG/ML
INJECTION, SOLUTION INTRAMUSCULAR; INTRAVENOUS
Status: DISCONTINUED
Start: 2023-10-25 | End: 2023-10-25 | Stop reason: ALTCHOICE

## 2023-10-25 RX ADMIN — OXYCODONE HYDROCHLORIDE 10 MG: 5 TABLET ORAL at 08:10

## 2023-10-25 RX ADMIN — FENTANYL CITRATE 50 MCG: 50 INJECTION, SOLUTION INTRAMUSCULAR; INTRAVENOUS at 07:10

## 2023-10-25 RX ADMIN — NEOSTIGMINE METHYLSULFATE 3 MG: 0.5 INJECTION INTRAVENOUS at 10:10

## 2023-10-25 RX ADMIN — ROCURONIUM BROMIDE 15 MG: 10 SOLUTION INTRAVENOUS at 10:10

## 2023-10-25 RX ADMIN — DEXAMETHASONE SODIUM PHOSPHATE 8 MG: 4 INJECTION, SOLUTION INTRA-ARTICULAR; INTRALESIONAL; INTRAMUSCULAR; INTRAVENOUS; SOFT TISSUE at 07:10

## 2023-10-25 RX ADMIN — DEXMEDETOMIDINE HYDROCHLORIDE 4 MCG: 100 INJECTION, SOLUTION INTRAVENOUS at 10:10

## 2023-10-25 RX ADMIN — FENTANYL CITRATE 100 MCG: 50 INJECTION, SOLUTION INTRAMUSCULAR; INTRAVENOUS at 07:10

## 2023-10-25 RX ADMIN — LIDOCAINE HYDROCHLORIDE 100 MG: 20 INJECTION INTRAVENOUS at 07:10

## 2023-10-25 RX ADMIN — ONDANSETRON 4 MG: 2 INJECTION INTRAMUSCULAR; INTRAVENOUS at 10:10

## 2023-10-25 RX ADMIN — CHLORHEXIDINE GLUCONATE 0.12% ORAL RINSE 10 ML: 1.2 LIQUID ORAL at 12:10

## 2023-10-25 RX ADMIN — ACETAMINOPHEN 1000 MG: 10 INJECTION, SOLUTION INTRAVENOUS at 08:10

## 2023-10-25 RX ADMIN — SENNOSIDES AND DOCUSATE SODIUM 1 TABLET: 8.6; 5 TABLET ORAL at 08:10

## 2023-10-25 RX ADMIN — MIDAZOLAM HYDROCHLORIDE 2 MG: 1 INJECTION INTRAMUSCULAR; INTRAVENOUS at 07:10

## 2023-10-25 RX ADMIN — OXYCODONE HYDROCHLORIDE AND ACETAMINOPHEN 1 TABLET: 5; 325 TABLET ORAL at 11:10

## 2023-10-25 RX ADMIN — SENNOSIDES AND DOCUSATE SODIUM 1 TABLET: 8.6; 5 TABLET ORAL at 12:10

## 2023-10-25 RX ADMIN — DEXMEDETOMIDINE HYDROCHLORIDE 4 MCG: 100 INJECTION, SOLUTION INTRAVENOUS at 09:10

## 2023-10-25 RX ADMIN — SUCCINYLCHOLINE CHLORIDE 120 MG: 20 INJECTION, SOLUTION INTRAMUSCULAR; INTRAVENOUS; PARENTERAL at 07:10

## 2023-10-25 RX ADMIN — DEXMEDETOMIDINE HYDROCHLORIDE 4 MCG: 100 INJECTION, SOLUTION INTRAVENOUS at 07:10

## 2023-10-25 RX ADMIN — ROCURONIUM BROMIDE 5 MG: 10 SOLUTION INTRAVENOUS at 07:10

## 2023-10-25 RX ADMIN — CLINDAMYCIN HYDROCHLORIDE 300 MG: 150 CAPSULE ORAL at 12:10

## 2023-10-25 RX ADMIN — OXYCODONE HYDROCHLORIDE 10 MG: 5 TABLET ORAL at 03:10

## 2023-10-25 RX ADMIN — CYCLOBENZAPRINE HYDROCHLORIDE 10 MG: 5 TABLET, FILM COATED ORAL at 05:10

## 2023-10-25 RX ADMIN — CEFAZOLIN SODIUM 2 G: 2 SOLUTION INTRAVENOUS at 11:10

## 2023-10-25 RX ADMIN — ACETAMINOPHEN 1000 MG: 10 INJECTION, SOLUTION INTRAVENOUS at 12:10

## 2023-10-25 RX ADMIN — DEXTROSE 2 G: 50 INJECTION, SOLUTION INTRAVENOUS at 06:10

## 2023-10-25 RX ADMIN — GLYCOPYRROLATE 0.4 MG: 0.2 INJECTION, SOLUTION INTRAMUSCULAR; INTRAVITREAL at 10:10

## 2023-10-25 RX ADMIN — MEPERIDINE HYDROCHLORIDE 12.5 MG: 25 INJECTION INTRAMUSCULAR; INTRAVENOUS; SUBCUTANEOUS at 11:10

## 2023-10-25 RX ADMIN — ETOMIDATE 20 MG: 2 INJECTION, SOLUTION INTRAVENOUS at 07:10

## 2023-10-25 RX ADMIN — FENTANYL CITRATE 25 MCG: 50 INJECTION, SOLUTION INTRAMUSCULAR; INTRAVENOUS at 11:10

## 2023-10-25 RX ADMIN — PROPOFOL 50 MG: 10 INJECTION, EMULSION INTRAVENOUS at 07:10

## 2023-10-25 RX ADMIN — EPHEDRINE SULFATE 5 MG: 50 INJECTION INTRAVENOUS at 09:10

## 2023-10-25 RX ADMIN — CEFAZOLIN SODIUM 2 G: 2 SOLUTION INTRAVENOUS at 03:10

## 2023-10-25 RX ADMIN — SODIUM CHLORIDE: 9 INJECTION, SOLUTION INTRAVENOUS at 12:10

## 2023-10-25 RX ADMIN — CHLORHEXIDINE GLUCONATE 0.12% ORAL RINSE 10 ML: 1.2 LIQUID ORAL at 08:10

## 2023-10-25 NOTE — OP NOTE
Operative Report    10/25/2023    Preoperative Diagnosis:   Left Breast Cancer, axillary tail quadrant  Desire for Risk-Reducing Contralateral Prophylactic Mastectomy    Postoperative Diagnosis:  Same    Procedures Performed:  Right Prophylactic Nipple-Sparing Mastectomy  Left  Nipple-Sparing Mastectomy  Left  [place] Lymph Node Biopsy  Intraoperative Ultrasound Guidance  Injection of Isosulfan Blue Dye    Surgeon: Hemalatha Josue MD    Anesthesia: General    Drains: Per plastics    Complications: None apparent    Disposition: The care of the patient was turned over to Dr. Lynn for completion of the reconstructive portion of the case.    Specimens:  Right Breast, Short Suture Superior, Long Suture Lateral  Left Breast, Short Suture Superior, Long Suture Lateral  Left  Axillary [place] Lymph Nodes              No. 1 Hot, Blue (Count 680)   Background Count 2    Statement of Medical Necessity:  This patient is a 63 year old woman who was recently diagnosed with left breast cancer.  After undergoing a thorough preoperative evaluation and considering all of her options, she has elected for nipple sparing  mastectomy.  The risks, benefits and alternatives to surgery have been discussed and she has provided informed consent.     Modifier 22 Statement:  Modifier 22 should be applied to the case due to the increased technical difficulty of the procedure and additional effort required to preserve the patient's nipple and areola.     Description of Operative Procedure and Findings:  The patient was identified and transported to the operating room and placed on the operating table.  All pressure points were padded.  General Anesthesia was administered.  After the skin was cleansed with an alcohol prep, blue dye was injected beneath the nipple-areolar complex and the breast was massaged for 5 minutes.  Intraoperative ultrasound was performed, as described above.  The patient's bilateral breast, axilla and upper arm was  prepped and draped in the usual sterile fashion.  A time out was performed.    Attention was then turned to the right breast.  An incision from the base of the areolar to the inframammary crease was made using the 15 blade scalpel.  The dissection was continued through the subcutaneous tissue to the chest wall using the bovie.  Tumescent solution was instilled in the plane between the breast and the subcutaneous tissue.  The breast was then removed from the pectoralis muscle, including the pectoralis fascia with the specimen, using the bovie. The breast was dissected from the overlysing skin and subcutaneous tissue using scissors.  The dissection extended to the inframammary fold inferiorly, the lateral border of the sternum medially, the clavicle superiorly and the anterior border of the latissimus laterally. The inferior, medial, superior and lateral attachments were then taken using the bovie. The specimen was oriented using a short suture superiorly and a long suture laterally and submitted to pathology for permanent evaluation.  The wound was irrigated and suctioned dry.  Hemostasis was obtained.  A moist lap was placed in the wound to prevent desiccation of the tissues.     Attention was then turned to the left breast.  An inframammary incision was made using the 15 blade scalpel.  The dissection was continued through the subcutaneous tissue to the chest wall using the bovie.  Tumescent solution was instilled in the plane between the breast and the subcutaneous tissue.  The breast was then removed from the pectoralis muscle, including the pectoralis fascia with the specimen, using the bovie.  The breast was dissected from the overlying skin and subcutaneous tissue using scissors.  The dissection extended to the inframammary fold inferiorly, the lateral border of the sternum medially, the clavicle superiorly and the anterior border of the latissimus laterally.  The inferior, medial, superior and lateral  attachments were then taken using the bovie.  The specimen was oriented using a short suture superiorly and a long suture laterally and submitted to pathology for permanent evaluation.     A specimen radiograph was taken of the left mastectomy specimen. The dumbbell clip was identified within the axillary tail. Additional axillary tail tissue beyond her breast borders was resected and suture oriented as above. A specimen radiograph confirmed no clip within this tissue. Additional lateral tissue along the serratus beyond her breast borders was resected and suture oriented as above. A specimen radiograph confirmed no clip within this tissue. Additional anterior-lateral tissue along the mastectomy flap and just above her clavipectoral fascia beyond her breast borders was resected and suture oriented as above. A specimen radiograph confirmed no clip within this tissue. Flouroscopy confirmed the conic shaped clip within her prior lymph node biopsy and no other clip was identified. The wound was irrigated and suctioned dry.  Hemostasis was obtained.  A moist lap was placed in the wound to prevent desiccation of the tissues.     Attention was turned to the left axilla.  An axillary incision was made at the base of the hair-bearing area, at the point of maximal uptake using the gamma probe.  The dissection was continued through the subcutaneous tissues using the bovie.  The clavipectoral fascia was opened, exposing the classically appearing axillary fat.  1 sentinel lymph nodes were identified, as described above.  These were submitted to pathology for permanent evaluation.  The wound was irrigated and suctioned dry. Hemostasis was obtained.      At this time, the care of the patient was turned over to the plastic surgeon to complete the reconstruction portion of the operation.  No complications were noted.  At the end of my portion of the case, the instrument, needle and sponge count was correct.      Procedure-specific  Information - Mastectomy    Procedure Intent Excision of primary tumor with grossly negative margins   Mastectomy Type Nipple-sparing   Type of Incision Radial   Tumescent Solutions Used Yes   Pectoralis Fascia, Muscle or Both Removed Fascia only   Reconstruction Yes   Drains Placed Deferred to plastics      Procedure-specific Information - [place] Lymph Node Biopsy    Tracers Used to Identify [place] Nodes in Neoadjuvant and Non-neoadjuvant Setting Radioactive tracer; non-neoadjuvant (upfront surgery) setting and Dye; non-neoadjuvant (upfront surgery) setting   Additional Tracer Details (Optional) Isosulfan blue   Completeness of Oliver and Palpable Node Removal All significantly radioactive nodes were removed, All nodes (colored or non-colored) present at the end of a dye-filled lymphatic channel were removed, and All palpably suspicious nodes were removed   Biopsy-proven Positive Nodes Marked with Clips Prior to Chemotherapy Identified and Removed Not applicable

## 2023-10-25 NOTE — PLAN OF CARE
Report to THANH Matthews, care assumed. Patient is an overnight observation and was moved to [facility] room 3. Patient's belongings at bedside and family brought to bedside.

## 2023-10-25 NOTE — DISCHARGE INSTRUCTIONS
POSTOPERATIVE INSTRUCTIONS FOLLOWING   MASTECTOMY AND/OR AXILLARY LYMPH NODE DISSECTION    The following are post-operative instructions that will help you to recover from your surgery.  Please read over these instructions carefully and contact us if we can answer any of your questions or concerns.    Dressing/breast binder (surgi-bra)  A surgical bra may be placed around your chest after your surgery.  If you are given the bra, please wear it as close to 24 hours a day as possible until your post-operative clinic appointment.  If the elastic around the bra irritates your skin, you may wear a soft t-shirt underneath the bra.    You may go without wearing the bra long enough to bath, to launder and dry the bra. If you have fluffy filler placed inside the bra, the filler should be removed whenever the bra is taken off. Please reinsert the fluffy filler, or insert the new soft filler, under the bra when you put the bra back on.  If the bra is extremely uncomfortable, you may wear a supportive sports bra instead after 2 days.    You may shower AFTER the drains are removed.  Please sponge bathe until then. Do not take a tub bath and do not soak the surgical site. lease do not remove the surgical glue that covers your incision.  This will peel off in 2-3 weeks naturally.     Activity   You will be able to do much of your own personal care, such as bathing, dressing, preparing simple meals, etc.  A short walk each day will help with your recovery  You may find that you need to take rest breaks between activities, but you should not need to stay in bed for prolonged periods of time during the day  You may resume light household activities such as simple meal preparation, folding laundry, using your computer, and completing paperwork as you feel ready  Please avoid activities that require moderate to heavy lifting (grocery shopping) or pushing/pulling (vacuuming) and repetitive motions (such as washing windows or long hours  on the computer). Do not lift anything heavier than a gallon of milk.  A good rule during this time is to listen to your body, do what is comfortable, and stop and rest when your feel tired.  If it hurts, dont do it.  Following a lymph node dissection, dont avoid using your arm, but dont exercise your arm until after your first post-operative visit.  At your first post-op visit, you will be given arm exercises to regain movement and flexibility.  You may be referred to physical therapy.  You may restart driving when you are no longer on narcotics, your drain has been removed, and you feel safe turning the wheel and stopping quickly.  You will need to be out of work approximately 2-6 weeks depending on your particular surgery and how well you are recovering.  We will evaluate how you are doing at the first post-op appointment.  This is a good time to ask when you may return to work and what activities you may do.    Medication for pain  You will be given a prescription for pain medication. You should not drive or operate machinery while taking these.  Please take narcotics with food.  Narcotics can cause, or worse, constipation.  You will need to increase your fluid intake, eat high fiber foods (such as fruits and bran) and make sure that you are up and walking. You may need to take an over the counter stool softener for constipation.  An icepack may be helpful to decrease discomfort and swelling, particularly to the armpit after a lymph node dissection.  A small pillow positioned in the armpit may also decrease discomfort after a lymph node dissection.  If you are given a prescription for antibiotics, please continue to take them until the drains have been removed.    How to care for your Drain(s)  Wash hands--STRIP or milk the drainage tube as it comes out of your body toward the bulb.   Beginning where the drain comes out of your body, hold drainage tubing with one hand and with the other, stretch and release  tubing an inch at time while moving downward with both hands toward the bulb.  Do this 2-3 times before emptying the bulb.  Remove the stopper from the bulbs port  (drainage port)  Pour the drainage in the measuring cup provided by the nurse  Flatten/squeeze the bulb to create a vacuum and replace the stopper before letting go the of the bulb.  Record the date, time and amount of drainage in ccs (not ounces) each time bulb is emptied. If you have more than one drain, record each separately.  Discard the drainage into the toilet after measuring and then wash hands.  Empty bulbs 3 times/day or as needed if it fills up before 8 hours.  Remember to bring the output record with you to your doctors appointment.    Please report the following:  Temperature greater than 101 degrees  Discharge or bad odor from the wound  Excessive bleeding, such as bloody dressing or extreme bruising  Redness at incision and/or drain sites  Swelling or buildup of fluid around incision  If blue dye was used to locate your sentinel lymph nodes, your urine and stool may be blue-green in color for 1 or 2 days.    Additional information  I will see you approximately 2 weeks following your surgery.  If this follow-up appointment has not been made, please call the office.    If you have any questions or problems, please call my office.    Dr. Hemalatha Josue    [phone]    After hours, please contact the [facility]  at [phone].    Nozin Instructions  Goal: the goal of Nozin is to reduce the risk of post-procedural infections by bacteria in the nasal cavity. Think of it as hand  for your nose.    How to use:    1. Shake Nozin bottle well    2. Take a cotton swab and apply 4 drops to one tip    3. Insert cotton tip into one nostril, being sure not to go deeper into nose than tip of the swab.    4. Swab nostril 6 times counterclockwise and 6 times clockwise. Make sure to swab the inside front pocket of the nostril.    5. Take  swab out and apply 2 drops to the same cotton tip. Repeat steps 3 and 4 in the other nostril.        Do steps 1-5 twice a day for 7 days.

## 2023-10-25 NOTE — OP NOTE
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 Josue dictation for the initial portion of the procedure and mastectomies.  Upon entering the operating room time out was taken to verify the correct patient, site, location, instrumentation, implants, and beta blockers had been given if needed.  My attention then turned to the right breast. Hemostasis was verified and the pectoralis muscle was then elevated off the chest wall and released from its infra mammary fold attachments around to the sternal boarder.  A 650cc high profile mentor tissue expander was then brought on the field and prepped by removing all the air and irrigating with antibiotic impregnated saline.  This was then sutured in to place at the 3, 6, and 9 o'clock positions with a 0 pds.  A thin, contoured, perforated piece of AlloDerm was then sutured in place along the cut edge of the sternal boarder with a vicryl suture and the inframammary fold. 300cc saline was then infiltrated into the expander.  2 15 marisela drains were placed into the pocket and the skin was closed in two layers.   The left side was done in the exact same fashion. All sponge, instrument, and  needle counts were correct.     Significant Surgical Tasks Conducted by the Assistant(s), if Applicable: none    Estimated Blood Loss (EBL): * No values recorded between 10/25/2023  7:29 AM and 10/25/2023 10:39 AM *           Implants: * No implants in log *    Specimens:   Specimen (24h ago, onward)       Start     Ordered    10/25/23 1001  Specimen to Pathology, Surgery Breast  Once        Comments: Pre-op Diagnosis: Breast neoplasm, Tis (DCIS), left [D05.12]Procedure(s):MASTECTOMY, NIPPLE SPARINGBIOPSY, LYMPH NODE, SENTINELINJECTION, FOR SENTINEL NODE IDENTIFICATIONINSERTION, TISSUE EXPANDER, BREAST Number of specimens: 6Name of specimens: 1.  Right Breast Prophylactic Nipple Sparing Mastectomy short superior, long lateral2.  Left Breast Nipple Sparing Mastectomy short superior, long lateral3.  SN #1 Hot and Blue Background Count 24.  Left Breast Axillary Tail short superior, long lateral5.  Additional Lateral Margin short superior, long lateral6.  Additional Anterior Margin short superior, long lateral     References:    Click here for ordering Quick Tip   Question Answer Comment   Procedure Type: Breast    Specimen Class: Known or suspected malignancy    Which provider would you like to cc? ANUJ BUENO    Release to patient Immediate        10/25/23 1018                            Condition: Good    Disposition: PACU - hemodynamically stable.    Attestation: I was present and scrubbed for the entire procedure.

## 2023-10-25 NOTE — TRANSFER OF CARE
"Anesthesia Transfer of Care Note    Patient: Demetrice Gaines    Procedure(s) Performed: Procedure(s) (LRB):  MASTECTOMY, NIPPLE SPARING (Bilateral)  BIOPSY, LYMPH NODE, SENTINEL (Left)  INJECTION, FOR SENTINEL NODE IDENTIFICATION (Left)  INSERTION, TISSUE EXPANDER, BREAST (Bilateral)    Patient location: PACU    Anesthesia Type: general    Transport from OR: Transported from OR on room air with adequate spontaneous ventilation    Post pain: adequate analgesia    Post assessment: no apparent anesthetic complications    Post vital signs: stable    Level of consciousness: sedated    Nausea/Vomiting: no nausea/vomiting    Complications: none    Transfer of care protocol was followed      Last vitals:   Visit Vitals  /85 (BP Location: Right arm, Patient Position: Sitting)   Pulse 81   Temp 36.5 °C (97.7 °F) (Tympanic)   Ht 5' 4" (1.626 m)   Wt 83.4 kg (183 lb 13.8 oz)   SpO2 97%   Breastfeeding No   BMI 31.56 kg/m²     "

## 2023-10-25 NOTE — INTERVAL H&P NOTE
The patient has been examined and the H&P has been reviewed:    I concur with the findings and no changes have occurred since H&P was written.    Surgery risks, benefits and alternative options discussed and understood by patient/family.          Active Hospital Problems    Diagnosis  POA    *Malignant neoplasm of upper-outer quadrant of left breast in female, estrogen receptor positive [C50.412, Z17.0]  Not Applicable     Chronic      Resolved Hospital Problems   No resolved problems to display.

## 2023-10-25 NOTE — CARE UPDATE
[facility] awake,alert and oriented. No apparent distress observed. Verbalized pain in the surgical site. Safety measures intact.  IV catheter intact, IVF Nsaline infusing . Family members at bedside. Therapeutic environment provided. Care continues.

## 2023-10-25 NOTE — PROGRESS NOTES
IV Ancef and PO Clindamycin ordered post op.     Per Dr Josue, IV Ancef is needed post op. Dr Lynn ordered for PO Clindamycin to be discontinued.

## 2023-10-26 VITALS
SYSTOLIC BLOOD PRESSURE: 121 MMHG | OXYGEN SATURATION: 96 % | DIASTOLIC BLOOD PRESSURE: 81 MMHG | BODY MASS INDEX: 31.39 KG/M2 | HEART RATE: 105 BPM | HEIGHT: 64 IN | RESPIRATION RATE: 17 BRPM | WEIGHT: 183.88 LBS | TEMPERATURE: 98 F

## 2023-10-26 LAB — POCT GLUCOSE: 150 MG/DL (ref 70–110)

## 2023-10-26 PROCEDURE — 94799 UNLISTED PULMONARY SVC/PX: CPT

## 2023-10-26 PROCEDURE — 25000003 PHARM REV CODE 250: Performed by: SURGERY

## 2023-10-26 RX ADMIN — OXYCODONE HYDROCHLORIDE 10 MG: 5 TABLET ORAL at 04:10

## 2023-10-26 NOTE — DISCHARGE SUMMARY
[facility]/Surg  Discharge Note  Short Stay    Procedure(s) (LRB):  MASTECTOMY, NIPPLE SPARING (Bilateral)  BIOPSY, LYMPH NODE, SENTINEL (Left)  INJECTION, FOR SENTINEL NODE IDENTIFICATION (Left)  INSERTION, TISSUE EXPANDER, BREAST (Bilateral)      OUTCOME: Patient tolerated treatment/procedure well without complication and is now ready for discharge. She was admitted overnight for observation. Her skin flaps are viable. She has tolerated PO. Her drain output is appropriate. She has voided after the mccray was removed. And her pain is controlled with PO medications.       DISPOSITION: Home or Self Care    FINAL DIAGNOSIS:  Malignant neoplasm of upper-outer quadrant of left breast in female, estrogen receptor positive    FOLLOWUP: In clinic    DISCHARGE INSTRUCTIONS:    Discharge Procedure Orders   Diet Adult Regular     Lifting restrictions     Notify your health care provider if you experience any of the following:  temperature >100.4     Notify your health care provider if you experience any of the following:  persistent nausea and vomiting or diarrhea     Notify your health care provider if you experience any of the following:  severe uncontrolled pain     Notify your health care provider if you experience any of the following:  redness, tenderness, or signs of infection (pain, swelling, redness, odor or green/yellow discharge around incision site)     Leave dressing on - Keep it clean, dry, and intact until clinic visit        TIME SPENT ON DISCHARGE: 15 minutes

## 2023-10-26 NOTE — PLAN OF CARE
Pt discharged per MD order. IV removed. CECELIA drain education complete. AVS reviewed and all questions answered. Pt transported by wheelchair to family vehicle without incident or complaint.

## 2023-10-28 ENCOUNTER — HOSPITAL ENCOUNTER (EMERGENCY)
Facility: HOSPITAL | Age: 63
Discharge: HOME | End: 2023-10-29
Attending: FAMILY MEDICINE
Payer: MEDICARE

## 2023-10-28 VITALS
DIASTOLIC BLOOD PRESSURE: 70 MMHG | HEART RATE: 81 BPM | RESPIRATION RATE: 18 BRPM | TEMPERATURE: 99 F | SYSTOLIC BLOOD PRESSURE: 138 MMHG | OXYGEN SATURATION: 98 %

## 2023-10-28 DIAGNOSIS — R07.9 CHEST PAIN: Primary | ICD-10-CM

## 2023-10-28 DIAGNOSIS — I10 HYPERTENSION, UNSPECIFIED TYPE: ICD-10-CM

## 2023-10-28 DIAGNOSIS — G89.18 POST-OPERATIVE PAIN: ICD-10-CM

## 2023-10-28 LAB
ALBUMIN SERPL BCP-MCNC: 3.3 G/DL (ref 3.5–5.2)
ALP SERPL-CCNC: 60 U/L (ref 55–135)
ALT SERPL W/O P-5'-P-CCNC: 18 U/L (ref 10–44)
ANION GAP SERPL CALC-SCNC: 12 MMOL/L (ref 8–16)
AST SERPL-CCNC: 28 U/L (ref 10–40)
BACTERIA #/AREA URNS HPF: NORMAL /HPF
BASOPHILS # BLD AUTO: 0.06 K/UL (ref 0–0.2)
BASOPHILS NFR BLD: 0.6 % (ref 0–1.9)
BILIRUB SERPL-MCNC: 0.3 MG/DL (ref 0.1–1)
BILIRUB UR QL STRIP: NEGATIVE
BNP SERPL-MCNC: <10 PG/ML (ref 0–99)
BUN SERPL-MCNC: 14 MG/DL (ref 8–23)
CALCIUM SERPL-MCNC: 8.2 MG/DL (ref 8.7–10.5)
CHLORIDE SERPL-SCNC: 101 MMOL/L (ref 95–110)
CLARITY UR: CLEAR
CO2 SERPL-SCNC: 23 MMOL/L (ref 23–29)
COLOR UR: YELLOW
CREAT SERPL-MCNC: 0.9 MG/DL (ref 0.5–1.4)
DIFFERENTIAL METHOD: ABNORMAL
EOSINOPHIL # BLD AUTO: 0.2 K/UL (ref 0–0.5)
EOSINOPHIL NFR BLD: 2.3 % (ref 0–8)
ERYTHROCYTE [DISTWIDTH] IN BLOOD BY AUTOMATED COUNT: 13.7 % (ref 11.5–14.5)
EST. GFR  (NO RACE VARIABLE): >60 ML/MIN/1.73 M^2
GLUCOSE SERPL-MCNC: 124 MG/DL (ref 70–110)
GLUCOSE UR QL STRIP: NEGATIVE
HCT VFR BLD AUTO: 36.5 % (ref 37–48.5)
HGB BLD-MCNC: 12.3 G/DL (ref 12–16)
HGB UR QL STRIP: NEGATIVE
IMM GRANULOCYTES # BLD AUTO: 0.03 K/UL (ref 0–0.04)
IMM GRANULOCYTES NFR BLD AUTO: 0.3 % (ref 0–0.5)
KETONES UR QL STRIP: NEGATIVE
LACTATE SERPL-SCNC: 1.1 MMOL/L (ref 0.5–2.2)
LEUKOCYTE ESTERASE UR QL STRIP: ABNORMAL
LYMPHOCYTES # BLD AUTO: 3.7 K/UL (ref 1–4.8)
LYMPHOCYTES NFR BLD: 37.8 % (ref 18–48)
MCH RBC QN AUTO: 29.3 PG (ref 27–31)
MCHC RBC AUTO-ENTMCNC: 33.7 G/DL (ref 32–36)
MCV RBC AUTO: 87 FL (ref 82–98)
MICROSCOPIC COMMENT: NORMAL
MONOCYTES # BLD AUTO: 0.9 K/UL (ref 0.3–1)
MONOCYTES NFR BLD: 9.3 % (ref 4–15)
NEUTROPHILS # BLD AUTO: 4.8 K/UL (ref 1.8–7.7)
NEUTROPHILS NFR BLD: 49.7 % (ref 38–73)
NITRITE UR QL STRIP: NEGATIVE
NRBC BLD-RTO: 0 /100 WBC
PH UR STRIP: 6 [PH] (ref 5–8)
PLATELET # BLD AUTO: 296 K/UL (ref 150–450)
PMV BLD AUTO: 8.6 FL (ref 9.2–12.9)
POTASSIUM SERPL-SCNC: 3.8 MMOL/L (ref 3.5–5.1)
PROCALCITONIN SERPL IA-MCNC: 0.03 NG/ML
PROT SERPL-MCNC: 6.4 G/DL (ref 6–8.4)
PROT UR QL STRIP: NEGATIVE
RBC # BLD AUTO: 4.2 M/UL (ref 4–5.4)
RBC #/AREA URNS HPF: 2 /HPF (ref 0–4)
SODIUM SERPL-SCNC: 136 MMOL/L (ref 136–145)
SP GR UR STRIP: 1.02 (ref 1–1.03)
SQUAMOUS #/AREA URNS HPF: 2 /HPF
TROPONIN I SERPL DL<=0.01 NG/ML-MCNC: <0.006 NG/ML (ref 0–0.03)
URN SPEC COLLECT METH UR: ABNORMAL
UROBILINOGEN UR STRIP-ACNC: NEGATIVE EU/DL
WBC # BLD AUTO: 9.74 K/UL (ref 3.9–12.7)
WBC #/AREA URNS HPF: 5 /HPF (ref 0–5)

## 2023-10-28 PROCEDURE — 25000003 PHARM REV CODE 250: Performed by: FAMILY MEDICINE

## 2023-10-28 PROCEDURE — 83880 ASSAY OF NATRIURETIC PEPTIDE: CPT | Performed by: FAMILY MEDICINE

## 2023-10-28 PROCEDURE — 93010 EKG 12-LEAD: ICD-10-PCS | Mod: ,,, | Performed by: INTERNAL MEDICINE

## 2023-10-28 PROCEDURE — 63600175 PHARM REV CODE 636 W HCPCS: Performed by: EMERGENCY MEDICINE

## 2023-10-28 PROCEDURE — 96376 TX/PRO/DX INJ SAME DRUG ADON: CPT

## 2023-10-28 PROCEDURE — 99285 EMERGENCY DEPT VISIT HI MDM: CPT | Mod: 25

## 2023-10-28 PROCEDURE — 63600175 PHARM REV CODE 636 W HCPCS: Performed by: FAMILY MEDICINE

## 2023-10-28 PROCEDURE — 93005 ELECTROCARDIOGRAM TRACING: CPT

## 2023-10-28 PROCEDURE — 96361 HYDRATE IV INFUSION ADD-ON: CPT

## 2023-10-28 PROCEDURE — 83605 ASSAY OF LACTIC ACID: CPT | Performed by: FAMILY MEDICINE

## 2023-10-28 PROCEDURE — 80053 COMPREHEN METABOLIC PANEL: CPT | Performed by: FAMILY MEDICINE

## 2023-10-28 PROCEDURE — 84484 ASSAY OF TROPONIN QUANT: CPT | Performed by: FAMILY MEDICINE

## 2023-10-28 PROCEDURE — 25500020 PHARM REV CODE 255: Performed by: EMERGENCY MEDICINE

## 2023-10-28 PROCEDURE — 93010 ELECTROCARDIOGRAM REPORT: CPT | Mod: ,,, | Performed by: INTERNAL MEDICINE

## 2023-10-28 PROCEDURE — 81000 URINALYSIS NONAUTO W/SCOPE: CPT | Performed by: FAMILY MEDICINE

## 2023-10-28 PROCEDURE — 96374 THER/PROPH/DIAG INJ IV PUSH: CPT | Mod: 59

## 2023-10-28 PROCEDURE — 85025 COMPLETE CBC W/AUTO DIFF WBC: CPT | Performed by: FAMILY MEDICINE

## 2023-10-28 PROCEDURE — 84145 PROCALCITONIN (PCT): CPT | Performed by: FAMILY MEDICINE

## 2023-10-28 PROCEDURE — 87040 BLOOD CULTURE FOR BACTERIA: CPT | Mod: 59 | Performed by: FAMILY MEDICINE

## 2023-10-28 RX ORDER — MORPHINE SULFATE 4 MG/ML
6 INJECTION, SOLUTION INTRAMUSCULAR; INTRAVENOUS
Status: COMPLETED | OUTPATIENT
Start: 2023-10-28 | End: 2023-10-28

## 2023-10-28 RX ORDER — MORPHINE SULFATE 4 MG/ML
4 INJECTION, SOLUTION INTRAMUSCULAR; INTRAVENOUS
Status: COMPLETED | OUTPATIENT
Start: 2023-10-28 | End: 2023-10-28

## 2023-10-28 RX ADMIN — MORPHINE SULFATE 4 MG: 4 INJECTION INTRAVENOUS at 07:10

## 2023-10-28 RX ADMIN — IOHEXOL 100 ML: 350 INJECTION, SOLUTION INTRAVENOUS at 09:10

## 2023-10-28 RX ADMIN — MORPHINE SULFATE 6 MG: 4 INJECTION INTRAVENOUS at 10:10

## 2023-10-28 RX ADMIN — SODIUM CHLORIDE 1000 ML: 9 INJECTION, SOLUTION INTRAVENOUS at 08:10

## 2023-10-28 NOTE — ED PROVIDER NOTES
SCRIBE #1 NOTE: I, Alejandra cMgowan, am scribing for, and in the presence of, Alycia Villafuerte MD. I have scribed the HPI, ROS, and PEx.    SCRIBE #2 NOTE: I, Say Tovar, am scribing for, and in the presence of,  Sydnie Briscoe MD. I have scribed the remaining portions of the note not scribed by Scribe #1.      History     Chief Complaint   Patient presents with    Post-op Problem     Pt had bilateral mastectomy done 10/25/23, pt had home health nurse come to house today and states she had worsening pain while drains were emptied. Reports increase pain to chest and shortness of breath.     Review of patient's allergies indicates:   Allergen Reactions    Aldactone [spironolactone]      Memory impair and breast soreness    Coreg [carvedilol]      Hair loss    Lisinopril-hydrochlorothiazide      Other reaction(s): Reaction:Throat swelling;         History of Present Illness     HPI    10/28/2023, 6:51 PM  History obtained from the patient      History of Present Illness: Demetrice Gaines is a 63 y.o. female patient with a PMHx of HTN, CAD, hyponatremia, PE, breast CA, CHF, DM, and MI who presents to the Emergency Department for evaluation of post-op problem which onset today. Pt had bilateral mastectomy done 10/25/23, pt had home health nurse come to house today and states she had worsening pain while drains were emptied. Reports increase pain to chest and shortness of breath. Symptoms are constant and moderate in severity. No mitigating or exacerbating factors reported. Patient denies any fever, chills, n/v/d, and all other sxs at this time. No further complaints or concerns at this time.       Arrival mode: Personal vehicle      PCP: Lul Alvarez MD        Past Medical History:  Past Medical History:   Diagnosis Date    Bilateral pleural effusion 06/20/2020    CAD (coronary artery disease)     CHF (congestive heart failure)     Colon polyp     Diabetes mellitus, type 2     Hypertension     Hyponatremia  2020    Malignant neoplasm of upper-outer quadrant of left breast in female, estrogen receptor positive 10/17/2023    Myocardial infarction        Past Surgical History:  Past Surgical History:   Procedure Laterality Date    Benign Cyst Right     BREAST CYST EXCISION Right 1986    pt states benign     SECTION      COLONOSCOPY      COLONOSCOPY N/A 2019    Procedure: COLONOSCOPY;  Surgeon: Ileana Holcomb MD;  Location: [facility];  Service: Endoscopy;  Laterality: N/A;         Family History:  Family History   Problem Relation Age of Onset    Cancer Mother     Pacemaker/defibrilator Mother     Glaucoma Mother     Arthritis Mother     Heart disease Mother         It seems that this condition runs in my family on my mother's side    Hypertension Mother     Diabetes Father     Hypertension Sister     Glaucoma Sister     Breast cancer Paternal Grandmother     Cancer Paternal Grandmother     Hypertension Brother     COPD Sister     COPD Brother     Hypertension Brother     Hypertension Sister        Social History:  Social History     Tobacco Use    Smoking status: Never    Smokeless tobacco: Never    Tobacco comments:     NEVER   Substance and Sexual Activity    Alcohol use: Never    Drug use: Never    Sexual activity: Not Currently     Comment: Menopausal        Review of Systems     Review of Systems   Constitutional:  Negative for chills and fever.   HENT:  Negative for sore throat.    Respiratory:  Positive for shortness of breath.    Cardiovascular:  Positive for chest pain.   Gastrointestinal:  Negative for diarrhea, nausea and vomiting.   Genitourinary:  Negative for dysuria.   Musculoskeletal:  Negative for back pain.   Skin:  Negative for rash.   Neurological:  Negative for weakness.   Hematological:  Does not bruise/bleed easily.   All other systems reviewed and are negative.         Physical Exam     Initial Vitals [10/28/23 1834]   BP Pulse Resp Temp SpO2   (!) 161/91 (!) 111 (!) 24 98.6  °F (37 °C) 97 %      MAP       --          Physical Exam   Nursing Notes and Vital Signs Reviewed.  Constitutional: Patient is in no acute distress. Well-developed and well-nourished.  Head: Atraumatic. Normocephalic.  Eyes: PERRL. EOM intact. Conjunctivae are not pale. No scleral icterus.  ENT: Mucous membranes are moist. Oropharynx is clear and symmetric.    Neck: Supple. Full ROM. No lymphadenopathy.  Cardiovascular: Regular rate. Regular rhythm. No murmurs, rubs, or gallops. Distal pulses are 2+ and symmetric.  Pulmonary/Chest: No respiratory distress. Clear to auscultation bilaterally. No wheezing or rales. Bilateral mastectomy scars. CDI. Double CECELIA drains on both sides; serous fluid in drains. Tenderness to anterior chest wall and bilaterally to lateral chest wall.  Abdominal: Soft and non-distended.  There is no tenderness.  No rebound, guarding, or rigidity. Good bowel sounds.  Genitourinary: No CVA tenderness  Musculoskeletal: Moves all extremities. No obvious deformities. No edema. No calf tenderness.  Skin: Warm and dry.  Neurological:  Alert, awake, and appropriate.  Normal speech.  No acute focal neurological deficits are appreciated.  Psychiatric: Normal affect. Good eye contact. Appropriate in content.     ED Course   Procedures  ED Vital Signs:  Vitals:    10/28/23 1834 10/28/23 1938 10/28/23 2150 10/28/23 2230   BP: (!) 161/91 (!) 140/70 (!) 151/72 (!) 194/70   Pulse: (!) 111 98 98 91   Resp: (!) 24 20 12 12   Temp: 98.6 °F (37 °C)      TempSrc: Oral      SpO2: 97% 96% 98% 96%    10/28/23 2245 10/28/23 2309   BP:  138/70   Pulse: 94 81   Resp: 16 18   Temp:  98.9 °F (37.2 °C)   TempSrc:     SpO2: (!) 94% 98%       Abnormal Lab Results:  Labs Reviewed   CBC W/ AUTO DIFFERENTIAL - Abnormal; Notable for the following components:       Result Value    Hematocrit 36.5 (*)     MPV 8.6 (*)     All other components within normal limits   URINALYSIS, REFLEX TO URINE CULTURE - Abnormal; Notable for the  following components:    Leukocytes, UA 2+ (*)     All other components within normal limits    Narrative:     Specimen Source->Urine   COMPREHENSIVE METABOLIC PANEL - Abnormal; Notable for the following components:    Glucose 124 (*)     Calcium 8.2 (*)     Albumin 3.3 (*)     All other components within normal limits   CULTURE, BLOOD   CULTURE, BLOOD   LACTIC ACID, PLASMA   B-TYPE NATRIURETIC PEPTIDE   TROPONIN I   PROCALCITONIN   URINALYSIS MICROSCOPIC    Narrative:     Specimen Source->Urine        All Lab Results:  Results for orders placed or performed during the hospital encounter of 10/28/23   CBC auto differential   Result Value Ref Range    WBC 9.74 3.90 - 12.70 K/uL    RBC 4.20 4.00 - 5.40 M/uL    Hemoglobin 12.3 12.0 - 16.0 g/dL    Hematocrit 36.5 (L) 37.0 - 48.5 %    MCV 87 82 - 98 fL    MCH 29.3 27.0 - 31.0 pg    MCHC 33.7 32.0 - 36.0 g/dL    RDW 13.7 11.5 - 14.5 %    Platelets 296 150 - 450 K/uL    MPV 8.6 (L) 9.2 - 12.9 fL    Immature Granulocytes 0.3 0.0 - 0.5 %    Gran # (ANC) 4.8 1.8 - 7.7 K/uL    Immature Grans (Abs) 0.03 0.00 - 0.04 K/uL    Lymph # 3.7 1.0 - 4.8 K/uL    Mono # 0.9 0.3 - 1.0 K/uL    Eos # 0.2 0.0 - 0.5 K/uL    Baso # 0.06 0.00 - 0.20 K/uL    nRBC 0 0 /100 WBC    Gran % 49.7 38.0 - 73.0 %    Lymph % 37.8 18.0 - 48.0 %    Mono % 9.3 4.0 - 15.0 %    Eosinophil % 2.3 0.0 - 8.0 %    Basophil % 0.6 0.0 - 1.9 %    Differential Method Automated    Lactic acid, plasma #1   Result Value Ref Range    Lactate (Lactic Acid) 1.1 0.5 - 2.2 mmol/L   Urinalysis, Reflex to Urine Culture Urine, Clean Catch    Specimen: Urine   Result Value Ref Range    Specimen UA Urine, Clean Catch     Color, UA Yellow Yellow, Straw, [place]    Appearance, UA Clear Clear    pH, UA 6.0 5.0 - 8.0    Specific Gravity, UA 1.020 1.005 - 1.030    Protein, UA Negative Negative    Glucose, UA Negative Negative    Ketones, UA Negative Negative    Bilirubin (UA) Negative Negative    Occult Blood UA Negative Negative     Nitrite, UA Negative Negative    Urobilinogen, UA Negative <2.0 EU/dL    Leukocytes, UA 2+ (A) Negative   Brain natriuretic peptide   Result Value Ref Range    BNP <10 0 - 99 pg/mL   Troponin I   Result Value Ref Range    Troponin I <0.006 0.000 - 0.026 ng/mL   Procalcitonin   Result Value Ref Range    Procalcitonin 0.03 <0.25 ng/mL   Urinalysis Microscopic   Result Value Ref Range    RBC, UA 2 0 - 4 /hpf    WBC, UA 5 0 - 5 /hpf    Bacteria Rare None-Occ /hpf    Squam Epithel, UA 2 /hpf    Microscopic Comment SEE COMMENT    Comprehensive metabolic panel   Result Value Ref Range    Sodium 136 136 - 145 mmol/L    Potassium 3.8 3.5 - 5.1 mmol/L    Chloride 101 95 - 110 mmol/L    CO2 23 23 - 29 mmol/L    Glucose 124 (H) 70 - 110 mg/dL    BUN 14 8 - 23 mg/dL    Creatinine 0.9 0.5 - 1.4 mg/dL    Calcium 8.2 (L) 8.7 - 10.5 mg/dL    Total Protein 6.4 6.0 - 8.4 g/dL    Albumin 3.3 (L) 3.5 - 5.2 g/dL    Total Bilirubin 0.3 0.1 - 1.0 mg/dL    Alkaline Phosphatase 60 55 - 135 U/L    AST 28 10 - 40 U/L    ALT 18 10 - 44 U/L    eGFR >60 >60 mL/min/1.73 m^2    Anion Gap 12 8 - 16 mmol/L         Imaging Results:  Imaging Results              CTA Chest Non-Coronary (PE Studies) (Final result)  Result time 10/28/23 21:54:24      Final result by Nasim Dasilva MD (10/28/23 21:54:24)                   Impression:      No pulmonary embolism.  No dissection.  No pneumonia.    Bilateral breast tissue expander is are identified with soft tissue gas suggestive of recent surgery.  Bilateral drainage catheter is noted along the breast tissue    Bibasilar subsegmental atelectasis or scarring    Pulmonary micro nodularity measuring up to 5 mm in right lower lobe.  Recommend clinical correlation and follow-up.    All CT scans   are performed using dose optimization techniques including the following: automated exposure control; adjustment of the mA and/or kV; use of iterative reconstruction technique.  Dose modulation was employed for ALARA by  means of: Automated exposure control; adjustment of the mA and/or kV according to patient size (this includes techniques or standardized protocols for targeted exams where dose is matched to indication/reason for exam; i.e. extremities or head); and/or use of iterative reconstructive technique.      Electronically signed by: Nasim Dasilva  Date:    10/28/2023  Time:    21:54               Narrative:    EXAMINATION:  CTA CHEST NON CORONARY (PE STUDIES)    CLINICAL HISTORY:  Pulmonary embolism (PE) suspected, neg D-dimer;    TECHNIQUE:  Low dose axial images, sagittal and coronal reformations were obtained from the thoracic inlet to the lung bases following the IV administration of 100 mL of Omnipaque 350.  Contrast timing was optimized to evaluate the pulmonary arteries.  MIP images were performed.    COMPARISON:  None    FINDINGS:  No evidence for pulmonary embolism.  No evidence for aortic dissection or aneurysm.  Cardiovascular structures have a normal non gated appearance.  Lungs are essentially clear.  No acute osseous injury.  Mild motion artifacts.  Mild subsegmental atelectasis the lung bases.  Ascending aorta measures up to 4 cm.  Soft tissue gas along the tissue expander with bilateral drains.  Correlate to history of recent breast intervention.  Pulmonary nodularity along the right lower lobe measuring up to 5 mm cardiomegaly is noted.  No evidence for pulmonary embolism.  For aortic dissection.  Breast tissue expander noted.                                       X-Ray Chest AP Portable (Final result)  Result time 10/28/23 19:05:19      Final result by Nasim Dasilva MD (10/28/23 19:05:19)                   Impression:      No acute abnormality.      Electronically signed by: Nasim Dasilva  Date:    10/28/2023  Time:    19:05               Narrative:    EXAMINATION:  XR CHEST AP PORTABLE    CLINICAL HISTORY:  Sepsis;    TECHNIQUE:  Single frontal view of the chest was  performed.    COMPARISON:  None    FINDINGS:  Removal breast prosthesis identified.  Correlate to clinical history and recommend follow-up.Otherwise, the lungs are clear, with normal appearance of pulmonary vasculature and no pleural effusion or pneumothorax.    The cardiac silhouette is normal in size. The hilar and mediastinal contours are unremarkable.    Bones are intact.                                       The EKG was ordered, reviewed, and independently interpreted by the ED provider.  Interpretation time: 18:53  Rate: 95 BPM  Rhythm: normal sinus rhythm  Interpretation: Minimal voltage criteria for LVH, may be normal variant (R in aVL). Inferior infarct, age undetermined. Anterior infarct, age undetermined. Abnormal EKG. No STEMI.           The Emergency Provider reviewed the vital signs and test results, which are outlined above.     ED Discussion       8:00 PM: Dr. Villafuerte transfers care of patient to Dr. Briscoe pending lab and imaging results.    10:24 PM: Reassessed patient at this time. Pt is concerned about how she is draining less than she usually does and also states that she is in so much pain. Pt describes it as a sharp and burning pain on both sides. Pt has a difficult time walking due to the pain. Discussed lab and imaging results with patient.    10:56 PM: Reassessed pt at this time. Discussed with pt all pertinent ED information and results. Discussed pt dx and plan of tx. Gave pt all f/u and return to the ED instructions. All questions and concerns were addressed at this time. Pt expresses understanding of information and instructions, and is comfortable with plan to discharge. Pt is stable for discharge.    I discussed with patient and/or family/caretaker that evaluation in the ED does not suggest any emergent or life threatening medical conditions requiring immediate intervention beyond what was provided in the ED, and I believe patient is safe for discharge.  Regardless, an unremarkable  evaluation in the ED does not preclude the development or presence of a serious of life threatening condition. As such, patient was instructed to return immediately for any worsening or change in current symptoms.         Medical Decision Making  Amount and/or Complexity of Data Reviewed  Labs: ordered. Decision-making details documented in ED Course.  Radiology: ordered. Decision-making details documented in ED Course.  ECG/medicine tests: ordered. Decision-making details documented in ED Course.    Risk  Prescription drug management.                ED Medication(s):  Medications   morphine injection 4 mg (4 mg Intravenous Given 10/28/23 1938)   sodium chloride 0.9% bolus 1,000 mL 1,000 mL (0 mLs Intravenous Stopped 10/28/23 2152)   iohexoL (OMNIPAQUE 350) injection 100 mL (100 mLs Intravenous Given 10/28/23 2145)   morphine injection 6 mg (6 mg Intravenous Given 10/28/23 2245)       Discharge Medication List as of 10/28/2023 10:54 PM           Follow-up Information       Hemalatha Josue MD In 2 days.    Specialties: Surgical Oncology, Breast Surgery  Contact information:  [address]  [place] [address]  [phone]               [facility] - Emergency Dept..    Specialty: Emergency Medicine  Why: As needed, If symptoms worsen  Contact information:  [address] [facility] [address] [phone]                               Scribe Attestation:   Scribe #1: I performed the above scribed service and the documentation accurately describes the services I performed. I attest to the accuracy of the note.     Attending:   Physician Attestation Statement for Scribe #1: I, Alycia Villafuerte MD, personally performed the services described in this documentation, as scribed by Alejandra Mcgowan, in my presence, and it is both accurate and complete.       Scribe Attestation:   Scribe #2: I performed the above scribed service and the documentation accurately describes the services I  performed. I attest to the accuracy of the note.    Attending Attestation:           Physician Attestation for Scribe:    Physician Attestation Statement for Scribe #2: I, Sydnie Briscoe MD, reviewed documentation, as scribed by Say Tovar in my presence, and it is both accurate and complete. I also acknowledge and confirm the content of the note done by Scribe #1.           Clinical Impression       ICD-10-CM ICD-9-CM   1. Chest pain  R07.9 786.50   2. Post-operative pain  G89.18 338.18   3. Hypertension, unspecified type  I10 401.9       Disposition:   Disposition: Discharged  Condition: Stable         Sydnie Briscoe MD  10/29/23 0522

## 2023-10-30 ENCOUNTER — PATIENT OUTREACH (OUTPATIENT)
Dept: EMERGENCY MEDICINE | Facility: HOSPITAL | Age: 63
End: 2023-10-30
Payer: MEDICARE

## 2023-10-30 NOTE — PROGRESS NOTES
Lilo Bee Patient Care Assistant  ED Navigator  Emergency Department    Project: [facility] ED Navigator  Role: Community Health Worker    Date: 10/30/2023  Patient Name: Demetrice Gaines  MRN: [id number]  PCP: Lul Alvarez MD    Assessment:     Demetrice Gaines is a 63 y.o. female who has presented to ED for post-op problem. Patient has visited the ED 1 times in the past 3 months. Patient did not contact PCP.     ED Navigator Initial Assessment    ED Navigator Enrollment Documentation  Consent to Services  Does patient consent to completing the assessment?: Yes  Contact  Method of Initial Contact: Phone  Transportation  Does the patient have issues with Transportation?: No  Does the patient have transportation to and from healthcare appointments?: Yes  Insurance Coverage  Do you have coverage/adequate coverage?: Yes  Type/kind of coverage: [employer]  Specialist Appointment  PCP Follow Up Appointment  Medications  Psychological  Food  Communication/Education  Other Financial Concerns  Other Social Barriers/Concerns  Does the patient have any additional barriers or concerns?: None  Primary Barrier  Barriers identified: Structural barrier (service availability, waiting times, etc.)  Root Cause of ED Utilization: Chronic Conditions  Plan to address Chronic Conditions: Remind patient of upcoming PCP/specialist appointment within 24 to 48 hours before scheduled appt  Next steps: Provided Education, Scheduled Appointment/Referral  Scheduled Appointment Date: 11/7/23  Appointment Reminder Date: 11/6/23  Additional Documentation: Pt is doing better, as expressed. Pt was not wanting to push post-op appointment up; she is okay with keeping it for 11/7. Pt has issues with transportation and was provided [employer] information ([phone]) and was encouraged to call ASAP to schedule. Pt will be reminded on 11/6 of appt.    Lilo Bee  ED Navigator  [phone]            Social History      Socioeconomic History    Marital status:    Tobacco Use    Smoking status: Never    Smokeless tobacco: Never    Tobacco comments:     NEVER   Substance and Sexual Activity    Alcohol use: Never    Drug use: Never    Sexual activity: Not Currently     Comment: Menopausal       Plan:       Pt is doing better, as expressed. Pt was not wanting to push post-op appointment up; she is okay with keeping it for 11/7. Pt has issues with transportation and was provided [employer] information ([phone]) and was encouraged to call ASAP to schedule. Pt will be reminded on 11/6 of appt.    Lilo Bee  ED Navigator  [phone]

## 2023-11-01 NOTE — PLAN OF CARE
Chart reviewed, pt resting in bed with call light and personal belongings within reach. Vitals stable, heart monitor 8568. Pain controlled with ordered meds in MAR.  Blood sugar checks as ordered  Pt unable to leave respiratory sample still, cough in dry and non productive.  Iv antibiotics given as ordered.  Pt absent of injury throughout shift, will continue to monitor.    Hydroxyzine Pregnancy And Lactation Text: This medication is not safe during pregnancy and should not be taken. It is also excreted in breast milk and breast feeding isn't recommended.

## 2023-11-03 LAB
BACTERIA BLD CULT: NORMAL
BACTERIA BLD CULT: NORMAL

## 2023-11-06 ENCOUNTER — PATIENT OUTREACH (OUTPATIENT)
Dept: EMERGENCY MEDICINE | Facility: HOSPITAL | Age: 63
End: 2023-11-06
Payer: MEDICARE

## 2023-11-06 NOTE — PROGRESS NOTES
Patient was reminded about her appointment for tomorrow with Hemalatha Josue MD at 1:00 p.m. through  as she did not answer the call. Pt was asked to return the call.    Lilo Bee  ED Navigator  [phone]

## 2023-11-07 ENCOUNTER — OFFICE VISIT (OUTPATIENT)
Dept: SURGERY | Facility: CLINIC | Age: 63
End: 2023-11-07
Payer: MEDICARE

## 2023-11-07 DIAGNOSIS — C50.412 MALIGNANT NEOPLASM OF UPPER-OUTER QUADRANT OF LEFT BREAST IN FEMALE, ESTROGEN RECEPTOR POSITIVE: Primary | Chronic | ICD-10-CM

## 2023-11-07 DIAGNOSIS — Z17.0 MALIGNANT NEOPLASM OF UPPER-OUTER QUADRANT OF LEFT BREAST IN FEMALE, ESTROGEN RECEPTOR POSITIVE: Primary | Chronic | ICD-10-CM

## 2023-11-07 PROCEDURE — 4010F ACE/ARB THERAPY RXD/TAKEN: CPT | Mod: CPTII,S$GLB,, | Performed by: SURGERY

## 2023-11-07 PROCEDURE — 3066F NEPHROPATHY DOC TX: CPT | Mod: CPTII,S$GLB,, | Performed by: SURGERY

## 2023-11-07 PROCEDURE — 3044F HG A1C LEVEL LT 7.0%: CPT | Mod: CPTII,S$GLB,, | Performed by: SURGERY

## 2023-11-07 PROCEDURE — 4010F PR ACE/ARB THEARPY RXD/TAKEN: ICD-10-PCS | Mod: CPTII,S$GLB,, | Performed by: SURGERY

## 2023-11-07 PROCEDURE — 3061F PR NEG MICROALBUMINURIA RESULT DOCUMENTED/REVIEW: ICD-10-PCS | Mod: CPTII,S$GLB,, | Performed by: SURGERY

## 2023-11-07 PROCEDURE — 3044F PR MOST RECENT HEMOGLOBIN A1C LEVEL <7.0%: ICD-10-PCS | Mod: CPTII,S$GLB,, | Performed by: SURGERY

## 2023-11-07 PROCEDURE — 99024 PR POST-OP FOLLOW-UP VISIT: ICD-10-PCS | Mod: S$GLB,,, | Performed by: SURGERY

## 2023-11-07 PROCEDURE — 3061F NEG MICROALBUMINURIA REV: CPT | Mod: CPTII,S$GLB,, | Performed by: SURGERY

## 2023-11-07 PROCEDURE — 3066F PR DOCUMENTATION OF TREATMENT FOR NEPHROPATHY: ICD-10-PCS | Mod: CPTII,S$GLB,, | Performed by: SURGERY

## 2023-11-07 PROCEDURE — 99024 POSTOP FOLLOW-UP VISIT: CPT | Mod: S$GLB,,, | Performed by: SURGERY

## 2023-11-07 NOTE — PROGRESS NOTES
Breast Surgical Oncology  Post-operative Visit      REFERRING PHYSICIAN:  Lul Alvarez MD    MEDICAL ONCOLOGIST:    Ellis Greene M.D.   RADIATION ONCOLOGIST:   N/A    Date of Service: 11/07/2023    DIAGNOSIS:   This is a 63 y.o. female with Stage IA (T1c N0 Mx) LEFT Breast Invasive Ductal  Carcinoma, Grade 3, ER 70%, MI 80%, Ykk3gle 0 with a ki67 of 90%    TREATMENT SUMMARY:   The patient is status post bilateral nipple sparing mastectomy and sentinel node biopsy on 10/25/23.  Final pathology is pending.     INTERVAL HISTORY:   Demetrice Gaines comes in for a post-op check.  She denies fever, chills, chest pain or shortness of breath.  Her pain is well controlled.  She went to the ED the weekend following her surgery for chest tightness that has since resolved. She is doing quite well now    MEDICATIONS:     Current Outpatient Medications   Medication Sig Dispense Refill    amlodipine-olmesartan (LEATHA) 10-40 mg per tablet Take 1 tablet by mouth once daily. 90 tablet 1    aspirin (ECOTRIN) 81 MG EC tablet Take 1 tablet (81 mg total) by mouth once daily. (Patient not taking: Reported on 9/12/2023) 90 tablet 3    blood sugar diagnostic (ONETOUCH VERIO TEST STRIPS) Strp Use to check blood sugar twice daily 100 each 6    chlorthalidone (HYGROTEN) 25 MG Tab Take 1 tablet (25 mg total) by mouth once daily. 90 tablet 3    lancets (ONETOUCH ULTRASOFT LANCETS) Misc Use to check blood sugar twice daily 100 each 6    semaglutide (OZEMPIC) 1 mg/dose (4 mg/3 mL) Inject 1 mg into the skin every 7 days. 9 mL 3     No current facility-administered medications for this visit.       ALLERGIES:     Review of patient's allergies indicates:   Allergen Reactions    Aldactone [spironolactone]      Memory impair and breast soreness    Coreg [carvedilol]      Hair loss    Lisinopril-hydrochlorothiazide      Other reaction(s): Reaction:Throat swelling;       PHYSICAL EXAMINATION:   General:  This is a well appearing female with  appropriate speech, affect and gait.     Breast:  bilateral incisions clean, dry, and intact, tissue expanders in place    ASSESSMENT:   The patient has had an uneventful postoperative course.    PLAN:   1. Return in 2 weeks for a follow up office visit and breast exam  2. The patient is advised in continued exam of the breast chest wall and to report to this office sooner should she note any areas of abnormality or concern.   3.  She has been instructed to meet with med onc and rad onc for discussion of adjuvant treatment recommendations  4. She can apply petroleum based ointment to her left nipple as needed.     Hemalatha Josue M.D.

## 2023-11-08 ENCOUNTER — TELEPHONE (OUTPATIENT)
Dept: SURGERY | Facility: CLINIC | Age: 63
End: 2023-11-08
Payer: MEDICARE

## 2023-11-08 DIAGNOSIS — C50.412 MALIGNANT NEOPLASM OF UPPER-OUTER QUADRANT OF LEFT BREAST IN FEMALE, ESTROGEN RECEPTOR POSITIVE: Primary | Chronic | ICD-10-CM

## 2023-11-08 DIAGNOSIS — Z17.0 MALIGNANT NEOPLASM OF UPPER-OUTER QUADRANT OF LEFT BREAST IN FEMALE, ESTROGEN RECEPTOR POSITIVE: Primary | Chronic | ICD-10-CM

## 2023-11-08 LAB
COMMENT: NORMAL
FINAL PATHOLOGIC DIAGNOSIS: NORMAL
GROSS: NORMAL
Lab: NORMAL

## 2023-11-09 ENCOUNTER — CLINICAL SUPPORT (OUTPATIENT)
Dept: REHABILITATION | Facility: HOSPITAL | Age: 63
End: 2023-11-09
Attending: SURGERY
Payer: MEDICARE

## 2023-11-09 DIAGNOSIS — Z91.89 AT RISK FOR SELF CARE DEFICIT: ICD-10-CM

## 2023-11-09 DIAGNOSIS — M25.60 DECREASED RANGE OF MOTION: ICD-10-CM

## 2023-11-09 DIAGNOSIS — M62.81 DECREASED MUSCLE STRENGTH: ICD-10-CM

## 2023-11-09 DIAGNOSIS — R52 PAIN: ICD-10-CM

## 2023-11-09 DIAGNOSIS — Z78.9 DECREASED ACTIVITIES OF DAILY LIVING (ADL): ICD-10-CM

## 2023-11-09 DIAGNOSIS — Z71.9 ENCOUNTER FOR EDUCATION: Primary | ICD-10-CM

## 2023-11-09 PROCEDURE — 97535 SELF CARE MNGMENT TRAINING: CPT

## 2023-11-09 PROCEDURE — 97140 MANUAL THERAPY 1/> REGIONS: CPT

## 2023-11-09 PROCEDURE — 97110 THERAPEUTIC EXERCISES: CPT

## 2023-11-09 NOTE — PLAN OF CARE
[facility]   Outpatient Occupational Therapy Breast Cancer Post-Operative Evaluation/Updated Plan of Care     Date: 11/9/2023  Name: Demetrice Gaines  Clinic Number: [id number]    Therapy Diagnosis:   Encounter Diagnoses   Name Primary?    Encounter for education Yes    At risk for self care deficit     Decreased range of motion     Decreased activities of daily living (ADL)     Decreased muscle strength     Pain      Physician: Hemalatha Josue MD    Physician Orders: Occupational Therapy to Evaluate and Treat  Medical Diagnosis: D05.12 (ICD-10-CM) - Intraductal carcinoma in situ of left breast    Evaluation Date: 11/9/2023  Insurance Authorization Period Expiration: 10/3/2023 - 11/28/2023  Plan of Care Certification Period: 11/09/2023 - 02/09/204  Progress Note Due: 12/09/2023     Date of Return to MD: N/A    Visit # / Visits authorized: 1/24  FOTO: 1/3    Precautions:  Standard and cancer    Time In: 2:35  Time Out: 3:15  Total Appointment Time (timed & untimed codes): 40 minutes    Subjective           Date of Onset: 10/25/2023  History of Current Condition: Demetrice is a 63 y.o. female that presents to [facility] due to a Bilateral mastectomy nipple sparring procedure on 10/25/2023. Drains to be removed November 20th. Patient reports that her incisions are healing nice and she currently has tissue expanders. She had her first fill the other day. She will be undergoing her reconstruction once the expander process is complete. She has decreased range of motion, strength, and some pain.      Staging: Stage IA (T1c N0 Mx) LEFT Breast Invasive Ductal  Carcinoma, Grade 3, ER 70%, VA 80%, [id number] 0 with a ki67 of 90%  Surgical Procedure and Date: Bilateral Nipple Sparring Mastectomy , 10/25/2023  Chemotherapy: TBD  Radiation: TBD      Patients Goals: Patient reported goals are to decrease overall pain, improve mobility, decrease risk for lymphedema and to return  to prior functional level.     Falls: None    Pain:  Pain Related Behaviors Observed: Yes   Functional Pain Scale Rating 0-10:   Average: 6/10   Best: 4/10   Worst: 7/10   Location: Bilateral breasts  Description: Aching  Aggravating Factors: Movement  Easing Factors: rest    Occupation:    Working Presently: Retired      Functional Limitations/Social History:    Prior Level of Function: Independent and pain free with all ADL, IADL, community mobility and functional activities.     Current Level of Function: Mod A to Mod Independent with all ADL, IADL, community mobility and functional activities with reports of increased pain and need for increased time and frequent breaks.       Limitation of Functional Status as follows:   ADLs/IADLs: See Below in Objective Section    Nutrition:  Normal  Home/Living Environment : lives with their spouse  Home Access: Single Story  Leisure: [employer]   DME: none       Past Medical History/Physical Systems Review:     Past Medical History:  Demetrice Gaines  has a past medical history of Bilateral pleural effusion, CAD (coronary artery disease), CHF (congestive heart failure), Colon polyp, Diabetes mellitus, type 2, Hypertension, Hyponatremia, Malignant neoplasm of upper-outer quadrant of left breast in female, estrogen receptor positive, and Myocardial infarction.    Past Surgical History:  Demetrice Gaines  has a past surgical history that includes Benign Cyst (Right);  section; Colonoscopy; Colonoscopy (N/A, 2019); Breast cyst excision (Right, ); mastectomy, nipple sparing (Bilateral, 10/25/2023); [place] lymph node biopsy (Left, 10/25/2023); Injection for sentinel node identification (Left, 10/25/2023); Ultrasound guidance (Left, 10/25/2023); and Insertion of breast tissue expander (Bilateral, 10/25/2023).    Current Medications:  Demetrice has a current medication list which includes the following prescription(s): amlodipine-olmesartan,  aspirin, blood sugar diagnostic, chlorthalidone, lancets, and ozempic.    Allergies:  Review of patient's allergies indicates:   Allergen Reactions    Aldactone [spironolactone]      Memory impair and breast soreness    Coreg [carvedilol]      Hair loss    Lisinopril-hydrochlorothiazide      Other reaction(s): Reaction:Throat swelling;                    Objective       Activities of Daily Living:    Activity of Daily Living  11/9/2023   Feeding Set-up Assist   Grooming Minimal Assist   Hygiene Minimal Assist   Toileting Stand-by Assist   Upper Body Dressing Minimal Assist   Lower Body Dressing Minimal Assist   Bathing Minimal Assist       Instrumental Activities of Daily Living:    Instrumental Activity of Daily Living 11/9/2023   Homecare Moderate Assist   Cooking Moderate Assist   Laundry Moderate Assist   Driving Moderate Assist   Yard Work Maximal Assist   Use of Telephone Modified Independent   Money Management Modified Independent   Medication Management Modified Independent   Handwriting Stand-by Assist   Technology Use Modified Independent       Gait Assessment:   -    Assistive Devices Used: None  -    Assistance: Independent  -    Distance: Community distances     Endurance Deficit: mild      Skin Integrity:   Scar Location: inferior gil areolar  Appearance: clean, healing  Signs of infection: No  Drainage:clear    Edema: Mild  Location: bilateral breasts    Axillary Web Syndrome/Cording:   Location: N/A, None      Range of Motion:    Joint Evaluation  AROM  11/9/2023 AROM  11/9/2023 Goal    Left Right Bilateral   Shoulder Flexion 0-180 90 90 140   Shoulder Abduction 0-180 90 90 140   Shoulder External Rotation 0-90 45 45 60   Shoulder Internal Rotation 0-90 55 55 70   Shoulder Extension 0-60 10 10 30   Shoulder Horizontal Adduction 0-90 Within normal limits  Within normal limits         Strength:    Strength 11/9/2023 11/9/2023 Goal    Left Right Bilateral   Shoulder Flexion 3/5 3/5 4+/5   Shoulder  Abduction      Shoulder External Rotation       Shoulder Internal Rotation      Shoulder Extension      Shoulder Horizontal Adduction      Elbow Flexion 4/5 4/5    Elbow Extension         Strength: (NAE Dynamometer in lbs.) Average 3 trials, Position II:     11/9/2023 11/9/2023    Left Right   Rung II 55# 65#         Coordination:   -     Fine Motor Coordination: intact  -     Upper Extremity Coordination: intact  -     Lower Extremity Coordination: intact    Sensation:  Demetrice  reports impaired sensation are bilateral breasts    Mental status :within normal limits       Intake Outcome Measure for FOTO Cancer Shoulder Survey    Therapist reviewed FOTO scores for Demetrice Gaines on 11/9/2023.   FOTO documents entered into [employer] - see Media section.    Evaluation Intake Score: 35%          Treatment and Patient Education     Total Time Separate from Evaluation: See today's treatment note dated 11/10/2023 for 40 minute treatment     Patient was able to demonstrate and report understanding and performance    Written Home Exercises Provided: yes.  Exercises were reviewed and Demetrice was able to demonstrate them prior to the end of the session.  Demetrice demonstrated good  understanding of the education provided.     See EMR under Patient Instructions for exercises provided 11/9/2023.      Assessment     This is a 63 y.o. female referred to outpatient occupational therapy and presents with a medical diagnosis of Intraductal carcinoma in situ of left breast cancer and was seen today post-operatively to assess strength and range of motion of bilateral upper extremities, to take baseline circumferential measurements of bilateral upper extremities to aid in the early detection of lymphedema and provide patient education on exercises/precautions post breast surgery. Patient educated on breast and scar massage how to perform and how often.      Anticipated barriers to Occupational Therapy: Pain     Plan of care discussed  with patient: Yes  Patient's spiritual, cultural and educational needs considered and patient is agreeable to the plan of care and goals as stated below:     Medical Necessity is demonstrated by the following  Occupational Profile/History  Co-morbidities and personal factors that may impact the plan of care [] LOW: Brief chart review  [x] MODERATE: Expanded chart review   [] HIGH: Extensive chart review    Moderate / High Support Documentation:   Past Medical History:   Diagnosis Date    Bilateral pleural effusion 06/20/2020    CAD (coronary artery disease)     CHF (congestive heart failure)     Colon polyp     Diabetes mellitus, type 2     Hypertension     Hyponatremia 06/20/2020    Malignant neoplasm of upper-outer quadrant of left breast in female, estrogen receptor positive 10/17/2023    Myocardial infarction         Examination  Performance deficits relating to physical, cognitive or psychosocial skills that result in activity limitations and/or participation restrictions  [] LOW: addressing 1-3 Performance deficits  [x] MODERATE: 3-5 Performance deficits  [] HIGH: 5+ Performance deficits (please support below)    Moderate / High Support Documentation:    Physical:  Joint Mobility  Muscle Power/Strength  Muscle Endurance  Skin Integrity/Scar Formation  Postural Control  Pain    Cognitive:  No Deficits    Psychosocial:    No Deficits     Treatment Options [] LOW: Multiple options  [x] MODERATE: Several options  [] HIGH: Limited options      Decision Making/ Complexity Score: moderate        Goals:    Short Term Goals:  6 weeks  Progress    Pain: Patient will demonstrate improved pain by reports of less than or equal to 5/10 worst pain on the verbal rating scale in order to progress toward maximal functional ability and improve quality of life. PC   Function: Patient will demonstrate improved function as indicated by a functional intake score of more than or equal to 45 out of 100 on FOTO. PC   Mobility: Patient  will improve active range of motion to 50% of stated goals, listed in objective measures above, in order to progress towards independence with functional activities.  PC   Strength: Patient will improve strength to 50% of stated goals, listed in objective measures above, in order to progress towards independence with functional activities.  PC   HEP: Patient will demonstrate independence with home exercise program in order to progress toward functional independence. PC   Lymphedema: Patient will demonstrate 100% understanding of lymphedema risk reduction practices to include self monitoring for lymphedema. PC   Massage: Patient will demonstrate 100% understanding of breast and scar massage practices to reduce risk of fibrosis, scar adhesions, range of motion impairments and pain.  PC     Long Term Goals:  12 weeks  Progress   Pain: Patient will demonstrate improved pain by reports of less than or equal to 3/10 worst pain on the verbal rating scale in order to progress toward maximal functional ability and improve quality of life.   PC   Function: Patient will demonstrate improved function as indicated by a functional intake score of more than or equal to 63 out of 100 on FOTO. PC   Mobility: Patient will improve active range of motion to stated goals, listed in objective measures above, in order to return to maximal functional potential and improve quality of life. PC   Strength: Patient will improve strength to stated goals, listed in objective measures above, in order to improve functional independence and quality of life. PC   ADL: Patient will return to normal ADL's, IADL's, community involvement, recreational activities, and work-related activities with less than or equal to 0/10 pain, full/maximized tissue mobility and maximal function.  PC     Goals Key:  PC= Progressing/Continue; PM= Partially Met;  GM= Goal Met,      DC= Discontinue    Plan     Certification Period/Plan of Care Expiration: 11/9/2023 to  02/09/2024.    Outpatient Occupational Therapy 1 - 2 times weekly for 12 weeks to include the following interventions: Therapeutic Exercise, Functional Activities, Patient Education, Home Exercise Program, ADL Training, Transfer/Mobility Training, Manual Therapy, and Neuromuscular Reeducation/Sensory.      Niya Paris OT ,OTD, OTR/L

## 2023-11-09 NOTE — PROGRESS NOTES
[facility]  Occupational Therapy Treatment Note     Date: 11/9/2023  Name: Demetrice Gaines  [facility] Number: [id number]    Therapy Diagnosis:   Encounter Diagnoses   Name Primary?    Encounter for education Yes    At risk for self care deficit     Decreased range of motion     Decreased activities of daily living (ADL)     Decreased muscle strength     Pain      Physician: Hemalatha Josue MD    Physician Orders: Occupational Therapy to Evaluate and Treat  Physician Orders: Occupational Therapy to Evaluate and Treat  Medical Diagnosis: D05.12 (ICD-10-CM) - Intraductal carcinoma in situ of left breast     Evaluation Date: 11/9/2023  Insurance Authorization Period Expiration: 10/3/2023 - 11/28/2023  Plan of Care Certification Period: 11/09/2023 - 02/09/204  Progress Note Due: 12/09/2023      Date of Return to MD: N/A     Visit # / Visits authorized: 1/24  FOTO: 1/3     Precautions:  Standard and cancer     Time In: 2:35  Time Out: 3:15  Total Treatment Time: 40 minutes    Subjective     Patient reports: She is doing well. Her drains will be removed the 20th    she was compliant with home exercise program given last session.   Response to previous treatment: Tolerated well  Functional change: Improved knowledge of HEP    Pain: 5/10  Location: bilateral breasts    Objective     Objective measures updated at progress report unless specified.    Treatment     Supervised Modalities: Modalities such as hot/cold packs, traction, unattended electrical stimulation, paraffin bath, fluidotherapy to reduce pain and increase soft tissue extensibility. Demetrice received (0) minutes of modalities listed below after being cleared for contraindications.  x = modalities performed     Supervised Modalities 11/9/2023                            Manual Therapy Techniques: Joint mobilizations, Soft tissue Mobilization, and mobilization with movement applied to the area listed below. Demetrice received (10) minutes of  interventions. x = intervention performed today    Manual Intervention 11/9/2023    Soft Tissue Mobilization x Bilateral breasts - gentle   Joint Mobilizations     Mobilization with movement     Scar Tissue Mobilization x Bilateral incisions 3 directions       Therapeutic Exercises: to develop strength, endurance, ROM, flexibility, posture and core stabilization. Demetrice received (20) minutes of exercises listed below. x = performed today * = level of progression of activity     Therapeutic Exercise 11/9/2023    Diaphragmatic breathing and relaxation x 5x   scapular retractions x 3 sets of 10x   fist making/ball squeezes x 2 minutes   elbow flexion/extension x 3 sets of 10x   Wall walks (max range shoulder level)  -Flexion  -Abduction x 2 sets of 10x   Pendulums  - front/back  -side to side  -circles both ways x 10x each direction                  Therapeutic Activities: Everyday tasks to address the combined need of improved strength, range of motion, and endurance to achieve increased independence. While simulating these activities, the person is applying the gained skills of strength and improved range of motion in a practical way.  Demetrice received (0) minutes of activities listed below. x = activities performed today * = level of progression of activity     Therapeutic Activities 11/9/2023                          Neuromuscular Re-education: the use of functional strengthening, stretching, balancing and coordination activities that train the nerves and muscles to react and communicate to restore normal body movement patterns to increase self care independence. Demetrice received (0) minutes of interventions listed below.  x = interventions performed today * = level of progression of activity     Neuromuscular Re-education 11/9/2023                          Self Care: Fundamental skills required to independently care for oneself. Activities of daily living such as eating, bathing, dressing, toileting, grooming,  sleeping, transfers and mobility. Instrumental activities of daily living such as driving, medication management, pet care, home management/cleaning, financial management, using the phone, meal preparation and shopping. Demetrice received (10) minutes of interventions listed below.  x = interventions performed * = level of progression of activity     Self Care 11/9/2023    Upper body dressing, donning/ doffing of shirt and surgical bra with decreased pain x SBA                      Home Exercises and Education Provided     Education provided:   - Breast Massage and Scar Massage  - progress towards goals     Written Home Exercises Provided: yes.  Exercises were reviewed and Demetrice was able to demonstrate them prior to the end of the session. Demetrice demonstrated good understanding of the home exercise program provided.     See EMR under Patient Instructions for exercises provided 11/9/2023.        Assessment     Patient tolerated treatment well. She demonstrates tenderness of bilateral breasts    Demetrice is progressing towards her goals and there are no updates to goals at this time. Patient prognosis is Good.     Patient will continue to benefit from skilled outpatient occupational therapy to address the deficits listed in the problem list on initial evaluation provide patient/family education and to maximize patient's level of independence in the home and community environment.     Patient's spiritual, cultural and educational needs considered and patient agreeable to plan of care and goals.    Anticipated barriers to occupational therapy: Pain    Goals:    Short Term Goals:  6 weeks  Progress    Pain: Patient will demonstrate improved pain by reports of less than or equal to 5/10 worst pain on the verbal rating scale in order to progress toward maximal functional ability and improve quality of life. PC   Function: Patient will demonstrate improved function as indicated by a functional intake score of more than or equal  to 45 out of 100 on FOTO. PC   Mobility: Patient will improve active range of motion to 50% of stated goals, listed in objective measures above, in order to progress towards independence with functional activities.  PC   Strength: Patient will improve strength to 50% of stated goals, listed in objective measures above, in order to progress towards independence with functional activities.  PC   HEP: Patient will demonstrate independence with home exercise program in order to progress toward functional independence. PC   Lymphedema: Patient will demonstrate 100% understanding of lymphedema risk reduction practices to include self monitoring for lymphedema. PC   Massage: Patient will demonstrate 100% understanding of breast and scar massage practices to reduce risk of fibrosis, scar adhesions, range of motion impairments and pain.  PC     Long Term Goals:  12 weeks  Progress   Pain: Patient will demonstrate improved pain by reports of less than or equal to 3/10 worst pain on the verbal rating scale in order to progress toward maximal functional ability and improve quality of life.   PC   Function: Patient will demonstrate improved function as indicated by a functional intake score of more than or equal to 63 out of 100 on FOTO. PC   Mobility: Patient will improve active range of motion to stated goals, listed in objective measures above, in order to return to maximal functional potential and improve quality of life. PC   Strength: Patient will improve strength to stated goals, listed in objective measures above, in order to improve functional independence and quality of life. PC   ADL: Patient will return to normal ADL's, IADL's, community involvement, recreational activities, and work-related activities with less than or equal to 0/10 pain, full/maximized tissue mobility and maximal function.  PC     Goals Key:  PC= Progressing/Continue; PM= Partially Met;  GM= Goal Met,      DC= Discontinue    Plan     Continue Plan  of Care (POC) and progress per patient tolerance.      Niya Paris, OT  ,OTD, OTR/L

## 2023-11-10 PROBLEM — M25.60 DECREASED RANGE OF MOTION: Status: ACTIVE | Noted: 2023-11-10

## 2023-11-10 PROBLEM — M62.81 DECREASED MUSCLE STRENGTH: Status: ACTIVE | Noted: 2023-11-10

## 2023-11-10 PROBLEM — R52 PAIN: Status: ACTIVE | Noted: 2023-11-10

## 2023-11-10 PROBLEM — Z78.9 DECREASED ACTIVITIES OF DAILY LIVING (ADL): Status: ACTIVE | Noted: 2023-11-10

## 2023-11-15 ENCOUNTER — HOSPITAL ENCOUNTER (OUTPATIENT)
Dept: RADIOLOGY | Facility: HOSPITAL | Age: 63
Discharge: HOME | End: 2023-11-15
Attending: SURGERY
Payer: MEDICARE

## 2023-11-15 DIAGNOSIS — Z17.0 MALIGNANT NEOPLASM OF UPPER-OUTER QUADRANT OF LEFT BREAST IN FEMALE, ESTROGEN RECEPTOR POSITIVE: Primary | ICD-10-CM

## 2023-11-15 DIAGNOSIS — C50.412 MALIGNANT NEOPLASM OF UPPER-OUTER QUADRANT OF LEFT BREAST IN FEMALE, ESTROGEN RECEPTOR POSITIVE: Chronic | ICD-10-CM

## 2023-11-15 DIAGNOSIS — C50.412 MALIGNANT NEOPLASM OF UPPER-OUTER QUADRANT OF LEFT BREAST IN FEMALE, ESTROGEN RECEPTOR POSITIVE: ICD-10-CM

## 2023-11-15 DIAGNOSIS — C50.412 MALIGNANT NEOPLASM OF UPPER-OUTER QUADRANT OF LEFT BREAST IN FEMALE, ESTROGEN RECEPTOR POSITIVE: Primary | ICD-10-CM

## 2023-11-15 DIAGNOSIS — Z17.0 MALIGNANT NEOPLASM OF UPPER-OUTER QUADRANT OF LEFT BREAST IN FEMALE, ESTROGEN RECEPTOR POSITIVE: ICD-10-CM

## 2023-11-15 DIAGNOSIS — Z17.0 MALIGNANT NEOPLASM OF UPPER-OUTER QUADRANT OF LEFT BREAST IN FEMALE, ESTROGEN RECEPTOR POSITIVE: Chronic | ICD-10-CM

## 2023-11-15 PROCEDURE — 19285 PERQ DEV BREAST 1ST US IMAG: CPT | Mod: LT,,, | Performed by: RADIOLOGY

## 2023-11-15 PROCEDURE — 76642 ULTRASOUND BREAST LIMITED: CPT | Mod: 26,LT,, | Performed by: RADIOLOGY

## 2023-11-15 PROCEDURE — 76642 US BREAST LEFT LIMITED: ICD-10-PCS | Mod: 26,LT,, | Performed by: RADIOLOGY

## 2023-11-15 PROCEDURE — 19285 US BREAST RADAR REFLECTOR LOCALIZATION W/GUIDANCE, 1ST LESION, LEFT: ICD-10-PCS | Mod: LT,,, | Performed by: RADIOLOGY

## 2023-11-15 PROCEDURE — 76642 ULTRASOUND BREAST LIMITED: CPT | Mod: TC,LT

## 2023-11-15 PROCEDURE — A4648 IMPLANTABLE TISSUE MARKER: HCPCS

## 2023-11-15 PROCEDURE — 19285 PERQ DEV BREAST 1ST US IMAG: CPT | Mod: LT

## 2023-11-15 NOTE — NURSING
Pressure held on left breast biopsy site for 10 mins, hemostasis was achieved, steri strips were applied. Radar audible sounds heard. Dressing clean, dry and intact with no drainage noted.  Discharge instructions given verbally and in writing, patient voiced understandings.  Patient discharged and accompanied by family member.

## 2023-11-16 ENCOUNTER — HOSPITAL ENCOUNTER (OUTPATIENT)
Dept: RADIOLOGY | Facility: HOSPITAL | Age: 63
Discharge: HOME | End: 2023-11-16
Attending: SURGERY
Payer: MEDICARE

## 2023-11-16 ENCOUNTER — ANESTHESIA EVENT (OUTPATIENT)
Dept: SURGERY | Facility: HOSPITAL | Age: 63
End: 2023-11-16
Payer: MEDICARE

## 2023-11-16 ENCOUNTER — HOSPITAL ENCOUNTER (OUTPATIENT)
Dept: CARDIOLOGY | Facility: HOSPITAL | Age: 63
Discharge: HOME | End: 2023-11-16
Attending: SURGERY
Payer: MEDICARE

## 2023-11-16 DIAGNOSIS — C50.412 MALIGNANT NEOPLASM OF UPPER-OUTER QUADRANT OF LEFT FEMALE BREAST: ICD-10-CM

## 2023-11-16 DIAGNOSIS — Z17.0 MALIGNANT NEOPLASM OF UPPER-OUTER QUADRANT OF LEFT BREAST IN FEMALE, ESTROGEN RECEPTOR POSITIVE: Primary | ICD-10-CM

## 2023-11-16 DIAGNOSIS — Z17.0 MALIGNANT NEOPLASM OF UPPER-OUTER QUADRANT OF LEFT BREAST IN FEMALE, ESTROGEN RECEPTOR POSITIVE: ICD-10-CM

## 2023-11-16 DIAGNOSIS — C50.412 MALIGNANT NEOPLASM OF UPPER-OUTER QUADRANT OF LEFT BREAST IN FEMALE, ESTROGEN RECEPTOR POSITIVE: ICD-10-CM

## 2023-11-16 DIAGNOSIS — C50.412 MALIGNANT NEOPLASM OF UPPER-OUTER QUADRANT OF LEFT BREAST IN FEMALE, ESTROGEN RECEPTOR POSITIVE: Primary | ICD-10-CM

## 2023-11-16 PROCEDURE — 71045 XR CHEST 1 VIEW: ICD-10-PCS | Mod: 26,,, | Performed by: RADIOLOGY

## 2023-11-16 PROCEDURE — 71045 X-RAY EXAM CHEST 1 VIEW: CPT | Mod: TC

## 2023-11-16 PROCEDURE — 93010 ELECTROCARDIOGRAM REPORT: CPT | Mod: ,,, | Performed by: INTERNAL MEDICINE

## 2023-11-16 PROCEDURE — 93005 ELECTROCARDIOGRAM TRACING: CPT

## 2023-11-16 PROCEDURE — 93010 EKG 12-LEAD: ICD-10-PCS | Mod: ,,, | Performed by: INTERNAL MEDICINE

## 2023-11-16 PROCEDURE — 71045 X-RAY EXAM CHEST 1 VIEW: CPT | Mod: 26,,, | Performed by: RADIOLOGY

## 2023-11-16 RX ORDER — SODIUM CHLORIDE 9 MG/ML
INJECTION, SOLUTION INTRAVENOUS CONTINUOUS
Status: CANCELLED | OUTPATIENT
Start: 2023-11-16

## 2023-11-16 NOTE — ANESTHESIA PREPROCEDURE EVALUATION
2023  Demetrice Gaines is a 63 y.o., female.  Past Medical History:   Diagnosis Date    Bilateral pleural effusion 2020    CAD (coronary artery disease)     CHF (congestive heart failure)     Colon polyp     Diabetes mellitus, type 2     Hypertension     Hyponatremia 2020    Malignant neoplasm of upper-outer quadrant of left breast in female, estrogen receptor positive 10/17/2023    Myocardial infarction      Past Surgical History:   Procedure Laterality Date    Benign Cyst Right     BREAST CYST EXCISION Right 1986    pt states benign     SECTION      COLONOSCOPY      COLONOSCOPY N/A 2019    Procedure: COLONOSCOPY;  Surgeon: Ileana Holcomb MD;  Location: [facility] ENDO;  Service: Endoscopy;  Laterality: N/A;    INJECTION FOR SENTINEL NODE IDENTIFICATION Left 10/25/2023    Procedure: INJECTION, FOR SENTINEL NODE IDENTIFICATION;  Surgeon: Hemalatha Josue MD;  Location: [facility] OR;  Service: General;  Laterality: Left;    INSERTION OF BREAST TISSUE EXPANDER Bilateral 10/25/2023    Procedure: INSERTION, TISSUE EXPANDER, BREAST;  Surgeon: Keron Lynn MD;  Location: [facility] OR;  Service: Plastics;  Laterality: Bilateral;    MASTECTOMY, NIPPLE SPARING Bilateral 10/25/2023    Procedure: MASTECTOMY, NIPPLE SPARING;  Surgeon: Hemalatha Josue MD;  Location: [facility] OR;  Service: General;  Laterality: Bilateral;    SENTINEL LYMPH NODE BIOPSY Left 10/25/2023    Procedure: BIOPSY, LYMPH NODE, SENTINEL;  Surgeon: Hemalatha Josue MD;  Location: [facility] OR;  Service: General;  Laterality: Left;  nuc med needed 1 hour before start    ULTRASOUND GUIDANCE Left 10/25/2023    Procedure: ULTRASOUND GUIDANCE;  Surgeon: Hemalatha Josue MD;  Location: [facility] OR;  Service: General;  Laterality: Left;            Pre-op Assessment    I have reviewed the Patient Summary Reports.    I have  reviewed the NPO Status.   I have reviewed the Medications.     Review of Systems  Anesthesia Hx:   History of prior surgery of interest to airway management or planning:            Denies Personal Hx of Anesthesia complications.                    Hematology/Oncology:  Hematology Normal                     Current/Recent Cancer.  Breast              Cardiovascular:     Hypertension  Past MI CAD       CHF                                 Pulmonary:  Pulmonary Normal                       Renal/:  Renal/ Normal                 Hepatic/GI:  Hepatic/GI Normal                 Endocrine:  Diabetes               Physical Exam  General: Well nourished and Cooperative    Airway:  Mallampati: II   Mouth Opening: Normal  TM Distance: Normal  Tongue: Normal, Large  Neck ROM: Normal ROM    Dental:    Chest/Lungs:  Normal Respiratory Rate        Anesthesia Plan  Type of Anesthesia, risks & benefits discussed:    Anesthesia Type: Gen ETT  Intra-op Monitoring Plan: Standard ASA Monitors  Post Op Pain Control Plan: multimodal analgesia and IV/PO Opioids PRN  Induction:  IV  Informed Consent: Informed consent signed with the Patient and all parties understand the risks and agree with anesthesia plan.  All questions answered. Patient consented to blood products? No  ASA Score: 3  Day of Surgery Review of History & Physical: H&P Update referred to the surgeon/provider.    Ready For Surgery From Anesthesia Perspective.     .

## 2023-11-17 ENCOUNTER — PATIENT MESSAGE (OUTPATIENT)
Dept: PREADMISSION TESTING | Facility: HOSPITAL | Age: 63
End: 2023-11-17
Payer: MEDICARE

## 2023-11-17 ENCOUNTER — TELEPHONE (OUTPATIENT)
Dept: PREADMISSION TESTING | Facility: HOSPITAL | Age: 63
End: 2023-11-17
Payer: MEDICARE

## 2023-11-17 NOTE — TELEPHONE ENCOUNTER
----- Message from Antwon Coffman MD sent at 11/17/2023  8:40 AM CST -----  Regarding: RE: Updated cardiac clearance  No change of serial EKGs.  Ok for the procedure    ----- Message -----  From: Tonia Belle LPN  Sent: 11/17/2023   7:19 AM CST  To: Antwon Coffman MD; Augustus Kapoor Staff  Subject: Updated cardiac clearance                        Good morning! This patient saw you on 10.3 for cardiac clearance for a previous surgery. Since then it looks like her EKG has changed as of 11/16/2023. She has a surgery coming up on 11/22 with Dr. Josue for a breast mass excision. May you please let me know if testing/ updated clearance is needed? Thank you!

## 2023-11-17 NOTE — TELEPHONE ENCOUNTER
Tonia Belle, Antwon Guzman MD; AUGUSTIN Kapoor Staff  Caller: Unspecified (Today,  7:15 AM)  Good morning! This patient saw you on 10.3 for cardiac clearance for a previous surgery. Since then it looks like her EKG has changed as of 11/16/2023. She has a surgery coming up on 11/22 with Dr. Josue for a breast mass excision. May you please let me know if testing/ updated clearance is needed? Thank you!

## 2023-11-21 ENCOUNTER — PATIENT MESSAGE (OUTPATIENT)
Dept: PREADMISSION TESTING | Facility: HOSPITAL | Age: 63
End: 2023-11-21
Payer: MEDICARE

## 2023-11-21 ENCOUNTER — OFFICE VISIT (OUTPATIENT)
Dept: SURGERY | Facility: CLINIC | Age: 63
End: 2023-11-21
Payer: MEDICARE

## 2023-11-21 ENCOUNTER — CLINICAL SUPPORT (OUTPATIENT)
Dept: REHABILITATION | Facility: HOSPITAL | Age: 63
End: 2023-11-21
Attending: SURGERY
Payer: MEDICARE

## 2023-11-21 DIAGNOSIS — Z17.0 MALIGNANT NEOPLASM OF UPPER-OUTER QUADRANT OF LEFT BREAST IN FEMALE, ESTROGEN RECEPTOR POSITIVE: Primary | Chronic | ICD-10-CM

## 2023-11-21 DIAGNOSIS — M25.60 DECREASED RANGE OF MOTION: Primary | ICD-10-CM

## 2023-11-21 DIAGNOSIS — C50.412 MALIGNANT NEOPLASM OF UPPER-OUTER QUADRANT OF LEFT BREAST IN FEMALE, ESTROGEN RECEPTOR POSITIVE: Primary | Chronic | ICD-10-CM

## 2023-11-21 DIAGNOSIS — M62.81 DECREASED MUSCLE STRENGTH: ICD-10-CM

## 2023-11-21 DIAGNOSIS — R52 PAIN: ICD-10-CM

## 2023-11-21 DIAGNOSIS — Z78.9 DECREASED ACTIVITIES OF DAILY LIVING (ADL): ICD-10-CM

## 2023-11-21 PROCEDURE — 97110 THERAPEUTIC EXERCISES: CPT

## 2023-11-21 PROCEDURE — 99024 PR POST-OP FOLLOW-UP VISIT: ICD-10-PCS | Mod: S$GLB,,, | Performed by: SURGERY

## 2023-11-21 PROCEDURE — 3061F NEG MICROALBUMINURIA REV: CPT | Mod: CPTII,S$GLB,, | Performed by: SURGERY

## 2023-11-21 PROCEDURE — 3044F HG A1C LEVEL LT 7.0%: CPT | Mod: CPTII,S$GLB,, | Performed by: SURGERY

## 2023-11-21 PROCEDURE — 3066F PR DOCUMENTATION OF TREATMENT FOR NEPHROPATHY: ICD-10-PCS | Mod: CPTII,S$GLB,, | Performed by: SURGERY

## 2023-11-21 PROCEDURE — 3066F NEPHROPATHY DOC TX: CPT | Mod: CPTII,S$GLB,, | Performed by: SURGERY

## 2023-11-21 PROCEDURE — 4010F PR ACE/ARB THEARPY RXD/TAKEN: ICD-10-PCS | Mod: CPTII,S$GLB,, | Performed by: SURGERY

## 2023-11-21 PROCEDURE — 3061F PR NEG MICROALBUMINURIA RESULT DOCUMENTED/REVIEW: ICD-10-PCS | Mod: CPTII,S$GLB,, | Performed by: SURGERY

## 2023-11-21 PROCEDURE — 99024 POSTOP FOLLOW-UP VISIT: CPT | Mod: S$GLB,,, | Performed by: SURGERY

## 2023-11-21 PROCEDURE — 3044F PR MOST RECENT HEMOGLOBIN A1C LEVEL <7.0%: ICD-10-PCS | Mod: CPTII,S$GLB,, | Performed by: SURGERY

## 2023-11-21 PROCEDURE — 4010F ACE/ARB THERAPY RXD/TAKEN: CPT | Mod: CPTII,S$GLB,, | Performed by: SURGERY

## 2023-11-21 PROCEDURE — 97140 MANUAL THERAPY 1/> REGIONS: CPT

## 2023-11-21 NOTE — PROGRESS NOTES
Breast Surgical Oncology  H&P      REFERRING PHYSICIAN:  Lul Alvarez MD    MEDICAL ONCOLOGIST:    CYNDIE  RADIATION ONCOLOGIST:   N/A    Date of Service: 11/21/2023    DIAGNOSIS:   This is a 63 y.o. female with Stage IA (T1c N0 Mx) LEFT Breast Invasive Ductal  Carcinoma, Grade 3, ER 70%, [place] 80%, [place] 0 with a ki67 of 90%    TREATMENT SUMMARY:   The patient is status post bilateral nipple sparing mastectomy and sentinel node biopsy on 10/25/23.  Final pathology showed no primary tumor within the breast specimen and one sentinel lymph node negative for metastatic disease.   The primary diagnosis of DCIS was identified on stereotactic biopsy of the LOQ of the breast. That area was resected within the surgical specimen and based on pathology, all DCIS was resected from the stereotactic biopsy.   The primary diagnosis of invasive carcinoma was identified on core needle biopsy of the left breast 3-4:00 position. That area was resected within the surgical specimen also but not identified on pathology.  Second look ultrasound postoperatively identified the remaining mass below the IMF abutting the tissue expander at the 5:00 radian. BRIDGETT reflector has been placed for localization and she is planned for excision tomorrow.     Lab Results   Component Value Date    FPATHDX  10/25/2023     1. Right breast, prophylactic nipple sparing mastectomy (467 grams):      -  Benign breast tissue with fibrocystic change including fibrosis, adenosis, usual ductal hyperplasia         (UDH) and duct ectasia with apocrine metaplasia and focal periductal chronic inflammation      -  Microcalcifications:  Present, associated with benign ductal tissue      -  Skin and nipple are surgically absent      -  Unremarkable skeletal muscle present      -  Negative for atypia or malignancy    2. Left breast, nipple sparing mastectomy (470 grams):      -  Benign breast tissue with focal atypical ductal hyperplasia (ADH, less than 2mm) in a  background of          fibrocystic change including fibrosis, adenosis, duct ectasia, focal columnar cell change/hyperplasia         and fibroadenomatoid change      -  Findings of biopsy cavity are NOT appreciated grossly or histologically, see case comment      -  Skin and nipple are surgically absent      -  Dumbbell shaped biopsy clip NOT IDENTIFIED at time of grossing    Comment:  Immunohistochemical stains with appropriate controls were performed on select tissue blocks.  Cytokeratin 5/6 demonstrates a mosaic staining pattern in areas of ductal hyperplasia, showing no evidence of atypia.  AE1/AE3 is utilized for duct   mapping.  P63 highlights intact myoepithelial cells throughout the specimen, showing no evidence of invasive carcinoma.    3. [place] lymph node #1 (hot and blue, background count 2), biopsy:      -  One lymph node, negative for metastatic carcinoma (0/1)      -  Immunohistochemical stains for CAM 5.2, AE1/AE3 and wide spectrum keratin are negative      4. Left breast axillary tail, excision (11 grams):      -  Benign mature fibroadipose tissue      -  Negative for atypia or malignancy    5. Additional lateral margin, excision (tissue submitted entirely for histologic evaluation):      -  Benign breast tissue with mild fibrocystic change including fibrosis, adenosis and          fibroadenomatoid change      -  Negative for atypia or malignancy      6. Additional anterior lateral margin, excision (tissue submitted entirely for histologic evaluation):      -  Benign breast tissue with fibrocystic change including fibrosis, adenosis, fibroadenomatoid          changes and duct ectasia with apocrine metaplasia       -  Microcalcifications:  Present, associated with benign breast tissue      -  Negative for atypia or malignancy           INTERVAL HISTORY:   Demetrice Gaines comes in for a post-op check.  She denies fever, chills, chest pain or shortness of breath.  Her pain is well controlled.  She  had her CECELIA drains removed by Dr. Lynn's office yesterday.    MEDICATIONS:     Current Outpatient Medications   Medication Sig Dispense Refill    amlodipine-olmesartan (LEATHA) 10-40 mg per tablet Take 1 tablet by mouth once daily. 90 tablet 1    aspirin (ECOTRIN) 81 MG EC tablet Take 1 tablet (81 mg total) by mouth once daily. (Patient not taking: Reported on 9/12/2023) 90 tablet 3    blood sugar diagnostic (ONETOUCH VERIO TEST STRIPS) Strp Use to check blood sugar twice daily 100 each 6    chlorthalidone (HYGROTEN) 25 MG Tab Take 1 tablet (25 mg total) by mouth once daily. 90 tablet 3    lancets (ONETOUCH ULTRASOFT LANCETS) Misc Use to check blood sugar twice daily 100 each 6    semaglutide (OZEMPIC) 1 mg/dose (4 mg/3 mL) Inject 1 mg into the skin every 7 days. 9 mL 3     No current facility-administered medications for this visit.       ALLERGIES:     Review of patient's allergies indicates:   Allergen Reactions    Aldactone [spironolactone]      Memory impair and breast soreness    Coreg [carvedilol]      Hair loss    Lisinopril-hydrochlorothiazide      Other reaction(s): Reaction:Throat swelling;       PHYSICAL EXAMINATION:   General:  This is a well appearing female with appropriate speech, affect and gait.     Breast:  bilateral radial incisions clean, dry, and intact, left axillary incision healing well, tissue expanders in place    ASSESSMENT:   The patient has had an eventful postoperative course and requires re-excision following mastectomy.    PLAN:   To OR tomorrow for excision of her left breast cancer. The risks benefits and alternatives to surgery have been discussed.  The risks include bout are not limited to pain, bleeding, infection, scarring, cosmetic deformity, nondiagnostic biopsy and need for other procedures and/or treatments. She has provided informed consent.       Hemalatha Josue M.D.

## 2023-11-21 NOTE — PROGRESS NOTES
[facility]  Occupational Therapy Treatment Note     Date: 11/21/2023  Name: Demetrice Gaines  [facility] Number: [id number]    Therapy Diagnosis:   Encounter Diagnoses   Name Primary?    Decreased range of motion Yes    Decreased activities of daily living (ADL)     Decreased muscle strength     Pain      Physician: Hemalatha Josue MD    Physician Orders: Occupational Therapy to Evaluate and Treat  Medical Diagnosis: D05.12 (ICD-10-CM) - Intraductal carcinoma in situ of left breast     Evaluation Date: 11/9/2023  Insurance Authorization Period Expiration: 10/3/2023 - 11/28/2023  Plan of Care Certification Period: 11/09/2023 - 02/09/204  Progress Note Due: 12/09/2023      Date of Return to MD: N/A     Visit # / Visits authorized: 2/24  FOTO: 1/3     Precautions:  Standard and cancer     Time In: 9:25  Time Out: 10:05  Total Treatment Time: 40 minutes    Subjective     Patient reports: She is doing well. Her drains will were removed the 20th. She has to undergo another surgery revision tomorrow. She reports she has been doing her home exercise program to the best of her ability but her breasts are still hard. She will resume breast massage post revision. Updated Plan of Care to be completed on 12/6/2023.    she was compliant with home exercise program given last session.   Response to previous treatment: Tolerated well  Functional change: Improved knowledge of HEP    Pain: 5/10  Location: bilateral breasts    Objective     Objective measures updated at progress report unless specified.    Treatment     Supervised Modalities: Modalities such as hot/cold packs, traction, unattended electrical stimulation, paraffin bath, fluidotherapy to reduce pain and increase soft tissue extensibility. Demetrice received (0) minutes of modalities listed below after being cleared for contraindications.  x = modalities performed     Supervised Modalities 11/21/2023                            Manual Therapy Techniques:  Joint mobilizations, Soft tissue Mobilization, and mobilization with movement applied to the area listed below. Demetrice received (15) minutes of interventions. x = intervention performed today    Manual Intervention 11/21/2023    Soft Tissue Mobilization x Bilateral breasts - gentle   Joint Mobilizations     Mobilization with movement     Scar Tissue Mobilization x Bilateral incisions 3 directions       Therapeutic Exercises: to develop strength, endurance, ROM, flexibility, posture and core stabilization. Demetrice received (25) minutes of exercises listed below. x = performed today * = level of progression of activity     Therapeutic Exercise 11/21/2023    Diaphragmatic breathing and relaxation x 5x   scapular retractions x 3 sets of 10x   fist making/ball squeezes x 2 minutes   elbow flexion/extension x 3 sets of 10x   Wall walks (max range shoulder level)  -Flexion  -Abduction x 2 sets of 10x   Pendulums  - front/back  -side to side  -circles both ways x 10x each direction                  Therapeutic Activities: Everyday tasks to address the combined need of improved strength, range of motion, and endurance to achieve increased independence. While simulating these activities, the person is applying the gained skills of strength and improved range of motion in a practical way.  Demetrice received (0) minutes of activities listed below. x = activities performed today * = level of progression of activity     Therapeutic Activities 11/21/2023                          Neuromuscular Re-education: the use of functional strengthening, stretching, balancing and coordination activities that train the nerves and muscles to react and communicate to restore normal body movement patterns to increase self care independence. Demetrice received (0) minutes of interventions listed below.  x = interventions performed today * = level of progression of activity     Neuromuscular Re-education 11/21/2023                          Self Care:  Fundamental skills required to independently care for oneself. Activities of daily living such as eating, bathing, dressing, toileting, grooming, sleeping, transfers and mobility. Instrumental activities of daily living such as driving, medication management, pet care, home management/cleaning, financial management, using the phone, meal preparation and shopping. Demetrice received (0) minutes of interventions listed below.  x = interventions performed * = level of progression of activity     Self Care 11/21/2023    Upper body dressing, donning/ doffing of shirt and surgical bra with decreased pain x SBA                      Home Exercises and Education Provided     Education provided:   - Breast Massage and Scar Massage  - progress towards goals     Written Home Exercises Provided: yes.  Exercises were reviewed and Demetrice was able to demonstrate them prior to the end of the session. Demetrice demonstrated good understanding of the home exercise program provided.     See EMR under Patient Instructions for exercises provided 11/9/2023.        Assessment     Patient tolerated treatment well. She demonstrates tenderness of bilateral breasts    Demetrice is progressing towards her goals and there are no updates to goals at this time. Patient prognosis is Good.     Patient will continue to benefit from skilled outpatient occupational therapy to address the deficits listed in the problem list on initial evaluation provide patient/family education and to maximize patient's level of independence in the home and community environment.     Patient's spiritual, cultural and educational needs considered and patient agreeable to plan of care and goals.    Anticipated barriers to occupational therapy: Pain    Goals:    Short Term Goals:  6 weeks  Progress    Pain: Patient will demonstrate improved pain by reports of less than or equal to 5/10 worst pain on the verbal rating scale in order to progress toward maximal functional ability and  improve quality of life. PC   Function: Patient will demonstrate improved function as indicated by a functional intake score of more than or equal to 45 out of 100 on FOTO. PC   Mobility: Patient will improve active range of motion to 50% of stated goals, listed in objective measures above, in order to progress towards independence with functional activities.  PC   Strength: Patient will improve strength to 50% of stated goals, listed in objective measures above, in order to progress towards independence with functional activities.  PC   HEP: Patient will demonstrate independence with home exercise program in order to progress toward functional independence. PC   Lymphedema: Patient will demonstrate 100% understanding of lymphedema risk reduction practices to include self monitoring for lymphedema. PC   Massage: Patient will demonstrate 100% understanding of breast and scar massage practices to reduce risk of fibrosis, scar adhesions, range of motion impairments and pain.  PC     Long Term Goals:  12 weeks  Progress   Pain: Patient will demonstrate improved pain by reports of less than or equal to 3/10 worst pain on the verbal rating scale in order to progress toward maximal functional ability and improve quality of life.   PC   Function: Patient will demonstrate improved function as indicated by a functional intake score of more than or equal to 63 out of 100 on FOTO. PC   Mobility: Patient will improve active range of motion to stated goals, listed in objective measures above, in order to return to maximal functional potential and improve quality of life. PC   Strength: Patient will improve strength to stated goals, listed in objective measures above, in order to improve functional independence and quality of life. PC   ADL: Patient will return to normal ADL's, IADL's, community involvement, recreational activities, and work-related activities with less than or equal to 0/10 pain, full/maximized tissue mobility  and maximal function.  PC     Goals Key:  PC= Progressing/Continue; PM= Partially Met;  GM= Goal Met,      DC= Discontinue    Plan     Continue Plan of Care (POC) and progress per patient tolerance.      Niya Paris, OT  ,OTD, OTR/L

## 2023-11-22 ENCOUNTER — HOSPITAL ENCOUNTER (OUTPATIENT)
Facility: HOSPITAL | Age: 63
Discharge: HOME | End: 2023-11-22
Attending: SURGERY | Admitting: SURGERY
Payer: MEDICARE

## 2023-11-22 ENCOUNTER — ANESTHESIA (OUTPATIENT)
Dept: SURGERY | Facility: HOSPITAL | Age: 63
End: 2023-11-22
Payer: MEDICARE

## 2023-11-22 ENCOUNTER — HOSPITAL ENCOUNTER (OUTPATIENT)
Dept: RADIOLOGY | Facility: HOSPITAL | Age: 63
Discharge: HOME | End: 2023-11-22
Attending: SURGERY | Admitting: SURGERY
Payer: MEDICARE

## 2023-11-22 DIAGNOSIS — C50.412 MALIGNANT NEOPLASM OF UPPER-OUTER QUADRANT OF LEFT BREAST IN FEMALE, ESTROGEN RECEPTOR POSITIVE: ICD-10-CM

## 2023-11-22 DIAGNOSIS — C50.412 MALIGNANT NEOPLASM OF UPPER-OUTER QUADRANT OF LEFT FEMALE BREAST: ICD-10-CM

## 2023-11-22 DIAGNOSIS — Z17.0 MALIGNANT NEOPLASM OF UPPER-OUTER QUADRANT OF LEFT BREAST IN FEMALE, ESTROGEN RECEPTOR POSITIVE: ICD-10-CM

## 2023-11-22 LAB
POCT GLUCOSE: 93 MG/DL (ref 70–110)
POCT GLUCOSE: 99 MG/DL (ref 70–110)

## 2023-11-22 PROCEDURE — 71000015 HC POSTOP RECOV 1ST HR: Performed by: SURGERY

## 2023-11-22 PROCEDURE — 19301 PR MASTECTOMY, PARTIAL: ICD-10-PCS | Mod: 58,LT,, | Performed by: SURGERY

## 2023-11-22 PROCEDURE — 27200651 HC AIRWAY, LMA: Performed by: ANESTHESIOLOGY

## 2023-11-22 PROCEDURE — 76098 PR  X-RAY EXAM, BREAST SPECIMEN: ICD-10-PCS | Mod: 26,,, | Performed by: SURGERY

## 2023-11-22 PROCEDURE — 71000033 HC RECOVERY, INTIAL HOUR: Performed by: SURGERY

## 2023-11-22 PROCEDURE — 88307 TISSUE EXAM BY PATHOLOGIST: CPT | Performed by: STUDENT IN AN ORGANIZED HEALTH CARE EDUCATION/TRAINING PROGRAM

## 2023-11-22 PROCEDURE — 63600175 PHARM REV CODE 636 W HCPCS: Performed by: SURGERY

## 2023-11-22 PROCEDURE — 82962 GLUCOSE BLOOD TEST: CPT | Performed by: SURGERY

## 2023-11-22 PROCEDURE — 76098 X-RAY EXAM SURGICAL SPECIMEN: CPT | Mod: 26,,, | Performed by: SURGERY

## 2023-11-22 PROCEDURE — 27201423 OPTIME MED/SURG SUP & DEVICES STERILE SUPPLY: Performed by: SURGERY

## 2023-11-22 PROCEDURE — 63600175 PHARM REV CODE 636 W HCPCS: Performed by: NURSE ANESTHETIST, CERTIFIED REGISTERED

## 2023-11-22 PROCEDURE — 76098 X-RAY EXAM SURGICAL SPECIMEN: CPT | Mod: TC

## 2023-11-22 PROCEDURE — D9220A PRA ANESTHESIA: ICD-10-PCS | Mod: ,,, | Performed by: NURSE ANESTHETIST, CERTIFIED REGISTERED

## 2023-11-22 PROCEDURE — 36000707: Performed by: SURGERY

## 2023-11-22 PROCEDURE — 25000003 PHARM REV CODE 250: Performed by: NURSE ANESTHETIST, CERTIFIED REGISTERED

## 2023-11-22 PROCEDURE — 88307 TISSUE EXAM BY PATHOLOGIST: CPT | Mod: 26,,, | Performed by: STUDENT IN AN ORGANIZED HEALTH CARE EDUCATION/TRAINING PROGRAM

## 2023-11-22 PROCEDURE — D9220A PRA ANESTHESIA: Mod: ,,, | Performed by: NURSE ANESTHETIST, CERTIFIED REGISTERED

## 2023-11-22 PROCEDURE — 36000706: Performed by: SURGERY

## 2023-11-22 PROCEDURE — 25000003 PHARM REV CODE 250: Performed by: ANESTHESIOLOGY

## 2023-11-22 PROCEDURE — 37000008 HC ANESTHESIA 1ST 15 MINUTES: Performed by: SURGERY

## 2023-11-22 PROCEDURE — 88307 PR  SURG PATH,LEVEL V: ICD-10-PCS | Mod: 26,,, | Performed by: STUDENT IN AN ORGANIZED HEALTH CARE EDUCATION/TRAINING PROGRAM

## 2023-11-22 PROCEDURE — 37000009 HC ANESTHESIA EA ADD 15 MINS: Performed by: SURGERY

## 2023-11-22 PROCEDURE — 19301 PARTIAL MASTECTOMY: CPT | Mod: 58,LT,, | Performed by: SURGERY

## 2023-11-22 RX ORDER — EPHEDRINE SULFATE 50 MG/ML
INJECTION, SOLUTION INTRAVENOUS
Status: DISCONTINUED | OUTPATIENT
Start: 2023-11-22 | End: 2023-11-22

## 2023-11-22 RX ORDER — BUPIVACAINE HYDROCHLORIDE 2.5 MG/ML
INJECTION, SOLUTION EPIDURAL; INFILTRATION; INTRACAUDAL
Status: DISCONTINUED | OUTPATIENT
Start: 2023-11-22 | End: 2023-11-22 | Stop reason: HOSPADM

## 2023-11-22 RX ORDER — DEXAMETHASONE SODIUM PHOSPHATE 4 MG/ML
INJECTION, SOLUTION INTRA-ARTICULAR; INTRALESIONAL; INTRAMUSCULAR; INTRAVENOUS; SOFT TISSUE
Status: DISCONTINUED | OUTPATIENT
Start: 2023-11-22 | End: 2023-11-22

## 2023-11-22 RX ORDER — ACETAMINOPHEN 10 MG/ML
INJECTION, SOLUTION INTRAVENOUS
Status: DISCONTINUED | OUTPATIENT
Start: 2023-11-22 | End: 2023-11-22

## 2023-11-22 RX ORDER — CEFAZOLIN SODIUM 2 G/50ML
2 SOLUTION INTRAVENOUS
Status: DISCONTINUED | OUTPATIENT
Start: 2023-11-22 | End: 2023-11-22 | Stop reason: HOSPADM

## 2023-11-22 RX ORDER — FENTANYL CITRATE 50 UG/ML
INJECTION, SOLUTION INTRAMUSCULAR; INTRAVENOUS
Status: DISCONTINUED | OUTPATIENT
Start: 2023-11-22 | End: 2023-11-22

## 2023-11-22 RX ORDER — MIDAZOLAM HYDROCHLORIDE 1 MG/ML
INJECTION INTRAMUSCULAR; INTRAVENOUS
Status: DISCONTINUED | OUTPATIENT
Start: 2023-11-22 | End: 2023-11-22

## 2023-11-22 RX ORDER — DIPHENHYDRAMINE HYDROCHLORIDE 50 MG/ML
25 INJECTION INTRAMUSCULAR; INTRAVENOUS EVERY 6 HOURS PRN
Status: DISCONTINUED | OUTPATIENT
Start: 2023-11-22 | End: 2023-11-22 | Stop reason: HOSPADM

## 2023-11-22 RX ORDER — ONDANSETRON 2 MG/ML
INJECTION INTRAMUSCULAR; INTRAVENOUS
Status: DISCONTINUED | OUTPATIENT
Start: 2023-11-22 | End: 2023-11-22

## 2023-11-22 RX ORDER — DEXMEDETOMIDINE HYDROCHLORIDE 100 UG/ML
INJECTION, SOLUTION INTRAVENOUS
Status: DISCONTINUED | OUTPATIENT
Start: 2023-11-22 | End: 2023-11-22

## 2023-11-22 RX ORDER — HYDROCODONE BITARTRATE AND ACETAMINOPHEN 5; 325 MG/1; MG/1
1 TABLET ORAL
Status: DISCONTINUED | OUTPATIENT
Start: 2023-11-22 | End: 2023-11-22 | Stop reason: HOSPADM

## 2023-11-22 RX ORDER — MEPERIDINE HYDROCHLORIDE 25 MG/ML
12.5 INJECTION INTRAMUSCULAR; INTRAVENOUS; SUBCUTANEOUS ONCE
Status: DISCONTINUED | OUTPATIENT
Start: 2023-11-22 | End: 2023-11-22 | Stop reason: HOSPADM

## 2023-11-22 RX ORDER — SODIUM CHLORIDE 9 MG/ML
INJECTION, SOLUTION INTRAVENOUS CONTINUOUS
Status: DISCONTINUED | OUTPATIENT
Start: 2023-11-22 | End: 2023-11-22 | Stop reason: HOSPADM

## 2023-11-22 RX ORDER — TRAMADOL HYDROCHLORIDE 50 MG/1
50 TABLET ORAL EVERY 6 HOURS PRN
Qty: 15 TABLET | Refills: 0 | Status: SHIPPED | OUTPATIENT
Start: 2023-11-22 | End: 2024-01-24

## 2023-11-22 RX ORDER — ONDANSETRON 2 MG/ML
4 INJECTION INTRAMUSCULAR; INTRAVENOUS ONCE AS NEEDED
Status: DISCONTINUED | OUTPATIENT
Start: 2023-11-22 | End: 2023-11-22 | Stop reason: HOSPADM

## 2023-11-22 RX ORDER — ONDANSETRON 8 MG/1
8 TABLET, ORALLY DISINTEGRATING ORAL EVERY 8 HOURS PRN
Qty: 12 TABLET | Refills: 2 | Status: SHIPPED | OUTPATIENT
Start: 2023-11-22 | End: 2024-01-24

## 2023-11-22 RX ORDER — FENTANYL CITRATE 50 UG/ML
25 INJECTION, SOLUTION INTRAMUSCULAR; INTRAVENOUS EVERY 5 MIN PRN
Status: DISCONTINUED | OUTPATIENT
Start: 2023-11-22 | End: 2023-11-22 | Stop reason: HOSPADM

## 2023-11-22 RX ORDER — LIDOCAINE HYDROCHLORIDE 20 MG/ML
INJECTION INTRAVENOUS
Status: DISCONTINUED | OUTPATIENT
Start: 2023-11-22 | End: 2023-11-22

## 2023-11-22 RX ORDER — BUPIVACAINE HYDROCHLORIDE 2.5 MG/ML
INJECTION, SOLUTION EPIDURAL; INFILTRATION; INTRACAUDAL
Status: DISCONTINUED
Start: 2023-11-22 | End: 2023-11-22 | Stop reason: HOSPADM

## 2023-11-22 RX ORDER — PROPOFOL 10 MG/ML
INJECTION, EMULSION INTRAVENOUS
Status: DISCONTINUED | OUTPATIENT
Start: 2023-11-22 | End: 2023-11-22

## 2023-11-22 RX ADMIN — LIDOCAINE HYDROCHLORIDE 75 MG: 20 INJECTION INTRAVENOUS at 11:11

## 2023-11-22 RX ADMIN — HYDROCODONE BITARTRATE AND ACETAMINOPHEN 1 TABLET: 5; 325 TABLET ORAL at 01:11

## 2023-11-22 RX ADMIN — EPHEDRINE SULFATE 10 MG: 50 INJECTION INTRAVENOUS at 12:11

## 2023-11-22 RX ADMIN — ACETAMINOPHEN 1000 MG: 10 INJECTION, SOLUTION INTRAVENOUS at 11:11

## 2023-11-22 RX ADMIN — FENTANYL CITRATE 50 MCG: 50 INJECTION, SOLUTION INTRAMUSCULAR; INTRAVENOUS at 11:11

## 2023-11-22 RX ADMIN — DEXMEDETOMIDINE HYDROCHLORIDE 4 MCG: 100 INJECTION, SOLUTION INTRAVENOUS at 11:11

## 2023-11-22 RX ADMIN — EPHEDRINE SULFATE 5 MG: 50 INJECTION INTRAVENOUS at 12:11

## 2023-11-22 RX ADMIN — DEXAMETHASONE SODIUM PHOSPHATE 4 MG: 4 INJECTION, SOLUTION INTRA-ARTICULAR; INTRALESIONAL; INTRAMUSCULAR; INTRAVENOUS; SOFT TISSUE at 11:11

## 2023-11-22 RX ADMIN — PROPOFOL 130 MG: 10 INJECTION, EMULSION INTRAVENOUS at 11:11

## 2023-11-22 RX ADMIN — CEFAZOLIN SODIUM 2 G: 2 SOLUTION INTRAVENOUS at 11:11

## 2023-11-22 RX ADMIN — FENTANYL CITRATE 25 MCG: 50 INJECTION, SOLUTION INTRAMUSCULAR; INTRAVENOUS at 11:11

## 2023-11-22 RX ADMIN — MIDAZOLAM HYDROCHLORIDE 2 MG: 1 INJECTION INTRAMUSCULAR; INTRAVENOUS at 11:11

## 2023-11-22 RX ADMIN — ONDANSETRON 4 MG: 2 INJECTION INTRAMUSCULAR; INTRAVENOUS at 12:11

## 2023-11-22 NOTE — H&P
Hemalatha Josue MD  Physician  Specialty: Surgical Oncology     H&P     Signed     Encounter Date: 11/21/2023  Creation Time: 11/21/2023 10:55 AM   Related encounter: Office Visit from 11/21/2023 in [facility] - Breast Surgery     Expand All Collapse All    Breast Surgical Oncology  H&P        REFERRING PHYSICIAN:                 Lul Alvarez MD     MEDICAL ONCOLOGIST:                 CYNDIE  RADIATION ONCOLOGIST:             N/A     Date of Service: 11/21/2023     DIAGNOSIS:   This is a 63 y.o. female with Stage IA (T1c N0 Mx) LEFT Breast Invasive Ductal  Carcinoma, Grade 3, ER 70%, [place] 80%, Ybl4fvn 0 with a ki67 of 90%     TREATMENT SUMMARY:   The patient is status post bilateral nipple sparing mastectomy and sentinel node biopsy on 10/25/23.  Final pathology showed no primary tumor within the breast specimen and one sentinel lymph node negative for metastatic disease.   The primary diagnosis of DCIS was identified on stereotactic biopsy of the LOQ of the breast. That area was resected within the surgical specimen and based on pathology, all DCIS was resected from the stereotactic biopsy.   The primary diagnosis of invasive carcinoma was identified on core needle biopsy of the left breast 3-4:00 position. That area was resected within the surgical specimen also but not identified on pathology.  Second look ultrasound postoperatively identified the remaining mass below the IMF abutting the tissue expander at the 5:00 radian. BRIDGETT reflector has been placed for localization and she is planned for excision tomorrow.             Lab Results   Component Value Date     FPATHDX   10/25/2023       1. Right breast, prophylactic nipple sparing mastectomy (467 grams):      -  Benign breast tissue with fibrocystic change including fibrosis, adenosis, usual ductal hyperplasia         (UDH) and duct ectasia with apocrine metaplasia and focal periductal chronic inflammation      -  Microcalcifications:  Present,  associated with benign ductal tissue      -  Skin and nipple are surgically absent      -  Unremarkable skeletal muscle present      -  Negative for atypia or malignancy     2. Left breast, nipple sparing mastectomy (470 grams):      -  Benign breast tissue with focal atypical ductal hyperplasia (ADH, less than 2mm) in a background of          fibrocystic change including fibrosis, adenosis, duct ectasia, focal columnar cell change/hyperplasia         and fibroadenomatoid change      -  Findings of biopsy cavity are NOT appreciated grossly or histologically, see case comment      -  Skin and nipple are surgically absent      -  Dumbbell shaped biopsy clip NOT IDENTIFIED at time of grossing     Comment:  Immunohistochemical stains with appropriate controls were performed on select tissue blocks.  Cytokeratin 5/6 demonstrates a mosaic staining pattern in areas of ductal hyperplasia, showing no evidence of atypia.  AE1/AE3 is utilized for duct   mapping.  P63 highlights intact myoepithelial cells throughout the specimen, showing no evidence of invasive carcinoma.     3. [place] lymph node #1 (hot and blue, background count 2), biopsy:      -  One lymph node, negative for metastatic carcinoma (0/1)      -  Immunohistochemical stains for CAM 5.2, AE1/AE3 and wide spectrum keratin are negative       4. Left breast axillary tail, excision (11 grams):      -  Benign mature fibroadipose tissue      -  Negative for atypia or malignancy     5. Additional lateral margin, excision (tissue submitted entirely for histologic evaluation):      -  Benign breast tissue with mild fibrocystic change including fibrosis, adenosis and          fibroadenomatoid change      -  Negative for atypia or malignancy       6. Additional anterior lateral margin, excision (tissue submitted entirely for histologic evaluation):      -  Benign breast tissue with fibrocystic change including fibrosis, adenosis, fibroadenomatoid          changes and duct  ectasia with apocrine metaplasia       -  Microcalcifications:  Present, associated with benign breast tissue      -  Negative for atypia or malignancy              INTERVAL HISTORY:   Demetrice Gaines comes in for a post-op check.  She denies fever, chills, chest pain or shortness of breath.  Her pain is well controlled.  She had her CECELIA drains removed by Dr. Lynn's office yesterday.     MEDICATIONS:      Current Medications          Current Outpatient Medications   Medication Sig Dispense Refill    amlodipine-olmesartan (LEATHA) 10-40 mg per tablet Take 1 tablet by mouth once daily. 90 tablet 1    aspirin (ECOTRIN) 81 MG EC tablet Take 1 tablet (81 mg total) by mouth once daily. (Patient not taking: Reported on 9/12/2023) 90 tablet 3    blood sugar diagnostic (ONETOUCH VERIO TEST STRIPS) Strp Use to check blood sugar twice daily 100 each 6    chlorthalidone (HYGROTEN) 25 MG Tab Take 1 tablet (25 mg total) by mouth once daily. 90 tablet 3    lancets (ONETOUCH ULTRASOFT LANCETS) Misc Use to check blood sugar twice daily 100 each 6    semaglutide (OZEMPIC) 1 mg/dose (4 mg/3 mL) Inject 1 mg into the skin every 7 days. 9 mL 3      No current facility-administered medications for this visit.            ALLERGIES:            Review of patient's allergies indicates:   Allergen Reactions    Aldactone [spironolactone]         Memory impair and breast soreness    Coreg [carvedilol]         Hair loss    Lisinopril-hydrochlorothiazide         Other reaction(s): Reaction:Throat swelling;         PHYSICAL EXAMINATION:   General:  This is a well appearing female with appropriate speech, affect and gait.     Breast:  bilateral radial incisions clean, dry, and intact, left axillary incision healing well, tissue expanders in place     ASSESSMENT:   The patient has had an eventful postoperative course and requires re-excision following mastectomy.     PLAN:   To OR tomorrow for excision of her left breast cancer. The risks benefits  and alternatives to surgery have been discussed.  The risks include bout are not limited to pain, bleeding, infection, scarring, cosmetic deformity, nondiagnostic biopsy and need for other procedures and/or treatments. She has provided informed consent.         Hemalatha Josue M.D.

## 2023-11-22 NOTE — ANESTHESIA POSTPROCEDURE EVALUATION
Anesthesia Post Evaluation    Patient: Demetrice Gaines    Procedure(s) Performed: Procedure(s) (LRB):  EXCISION,MASS,BREAST,USING RADIOLOGICAL MARKER (Left)    Final Anesthesia Type: general      Patient location during evaluation: PACU  Patient participation: Yes- Able to Participate  Level of consciousness: awake and alert and oriented  Post-procedure vital signs: reviewed and stable  Pain management: adequate  Airway patency: patent    PONV status at discharge: No PONV  Anesthetic complications: no      Cardiovascular status: blood pressure returned to baseline, stable and hemodynamically stable  Respiratory status: unassisted  Hydration status: euvolemic  Follow-up not needed.          Vitals Value Taken Time   /76 11/22/23 1330   Temp 36.7 °C (98 °F) 11/22/23 1330   Pulse 78 11/22/23 1332   Resp 18 11/22/23 1330   SpO2 97 % 11/22/23 1332   Vitals shown include unvalidated device data.      Event Time   Out of Recovery 13:22:00         Pain/Bryce Score: Pain Rating Prior to Med Admin: 10 (11/22/2023  1:28 PM)  Bryce Score: 10 (11/22/2023  1:30 PM)

## 2023-11-22 NOTE — DISCHARGE INSTRUCTIONS
POSTOPERATIVE INSTRUCTIONS FOLLOWING BIOPSY OR LUMPECTOMY     The following are post-operative instructions that will help you to recover from your surgery.  Please read over these instructions carefully and contact us if we can answer any of your questions or concerns.     Dressing/breast binder (surgi-bra)  A surgical bra may be placed around your chest after your surgery.  If you are given the bra, please wear it as close to 24 hours a day as possible until your post-operative clinic appointment.  If the elastic around the bra irritates your skin, you may wear a soft t-shirt underneath the bra.  You may go without wearing the bra long enough to bath, to launder and dry the bra.  If the bra is extremely uncomfortable, you may wear a supportive sports bra instead after 2 days.  You may shower after 2 days.  Do not take a tub bath and do not soak the surgical site. Please do not remove the white strips of tape (steri-strips) that cover your incision.  They will be removed at your clinic visit.     Activity   You should be able to return to your regular activities 2 days after your surgery.  However, do not engage in strenuous activities in which you use your upper body such as:  golf, tennis, aerobics, washing windows, raking the yard, mopping, vacuuming, heavy lifting (e.g children) until you are seen for your follow-up appointment in clinic.     Medication for pain  You may find that over the counter pain medications may be sufficient for your pain.  You will be given a prescription for pain medication for more severe pain.  You should not drive or operate machinery while taking these.  Please take narcotics with food.  Narcotics can cause, or worse, constipation.  You will need to increase your fluid intake, eat high fiber foods (such as fruits and bran) and make sure that you are up and walking. You may need to take an over the counter stool softener for constipation.     After surgery at home  Ultram will be  prescribed to take every 6 hr as needed for breakthrough pain  2.  Wear compressive bra at all times for 2 weeks after surgery.    3.  May apply ice on the breast to help with decreasing pain  4.  Keep wound dry 48 hours after the operation. After this time, you are able to shower. No soaking in baths for 1 week. After this time, you are free to shower or bathe.   5.  No driving if you are taking prescribed Ultram. You can get a DUI on these medications.   6.  Avoid touching the wound or surrounding area as much as possible until your postoperative visit.             Please report the following:  Temperature greater than 101 degrees  Discharge or bad odor from the wound  Excessive bleeding, such as bloody dressing or extreme bruising  Redness at incision and/or drain sites  Swelling or buildup of fluid around incision     Additional information  I will see you approximately 2 weeks following your surgery.  If this follow-up appointment has not been made, please call the office.     If you have any questions or problems, please call my office or my nurse.     Dr. Hemalatha Josue     [phone]

## 2023-11-22 NOTE — TRANSFER OF CARE
"Anesthesia Transfer of Care Note    Patient: Demetrice Gaines    Procedure(s) Performed: Procedure(s) (LRB):  EXCISION,MASS,BREAST,USING RADIOLOGICAL MARKER (Left)    Patient location: PACU    Anesthesia Type: general    Transport from OR: Transported from OR on room air with adequate spontaneous ventilation    Post pain: adequate analgesia    Post assessment: no apparent anesthetic complications and tolerated procedure well    Post vital signs: stable    Level of consciousness: awake    Nausea/Vomiting: no nausea/vomiting    Complications: none    Transfer of care protocol was followed      Last vitals: Visit Vitals  /75 (BP Location: Right arm, Patient Position: Sitting)   Pulse 80   Temp 36.6 °C (97.9 °F) (Temporal)   Resp 19   Ht 5' 4" (1.626 m)   Wt 83 kg (182 lb 15.7 oz)   SpO2 100%   Breastfeeding No   BMI 31.41 kg/m²     "

## 2023-11-22 NOTE — DISCHARGE SUMMARY
[facility]  Discharge Note  Short Stay    Procedure(s) (LRB):  EXCISION,MASS,BREAST,USING RADIOLOGICAL MARKER (Left)      OUTCOME: Patient tolerated treatment/procedure well without complication and is now ready for discharge.    DISPOSITION: Home or Self Care    FINAL DIAGNOSIS:  <principal problem not specified>    FOLLOWUP: In clinic    DISCHARGE INSTRUCTIONS:    Discharge Procedure Orders   Diet Adult Regular     Notify your health care provider if you experience any of the following:  temperature >100.4     Notify your health care provider if you experience any of the following:  persistent nausea and vomiting or diarrhea     Notify your health care provider if you experience any of the following:  severe uncontrolled pain     Notify your health care provider if you experience any of the following:  redness, tenderness, or signs of infection (pain, swelling, redness, odor or green/yellow discharge around incision site)     No dressing needed     Activity as tolerated        TIME SPENT ON DISCHARGE: 15 minutes

## 2023-11-22 NOTE — OP NOTE
Operative Report    11/22/2023    Preoperative Diagnosis:   Left Breast Invasive Breast Cancer, Lower Outer Quadrant    Postoperative Diagnosis:  Same    Procedures Performed:  Left Re-excision with Preoperatively Place Radiographic Marker (GLORIA )    Surgeon: Hemalatha Josue MD    EBL: <10    Drains: None    Complications: None Apparent     Disposition: PACU in good condition    Specimens:  Left Re-excision, Short Suture Superior, Long Suture Lateral    Statement of Medical Necessity:  This patient is a 63 year old woman who was recently diagnosed with left breast cancer.  After undergoing a thorough preoperative evaluation and considering all of her options, she elected for bilateral mastectomy  On permanent pathologic exam, the primary cancer was not identified. Repeat radiographic evaluation identified the remaining mass below her IMF abutting the alloderm for her tissue expander reconstruction.  The risks, benefits and alternatives to surgery have been discussed and she has provided informed consent for re-excision.     Description of Operative Procedures and Findings:  The patient was identified and transported to the operating room and placed on the operating table.  All pressure points were padded.  General Anesthesia was administered.  The patient's left breast was prepped and draped in the usual sterile fashion.  A time out was performed.    Attention was then turned to the left breast.  An horizontal incision below her inframammary crease was made using the scalpel. A flap anterior to the target was raised using the bovie.  A figure of eight suture was placed at the point of maximal signal and this was used as a handle.  Using a combination of the Gloria probe and skin markings made under ultrasound guidance, the target was circumferentially dissected. The superior margin of the resection was performed with scissors, gently removing all of the tissue from the existing alloderm. Once the reflector was  confirmed to be within the mobilized tissue, the posterior margin was transected along the abdominal wall fascia, allowing for removal of the specimen.  The specimen was oriented using a short suture superiorly, a single long suture laterally.  A specimen mammogram confirmed inclusion of the malignancy and the biopsy clip.  I interpreted this image myself.      The wound was irrigated, suctioned dry and hemostasis was obtained. The breast tissue was re-approximated using interrupted 3-0 vicryl sutures.    The instrument, needle and sponge counts were reported to be correct.  The skin incision was closed in 3 layers using numerous 3-0 vicryl interrupted deep parenchymal sutures, running 4-0 vicryl deep dermal sutures, and running 4-0 monocryl subcuticular sutures.  The incisions were dressed with surgical glue, fluffs and surgical bra.   The patient was awoken from anesthesia and transported to the PACU in good condition.  No complications were noted.  I was present for and performed the entire operation.     Procedure-specific Information - Mastectomy    Procedure Intent Excision of primary tumor with grossly negative margins   Mastectomy Type Nipple-sparing, re-excision beyond natural breast borders   Type of Incision Radial   [employer] Used No   Pectoralis Fascia, Muscle or Both Removed Neither - pectoralis fascia removed at primary surgery   Reconstruction Yes   Drains Placed Deferred to plastics

## 2023-11-27 VITALS
HEART RATE: 80 BPM | TEMPERATURE: 98 F | DIASTOLIC BLOOD PRESSURE: 76 MMHG | OXYGEN SATURATION: 97 % | WEIGHT: 183 LBS | BODY MASS INDEX: 31.24 KG/M2 | HEIGHT: 64 IN | SYSTOLIC BLOOD PRESSURE: 121 MMHG | RESPIRATION RATE: 18 BRPM

## 2023-11-28 LAB
FINAL PATHOLOGIC DIAGNOSIS: NORMAL
GROSS: NORMAL
Lab: NORMAL

## 2023-12-04 ENCOUNTER — PATIENT MESSAGE (OUTPATIENT)
Dept: INTERNAL MEDICINE | Facility: CLINIC | Age: 63
End: 2023-12-04
Payer: MEDICARE

## 2023-12-04 ENCOUNTER — PATIENT MESSAGE (OUTPATIENT)
Dept: CARDIOLOGY | Facility: CLINIC | Age: 63
End: 2023-12-04
Payer: MEDICARE

## 2023-12-04 DIAGNOSIS — I15.2 HYPERTENSION ASSOCIATED WITH DIABETES: ICD-10-CM

## 2023-12-04 DIAGNOSIS — I10 ESSENTIAL HYPERTENSION: ICD-10-CM

## 2023-12-04 DIAGNOSIS — E11.59 TYPE 2 DIABETES MELLITUS WITH OTHER CIRCULATORY COMPLICATION, WITHOUT LONG-TERM CURRENT USE OF INSULIN: Primary | ICD-10-CM

## 2023-12-04 DIAGNOSIS — E11.59 HYPERTENSION ASSOCIATED WITH DIABETES: ICD-10-CM

## 2023-12-04 RX ORDER — CHLORTHALIDONE 25 MG/1
25 TABLET ORAL DAILY
Qty: 90 TABLET | Refills: 3 | Status: SHIPPED | OUTPATIENT
Start: 2023-12-04 | End: 2024-12-03

## 2023-12-04 RX ORDER — AMLODIPINE AND OLMESARTAN MEDOXOMIL 10; 40 MG/1; MG/1
1 TABLET ORAL DAILY
Qty: 90 TABLET | Refills: 3 | Status: SHIPPED | OUTPATIENT
Start: 2023-12-04 | End: 2024-12-03

## 2023-12-04 RX ORDER — SEMAGLUTIDE 1.34 MG/ML
1 INJECTION, SOLUTION SUBCUTANEOUS
Qty: 9 ML | Refills: 3 | Status: SHIPPED | OUTPATIENT
Start: 2023-12-04 | End: 2024-12-03

## 2023-12-05 ENCOUNTER — OFFICE VISIT (OUTPATIENT)
Dept: SURGERY | Facility: CLINIC | Age: 63
End: 2023-12-05
Payer: MEDICARE

## 2023-12-05 DIAGNOSIS — C50.412 MALIGNANT NEOPLASM OF UPPER-OUTER QUADRANT OF LEFT BREAST IN FEMALE, ESTROGEN RECEPTOR POSITIVE: Primary | Chronic | ICD-10-CM

## 2023-12-05 DIAGNOSIS — Z17.0 MALIGNANT NEOPLASM OF UPPER-OUTER QUADRANT OF LEFT BREAST IN FEMALE, ESTROGEN RECEPTOR POSITIVE: Primary | Chronic | ICD-10-CM

## 2023-12-05 PROCEDURE — 3066F PR DOCUMENTATION OF TREATMENT FOR NEPHROPATHY: ICD-10-PCS | Mod: CPTII,S$GLB,, | Performed by: SURGERY

## 2023-12-05 PROCEDURE — 3044F PR MOST RECENT HEMOGLOBIN A1C LEVEL <7.0%: ICD-10-PCS | Mod: CPTII,S$GLB,, | Performed by: SURGERY

## 2023-12-05 PROCEDURE — 99024 PR POST-OP FOLLOW-UP VISIT: ICD-10-PCS | Mod: S$GLB,,, | Performed by: SURGERY

## 2023-12-05 PROCEDURE — 3061F NEG MICROALBUMINURIA REV: CPT | Mod: CPTII,S$GLB,, | Performed by: SURGERY

## 2023-12-05 PROCEDURE — 3044F HG A1C LEVEL LT 7.0%: CPT | Mod: CPTII,S$GLB,, | Performed by: SURGERY

## 2023-12-05 PROCEDURE — 3061F PR NEG MICROALBUMINURIA RESULT DOCUMENTED/REVIEW: ICD-10-PCS | Mod: CPTII,S$GLB,, | Performed by: SURGERY

## 2023-12-05 PROCEDURE — 3066F NEPHROPATHY DOC TX: CPT | Mod: CPTII,S$GLB,, | Performed by: SURGERY

## 2023-12-05 PROCEDURE — 4010F ACE/ARB THERAPY RXD/TAKEN: CPT | Mod: CPTII,S$GLB,, | Performed by: SURGERY

## 2023-12-05 PROCEDURE — 4010F PR ACE/ARB THEARPY RXD/TAKEN: ICD-10-PCS | Mod: CPTII,S$GLB,, | Performed by: SURGERY

## 2023-12-05 PROCEDURE — 99024 POSTOP FOLLOW-UP VISIT: CPT | Mod: S$GLB,,, | Performed by: SURGERY

## 2023-12-05 NOTE — PROGRESS NOTES
Breast Surgical Oncology  H&P      REFERRING PHYSICIAN:  Lul Alvarez MD    MEDICAL ONCOLOGIST:    CYNDIE  RADIATION ONCOLOGIST:   N/A    Date of Service: 12/05/2023    DIAGNOSIS:   This is a 63 y.o. female with Stage IA (T1c N0 Mx) LEFT Breast Invasive Ductal  Carcinoma, Grade 3, ER 70%, AL 80%, Fag5wdu 0 with a ki67 of 90%    TREATMENT SUMMARY:   The patient is status post bilateral nipple sparing mastectomy and sentinel node biopsy on 10/25/23. No metastatic disease in her sentinel lymph node. Excision of primary tumor 11/22/23 - superior margin abuts the AlloDerm so is essentially negative.     Lab Results   Component Value Date    FPATHDX  11/22/2023     Breast, left, re-excision:  Invasive ductal carcinoma, poorly differentiated   Renata grade 3:  Tubule formation-3, nuclear pleomorphism-3 and mitotic count -3   Invasive carcinoma measures 19 x 9 x 8 mm  Positive superior margin, measuring 10 mm in greatest extent  Additional margins:  -Anterior margin:  1 mm  -Medial margin:  6 mm   -Posterior margin:  6.5 mm  -Lateral margin:  9 mm   -Inferior margin:  9 mm  No carcinoma in Situ or lymphovascular invasion is identified  Previous biopsy clip and reflector both grossly identified         INTERVAL HISTORY:   Demetrice Gaines comes in for a post-op check.  She denies fever, chills, chest pain or shortness of breath.  Her pain is well controlled.     MEDICATIONS:     Current Outpatient Medications   Medication Sig Dispense Refill    amlodipine-olmesartan (LEATHA) 10-40 mg per tablet Take 1 tablet by mouth once daily. 90 tablet 3    blood sugar diagnostic (ONETOUCH VERIO TEST STRIPS) Strp Use to check blood sugar twice daily 100 each 6    chlorthalidone (HYGROTEN) 25 MG Tab Take 1 tablet (25 mg total) by mouth once daily. 90 tablet 3    lancets (ONETOUCH ULTRASOFT LANCETS) Misc Use to check blood sugar twice daily 100 each 6    ondansetron (ZOFRAN-ODT) 8 MG TbDL Take 1 tablet (8 mg total) by mouth every 8  (eight) hours as needed (Nausea). 12 tablet 2    semaglutide (OZEMPIC) 1 mg/dose (4 mg/3 mL) Inject 1 mg into the skin every 7 days. 9 mL 3    traMADoL (ULTRAM) 50 mg tablet Take 1 tablet (50 mg total) by mouth every 6 (six) hours as needed for Pain. 15 tablet 0     No current facility-administered medications for this visit.       ALLERGIES:     Review of patient's allergies indicates:   Allergen Reactions    Aldactone [spironolactone]      Memory impair and breast soreness    Coreg [carvedilol]      Hair loss    Lisinopril-hydrochlorothiazide      Other reaction(s): Reaction:Throat swelling;       PHYSICAL EXAMINATION:   General:  This is a well appearing female with appropriate speech, affect and gait.     Breast:  bilateral radial incisions clean, dry, and intact, left axillary incision healing well, tissue expanders in place, left IM incision healed    ASSESSMENT:   The patient has had an uneventful postoperative course.    PLAN:   Return in 2 months for follow up clinical examination  2.  The patient is advised in continued exam of the breast chest wall and to report to this office sooner should she note any areas of abnormality or concern.   3.  She has been instructed to meet with med onc and rad onc for discussion of adjuvant treatment recommendations    Hemalatha Josue M.D.

## 2023-12-12 ENCOUNTER — DOCUMENTATION ONLY (OUTPATIENT)
Dept: HEMATOLOGY/ONCOLOGY | Facility: CLINIC | Age: 63
End: 2023-12-12
Payer: MEDICARE

## 2023-12-12 DIAGNOSIS — C50.412 MALIGNANT NEOPLASM OF UPPER-OUTER QUADRANT OF LEFT BREAST IN FEMALE, ESTROGEN RECEPTOR POSITIVE: Primary | Chronic | ICD-10-CM

## 2023-12-12 DIAGNOSIS — Z17.0 MALIGNANT NEOPLASM OF UPPER-OUTER QUADRANT OF LEFT BREAST IN FEMALE, ESTROGEN RECEPTOR POSITIVE: Primary | Chronic | ICD-10-CM

## 2023-12-12 NOTE — PROGRESS NOTES
"Oncotype requested, will follow results for MD scheduling to review.   Oncology Navigation   Intake  Date of Diagnosis: 23  Cancer Type: Breast  Internal / External Referral: Internal  Date of Referral: 23  Initial Nurse Navigator Contact: 23  Referral to Initial Contact Timeline (days): 0  Date Worked: 23  First Appointment Available: 23  Appointment Date: 23  First Available Date vs. Scheduled Date (days): 0  Multiple appointments: No     Treatment  Current Status: Staging work-up  Pending: Done    Surgery: Planned  Surgical Oncologist: Hemalatha Josue MD  Plastic Surgeon: Keron Lynn MD  Type of Surgery: Bilateral mastectomy with left sentinel lymph node biopsy  Consult Date: 23          Procedures: MRI; Genetic test  Genetic Testing Date Sent: 23    General Referrals: Physical Therapy; Plastic Surgery; Home Health  Physical Therapy Referral Date: 23    ER: Positive  FL: Positive       Support Systems: Spouse/significant other; Children  Barriers of Care: Barriers to Care "Assessment completed-no barriers noted"     Acuity  Stage: 0  Surgical Procedure Complexity: 2  Treatment Tolerability: Has not started treatment yet/treatment fully completed and side effects resolved  ECO  Comorbidities in Medical History: 1  Hospitalization Within the Past Month: 0   Needed: 0  Support: 0  Verbalizes Financial Concerns: 0  Transportation: 0  Psychological Factors (+1 each): Emotional during conversation  History of noncompliance/frequent no shows and cancellations: 0  Verbalizes the need for more education: 1  Other Factors (+1 for Each): 0  Navigation Acuity: 5     Follow Up  No follow-ups on file.         "

## 2023-12-13 ENCOUNTER — PATIENT OUTREACH (OUTPATIENT)
Dept: ADMINISTRATIVE | Facility: HOSPITAL | Age: 63
End: 2023-12-13
Payer: MEDICARE

## 2023-12-13 NOTE — PROGRESS NOTES
Working PH[facility] DM Statin Report: Patient is currently not on any statin due to refused, chart routed with instructions to PCP to update Problem List on next visit  01/23/2024. Reminder set to f/u.

## 2023-12-13 NOTE — Clinical Note
[employer] is suggesting a high/moderate intensity statin. Patient is not on any statins and need a statin ordered. Please order if agreeable. If you order statin, please ensure your staff calls the patient to notify and educate patient on medication & where to . If patient cannot take statin medications due to allergy or intolerance you must drop that code every year in a visit. (See below) A few codes to place on Problem list instead of insensitivity/intolerance/allergy: G72.0 Drug induced myopathy G72.2 Myopathy due to other toxic agent G72.9 Myopathy, unspecified M62.82 Rhabdomyolysis M60.80 Other myositis unspecified M79.1 Myalgia

## 2023-12-18 PROBLEM — Z53.20 REFUSAL OF STATIN MEDICATION BY PATIENT: Status: ACTIVE | Noted: 2023-12-18

## 2023-12-21 ENCOUNTER — DOCUMENTATION ONLY (OUTPATIENT)
Dept: HEMATOLOGY/ONCOLOGY | Facility: CLINIC | Age: 63
End: 2023-12-21
Payer: MEDICARE

## 2023-12-21 NOTE — PROGRESS NOTES
"Oncotype scanned in to media, MD notified.   Oncology Navigation   Intake  Date of Diagnosis: 23  Cancer Type: Breast  Internal / External Referral: Internal  Date of Referral: 23  Initial Nurse Navigator Contact: 23  Referral to Initial Contact Timeline (days): 0  Date Worked: 23  First Appointment Available: 23  Appointment Date: 23  First Available Date vs. Scheduled Date (days): 0  Multiple appointments: No     Treatment  Current Status: Staging work-up  Pending: Done    Surgery: Planned  Surgical Oncologist: Hemalatha Josue MD  Plastic Surgeon: Keron Lynn MD  Type of Surgery: Bilateral mastectomy with left sentinel lymph node biopsy  Consult Date: 23          Procedures: MRI; Genetic test  Genetic Testing Date Sent: 23    General Referrals: Physical Therapy; Plastic Surgery; Home Health  Physical Therapy Referral Date: 23    ER: Positive  AL: Positive       Support Systems: Spouse/significant other; Children  Barriers of Care: Barriers to Care "Assessment completed-no barriers noted"     Acuity  Stage: 0  Surgical Procedure Complexity: 2  Treatment Tolerability: Has not started treatment yet/treatment fully completed and side effects resolved  ECO  Comorbidities in Medical History: 1  Hospitalization Within the Past Month: 0   Needed: 0  Support: 0  Verbalizes Financial Concerns: 0  Transportation: 0  Psychological Factors (+1 each): Emotional during conversation  History of noncompliance/frequent no shows and cancellations: 0  Verbalizes the need for more education: 1  Other Factors (+1 for Each): 0  Navigation Acuity: 5     Follow Up  No follow-ups on file.         "

## 2023-12-27 ENCOUNTER — TELEPHONE (OUTPATIENT)
Dept: GASTROENTEROLOGY | Facility: CLINIC | Age: 63
End: 2023-12-27
Payer: MEDICARE

## 2023-12-27 ENCOUNTER — OFFICE VISIT (OUTPATIENT)
Dept: HEMATOLOGY/ONCOLOGY | Facility: CLINIC | Age: 63
End: 2023-12-27
Payer: MEDICARE

## 2023-12-27 VITALS
WEIGHT: 190.06 LBS | TEMPERATURE: 98 F | OXYGEN SATURATION: 99 % | SYSTOLIC BLOOD PRESSURE: 133 MMHG | RESPIRATION RATE: 20 BRPM | DIASTOLIC BLOOD PRESSURE: 86 MMHG | HEART RATE: 100 BPM | BODY MASS INDEX: 32.45 KG/M2 | HEIGHT: 64 IN

## 2023-12-27 DIAGNOSIS — E11.59 TYPE 2 DIABETES MELLITUS WITH OTHER CIRCULATORY COMPLICATION, WITHOUT LONG-TERM CURRENT USE OF INSULIN: Chronic | ICD-10-CM

## 2023-12-27 DIAGNOSIS — D05.12 BREAST NEOPLASM, TIS (DCIS), LEFT: ICD-10-CM

## 2023-12-27 DIAGNOSIS — Z17.0 MALIGNANT NEOPLASM OF UPPER-OUTER QUADRANT OF LEFT BREAST IN FEMALE, ESTROGEN RECEPTOR POSITIVE: Primary | Chronic | ICD-10-CM

## 2023-12-27 DIAGNOSIS — K62.5 RECTAL BLEEDING: Primary | ICD-10-CM

## 2023-12-27 DIAGNOSIS — C50.412 MALIGNANT NEOPLASM OF UPPER-OUTER QUADRANT OF LEFT BREAST IN FEMALE, ESTROGEN RECEPTOR POSITIVE: Primary | Chronic | ICD-10-CM

## 2023-12-27 PROCEDURE — 1159F PR MEDICATION LIST DOCUMENTED IN MEDICAL RECORD: ICD-10-PCS | Mod: CPTII,S$GLB,, | Performed by: INTERNAL MEDICINE

## 2023-12-27 PROCEDURE — 99205 PR OFFICE/OUTPT VISIT, NEW, LEVL V, 60-74 MIN: ICD-10-PCS | Mod: S$GLB,,, | Performed by: INTERNAL MEDICINE

## 2023-12-27 PROCEDURE — 3288F FALL RISK ASSESSMENT DOCD: CPT | Mod: CPTII,S$GLB,, | Performed by: INTERNAL MEDICINE

## 2023-12-27 PROCEDURE — 3044F PR MOST RECENT HEMOGLOBIN A1C LEVEL <7.0%: ICD-10-PCS | Mod: CPTII,S$GLB,, | Performed by: INTERNAL MEDICINE

## 2023-12-27 PROCEDURE — 3066F PR DOCUMENTATION OF TREATMENT FOR NEPHROPATHY: ICD-10-PCS | Mod: CPTII,S$GLB,, | Performed by: INTERNAL MEDICINE

## 2023-12-27 PROCEDURE — 4010F ACE/ARB THERAPY RXD/TAKEN: CPT | Mod: CPTII,S$GLB,, | Performed by: INTERNAL MEDICINE

## 2023-12-27 PROCEDURE — 4010F PR ACE/ARB THEARPY RXD/TAKEN: ICD-10-PCS | Mod: CPTII,S$GLB,, | Performed by: INTERNAL MEDICINE

## 2023-12-27 PROCEDURE — 3079F DIAST BP 80-89 MM HG: CPT | Mod: CPTII,S$GLB,, | Performed by: INTERNAL MEDICINE

## 2023-12-27 PROCEDURE — 3061F PR NEG MICROALBUMINURIA RESULT DOCUMENTED/REVIEW: ICD-10-PCS | Mod: CPTII,S$GLB,, | Performed by: INTERNAL MEDICINE

## 2023-12-27 PROCEDURE — 3008F PR BODY MASS INDEX (BMI) DOCUMENTED: ICD-10-PCS | Mod: CPTII,S$GLB,, | Performed by: INTERNAL MEDICINE

## 2023-12-27 PROCEDURE — 1159F MED LIST DOCD IN RCRD: CPT | Mod: CPTII,S$GLB,, | Performed by: INTERNAL MEDICINE

## 2023-12-27 PROCEDURE — 1101F PT FALLS ASSESS-DOCD LE1/YR: CPT | Mod: CPTII,S$GLB,, | Performed by: INTERNAL MEDICINE

## 2023-12-27 PROCEDURE — 99205 OFFICE O/P NEW HI 60 MIN: CPT | Mod: S$GLB,,, | Performed by: INTERNAL MEDICINE

## 2023-12-27 PROCEDURE — 3075F PR MOST RECENT SYSTOLIC BLOOD PRESS GE 130-139MM HG: ICD-10-PCS | Mod: CPTII,S$GLB,, | Performed by: INTERNAL MEDICINE

## 2023-12-27 PROCEDURE — 3061F NEG MICROALBUMINURIA REV: CPT | Mod: CPTII,S$GLB,, | Performed by: INTERNAL MEDICINE

## 2023-12-27 PROCEDURE — 99999 PR PBB SHADOW E&M-EST. PATIENT-LVL III: CPT | Mod: PBBFAC,,, | Performed by: INTERNAL MEDICINE

## 2023-12-27 PROCEDURE — 3079F PR MOST RECENT DIASTOLIC BLOOD PRESSURE 80-89 MM HG: ICD-10-PCS | Mod: CPTII,S$GLB,, | Performed by: INTERNAL MEDICINE

## 2023-12-27 PROCEDURE — 3044F HG A1C LEVEL LT 7.0%: CPT | Mod: CPTII,S$GLB,, | Performed by: INTERNAL MEDICINE

## 2023-12-27 PROCEDURE — 3075F SYST BP GE 130 - 139MM HG: CPT | Mod: CPTII,S$GLB,, | Performed by: INTERNAL MEDICINE

## 2023-12-27 PROCEDURE — 3288F PR FALLS RISK ASSESSMENT DOCUMENTED: ICD-10-PCS | Mod: CPTII,S$GLB,, | Performed by: INTERNAL MEDICINE

## 2023-12-27 PROCEDURE — 1101F PR PT FALLS ASSESS DOC 0-1 FALLS W/OUT INJ PAST YR: ICD-10-PCS | Mod: CPTII,S$GLB,, | Performed by: INTERNAL MEDICINE

## 2023-12-27 PROCEDURE — 3008F BODY MASS INDEX DOCD: CPT | Mod: CPTII,S$GLB,, | Performed by: INTERNAL MEDICINE

## 2023-12-27 PROCEDURE — 3066F NEPHROPATHY DOC TX: CPT | Mod: CPTII,S$GLB,, | Performed by: INTERNAL MEDICINE

## 2023-12-27 PROCEDURE — 99999 PR PBB SHADOW E&M-EST. PATIENT-LVL III: ICD-10-PCS | Mod: PBBFAC,,, | Performed by: INTERNAL MEDICINE

## 2023-12-27 NOTE — TELEPHONE ENCOUNTER
----- Message from Jocelyne Noriega sent at 12/27/2023 11:48 AM CST -----  Contact: reena Suresh is calling regarding scheduling for her colonoscopy.  Please give her a call back at [phone]

## 2023-12-27 NOTE — PROGRESS NOTES
Patient ID: Demetrice Gaines   Chief Complaint: Establish Care (Breast Cancer)  MRN:  [id number]     Reason for Referral:  Establish Care for Breast   Subjective   Demetrice Gaines is a pleasant 63 y.o. female with history of CAD not requiring PCI, HTN, DM II, CHF and colon polyps who presents to clinic to establish care on referral for breast cancer.    The patient reports that she has been recovering well from surgery.  She has no acute complaints.  She has been having blood in her stool for over a year as well as constipation.  I recommended that we have this moved up to rule out any colonic lesions.  I will message the gastroenterologist to see if they can possibly do a colonoscopy on her prior to starting chemotherapy     I reviewed her Oncotype DX with her.  Unfortunately her recurrence score is 38 indicating a clear benefit for adjuvant chemotherapy.  She is understanding of this.  I reviewed the most common side effects of chemotherapy.  Initially considered giving her AC-T however giving her cardiac history I think that it would be better for the patient unless toxic if we administered TC.  Otherwise she does have some baseline intermittent neuropathy which will need to be monitored.    FHx significant for Mother with primary bone cancer and paternal grandmother with breast cancer    Review of Systems:  Review of Systems   Constitutional:  Negative for activity change, appetite change, chills, diaphoresis, fatigue, fever and unexpected weight change.   HENT:  Negative for nosebleeds.    Respiratory:  Negative for shortness of breath.    Cardiovascular:  Negative for chest pain.   Gastrointestinal:  Positive for blood in stool and constipation. Negative for abdominal distention, abdominal pain, anal bleeding, diarrhea, nausea and vomiting.   Genitourinary:  Negative for difficulty urinating, hematuria and vaginal bleeding.   Musculoskeletal:  Negative for arthralgias, back pain and myalgias.   Skin:   Negative for rash.   Neurological:  Negative for dizziness, weakness, light-headedness and headaches.   Hematological:  Does not bruise/bleed easily.   Psychiatric/Behavioral:  The patient is not nervous/anxious.      History     Oncology History   Breast neoplasm, Tis (DCIS), left (Resolved)   2023 Initial Diagnosis    Breast neoplasm, Tis (DCIS), left     2023 Cancer Staged    Staging form: Breast, AJCC 8th Edition  - Clinical stage from 2023: Stage 0 (cTis (DCIS), cN0, cM0, G3, ER+, DC+, HER2: Not Assessed)      Genetic Testing    Patient has genetic testing done for [employer] STAT breast cancer panel and 84 gene multicancer panel.                                              Results revealed patient has the following mutation(s):negative breast cancer panel- no mutation noted- MLH1- variant of undetermined significance     Malignant neoplasm of upper-outer quadrant of left breast in female, estrogen receptor positive   10/17/2023 Initial Diagnosis    Malignant neoplasm of upper-outer quadrant of left breast in female, estrogen receptor positive     10/18/2023 Cancer Staged    Staging form: Breast, AJCC 8th Edition  - Clinical stage from 10/18/2023: Stage IA (cT1c, cN0(f), cM0, G3, ER+, DC+, HER2-)     2023 Cancer Staged    Staging form: Breast, AJCC 8th Edition  - Pathologic stage from 2023: Stage IA (pT1c, pN0(sn), cM0, G3, ER+, DC+, HER2-)           Past Medical History:   Diagnosis Date    Bilateral pleural effusion 2020    CAD (coronary artery disease)     CHF (congestive heart failure)     Colon polyp     Diabetes mellitus, type 2     Hypertension     Hyponatremia 2020    Malignant neoplasm of upper-outer quadrant of left breast in female, estrogen receptor positive 10/17/2023    Myocardial infarction        Past Surgical History:   Procedure Laterality Date    Benign Cyst Right     BREAST CYST EXCISION Right     pt states benign     SECTION      COLONOSCOPY       COLONOSCOPY N/A 12/31/2019    Procedure: COLONOSCOPY;  Surgeon: Ileana Holcomb MD;  Location: [facility] ENDO;  Service: Endoscopy;  Laterality: N/A;    EXCISION, MASS, BREAST, USING RADIOLOGICAL MARKER Left 11/22/2023    Procedure: EXCISION,MASS,BREAST,USING RADIOLOGICAL MARKER;  Surgeon: Hemalatha Josue MD;  Location: [facility] OR;  Service: General;  Laterality: Left;  radhika  needed    INJECTION FOR SENTINEL NODE IDENTIFICATION Left 10/25/2023    Procedure: INJECTION, FOR SENTINEL NODE IDENTIFICATION;  Surgeon: Hemalatha Josue MD;  Location: [facility] OR;  Service: General;  Laterality: Left;    INSERTION OF BREAST TISSUE EXPANDER Bilateral 10/25/2023    Procedure: INSERTION, TISSUE EXPANDER, BREAST;  Surgeon: Keron Lynn MD;  Location: [facility] OR;  Service: Plastics;  Laterality: Bilateral;    MASTECTOMY, NIPPLE SPARING Bilateral 10/25/2023    Procedure: MASTECTOMY, NIPPLE SPARING;  Surgeon: Hemalatha Josue MD;  Location: [facility] OR;  Service: General;  Laterality: Bilateral;    SENTINEL LYMPH NODE BIOPSY Left 10/25/2023    Procedure: BIOPSY, LYMPH NODE, SENTINEL;  Surgeon: Hemalatha Josue MD;  Location: [facility] OR;  Service: General;  Laterality: Left;  nuc med needed 1 hour before start    ULTRASOUND GUIDANCE Left 10/25/2023    Procedure: ULTRASOUND GUIDANCE;  Surgeon: Hemalatha Josue MD;  Location: [facility] OR;  Service: General;  Laterality: Left;       Family History   Problem Relation Age of Onset    Cancer Mother     Pacemaker/defibrilator Mother     Glaucoma Mother     Arthritis Mother     Heart disease Mother         It seems that this condition runs in my family on my mother's side    Hypertension Mother     Diabetes Father     Hypertension Sister     Glaucoma Sister     Breast cancer Paternal Grandmother     Cancer Paternal Grandmother     Hypertension Brother     COPD Sister     COPD Brother     Hypertension Brother     Hypertension Sister        Review of patient's allergies indicates:  "  Allergen Reactions    Aldactone [spironolactone]      Memory impair and breast soreness    Coreg [carvedilol]      Hair loss    Lisinopril-hydrochlorothiazide      Other reaction(s): Reaction:Throat swelling;       Social History     Tobacco Use    Smoking status: Never     Passive exposure: Never    Smokeless tobacco: Never    Tobacco comments:     NEVER   Substance Use Topics    Alcohol use: Never    Drug use: Never       Physical Exam   ECOG:   ECOG SCORE    0 - Fully active-able to carry on all pre-disease performance without restriction          Vitals:  /86   Pulse 100   Temp 97.7 °F (36.5 °C) (Temporal)   Resp 20   Ht 5' 4" (1.626 m)   Wt 86.2 kg (190 lb 0.6 oz)   SpO2 99%   BMI 32.62 kg/m²     Physical Exam:  Physical Exam  Constitutional:       General: She is not in acute distress.     Appearance: Normal appearance. She is not ill-appearing.   HENT:      Head: Normocephalic and atraumatic.   Eyes:      Extraocular Movements: Extraocular movements intact.      Conjunctiva/sclera: Conjunctivae normal.   Cardiovascular:      Rate and Rhythm: Normal rate.   Pulmonary:      Effort: Pulmonary effort is normal. No respiratory distress.   Abdominal:      Palpations: There is no hepatomegaly, splenomegaly or mass.   Skin:     Findings: No rash.   Neurological:      General: No focal deficit present.      Mental Status: She is alert and oriented to person, place, and time.   Psychiatric:         Mood and Affect: Mood normal.         Behavior: Behavior normal.         Thought Content: Thought content normal.      Labs   Labs:  No visits with results within 2 Day(s) from this visit.   Latest known visit with results is:   Admission on 11/22/2023, Discharged on 11/22/2023   Component Date Value Ref Range Status    POCT Glucose 11/22/2023 99  70 - 110 mg/dL Final    Final Pathologic Diagnosis 11/22/2023    Final                    Value:Breast, left, re-excision:  Invasive ductal carcinoma, poorly " differentiated   [place] grade 3:  Tubule formation-3, nuclear pleomorphism-3 and mitotic count -3   Invasive carcinoma measures 19 x 9 x 8 mm  Positive superior margin, measuring 10 mm in greatest extent  Additional margins:  -Anterior margin:  1 mm  -Medial margin:  6 mm   -Posterior margin:  6.5 mm  -Lateral margin:  9 mm   -Inferior margin:  9 mm  No carcinoma in Situ or lymphovascular invasion is identified  Previous biopsy clip and reflector both grossly identified      Interp By Shelbie Bishop M.D., Signed on 11/28/2023 at 10:46    Gross 11/22/2023    Final                    Value:Surgery ID:  [id number]    Pathology ID:  [id number]   1.  Received in formalin labeled &quot;left breast biopsy&quot; is a 4 g, 2.3 cm superior to inferior by 2.1 cm medial to lateral by 2.3 cm anterior to posterior portion of breast tissue received oriented by the surgeon as short stitch superior and long   stitch lateral.  A biopsy clip and a reflector clip is identified exposed at the anterior-superior margins (see attached image).  The specimen is sectioned from anterior to posterior into 5 slices averaging 0.5 cm in thickness.  Sectioning reveals a 1.9   cm anterior to posterior by 0.9 cm medial to lateral by 0.8 cm superior to inferior well-circumscribed, tan-white, rubbery mass within slices 2-5.  The mass abuts the superior and medial margins and measures 0.9 cm from the lateral margin, 0.9 cm from   the inferior margin, 0.4 cm from the posterior margin, and 0.5 cm from the anterior margin.  There is an X shaped biopsy clip identified within the mass in slice 4 and a reflector clip                           identified adjacent to the mass in slice 1.  The specimen is inked as   follows:  Superior-blue, inferior-green, medial-red, lateral-orange, anterior-yellow, and posterior-black.  The specimen is submitted entirely and sequentially from anterior to posterior as follows:   FXI--1-A:  Slice 1, anterior margin,  perpendicular   GGL--1-B:  Slice 2, mass to include anterior, superior, inferior, medial, and lateral margins   OZD--1-C:  Slice 3, mass to superior, inferior, medial, and lateral margins  NHI--1-D:  Slice 4, mass to superior, inferior, medial, and lateral margins   YQO--1-E:  Slice 5, posterior margin, perpendicular        Grossed by: Carl Donovan MS, PA([facility])      Disclaimer 11/22/2023 Unless the case is a 'gross only' or additional testing only, the final diagnosis for each specimen is based on a microscopic examination of appropriate tissue sections.   Final    POCT Glucose 11/22/2023 93  70 - 110 mg/dL Final      Pathology     Specimen to Pathology, Surgery Breast 10/25/2023      Component 2 mo ago   Final Pathologic Diagnosis 1. Right breast, prophylactic nipple sparing mastectomy (467 grams):      -  Benign breast tissue with fibrocystic change including fibrosis, adenosis, usual ductal hyperplasia         (UDH) and duct ectasia with apocrine metaplasia and focal periductal chronic inflammation      -  Microcalcifications:  Present, associated with benign ductal tissue      -  Skin and nipple are surgically absent      -  Unremarkable skeletal muscle present      -  Negative for atypia or malignancy    2. Left breast, nipple sparing mastectomy (470 grams):      -  Benign breast tissue with focal atypical ductal hyperplasia (ADH, less than 2mm) in a background of         fibrocystic change including fibrosis, adenosis, duct ectasia, focal columnar cell change/hyperplasia         and fibroadenomatoid change      -  Findings of biopsy cavity are NOT appreciated grossly or histologically, see case comment      -  Skin and nipple are surgically absent      -  Dumbbell shaped biopsy clip NOT IDENTIFIED at time of grossing    Comment:  Immunohistochemical stains with appropriate controls were performed on select tissue blocks.  Cytokeratin 5/6 demonstrates a mosaic staining pattern in  areas of ductal hyperplasia, showing no evidence of atypia.  AE1/AE3 is utilized for duct  mapping.  P63 highlights intact myoepithelial cells throughout the specimen, showing no evidence of invasive carcinoma.    3. [place] lymph node #1 (hot and blue, background count 2), biopsy:      -  One lymph node, negative for metastatic carcinoma (0/1)      -  Immunohistochemical stains for CAM 5.2, AE1/AE3 and wide spectrum keratin are negative      4. Left breast axillary tail, excision (11 grams):      -  Benign mature fibroadipose tissue      -  Negative for atypia or malignancy    5. Additional lateral margin, excision (tissue submitted entirely for histologic evaluation):      -  Benign breast tissue with mild fibrocystic change including fibrosis, adenosis and         fibroadenomatoid change      -  Negative for atypia or malignancy      6. Additional anterior lateral margin, excision (tissue submitted entirely for histologic evaluation):      -  Benign breast tissue with fibrocystic change including fibrosis, adenosis, fibroadenomatoid         changes and duct ectasia with apocrine metaplasia      -  Microcalcifications:  Present, associated with benign breast tissue      -  Negative for atypia or malignancy     Comment: Interp By Dianelys Cardona M.D., Signed on 11/08/2023 at 09:29   Comment The patient's history of invasive carcinoma and ductal carcinoma in-situ in the upper outer quadrant is noted.  The specimen is thoroughly searched for both the dumbbell biopsy clip and a biopsy cavity, neither of which are identified grossly or  histologically.  Numerous sections of tissue from specimen 2 (left breast mastectomy) were evaluated.  The patient's additional margins (specimens 5 and 6) were submitted entirely for histologic review and show no evidence of invasive or in-situ  carcinoma.  Specimen 4 (breast axillary tail) was also submitted entirely for histologic review and showed no evidence of invasive or  in-situ carcinoma.  Dr. Bishop has reviewed the above case and agrees with the findings.    Dr. Josue was alerted to these findings via epic message on 11/08/2023 at 09:30.      All stains were performed with adequate positive and negative controls.              Specimen to Pathology, Surgery Breast 11/22/2023      Component 1 mo ago   Final Pathologic Diagnosis Breast, left, re-excision:  Invasive ductal carcinoma, poorly differentiated  [place] grade 3:  Tubule formation-3, nuclear pleomorphism-3 and mitotic count -3  Invasive carcinoma measures 19 x 9 x 8 mm  Positive superior margin, measuring 10 mm in greatest extent  Additional margins:  -Anterior margin:  1 mm  -Medial margin:  6 mm  -Posterior margin:  6.5 mm  -Lateral margin:  9 mm  -Inferior margin:  9 mm  No carcinoma in Situ or lymphovascular invasion is identified  Previous biopsy clip and reflector both grossly identified   Comment: Interp By Shelbie Bishop M.D., Signed on 11/28/2023 at 10:46   Gross Surgery ID:  [id number]    Pathology ID:  [id number]  1.  Received in formalin labeled &quot;left breast biopsy&quot; is a 4 g, 2.3 cm superior to inferior by 2.1 cm medial to lateral by 2.3 cm anterior to posterior portion of breast tissue received oriented by the surgeon as short stitch superior and long  stitch lateral.  A biopsy clip and a reflector clip is identified exposed at the anterior-superior margins (see attached image).  The specimen is sectioned from anterior to posterior into 5 slices averaging 0.5 cm in thickness.  Sectioning reveals a 1.9  cm anterior to posterior by 0.9 cm medial to lateral by 0.8 cm superior to inferior well-circumscribed, tan-white, rubbery mass within slices 2-5.  The mass abuts the superior and medial margins and measures 0.9 cm from the lateral margin, 0.9 cm from  the inferior margin, 0.4 cm from the posterior margin, and 0.5 cm from the anterior margin.  There is an X shaped biopsy clip identified within the mass in  slice 4 and a reflector clip identified adjacent to the mass in slice 1.  The specimen is inked as   follows:  Superior-blue, inferior-green, medial-red, lateral-orange, anterior-yellow, and posterior-black.  The specimen is submitted entirely and sequentially from anterior to posterior as follows:  XHX--1-A:  Slice 1, anterior margin, perpendicular  ARZ--1-B:  Slice 2, mass to include anterior, superior, inferior, medial, and lateral margins  JGK--1-C:  Slice 3, mass to superior, inferior, medial, and lateral margins  HMN--1-D:  Slice 4, mass to superior, inferior, medial, and lateral margins  VHV--1-E:  Slice 5, posterior margin, perpendicular        Grossed by: Carl Donovan MS, PA([facility])        Assessment and Plan   Stage IA (T1c N0 Mx) L Breast Invasive Ductal  Carcinoma, G3, +/+/-, ki67 of 90%   s/p b/l nipple sparing mastectomy and sentinel node biopsy on 10/25/23   s/p excision of primary tumor 11/22/23   Pathology report above  Genetic Testing 09/13/2023: VUS in MLH1  Oncotype Dx Score 38  Needs Port referred to IR  Check TTE  She has a history of heart disease; she has not required PCI however will precert for TC treatment to circumvent potential anthracycline cardiac effects  I discussed in detail the role of medical oncology in her care.  I informed her that my treatment recommendation is based on the NCCN Guidelines, her final pathology and Oncotype DX score  Because of the high Oncotype DX score, chemotherapy is recommended; because of the hormonal make up of her tumor, endocrine therapy is recommended after she completes chemotherapy. I reviewed the major side effects of chemotherapy and  AIs.       Blood in stool   Patient reports blood in her stool for over 1 year   Her GI appointment is currently in February; messaged GI for sooner appointment and colonoscopy preferably prior to starting chemotherapy      Bone Health  Given that she is post menopausal and  endocrine therapy is recommended, she will be monitored for the need for bisphosphonate therapy.  BMD 08/2023 was normal  She will need to take Calcium and vitamin D      Cancer Screening  PAP Smear 09/15/2023: Negative for intraepithelial lesion or malignancy   Colonoscopy 12/31/2019: Normal       Chronic Medical Conditions  DM II  HTN  CAD:  She has not require PCI; stress test 12/26/2019 negative for ischemia and arrhythmias; last echo 06/2020 and showed preserved ejection fraction at 55%; concentric hypertrophy and normal left ventricular diastolic function  CHF  Hx of Colon Polyps  Peripheral neuropathy; intermittent        Med Onc Chart Routing      Follow up with physician 3 weeks.   Follow up with [facility]    Infusion scheduling note   AC-T chemo start   Injection scheduling note    Labs CBC, CMP, magnesium and phosphorus   Scheduling:  Preferred lab:  Lab interval:     Imaging ECHO      Pharmacy appointment    Other referrals       Additional referrals needed  IR for STAT port placement              The patient was seen, interviewed and examined. Pertinent lab and radiologic studies were reviewed. Pt instructed to call should they develop concerning signs/symptoms or have further questions.        Portions of the record may have been created with voice recognition software. Occasional wrong-word or sound-a-like substitutions may have occurred due to the inherent limitations of voice recognition software. Read the chart carefully and recognize, using context, where substitutions have occurred.      Deloris Garcia MD    Hematology/Oncology

## 2023-12-27 NOTE — PROGRESS NOTES
Received message from Dr. Gordon. She has been having recta bleeding for over 1 year now.   I have reviewed her chart.   Her last colonoscopy was 4 years ago. No polyps. Also no hemorrhoids noted at that time.   She has been diagnosed with breast cancer and will be undergoing chemotherapy.     Requesting colonoscopy soon to rule out presence of colon cancer as the cause of rectal bleeding which I agree with.     She has an upcoming appt scheduled with me in early February but will place orders now for colonoscopy to be done soon.

## 2023-12-27 NOTE — H&P (VIEW-ONLY)
Patient ID: Demetrice Gaines   Chief Complaint: Establish Care (Breast Cancer)  MRN:  [id number]     Reason for Referral:  Establish Care for Breast   Subjective   Demetrice Gaines is a pleasant 63 y.o. female with history of CAD not requiring PCI, HTN, DM II, CHF and colon polyps who presents to clinic to establish care on referral for breast cancer.    The patient reports that she has been recovering well from surgery.  She has no acute complaints.  She has been having blood in her stool for over a year as well as constipation.  I recommended that we have this moved up to rule out any colonic lesions.  I will message the gastroenterologist to see if they can possibly do a colonoscopy on her prior to starting chemotherapy     I reviewed her Oncotype DX with her.  Unfortunately her recurrence score is 38 indicating a clear benefit for adjuvant chemotherapy.  She is understanding of this.  I reviewed the most common side effects of chemotherapy.  Initially considered giving her AC-T however giving her cardiac history I think that it would be better for the patient unless toxic if we administered TC.  Otherwise she does have some baseline intermittent neuropathy which will need to be monitored.    FHx significant for Mother with primary bone cancer and paternal grandmother with breast cancer    Review of Systems:  Review of Systems   Constitutional:  Negative for activity change, appetite change, chills, diaphoresis, fatigue, fever and unexpected weight change.   HENT:  Negative for nosebleeds.    Respiratory:  Negative for shortness of breath.    Cardiovascular:  Negative for chest pain.   Gastrointestinal:  Positive for blood in stool and constipation. Negative for abdominal distention, abdominal pain, anal bleeding, diarrhea, nausea and vomiting.   Genitourinary:  Negative for difficulty urinating, hematuria and vaginal bleeding.   Musculoskeletal:  Negative for arthralgias, back pain and myalgias.   Skin:   Negative for rash.   Neurological:  Negative for dizziness, weakness, light-headedness and headaches.   Hematological:  Does not bruise/bleed easily.   Psychiatric/Behavioral:  The patient is not nervous/anxious.      History     Oncology History   Breast neoplasm, Tis (DCIS), left (Resolved)   2023 Initial Diagnosis    Breast neoplasm, Tis (DCIS), left     2023 Cancer Staged    Staging form: Breast, AJCC 8th Edition  - Clinical stage from 2023: Stage 0 (cTis (DCIS), cN0, cM0, G3, ER+, OR+, HER2: Not Assessed)      Genetic Testing    Patient has genetic testing done for Empiriboxitae STAT breast cancer panel and 84 gene multicancer panel.                                              Results revealed patient has the following mutation(s):negative breast cancer panel- no mutation noted- MLH1- variant of undetermined significance     Malignant neoplasm of upper-outer quadrant of left breast in female, estrogen receptor positive   10/17/2023 Initial Diagnosis    Malignant neoplasm of upper-outer quadrant of left breast in female, estrogen receptor positive     10/18/2023 Cancer Staged    Staging form: Breast, AJCC 8th Edition  - Clinical stage from 10/18/2023: Stage IA (cT1c, cN0(f), cM0, G3, ER+, OR+, HER2-)     2023 Cancer Staged    Staging form: Breast, AJCC 8th Edition  - Pathologic stage from 2023: Stage IA (pT1c, pN0(sn), cM0, G3, ER+, OR+, HER2-)           Past Medical History:   Diagnosis Date    Bilateral pleural effusion 2020    CAD (coronary artery disease)     CHF (congestive heart failure)     Colon polyp     Diabetes mellitus, type 2     Hypertension     Hyponatremia 2020    Malignant neoplasm of upper-outer quadrant of left breast in female, estrogen receptor positive 10/17/2023    Myocardial infarction        Past Surgical History:   Procedure Laterality Date    Benign Cyst Right     BREAST CYST EXCISION Right     pt states benign     SECTION      COLONOSCOPY       COLONOSCOPY N/A 12/31/2019    Procedure: COLONOSCOPY;  Surgeon: Ileana Holcomb MD;  Location: [facility] ENDO;  Service: Endoscopy;  Laterality: N/A;    EXCISION, MASS, BREAST, USING RADIOLOGICAL MARKER Left 11/22/2023    Procedure: EXCISION,MASS,BREAST,USING RADIOLOGICAL MARKER;  Surgeon: Hemalatha Josue MD;  Location: [facility] OR;  Service: General;  Laterality: Left;  radhika  needed    INJECTION FOR SENTINEL NODE IDENTIFICATION Left 10/25/2023    Procedure: INJECTION, FOR SENTINEL NODE IDENTIFICATION;  Surgeon: Hemalatha Josue MD;  Location: [facility] OR;  Service: General;  Laterality: Left;    INSERTION OF BREAST TISSUE EXPANDER Bilateral 10/25/2023    Procedure: INSERTION, TISSUE EXPANDER, BREAST;  Surgeon: Keron Lynn MD;  Location: [facility] OR;  Service: Plastics;  Laterality: Bilateral;    MASTECTOMY, NIPPLE SPARING Bilateral 10/25/2023    Procedure: MASTECTOMY, NIPPLE SPARING;  Surgeon: Hemalatha Josue MD;  Location: [facility] OR;  Service: General;  Laterality: Bilateral;    SENTINEL LYMPH NODE BIOPSY Left 10/25/2023    Procedure: BIOPSY, LYMPH NODE, SENTINEL;  Surgeon: Hemalatha Josue MD;  Location: [facility] OR;  Service: General;  Laterality: Left;  nuc med needed 1 hour before start    ULTRASOUND GUIDANCE Left 10/25/2023    Procedure: ULTRASOUND GUIDANCE;  Surgeon: Hemalatha Josue MD;  Location: [facility] OR;  Service: General;  Laterality: Left;       Family History   Problem Relation Age of Onset    Cancer Mother     Pacemaker/defibrilator Mother     Glaucoma Mother     Arthritis Mother     Heart disease Mother         It seems that this condition runs in my family on my mother's side    Hypertension Mother     Diabetes Father     Hypertension Sister     Glaucoma Sister     Breast cancer Paternal Grandmother     Cancer Paternal Grandmother     Hypertension Brother     COPD Sister     COPD Brother     Hypertension Brother     Hypertension Sister        Review of patient's allergies indicates:  "  Allergen Reactions    Aldactone [spironolactone]      Memory impair and breast soreness    Coreg [carvedilol]      Hair loss    Lisinopril-hydrochlorothiazide      Other reaction(s): Reaction:Throat swelling;       Social History     Tobacco Use    Smoking status: Never     Passive exposure: Never    Smokeless tobacco: Never    Tobacco comments:     NEVER   Substance Use Topics    Alcohol use: Never    Drug use: Never       Physical Exam   ECOG:   ECOG SCORE    0 - Fully active-able to carry on all pre-disease performance without restriction          Vitals:  /86   Pulse 100   Temp 97.7 °F (36.5 °C) (Temporal)   Resp 20   Ht 5' 4" (1.626 m)   Wt 86.2 kg (190 lb 0.6 oz)   SpO2 99%   BMI 32.62 kg/m²     Physical Exam:  Physical Exam  Constitutional:       General: She is not in acute distress.     Appearance: Normal appearance. She is not ill-appearing.   HENT:      Head: Normocephalic and atraumatic.   Eyes:      Extraocular Movements: Extraocular movements intact.      Conjunctiva/sclera: Conjunctivae normal.   Cardiovascular:      Rate and Rhythm: Normal rate.   Pulmonary:      Effort: Pulmonary effort is normal. No respiratory distress.   Abdominal:      Palpations: There is no hepatomegaly, splenomegaly or mass.   Skin:     Findings: No rash.   Neurological:      General: No focal deficit present.      Mental Status: She is alert and oriented to person, place, and time.   Psychiatric:         Mood and Affect: Mood normal.         Behavior: Behavior normal.         Thought Content: Thought content normal.      Labs   Labs:  No visits with results within 2 Day(s) from this visit.   Latest known visit with results is:   Admission on 11/22/2023, Discharged on 11/22/2023   Component Date Value Ref Range Status    POCT Glucose 11/22/2023 99  70 - 110 mg/dL Final    Final Pathologic Diagnosis 11/22/2023    Final                    Value:Breast, left, re-excision:  Invasive ductal carcinoma, poorly " differentiated   Baker grade 3:  Tubule formation-3, nuclear pleomorphism-3 and mitotic count -3   Invasive carcinoma measures 19 x 9 x 8 mm  Positive superior margin, measuring 10 mm in greatest extent  Additional margins:  -Anterior margin:  1 mm  -Medial margin:  6 mm   -Posterior margin:  6.5 mm  -Lateral margin:  9 mm   -Inferior margin:  9 mm  No carcinoma in Situ or lymphovascular invasion is identified  Previous biopsy clip and reflector both grossly identified      Interp By Shelbie Bishop M.D., Signed on 11/28/2023 at 10:46    Gross 11/22/2023    Final                    Value:Surgery ID:  [id number]    Pathology ID:  [id number]   1.  Received in formalin labeled &quot;left breast biopsy&quot; is a 4 g, 2.3 cm superior to inferior by 2.1 cm medial to lateral by 2.3 cm anterior to posterior portion of breast tissue received oriented by the surgeon as short stitch superior and long   stitch lateral.  A biopsy clip and a reflector clip is identified exposed at the anterior-superior margins (see attached image).  The specimen is sectioned from anterior to posterior into 5 slices averaging 0.5 cm in thickness.  Sectioning reveals a 1.9   cm anterior to posterior by 0.9 cm medial to lateral by 0.8 cm superior to inferior well-circumscribed, tan-white, rubbery mass within slices 2-5.  The mass abuts the superior and medial margins and measures 0.9 cm from the lateral margin, 0.9 cm from   the inferior margin, 0.4 cm from the posterior margin, and 0.5 cm from the anterior margin.  There is an X shaped biopsy clip identified within the mass in slice 4 and a reflector clip                           identified adjacent to the mass in slice 1.  The specimen is inked as   follows:  Superior-blue, inferior-green, medial-red, lateral-orange, anterior-yellow, and posterior-black.  The specimen is submitted entirely and sequentially from anterior to posterior as follows:   ACW--1-A:  Slice 1, anterior margin,  perpendicular   TYK--1-B:  Slice 2, mass to include anterior, superior, inferior, medial, and lateral margins   IXF--1-C:  Slice 3, mass to superior, inferior, medial, and lateral margins  QUB--1-D:  Slice 4, mass to superior, inferior, medial, and lateral margins   NHU--1-E:  Slice 5, posterior margin, perpendicular        Grossed by: Carl Donovan MS, PA([facility])      Disclaimer 11/22/2023 Unless the case is a 'gross only' or additional testing only, the final diagnosis for each specimen is based on a microscopic examination of appropriate tissue sections.   Final    POCT Glucose 11/22/2023 93  70 - 110 mg/dL Final      Pathology     Specimen to Pathology, Surgery Breast 10/25/2023      Component 2 mo ago   Final Pathologic Diagnosis 1. Right breast, prophylactic nipple sparing mastectomy (467 grams):      -  Benign breast tissue with fibrocystic change including fibrosis, adenosis, usual ductal hyperplasia         (UDH) and duct ectasia with apocrine metaplasia and focal periductal chronic inflammation      -  Microcalcifications:  Present, associated with benign ductal tissue      -  Skin and nipple are surgically absent      -  Unremarkable skeletal muscle present      -  Negative for atypia or malignancy    2. Left breast, nipple sparing mastectomy (470 grams):      -  Benign breast tissue with focal atypical ductal hyperplasia (ADH, less than 2mm) in a background of         fibrocystic change including fibrosis, adenosis, duct ectasia, focal columnar cell change/hyperplasia         and fibroadenomatoid change      -  Findings of biopsy cavity are NOT appreciated grossly or histologically, see case comment      -  Skin and nipple are surgically absent      -  Dumbbell shaped biopsy clip NOT IDENTIFIED at time of grossing    Comment:  Immunohistochemical stains with appropriate controls were performed on select tissue blocks.  Cytokeratin 5/6 demonstrates a mosaic staining pattern in  areas of ductal hyperplasia, showing no evidence of atypia.  AE1/AE3 is utilized for duct  mapping.  P63 highlights intact myoepithelial cells throughout the specimen, showing no evidence of invasive carcinoma.    3. [place] lymph node #1 (hot and blue, background count 2), biopsy:      -  One lymph node, negative for metastatic carcinoma (0/1)      -  Immunohistochemical stains for CAM 5.2, AE1/AE3 and wide spectrum keratin are negative      4. Left breast axillary tail, excision (11 grams):      -  Benign mature fibroadipose tissue      -  Negative for atypia or malignancy    5. Additional lateral margin, excision (tissue submitted entirely for histologic evaluation):      -  Benign breast tissue with mild fibrocystic change including fibrosis, adenosis and         fibroadenomatoid change      -  Negative for atypia or malignancy      6. Additional anterior lateral margin, excision (tissue submitted entirely for histologic evaluation):      -  Benign breast tissue with fibrocystic change including fibrosis, adenosis, fibroadenomatoid         changes and duct ectasia with apocrine metaplasia      -  Microcalcifications:  Present, associated with benign breast tissue      -  Negative for atypia or malignancy     Comment: Interp By Dianelys Cardona M.D., Signed on 11/08/2023 at 09:29   Comment The patient's history of invasive carcinoma and ductal carcinoma in-situ in the upper outer quadrant is noted.  The specimen is thoroughly searched for both the dumbbell biopsy clip and a biopsy cavity, neither of which are identified grossly or  histologically.  Numerous sections of tissue from specimen 2 (left breast mastectomy) were evaluated.  The patient's additional margins (specimens 5 and 6) were submitted entirely for histologic review and show no evidence of invasive or in-situ  carcinoma.  Specimen 4 (breast axillary tail) was also submitted entirely for histologic review and showed no evidence of invasive or  in-situ carcinoma.  Dr. Bishop has reviewed the above case and agrees with the findings.    Dr. Josue was alerted to these findings via epic message on 11/08/2023 at 09:30.      All stains were performed with adequate positive and negative controls.              Specimen to Pathology, Surgery Breast 11/22/2023      Component 1 mo ago   Final Pathologic Diagnosis Breast, left, re-excision:  Invasive ductal carcinoma, poorly differentiated  Boulder grade 3:  Tubule formation-3, nuclear pleomorphism-3 and mitotic count -3  Invasive carcinoma measures 19 x 9 x 8 mm  Positive superior margin, measuring 10 mm in greatest extent  Additional margins:  -Anterior margin:  1 mm  -Medial margin:  6 mm  -Posterior margin:  6.5 mm  -Lateral margin:  9 mm  -Inferior margin:  9 mm  No carcinoma in Situ or lymphovascular invasion is identified  Previous biopsy clip and reflector both grossly identified   Comment: Interp By Shelbie Bishop M.D., Signed on 11/28/2023 at 10:46   Gross Surgery ID:  [id number]    Pathology ID:  [id number]  1.  Received in formalin labeled &quot;left breast biopsy&quot; is a 4 g, 2.3 cm superior to inferior by 2.1 cm medial to lateral by 2.3 cm anterior to posterior portion of breast tissue received oriented by the surgeon as short stitch superior and long  stitch lateral.  A biopsy clip and a reflector clip is identified exposed at the anterior-superior margins (see attached image).  The specimen is sectioned from anterior to posterior into 5 slices averaging 0.5 cm in thickness.  Sectioning reveals a 1.9  cm anterior to posterior by 0.9 cm medial to lateral by 0.8 cm superior to inferior well-circumscribed, tan-white, rubbery mass within slices 2-5.  The mass abuts the superior and medial margins and measures 0.9 cm from the lateral margin, 0.9 cm from  the inferior margin, 0.4 cm from the posterior margin, and 0.5 cm from the anterior margin.  There is an X shaped biopsy clip identified within the mass in  slice 4 and a reflector clip identified adjacent to the mass in slice 1.  The specimen is inked as   follows:  Superior-blue, inferior-green, medial-red, lateral-orange, anterior-yellow, and posterior-black.  The specimen is submitted entirely and sequentially from anterior to posterior as follows:  YWB--1-A:  Slice 1, anterior margin, perpendicular  TDJ--1-B:  Slice 2, mass to include anterior, superior, inferior, medial, and lateral margins  GIP--1-C:  Slice 3, mass to superior, inferior, medial, and lateral margins  QQI--1-D:  Slice 4, mass to superior, inferior, medial, and lateral margins  TGC--1-E:  Slice 5, posterior margin, perpendicular        Grossed by: Carl Donovan MS, PA([facility])        Assessment and Plan   Stage IA (T1c N0 Mx) L Breast Invasive Ductal  Carcinoma, G3, +/+/-, ki67 of 90%   s/p b/l nipple sparing mastectomy and sentinel node biopsy on 10/25/23   s/p excision of primary tumor 11/22/23   Pathology report above  Genetic Testing 09/13/2023: VUS in MLH1  Oncotype Dx Score 38  Needs Port referred to IR  Check TTE  She has a history of heart disease; she has not required PCI however will precert for TC treatment to circumvent potential anthracycline cardiac effects  I discussed in detail the role of medical oncology in her care.  I informed her that my treatment recommendation is based on the NCCN Guidelines, her final pathology and Oncotype DX score  Because of the high Oncotype DX score, chemotherapy is recommended; because of the hormonal make up of her tumor, endocrine therapy is recommended after she completes chemotherapy. I reviewed the major side effects of chemotherapy and  AIs.       Blood in stool   Patient reports blood in her stool for over 1 year   Her GI appointment is currently in February; messaged GI for sooner appointment and colonoscopy preferably prior to starting chemotherapy      Bone Health  Given that she is post menopausal and  endocrine therapy is recommended, she will be monitored for the need for bisphosphonate therapy.  BMD 08/2023 was normal  She will need to take Calcium and vitamin D      Cancer Screening  PAP Smear 09/15/2023: Negative for intraepithelial lesion or malignancy   Colonoscopy 12/31/2019: Normal       Chronic Medical Conditions  DM II  HTN  CAD:  She has not require PCI; stress test 12/26/2019 negative for ischemia and arrhythmias; last echo 06/2020 and showed preserved ejection fraction at 55%; concentric hypertrophy and normal left ventricular diastolic function  CHF  Hx of Colon Polyps  Peripheral neuropathy; intermittent        Med Onc Chart Routing      Follow up with physician 3 weeks.   Follow up with [facility]    Infusion scheduling note   AC-T chemo start   Injection scheduling note    Labs CBC, CMP, magnesium and phosphorus   Scheduling:  Preferred lab:  Lab interval:     Imaging ECHO      Pharmacy appointment    Other referrals       Additional referrals needed  IR for STAT port placement              The patient was seen, interviewed and examined. Pertinent lab and radiologic studies were reviewed. Pt instructed to call should they develop concerning signs/symptoms or have further questions.        Portions of the record may have been created with voice recognition software. Occasional wrong-word or sound-a-like substitutions may have occurred due to the inherent limitations of voice recognition software. Read the chart carefully and recognize, using context, where substitutions have occurred.      Deloris Garcia MD    Hematology/Oncology

## 2023-12-28 ENCOUNTER — HOSPITAL ENCOUNTER (OUTPATIENT)
Dept: PREADMISSION TESTING | Facility: HOSPITAL | Age: 63
Discharge: HOME | End: 2023-12-28
Attending: FAMILY MEDICINE
Payer: MEDICARE

## 2023-12-28 RX ORDER — SODIUM, POTASSIUM,MAG SULFATES 17.5-3.13G
1 SOLUTION, RECONSTITUTED, ORAL ORAL DAILY
Qty: 1 KIT | Refills: 0 | Status: SHIPPED | OUTPATIENT
Start: 2023-12-28 | End: 2023-12-30

## 2023-12-28 RX ORDER — PROCHLORPERAZINE MALEATE 5 MG
5 TABLET ORAL EVERY 6 HOURS PRN
Qty: 20 TABLET | Refills: 11 | Status: SHIPPED | OUTPATIENT
Start: 2024-01-09

## 2023-12-28 RX ORDER — OLANZAPINE 5 MG/1
TABLET ORAL
Qty: 3 TABLET | Refills: 5 | Status: SHIPPED | OUTPATIENT
Start: 2024-01-09 | End: 2024-04-03 | Stop reason: SDUPTHER

## 2023-12-28 NOTE — PLAN OF CARE
START ON PATHWAY REGIMEN - Breast    [id number]        Docetaxel (Taxotere)       Cyclophosphamide           Additional Orders: Premedicate with dexamethasone 8 mg PO bid for three   days beginning 1 day prior to therapy    **Always confirm dose/schedule in your pharmacy ordering system**    Patient Characteristics:  Postoperative without Neoadjuvant Therapy (Pathologic Staging), Invasive   Disease, Adjuvant Therapy, HER2 Negative, ER Positive, Node Negative, pT1a,   pN1mi or pT1b-c, pN0/N1mi or pT2 or Higher, pN0, Oncotype High Risk (? 26)  Therapeutic Status: Postoperative without Neoadjuvant Therapy (Pathologic   Staging)  AJCC Grade: G3  AJCC N Category: pN0  AJCC M Category: cM0  ER Status: Positive (+)  AJCC 8 Stage Grouping: IA  HER2 Status: Negative (-)  Oncotype Dx Recurrence Score: 38  AJCC T Category: pT1c  AZ Status: Positive (+)  Has this patient completed genomic testing<= Yes - Oncotype DX(R)  Intent of Therapy:  Curative Intent, Discussed with Patient

## 2024-01-01 ENCOUNTER — NURSE TRIAGE (OUTPATIENT)
Dept: ADMINISTRATIVE | Facility: CLINIC | Age: 64
End: 2024-01-01
Payer: MEDICARE

## 2024-01-02 ENCOUNTER — ANESTHESIA EVENT (OUTPATIENT)
Dept: ENDOSCOPY | Facility: HOSPITAL | Age: 64
End: 2024-01-02
Payer: MEDICARE

## 2024-01-02 ENCOUNTER — ANESTHESIA (OUTPATIENT)
Dept: ENDOSCOPY | Facility: HOSPITAL | Age: 64
End: 2024-01-02
Payer: MEDICARE

## 2024-01-02 ENCOUNTER — TELEPHONE (OUTPATIENT)
Dept: PREADMISSION TESTING | Facility: HOSPITAL | Age: 64
End: 2024-01-02
Payer: MEDICARE

## 2024-01-02 ENCOUNTER — HOSPITAL ENCOUNTER (OUTPATIENT)
Facility: HOSPITAL | Age: 64
Discharge: HOME | End: 2024-01-02
Attending: FAMILY MEDICINE | Admitting: FAMILY MEDICINE
Payer: MEDICARE

## 2024-01-02 DIAGNOSIS — K63.5 POLYP OF COLON, UNSPECIFIED PART OF COLON, UNSPECIFIED TYPE: ICD-10-CM

## 2024-01-02 DIAGNOSIS — Z86.010 HISTORY OF COLON POLYPS: ICD-10-CM

## 2024-01-02 DIAGNOSIS — K62.5 RECTAL BLEEDING: Primary | ICD-10-CM

## 2024-01-02 DIAGNOSIS — K63.9 ABNORMALITY OF COLON: ICD-10-CM

## 2024-01-02 PROCEDURE — 37000008 HC ANESTHESIA 1ST 15 MINUTES: Performed by: FAMILY MEDICINE

## 2024-01-02 PROCEDURE — 88305 TISSUE EXAM BY PATHOLOGIST: CPT | Mod: 59 | Performed by: STUDENT IN AN ORGANIZED HEALTH CARE EDUCATION/TRAINING PROGRAM

## 2024-01-02 PROCEDURE — 45380 COLONOSCOPY AND BIOPSY: CPT | Performed by: FAMILY MEDICINE

## 2024-01-02 PROCEDURE — 45380 COLONOSCOPY AND BIOPSY: CPT | Mod: ,,, | Performed by: FAMILY MEDICINE

## 2024-01-02 PROCEDURE — 27201012 HC FORCEPS, HOT/COLD, DISP: Performed by: FAMILY MEDICINE

## 2024-01-02 PROCEDURE — 63600175 PHARM REV CODE 636 W HCPCS: Performed by: NURSE ANESTHETIST, CERTIFIED REGISTERED

## 2024-01-02 PROCEDURE — 25000003 PHARM REV CODE 250: Performed by: NURSE ANESTHETIST, CERTIFIED REGISTERED

## 2024-01-02 PROCEDURE — 37000009 HC ANESTHESIA EA ADD 15 MINS: Performed by: FAMILY MEDICINE

## 2024-01-02 PROCEDURE — 88305 TISSUE EXAM BY PATHOLOGIST: CPT | Mod: 26,,, | Performed by: STUDENT IN AN ORGANIZED HEALTH CARE EDUCATION/TRAINING PROGRAM

## 2024-01-02 RX ORDER — LIDOCAINE HYDROCHLORIDE 10 MG/ML
INJECTION, SOLUTION EPIDURAL; INFILTRATION; INTRACAUDAL; PERINEURAL
Status: DISCONTINUED | OUTPATIENT
Start: 2024-01-02 | End: 2024-01-02

## 2024-01-02 RX ORDER — PROPOFOL 10 MG/ML
VIAL (ML) INTRAVENOUS
Status: DISCONTINUED | OUTPATIENT
Start: 2024-01-02 | End: 2024-01-02

## 2024-01-02 RX ADMIN — Medication 50 MG: at 01:01

## 2024-01-02 RX ADMIN — SODIUM CHLORIDE, POTASSIUM CHLORIDE, SODIUM LACTATE AND CALCIUM CHLORIDE: 600; 310; 30; 20 INJECTION, SOLUTION INTRAVENOUS at 01:01

## 2024-01-02 RX ADMIN — LIDOCAINE HYDROCHLORIDE 50 MG: 10 SOLUTION INTRAVENOUS at 01:01

## 2024-01-02 NOTE — ANESTHESIA POSTPROCEDURE EVALUATION
Anesthesia Post Evaluation    Patient: Demetrice Gaines    Procedure(s) Performed: Procedure(s) (LRB):  COLONOSCOPY (N/A)    Final Anesthesia Type: MAC      Patient location during evaluation: GI PACU  Patient participation: Yes- Able to Participate  Level of consciousness: awake and alert  Post-procedure vital signs: reviewed and stable  Pain management: adequate  Airway patency: patent    PONV status at discharge: No PONV  Anesthetic complications: no      Cardiovascular status: blood pressure returned to baseline, hemodynamically stable and stable  Respiratory status: unassisted, room air and spontaneous ventilation  Hydration status: euvolemic  Follow-up not needed.              Vitals Value Taken Time   /76 01/02/24 1347   Temp 36.6 °C (97.9 °F) 01/02/24 1327   Pulse 79 01/02/24 1347   Resp 18 01/02/24 1337   SpO2 96 % 01/02/24 1347         No case tracking events are documented in the log.      Pain/Bryce Score: Bryce Score: 9 (1/2/2024  1:37 PM)

## 2024-01-02 NOTE — TELEPHONE ENCOUNTER
Pt calling with questions regarding procedure tomorrow. Needs arrival time and financial waver. Also has questions regarding the time she should begin taking her second portion of her prep. I have provided her with the number for financial information. Also provided with the endo number to call in the AM. She verbalizes understanding. Would like follow up in this regard.    Reason for Disposition   Nursing judgment or information in reference    Protocols used: No Guideline [id number]

## 2024-01-02 NOTE — H&P
Short Stay Endoscopy History and Physical    PCP - Lul Alvarez MD    Procedure - Colonoscopy  ASA - 2  Mallampati - per anesthesia  History of Anesthesia problems - no  Family history Anesthesia problems -  no     HPI:  This is a 63 y.o. female here for evaluation of :   Active Hospital Problems    Diagnosis  POA    *Rectal bleeding [K62.5]  Yes    History of colon polyps [Z86.010]  Not Applicable      Resolved Hospital Problems   No resolved problems to display.         Health Maintenance         Date Due Completion Date    Aspirin/Antiplatelet Therapy Never done ---    RSV Vaccine (Age 60+ and Pregnant patients) (1 - 1-dose 60+ series) Never done ---    Foot Exam 01/05/2023 1/5/2022 (Done)    Override on 1/5/2022: Done    Influenza Vaccine (1) 09/01/2023 11/16/2020    COVID-19 Vaccine (4 - 2023-24 season) 09/01/2023 12/9/2021    Shingles Vaccine (1 of 2) 02/17/2024 (Originally 2/17/1979) ---    Pneumococcal Vaccines (Age 65+) (2 - PCV) 02/17/2024 (Originally 11/16/2021) 11/16/2020    Diabetes Urine Screening 02/16/2024 2/16/2023    Lipid Panel 03/20/2024 3/20/2023    Hemoglobin A1c 04/17/2024 10/17/2023    Eye Exam 08/22/2024 8/22/2023    Override on 8/22/2023: Done    Mammogram 10/04/2024 10/4/2023    DEXA Scan 08/22/2026 8/22/2023    TETANUS VACCINE 12/18/2029 12/18/2019    Colorectal Cancer Screening 12/31/2029 12/31/2019            Screening - No  History of polyps - No     Diarrhea - no  Anemia - no  Blood in stools - She has had rectal bleeding so this procedure is medically needed asap to clear her colon due to the need for her to get chemo in the near future and needs clearance prior to that.  Abdominal pain - no  Other - no    ROS:  CONSTITUTIONAL: Denies weight change,  fatigue, fevers, chills, night sweats.  CARDIOVASCULAR: Denies chest pain, shortness of breath, orthopnea and edema.  RESPIRATORY: Denies cough, hemoptysis, dyspnea, and wheezing.  GI: See HPI.    Medical History:   Past Medical  History:   Diagnosis Date    Bilateral pleural effusion 2020    CAD (coronary artery disease)     CHF (congestive heart failure)     Colon polyp     Diabetes mellitus, type 2     Hypertension     Hyponatremia 2020    Malignant neoplasm of upper-outer quadrant of left breast in female, estrogen receptor positive 10/17/2023    Myocardial infarction        Surgical History:   Past Surgical History:   Procedure Laterality Date    Benign Cyst Right     BREAST CYST EXCISION Right 1986    pt states benign     SECTION      COLONOSCOPY      COLONOSCOPY N/A 2019    Procedure: COLONOSCOPY;  Surgeon: Ileana Holcomb MD;  Location: [facility];  Service: Endoscopy;  Laterality: N/A;    EXCISION, MASS, BREAST, USING RADIOLOGICAL MARKER Left 2023    Procedure: EXCISION,MASS,BREAST,USING RADIOLOGICAL MARKER;  Surgeon: Hemalatha Josue MD;  Location: [facility] [place] [facility];  Service: General;  Laterality: Left;  radhika  needed    INJECTION FOR SENTINEL NODE IDENTIFICATION Left 10/25/2023    Procedure: INJECTION, FOR SENTINEL NODE IDENTIFICATION;  Surgeon: Hemalatha Josue MD;  Location: [facility] [place] [facility];  Service: General;  Laterality: Left;    INSERTION OF BREAST TISSUE EXPANDER Bilateral 10/25/2023    Procedure: INSERTION, TISSUE EXPANDER, BREAST;  Surgeon: Keron Lynn MD;  Location: [facility] [place] [facility];  Service: Plastics;  Laterality: Bilateral;    MASTECTOMY, NIPPLE SPARING Bilateral 10/25/2023    Procedure: MASTECTOMY, NIPPLE SPARING;  Surgeon: Hemalatha Josue MD;  Location: [facility] [place] [facility];  Service: General;  Laterality: Bilateral;    SENTINEL LYMPH NODE BIOPSY Left 10/25/2023    Procedure: BIOPSY, LYMPH NODE, SENTINEL;  Surgeon: Hemalatha Josue MD;  Location: [facility] [place] [facility];  Service: General;  Laterality: Left;  nuc med needed 1 hour before start    ULTRASOUND GUIDANCE Left 10/25/2023    Procedure: ULTRASOUND GUIDANCE;  Surgeon: Hemalatha Josue MD;  Location: [facility] [place] [facility];  Service: General;  Laterality: Left;        Family History:   Family History   Problem Relation Age of Onset    Cancer Mother     Pacemaker/defibrilator Mother     Glaucoma Mother     Arthritis Mother     Heart disease Mother         It seems that this condition runs in my family on my mother's side    Hypertension Mother     Diabetes Father     Hypertension Sister     Glaucoma Sister     Breast cancer Paternal Grandmother     Cancer Paternal Grandmother     Hypertension Brother     COPD Sister     COPD Brother     Hypertension Brother     Hypertension Sister        Social History:   Social History     Tobacco Use    Smoking status: Never     Passive exposure: Never    Smokeless tobacco: Never    Tobacco comments:     NEVER   Substance Use Topics    Alcohol use: Never    Drug use: Never       Allergies:   Review of patient's allergies indicates:   Allergen Reactions    Aldactone [spironolactone]      Memory impair and breast soreness    Coreg [carvedilol]      Hair loss    Lisinopril-hydrochlorothiazide      Other reaction(s): Reaction:Throat swelling;       Medications:   No current facility-administered medications on file prior to encounter.     Current Outpatient Medications on File Prior to Encounter   Medication Sig Dispense Refill    amlodipine-olmesartan (LEATHA) 10-40 mg per tablet Take 1 tablet by mouth once daily. 90 tablet 3    chlorthalidone (HYGROTEN) 25 MG Tab Take 1 tablet (25 mg total) by mouth once daily. 90 tablet 3    blood sugar diagnostic (ONETOUCH VERIO TEST STRIPS) Strp Use to check blood sugar twice daily 100 each 6    lancets (ONETOUCH ULTRASOFT LANCETS) Misc Use to check blood sugar twice daily 100 each 6    [START ON 1/9/2024] OLANZapine (ZYPREXA) 5 MG tablet Take 1 tablet by mouth nightly on days 1-3 of each chemotherapy cycle. 3 tablet 5    ondansetron (ZOFRAN-ODT) 8 MG TbDL Take 1 tablet (8 mg total) by mouth every 8 (eight) hours as needed (Nausea). 12 tablet 2    [START ON 1/9/2024] prochlorperazine (COMPAZINE) 5 MG tablet  Take 1 tablet (5 mg total) by mouth every 6 (six) hours as needed for Nausea. 20 tablet 11    semaglutide (OZEMPIC) 1 mg/dose (4 mg/3 mL) Inject 1 mg into the skin every 7 days. 9 mL 3    traMADoL (ULTRAM) 50 mg tablet Take 1 tablet (50 mg total) by mouth every 6 (six) hours as needed for Pain. 15 tablet 0       Physical Exam:  Vital Signs:   Vitals:    01/02/24 1202   BP: (!) 156/101   Pulse: 99   Resp: 18   Temp: 98.2 °F (36.8 °C)     General Appearance: Well appearing in no acute distress  ENT: OP clear  Chest: CTA B  CV: RRR, no m/r/g  Abd: s/nt/nd/nabs  Ext: no edema    Labs:Reviewed    IMP:  Active Hospital Problems    Diagnosis  POA    *Rectal bleeding [K62.5]  Yes    History of colon polyps [Z86.010]  Not Applicable      Resolved Hospital Problems   No resolved problems to display.         Plan:   I have explained the risks and benefits of colonoscopy to the patient including but not limited to bleeding, perforation, infection, and death. The patient wishes to proceed.

## 2024-01-02 NOTE — TRANSFER OF CARE
Anesthesia Transfer of Care Note    Patient: Demetrice Gaines    Procedure(s) Performed: Procedure(s) (LRB):  COLONOSCOPY (N/A)    Patient location: GI    Anesthesia Type: MAC    Transport from OR: Transported from OR on room air with adequate spontaneous ventilation    Post pain: adequate analgesia    Post assessment: no apparent anesthetic complications and tolerated procedure well    Post vital signs: stable    Level of consciousness: responds to stimulation    Nausea/Vomiting: no nausea/vomiting    Complications: none    Transfer of care protocol was followed      Last vitals: Visit Vitals  BP (!) 156/101   Pulse 99   Temp 36.8 °C (98.2 °F)   Resp 18   Wt 83.9 kg (185 lb)   SpO2 98%   Breastfeeding No   BMI 31.76 kg/m²

## 2024-01-02 NOTE — H&P (VIEW-ONLY)
Short Stay Endoscopy History and Physical    PCP - Lul Alvarez MD    Procedure - Colonoscopy  ASA - 2  Mallampati - per anesthesia  History of Anesthesia problems - no  Family history Anesthesia problems -  no     HPI:  This is a 63 y.o. female here for evaluation of :   Active Hospital Problems    Diagnosis  POA    *Rectal bleeding [K62.5]  Yes    History of colon polyps [Z86.010]  Not Applicable      Resolved Hospital Problems   No resolved problems to display.         Health Maintenance         Date Due Completion Date    Aspirin/Antiplatelet Therapy Never done ---    RSV Vaccine (Age 60+ and Pregnant patients) (1 - 1-dose 60+ series) Never done ---    Foot Exam 01/05/2023 1/5/2022 (Done)    Override on 1/5/2022: Done    Influenza Vaccine (1) 09/01/2023 11/16/2020    COVID-19 Vaccine (4 - 2023-24 season) 09/01/2023 12/9/2021    Shingles Vaccine (1 of 2) 02/17/2024 (Originally 2/17/1979) ---    Pneumococcal Vaccines (Age 65+) (2 - PCV) 02/17/2024 (Originally 11/16/2021) 11/16/2020    Diabetes Urine Screening 02/16/2024 2/16/2023    Lipid Panel 03/20/2024 3/20/2023    Hemoglobin A1c 04/17/2024 10/17/2023    Eye Exam 08/22/2024 8/22/2023    Override on 8/22/2023: Done    Mammogram 10/04/2024 10/4/2023    DEXA Scan 08/22/2026 8/22/2023    TETANUS VACCINE 12/18/2029 12/18/2019    Colorectal Cancer Screening 12/31/2029 12/31/2019            Screening - No  History of polyps - No     Diarrhea - no  Anemia - no  Blood in stools - She has had rectal bleeding so this procedure is medically needed asap to clear her colon due to the need for her to get chemo in the near future and needs clearance prior to that.  Abdominal pain - no  Other - no    ROS:  CONSTITUTIONAL: Denies weight change,  fatigue, fevers, chills, night sweats.  CARDIOVASCULAR: Denies chest pain, shortness of breath, orthopnea and edema.  RESPIRATORY: Denies cough, hemoptysis, dyspnea, and wheezing.  GI: See HPI.    Medical History:   Past Medical  History:   Diagnosis Date    Bilateral pleural effusion 2020    CAD (coronary artery disease)     CHF (congestive heart failure)     Colon polyp     Diabetes mellitus, type 2     Hypertension     Hyponatremia 2020    Malignant neoplasm of upper-outer quadrant of left breast in female, estrogen receptor positive 10/17/2023    Myocardial infarction        Surgical History:   Past Surgical History:   Procedure Laterality Date    Benign Cyst Right     BREAST CYST EXCISION Right 1986    pt states benign     SECTION      COLONOSCOPY      COLONOSCOPY N/A 2019    Procedure: COLONOSCOPY;  Surgeon: Ileana Holcomb MD;  Location: [facility];  Service: Endoscopy;  Laterality: N/A;    EXCISION, MASS, BREAST, USING RADIOLOGICAL MARKER Left 2023    Procedure: EXCISION,MASS,BREAST,USING RADIOLOGICAL MARKER;  Surgeon: Hemalatha Josue MD;  Location: [facility] OR;  Service: General;  Laterality: Left;  radhika  needed    INJECTION FOR SENTINEL NODE IDENTIFICATION Left 10/25/2023    Procedure: INJECTION, FOR SENTINEL NODE IDENTIFICATION;  Surgeon: Hemalatha Josue MD;  Location: [facility] OR;  Service: General;  Laterality: Left;    INSERTION OF BREAST TISSUE EXPANDER Bilateral 10/25/2023    Procedure: INSERTION, TISSUE EXPANDER, BREAST;  Surgeon: Keron Lynn MD;  Location: [facility] OR;  Service: Plastics;  Laterality: Bilateral;    MASTECTOMY, NIPPLE SPARING Bilateral 10/25/2023    Procedure: MASTECTOMY, NIPPLE SPARING;  Surgeon: Hemalatha Josue MD;  Location: [facility] OR;  Service: General;  Laterality: Bilateral;    SENTINEL LYMPH NODE BIOPSY Left 10/25/2023    Procedure: BIOPSY, LYMPH NODE, SENTINEL;  Surgeon: Hemalatha Josue MD;  Location: [facility] OR;  Service: General;  Laterality: Left;  nuc med needed 1 hour before start    ULTRASOUND GUIDANCE Left 10/25/2023    Procedure: ULTRASOUND GUIDANCE;  Surgeon: Hemalatha Josue MD;  Location: [facility] OR;  Service: General;  Laterality: Left;        Family History:   Family History   Problem Relation Age of Onset    Cancer Mother     Pacemaker/defibrilator Mother     Glaucoma Mother     Arthritis Mother     Heart disease Mother         It seems that this condition runs in my family on my mother's side    Hypertension Mother     Diabetes Father     Hypertension Sister     Glaucoma Sister     Breast cancer Paternal Grandmother     Cancer Paternal Grandmother     Hypertension Brother     COPD Sister     COPD Brother     Hypertension Brother     Hypertension Sister        Social History:   Social History     Tobacco Use    Smoking status: Never     Passive exposure: Never    Smokeless tobacco: Never    Tobacco comments:     NEVER   Substance Use Topics    Alcohol use: Never    Drug use: Never       Allergies:   Review of patient's allergies indicates:   Allergen Reactions    Aldactone [spironolactone]      Memory impair and breast soreness    Coreg [carvedilol]      Hair loss    Lisinopril-hydrochlorothiazide      Other reaction(s): Reaction:Throat swelling;       Medications:   No current facility-administered medications on file prior to encounter.     Current Outpatient Medications on File Prior to Encounter   Medication Sig Dispense Refill    amlodipine-olmesartan (LEATHA) 10-40 mg per tablet Take 1 tablet by mouth once daily. 90 tablet 3    chlorthalidone (HYGROTEN) 25 MG Tab Take 1 tablet (25 mg total) by mouth once daily. 90 tablet 3    blood sugar diagnostic (ONETOUCH VERIO TEST STRIPS) Strp Use to check blood sugar twice daily 100 each 6    lancets (ONETOUCH ULTRASOFT LANCETS) Misc Use to check blood sugar twice daily 100 each 6    [START ON 1/9/2024] OLANZapine (ZYPREXA) 5 MG tablet Take 1 tablet by mouth nightly on days 1-3 of each chemotherapy cycle. 3 tablet 5    ondansetron (ZOFRAN-ODT) 8 MG TbDL Take 1 tablet (8 mg total) by mouth every 8 (eight) hours as needed (Nausea). 12 tablet 2    [START ON 1/9/2024] prochlorperazine (COMPAZINE) 5 MG tablet  Take 1 tablet (5 mg total) by mouth every 6 (six) hours as needed for Nausea. 20 tablet 11    semaglutide (OZEMPIC) 1 mg/dose (4 mg/3 mL) Inject 1 mg into the skin every 7 days. 9 mL 3    traMADoL (ULTRAM) 50 mg tablet Take 1 tablet (50 mg total) by mouth every 6 (six) hours as needed for Pain. 15 tablet 0       Physical Exam:  Vital Signs:   Vitals:    01/02/24 1202   BP: (!) 156/101   Pulse: 99   Resp: 18   Temp: 98.2 °F (36.8 °C)     General Appearance: Well appearing in no acute distress  ENT: OP clear  Chest: CTA B  CV: RRR, no m/r/g  Abd: s/nt/nd/nabs  Ext: no edema    Labs:Reviewed    IMP:  Active Hospital Problems    Diagnosis  POA    *Rectal bleeding [K62.5]  Yes    History of colon polyps [Z86.010]  Not Applicable      Resolved Hospital Problems   No resolved problems to display.         Plan:   I have explained the risks and benefits of colonoscopy to the patient including but not limited to bleeding, perforation, infection, and death. The patient wishes to proceed.

## 2024-01-02 NOTE — TELEPHONE ENCOUNTER
Spoke to pt and she is here at the hospital to do her procedure. She stated she found out what she needed to know for today.

## 2024-01-02 NOTE — ANESTHESIA PREPROCEDURE EVALUATION
01/02/2024  Demetrice Gaines is a 63 y.o., female.    Patient Active Problem List   Diagnosis    Old myocardial infarction    History of colon polyps    Type 2 diabetes mellitus with circulatory disorder, without long-term current use of insulin    Cardiomegaly    Coronary artery disease involving native coronary artery of native heart without angina pectoris    Chronic diastolic congestive heart failure    Epiretinal membrane (ERM) of right eye    Hypertension associated with diabetes    Aortic atherosclerosis    Encounter for education    At risk for self care deficit    Pre-op exam    Malignant neoplasm of upper-outer quadrant of left breast in female, estrogen receptor positive    Decreased range of motion    Decreased activities of daily living (ADL)    Decreased muscle strength    Pain    Refusal of statin medication by patient    Rectal bleeding        Pre-op Assessment    I have reviewed the Patient Summary Reports.     I have reviewed the Nursing Notes. I have reviewed the NPO Status.   I have reviewed the Medications.     Review of Systems  Cardiovascular:     Hypertension  Past MI CAD       CHF           Coronary Artery Disease:          Hx of Myocardial Infarction     Congestive Heart Failure (CHF)                Hypertension         Hepatic/GI:  Bowel Prep.                Endocrine:  Diabetes    Diabetes                          Physical Exam  General: Well nourished, Cooperative, Alert and Oriented    Airway:  Mallampati: II   Mouth Opening: Normal  TM Distance: Normal  Tongue: Normal  Neck ROM: Normal ROM    Dental:  Intact        Anesthesia Plan  Type of Anesthesia, risks & benefits discussed:    Anesthesia Type: MAC, Gen ETT  Intra-op Monitoring Plan: Standard ASA Monitors  Post Op Pain Control Plan: multimodal analgesia  Induction:  IV  Informed Consent: Informed consent signed with the  Patient and all parties understand the risks and agree with anesthesia plan.  All questions answered.   ASA Score: 3  Day of Surgery Review of History & Physical: H&P Update referred to the surgeon/provider.    Ready For Surgery From Anesthesia Perspective.     .

## 2024-01-02 NOTE — [employer] PATIENT INSTRUCTIONS
Discharge Summary/Instructions after an Endoscopic Procedure  Patient Name: Demetrice Gaines  Patient MRN: [id number]  Patient YOB: 1960 Tuesday, January 2, 2024 Lobo Mitchell MD  Dear patient,  As a result of recent federal legislation (The Federal Cures Act), you may   receive lab or pathology results from your procedure in your [facility]   account before your physician is able to contact you. Your physician or   their representative will relay the results to you with their   recommendations at their soonest availability.  Thank you,  RESTRICTIONS:  During your procedure today, you received medications for sedation.  These   medications may affect your judgment, balance and coordination.  Therefore,   for 24 hours, you have the following restrictions:   - DO NOT drive a car, operate machinery, make legal/financial decisions,   sign important papers or drink alcohol.    ACTIVITY:  Today: no heavy lifting, straining or running due to procedural   sedation/anesthesia.  The following day: return to full activity including work.  DIET:  Eat and drink normally unless instructed otherwise.     TREATMENT FOR COMMON SIDE EFFECTS:  - Mild abdominal pain, nausea, belching, bloating or excessive gas:  rest,   eat lightly and use a heating pad.  - Sore Throat: treat with throat lozenges and/or gargle with warm salt   water.  - Because air was used during the procedure, expelling large amounts of air   from your rectum or belching is normal.  - If a bowel prep was taken, you may not have a bowel movement for 1-3 days.    This is normal.  SYMPTOMS TO WATCH FOR AND REPORT TO YOUR PHYSICIAN:  1. Abdominal pain or bloating, other than gas cramps.  2. Chest pain.  3. Back pain.  4. Signs of infection such as: chills or fever occurring within 24 hours   after the procedure.  5. Rectal bleeding, which would show as bright red, maroon, or black stools.   (A tablespoon of blood from the rectum is not serious, especially  if   hemorrhoids are present.)  6. Vomiting.  7. Weakness or dizziness.  GO DIRECTLY TO THE NEAREST EMERGENCY ROOM IF YOU HAVE ANY OF THE FOLLOWING:      Difficulty breathing              Chills and/or fever over 101 F   Persistent vomiting and/or vomiting blood   Severe abdominal pain   Severe chest pain   Black, tarry stools   Bleeding- more than one tablespoon   Any other symptom or condition that you feel may need urgent attention  Your doctor recommends these additional instructions:  If any biopsies were taken, your doctors clinic will contact you in 1 to 2   weeks with any results.  - Patient has a contact number available for emergencies.  The signs and   symptoms of potential delayed complications were discussed with the   patient.  Return to normal activities tomorrow.  Written discharge   instructions were provided to the patient.   - Resume previous diet.   - Continue present medications.   - Await pathology results.   - Repeat colonoscopy in 5 years for surveillance.   - Telephone my office for pathology results in 2 weeks.   - I find no reason at this time that she can't have her chemo based on the   exam of the colon but do recommend a CT of the abdomen to rule out anything   external to the colon that might have cause the bulging of the cecum.    - Perform CT scan (computed tomography) of the abdomen/pelvis.    - Return to referring physician in 2 weeks.   - Discharge patient to home (via wheelchair).  For questions, problems or results please call your physician Lobo Mitchell MD at Work:  [phone]  If you have any questions about the above instructions, call the GI   department at [phone] or call the endoscopy unit at [phone]   from 7am until 3 pm.  [facility] [place], EMERGENCY ROOM PHONE NUMBER:   [phone]  IF A COMPLICATION OR EMERGENCY SITUATION ARISES AND YOU ARE UNABLE TO REACH   YOUR PHYSICIAN - GO DIRECTLY TO THE EMERGENCY ROOM.  I have read or  have had read to me these discharge instructions for my   procedure and have received a written copy.  I understand these   instructions and will follow-up with my physician if I have any questions.     __________________________________       _____________________________________  Nurse Signature                                          Patient/Designated   Responsible Party Signature  Lobo Mitchell MD  1/2/2024 1:36:31 PM  This report has been verified and signed electronically.  Dear patient,  As a result of recent federal legislation (The Federal Cures Act), you may   receive lab or pathology results from your procedure in your [facility]   account before your physician is able to contact you. Your physician or   their representative will relay the results to you with their   recommendations at their soonest availability.  Thank you,  [employer]

## 2024-01-03 ENCOUNTER — TELEPHONE (OUTPATIENT)
Dept: CARDIOLOGY | Facility: HOSPITAL | Age: 64
End: 2024-01-03
Payer: MEDICARE

## 2024-01-03 VITALS
OXYGEN SATURATION: 96 % | BODY MASS INDEX: 31.76 KG/M2 | WEIGHT: 185 LBS | SYSTOLIC BLOOD PRESSURE: 121 MMHG | TEMPERATURE: 98 F | RESPIRATION RATE: 18 BRPM | DIASTOLIC BLOOD PRESSURE: 76 MMHG | HEART RATE: 79 BPM

## 2024-01-03 LAB
FINAL PATHOLOGIC DIAGNOSIS: NORMAL
GROSS: NORMAL
Lab: NORMAL

## 2024-01-03 NOTE — TELEPHONE ENCOUNTER
----- Message from Mary Jane Jacobs sent at 1/3/2024 10:23 AM CST -----  Contact: Tyron Elmore is calling in regards to her appt on 01/03 and would like to get a call back to reschedule the appt.  Please call back at  [phone].      Thanks

## 2024-01-03 NOTE — TELEPHONE ENCOUNTER
Patient also request [facility]  financial aide forms to be sent to [address], [place] [address].    Forms sent as requested

## 2024-01-04 ENCOUNTER — TELEPHONE (OUTPATIENT)
Dept: HEMATOLOGY/ONCOLOGY | Facility: CLINIC | Age: 64
End: 2024-01-04
Payer: MEDICARE

## 2024-01-04 NOTE — TELEPHONE ENCOUNTER
[facility] was consulted to contact patient regarding transportation to her appointments. SWER informed patient that [employer]ER is out of transportation funds at this time. SWER reports she could contact [employer] [facility]. SWER called [employer] [facility] for patient and she does not have the transportation benefit. SWER will call [employer] to assist with transportation and sitter for Monday for port placement and contact patient back.

## 2024-01-08 ENCOUNTER — HOSPITAL ENCOUNTER (OUTPATIENT)
Facility: HOSPITAL | Age: 64
Discharge: HOME | End: 2024-01-08
Attending: RADIOLOGY | Admitting: RADIOLOGY
Payer: MEDICARE

## 2024-01-08 ENCOUNTER — HOSPITAL ENCOUNTER (OUTPATIENT)
Dept: RADIOLOGY | Facility: HOSPITAL | Age: 64
Discharge: HOME | End: 2024-01-08
Attending: INTERNAL MEDICINE
Payer: MEDICARE

## 2024-01-08 ENCOUNTER — TELEPHONE (OUTPATIENT)
Dept: HEMATOLOGY/ONCOLOGY | Facility: CLINIC | Age: 64
End: 2024-01-08
Payer: MEDICARE

## 2024-01-08 VITALS
TEMPERATURE: 98 F | DIASTOLIC BLOOD PRESSURE: 68 MMHG | RESPIRATION RATE: 10 BRPM | HEIGHT: 64 IN | HEART RATE: 82 BPM | BODY MASS INDEX: 31.58 KG/M2 | SYSTOLIC BLOOD PRESSURE: 116 MMHG | OXYGEN SATURATION: 97 % | WEIGHT: 185 LBS

## 2024-01-08 DIAGNOSIS — D05.12 BREAST NEOPLASM, TIS (DCIS), LEFT: ICD-10-CM

## 2024-01-08 DIAGNOSIS — Z17.0 MALIGNANT NEOPLASM OF UPPER-OUTER QUADRANT OF LEFT BREAST IN FEMALE, ESTROGEN RECEPTOR POSITIVE: ICD-10-CM

## 2024-01-08 DIAGNOSIS — C50.412 MALIGNANT NEOPLASM OF UPPER-OUTER QUADRANT OF LEFT BREAST IN FEMALE, ESTROGEN RECEPTOR POSITIVE: ICD-10-CM

## 2024-01-08 DIAGNOSIS — C50.412 MALIGNANT NEOPLASM OF UPPER-OUTER QUADRANT OF LEFT BREAST IN FEMALE, ESTROGEN RECEPTOR POSITIVE: Chronic | ICD-10-CM

## 2024-01-08 DIAGNOSIS — Z17.0 MALIGNANT NEOPLASM OF UPPER-OUTER QUADRANT OF LEFT BREAST IN FEMALE, ESTROGEN RECEPTOR POSITIVE: Chronic | ICD-10-CM

## 2024-01-08 LAB
ALBUMIN SERPL BCP-MCNC: 4.5 G/DL (ref 3.5–5.2)
ALP SERPL-CCNC: 60 U/L (ref 55–135)
ALT SERPL W/O P-5'-P-CCNC: 15 U/L (ref 10–44)
ANION GAP SERPL CALC-SCNC: 14 MMOL/L (ref 8–16)
APTT PPP: 27.1 SEC (ref 21–32)
AST SERPL-CCNC: 20 U/L (ref 10–40)
BASOPHILS # BLD AUTO: 0.07 K/UL (ref 0–0.2)
BASOPHILS NFR BLD: 0.9 % (ref 0–1.9)
BILIRUB SERPL-MCNC: 0.5 MG/DL (ref 0.1–1)
BUN SERPL-MCNC: 16 MG/DL (ref 8–23)
CALCIUM SERPL-MCNC: 10 MG/DL (ref 8.7–10.5)
CHLORIDE SERPL-SCNC: 98 MMOL/L (ref 95–110)
CO2 SERPL-SCNC: 25 MMOL/L (ref 23–29)
CREAT SERPL-MCNC: 0.9 MG/DL (ref 0.5–1.4)
DIFFERENTIAL METHOD BLD: ABNORMAL
EOSINOPHIL # BLD AUTO: 0.1 K/UL (ref 0–0.5)
EOSINOPHIL NFR BLD: 1.7 % (ref 0–8)
ERYTHROCYTE [DISTWIDTH] IN BLOOD BY AUTOMATED COUNT: 13.1 % (ref 11.5–14.5)
EST. GFR  (NO RACE VARIABLE): >60 ML/MIN/1.73 M^2
GLUCOSE SERPL-MCNC: 94 MG/DL (ref 70–110)
HCT VFR BLD AUTO: 44.2 % (ref 37–48.5)
HGB BLD-MCNC: 15.1 G/DL (ref 12–16)
IMM GRANULOCYTES # BLD AUTO: 0.02 K/UL (ref 0–0.04)
IMM GRANULOCYTES NFR BLD AUTO: 0.2 % (ref 0–0.5)
INR PPP: 1 (ref 0.8–1.2)
LYMPHOCYTES # BLD AUTO: 3.2 K/UL (ref 1–4.8)
LYMPHOCYTES NFR BLD: 40 % (ref 18–48)
MCH RBC QN AUTO: 28.9 PG (ref 27–31)
MCHC RBC AUTO-ENTMCNC: 34.2 G/DL (ref 32–36)
MCV RBC AUTO: 85 FL (ref 82–98)
MONOCYTES # BLD AUTO: 0.6 K/UL (ref 0.3–1)
MONOCYTES NFR BLD: 7.7 % (ref 4–15)
NEUTROPHILS # BLD AUTO: 4 K/UL (ref 1.8–7.7)
NEUTROPHILS NFR BLD: 49.5 % (ref 38–73)
NRBC BLD-RTO: 0 /100 WBC
PLATELET # BLD AUTO: 405 K/UL (ref 150–450)
PMV BLD AUTO: 9 FL (ref 9.2–12.9)
POTASSIUM SERPL-SCNC: 3.5 MMOL/L (ref 3.5–5.1)
PROT SERPL-MCNC: 8.3 G/DL (ref 6–8.4)
PROTHROMBIN TIME: 10.1 SEC (ref 9–12.5)
RBC # BLD AUTO: 5.23 M/UL (ref 4–5.4)
SODIUM SERPL-SCNC: 137 MMOL/L (ref 136–145)
WBC # BLD AUTO: 8.07 K/UL (ref 3.9–12.7)

## 2024-01-08 PROCEDURE — 63600175 PHARM REV CODE 636 W HCPCS: Performed by: RADIOLOGY

## 2024-01-08 PROCEDURE — 36561 INSERT TUNNELED CV CATH: CPT | Mod: RT

## 2024-01-08 PROCEDURE — 85610 PROTHROMBIN TIME: CPT | Performed by: RADIOLOGY

## 2024-01-08 PROCEDURE — 76937 US GUIDE VASCULAR ACCESS: CPT | Mod: 26,,, | Performed by: RADIOLOGY

## 2024-01-08 PROCEDURE — C1788 PORT, INDWELLING, IMP: HCPCS | Performed by: RADIOLOGY

## 2024-01-08 PROCEDURE — 77001 FLUOROGUIDE FOR VEIN DEVICE: CPT | Mod: TC

## 2024-01-08 PROCEDURE — 85025 COMPLETE CBC W/AUTO DIFF WBC: CPT | Performed by: RADIOLOGY

## 2024-01-08 PROCEDURE — 25000003 PHARM REV CODE 250: Performed by: RADIOLOGY

## 2024-01-08 PROCEDURE — 80053 COMPREHEN METABOLIC PANEL: CPT | Performed by: RADIOLOGY

## 2024-01-08 PROCEDURE — 36561 INSERT TUNNELED CV CATH: CPT | Mod: RT,,, | Performed by: RADIOLOGY

## 2024-01-08 PROCEDURE — 85730 THROMBOPLASTIN TIME PARTIAL: CPT | Performed by: RADIOLOGY

## 2024-01-08 DEVICE — POWERPORT CLEARVUE IMPLANTABLE PORT WITH ATTACHABLE 8F POLYURETHANE OPEN-ENDED SINGLE-LUMEN VENOUS CATHETER INTERMEDIATE KIT
Type: IMPLANTABLE DEVICE | Site: CHEST  WALL | Status: FUNCTIONAL
Brand: POWERPORT CLEARVUE

## 2024-01-08 RX ORDER — LIDOCAINE HYDROCHLORIDE 20 MG/ML
INJECTION, SOLUTION INFILTRATION; PERINEURAL CODE/TRAUMA/SEDATION MEDICATION
Status: COMPLETED | OUTPATIENT
Start: 2024-01-08 | End: 2024-01-08

## 2024-01-08 RX ORDER — CEFAZOLIN SODIUM 1 G/3ML
INJECTION, POWDER, FOR SOLUTION INTRAMUSCULAR; INTRAVENOUS CODE/TRAUMA/SEDATION MEDICATION
Status: COMPLETED | OUTPATIENT
Start: 2024-01-08 | End: 2024-01-08

## 2024-01-08 RX ORDER — MIDAZOLAM HYDROCHLORIDE 1 MG/ML
INJECTION INTRAMUSCULAR; INTRAVENOUS CODE/TRAUMA/SEDATION MEDICATION
Status: COMPLETED | OUTPATIENT
Start: 2024-01-08 | End: 2024-01-08

## 2024-01-08 RX ORDER — FENTANYL CITRATE 50 UG/ML
INJECTION, SOLUTION INTRAMUSCULAR; INTRAVENOUS CODE/TRAUMA/SEDATION MEDICATION
Status: COMPLETED | OUTPATIENT
Start: 2024-01-08 | End: 2024-01-08

## 2024-01-08 RX ADMIN — FENTANYL CITRATE 50 MCG: 50 INJECTION, SOLUTION INTRAMUSCULAR; INTRAVENOUS at 01:01

## 2024-01-08 RX ADMIN — MIDAZOLAM HYDROCHLORIDE 1 MG: 1 INJECTION, SOLUTION INTRAMUSCULAR; INTRAVENOUS at 01:01

## 2024-01-08 RX ADMIN — CEFAZOLIN 2 G: 330 INJECTION, POWDER, FOR SOLUTION INTRAMUSCULAR; INTRAVENOUS at 01:01

## 2024-01-08 RX ADMIN — LIDOCAINE HYDROCHLORIDE 5 ML: 20 INJECTION, SOLUTION INFILTRATION; PERINEURAL at 01:01

## 2024-01-08 RX ADMIN — MIDAZOLAM HYDROCHLORIDE 0.5 MG: 1 INJECTION, SOLUTION INTRAMUSCULAR; INTRAVENOUS at 01:01

## 2024-01-08 RX ADMIN — FENTANYL CITRATE 25 MCG: 50 INJECTION, SOLUTION INTRAMUSCULAR; INTRAVENOUS at 01:01

## 2024-01-08 NOTE — PROCEDURES
Radiology Post-Procedure Note    Pre Op Diagnosis: breast ca    Post Op Diagnosis: Same    Procedure: PORT placement    Procedure performed by: Sylvester Zazueta MD    Written Informed Consent Obtained: Yes    Specimen Removed: No    Estimated Blood Loss: Minimal    Findings:   Using realtime U/S guidance a 8 Fr port catheter was placed into the right jugular vein with tip of the catheter in the SVC.    Port is ready for use.     Sylvester Zazueta MD  Staff Radiologist  Department of Radiology  Pager: [phone]

## 2024-01-08 NOTE — TELEPHONE ENCOUNTER
Called pt to discuss scheduling tx, states she is getting ready to leave for her appt but will call me once she's back home. Gave her my direct number, told her will wait to hear from her. She voiced understanding.

## 2024-01-08 NOTE — DISCHARGE SUMMARY
Radiology Discharge Summary      Hospital Course: No complications    Admit Date: 1/8/2024  Discharge Date: 01/08/2024     Instructions Given to Patient: Yes  Diet: regular diet and Resume prior diet  Activity: no heavy lifting, pushing, pulling with the implant side for 2 months    Description of Condition on Discharge: Stable  Vital Signs (Most Recent):      Discharge Disposition: Home    Discharge Diagnosis: breast ca     Follow-up: with ir in 1-2 weeks for site check    Sylvester Zazueta MD  Staff Radiologist  Department of Radiology  Pager: [phone]

## 2024-01-08 NOTE — SEDATION DOCUMENTATION
Pt transferred to [facility] and transported back to [facility] at this time.

## 2024-01-08 NOTE — SEDATION DOCUMENTATION
Procedure completed at this time. VSS, NADN, and pt tolerated procedure well. Dermabond, steri-strips, aquacel dressing applied to right upper chest port placement site.

## 2024-01-12 ENCOUNTER — DOCUMENTATION ONLY (OUTPATIENT)
Dept: HEMATOLOGY/ONCOLOGY | Facility: CLINIC | Age: 64
End: 2024-01-12
Payer: MEDICARE

## 2024-01-12 NOTE — PROGRESS NOTES
SWER spoke with patient on this morning. Pt reports due to her transportation issues she needs all of her appts on one day. SWER previously called patient's insurance and patient does not have transportation benefits. SWER will assist with a gas card. SWER sent email to navigation's management to assist patient with appts. SWER will remain available.

## 2024-01-18 ENCOUNTER — DOCUMENTATION ONLY (OUTPATIENT)
Dept: HEMATOLOGY/ONCOLOGY | Facility: CLINIC | Age: 64
End: 2024-01-18
Payer: MEDICARE

## 2024-01-18 ENCOUNTER — TELEPHONE (OUTPATIENT)
Dept: HEMATOLOGY/ONCOLOGY | Facility: CLINIC | Age: 64
End: 2024-01-18
Payer: MEDICARE

## 2024-01-18 ENCOUNTER — HOSPITAL ENCOUNTER (OUTPATIENT)
Dept: RADIOLOGY | Facility: HOSPITAL | Age: 64
Discharge: HOME | End: 2024-01-18
Attending: FAMILY MEDICINE
Payer: MEDICARE

## 2024-01-18 DIAGNOSIS — Z86.010 HISTORY OF COLON POLYPS: ICD-10-CM

## 2024-01-18 DIAGNOSIS — K63.5 POLYP OF COLON, UNSPECIFIED PART OF COLON, UNSPECIFIED TYPE: ICD-10-CM

## 2024-01-18 DIAGNOSIS — K62.5 RECTAL BLEEDING: ICD-10-CM

## 2024-01-18 DIAGNOSIS — K63.9 ABNORMALITY OF COLON: ICD-10-CM

## 2024-01-18 PROCEDURE — 74176 CT ABD & PELVIS W/O CONTRAST: CPT | Mod: 26,,, | Performed by: RADIOLOGY

## 2024-01-18 PROCEDURE — A9698 NON-RAD CONTRAST MATERIALNOC: HCPCS | Performed by: FAMILY MEDICINE

## 2024-01-18 PROCEDURE — 25500020 PHARM REV CODE 255: Performed by: FAMILY MEDICINE

## 2024-01-18 PROCEDURE — 74176 CT ABD & PELVIS W/O CONTRAST: CPT | Mod: TC

## 2024-01-18 RX ADMIN — IOHEXOL 1000 ML: 9 SOLUTION ORAL at 03:01

## 2024-01-19 ENCOUNTER — DOCUMENTATION ONLY (OUTPATIENT)
Dept: HEMATOLOGY/ONCOLOGY | Facility: CLINIC | Age: 64
End: 2024-01-19
Payer: MEDICARE

## 2024-01-19 NOTE — PROGRESS NOTES
SWER submitted [employer] check request for home instead on today for patient. [employer] will mail check directly to home instead to repay for the services that was provided. SWER will remain available.

## 2024-01-22 ENCOUNTER — HOSPITAL ENCOUNTER (OUTPATIENT)
Dept: CARDIOLOGY | Facility: HOSPITAL | Age: 64
Discharge: HOME | End: 2024-01-22
Attending: INTERNAL MEDICINE
Payer: MEDICARE

## 2024-01-22 VITALS
SYSTOLIC BLOOD PRESSURE: 133 MMHG | HEIGHT: 64 IN | WEIGHT: 185 LBS | DIASTOLIC BLOOD PRESSURE: 86 MMHG | BODY MASS INDEX: 31.58 KG/M2

## 2024-01-22 DIAGNOSIS — Z17.0 MALIGNANT NEOPLASM OF UPPER-OUTER QUADRANT OF LEFT BREAST IN FEMALE, ESTROGEN RECEPTOR POSITIVE: Chronic | ICD-10-CM

## 2024-01-22 DIAGNOSIS — C50.412 MALIGNANT NEOPLASM OF UPPER-OUTER QUADRANT OF LEFT BREAST IN FEMALE, ESTROGEN RECEPTOR POSITIVE: Chronic | ICD-10-CM

## 2024-01-22 LAB
AORTIC ROOT ANNULUS: 3.22 CM
ASCENDING AORTA: 3.69 CM
AV INDEX (PROSTH): 0.82
AV MEAN GRADIENT: 4 MMHG
AV PEAK GRADIENT: 8 MMHG
AV VALVE AREA BY VELOCITY RATIO: 2.28 CM²
AV VALVE AREA: 2.28 CM²
AV VELOCITY RATIO: 0.82
BSA FOR ECHO PROCEDURE: 1.95 M2
CV ECHO LV RWT: 0.57 CM
DOP CALC AO PEAK VEL: 1.41 M/S
DOP CALC AO VTI: 27.8 CM
DOP CALC LVOT AREA: 2.8 CM2
DOP CALC LVOT DIAMETER: 1.88 CM
DOP CALC LVOT PEAK VEL: 1.16 M/S
DOP CALC LVOT STROKE VOLUME: 63.26 CM3
DOP CALC RVOT PEAK VEL: 0.65 M/S
DOP CALC RVOT VTI: 11.2 CM
DOP CALCLVOT PEAK VEL VTI: 22.8 CM
E WAVE DECELERATION TIME: 203.97 MSEC
E/A RATIO: 0.49
E/E' RATIO: 6.77 M/S
ECHO LV POSTERIOR WALL: 1.08 CM (ref 0.6–1.1)
FRACTIONAL SHORTENING: 30 % (ref 28–44)
GLOBAL LONGITUIDAL STRAIN: 14.4 %
INTERVENTRICULAR SEPTUM: 1.01 CM (ref 0.6–1.1)
LA MAJOR: 5.34 CM
LA MINOR: 5.37 CM
LA WIDTH: 2.6 CM
LEFT ATRIUM SIZE: 2.76 CM
LEFT ATRIUM VOLUME INDEX MOD: 14.5 ML/M2
LEFT ATRIUM VOLUME INDEX: 17.3 ML/M2
LEFT ATRIUM VOLUME MOD: 27.35 CM3
LEFT ATRIUM VOLUME: 32.66 CM3
LEFT INTERNAL DIMENSION IN SYSTOLE: 2.64 CM (ref 2.1–4)
LEFT VENTRICLE DIASTOLIC VOLUME INDEX: 32.67 ML/M2
LEFT VENTRICLE DIASTOLIC VOLUME: 61.74 ML
LEFT VENTRICLE MASS INDEX: 66 G/M2
LEFT VENTRICLE SYSTOLIC VOLUME INDEX: 13.6 ML/M2
LEFT VENTRICLE SYSTOLIC VOLUME: 25.62 ML
LEFT VENTRICULAR INTERNAL DIMENSION IN DIASTOLE: 3.79 CM (ref 3.5–6)
LEFT VENTRICULAR MASS: 124.45 G
LV LATERAL E/E' RATIO: 4.89 M/S
LV SEPTAL E/E' RATIO: 11 M/S
LVOT MG: 3.07 MMHG
LVOT MV: 0.83 CM/S
MV PEAK A VEL: 0.89 M/S
MV PEAK E VEL: 0.44 M/S
MV STENOSIS PRESSURE HALF TIME: 59.15 MS
MV VALVE AREA P 1/2 METHOD: 3.72 CM2
OHS LV EJECTION FRACTION SIMPSONS BIPLANE MOD: 57 %
PULM VEIN S/D RATIO: 2.55
PV MEAN GRADIENT: 1 MMHG
PV MV: 0.5 M/S
PV PEAK D VEL: 0.29 M/S
PV PEAK GRADIENT: 2 MMHG
PV PEAK S VEL: 0.74 M/S
PV PEAK VELOCITY: 0.79 M/S
RA MAJOR: 5.15 CM
RA PRESSURE ESTIMATED: 3 MMHG
RA WIDTH: 2.98 CM
RIGHT VENTRICULAR END-DIASTOLIC DIMENSION: 3.16 CM
SINUS: 3.51 CM
STJ: 3.49 CM
TDI LATERAL: 0.09 M/S
TDI SEPTAL: 0.04 M/S
TDI: 0.07 M/S
Z-SCORE OF LEFT VENTRICULAR DIMENSION IN END DIASTOLE: -3.34
Z-SCORE OF LEFT VENTRICULAR DIMENSION IN END SYSTOLE: -1.67

## 2024-01-22 PROCEDURE — 93306 TTE W/DOPPLER COMPLETE: CPT | Mod: 26,,, | Performed by: INTERNAL MEDICINE

## 2024-01-22 PROCEDURE — 93356 MYOCRD STRAIN IMG SPCKL TRCK: CPT | Mod: ,,, | Performed by: INTERNAL MEDICINE

## 2024-01-22 PROCEDURE — 93306 TTE W/DOPPLER COMPLETE: CPT

## 2024-01-24 ENCOUNTER — OFFICE VISIT (OUTPATIENT)
Dept: INTERNAL MEDICINE | Facility: CLINIC | Age: 64
End: 2024-01-24
Payer: MEDICARE

## 2024-01-24 ENCOUNTER — LAB VISIT (OUTPATIENT)
Dept: LAB | Facility: HOSPITAL | Age: 64
End: 2024-01-24
Attending: INTERNAL MEDICINE
Payer: MEDICARE

## 2024-01-24 VITALS
DIASTOLIC BLOOD PRESSURE: 84 MMHG | SYSTOLIC BLOOD PRESSURE: 132 MMHG | TEMPERATURE: 98 F | WEIGHT: 195.56 LBS | HEIGHT: 64 IN | HEART RATE: 103 BPM | BODY MASS INDEX: 33.38 KG/M2 | RESPIRATION RATE: 16 BRPM | OXYGEN SATURATION: 97 %

## 2024-01-24 DIAGNOSIS — Z53.20 REFUSAL OF STATIN MEDICATION BY PATIENT: ICD-10-CM

## 2024-01-24 DIAGNOSIS — C50.412 MALIGNANT NEOPLASM OF UPPER-OUTER QUADRANT OF LEFT BREAST IN FEMALE, ESTROGEN RECEPTOR POSITIVE: Chronic | ICD-10-CM

## 2024-01-24 DIAGNOSIS — I25.10 CORONARY ARTERY DISEASE INVOLVING NATIVE CORONARY ARTERY OF NATIVE HEART WITHOUT ANGINA PECTORIS: Chronic | ICD-10-CM

## 2024-01-24 DIAGNOSIS — Z17.0 MALIGNANT NEOPLASM OF UPPER-OUTER QUADRANT OF LEFT BREAST IN FEMALE, ESTROGEN RECEPTOR POSITIVE: Chronic | ICD-10-CM

## 2024-01-24 DIAGNOSIS — I15.2 HYPERTENSION ASSOCIATED WITH DIABETES: Chronic | ICD-10-CM

## 2024-01-24 DIAGNOSIS — E11.59 HYPERTENSION ASSOCIATED WITH DIABETES: Chronic | ICD-10-CM

## 2024-01-24 DIAGNOSIS — E11.59 TYPE 2 DIABETES MELLITUS WITH OTHER CIRCULATORY COMPLICATION, WITHOUT LONG-TERM CURRENT USE OF INSULIN: ICD-10-CM

## 2024-01-24 DIAGNOSIS — I50.32 CHRONIC DIASTOLIC CONGESTIVE HEART FAILURE: Chronic | ICD-10-CM

## 2024-01-24 DIAGNOSIS — I70.0 AORTIC ATHEROSCLEROSIS: Chronic | ICD-10-CM

## 2024-01-24 DIAGNOSIS — E11.59 TYPE 2 DIABETES MELLITUS WITH OTHER CIRCULATORY COMPLICATION, WITHOUT LONG-TERM CURRENT USE OF INSULIN: Primary | ICD-10-CM

## 2024-01-24 LAB
ALBUMIN SERPL BCP-MCNC: 4 G/DL (ref 3.5–5.2)
ALP SERPL-CCNC: 69 U/L (ref 55–135)
ALT SERPL W/O P-5'-P-CCNC: 14 U/L (ref 10–44)
ANION GAP SERPL CALC-SCNC: 8 MMOL/L (ref 8–16)
AST SERPL-CCNC: 17 U/L (ref 10–40)
BASOPHILS # BLD AUTO: 0.08 K/UL (ref 0–0.2)
BASOPHILS NFR BLD: 1.1 % (ref 0–1.9)
BILIRUB SERPL-MCNC: 0.3 MG/DL (ref 0.1–1)
BUN SERPL-MCNC: 13 MG/DL (ref 8–23)
CALCIUM SERPL-MCNC: 9 MG/DL (ref 8.7–10.5)
CHLORIDE SERPL-SCNC: 101 MMOL/L (ref 95–110)
CO2 SERPL-SCNC: 27 MMOL/L (ref 23–29)
CREAT SERPL-MCNC: 0.9 MG/DL (ref 0.5–1.4)
DIFFERENTIAL METHOD BLD: ABNORMAL
EOSINOPHIL # BLD AUTO: 0.2 K/UL (ref 0–0.5)
EOSINOPHIL NFR BLD: 2.1 % (ref 0–8)
ERYTHROCYTE [DISTWIDTH] IN BLOOD BY AUTOMATED COUNT: 13.4 % (ref 11.5–14.5)
EST. GFR  (NO RACE VARIABLE): >60 ML/MIN/1.73 M^2
GLUCOSE SERPL-MCNC: 200 MG/DL (ref 70–110)
HCT VFR BLD AUTO: 39 % (ref 37–48.5)
HGB BLD-MCNC: 13.5 G/DL (ref 12–16)
IMM GRANULOCYTES # BLD AUTO: 0.02 K/UL (ref 0–0.04)
IMM GRANULOCYTES NFR BLD AUTO: 0.3 % (ref 0–0.5)
LYMPHOCYTES # BLD AUTO: 2.7 K/UL (ref 1–4.8)
LYMPHOCYTES NFR BLD: 35.3 % (ref 18–48)
MAGNESIUM SERPL-MCNC: 1.3 MG/DL (ref 1.6–2.6)
MCH RBC QN AUTO: 29 PG (ref 27–31)
MCHC RBC AUTO-ENTMCNC: 34.6 G/DL (ref 32–36)
MCV RBC AUTO: 84 FL (ref 82–98)
MONOCYTES # BLD AUTO: 0.5 K/UL (ref 0.3–1)
MONOCYTES NFR BLD: 6.9 % (ref 4–15)
NEUTROPHILS # BLD AUTO: 4.1 K/UL (ref 1.8–7.7)
NEUTROPHILS NFR BLD: 54.3 % (ref 38–73)
NRBC BLD-RTO: 0 /100 WBC
PHOSPHATE SERPL-MCNC: 2.8 MG/DL (ref 2.7–4.5)
PLATELET # BLD AUTO: 358 K/UL (ref 150–450)
PMV BLD AUTO: 8.7 FL (ref 9.2–12.9)
POTASSIUM SERPL-SCNC: 3.7 MMOL/L (ref 3.5–5.1)
PROT SERPL-MCNC: 7.9 G/DL (ref 6–8.4)
RBC # BLD AUTO: 4.65 M/UL (ref 4–5.4)
SODIUM SERPL-SCNC: 136 MMOL/L (ref 136–145)
WBC # BLD AUTO: 7.51 K/UL (ref 3.9–12.7)

## 2024-01-24 PROCEDURE — 85025 COMPLETE CBC W/AUTO DIFF WBC: CPT | Performed by: INTERNAL MEDICINE

## 2024-01-24 PROCEDURE — 80053 COMPREHEN METABOLIC PANEL: CPT | Performed by: INTERNAL MEDICINE

## 2024-01-24 PROCEDURE — 36415 COLL VENOUS BLD VENIPUNCTURE: CPT | Performed by: FAMILY MEDICINE

## 2024-01-24 PROCEDURE — 99214 OFFICE O/P EST MOD 30 MIN: CPT | Mod: S$GLB,,, | Performed by: FAMILY MEDICINE

## 2024-01-24 PROCEDURE — 84100 ASSAY OF PHOSPHORUS: CPT | Performed by: INTERNAL MEDICINE

## 2024-01-24 PROCEDURE — 83735 ASSAY OF MAGNESIUM: CPT | Performed by: INTERNAL MEDICINE

## 2024-01-24 PROCEDURE — 83036 HEMOGLOBIN GLYCOSYLATED A1C: CPT | Performed by: FAMILY MEDICINE

## 2024-01-24 PROCEDURE — 99999 PR PBB SHADOW E&M-EST. PATIENT-LVL IV: CPT | Mod: PBBFAC,,, | Performed by: FAMILY MEDICINE

## 2024-01-24 PROCEDURE — 36415 COLL VENOUS BLD VENIPUNCTURE: CPT | Mod: XB | Performed by: INTERNAL MEDICINE

## 2024-01-24 RX ORDER — ASPIRIN 81 MG/1
81 TABLET ORAL DAILY
COMMUNITY

## 2024-01-24 NOTE — PROGRESS NOTES
SWER was consulted to assist patient with a gas card at the [facility] location. SWER asked nurse navigator to provide patient with a $50 gas card to assist with transportation to and from app. SWER will remain available.

## 2024-01-24 NOTE — PROGRESS NOTES
Subjective:   Patient ID: Demetrice Gaines is a 63 y.o. female.  Chief Complaint:  Follow-up    Presents for overdue follow-up on chronic medical conditions     Follow-up was delayed due to diagnosis of breast cancer   Has undergone successful breast surgery surgery  Recently underwent colonoscopy due to history of rectal bleeding to make sure no colon cancer present prior to starting chemotherapy   Multiple polyps removed, but otherwise colonoscopy unremarkable  Overall doing well    Last visit August 2023   - Type 2 diabetes mellitus with other circulatory complication, without long-term current use of insulin  Controlled.  Stable.  Asymptomatic.  A1c less than 8%.    Increased Ozempic 1 mg weekly injection   Discontinue metformin  Recommended repeat A1c 3 months   A1c October 2023 6.4% and well controlled   Denies any symptoms of hypo or hyperglycemia   Eye exam and microalbumin up-to-date   Needs foot exam     - Hypertension associated with diabetes  - Chronic diastolic congestive heart failure  - Cardiomegaly  Last visit blood pressure elevated and uncontrolled but asymptomatic   Noncompliance with medical regimen   Continued Elisabet 10/40 mg daily   Started chlorthalidone 25 mg daily as recommended by Cardiology   Reports compliance.  Denies side effects.  Blood pressure controlled.     - Coronary artery disease involving native coronary artery of native heart without angina pectoris  - Aortic atherosclerosis  - History of MI (myocardial infarction)  - Refusal of statin medication by patient  Multiple discussions in past about statin indication indication to decrease significant cardiovascular risk due to her diabetes and to prevent another heart attack   Patient continues to refuse/decline treatment   Also offered potential Repatha injections, but patient refused/declined treatment   Aspirin has been on home due to recent surgeries and procedures, but plans to restart    No new complaints today    Review of  "Systems   Constitutional:  Negative for diaphoresis, fatigue and fever.   Eyes:  Negative for visual disturbance.   Respiratory:  Negative for shortness of breath.    Cardiovascular:  Negative for chest pain and leg swelling.   Gastrointestinal:  Negative for abdominal pain, constipation, diarrhea, nausea and vomiting.   Endocrine: Negative for polydipsia, polyphagia and polyuria.   Genitourinary:  Negative for difficulty urinating, dysuria, flank pain, frequency, hematuria and urgency.   Musculoskeletal:  Negative for myalgias.   Skin:  Negative for rash.   Neurological:  Negative for dizziness, weakness, light-headedness, numbness and headaches.       Objective:   /84 (BP Location: Right arm, Patient Position: Sitting, BP Method: Medium (Manual))   Pulse 103   Temp 97.7 °F (36.5 °C) (Tympanic)   Resp 16   Ht 5' 4" (1.626 m)   Wt 88.7 kg (195 lb 8.8 oz)   SpO2 97%   BMI 33.57 kg/m²     Physical Exam  Vitals and nursing note reviewed.   Constitutional:       Appearance: Normal appearance. She is well-developed and normal weight.   Neck:      Vascular: No carotid bruit or JVD.   Cardiovascular:      Rate and Rhythm: Normal rate and regular rhythm.      Pulses:           Dorsalis pedis pulses are 2+ on the right side and 2+ on the left side.        Posterior tibial pulses are 2+ on the right side and 2+ on the left side.      Heart sounds: Normal heart sounds.   Pulmonary:      Effort: Pulmonary effort is normal.      Breath sounds: Normal breath sounds.   Musculoskeletal:      Right foot: Normal range of motion. No deformity or bunion.      Left foot: Normal range of motion. No deformity or bunion.   Feet:      Right foot:      Protective Sensation: 5 sites tested.  5 sites sensed.      Skin integrity: No ulcer, blister, skin breakdown, erythema, warmth, callus, dry skin or fissure.      Toenail Condition: Right toenails are normal.      Left foot:      Protective Sensation: 5 sites tested.  5 sites " sensed.      Skin integrity: No ulcer, blister, skin breakdown, erythema, warmth, callus, dry skin or fissure.      Toenail Condition: Left toenails are normal.   Skin:     Findings: No rash.   Psychiatric:         Mood and Affect: Mood and affect normal.       Assessment:       ICD-10-CM ICD-9-CM   1. Type 2 diabetes mellitus with other circulatory complication, without long-term current use of insulin  E11.59 250.70   2. Coronary artery disease involving native coronary artery of native heart without angina pectoris  I25.10 414.01   3. Refusal of statin medication by patient  Z53.20 V64.2   4. Aortic atherosclerosis  I70.0 440.0   5. Hypertension associated with diabetes  E11.59 250.80    I15.2 401.9   6. Chronic diastolic congestive heart failure  I50.32 428.32     428.0   7. Malignant neoplasm of upper-outer quadrant of left breast in female, estrogen receptor positive  C50.412 174.4    Z17.0 V86.0     Plan:   Type 2 diabetes mellitus with other circulatory complication, without long-term current use of insulin  -     Hemoglobin A1C; Future; Expected date: 01/24/2024  -     Microalbumin/Creatinine Ratio, Urine; Future; Expected date: 01/24/2024  Asymptomatic   A1c added to today's blood draw  Adjust Ozempic 1 mg weekly injection if needed   Okay remain off metformin   Eye exam up-to-date  Microalbumin ordered for next lab visit  Foot exam stable     Coronary artery disease involving native coronary artery of native heart without angina pectoris  Refusal of statin medication by patient  Aortic atherosclerosis  -     Lipid Panel; Future; Expected date: 01/24/2024  Asymptomatic   Continues to refuse statin   Lipid panel with next blood draw   Recommend she at least restart aspirin 81 mg daily     Hypertension associated with diabetes  Chronic diastolic congestive heart failure  Controlled rate stable.  Asymptomatic.  BP at goal.    Continue Elisabet 10/40 mg daily  Continue chlorthalidone 25 mg daily     Malignant  neoplasm of upper-outer quadrant of left breast in female, estrogen receptor positive  Follow-up with Hematology/Oncology and breast surgery as scheduled     Return to clinic 6 months for annual physical exam or sooner as needed

## 2024-01-24 NOTE — PROGRESS NOTES
Demetrice,   I am happy to say that there is nothing in the abdomen that is bad at this time.  The CT did show that you have what are called fibroids of the uterus which is a benign condition.  You do have a fatty liver which is a condition where fatty tissue can go into the liver.  This is only treated with a low fat diet and we ask patients to keep their weight as low as possible.  I usually track patients with liver enzymes during their annual physical.  Please follow up with Dr. Alvarez on this issue.  As far as a concer that the bulge I saw on the colonoscopy, there is nothing to worry about at this time.    Dr. Diamond

## 2024-01-25 ENCOUNTER — OFFICE VISIT (OUTPATIENT)
Dept: HEMATOLOGY/ONCOLOGY | Facility: CLINIC | Age: 64
End: 2024-01-25
Payer: MEDICARE

## 2024-01-25 ENCOUNTER — CLINICAL SUPPORT (OUTPATIENT)
Dept: HEMATOLOGY/ONCOLOGY | Facility: CLINIC | Age: 64
End: 2024-01-25
Payer: MEDICARE

## 2024-01-25 VITALS
DIASTOLIC BLOOD PRESSURE: 89 MMHG | TEMPERATURE: 98 F | HEIGHT: 64 IN | SYSTOLIC BLOOD PRESSURE: 143 MMHG | HEART RATE: 89 BPM | WEIGHT: 194.88 LBS | BODY MASS INDEX: 33.27 KG/M2 | OXYGEN SATURATION: 98 % | RESPIRATION RATE: 18 BRPM

## 2024-01-25 DIAGNOSIS — C50.412 MALIGNANT NEOPLASM OF UPPER-OUTER QUADRANT OF LEFT BREAST IN FEMALE, ESTROGEN RECEPTOR POSITIVE: Primary | ICD-10-CM

## 2024-01-25 DIAGNOSIS — Z17.0 MALIGNANT NEOPLASM OF UPPER-OUTER QUADRANT OF LEFT BREAST IN FEMALE, ESTROGEN RECEPTOR POSITIVE: Primary | ICD-10-CM

## 2024-01-25 DIAGNOSIS — D05.12 BREAST NEOPLASM, TIS (DCIS), LEFT: ICD-10-CM

## 2024-01-25 DIAGNOSIS — Z17.0 MALIGNANT NEOPLASM OF UPPER-OUTER QUADRANT OF LEFT BREAST IN FEMALE, ESTROGEN RECEPTOR POSITIVE: Primary | Chronic | ICD-10-CM

## 2024-01-25 DIAGNOSIS — C50.412 MALIGNANT NEOPLASM OF UPPER-OUTER QUADRANT OF LEFT BREAST IN FEMALE, ESTROGEN RECEPTOR POSITIVE: Primary | Chronic | ICD-10-CM

## 2024-01-25 LAB
ESTIMATED AVG GLUCOSE: 134 MG/DL (ref 68–131)
HBA1C MFR BLD: 6.3 % (ref 4–5.6)

## 2024-01-25 PROCEDURE — 99999 PR PBB SHADOW E&M-EST. PATIENT-LVL I: CPT | Mod: PBBFAC,,,

## 2024-01-25 PROCEDURE — 99214 OFFICE O/P EST MOD 30 MIN: CPT | Mod: S$GLB,,, | Performed by: INTERNAL MEDICINE

## 2024-01-25 PROCEDURE — 99999 PR PBB SHADOW E&M-EST. PATIENT-LVL III: CPT | Mod: PBBFAC,,, | Performed by: INTERNAL MEDICINE

## 2024-01-25 RX ORDER — ONDANSETRON 4 MG/1
4 TABLET, ORALLY DISINTEGRATING ORAL EVERY 8 HOURS PRN
Qty: 90 TABLET | Refills: 3 | Status: SHIPPED | OUTPATIENT
Start: 2024-01-25

## 2024-01-25 NOTE — PROGRESS NOTES
Dear Lul Alvarez MD,    I recently cared for Demetrice Gaines and performed an endoscopy.  Tissue was sent for pathology evaluation and I will have a letter written to ask the patient to repeat the colonoscopy in 5 years.  The pathology showed that there was adenomatous tissue present.  Thank you for allowing me to participate in the care of your patient.  Please call me for any questions that you might have.      Dr. Lobo iMtchell  [phone] cell  [phone] office      NURSING STAFF:Please  inform the patient that I reviewed the recent pathology obtained at the time of colonoscopy.    The results showed that there was adenomatous tissue present which is benign and based on that, I recommend that the patient have a repeat colonoscopy performed in 5 years.     If the patient has MyChart, this message has been sent to them.  Confirm that they read the note.  If not, copy the information and print a letter to send to the patient at this time.  Confirm that a notation to the PCP was done.      Dear Demetrice Gaines,    This is to inform you that I have reviewed your recent colonoscopy pathology.  The results showed that you had adenomatous tissue present which is benign and based on that, I recommend that you have a repeat colonoscopy performed in 5 years.      Dr. Lobo Mitchell  [phone]

## 2024-01-25 NOTE — PROGRESS NOTES
Patient ID: Demetrice Gaines   Chief Complaint: Breast Cancer and Follow-up  MRN:  [id number]     Reason for Referral:  Establish Care for Breast   Subjective   The patient presents for follow up and is accompanied by her . She completed chemo-education today.  We discussed the recommendation for TC and why.  We reviewed that she will only have 4 cycles.  Her and her husbands questions answered.  Her colonoscopy showed a TA but otherwise normal.      Review of Systems:  Review of Systems   Constitutional:  Negative for activity change, appetite change, chills, diaphoresis, fatigue, fever and unexpected weight change.   HENT:  Negative for nosebleeds.    Respiratory:  Negative for shortness of breath.    Cardiovascular:  Negative for chest pain.   Gastrointestinal:  Positive for blood in stool and constipation. Negative for abdominal distention, abdominal pain, anal bleeding, diarrhea, nausea and vomiting.   Genitourinary:  Negative for difficulty urinating, hematuria and vaginal bleeding.   Musculoskeletal:  Negative for arthralgias, back pain and myalgias.   Skin:  Negative for rash.   Neurological:  Negative for dizziness, weakness, light-headedness and headaches.   Hematological:  Does not bruise/bleed easily.   Psychiatric/Behavioral:  The patient is not nervous/anxious.      History     Oncology History   Breast neoplasm, Tis (DCIS), left (Resolved)   9/8/2023 Initial Diagnosis    Breast neoplasm, Tis (DCIS), left     9/8/2023 Cancer Staged    Staging form: Breast, AJCC 8th Edition  - Clinical stage from 9/8/2023: Stage 0 (cTis (DCIS), cN0, cM0, G3, ER+, OK+, HER2: Not Assessed)      Genetic Testing    Patient has genetic testing done for [employer] STAT breast cancer panel and 84 gene multicancer panel.                                              Results revealed patient has the following mutation(s):negative breast cancer panel- no mutation noted- MLH1- variant of undetermined significance       Chemotherapy    Treatment Summary   Plan Name: OP BREAST TC (DOCEtaxel cycloPHOSphamide) Q3W  Treatment Goal: Curative  Status: Active  Start Date: 1/10/2024 (Planned)  End Date: 3/14/2024 (Planned)  Provider: Deloris Gordon MD  Chemotherapy: cycloPHOSphamide 600 mg/m2 = 1,180 mg in sodium chloride 0.9% 255.9 mL chemo infusion, 600 mg/m2, Intravenous, Clinic/HOD 1 time, 0 of 4 cycles    DOCEtaxel (TAXOTERE) 75 mg/m2 = 148 mg in sodium chloride 0.9% 257.4 mL chemo infusion, 75 mg/m2, Intravenous, Clinic/HOD 1 time, 0 of 4 cycles       Malignant neoplasm of upper-outer quadrant of left breast in female, estrogen receptor positive   10/17/2023 Initial Diagnosis    Malignant neoplasm of upper-outer quadrant of left breast in female, estrogen receptor positive     10/18/2023 Cancer Staged    Staging form: Breast, AJCC 8th Edition  - Clinical stage from 10/18/2023: Stage IA (cT1c, cN0(f), cM0, G3, ER+, KS+, HER2-)     12/4/2023 Cancer Staged    Staging form: Breast, AJCC 8th Edition  - Pathologic stage from 12/4/2023: Stage IA (pT1c, pN0(sn), cM0, G3, ER+, KS+, HER2-)     1/10/2024 -  Chemotherapy    Treatment Summary   Plan Name: OP BREAST TC (DOCEtaxel cycloPHOSphamide) Q3W  Treatment Goal: Curative  Status: Active  Start Date: 1/10/2024 (Planned)  End Date: 3/14/2024 (Planned)  Provider: Deloris Gordno MD  Chemotherapy: CYCLOPHOSPHAMIDE INFUSION, 600 mg/m2, Intravenous, Clinic/HOD 1 time, 0 of 4 cycles  DOCETAXEL CHEMO INFUSION, 75 mg/m2, Intravenous, Clinic/HOD 1 time, 0 of 4 cycles      Chemotherapy    Treatment Summary   Plan Name: OP BREAST TC (DOCEtaxel cycloPHOSphamide) Q3W  Treatment Goal: Curative  Status: Active  Start Date: 1/10/2024 (Planned)  End Date: 3/14/2024 (Planned)  Provider: Deloris Gordon MD  Chemotherapy: cycloPHOSphamide 600 mg/m2 = 1,180 mg in sodium chloride 0.9% 255.9 mL chemo infusion, 600 mg/m2, Intravenous, Clinic/HOD 1 time, 0 of 4 cycles    DOCEtaxel (TAXOTERE) 75 mg/m2 = 148 mg  in sodium chloride 0.9% 257.4 mL chemo infusion, 75 mg/m2, Intravenous, Clinic/HOD 1 time, 0 of 4 cycles           HPI:  Demetrice Gaines is a pleasant 63 y.o. female with history of CAD not requiring PCI, HTN, DM II, CHF and colon polyps who presents to clinic to establish care on referral for breast cancer.    The patient reports that she has been recovering well from surgery.  She has no acute complaints.  She has been having blood in her stool for over a year as well as constipation.  I recommended that we have this moved up to rule out any colonic lesions.  I will message the gastroenterologist to see if they can possibly do a colonoscopy on her prior to starting chemotherapy     I reviewed her Oncotype DX with her.  Unfortunately her recurrence score is 38 indicating a clear benefit for adjuvant chemotherapy.  She is understanding of this.  I reviewed the most common side effects of chemotherapy.  Initially considered giving her AC-T however giving her cardiac history I think that it would be better for the patient unless toxic if we administered TC.  Otherwise she does have some baseline intermittent neuropathy which will need to be monitored.    FHx significant for Mother with primary bone cancer and paternal grandmother with breast cancer      Past Medical History:   Diagnosis Date    Bilateral pleural effusion 2020    CAD (coronary artery disease)     CHF (congestive heart failure)     Colon polyp     Diabetes mellitus, type 2     Hypertension     Hyponatremia 2020    Malignant neoplasm of upper-outer quadrant of left breast in female, estrogen receptor positive 10/17/2023    Myocardial infarction        Past Surgical History:   Procedure Laterality Date    Benign Cyst Right     BREAST CYST EXCISION Right     pt states benign     SECTION      COLONOSCOPY      COLONOSCOPY N/A 2019    Procedure: COLONOSCOPY;  Surgeon: Ileana Holcomb MD;  Location: [facility];  Service:  Endoscopy;  Laterality: N/A;    COLONOSCOPY N/A 1/2/2024    Procedure: COLONOSCOPY;  Surgeon: Lobo Mitchell MD;  Location: [facility] ENDO;  Service: Endoscopy;  Laterality: N/A;    EXCISION, MASS, BREAST, USING RADIOLOGICAL MARKER Left 11/22/2023    Procedure: EXCISION,MASS,BREAST,USING RADIOLOGICAL MARKER;  Surgeon: Hemalatha Josue MD;  Location: [facility] OR;  Service: General;  Laterality: Left;  radhika  needed    FLUOROSCOPY N/A 1/8/2024    Procedure: Fluoroscopy/Mediport placement;  Surgeon: Sylvester Zazueta MD;  Location: [facility] CATH LAB;  Service: General;  Laterality: N/A;    INJECTION FOR SENTINEL NODE IDENTIFICATION Left 10/25/2023    Procedure: INJECTION, FOR SENTINEL NODE IDENTIFICATION;  Surgeon: Hemalatha Josue MD;  Location: [facility] OR;  Service: General;  Laterality: Left;    INSERTION OF BREAST TISSUE EXPANDER Bilateral 10/25/2023    Procedure: INSERTION, TISSUE EXPANDER, BREAST;  Surgeon: Keron Lynn MD;  Location: [facility] OR;  Service: Plastics;  Laterality: Bilateral;    MASTECTOMY, NIPPLE SPARING Bilateral 10/25/2023    Procedure: MASTECTOMY, NIPPLE SPARING;  Surgeon: Hemalatha Josue MD;  Location: [facility] OR;  Service: General;  Laterality: Bilateral;    SENTINEL LYMPH NODE BIOPSY Left 10/25/2023    Procedure: BIOPSY, LYMPH NODE, SENTINEL;  Surgeon: Hemalatha Josue MD;  Location: [facility] OR;  Service: General;  Laterality: Left;  nuc med needed 1 hour before start    ULTRASOUND GUIDANCE Left 10/25/2023    Procedure: ULTRASOUND GUIDANCE;  Surgeon: Hemalatha Josue MD;  Location: [facility] OR;  Service: General;  Laterality: Left;       Family History   Problem Relation Age of Onset    Cancer Mother     Pacemaker/defibrilator Mother     Glaucoma Mother     Arthritis Mother     Heart disease Mother         It seems that this condition runs in my family on my mother's side    Hypertension Mother     Diabetes Father     Hypertension Sister     Glaucoma Sister     Breast cancer Paternal  "Grandmother     Cancer Paternal Grandmother     Hypertension Brother     COPD Sister     COPD Brother     Hypertension Brother     Hypertension Sister        Review of patient's allergies indicates:   Allergen Reactions    Aldactone [spironolactone]      Memory impair and breast soreness    Coreg [carvedilol]      Hair loss    Lisinopril-hydrochlorothiazide      Other reaction(s): Reaction:Throat swelling;       Social History     Tobacco Use    Smoking status: Never     Passive exposure: Never    Smokeless tobacco: Never    Tobacco comments:     NEVER   Substance Use Topics    Alcohol use: Never    Drug use: Never       Physical Exam   ECOG:   ECOG SCORE    0 - Fully active-able to carry on all pre-disease performance without restriction          Vitals:  BP (!) 143/89   Pulse 89   Temp 98.3 °F (36.8 °C)   Resp 18   Ht 5' 4" (1.626 m)   Wt 88.4 kg (194 lb 14.2 oz)   SpO2 98%   BMI 33.45 kg/m²     Physical Exam:  Physical Exam  Constitutional:       General: She is not in acute distress.     Appearance: Normal appearance. She is not ill-appearing.   HENT:      Head: Normocephalic and atraumatic.   Eyes:      Extraocular Movements: Extraocular movements intact.      Conjunctiva/sclera: Conjunctivae normal.   Cardiovascular:      Rate and Rhythm: Normal rate.   Pulmonary:      Effort: Pulmonary effort is normal. No respiratory distress.   Abdominal:      Palpations: There is no hepatomegaly, splenomegaly or mass.   Skin:     Findings: No rash.   Neurological:      General: No focal deficit present.      Mental Status: She is alert and oriented to person, place, and time.   Psychiatric:         Mood and Affect: Mood normal.         Behavior: Behavior normal.         Thought Content: Thought content normal.        Labs   Labs:  Lab Visit on 01/24/2024   Component Date Value Ref Range Status    Phosphorus 01/24/2024 2.8  2.7 - 4.5 mg/dL Final    Magnesium 01/24/2024 1.3 (L)  1.6 - 2.6 mg/dL Final    Sodium " 01/24/2024 136  136 - 145 mmol/L Final    Potassium 01/24/2024 3.7  3.5 - 5.1 mmol/L Final    Chloride 01/24/2024 101  95 - 110 mmol/L Final    CO2 01/24/2024 27  23 - 29 mmol/L Final    Glucose 01/24/2024 200 (H)  70 - 110 mg/dL Final    BUN 01/24/2024 13  8 - 23 mg/dL Final    Creatinine 01/24/2024 0.9  0.5 - 1.4 mg/dL Final    Calcium 01/24/2024 9.0  8.7 - 10.5 mg/dL Final    Total Protein 01/24/2024 7.9  6.0 - 8.4 g/dL Final    Albumin 01/24/2024 4.0  3.5 - 5.2 g/dL Final    Total Bilirubin 01/24/2024 0.3  0.1 - 1.0 mg/dL Final    Comment: For infants and newborns, interpretation of results should be based  on gestational age, weight and in agreement with clinical  observations.    Premature Infant recommended reference ranges:  Up to 24 hours.............<8.0 mg/dL  Up to 48 hours............<12.0 mg/dL  3-5 days..................<15.0 mg/dL  6-29 days.................<15.0 mg/dL      Alkaline Phosphatase 01/24/2024 69  55 - 135 U/L Final    AST 01/24/2024 17  10 - 40 U/L Final    ALT 01/24/2024 14  10 - 44 U/L Final    eGFR 01/24/2024 >60  >60 mL/min/1.73 m^2 Final    Anion Gap 01/24/2024 8  8 - 16 mmol/L Final    WBC 01/24/2024 7.51  3.90 - 12.70 K/uL Final    RBC 01/24/2024 4.65  4.00 - 5.40 M/uL Final    Hemoglobin 01/24/2024 13.5  12.0 - 16.0 g/dL Final    Hematocrit 01/24/2024 39.0  37.0 - 48.5 % Final    MCV 01/24/2024 84  82 - 98 fL Final    MCH 01/24/2024 29.0  27.0 - 31.0 pg Final    MCHC 01/24/2024 34.6  32.0 - 36.0 g/dL Final    RDW 01/24/2024 13.4  11.5 - 14.5 % Final    Platelets 01/24/2024 358  150 - 450 K/uL Final    MPV 01/24/2024 8.7 (L)  9.2 - 12.9 fL Final    Immature Granulocytes 01/24/2024 0.3  0.0 - 0.5 % Final    Gran # (ANC) 01/24/2024 4.1  1.8 - 7.7 K/uL Final    Immature Grans (Abs) 01/24/2024 0.02  0.00 - 0.04 K/uL Final    Comment: Mild elevation in immature granulocytes is non specific and   can be seen in a variety of conditions including stress response,   acute inflammation,  trauma and pregnancy. Correlation with other   laboratory and clinical findings is essential.      Lymph # 01/24/2024 2.7  1.0 - 4.8 K/uL Final    Mono # 01/24/2024 0.5  0.3 - 1.0 K/uL Final    Eos # 01/24/2024 0.2  0.0 - 0.5 K/uL Final    Baso # 01/24/2024 0.08  0.00 - 0.20 K/uL Final    nRBC 01/24/2024 0  0 /100 WBC Final    Gran % 01/24/2024 54.3  38.0 - 73.0 % Final    Lymph % 01/24/2024 35.3  18.0 - 48.0 % Final    Mono % 01/24/2024 6.9  4.0 - 15.0 % Final    Eosinophil % 01/24/2024 2.1  0.0 - 8.0 % Final    Basophil % 01/24/2024 1.1  0.0 - 1.9 % Final    Differential Method 01/24/2024 Automated   Final    Hemoglobin A1C 01/24/2024 6.3 (H)  4.0 - 5.6 % Final    Comment: ADA Screening Guidelines:  5.7-6.4%  Consistent with prediabetes  >or=6.5%  Consistent with diabetes    High levels of fetal hemoglobin interfere with the HbA1C  assay. Heterozygous hemoglobin variants (HbS, HgC, etc)do  not significantly interfere with this assay.   However, presence of multiple variants may affect accuracy.      Estimated Avg Glucose 01/24/2024 134 (H)  68 - 131 mg/dL Final      Pathology     Specimen to Pathology, Surgery Breast 10/25/2023      Component 2 mo ago   Final Pathologic Diagnosis 1. Right breast, prophylactic nipple sparing mastectomy (467 grams):      -  Benign breast tissue with fibrocystic change including fibrosis, adenosis, usual ductal hyperplasia         (UDH) and duct ectasia with apocrine metaplasia and focal periductal chronic inflammation      -  Microcalcifications:  Present, associated with benign ductal tissue      -  Skin and nipple are surgically absent      -  Unremarkable skeletal muscle present      -  Negative for atypia or malignancy    2. Left breast, nipple sparing mastectomy (470 grams):      -  Benign breast tissue with focal atypical ductal hyperplasia (ADH, less than 2mm) in a background of         fibrocystic change including fibrosis, adenosis, duct ectasia, focal columnar cell  change/hyperplasia         and fibroadenomatoid change      -  Findings of biopsy cavity are NOT appreciated grossly or histologically, see case comment      -  Skin and nipple are surgically absent      -  Dumbbell shaped biopsy clip NOT IDENTIFIED at time of grossing    Comment:  Immunohistochemical stains with appropriate controls were performed on select tissue blocks.  Cytokeratin 5/6 demonstrates a mosaic staining pattern in areas of ductal hyperplasia, showing no evidence of atypia.  AE1/AE3 is utilized for duct  mapping.  P63 highlights intact myoepithelial cells throughout the specimen, showing no evidence of invasive carcinoma.    3. [place] lymph node #1 (hot and blue, background count 2), biopsy:      -  One lymph node, negative for metastatic carcinoma (0/1)      -  Immunohistochemical stains for CAM 5.2, AE1/AE3 and wide spectrum keratin are negative      4. Left breast axillary tail, excision (11 grams):      -  Benign mature fibroadipose tissue      -  Negative for atypia or malignancy    5. Additional lateral margin, excision (tissue submitted entirely for histologic evaluation):      -  Benign breast tissue with mild fibrocystic change including fibrosis, adenosis and         fibroadenomatoid change      -  Negative for atypia or malignancy      6. Additional anterior lateral margin, excision (tissue submitted entirely for histologic evaluation):      -  Benign breast tissue with fibrocystic change including fibrosis, adenosis, fibroadenomatoid         changes and duct ectasia with apocrine metaplasia      -  Microcalcifications:  Present, associated with benign breast tissue      -  Negative for atypia or malignancy     Comment: Interp By Dianelys Cardona M.D., Signed on 11/08/2023 at 09:29   Comment The patient's history of invasive carcinoma and ductal carcinoma in-situ in the upper outer quadrant is noted.  The specimen is thoroughly searched for both the dumbbell biopsy clip and a biopsy  cavity, neither of which are identified grossly or  histologically.  Numerous sections of tissue from specimen 2 (left breast mastectomy) were evaluated.  The patient's additional margins (specimens 5 and 6) were submitted entirely for histologic review and show no evidence of invasive or in-situ  carcinoma.  Specimen 4 (breast axillary tail) was also submitted entirely for histologic review and showed no evidence of invasive or in-situ carcinoma.  Dr. Bishop has reviewed the above case and agrees with the findings.    Dr. Josue was alerted to these findings via epic message on 11/08/2023 at 09:30.      All stains were performed with adequate positive and negative controls.              Specimen to Pathology, Surgery Breast 11/22/2023      Component 1 mo ago   Final Pathologic Diagnosis Breast, left, re-excision:  Invasive ductal carcinoma, poorly differentiated  [place] grade 3:  Tubule formation-3, nuclear pleomorphism-3 and mitotic count -3  Invasive carcinoma measures 19 x 9 x 8 mm  Positive superior margin, measuring 10 mm in greatest extent  Additional margins:  -Anterior margin:  1 mm  -Medial margin:  6 mm  -Posterior margin:  6.5 mm  -Lateral margin:  9 mm  -Inferior margin:  9 mm  No carcinoma in Situ or lymphovascular invasion is identified  Previous biopsy clip and reflector both grossly identified   Comment: Interp By Shelbie Bishop M.D., Signed on 11/28/2023 at 10:46   Gross Surgery ID:  [id number]    Pathology ID:  [id number]  1.  Received in formalin labeled &quot;left breast biopsy&quot; is a 4 g, 2.3 cm superior to inferior by 2.1 cm medial to lateral by 2.3 cm anterior to posterior portion of breast tissue received oriented by the surgeon as short stitch superior and long  stitch lateral.  A biopsy clip and a reflector clip is identified exposed at the anterior-superior margins (see attached image).  The specimen is sectioned from anterior to posterior into 5 slices averaging 0.5 cm in thickness.   Sectioning reveals a 1.9  cm anterior to posterior by 0.9 cm medial to lateral by 0.8 cm superior to inferior well-circumscribed, tan-white, rubbery mass within slices 2-5.  The mass abuts the superior and medial margins and measures 0.9 cm from the lateral margin, 0.9 cm from  the inferior margin, 0.4 cm from the posterior margin, and 0.5 cm from the anterior margin.  There is an X shaped biopsy clip identified within the mass in slice 4 and a reflector clip identified adjacent to the mass in slice 1.  The specimen is inked as   follows:  Superior-blue, inferior-green, medial-red, lateral-orange, anterior-yellow, and posterior-black.  The specimen is submitted entirely and sequentially from anterior to posterior as follows:  REG--1-A:  Slice 1, anterior margin, perpendicular  LNJ--1-B:  Slice 2, mass to include anterior, superior, inferior, medial, and lateral margins  NIG--1-C:  Slice 3, mass to superior, inferior, medial, and lateral margins  RXP--1-D:  Slice 4, mass to superior, inferior, medial, and lateral margins  VUO--1-E:  Slice 5, posterior margin, perpendicular        Grossed by: Carl Donovan MS, PA(ASCP)        Assessment and Plan   Stage IA (T1c N0 Mx) L Breast Invasive Ductal  Carcinoma, G3, +/+/-, ki67 of 90%   s/p b/l nipple sparing mastectomy and sentinel node biopsy on 10/25/23   s/p excision of primary tumor 11/22/23   Pathology report above  Genetic Testing 09/13/2023: VUS in MLH1  Oncotype Dx Score 38  TTE stable from prior  She has a history of heart disease; she has not required PCI however will precert for TC treatment to circumvent potential anthracycline cardiac effects  I discussed in detail the role of medical oncology in her care.  I informed her that my treatment recommendation is based on the NCCN Guidelines, her final pathology and Oncotype DX score  Because of the high Oncotype DX score, chemotherapy is recommended; because of the hormonal make up  of her tumor, endocrine therapy is recommended after she completes chemotherapy. I reviewed the major side effects of chemotherapy and  AIs.   Plan to start TC 01/29/24 with Tox Check 02/06/24      Blood in stool   Patient reports blood in her stool for over 1 year   Colonoscopy 01/02/2024: TA (4 fragments in ascending colon and 1 TA in sigmoid colon); recommend repeat in 5 years      Bone Health  Given that she is post menopausal and endocrine therapy is recommended, she will be monitored for the need for bisphosphonate therapy.  BMD 08/2023 was normal  She will need to take Calcium and vitamin D      Cancer Screening  PAP Smear 09/15/2023: Negative for intraepithelial lesion or malignancy   Colonoscopy 01/2024: TAs; repeat in 5 years      Chronic Medical Conditions  DM II  HTN  CAD:  She has not require PCI; stress test 12/26/2019 negative for ischemia and arrhythmias; last echo 06/2020 and showed preserved ejection fraction at 55%; concentric hypertrophy and normal left ventricular diastolic function  CHF  Hx of Colon Polyps  Peripheral neuropathy; intermittent        Med Onc Chart Routing      Follow up with physician . 2/6/24 for Tox Check   Follow up with [facility]    Infusion scheduling note   RTC 01/29/24 for C1 TC   Injection scheduling note    Labs CBC, CMP, phosphorus and magnesium   Scheduling:  Preferred lab:  Lab interval:     Imaging    Pharmacy appointment    Other referrals                       The patient was seen, interviewed and examined. Pertinent lab and radiologic studies were reviewed. Pt instructed to call should they develop concerning signs/symptoms or have further questions.        Portions of the record may have been created with voice recognition software. Occasional wrong-word or sound-a-like substitutions may have occurred due to the inherent limitations of voice recognition software. Read the chart carefully and recognize, using context, where substitutions have occurred.      Deloris  MD Jose    Hematology/Oncology

## 2024-01-25 NOTE — PROGRESS NOTES
Met with patient in person_with  in attendance to discuss upcoming systemic therapy initiation. Discussed patient diagnosis including staging information. Also discussed that therapy regimen prescribed involves the following drugs: Taxotere and Cytoxan, as well as Udenyca__, and timeline of therapy administration. Went over what to expect on first day of treatment, including what to bring with you, visitor policy, and infusion suite guidelines. Also discussed supportive and shared services available to patient, including social work, financial counseling, oncology nutrition, and oncology psychology.      Covered with patient potential side effects and symptom management. Reviewed supportive medications that will be given before, during, and after treatment. Also stressed that other side effects are possible outside of those listed. Spent additional time on signs of infection, infection prevention, and proper nutrition/hydration.    Education provided to patient via (_chemotherapy education binder___). Also provided contact information for clinic and information related to [facility] communication. Discussed communication process for after-hours needs and some common scenarios in which patient should call provider for guidance vs. immediately report to the emergency room.     Reviewed RX for Zyprexa to be taken on days 1-3 and Compazine to be taken PRN. Pt has both on hand for home use.  Toured chemo infusion room with pt and  as requested. All questions asked and answered to the best of my ability.    Finally, patient was given my contact information and role of oncology navigator was discussed. Encouraged patient to call with any questions, concerns, or needs. Patient verbalized understanding of all above information.

## 2024-01-29 ENCOUNTER — INFUSION (OUTPATIENT)
Dept: INFUSION THERAPY | Facility: HOSPITAL | Age: 64
End: 2024-01-29
Attending: INTERNAL MEDICINE
Payer: MEDICARE

## 2024-01-29 VITALS
OXYGEN SATURATION: 97 % | DIASTOLIC BLOOD PRESSURE: 79 MMHG | HEART RATE: 93 BPM | HEIGHT: 64 IN | WEIGHT: 195.13 LBS | TEMPERATURE: 98 F | SYSTOLIC BLOOD PRESSURE: 132 MMHG | RESPIRATION RATE: 16 BRPM | BODY MASS INDEX: 33.31 KG/M2

## 2024-01-29 DIAGNOSIS — C50.412 MALIGNANT NEOPLASM OF UPPER-OUTER QUADRANT OF LEFT BREAST IN FEMALE, ESTROGEN RECEPTOR POSITIVE: Primary | ICD-10-CM

## 2024-01-29 DIAGNOSIS — Z17.0 MALIGNANT NEOPLASM OF UPPER-OUTER QUADRANT OF LEFT BREAST IN FEMALE, ESTROGEN RECEPTOR POSITIVE: Primary | ICD-10-CM

## 2024-01-29 PROCEDURE — 96413 CHEMO IV INFUSION 1 HR: CPT

## 2024-01-29 PROCEDURE — 63600175 PHARM REV CODE 636 W HCPCS: Performed by: INTERNAL MEDICINE

## 2024-01-29 PROCEDURE — 25000003 PHARM REV CODE 250: Performed by: INTERNAL MEDICINE

## 2024-01-29 PROCEDURE — 96417 CHEMO IV INFUS EACH ADDL SEQ: CPT

## 2024-01-29 PROCEDURE — 96367 TX/PROPH/DG ADDL SEQ IV INF: CPT

## 2024-01-29 RX ORDER — HEPARIN 100 UNIT/ML
500 SYRINGE INTRAVENOUS
Status: DISCONTINUED | OUTPATIENT
Start: 2024-01-29 | End: 2024-01-29 | Stop reason: HOSPADM

## 2024-01-29 RX ADMIN — DOCETAXEL 150 MG: 20 INJECTION, SOLUTION, CONCENTRATE INTRAVENOUS at 02:01

## 2024-01-29 RX ADMIN — CYCLOPHOSPHAMIDE 1200 MG: 200 INJECTION, SOLUTION INTRAVENOUS at 03:01

## 2024-01-29 RX ADMIN — HEPARIN 500 UNITS: 100 SYRINGE at 04:01

## 2024-01-29 RX ADMIN — DEXAMETHASONE SODIUM PHOSPHATE 0.25 MG: 4 INJECTION, SOLUTION INTRA-ARTICULAR; INTRALESIONAL; INTRAMUSCULAR; INTRAVENOUS; SOFT TISSUE at 02:01

## 2024-01-29 NOTE — DISCHARGE INSTRUCTIONS
Thank you for letting me take care of you today!  - Emmanuelle Oneillon [facility]  [address] [facility]  [address] [address]  [phone] phone     [phone] fax  Hours of Operation: Monday- Friday 8:00am- 5:00pm  After hours phone  [phone]  Hematology / Oncology Physicians on call:     Dr. Jc Roland, ALIYAH Cardona, DEBBIE Dimas, ALIYAH Daley, ALIYAH Goddard, ALIYAH     Please don't hesitate to call, if you have any concerns.                                                                                                                                                                                                                                                        Discharge Instructions for Chemotherapy      Your doctor prescribed a type of medication therapy for you called chemotherapy. Doctors prescribe chemotherapy for many different types of illnesses, including cancer. There are many types of chemotherapy. This sheet provides general guidelines on how you can take care of yourself after your chemotherapy.   Mouth Care   Dont be discouraged if you get mouth sores, even if you are following all your doctors instructions. Many people get mouth sores as a side effect of chemotherapy. Heres what you can do to prevent mouth sores:   Keep your mouth clean. Brush your teeth with a soft-bristle toothbrush after every meal.   Use an oral swab or special soft toothbrush if your gums bleed during regular brushing.   Dont use dental floss if your platelet count is below 50,000. Your doctor or nurse will tell you if this is the case.   Use any mouthwashes given to you as directed.   If you cant tolerate regular methods, use salt and baking soda to clean your mouth. Mix 1 teaspoon of salt and 1 teaspoon of baking soda into an 8-ounce glass of warm water. Swish and spit.   Watch your mouth and tongue for  white patches. This may be a sign of fungal infection, a common side effect of chemotherapy. Be sure to tell your doctor about these patches. Medication can be prescribed to help you fight the fungal infection.  Other Home Care   Try to exercise. Exercise keeps you strong and keeps your heart and lungs active. Walk as much as you can without becoming dizzy or weak.   Keep clean. During chemotherapy, your body cant fight infection very well. Take short baths or showers.   Use moisturizing soap. Chemotherapy can make your skin dry.   Apply moisturizing lotion several times a day to help relieve dry skin.   Dont take very hot or very cold showers or baths.  Dont be surprised if your chemotherapy causes slight burns to your skin -- usually on the hands and feet. Some drugs used in high doses cause this to happen. Ask for a special cream to help relieve the burn and protect your skin.   Let your doctor know if your throat is sore. You may have an infection that needs treatment.   Remember, many patients feel sick and lose their appetites during treatment. Eat small meals several times a day to keep your strength up.   Choose bland foods with little taste or smell if you are reacting strongly to food.   Be sure to cook all food thoroughly. This kills bacteria and helps you avoid infection.   Eat foods that are soft. Soft foods are less likely to cause stomach irritation.  When to Call Your Doctor   Call your doctor right away if you have any of the following:   Unexplained bleeding   Trouble concentrating   Ongoing fatigue   Shortness of breath, wheezing, or trouble breathing   Rapid, irregular heartbeat; chest pain   Dizziness, lightheadedness   Constant feeling of being cold   Hives or a cut or rash that swells, turns red, feels hot or painful, or begins to ooze   Fever of 100.4°F (38°C) or higher, or chills                WAYS TO HELP PREVENT INFECTION        WASH YOUR HANDS OFTEN DURING THE DAY, ESPECIALLY BEFORE  YOU EAT, AFTER USING THE BATHROOM, AND AFTER TOUCHING ANIMALS     STAY AWAY FROM PEOPLE WHO HAVE ILLNESSES YOU CAN CATCH; SUCH AS COLDS, FLU, CHICKEN POX     TRY TO AVOID CROWDS     STAY AWAY FROM CHILDREN WHO RECENTLY HAVE RECEIVED LIVE VIRUS VACCINES     MAINTAIN GOOD MOUTH CARE     DO NOT SQUEEZE OR SCRATCH PIMPLES     CLEAN CUTS & SCRAPES RIGHT AWAY AND DAILY UNTIL HEALED WITH WARM WATER, SOAP & AN ANTISEPTIC     AVOID CONTACT WITH LITTER BOXES, BIRD CAGES, & FISH TANKS     AVOID STANDING WATER, IE., BIRD BATHS, FLOWER POTS/VASES, OR HUMIDIFIERS     WEAR GLOVES WHEN GARDENING OR CLEANING UP AFTER OTHERS, ESPECIALLY BABIES & SMALL CHILDREN     DO NOT EAT RAW FISH, SEAFOOD, MEAT, OR EGGS                                                                                                                 FALL PREVENTION      Falls often occur due to slipping, tripping or losing your balance. Here are ways to reduce your risk of falling again.   Was there anything that caused your fall that can be fixed, removed or replaced?   Make your home safe by keeping walkways clear of objects you may trip over.   Use non-slip pads under rugs.   Do not walk in poorly lit areas.   Do not stand on chairs or wobbly ladders.   Use caution when reaching overhead or looking upward. This position can cause a loss of balance.   Be sure your shoes fit properly, have non-slip bottoms and are in good condition.   Be cautious when going up and down stairs, curbs, and when walking on uneven sidewalks.   If your balance is poor, consider using a cane or walker.   If your fall was related to alcohol use, stop or limit alcohol intake.   If your fall was related to use of sleeping medicines, talk to your doctor about this. You may need to reduce your dosage at bedtime if you awaken during the night to go to the bathroom.   To reduce the need for nighttime bathroom trips:   Avoid drinking fluids for several hours before going to bed   Empty your  bladder before going to bed   Men can keep a urinal at the bedside

## 2024-01-29 NOTE — PLAN OF CARE
Patient tolerated taxotere/cytoxan well today; no adverse reaction noted.  POC reviewed with pt.  No questions or concerns voiced.  NAD noted upon discharge.  No significant new complaints voiced.   Has f/u appt(s) scheduled per MD request.      Problem: Adult Inpatient Plan of Care  Goal: Plan of Care Review  Outcome: Ongoing, Progressing  Flowsheets (Taken 1/29/2024 1639)  Plan of Care Reviewed With: patient  Goal: Patient-Specific Goal (Individualized)  Outcome: Ongoing, Progressing  Flowsheets (Taken 1/29/2024 1639)  Anxieties, Fears or Concerns: none  Individualized Care Needs: legs elevated, blanket, pillow, lights off  Goal: Optimal Comfort and Wellbeing  Outcome: Ongoing, Progressing  Intervention: Provide Person-Centered Care  Flowsheets (Taken 1/29/2024 1639)  Trust Relationship/Rapport:   care explained   empathic listening provided   reassurance provided   choices provided   questions answered   thoughts/feelings acknowledged   emotional support provided   questions encouraged

## 2024-01-30 ENCOUNTER — INFUSION (OUTPATIENT)
Dept: INFUSION THERAPY | Facility: HOSPITAL | Age: 64
End: 2024-01-30
Attending: INTERNAL MEDICINE
Payer: MEDICARE

## 2024-01-30 VITALS
TEMPERATURE: 99 F | RESPIRATION RATE: 16 BRPM | DIASTOLIC BLOOD PRESSURE: 81 MMHG | OXYGEN SATURATION: 98 % | HEART RATE: 94 BPM | SYSTOLIC BLOOD PRESSURE: 139 MMHG

## 2024-01-30 DIAGNOSIS — Z17.0 MALIGNANT NEOPLASM OF UPPER-OUTER QUADRANT OF LEFT BREAST IN FEMALE, ESTROGEN RECEPTOR POSITIVE: Primary | ICD-10-CM

## 2024-01-30 DIAGNOSIS — C50.412 MALIGNANT NEOPLASM OF UPPER-OUTER QUADRANT OF LEFT BREAST IN FEMALE, ESTROGEN RECEPTOR POSITIVE: Primary | ICD-10-CM

## 2024-01-30 PROCEDURE — 63600175 PHARM REV CODE 636 W HCPCS: Mod: JZ,JG | Performed by: INTERNAL MEDICINE

## 2024-01-30 PROCEDURE — 96372 THER/PROPH/DIAG INJ SC/IM: CPT

## 2024-01-30 RX ADMIN — PEGFILGRASTIM-CBQV 6 MG: 6 INJECTION, SOLUTION SUBCUTANEOUS at 04:01

## 2024-01-30 NOTE — NURSING
Udenyca injection given SQ to L arm w/o difficulties.  Bandaid applied.  Patient instructed to stay in the clinic for 15 minutes.  Patient verbalized understanding and will notify nurse with any complaints.  NAD upon discharge. Pt scheduled per provider request.

## 2024-01-30 NOTE — DISCHARGE INSTRUCTIONS
Thank you for letting me take care of you today!  - Emmanuelle MCCARTY RN      [facility]  [address] [address]  [phone] phone     [phone] fax  Hours of Operation: Monday- Friday 8:00am- 5:00pm  After hours phone  [phone]  Hematology / Oncology Physicians on call    DEBBIE Montiel Dr., Dr., Dr., Dr., NP Phaon Dunbar, NP Cheree Boden, NP      Please call with any concerns regarding your appointment today.

## 2024-02-03 ENCOUNTER — HOSPITAL ENCOUNTER (EMERGENCY)
Facility: HOSPITAL | Age: 64
Discharge: HOME | End: 2024-02-03
Attending: EMERGENCY MEDICINE
Payer: MEDICARE

## 2024-02-03 VITALS
OXYGEN SATURATION: 98 % | TEMPERATURE: 98 F | RESPIRATION RATE: 16 BRPM | WEIGHT: 192 LBS | SYSTOLIC BLOOD PRESSURE: 150 MMHG | DIASTOLIC BLOOD PRESSURE: 86 MMHG | HEART RATE: 100 BPM | BODY MASS INDEX: 32.96 KG/M2

## 2024-02-03 DIAGNOSIS — R00.0 TACHYCARDIA: ICD-10-CM

## 2024-02-03 DIAGNOSIS — D25.9 UTERINE LEIOMYOMA, UNSPECIFIED LOCATION: Primary | ICD-10-CM

## 2024-02-03 DIAGNOSIS — M79.606 LEG PAIN: ICD-10-CM

## 2024-02-03 DIAGNOSIS — R10.2 PELVIC PAIN: ICD-10-CM

## 2024-02-03 DIAGNOSIS — G89.3 CANCER ASSOCIATED PAIN: ICD-10-CM

## 2024-02-03 LAB
ALBUMIN SERPL BCP-MCNC: 4 G/DL (ref 3.5–5.2)
ALP SERPL-CCNC: 90 U/L (ref 55–135)
ALT SERPL W/O P-5'-P-CCNC: 14 U/L (ref 10–44)
ANION GAP SERPL CALC-SCNC: 11 MMOL/L (ref 8–16)
AST SERPL-CCNC: 17 U/L (ref 10–40)
BACTERIA #/AREA URNS HPF: NORMAL /HPF
BASOPHILS NFR BLD: 0 % (ref 0–1.9)
BILIRUB SERPL-MCNC: 0.5 MG/DL (ref 0.1–1)
BILIRUB UR QL STRIP: NEGATIVE
BUN SERPL-MCNC: 13 MG/DL (ref 8–23)
CALCIUM SERPL-MCNC: 10.2 MG/DL (ref 8.7–10.5)
CHLORIDE SERPL-SCNC: 97 MMOL/L (ref 95–110)
CLARITY UR: CLEAR
CO2 SERPL-SCNC: 26 MMOL/L (ref 23–29)
COLOR UR: YELLOW
CREAT SERPL-MCNC: 0.8 MG/DL (ref 0.5–1.4)
DIFFERENTIAL METHOD BLD: ABNORMAL
EOSINOPHIL NFR BLD: 0 % (ref 0–8)
ERYTHROCYTE [DISTWIDTH] IN BLOOD BY AUTOMATED COUNT: 13.2 % (ref 11.5–14.5)
EST. GFR  (NO RACE VARIABLE): >60 ML/MIN/1.73 M^2
GLUCOSE SERPL-MCNC: 144 MG/DL (ref 70–110)
GLUCOSE UR QL STRIP: NEGATIVE
HCT VFR BLD AUTO: 40.5 % (ref 37–48.5)
HGB BLD-MCNC: 13.9 G/DL (ref 12–16)
HGB UR QL STRIP: NEGATIVE
IMM GRANULOCYTES # BLD AUTO: ABNORMAL K/UL (ref 0–0.04)
IMM GRANULOCYTES NFR BLD AUTO: ABNORMAL % (ref 0–0.5)
KETONES UR QL STRIP: NEGATIVE
LACTATE SERPL-SCNC: 2 MMOL/L (ref 0.5–2.2)
LEUKOCYTE ESTERASE UR QL STRIP: ABNORMAL
LYMPHOCYTES NFR BLD: 60 % (ref 18–48)
MCH RBC QN AUTO: 28 PG (ref 27–31)
MCHC RBC AUTO-ENTMCNC: 34.3 G/DL (ref 32–36)
MCV RBC AUTO: 82 FL (ref 82–98)
MICROSCOPIC COMMENT: NORMAL
MONOCYTES NFR BLD: 5 % (ref 4–15)
NEUTROPHILS NFR BLD: 35 % (ref 38–73)
NITRITE UR QL STRIP: NEGATIVE
NRBC BLD-RTO: 0 /100 WBC
PH UR STRIP: 6 [PH] (ref 5–8)
PLATELET # BLD AUTO: 267 K/UL (ref 150–450)
PLATELET BLD QL SMEAR: ABNORMAL
PMV BLD AUTO: 9.2 FL (ref 9.2–12.9)
POTASSIUM SERPL-SCNC: 3.5 MMOL/L (ref 3.5–5.1)
PROT SERPL-MCNC: 8 G/DL (ref 6–8.4)
PROT UR QL STRIP: NEGATIVE
RBC # BLD AUTO: 4.96 M/UL (ref 4–5.4)
SODIUM SERPL-SCNC: 134 MMOL/L (ref 136–145)
SP GR UR STRIP: 1.02 (ref 1–1.03)
SQUAMOUS #/AREA URNS HPF: 3 /HPF
URN SPEC COLLECT METH UR: ABNORMAL
UROBILINOGEN UR STRIP-ACNC: NEGATIVE EU/DL
WBC # BLD AUTO: 3.76 K/UL (ref 3.9–12.7)
WBC #/AREA URNS HPF: 5 /HPF (ref 0–5)

## 2024-02-03 PROCEDURE — 63600175 PHARM REV CODE 636 W HCPCS: Performed by: EMERGENCY MEDICINE

## 2024-02-03 PROCEDURE — 83605 ASSAY OF LACTIC ACID: CPT | Performed by: NURSE PRACTITIONER

## 2024-02-03 PROCEDURE — 85007 BL SMEAR W/DIFF WBC COUNT: CPT | Performed by: NURSE PRACTITIONER

## 2024-02-03 PROCEDURE — 80053 COMPREHEN METABOLIC PANEL: CPT | Performed by: NURSE PRACTITIONER

## 2024-02-03 PROCEDURE — 25000003 PHARM REV CODE 250: Performed by: EMERGENCY MEDICINE

## 2024-02-03 PROCEDURE — 96374 THER/PROPH/DIAG INJ IV PUSH: CPT

## 2024-02-03 PROCEDURE — 99285 EMERGENCY DEPT VISIT HI MDM: CPT | Mod: 25

## 2024-02-03 PROCEDURE — 93010 ELECTROCARDIOGRAM REPORT: CPT | Mod: ,,, | Performed by: STUDENT IN AN ORGANIZED HEALTH CARE EDUCATION/TRAINING PROGRAM

## 2024-02-03 PROCEDURE — 93005 ELECTROCARDIOGRAM TRACING: CPT

## 2024-02-03 PROCEDURE — 81000 URINALYSIS NONAUTO W/SCOPE: CPT | Performed by: NURSE PRACTITIONER

## 2024-02-03 PROCEDURE — 96375 TX/PRO/DX INJ NEW DRUG ADDON: CPT

## 2024-02-03 PROCEDURE — 85027 COMPLETE CBC AUTOMATED: CPT | Performed by: NURSE PRACTITIONER

## 2024-02-03 PROCEDURE — 87040 BLOOD CULTURE FOR BACTERIA: CPT | Performed by: NURSE PRACTITIONER

## 2024-02-03 RX ORDER — OXYCODONE AND ACETAMINOPHEN 10; 325 MG/1; MG/1
1 TABLET ORAL
Status: COMPLETED | OUTPATIENT
Start: 2024-02-03 | End: 2024-02-03

## 2024-02-03 RX ORDER — OXYCODONE AND ACETAMINOPHEN 10; 325 MG/1; MG/1
1 TABLET ORAL EVERY 4 HOURS PRN
Qty: 12 TABLET | Refills: 0 | Status: SHIPPED | OUTPATIENT
Start: 2024-02-03 | End: 2024-02-06 | Stop reason: SDUPTHER

## 2024-02-03 RX ORDER — KETOROLAC TROMETHAMINE 30 MG/ML
15 INJECTION, SOLUTION INTRAMUSCULAR; INTRAVENOUS
Status: COMPLETED | OUTPATIENT
Start: 2024-02-03 | End: 2024-02-03

## 2024-02-03 RX ORDER — ONDANSETRON HYDROCHLORIDE 2 MG/ML
4 INJECTION, SOLUTION INTRAVENOUS
Status: COMPLETED | OUTPATIENT
Start: 2024-02-03 | End: 2024-02-03

## 2024-02-03 RX ORDER — MORPHINE SULFATE 4 MG/ML
4 INJECTION, SOLUTION INTRAMUSCULAR; INTRAVENOUS
Status: COMPLETED | OUTPATIENT
Start: 2024-02-03 | End: 2024-02-03

## 2024-02-03 RX ADMIN — OXYCODONE AND ACETAMINOPHEN 1 TABLET: 10; 325 TABLET ORAL at 10:02

## 2024-02-03 RX ADMIN — SODIUM CHLORIDE 1000 ML: 9 INJECTION, SOLUTION INTRAVENOUS at 09:02

## 2024-02-03 RX ADMIN — KETOROLAC TROMETHAMINE 15 MG: 30 INJECTION, SOLUTION INTRAMUSCULAR at 10:02

## 2024-02-03 RX ADMIN — MORPHINE SULFATE 4 MG: 4 INJECTION INTRAVENOUS at 09:02

## 2024-02-03 RX ADMIN — ONDANSETRON 4 MG: 2 INJECTION INTRAMUSCULAR; INTRAVENOUS at 09:02

## 2024-02-03 NOTE — PROGRESS NOTES
Breast Surgical Oncology  [place]    Date of Service: 02/06/2024    DIAGNOSIS:   63 y.o. female with a history of left breast cancer.    Date of Diagnosis: 8/29/23  Pathology: IDC, G3, ER 70%, NY 80%, HER2 0, Ki67 90%  Clinical Stage: IA (cT1c cN0 Mx)  Pathologic Stage: IA (pT1c pN0 Mx)    TREATMENT:   Surgery: bilateral nipple sparing mastectomy with sentinel node biopsy on 10/25/23 with return for re-excision on 11/22/23. Hemalatha Josue M.D. Breast Surgical Oncology; tissue expander reconstruction by Dr. Lynn  Systemic Therapy: Chemotherapy  adjuvant TC from 1/10/24  To 3/14/24  Medical Oncologist: Deloris Gordon M.D.    Radiation Treatment Summary: N/A  Radiation Oncologist: None  Genetics: negative  Physical Therapy: prehabilitation     HISTORY OF PRESENT ILLNESS:   Demetrice Gaines is here for oncological follow up.  Today, she denies new breast concerns such as pain, masses, skin changes, nipple discharge, nipple retraction or lumps under the arm.  She also denies bone pain, shortness of breath, abdominal pain and neurologic symptoms.     Her port was placed by IR. She reports a headache following her first chemotherapy cycle. Her tissue expanders are fully inflated.     IMAGING:   No new     MEDICATIONS/ALLERGIES:     Current Outpatient Medications:     amlodipine-olmesartan (LEATHA) 10-40 mg per tablet, Take 1 tablet by mouth once daily., Disp: 90 tablet, Rfl: 3    aspirin (ECOTRIN) 81 MG EC tablet, Take 81 mg by mouth once daily., Disp: , Rfl:     chlorthalidone (HYGROTEN) 25 MG Tab, Take 1 tablet (25 mg total) by mouth once daily., Disp: 90 tablet, Rfl: 3    magnesium oxide (MAGOX) 400 mg (241.3 mg magnesium) tablet, Take 1 tablet (400 mg total) by mouth once daily., Disp: 30 tablet, Rfl: 0    OLANZapine (ZYPREXA) 5 MG tablet, Take 1 tablet by mouth nightly on days 1-3 of each chemotherapy cycle., Disp: 3 tablet, Rfl: 5    ondansetron (ZOFRAN-ODT) 4 MG TbDL, Take 1 tablet (4 mg total) by mouth  every 8 (eight) hours as needed., Disp: 90 tablet, Rfl: 3    oxyCODONE-acetaminophen (PERCOCET)  mg per tablet, Take 1 tablet by mouth every 4 (four) hours as needed for Pain., Disp: 60 tablet, Rfl: 0    prochlorperazine (COMPAZINE) 5 MG tablet, Take 1 tablet (5 mg total) by mouth every 6 (six) hours as needed for Nausea., Disp: 20 tablet, Rfl: 11    semaglutide (OZEMPIC) 1 mg/dose (4 mg/3 mL), Inject 1 mg into the skin every 7 days., Disp: 9 mL, Rfl: 3  Review of patient's allergies indicates:   Allergen Reactions    Aldactone [spironolactone]      Memory impair and breast soreness    Coreg [carvedilol]      Hair loss    Lisinopril-hydrochlorothiazide      Other reaction(s): Reaction:Throat swelling;       PHYSICAL EXAM:   General: The patient appears well and is in no acute distress.     Chaperone is present for examination.   BREAST EXAM  No Asymmetry  Bilateral tissue expanders in place  Right:  - Mass: No  - Skin change: breast edema to lower pole  - Nipple Discharge: No  - Nipple retraction: No  - Axillary LAD: No  Left:   - Mass: No  - Skin change: breast edema to lower pole  - Nipple Discharge: No  - Nipple retraction: No  - Axillary LAD: No    ASSESSMENT:   Diagnoses and all orders for this visit:    Malignant neoplasm of upper-outer quadrant of left breast in female, estrogen receptor positive         PLAN:   Demetrice has a benign exam today without evidence of local or distant relapse.    She will return in May for her 6 month follow up visit, or sooner should she develop breast concerns.      The patient is in agreement with the plan. Questions were encouraged and answered to patient's satisfaction. Demetrice will call our office with any questions or concerns.     I spent a total of 30 minutes on this visit. This includes face to face time and non-face to face time preparing to see the patient (eg, review of tests), obtaining and/or reviewing separately obtained history, documenting clinical  information in the electronic or other health record, independently interpreting results and communicating results to the patient/family/caregiver, or care coordinator.       Hemalatha Josue M.D.

## 2024-02-04 NOTE — ED PROVIDER NOTES
"SCRIBE #1 NOTE: I, Me-Nhung Tovar, am scribing for, and in the presence of, Hayde Richter DO. I have scribed the entire note.       History     Chief Complaint   Patient presents with    Leg Pain     Pt states she is having pain in both legs that start at her hip/ pelvic area and radiates down her legs. Pt states the pain in a sharp pain and some time pulsates .     Review of patient's allergies indicates:   Allergen Reactions    Aldactone [spironolactone]      Memory impair and breast soreness    Coreg [carvedilol]      Hair loss    Lisinopril-hydrochlorothiazide      Other reaction(s): Reaction:Throat swelling;         History of Present Illness     [facility]    2/3/2024, 8:51 PM  History obtained from the patient      History of Present Illness: Demetrice Gaines is a 63 y.o. female patient with a PMHx of bilateral pleural effusion, CAD, CHF, colon polyps, DM2, HTN, hyponatremia, breast cancer on chemotherapy, and MI who presents to the Emergency Department for evaluation of bilateral leg pain which onset 2 days ago. Pt states that it started at night and she could "barely walk" due to the pain. Pt primarily feels pain to her hips that radiates down to her BLE, but she also feels it in her pelvic area.  Pt describes the pain as sharp and sometimes constant. Symptoms are severe, worse when walking.  Patient states she took 2 of her leftover pain pills which helped the pain.  Motrin did not help the pain.  Patient denies any nausea, vomiting, diarrhea, fever, dysuria, hematuria, vaginal discharge, vaginal pain, and all other sxs at this time. Pt took pain medications which have helped relieve the pain temporarily. Additionally, pt states that she started chemotherapy on 1/29/2024 for invasive ductal carcinoma of the breast. No further complaints or concerns at this time.       Arrival mode: Personal vehicle    PCP: Lul Alvarez MD        Past Medical History:  Past Medical History:   Diagnosis Date    " Bilateral pleural effusion 2020    CAD (coronary artery disease)     CHF (congestive heart failure)     Colon polyp     Diabetes mellitus, type 2     Hypertension     Hyponatremia 2020    Malignant neoplasm of upper-outer quadrant of left breast in female, estrogen receptor positive 10/17/2023    Myocardial infarction        Past Surgical History:  Past Surgical History:   Procedure Laterality Date    Benign Cyst Right     BREAST CYST EXCISION Right 1986    pt states benign     SECTION      COLONOSCOPY      COLONOSCOPY N/A 2019    Procedure: COLONOSCOPY;  Surgeon: Ileana Holcomb MD;  Location: [facility] ENDO;  Service: Endoscopy;  Laterality: N/A;    COLONOSCOPY N/A 2024    Procedure: COLONOSCOPY;  Surgeon: Lobo Mitchell MD;  Location: [facility] ENDO;  Service: Endoscopy;  Laterality: N/A;    EXCISION, MASS, BREAST, USING RADIOLOGICAL MARKER Left 2023    Procedure: EXCISION,MASS,BREAST,USING RADIOLOGICAL MARKER;  Surgeon: Hemalatha Josue MD;  Location: [facility];  Service: General;  Laterality: Left;  radhika  needed    FLUOROSCOPY N/A 2024    Procedure: Fluoroscopy/Mediport placement;  Surgeon: Sylvester Zazueta MD;  Location: [facility] CATH LAB;  Service: General;  Laterality: N/A;    INJECTION FOR SENTINEL NODE IDENTIFICATION Left 10/25/2023    Procedure: INJECTION, FOR SENTINEL NODE IDENTIFICATION;  Surgeon: Hemalatha Josue MD;  Location: [facility];  Service: General;  Laterality: Left;    INSERTION OF BREAST TISSUE EXPANDER Bilateral 10/25/2023    Procedure: INSERTION, TISSUE EXPANDER, BREAST;  Surgeon: Keron Lynn MD;  Location: [facility] OR;  Service: Plastics;  Laterality: Bilateral;    MASTECTOMY, NIPPLE SPARING Bilateral 10/25/2023    Procedure: MASTECTOMY, NIPPLE SPARING;  Surgeon: Hemalatha Josue MD;  Location: [facility] OR;  Service: General;  Laterality: Bilateral;    SENTINEL LYMPH NODE BIOPSY Left 10/25/2023    Procedure: BIOPSY, LYMPH NODE, SENTINEL;   Surgeon: Hemalatha Josue MD;  Location: [facility] OR;  Service: General;  Laterality: Left;  nuc med needed 1 hour before start    ULTRASOUND GUIDANCE Left 10/25/2023    Procedure: ULTRASOUND GUIDANCE;  Surgeon: Hemalatha Josue MD;  Location: [facility] OR;  Service: General;  Laterality: Left;         Family History:  Family History   Problem Relation Age of Onset    Cancer Mother     Pacemaker/defibrilator Mother     Glaucoma Mother     Arthritis Mother     Heart disease Mother         It seems that this condition runs in my family on my mother's side    Hypertension Mother     Diabetes Father     Hypertension Sister     Glaucoma Sister     Breast cancer Paternal Grandmother     Cancer Paternal Grandmother     Hypertension Brother     COPD Sister     COPD Brother     Hypertension Brother     Hypertension Sister        Social History:  Social History     Tobacco Use    Smoking status: Never     Passive exposure: Never    Smokeless tobacco: Never    Tobacco comments:     NEVER   Substance and Sexual Activity    Alcohol use: Never    Drug use: Never    Sexual activity: Not Currently     Comment: Menopausal        Review of Systems     Review of Systems   Constitutional:  Negative for fever.   Gastrointestinal:  Negative for constipation (resolved), diarrhea, nausea and vomiting.   Genitourinary:  Positive for pelvic pain. Negative for dysuria, hematuria, vaginal discharge and vaginal pain.   Musculoskeletal:  Positive for arthralgias (Hip pain that radiates to BLE).      Physical Exam     Initial Vitals [02/03/24 1830]   BP Pulse Resp Temp SpO2   (!) 148/94 (!) 120 16 98.8 °F (37.1 °C) 96 %      MAP       --          Physical Exam  Nursing Notes and Vital Signs Reviewed.  Constitutional: Patient is in no acute distress. Well-developed and well-nourished.  Head: Atraumatic. Normocephalic.  Eyes: EOM intact.No scleral icterus..  Cardiovascular: Regular rate. Regular rhythm. No murmurs, rubs, or gallops. DP and PT are 2+  and symmetric.  Pulmonary/Chest: No respiratory distress. Clear to auscultation bilaterally. No wheezing or rales.  Abdominal: Soft and non-distended. Mild suprapubic tenderness.  No rebound, guarding, or rigidity. Good bowel sounds.  Genitourinary: No CVA tenderness  Musculoskeletal:  Full passive internal and external rotation of bilateral hips.  Full active flexion and extension of bilateral knees.  No obvious deformities. No edema. No calf tenderness.  Skin: Warm and dry.  No cyanosis.  Normal capillary refill.  Neurological:  Alert, awake, and appropriate.  Normal speech.  No acute focal neurological deficits are appreciated.  Psychiatric: Normal affect. Good eye contact. Appropriate in content.     ED Course   Procedures  ED Vital Signs:  Vitals:    02/03/24 1830 02/03/24 2000 02/03/24 2123 02/03/24 2220   BP: (!) 148/94      Pulse: (!) 120 (!) 116  99   Resp: 16 16 16    Temp: 98.8 °F (37.1 °C) 98.6 °F (37 °C)     TempSrc: Oral Oral     SpO2: 96% 97%     Weight: 87.1 kg (192 lb)       02/03/24 2237 02/03/24 2238   BP: (!) 150/86    Pulse: 100    Resp: 16 16   Temp: 98.4 °F (36.9 °C)    TempSrc: Oral    SpO2: 98%    Weight:         Abnormal Lab Results:  Labs Reviewed   CBC W/ AUTO DIFFERENTIAL - Abnormal; Notable for the following components:       Result Value    WBC 3.76 (*)     Gran % 35.0 (*)     Lymph % 60.0 (*)     All other components within normal limits   COMPREHENSIVE METABOLIC PANEL - Abnormal; Notable for the following components:    Sodium 134 (*)     Glucose 144 (*)     All other components within normal limits   URINALYSIS, REFLEX TO URINE CULTURE - Abnormal; Notable for the following components:    Leukocytes, UA 1+ (*)     All other components within normal limits    Narrative:     Specimen Source->Urine   CULTURE, BLOOD   CULTURE, BLOOD   LACTIC ACID, PLASMA   URINALYSIS MICROSCOPIC    Narrative:     Specimen Source->Urine   LACTIC ACID, PLASMA        All Lab Results:  Results for orders  placed or performed during the hospital encounter of 02/03/24   CBC auto differential   Result Value Ref Range    WBC 3.76 (L) 3.90 - 12.70 K/uL    RBC 4.96 4.00 - 5.40 M/uL    Hemoglobin 13.9 12.0 - 16.0 g/dL    Hematocrit 40.5 37.0 - 48.5 %    MCV 82 82 - 98 fL    MCH 28.0 27.0 - 31.0 pg    MCHC 34.3 32.0 - 36.0 g/dL    RDW 13.2 11.5 - 14.5 %    Platelets 267 150 - 450 K/uL    MPV 9.2 9.2 - 12.9 fL    Immature Granulocytes CANCELED 0.0 - 0.5 %    Immature Grans (Abs) CANCELED 0.00 - 0.04 K/uL    nRBC 0 0 /100 WBC    Gran % 35.0 (L) 38.0 - 73.0 %    Lymph % 60.0 (H) 18.0 - 48.0 %    Mono % 5.0 4.0 - 15.0 %    Eosinophil % 0.0 0.0 - 8.0 %    Basophil % 0.0 0.0 - 1.9 %    Platelet Estimate Appears normal     Differential Method Manual    Comprehensive metabolic panel   Result Value Ref Range    Sodium 134 (L) 136 - 145 mmol/L    Potassium 3.5 3.5 - 5.1 mmol/L    Chloride 97 95 - 110 mmol/L    CO2 26 23 - 29 mmol/L    Glucose 144 (H) 70 - 110 mg/dL    BUN 13 8 - 23 mg/dL    Creatinine 0.8 0.5 - 1.4 mg/dL    Calcium 10.2 8.7 - 10.5 mg/dL    Total Protein 8.0 6.0 - 8.4 g/dL    Albumin 4.0 3.5 - 5.2 g/dL    Total Bilirubin 0.5 0.1 - 1.0 mg/dL    Alkaline Phosphatase 90 55 - 135 U/L    AST 17 10 - 40 U/L    ALT 14 10 - 44 U/L    eGFR >60 >60 mL/min/1.73 m^2    Anion Gap 11 8 - 16 mmol/L   Lactic acid, plasma #1   Result Value Ref Range    Lactate (Lactic Acid) 2.0 0.5 - 2.2 mmol/L   Urinalysis, Reflex to Urine Culture Urine, Clean Catch    Specimen: Urine   Result Value Ref Range    Specimen UA Urine, Clean Catch     Color, UA Yellow Yellow, Straw, [place]    Appearance, UA Clear Clear    pH, UA 6.0 5.0 - 8.0    Specific Gravity, UA 1.020 1.005 - 1.030    Protein, UA Negative Negative    Glucose, UA Negative Negative    Ketones, UA Negative Negative    Bilirubin (UA) Negative Negative    Occult Blood UA Negative Negative    Nitrite, UA Negative Negative    Urobilinogen, UA Negative <2.0 EU/dL    Leukocytes, UA 1+ (A)  Negative   Urinalysis Microscopic   Result Value Ref Range    WBC, UA 5 0 - 5 /hpf    Bacteria Rare None-Occ /hpf    Squam Epithel, UA 3 /hpf    Microscopic Comment SEE COMMENT          Imaging Results:  Imaging Results              CT Abdomen Pelvis  Without Contrast (Final result)  Result time 02/03/24 22:06:38      Final result by Nasim Dasilva MD (02/03/24 22:06:38)                   Impression:      Enlarged multi fibroid uterus    Fat containing umbilical hernia    Slight athersclerotic changes.    All CT scans   are performed using dose optimization techniques including the following: automated exposure control; adjustment of the mA and/or kV; use of iterative reconstruction technique.  Dose modulation was employed for ALARA by means of: Automated exposure control; adjustment of the mA and/or kV according to patient size (this includes techniques or standardized protocols for targeted exams where dose is matched to indication/reason for exam; i.e. extremities or head); and/or use of iterative reconstructive technique.      Electronically signed by: Nasim Dasilva  Date:    02/03/2024  Time:    22:06               Narrative:    EXAMINATION:  CT ABDOMEN PELVIS WITHOUT CONTRAST    CLINICAL HISTORY:  Sepsis;    TECHNIQUE:  Low dose axial images, sagittal and coronal reformations were obtained from the lung bases to the pubic symphysis.    COMPARISON:  None    FINDINGS:  Heart: Normal in size as far as seen.  No pericardial effusion as far seen.    Lung Bases: Well aerated, without consolidation or pleural fluid.    Liver: Fatty infiltration of the liver, with no focal hepatic lesions.    Gallbladder: No calcified gallstones.    Bile Ducts: No evidence of dilated ducts.    Pancreas: No mass or peripancreatic fat stranding.    Spleen: Unremarkable.    Adrenals: Unremarkable.    Kidneys/ Ureters: Unremarkable.    Bladder: No evidence of wall thickening.    Reproductive organs: Enlarged multi fibroid uterus    GI  Tract/Mesentery: No evidence of bowel obstruction or inflammation. No secondary signs of appendicitis.    Peritoneal Space: No ascites. No free air.    Lymph nodes: No significant adenopathy.    Abdominal wall: Fat containing umbilical hernia    Vasculature: No aneurysm.  Mild atherosclerotic changes    Bones: No acute fracture.                                       US Lower Extremity Veins Bilateral (Final result)  Result time 02/03/24 21:44:03      Final result by Nasim Dasilva MD (02/03/24 21:44:03)                   Impression:      No evidence of deep venous thrombosis in either lower extremity.      Electronically signed by: Nasim Dasilva  Date:    02/03/2024  Time:    21:44               Narrative:    EXAMINATION:  US LOWER EXTREMITY VEINS BILATERAL    CLINICAL HISTORY:  Pain in leg, unspecified    TECHNIQUE:  Duplex and color flow Doppler and dynamic compression was performed of the bilateral lower extremity veins was performed.    COMPARISON:  None    FINDINGS:  Right thigh veins: The common femoral, femoral, popliteal, upper greater saphenous, and deep femoral veins are patent and free of thrombus. The veins are normally compressible and have normal phasic flow and augmentation response.    Right calf veins: The visualized calf veins are patent.    Left thigh veins: The common femoral, femoral, popliteal, upper greater saphenous, and deep femoral veins are patent and free of thrombus. The veins are normally compressible and have normal phasic flow and augmentation response.    Left calf veins: The visualized calf veins are patent.    Miscellaneous: None                                       X-Ray Chest AP Portable (Final result)  Result time 02/03/24 18:45:56      Final result by Nasim Dasilva MD (02/03/24 18:45:56)                   Impression:      No acute abnormality.      Electronically signed by: Nasim Dasilva  Date:    02/03/2024  Time:    18:45               Narrative:    EXAMINATION:  XR CHEST  AP PORTABLE    CLINICAL HISTORY:  Sepsis;    TECHNIQUE:  Single frontal view of the chest was performed.    COMPARISON:  None    FINDINGS:  Tissue expanders.  Right-sided port.The lungs are clear, with normal appearance of pulmonary vasculature and no pleural effusion or pneumothorax.    The cardiac silhouette is prominent.  Tortuous aorta.    Bones are intact.                                       The EKG was ordered, reviewed, and independently interpreted by the ED provider.  Interpretation time: 19:08  Rate: 103 BPM  Rhythm:  Sinus tachycardia with occasional Premature ventricular complexes   Interpretation: Septal infarct. No STEMI.           The Emergency Provider reviewed the vital signs and test results, which are outlined above.     ED Discussion       10:19 PM: Reassessed pt at this time. Discussed with pt all pertinent ED information and results. Discussed pt dx and plan of tx. Gave pt all f/u and return to the ED instructions. All questions and concerns were addressed at this time. Pt expresses understanding of information and instructions, and is comfortable with plan to discharge. Pt is stable for discharge.    I discussed with patient and/or family/caretaker that evaluation in the ED does not suggest any emergent or life threatening medical conditions requiring immediate intervention beyond what was provided in the ED, and I believe patient is safe for discharge.  Regardless, an unremarkable evaluation in the ED does not preclude the development or presence of a serious of life threatening condition. As such, patient was instructed to return immediately for any worsening or change in current symptoms.      ED Course as of 02/03/24 2250   Sat Feb 03, 2024   0868 Differential diagnosis includes cancer pain, DVT, cystitis, sepsis, appendicitis, diverticulitis, incarcerated or strangulated hernia, ovarian torsion.  Acute fracture unlikely she has no trauma with full range of motion.  CBC, CMP, UA, lactic  acid normal.  Blood cultures pending.  Ultrasound negative for DVT.  Chest x-ray normal.  CT abdomen and pelvis shows known uterine fibroids otherwise unremarkable.  Pain controlled morphine. [NF]      ED Course User Index  [NF] Hayde Richter,      Medical Decision Making  Amount and/or Complexity of Data Reviewed  Labs: ordered. Decision-making details documented in ED Course.  Radiology: ordered. Decision-making details documented in ED Course.  ECG/medicine tests: ordered and independent interpretation performed. Decision-making details documented in ED Course.    Risk  Prescription drug management.                ED Medication(s):  Medications   sodium chloride 0.9% bolus 1,000 mL 1,000 mL (1,000 mLs Intravenous New Bag 2/3/24 2123)   morphine injection 4 mg (4 mg Intravenous Given 2/3/24 2123)   ondansetron injection 4 mg (4 mg Intravenous Given 2/3/24 2123)   oxyCODONE-acetaminophen  mg per tablet 1 tablet (1 tablet Oral Given 2/3/24 2238)   ketorolac injection 15 mg (15 mg Intravenous Given 2/3/24 2238)       Discharge Medication List as of 2/3/2024 10:22 PM        START taking these medications    Details   oxyCODONE-acetaminophen (PERCOCET)  mg per tablet Take 1 tablet by mouth every 4 (four) hours as needed for Pain., Starting Sat 2/3/2024, Print              Follow-up Information       [facility] - Emergency Dept..    Specialty: Emergency Medicine  Why: As needed, If symptoms worsen  Contact information:  [address] [facility] [address] [phone]             Hemalatha Josue MD In 1 week.    Specialties: Surgical Oncology, Breast Surgery  Contact information:  [address]  [place] [address]  [phone]               Lul Alvarez MD In 1 week.    Specialty: Family Medicine  Contact information:  [address]  [place] [address]  [phone]                                 Scribe Attestation:   Scribe #1: I performed the above  scribed service and the documentation accurately describes the services I performed. I attest to the accuracy of the note.     Attending:   Physician Attestation Statement for Scribe #1: I, Hayde Richter DO, personally performed the services described in this documentation, as scribed by Say Tovar, in my presence, and it is both accurate and complete.           Clinical Impression       ICD-10-CM ICD-9-CM   1. Uterine leiomyoma, unspecified location  D25.9 218.9   2. Tachycardia  R00.0 785.0   3. Leg pain  M79.606 729.5   4. Pelvic pain  R10.2 [id number]   5. Cancer associated pain  G89.3 338.3       Disposition:   Disposition: Discharged  Condition: Stable         Hayde Richter DO  02/03/24 [id number]

## 2024-02-04 NOTE — FIRST PROVIDER EVALUATION
Medical screening examination initiated.  I have conducted a focused provider triage encounter, findings are as follows:    Brief history of present illness:  Patient is a cancer patient that presents with bilateral hip and pelvic pain starting yesterday.    Vitals:    02/03/24 1830   BP: (!) 148/94   BP Location: Right arm   Patient Position: Sitting   Pulse: (!) 120   Resp: 16   Temp: 98.8 °F (37.1 °C)   TempSrc: Oral   SpO2: 96%   Weight: 87.1 kg (192 lb)       Pertinent physical exam:  Tachycardic    Brief workup plan:  Labs, EKG came imaging    Preliminary workup initiated; this workup will be continued and followed by the physician or advanced practice provider that is assigned to the patient when roomed.

## 2024-02-05 ENCOUNTER — PATIENT OUTREACH (OUTPATIENT)
Dept: EMERGENCY MEDICINE | Facility: HOSPITAL | Age: 64
End: 2024-02-05

## 2024-02-06 ENCOUNTER — LAB VISIT (OUTPATIENT)
Dept: LAB | Facility: HOSPITAL | Age: 64
End: 2024-02-06
Attending: INTERNAL MEDICINE
Payer: MEDICARE

## 2024-02-06 ENCOUNTER — OFFICE VISIT (OUTPATIENT)
Dept: SURGERY | Facility: CLINIC | Age: 64
End: 2024-02-06
Payer: MEDICARE

## 2024-02-06 ENCOUNTER — OFFICE VISIT (OUTPATIENT)
Dept: HEMATOLOGY/ONCOLOGY | Facility: CLINIC | Age: 64
End: 2024-02-06
Payer: MEDICARE

## 2024-02-06 VITALS
TEMPERATURE: 98 F | RESPIRATION RATE: 18 BRPM | SYSTOLIC BLOOD PRESSURE: 115 MMHG | BODY MASS INDEX: 33.09 KG/M2 | WEIGHT: 193.81 LBS | DIASTOLIC BLOOD PRESSURE: 75 MMHG | OXYGEN SATURATION: 96 % | HEART RATE: 101 BPM | HEIGHT: 64 IN

## 2024-02-06 DIAGNOSIS — C50.412 MALIGNANT NEOPLASM OF UPPER-OUTER QUADRANT OF LEFT BREAST IN FEMALE, ESTROGEN RECEPTOR POSITIVE: Primary | Chronic | ICD-10-CM

## 2024-02-06 DIAGNOSIS — C50.412 MALIGNANT NEOPLASM OF UPPER-OUTER QUADRANT OF LEFT BREAST IN FEMALE, ESTROGEN RECEPTOR POSITIVE: Chronic | ICD-10-CM

## 2024-02-06 DIAGNOSIS — Z17.0 MALIGNANT NEOPLASM OF UPPER-OUTER QUADRANT OF LEFT BREAST IN FEMALE, ESTROGEN RECEPTOR POSITIVE: Primary | Chronic | ICD-10-CM

## 2024-02-06 DIAGNOSIS — D05.12 BREAST NEOPLASM, TIS (DCIS), LEFT: ICD-10-CM

## 2024-02-06 DIAGNOSIS — E83.42 HYPOMAGNESEMIA: ICD-10-CM

## 2024-02-06 DIAGNOSIS — Z17.0 MALIGNANT NEOPLASM OF UPPER-OUTER QUADRANT OF LEFT BREAST IN FEMALE, ESTROGEN RECEPTOR POSITIVE: Chronic | ICD-10-CM

## 2024-02-06 LAB
ALBUMIN SERPL BCP-MCNC: 4 G/DL (ref 3.5–5.2)
ALP SERPL-CCNC: 104 U/L (ref 55–135)
ALT SERPL W/O P-5'-P-CCNC: 19 U/L (ref 10–44)
ANION GAP SERPL CALC-SCNC: 9 MMOL/L (ref 8–16)
AST SERPL-CCNC: 28 U/L (ref 10–40)
BASOPHILS # BLD AUTO: ABNORMAL K/UL (ref 0–0.2)
BASOPHILS NFR BLD: 0 % (ref 0–1.9)
BILIRUB SERPL-MCNC: 0.3 MG/DL (ref 0.1–1)
BUN SERPL-MCNC: 16 MG/DL (ref 8–23)
CALCIUM SERPL-MCNC: 9.2 MG/DL (ref 8.7–10.5)
CHLORIDE SERPL-SCNC: 97 MMOL/L (ref 95–110)
CO2 SERPL-SCNC: 30 MMOL/L (ref 23–29)
CREAT SERPL-MCNC: 0.9 MG/DL (ref 0.5–1.4)
DIFFERENTIAL METHOD BLD: ABNORMAL
EOSINOPHIL # BLD AUTO: ABNORMAL K/UL (ref 0–0.5)
EOSINOPHIL NFR BLD: 0 % (ref 0–8)
ERYTHROCYTE [DISTWIDTH] IN BLOOD BY AUTOMATED COUNT: 13.8 % (ref 11.5–14.5)
EST. GFR  (NO RACE VARIABLE): >60 ML/MIN/1.73 M^2
GIANT PLATELETS BLD QL SMEAR: PRESENT
GLUCOSE SERPL-MCNC: 132 MG/DL (ref 70–110)
HCT VFR BLD AUTO: 38.5 % (ref 37–48.5)
HGB BLD-MCNC: 13.1 G/DL (ref 12–16)
IMM GRANULOCYTES # BLD AUTO: ABNORMAL K/UL (ref 0–0.04)
IMM GRANULOCYTES NFR BLD AUTO: ABNORMAL % (ref 0–0.5)
LYMPHOCYTES # BLD AUTO: ABNORMAL K/UL (ref 1–4.8)
LYMPHOCYTES NFR BLD: 47 % (ref 18–48)
MAGNESIUM SERPL-MCNC: 1.4 MG/DL (ref 1.6–2.6)
MCH RBC QN AUTO: 28.5 PG (ref 27–31)
MCHC RBC AUTO-ENTMCNC: 34 G/DL (ref 32–36)
MCV RBC AUTO: 84 FL (ref 82–98)
MONOCYTES # BLD AUTO: ABNORMAL K/UL (ref 0.3–1)
MONOCYTES NFR BLD: 5 % (ref 4–15)
NEUTROPHILS NFR BLD: 34 % (ref 38–73)
NEUTS BAND NFR BLD MANUAL: 14 %
NRBC BLD-RTO: 0 /100 WBC
PHOSPHATE SERPL-MCNC: 3.2 MG/DL (ref 2.7–4.5)
PLATELET # BLD AUTO: 316 K/UL (ref 150–450)
PLATELET BLD QL SMEAR: ABNORMAL
PMV BLD AUTO: 9.4 FL (ref 9.2–12.9)
POTASSIUM SERPL-SCNC: 4.6 MMOL/L (ref 3.5–5.1)
PROT SERPL-MCNC: 7 G/DL (ref 6–8.4)
RBC # BLD AUTO: 4.59 M/UL (ref 4–5.4)
SODIUM SERPL-SCNC: 136 MMOL/L (ref 136–145)
WBC # BLD AUTO: 18.93 K/UL (ref 3.9–12.7)

## 2024-02-06 PROCEDURE — 99213 OFFICE O/P EST LOW 20 MIN: CPT | Mod: S$GLB,,, | Performed by: INTERNAL MEDICINE

## 2024-02-06 PROCEDURE — 36415 COLL VENOUS BLD VENIPUNCTURE: CPT | Performed by: INTERNAL MEDICINE

## 2024-02-06 PROCEDURE — 84100 ASSAY OF PHOSPHORUS: CPT | Performed by: INTERNAL MEDICINE

## 2024-02-06 PROCEDURE — 85007 BL SMEAR W/DIFF WBC COUNT: CPT | Performed by: INTERNAL MEDICINE

## 2024-02-06 PROCEDURE — 99024 POSTOP FOLLOW-UP VISIT: CPT | Mod: S$GLB,,, | Performed by: SURGERY

## 2024-02-06 PROCEDURE — 85027 COMPLETE CBC AUTOMATED: CPT | Performed by: INTERNAL MEDICINE

## 2024-02-06 PROCEDURE — 99999 PR PBB SHADOW E&M-EST. PATIENT-LVL III: CPT | Mod: PBBFAC,,, | Performed by: INTERNAL MEDICINE

## 2024-02-06 PROCEDURE — 80053 COMPREHEN METABOLIC PANEL: CPT | Performed by: INTERNAL MEDICINE

## 2024-02-06 PROCEDURE — 83735 ASSAY OF MAGNESIUM: CPT | Performed by: INTERNAL MEDICINE

## 2024-02-06 RX ORDER — LANOLIN ALCOHOL/MO/W.PET/CERES
400 CREAM (GRAM) TOPICAL DAILY
Qty: 30 TABLET | Refills: 0 | Status: SHIPPED | OUTPATIENT
Start: 2024-02-06 | End: 2024-03-07

## 2024-02-06 RX ORDER — OXYCODONE AND ACETAMINOPHEN 10; 325 MG/1; MG/1
1 TABLET ORAL EVERY 4 HOURS PRN
Qty: 60 TABLET | Refills: 0 | Status: SHIPPED | OUTPATIENT
Start: 2024-02-06

## 2024-02-06 NOTE — PROGRESS NOTES
Patient ID: Demetrice Gaines   Chief Complaint: Follow-up and Breast Cancer  MRN:  [id number]     Reason for Referral:  Establish Care for Breast   Subjective   The patient presents for follow up and tox check.    The day of chemo she had HA and lightheadedness that resolved after pausing the treatment. About 4-5 days after chemo she started having severe pains in her legs preventing her from walking. She went to the ED and received pain control.  Today she feels better but has had arthralgias this week but they are improving on Percocet.  She also felt a little tired 3-4 days after treatment but her energy is improved.  Arthralgias likely due to GCSF. Counseled to take Claritin prior to and after GCSF with next cycle.    Review of Systems:  Review of Systems   Constitutional:  Negative for activity change, appetite change, chills, diaphoresis, fatigue, fever and unexpected weight change.   HENT:  Negative for nosebleeds.    Respiratory:  Negative for shortness of breath.    Cardiovascular:  Negative for chest pain.   Gastrointestinal:  Positive for constipation. Negative for abdominal distention, abdominal pain, anal bleeding, blood in stool, diarrhea, nausea and vomiting.   Genitourinary:  Negative for difficulty urinating, hematuria and vaginal bleeding.   Musculoskeletal:  Positive for arthralgias. Negative for back pain and myalgias.   Skin:  Negative for rash.   Neurological:  Negative for dizziness, weakness, light-headedness and headaches.   Hematological:  Does not bruise/bleed easily.   Psychiatric/Behavioral:  The patient is not nervous/anxious.      History     Oncology History   Breast neoplasm, Tis (DCIS), left (Resolved)   9/8/2023 Initial Diagnosis    Breast neoplasm, Tis (DCIS), left     9/8/2023 Cancer Staged    Staging form: Breast, AJCC 8th Edition  - Clinical stage from 9/8/2023: Stage 0 (cTis (DCIS), cN0, cM0, G3, ER+, LA+, HER2: Not Assessed)      Genetic Testing    Patient has genetic  testing done for [employer] STAT breast cancer panel and 84 gene multicancer panel.                                              Results revealed patient has the following mutation(s):negative breast cancer panel- no mutation noted- MLH1- variant of undetermined significance      Chemotherapy    Treatment Summary   Plan Name: OP BREAST TC (DOCEtaxel cycloPHOSphamide) Q3W  Treatment Goal: Curative  Status: Active  Start Date: 1/10/2024 (Planned)  End Date: 3/14/2024 (Planned)  Provider: Deloris Gordon MD  Chemotherapy: cycloPHOSphamide 600 mg/m2 = 1,180 mg in sodium chloride 0.9% 255.9 mL chemo infusion, 600 mg/m2, Intravenous, Clinic/HOD 1 time, 0 of 4 cycles    DOCEtaxel (TAXOTERE) 75 mg/m2 = 148 mg in sodium chloride 0.9% 257.4 mL chemo infusion, 75 mg/m2, Intravenous, Clinic/HOD 1 time, 0 of 4 cycles       Malignant neoplasm of upper-outer quadrant of left breast in female, estrogen receptor positive   10/17/2023 Initial Diagnosis    Malignant neoplasm of upper-outer quadrant of left breast in female, estrogen receptor positive     10/18/2023 Cancer Staged    Staging form: Breast, AJCC 8th Edition  - Clinical stage from 10/18/2023: Stage IA (cT1c, cN0(f), cM0, G3, ER+, ND+, HER2-)     12/4/2023 Cancer Staged    Staging form: Breast, AJCC 8th Edition  - Pathologic stage from 12/4/2023: Stage IA (pT1c, pN0(sn), cM0, G3, ER+, ND+, HER2-)     1/29/2024 -  Chemotherapy    Treatment Summary   Plan Name: OP BREAST TC (DOCEtaxel cycloPHOSphamide) Q3W  Treatment Goal: Curative  Status: Active  Start Date: 1/29/2024  End Date: 4/2/2024 (Planned)  Provider: Deloris Gordon MD  Chemotherapy: cycloPHOSphamide 600 mg/m2 = 1,200 mg in sodium chloride 0.9% 291 mL chemo infusion, 600 mg/m2 = 1,200 mg, Intravenous, Clinic/HOD 1 time, 1 of 4 cycles  Administration: 1,200 mg (1/29/2024)  DOCEtaxel (TAXOTERE) 75 mg/m2 = 150 mg in sodium chloride 0.9% 292.5 mL chemo infusion, 75 mg/m2 = 150 mg, Intravenous, Clinic/HOD 1 time, 1 of 4  cycles  Administration: 150 mg (1/29/2024)      Chemotherapy    Treatment Summary   Plan Name: OP BREAST TC (DOCEtaxel cycloPHOSphamide) Q3W  Treatment Goal: Curative  Status: Active  Start Date: 1/10/2024 (Planned)  End Date: 3/14/2024 (Planned)  Provider: Deloris Gordon MD  Chemotherapy: cycloPHOSphamide 600 mg/m2 = 1,180 mg in sodium chloride 0.9% 255.9 mL chemo infusion, 600 mg/m2, Intravenous, Clinic/HOD 1 time, 0 of 4 cycles    DOCEtaxel (TAXOTERE) 75 mg/m2 = 148 mg in sodium chloride 0.9% 257.4 mL chemo infusion, 75 mg/m2, Intravenous, Clinic/HOD 1 time, 0 of 4 cycles           HPI:  Demetrice Gaines is a pleasant 63 y.o. female with history of CAD not requiring PCI, HTN, DM II, CHF and colon polyps who presents to clinic to establish care on referral for breast cancer.    The patient reports that she has been recovering well from surgery.  She has no acute complaints.  She has been having blood in her stool for over a year as well as constipation.  I recommended that we have this moved up to rule out any colonic lesions.  I will message the gastroenterologist to see if they can possibly do a colonoscopy on her prior to starting chemotherapy     I reviewed her Oncotype DX with her.  Unfortunately her recurrence score is 38 indicating a clear benefit for adjuvant chemotherapy.  She is understanding of this.  I reviewed the most common side effects of chemotherapy.  Initially considered giving her AC-T however giving her cardiac history I think that it would be better for the patient unless toxic if we administered TC.  Otherwise she does have some baseline intermittent neuropathy which will need to be monitored.    FHx significant for Mother with primary bone cancer and paternal grandmother with breast cancer      Past Medical History:   Diagnosis Date    Bilateral pleural effusion 06/20/2020    CAD (coronary artery disease)     CHF (congestive heart failure)     Colon polyp     Diabetes mellitus, type 2      Hypertension     Hyponatremia 2020    Malignant neoplasm of upper-outer quadrant of left breast in female, estrogen receptor positive 10/17/2023    Myocardial infarction        Past Surgical History:   Procedure Laterality Date    Benign Cyst Right     BREAST CYST EXCISION Right     pt states benign     SECTION      COLONOSCOPY      COLONOSCOPY N/A 2019    Procedure: COLONOSCOPY;  Surgeon: Ileana Holcomb MD;  Location: [facility] ENDO;  Service: Endoscopy;  Laterality: N/A;    COLONOSCOPY N/A 2024    Procedure: COLONOSCOPY;  Surgeon: Lobo Mitchell MD;  Location: [facility] [place] ENDO;  Service: Endoscopy;  Laterality: N/A;    EXCISION, MASS, BREAST, USING RADIOLOGICAL MARKER Left 2023    Procedure: EXCISION,MASS,BREAST,USING RADIOLOGICAL MARKER;  Surgeon: Hemalatha Josue MD;  Location: [facility] OR;  Service: General;  Laterality: Left;  radhika  needed    FLUOROSCOPY N/A 2024    Procedure: Fluoroscopy/Mediport placement;  Surgeon: Sylvester Zazueta MD;  Location: [facility] [place] CATH LAB;  Service: General;  Laterality: N/A;    INJECTION FOR SENTINEL NODE IDENTIFICATION Left 10/25/2023    Procedure: INJECTION, FOR SENTINEL NODE IDENTIFICATION;  Surgeon: Hemalatha Josue MD;  Location: [facility];  Service: General;  Laterality: Left;    INSERTION OF BREAST TISSUE EXPANDER Bilateral 10/25/2023    Procedure: INSERTION, TISSUE EXPANDER, BREAST;  Surgeon: Keron Lynn MD;  Location: [facility] OR;  Service: Plastics;  Laterality: Bilateral;    MASTECTOMY, NIPPLE SPARING Bilateral 10/25/2023    Procedure: MASTECTOMY, NIPPLE SPARING;  Surgeon: Hemalatha Josue MD;  Location: [facility] OR;  Service: General;  Laterality: Bilateral;    SENTINEL LYMPH NODE BIOPSY Left 10/25/2023    Procedure: BIOPSY, LYMPH NODE, SENTINEL;  Surgeon: Hemalatha Josue MD;  Location: [facility] OR;  Service: General;  Laterality: Left;  nuc med needed 1 hour before start    ULTRASOUND GUIDANCE Left 10/25/2023    Procedure:  "ULTRASOUND GUIDANCE;  Surgeon: Hemalatha Josue MD;  Location: [facility];  Service: General;  Laterality: Left;       Family History   Problem Relation Age of Onset    Cancer Mother     Pacemaker/defibrilator Mother     Glaucoma Mother     Arthritis Mother     Heart disease Mother         It seems that this condition runs in my family on my mother's side    Hypertension Mother     Diabetes Father     Hypertension Sister     Glaucoma Sister     Breast cancer Paternal Grandmother     Cancer Paternal Grandmother     Hypertension Brother     COPD Sister     COPD Brother     Hypertension Brother     Hypertension Sister        Review of patient's allergies indicates:   Allergen Reactions    Aldactone [spironolactone]      Memory impair and breast soreness    Coreg [carvedilol]      Hair loss    Lisinopril-hydrochlorothiazide      Other reaction(s): Reaction:Throat swelling;       Social History     Tobacco Use    Smoking status: Never     Passive exposure: Never    Smokeless tobacco: Never    Tobacco comments:     NEVER   Substance Use Topics    Alcohol use: Never    Drug use: Never       Physical Exam   ECOG:   ECOG SCORE    0 - Fully active-able to carry on all pre-disease performance without restriction          Vitals:  /75   Pulse 101   Temp 97.7 °F (36.5 °C)   Resp 18   Ht 5' 4" (1.626 m)   Wt 87.9 kg (193 lb 12.6 oz)   SpO2 96%   BMI 33.26 kg/m²     Physical Exam:  Physical Exam  Constitutional:       General: She is not in acute distress.     Appearance: Normal appearance. She is not ill-appearing.   HENT:      Head: Normocephalic and atraumatic.   Eyes:      Extraocular Movements: Extraocular movements intact.      Conjunctiva/sclera: Conjunctivae normal.   Cardiovascular:      Rate and Rhythm: Normal rate.   Pulmonary:      Effort: Pulmonary effort is normal. No respiratory distress.   Abdominal:      Palpations: There is no hepatomegaly, splenomegaly or mass.   Skin:     Findings: No rash. "   Neurological:      General: No focal deficit present.      Mental Status: She is alert and oriented to person, place, and time.   Psychiatric:         Mood and Affect: Mood normal.         Behavior: Behavior normal.         Thought Content: Thought content normal.        Labs   Labs:  Lab Visit on 02/06/2024   Component Date Value Ref Range Status    Phosphorus 02/06/2024 3.2  2.7 - 4.5 mg/dL Final    Magnesium 02/06/2024 1.4 (L)  1.6 - 2.6 mg/dL Final    Sodium 02/06/2024 136  136 - 145 mmol/L Final    Potassium 02/06/2024 4.6  3.5 - 5.1 mmol/L Final    Chloride 02/06/2024 97  95 - 110 mmol/L Final    CO2 02/06/2024 30 (H)  23 - 29 mmol/L Final    Glucose 02/06/2024 132 (H)  70 - 110 mg/dL Final    BUN 02/06/2024 16  8 - 23 mg/dL Final    Creatinine 02/06/2024 0.9  0.5 - 1.4 mg/dL Final    Calcium 02/06/2024 9.2  8.7 - 10.5 mg/dL Final    Total Protein 02/06/2024 7.0  6.0 - 8.4 g/dL Final    Albumin 02/06/2024 4.0  3.5 - 5.2 g/dL Final    Total Bilirubin 02/06/2024 0.3  0.1 - 1.0 mg/dL Final    Comment: For infants and newborns, interpretation of results should be based  on gestational age, weight and in agreement with clinical  observations.    Premature Infant recommended reference ranges:  Up to 24 hours.............<8.0 mg/dL  Up to 48 hours............<12.0 mg/dL  3-5 days..................<15.0 mg/dL  6-29 days.................<15.0 mg/dL      Alkaline Phosphatase 02/06/2024 104  55 - 135 U/L Final    AST 02/06/2024 28  10 - 40 U/L Final    ALT 02/06/2024 19  10 - 44 U/L Final    eGFR 02/06/2024 >60  >60 mL/min/1.73 m^2 Final    Anion Gap 02/06/2024 9  8 - 16 mmol/L Final    WBC 02/06/2024 18.93 (H)  3.90 - 12.70 K/uL Final    RBC 02/06/2024 4.59  4.00 - 5.40 M/uL Final    Hemoglobin 02/06/2024 13.1  12.0 - 16.0 g/dL Final    Hematocrit 02/06/2024 38.5  37.0 - 48.5 % Final    MCV 02/06/2024 84  82 - 98 fL Final    MCH 02/06/2024 28.5  27.0 - 31.0 pg Final    MCHC 02/06/2024 34.0  32.0 - 36.0 g/dL Final     RDW 02/06/2024 13.8  11.5 - 14.5 % Final    Platelets 02/06/2024 316  150 - 450 K/uL Final    MPV 02/06/2024 9.4  9.2 - 12.9 fL Final      Pathology     Specimen to Pathology, Surgery Breast 10/25/2023      Component 2 mo ago   Final Pathologic Diagnosis 1. Right breast, prophylactic nipple sparing mastectomy (467 grams):      -  Benign breast tissue with fibrocystic change including fibrosis, adenosis, usual ductal hyperplasia         (UDH) and duct ectasia with apocrine metaplasia and focal periductal chronic inflammation      -  Microcalcifications:  Present, associated with benign ductal tissue      -  Skin and nipple are surgically absent      -  Unremarkable skeletal muscle present      -  Negative for atypia or malignancy    2. Left breast, nipple sparing mastectomy (470 grams):      -  Benign breast tissue with focal atypical ductal hyperplasia (ADH, less than 2mm) in a background of         fibrocystic change including fibrosis, adenosis, duct ectasia, focal columnar cell change/hyperplasia         and fibroadenomatoid change      -  Findings of biopsy cavity are NOT appreciated grossly or histologically, see case comment      -  Skin and nipple are surgically absent      -  Dumbbell shaped biopsy clip NOT IDENTIFIED at time of grossing    Comment:  Immunohistochemical stains with appropriate controls were performed on select tissue blocks.  Cytokeratin 5/6 demonstrates a mosaic staining pattern in areas of ductal hyperplasia, showing no evidence of atypia.  AE1/AE3 is utilized for duct  mapping.  P63 highlights intact myoepithelial cells throughout the specimen, showing no evidence of invasive carcinoma.    3. [place] lymph node #1 (hot and blue, background count 2), biopsy:      -  One lymph node, negative for metastatic carcinoma (0/1)      -  Immunohistochemical stains for CAM 5.2, AE1/AE3 and wide spectrum keratin are negative      4. Left breast axillary tail, excision (11 grams):      -  Benign  mature fibroadipose tissue      -  Negative for atypia or malignancy    5. Additional lateral margin, excision (tissue submitted entirely for histologic evaluation):      -  Benign breast tissue with mild fibrocystic change including fibrosis, adenosis and         fibroadenomatoid change      -  Negative for atypia or malignancy      6. Additional anterior lateral margin, excision (tissue submitted entirely for histologic evaluation):      -  Benign breast tissue with fibrocystic change including fibrosis, adenosis, fibroadenomatoid         changes and duct ectasia with apocrine metaplasia      -  Microcalcifications:  Present, associated with benign breast tissue      -  Negative for atypia or malignancy     Comment: Interp By Dianelys Cardona M.D., Signed on 11/08/2023 at 09:29   Comment The patient's history of invasive carcinoma and ductal carcinoma in-situ in the upper outer quadrant is noted.  The specimen is thoroughly searched for both the dumbbell biopsy clip and a biopsy cavity, neither of which are identified grossly or  histologically.  Numerous sections of tissue from specimen 2 (left breast mastectomy) were evaluated.  The patient's additional margins (specimens 5 and 6) were submitted entirely for histologic review and show no evidence of invasive or in-situ  carcinoma.  Specimen 4 (breast axillary tail) was also submitted entirely for histologic review and showed no evidence of invasive or in-situ carcinoma.  Dr. Bishop has reviewed the above case and agrees with the findings.    Dr. Josue was alerted to these findings via epic message on 11/08/2023 at 09:30.      All stains were performed with adequate positive and negative controls.              Specimen to Pathology, Surgery Breast 11/22/2023      Component 1 mo ago   Final Pathologic Diagnosis Breast, left, re-excision:  Invasive ductal carcinoma, poorly differentiated  Deale grade 3:  Tubule formation-3, nuclear pleomorphism-3 and mitotic  count -3  Invasive carcinoma measures 19 x 9 x 8 mm  Positive superior margin, measuring 10 mm in greatest extent  Additional margins:  -Anterior margin:  1 mm  -Medial margin:  6 mm  -Posterior margin:  6.5 mm  -Lateral margin:  9 mm  -Inferior margin:  9 mm  No carcinoma in Situ or lymphovascular invasion is identified  Previous biopsy clip and reflector both grossly identified   Comment: Interp By Shelbie Bishop M.D., Signed on 11/28/2023 at 10:46   Gross Surgery ID:  [id number]    Pathology ID:  [id number]  1.  Received in formalin labeled &quot;left breast biopsy&quot; is a 4 g, 2.3 cm superior to inferior by 2.1 cm medial to lateral by 2.3 cm anterior to posterior portion of breast tissue received oriented by the surgeon as short stitch superior and long  stitch lateral.  A biopsy clip and a reflector clip is identified exposed at the anterior-superior margins (see attached image).  The specimen is sectioned from anterior to posterior into 5 slices averaging 0.5 cm in thickness.  Sectioning reveals a 1.9  cm anterior to posterior by 0.9 cm medial to lateral by 0.8 cm superior to inferior well-circumscribed, tan-white, rubbery mass within slices 2-5.  The mass abuts the superior and medial margins and measures 0.9 cm from the lateral margin, 0.9 cm from  the inferior margin, 0.4 cm from the posterior margin, and 0.5 cm from the anterior margin.  There is an X shaped biopsy clip identified within the mass in slice 4 and a reflector clip identified adjacent to the mass in slice 1.  The specimen is inked as   follows:  Superior-blue, inferior-green, medial-red, lateral-orange, anterior-yellow, and posterior-black.  The specimen is submitted entirely and sequentially from anterior to posterior as follows:  CHF--1-A:  Slice 1, anterior margin, perpendicular  EHR--1-B:  Slice 2, mass to include anterior, superior, inferior, medial, and lateral margins  RQB--1-C:  Slice 3, mass to superior, inferior,  medial, and lateral margins  MLP--1-D:  Slice 4, mass to superior, inferior, medial, and lateral margins  CAU--1-E:  Slice 5, posterior margin, perpendicular        Grossed by: Carl Donovan MS, PA([facility])        Assessment and Plan   Stage IA (T1c N0 Mx) L Breast Invasive Ductal  Carcinoma, G3, +/+/-, ki67 of 90%   s/p b/l nipple sparing mastectomy and sentinel node biopsy on 10/25/23   s/p excision of primary tumor 11/22/23   Pathology report above  Genetic Testing 09/13/2023: VUS in MLH1  Oncotype Dx Score 38  TTE stable from prior  She has a history of heart disease; she has not required PCI however will precert for TC treatment to circumvent potential anthracycline cardiac effects  I discussed in detail the role of medical oncology in her care.  I informed her that my treatment recommendation is based on the NCCN Guidelines, her final pathology and Oncotype DX score  Because of the high Oncotype DX score, chemotherapy is recommended; because of the hormonal make up of her tumor, endocrine therapy is recommended after she completes chemotherapy. I reviewed the major side effects of chemotherapy and  AIs.   s/p C1 TC 01/29/24 with good tolerance; severe arthralgias, likely from GCSF      Hypomagnesemia  PO mag prescribed      Bone Health  Given that she is post menopausal and endocrine therapy is recommended, she will be monitored for the need for bisphosphonate therapy.  BMD 08/2023 was normal  Start Calcium and vitamin D      Blood in stool, Resolved   Patient reports blood in her stool for over 1 year   Colonoscopy 01/02/2024: TA (4 fragments in ascending colon and 1 TA in sigmoid colon); recommend repeat in 5 years      Cancer Screening  PAP Smear 09/15/2023: Negative for intraepithelial lesion or malignancy   Colonoscopy 01/2024: TAs; repeat in 5 years      Chronic Medical Conditions  DM II  HTN  CAD:  She has not require PCI; stress test 12/26/2019 negative for ischemia and arrhythmias; last  echo 06/2020 and showed preserved ejection fraction at 55%; concentric hypertrophy and normal left ventricular diastolic function  CHF  Hx of Colon Polyps  Peripheral neuropathy; intermittent        Med Onc Chart Routing      Follow up with physician 2 weeks.   Follow up with [facility]    Infusion scheduling note   C2 TC   Injection scheduling note    Labs CBC, magnesium, phosphorus and CMP   Scheduling:  Preferred lab:  Lab interval:     Imaging    Pharmacy appointment    Other referrals                       The patient was seen, interviewed and examined. Pertinent lab and radiologic studies were reviewed. Pt instructed to call should they develop concerning signs/symptoms or have further questions.        Portions of the record may have been created with voice recognition software. Occasional wrong-word or sound-a-like substitutions may have occurred due to the inherent limitations of voice recognition software. Read the chart carefully and recognize, using context, where substitutions have occurred.      Deloris Garcia MD    Hematology/Oncology

## 2024-02-08 ENCOUNTER — OFFICE VISIT (OUTPATIENT)
Dept: GASTROENTEROLOGY | Facility: CLINIC | Age: 64
End: 2024-02-08
Payer: MEDICARE

## 2024-02-08 VITALS
HEIGHT: 64 IN | HEART RATE: 113 BPM | BODY MASS INDEX: 32.63 KG/M2 | WEIGHT: 191.13 LBS | SYSTOLIC BLOOD PRESSURE: 111 MMHG | DIASTOLIC BLOOD PRESSURE: 76 MMHG

## 2024-02-08 DIAGNOSIS — K59.09 CHRONIC CONSTIPATION: Primary | ICD-10-CM

## 2024-02-08 DIAGNOSIS — K62.5 RECTAL BLEEDING: ICD-10-CM

## 2024-02-08 DIAGNOSIS — R10.2 PELVIC PAIN: ICD-10-CM

## 2024-02-08 PROCEDURE — 99204 OFFICE O/P NEW MOD 45 MIN: CPT | Mod: S$GLB,,, | Performed by: NURSE PRACTITIONER

## 2024-02-08 PROCEDURE — 99999 PR PBB SHADOW E&M-EST. PATIENT-LVL IV: CPT | Mod: PBBFAC,,, | Performed by: NURSE PRACTITIONER

## 2024-02-08 RX ORDER — POLYETHYLENE GLYCOL 3350 17 G/17G
17 POWDER, FOR SOLUTION ORAL DAILY
Qty: 510 G | Refills: 11 | Status: SHIPPED | OUTPATIENT
Start: 2024-02-08 | End: 2025-02-07

## 2024-02-08 NOTE — PROGRESS NOTES
Clinic Follow Up:  [facility] Follow Up Note    Reason for Follow Up:  The primary encounter diagnosis was Chronic constipation. Diagnoses of Rectal bleeding and Pelvic pain were also pertinent to this visit.    PCP: Lul Alvarez   No address on file    HPI:  This is a 63 y.o. female here for follow up of     Review of Systems    Medical History:  Past Medical History:   Diagnosis Date    Bilateral pleural effusion 2020    CAD (coronary artery disease)     CHF (congestive heart failure)     Colon polyp     Diabetes mellitus, type 2     Hypertension     Hyponatremia 2020    Malignant neoplasm of upper-outer quadrant of left breast in female, estrogen receptor positive 10/17/2023    Myocardial infarction        Surgical History:   Past Surgical History:   Procedure Laterality Date    Benign Cyst Right     BREAST CYST EXCISION Right     pt states benign     SECTION      COLONOSCOPY      COLONOSCOPY N/A 2019    Procedure: COLONOSCOPY;  Surgeon: Ileana Holcomb MD;  Location: [facility];  Service: Endoscopy;  Laterality: N/A;    COLONOSCOPY N/A 2024    Procedure: COLONOSCOPY;  Surgeon: Lobo Mitchell MD;  Location: [facility] ENDO;  Service: Endoscopy;  Laterality: N/A;    EXCISION, MASS, BREAST, USING RADIOLOGICAL MARKER Left 2023    Procedure: EXCISION,MASS,BREAST,USING RADIOLOGICAL MARKER;  Surgeon: Hemalatha Josue MD;  Location: [facility] OR;  Service: General;  Laterality: Left;  radhika  needed    FLUOROSCOPY N/A 2024    Procedure: Fluoroscopy/Mediport placement;  Surgeon: Sylvester Zazueta MD;  Location: [facility] CATH LAB;  Service: General;  Laterality: N/A;    INJECTION FOR SENTINEL NODE IDENTIFICATION Left 10/25/2023    Procedure: INJECTION, FOR SENTINEL NODE IDENTIFICATION;  Surgeon: Hemalatha Josue MD;  Location: [facility] OR;  Service: General;  Laterality: Left;    INSERTION OF BREAST TISSUE EXPANDER Bilateral 10/25/2023    Procedure:  INSERTION, TISSUE EXPANDER, BREAST;  Surgeon: Keron Lynn MD;  Location: [facility] OR;  Service: Plastics;  Laterality: Bilateral;    MASTECTOMY, NIPPLE SPARING Bilateral 10/25/2023    Procedure: MASTECTOMY, NIPPLE SPARING;  Surgeon: Hemalatha Josue MD;  Location: [facility] OR;  Service: General;  Laterality: Bilateral;    SENTINEL LYMPH NODE BIOPSY Left 10/25/2023    Procedure: BIOPSY, LYMPH NODE, SENTINEL;  Surgeon: Hemalatha Josue MD;  Location: [facility] OR;  Service: General;  Laterality: Left;  nuc med needed 1 hour before start    ULTRASOUND GUIDANCE Left 10/25/2023    Procedure: ULTRASOUND GUIDANCE;  Surgeon: Hemalatha Josue MD;  Location: [facility];  Service: General;  Laterality: Left;       Family History:   Family History   Problem Relation Age of Onset    Cancer Mother     Pacemaker/defibrilator Mother     Glaucoma Mother     Arthritis Mother     Heart disease Mother         It seems that this condition runs in my family on my mother's side    Hypertension Mother     Diabetes Father     Hypertension Sister     Glaucoma Sister     Breast cancer Paternal Grandmother     Cancer Paternal Grandmother     Hypertension Brother     COPD Sister     COPD Brother     Hypertension Brother     Hypertension Sister        Social History:   Social History     Tobacco Use    Smoking status: Never     Passive exposure: Never    Smokeless tobacco: Never    Tobacco comments:     NEVER   Substance Use Topics    Alcohol use: Never    Drug use: Never       Allergies:   Review of patient's allergies indicates:   Allergen Reactions    Aldactone [spironolactone]      Memory impair and breast soreness    Coreg [carvedilol]      Hair loss    Lisinopril-hydrochlorothiazide      Other reaction(s): Reaction:Throat swelling;       Home Medications:  Current Outpatient Medications on File Prior to Visit   Medication Sig Dispense Refill    amlodipine-olmesartan (LEATHA) 10-40 mg per tablet Take 1 tablet by mouth once daily. 90 tablet  "3    aspirin (ECOTRIN) 81 MG EC tablet Take 81 mg by mouth once daily.      chlorthalidone (HYGROTEN) 25 MG Tab Take 1 tablet (25 mg total) by mouth once daily. 90 tablet 3    magnesium oxide (MAGOX) 400 mg (241.3 mg magnesium) tablet Take 1 tablet (400 mg total) by mouth once daily. 30 tablet 0    OLANZapine (ZYPREXA) 5 MG tablet Take 1 tablet by mouth nightly on days 1-3 of each chemotherapy cycle. 3 tablet 5    ondansetron (ZOFRAN-ODT) 4 MG TbDL Take 1 tablet (4 mg total) by mouth every 8 (eight) hours as needed. 90 tablet 3    oxyCODONE-acetaminophen (PERCOCET)  mg per tablet Take 1 tablet by mouth every 4 (four) hours as needed for Pain. 60 tablet 0    prochlorperazine (COMPAZINE) 5 MG tablet Take 1 tablet (5 mg total) by mouth every 6 (six) hours as needed for Nausea. 20 tablet 11    semaglutide (OZEMPIC) 1 mg/dose (4 mg/3 mL) Inject 1 mg into the skin every 7 days. 9 mL 3     No current facility-administered medications on file prior to visit.       /76 (BP Location: Left arm, Patient Position: Sitting, BP Method: Medium (Automatic))   Pulse (!) 113   Ht 5' 4" (1.626 m)   Wt 86.7 kg (191 lb 2.2 oz)   BMI 32.81 kg/m²   Body mass index is 32.81 kg/m².  Physical Exam    Labs: Pertinent labs reviewed.  CRC Screening:     Assessment:   1. Chronic constipation    2. Rectal bleeding    3. Pelvic pain        Recommendations:   ***    Chronic constipation  -     polyethylene glycol (GLYCOLAX) 17 gram/dose powder; Take 17 g by mouth once daily.  Dispense: 510 g; Refill: 11    Rectal bleeding  -     Cancel: Ambulatory referral/consult to Colorectal Surgery; Future; Expected date: 02/15/2024    Pelvic pain      Return to Clinic:  No follow-ups on file.    Thank you for the opportunity to participate in the care of this patient.  MARC Barrios        "

## 2024-02-08 NOTE — PROGRESS NOTES
Clinic Consult:  [facility] Consultation Note    Reason for Consult:  The primary encounter diagnosis was Chronic constipation. Diagnoses of Rectal bleeding and Pelvic pain were also pertinent to this visit.    PCP: Lul Alvarez   No address on file    HPI:  This is a 63 y.o. female here for evaluation of rectal bleeding.   I was initially contacted by Dr. Gordon to discuss patient who had rectal bleeding x 1 year. She was going to be starting chemotherapy and would like to rule out colon cancer prior to starting chemotherapy. Orders for colonoscopy ordered by myself and completed.     She had a colonoscopy that showed multiple polyps. There was also extrinsic compression in the cecum. She had CT scan after without any concerning findings. She did have uterine fibroids. She reports today that she has been having some pelvic pain.     She still does have intermittent rectal bleeding with bright red blood. Occasionally containing darker red blood. Noticed on the toilet tissue as well as toilet bowl. Does have constipation.     Review of Systems   Constitutional:  Negative for fever and weight loss.   HENT:  Negative for sore throat.    Respiratory:  Negative for cough, shortness of breath and wheezing.    Cardiovascular:  Negative for chest pain and palpitations.   Gastrointestinal:  Positive for abdominal pain and constipation. Negative for blood in stool, diarrhea, heartburn, melena, nausea and vomiting.   Genitourinary:  Negative for dysuria and frequency.   Skin:  Negative for itching and rash.   Neurological:  Negative for dizziness, speech change, seizures, loss of consciousness and headaches.       Medical History:  has a past medical history of Bilateral pleural effusion (06/20/2020), CAD (coronary artery disease), CHF (congestive heart failure), Colon polyp, Diabetes mellitus, type 2, Hypertension, Hyponatremia (06/20/2020), Malignant neoplasm of upper-outer quadrant of left breast in  female, estrogen receptor positive (10/17/2023), and Myocardial infarction.    Surgical History:  has a past surgical history that includes Benign Cyst (Right);  section; Colonoscopy; Colonoscopy (N/A, 2019); Breast cyst excision (Right, ); mastectomy, nipple sparing (Bilateral, 10/25/2023); [place] lymph node biopsy (Left, 10/25/2023); Injection for sentinel node identification (Left, 10/25/2023); Ultrasound guidance (Left, 10/25/2023); Insertion of breast tissue expander (Bilateral, 10/25/2023); excision, mass, breast, using radiological marker (Left, 2023); Colonoscopy (N/A, 2024); and Fluoroscopy (N/A, 2024).    Family History: family history includes Arthritis in her mother; Breast cancer in her paternal grandmother; COPD in her brother and sister; Cancer in her mother and paternal grandmother; Diabetes in her father; Glaucoma in her mother and sister; Heart disease in her mother; Hypertension in her brother, brother, mother, sister, and sister; Pacemaker/defibrilator in her mother..     Social History:  reports that she has never smoked. She has never been exposed to tobacco smoke. She has never used smokeless tobacco. She reports that she does not drink alcohol and does not use drugs.    Allergies: Reviewed    Home Medications:   Current Outpatient Medications on File Prior to Visit   Medication Sig Dispense Refill    amlodipine-olmesartan (LEATHA) 10-40 mg per tablet Take 1 tablet by mouth once daily. 90 tablet 3    aspirin (ECOTRIN) 81 MG EC tablet Take 81 mg by mouth once daily.      chlorthalidone (HYGROTEN) 25 MG Tab Take 1 tablet (25 mg total) by mouth once daily. 90 tablet 3    magnesium oxide (MAGOX) 400 mg (241.3 mg magnesium) tablet Take 1 tablet (400 mg total) by mouth once daily. 30 tablet 0    OLANZapine (ZYPREXA) 5 MG tablet Take 1 tablet by mouth nightly on days 1-3 of each chemotherapy cycle. 3 tablet 5    ondansetron (ZOFRAN-ODT) 4 MG TbDL Take 1 tablet (4 mg  "total) by mouth every 8 (eight) hours as needed. 90 tablet 3    oxyCODONE-acetaminophen (PERCOCET)  mg per tablet Take 1 tablet by mouth every 4 (four) hours as needed for Pain. 60 tablet 0    prochlorperazine (COMPAZINE) 5 MG tablet Take 1 tablet (5 mg total) by mouth every 6 (six) hours as needed for Nausea. 20 tablet 11    semaglutide (OZEMPIC) 1 mg/dose (4 mg/3 mL) Inject 1 mg into the skin every 7 days. 9 mL 3     No current facility-administered medications on file prior to visit.       Physical Exam:  /76 (BP Location: Left arm, Patient Position: Sitting, BP Method: Medium (Automatic))   Pulse (!) 113   Ht 5' 4" (1.626 m)   Wt 86.7 kg (191 lb 2.2 oz)   BMI 32.81 kg/m²   Body mass index is 32.81 kg/m².  Physical Exam  Constitutional:       General: She is not in acute distress.  HENT:      Head: Normocephalic.   Neurological:      General: No focal deficit present.      Mental Status: She is alert.   Psychiatric:         Mood and Affect: Mood normal.         Judgment: Judgment normal.         Labs: Pertinent labs reviewed.    Assessment:  1. Chronic constipation    2. Rectal bleeding    3. Pelvic pain    No identifiable reason for rectal bleeding. Possible constipation as cause of rectal bleeding     Recommendations:   - trial of miralax  - if bleeding continues, recommend referral to colorectal surgery.   - need to see GYN for pelvic pain and fibroids.     Chronic constipation  -     polyethylene glycol (GLYCOLAX) 17 gram/dose powder; Take 17 g by mouth once daily.  Dispense: 510 g; Refill: 11    Rectal bleeding    Pelvic pain    Follow up if symptoms worsen or fail to improve.    Thank you so much for allowing me to participate in the care of MARC Villarreal    "

## 2024-02-09 LAB
BACTERIA BLD CULT: NORMAL
BACTERIA BLD CULT: NORMAL

## 2024-02-15 ENCOUNTER — OFFICE VISIT (OUTPATIENT)
Dept: OBSTETRICS AND GYNECOLOGY | Facility: CLINIC | Age: 64
End: 2024-02-15
Payer: MEDICARE

## 2024-02-15 VITALS
WEIGHT: 191.13 LBS | SYSTOLIC BLOOD PRESSURE: 120 MMHG | DIASTOLIC BLOOD PRESSURE: 70 MMHG | HEIGHT: 64 IN | BODY MASS INDEX: 32.63 KG/M2

## 2024-02-15 DIAGNOSIS — N85.2 ENLARGED UTERUS: Primary | ICD-10-CM

## 2024-02-15 DIAGNOSIS — D21.9 FIBROIDS: ICD-10-CM

## 2024-02-15 PROCEDURE — 99212 OFFICE O/P EST SF 10 MIN: CPT | Mod: S$GLB,,,

## 2024-02-15 PROCEDURE — 99999 PR PBB SHADOW E&M-EST. PATIENT-LVL III: CPT | Mod: PBBFAC,,,

## 2024-02-15 NOTE — PROGRESS NOTES
Subjective:       Patient ID: Demetrice Gaines is a 63 y.o. female.    Chief Complaint:  No chief complaint on file.      History of Present Illness  HPI  Uterine Fibroids  Patient presents with uterine fibroids. She is post-menopausal and denies vaginal bleeding. She reports intermittent pelvic pain and pressure that has been unrelieved by medication.   She has been aware of the fibroids for many years but has only recently started having problems. Pelvic CT ordered by PCP showed enlarged fibroid uterus. She is now interested in having a hysterectomy.    Pelvic CT Impression:  Enlarged multi fibroid uterus  Fat containing umbilical hernia  Slight athersclerotic changes.    GYN & OB History  No LMP recorded. Patient is postmenopausal.   Date of Last Pap: 9/15/2023    OB History    Para Term  AB Living   2 2 2     2   SAB IAB Ectopic Multiple Live Births           2      # Outcome Date GA Lbr Enrique/2nd Weight Sex Delivery Anes PTL Lv   2 Term     M CS-LTranv   MAGDA   1 Term     M Vag-Spont   MAGDA       Review of Systems  Review of Systems   Genitourinary:  Positive for pelvic pain. Negative for vaginal bleeding and postmenopausal bleeding.        Pelvic pressure   Musculoskeletal:  Positive for back pain.           Objective:      Physical Exam:   Constitutional: She is oriented to person, place, and time. She appears well-developed and well-nourished. No distress.    HENT:   Head: Normocephalic and atraumatic.    Eyes: Pupils are equal, round, and reactive to light. Conjunctivae and EOM are normal.      Pulmonary/Chest: Effort normal.                  Musculoskeletal: Normal range of motion and moves all extremeties.       Neurological: She is alert and oriented to person, place, and time.    Skin: Skin is warm and dry. No rash noted. She is not diaphoretic. No erythema. No pallor.    Psychiatric: She has a normal mood and affect. Her behavior is normal. Judgment and thought content normal.              Assessment:        1. Enlarged uterus    2. Fibroids               Plan:   Pelvic US ordered and scheduled   Patient request to follow up with Dr. Mehta for hysterectomy consult, appointment scheduled     Diagnosis and orders this visit:  Enlarged uterus  -     US Pelvis Comp with Transvag NON-OB (xpd; Future; Expected date: 02/15/2024    Fibroids  -     US Pelvis Comp with Transvag NON-OB (xpd; Future; Expected date: 02/15/2024         Destini Slaughter, NP

## 2024-02-19 ENCOUNTER — LAB VISIT (OUTPATIENT)
Dept: LAB | Facility: HOSPITAL | Age: 64
End: 2024-02-19
Attending: INTERNAL MEDICINE
Payer: MEDICARE

## 2024-02-19 ENCOUNTER — TELEPHONE (OUTPATIENT)
Dept: HEMATOLOGY/ONCOLOGY | Facility: CLINIC | Age: 64
End: 2024-02-19
Payer: MEDICARE

## 2024-02-19 DIAGNOSIS — I70.0 AORTIC ATHEROSCLEROSIS: Chronic | ICD-10-CM

## 2024-02-19 DIAGNOSIS — Z17.0 MALIGNANT NEOPLASM OF UPPER-OUTER QUADRANT OF LEFT BREAST IN FEMALE, ESTROGEN RECEPTOR POSITIVE: Chronic | ICD-10-CM

## 2024-02-19 DIAGNOSIS — I25.10 CORONARY ARTERY DISEASE INVOLVING NATIVE CORONARY ARTERY OF NATIVE HEART WITHOUT ANGINA PECTORIS: Chronic | ICD-10-CM

## 2024-02-19 DIAGNOSIS — Z53.20 REFUSAL OF STATIN MEDICATION BY PATIENT: ICD-10-CM

## 2024-02-19 DIAGNOSIS — C50.412 MALIGNANT NEOPLASM OF UPPER-OUTER QUADRANT OF LEFT BREAST IN FEMALE, ESTROGEN RECEPTOR POSITIVE: Chronic | ICD-10-CM

## 2024-02-19 LAB
ALBUMIN SERPL BCP-MCNC: 3.8 G/DL (ref 3.5–5.2)
ALP SERPL-CCNC: 60 U/L (ref 55–135)
ALT SERPL W/O P-5'-P-CCNC: 15 U/L (ref 10–44)
ANION GAP SERPL CALC-SCNC: 11 MMOL/L (ref 8–16)
AST SERPL-CCNC: 18 U/L (ref 10–40)
BILIRUB SERPL-MCNC: 0.3 MG/DL (ref 0.1–1)
BUN SERPL-MCNC: 6 MG/DL (ref 8–23)
CALCIUM SERPL-MCNC: 9.6 MG/DL (ref 8.7–10.5)
CHLORIDE SERPL-SCNC: 106 MMOL/L (ref 95–110)
CHOLEST SERPL-MCNC: 153 MG/DL (ref 120–199)
CHOLEST/HDLC SERPL: 4.3 {RATIO} (ref 2–5)
CO2 SERPL-SCNC: 24 MMOL/L (ref 23–29)
CREAT SERPL-MCNC: 0.7 MG/DL (ref 0.5–1.4)
EST. GFR  (NO RACE VARIABLE): >60 ML/MIN/1.73 M^2
GLUCOSE SERPL-MCNC: 101 MG/DL (ref 70–110)
HDLC SERPL-MCNC: 36 MG/DL (ref 40–75)
HDLC SERPL: 23.5 % (ref 20–50)
LDLC SERPL CALC-MCNC: 91 MG/DL (ref 63–159)
MAGNESIUM SERPL-MCNC: 1.4 MG/DL (ref 1.6–2.6)
NONHDLC SERPL-MCNC: 117 MG/DL
POTASSIUM SERPL-SCNC: 4.2 MMOL/L (ref 3.5–5.1)
PROT SERPL-MCNC: 7.1 G/DL (ref 6–8.4)
SODIUM SERPL-SCNC: 141 MMOL/L (ref 136–145)
TRIGL SERPL-MCNC: 130 MG/DL (ref 30–150)

## 2024-02-19 PROCEDURE — 80053 COMPREHEN METABOLIC PANEL: CPT | Performed by: INTERNAL MEDICINE

## 2024-02-19 PROCEDURE — 36415 COLL VENOUS BLD VENIPUNCTURE: CPT | Performed by: INTERNAL MEDICINE

## 2024-02-19 PROCEDURE — 83735 ASSAY OF MAGNESIUM: CPT | Performed by: INTERNAL MEDICINE

## 2024-02-19 PROCEDURE — 80061 LIPID PANEL: CPT | Performed by: FAMILY MEDICINE

## 2024-02-19 NOTE — TELEPHONE ENCOUNTER
SWER responded to patient's email requesting transportation for her appt on tomorrow. SWER called patient and scheduled transportation via [employer]. SWER will contact schedulers to not decouple appts in the future due to transportation issues. SWER will remain available.

## 2024-02-20 ENCOUNTER — OFFICE VISIT (OUTPATIENT)
Dept: HEMATOLOGY/ONCOLOGY | Facility: CLINIC | Age: 64
End: 2024-02-20
Payer: MEDICARE

## 2024-02-20 ENCOUNTER — INFUSION (OUTPATIENT)
Dept: INFUSION THERAPY | Facility: HOSPITAL | Age: 64
End: 2024-02-20
Attending: INTERNAL MEDICINE
Payer: MEDICARE

## 2024-02-20 ENCOUNTER — DOCUMENTATION ONLY (OUTPATIENT)
Dept: HEMATOLOGY/ONCOLOGY | Facility: CLINIC | Age: 64
End: 2024-02-20
Payer: MEDICARE

## 2024-02-20 VITALS
HEART RATE: 92 BPM | WEIGHT: 197.06 LBS | DIASTOLIC BLOOD PRESSURE: 87 MMHG | RESPIRATION RATE: 16 BRPM | TEMPERATURE: 99 F | BODY MASS INDEX: 33.83 KG/M2 | OXYGEN SATURATION: 98 % | SYSTOLIC BLOOD PRESSURE: 141 MMHG

## 2024-02-20 VITALS
RESPIRATION RATE: 18 BRPM | HEIGHT: 64 IN | BODY MASS INDEX: 33.64 KG/M2 | TEMPERATURE: 98 F | WEIGHT: 197.06 LBS | SYSTOLIC BLOOD PRESSURE: 130 MMHG | OXYGEN SATURATION: 100 % | DIASTOLIC BLOOD PRESSURE: 89 MMHG | HEART RATE: 97 BPM

## 2024-02-20 DIAGNOSIS — Z17.0 MALIGNANT NEOPLASM OF UPPER-OUTER QUADRANT OF LEFT BREAST IN FEMALE, ESTROGEN RECEPTOR POSITIVE: Primary | ICD-10-CM

## 2024-02-20 DIAGNOSIS — C50.412 MALIGNANT NEOPLASM OF UPPER-OUTER QUADRANT OF LEFT BREAST IN FEMALE, ESTROGEN RECEPTOR POSITIVE: Primary | ICD-10-CM

## 2024-02-20 DIAGNOSIS — Z17.0 MALIGNANT NEOPLASM OF UPPER-OUTER QUADRANT OF LEFT BREAST IN FEMALE, ESTROGEN RECEPTOR POSITIVE: Primary | Chronic | ICD-10-CM

## 2024-02-20 DIAGNOSIS — C50.412 MALIGNANT NEOPLASM OF UPPER-OUTER QUADRANT OF LEFT BREAST IN FEMALE, ESTROGEN RECEPTOR POSITIVE: Primary | Chronic | ICD-10-CM

## 2024-02-20 PROCEDURE — 25000003 PHARM REV CODE 250: Performed by: INTERNAL MEDICINE

## 2024-02-20 PROCEDURE — 63600175 PHARM REV CODE 636 W HCPCS: Mod: JG | Performed by: INTERNAL MEDICINE

## 2024-02-20 PROCEDURE — 99999 PR PBB SHADOW E&M-EST. PATIENT-LVL III: CPT | Mod: PBBFAC,,, | Performed by: INTERNAL MEDICINE

## 2024-02-20 PROCEDURE — 96413 CHEMO IV INFUSION 1 HR: CPT

## 2024-02-20 PROCEDURE — 96417 CHEMO IV INFUS EACH ADDL SEQ: CPT

## 2024-02-20 PROCEDURE — 99215 OFFICE O/P EST HI 40 MIN: CPT | Mod: S$GLB,,, | Performed by: INTERNAL MEDICINE

## 2024-02-20 PROCEDURE — 96367 TX/PROPH/DG ADDL SEQ IV INF: CPT

## 2024-02-20 RX ORDER — HEPARIN 100 UNIT/ML
500 SYRINGE INTRAVENOUS
Status: CANCELLED | OUTPATIENT
Start: 2024-02-20

## 2024-02-20 RX ORDER — DIPHENHYDRAMINE HYDROCHLORIDE 50 MG/ML
50 INJECTION INTRAMUSCULAR; INTRAVENOUS ONCE AS NEEDED
Status: CANCELLED | OUTPATIENT
Start: 2024-02-20

## 2024-02-20 RX ORDER — HEPARIN 100 UNIT/ML
500 SYRINGE INTRAVENOUS
Status: DISCONTINUED | OUTPATIENT
Start: 2024-02-20 | End: 2024-02-20 | Stop reason: HOSPADM

## 2024-02-20 RX ORDER — EPINEPHRINE 0.3 MG/.3ML
0.3 INJECTION SUBCUTANEOUS ONCE AS NEEDED
Status: CANCELLED | OUTPATIENT
Start: 2024-02-20

## 2024-02-20 RX ORDER — SODIUM CHLORIDE 0.9 % (FLUSH) 0.9 %
10 SYRINGE (ML) INJECTION
Status: CANCELLED | OUTPATIENT
Start: 2024-02-20

## 2024-02-20 RX ORDER — PROCHLORPERAZINE EDISYLATE 5 MG/ML
5 INJECTION INTRAMUSCULAR; INTRAVENOUS ONCE AS NEEDED
Status: CANCELLED | OUTPATIENT
Start: 2024-02-20

## 2024-02-20 RX ADMIN — PALONOSETRON HYDROCHLORIDE 0.25 MG: 0.25 INJECTION, SOLUTION INTRAVENOUS at 09:02

## 2024-02-20 RX ADMIN — HEPARIN 500 UNITS: 100 SYRINGE at 12:02

## 2024-02-20 RX ADMIN — CYCLOPHOSPHAMIDE 1200 MG: 200 INJECTION, SOLUTION INTRAVENOUS at 11:02

## 2024-02-20 RX ADMIN — DOCETAXEL 150 MG: 20 INJECTION, SOLUTION, CONCENTRATE INTRAVENOUS at 10:02

## 2024-02-20 NOTE — PROGRESS NOTES
MORRISR met with patient and spouse at chairside. MORRISR provided patient new patient packet from SWER. Pt and SWER reviewed its contents. SWER provided patient a case of Glucose control chocolate boost. Pt wanted reading on breast cancer. SWER provided a book for her to view at her leisure. SWER sent scheduling pool a message to not decouple patient's future appointments due to her using [employer] for appts. SWER will remain available.

## 2024-02-20 NOTE — PROGRESS NOTES
Patient ID: Demetrice Gaines   Chief Complaint: Follow-up (Breast Cancer)  MRN:  [id number]     Oncologic Diagnosis:  Stage IA (T1c N0 Mx) L Breast Invasive Ductal  Carcinoma, G3, +/+/-, ki67 of 90%; Oncotype 38  Previous Treatment:    Bilateral Mastectomies 10/25/2023  s/p Excision of Primary Tumor 11/22/23   Current Treatment:  TC (01/29/24 - )    Subjective   The patient presents for follow up and is accompanied by her .  She has recovered from C1 and doing well.  She would like to sign up for chemo care companion.  We will assist her with this.    Review of Systems:  Review of Systems   Constitutional:  Negative for activity change, appetite change, chills, diaphoresis, fatigue, fever and unexpected weight change.   HENT:  Negative for nosebleeds.    Respiratory:  Negative for shortness of breath.    Cardiovascular:  Negative for chest pain.   Gastrointestinal:  Negative for abdominal distention, abdominal pain, anal bleeding, blood in stool, constipation, diarrhea, nausea and vomiting.   Genitourinary:  Negative for difficulty urinating, hematuria and vaginal bleeding.   Musculoskeletal:  Negative for arthralgias, back pain and myalgias.   Skin:  Negative for rash.   Neurological:  Negative for dizziness, weakness, light-headedness and headaches.   Hematological:  Does not bruise/bleed easily.   Psychiatric/Behavioral:  The patient is not nervous/anxious.      History     Oncology History   Breast neoplasm, Tis (DCIS), left (Resolved)   9/8/2023 Initial Diagnosis    Breast neoplasm, Tis (DCIS), left     9/8/2023 Cancer Staged    Staging form: Breast, AJCC 8th Edition  - Clinical stage from 9/8/2023: Stage 0 (cTis (DCIS), cN0, cM0, G3, ER+, NM+, HER2: Not Assessed)      Genetic Testing    Patient has genetic testing done for [employer] STAT breast cancer panel and 84 gene multicancer panel.                                              Results revealed patient has the following mutation(s):negative  breast cancer panel- no mutation noted- MLH1- variant of undetermined significance      Chemotherapy    Treatment Summary   Plan Name: OP BREAST TC (DOCEtaxel cycloPHOSphamide) Q3W  Treatment Goal: Curative  Status: Active  Start Date: 1/10/2024 (Planned)  End Date: 3/14/2024 (Planned)  Provider: Deloris Gordon MD  Chemotherapy: cycloPHOSphamide 600 mg/m2 = 1,180 mg in sodium chloride 0.9% 255.9 mL chemo infusion, 600 mg/m2, Intravenous, Clinic/HOD 1 time, 0 of 4 cycles    DOCEtaxel (TAXOTERE) 75 mg/m2 = 148 mg in sodium chloride 0.9% 257.4 mL chemo infusion, 75 mg/m2, Intravenous, Clinic/HOD 1 time, 0 of 4 cycles       Malignant neoplasm of upper-outer quadrant of left breast in female, estrogen receptor positive   10/17/2023 Initial Diagnosis    Malignant neoplasm of upper-outer quadrant of left breast in female, estrogen receptor positive     10/18/2023 Cancer Staged    Staging form: Breast, AJCC 8th Edition  - Clinical stage from 10/18/2023: Stage IA (cT1c, cN0(f), cM0, G3, ER+, SC+, HER2-)     12/4/2023 Cancer Staged    Staging form: Breast, AJCC 8th Edition  - Pathologic stage from 12/4/2023: Stage IA (pT1c, pN0(sn), cM0, G3, ER+, SC+, HER2-)     1/29/2024 -  Chemotherapy    Treatment Summary   Plan Name: OP BREAST TC (DOCEtaxel cycloPHOSphamide) Q3W  Treatment Goal: Curative  Status: Active  Start Date: 1/29/2024  End Date: 4/2/2024 (Planned)  Provider: Deloris Gordon MD  Chemotherapy: cycloPHOSphamide 600 mg/m2 = 1,200 mg in sodium chloride 0.9% 291 mL chemo infusion, 600 mg/m2 = 1,200 mg, Intravenous, Clinic/HOD 1 time, 1 of 4 cycles  Administration: 1,200 mg (1/29/2024)  DOCEtaxel (TAXOTERE) 75 mg/m2 = 150 mg in sodium chloride 0.9% 292.5 mL chemo infusion, 75 mg/m2 = 150 mg, Intravenous, Clinic/HOD 1 time, 1 of 4 cycles  Administration: 150 mg (1/29/2024)      Chemotherapy    Treatment Summary   Plan Name: OP BREAST TC (DOCEtaxel cycloPHOSphamide) Q3W  Treatment Goal: Curative  Status: Active  Start  Date: 1/10/2024 (Planned)  End Date: 3/14/2024 (Planned)  Provider: Deloris Gordon MD  Chemotherapy: cycloPHOSphamide 600 mg/m2 = 1,180 mg in sodium chloride 0.9% 255.9 mL chemo infusion, 600 mg/m2, Intravenous, Clinic/HOD 1 time, 0 of 4 cycles    DOCEtaxel (TAXOTERE) 75 mg/m2 = 148 mg in sodium chloride 0.9% 257.4 mL chemo infusion, 75 mg/m2, Intravenous, Clinic/HOD 1 time, 0 of 4 cycles           HPI:  Demetrice Gaines is a pleasant 63 y.o. female with history of CAD not requiring PCI, HTN, DM II, CHF and colon polyps who presents to clinic to establish care on referral for breast cancer.    The patient reports that she has been recovering well from surgery.  She has no acute complaints.  She has been having blood in her stool for over a year as well as constipation.  I recommended that we have this moved up to rule out any colonic lesions.  I will message the gastroenterologist to see if they can possibly do a colonoscopy on her prior to starting chemotherapy     I reviewed her Oncotype DX with her.  Unfortunately her recurrence score is 38 indicating a clear benefit for adjuvant chemotherapy.  She is understanding of this.  I reviewed the most common side effects of chemotherapy.  Initially considered giving her AC-T however giving her cardiac history I think that it would be better for the patient unless toxic if we administered TC.  Otherwise she does have some baseline intermittent neuropathy which will need to be monitored.    FHx significant for Mother with primary bone cancer and paternal grandmother with breast cancer      Past Medical History:   Diagnosis Date    Bilateral pleural effusion 06/20/2020    CAD (coronary artery disease)     CHF (congestive heart failure)     Colon polyp     Diabetes mellitus, type 2     Hypertension     Hyponatremia 06/20/2020    Malignant neoplasm of upper-outer quadrant of left breast in female, estrogen receptor positive 10/17/2023    Myocardial infarction        Past  Surgical History:   Procedure Laterality Date    Benign Cyst Right     BREAST CYST EXCISION Right 1986    pt states benign     SECTION      COLONOSCOPY      COLONOSCOPY N/A 2019    Procedure: COLONOSCOPY;  Surgeon: Ileana Holcomb MD;  Location: [facility] ENDO;  Service: Endoscopy;  Laterality: N/A;    COLONOSCOPY N/A 2024    Procedure: COLONOSCOPY;  Surgeon: Lobo Mitchell MD;  Location: [facility] ENDO;  Service: Endoscopy;  Laterality: N/A;    EXCISION, MASS, BREAST, USING RADIOLOGICAL MARKER Left 2023    Procedure: EXCISION,MASS,BREAST,USING RADIOLOGICAL MARKER;  Surgeon: Hemalatha Josue MD;  Location: [facility] OR;  Service: General;  Laterality: Left;  radhika  needed    FLUOROSCOPY N/A 2024    Procedure: Fluoroscopy/Mediport placement;  Surgeon: Sylvester Zazueta MD;  Location: [facility] CATH LAB;  Service: General;  Laterality: N/A;    INJECTION FOR SENTINEL NODE IDENTIFICATION Left 10/25/2023    Procedure: INJECTION, FOR SENTINEL NODE IDENTIFICATION;  Surgeon: Hemalatha Josue MD;  Location: [facility];  Service: General;  Laterality: Left;    INSERTION OF BREAST TISSUE EXPANDER Bilateral 10/25/2023    Procedure: INSERTION, TISSUE EXPANDER, BREAST;  Surgeon: Keron Lynn MD;  Location: [facility];  Service: Plastics;  Laterality: Bilateral;    MASTECTOMY, NIPPLE SPARING Bilateral 10/25/2023    Procedure: MASTECTOMY, NIPPLE SPARING;  Surgeon: Hemalatha Josue MD;  Location: [facility] OR;  Service: General;  Laterality: Bilateral;    SENTINEL LYMPH NODE BIOPSY Left 10/25/2023    Procedure: BIOPSY, LYMPH NODE, SENTINEL;  Surgeon: Hemalatha Josue MD;  Location: [facility] OR;  Service: General;  Laterality: Left;  nuc med needed 1 hour before start    ULTRASOUND GUIDANCE Left 10/25/2023    Procedure: ULTRASOUND GUIDANCE;  Surgeon: Hemalatha Josue MD;  Location: [facility];  Service: General;  Laterality: Left;       Family History   Problem Relation Age of Onset    Cancer Mother      "Pacemaker/defibrilator Mother     Glaucoma Mother     Arthritis Mother     Heart disease Mother         It seems that this condition runs in my family on my mother's side    Hypertension Mother     Diabetes Father     Hypertension Sister     Glaucoma Sister     Breast cancer Paternal Grandmother     Cancer Paternal Grandmother     Hypertension Brother     COPD Sister     COPD Brother     Hypertension Brother     Hypertension Sister        Review of patient's allergies indicates:   Allergen Reactions    Aldactone [spironolactone]      Memory impair and breast soreness    Coreg [carvedilol]      Hair loss    Lisinopril-hydrochlorothiazide      Other reaction(s): Reaction:Throat swelling;       Social History     Tobacco Use    Smoking status: Never     Passive exposure: Never    Smokeless tobacco: Never    Tobacco comments:     NEVER   Substance Use Topics    Alcohol use: Never    Drug use: Never       Physical Exam   ECOG:   ECOG SCORE    0 - Fully active-able to carry on all pre-disease performance without restriction          Vitals:  /89   Pulse 97   Temp 98.1 °F (36.7 °C)   Resp 18   Ht 5' 4" (1.626 m)   Wt 89.4 kg (197 lb 1.5 oz)   SpO2 100%   BMI 33.83 kg/m²       Physical Exam  Constitutional:       General: She is not in acute distress.     Appearance: Normal appearance. She is not ill-appearing.   HENT:      Head: Normocephalic and atraumatic.   Eyes:      Extraocular Movements: Extraocular movements intact.      Conjunctiva/sclera: Conjunctivae normal.   Cardiovascular:      Rate and Rhythm: Normal rate.   Pulmonary:      Effort: Pulmonary effort is normal. No respiratory distress.   Abdominal:      Palpations: There is no hepatomegaly, splenomegaly or mass.   Skin:     Findings: No rash.   Neurological:      General: No focal deficit present.      Mental Status: She is alert and oriented to person, place, and time.   Psychiatric:         Mood and Affect: Mood normal.         Behavior: Behavior " normal.         Thought Content: Thought content normal.        Labs   Labs:  Lab Visit on 02/19/2024   Component Date Value Ref Range Status    Magnesium 02/19/2024 1.4 (L)  1.6 - 2.6 mg/dL Final    Sodium 02/19/2024 141  136 - 145 mmol/L Final    Potassium 02/19/2024 4.2  3.5 - 5.1 mmol/L Final    Chloride 02/19/2024 106  95 - 110 mmol/L Final    CO2 02/19/2024 24  23 - 29 mmol/L Final    Glucose 02/19/2024 101  70 - 110 mg/dL Final    BUN 02/19/2024 6 (L)  8 - 23 mg/dL Final    Creatinine 02/19/2024 0.7  0.5 - 1.4 mg/dL Final    Calcium 02/19/2024 9.6  8.7 - 10.5 mg/dL Final    Total Protein 02/19/2024 7.1  6.0 - 8.4 g/dL Final    Albumin 02/19/2024 3.8  3.5 - 5.2 g/dL Final    Total Bilirubin 02/19/2024 0.3  0.1 - 1.0 mg/dL Final    Comment: For infants and newborns, interpretation of results should be based  on gestational age, weight and in agreement with clinical  observations.    Premature Infant recommended reference ranges:  Up to 24 hours.............<8.0 mg/dL  Up to 48 hours............<12.0 mg/dL  3-5 days..................<15.0 mg/dL  6-29 days.................<15.0 mg/dL      Alkaline Phosphatase 02/19/2024 60  55 - 135 U/L Final    AST 02/19/2024 18  10 - 40 U/L Final    ALT 02/19/2024 15  10 - 44 U/L Final    eGFR 02/19/2024 >60  >60 mL/min/1.73 m^2 Final    Anion Gap 02/19/2024 11  8 - 16 mmol/L Final    Cholesterol 02/19/2024 153  120 - 199 mg/dL Final    Comment: The National Cholesterol Education Program (NCEP) has set the  following guidelines (reference ranges) for Cholesterol:  Optimal.....................<200 mg/dL  Borderline High.............200-239 mg/dL  High........................> or = 240 mg/dL      Triglycerides 02/19/2024 130  30 - 150 mg/dL Final    Comment: The National Cholesterol Education Program (NCEP) has set the  following guidelines (reference values) for triglycerides:  Normal......................<150 mg/dL  Borderline High.............150-199  mg/dL  High........................200-499 mg/dL      HDL 02/19/2024 36 (L)  40 - 75 mg/dL Final    Comment: The National Cholesterol Education Program (NCEP) has set the  following guidelines (reference values) for HDL Cholesterol:  Low...............<40 mg/dL  Optimal...........>60 mg/dL      LDL Cholesterol 02/19/2024 91.0  63.0 - 159.0 mg/dL Final    Comment: The National Cholesterol Education Program (NCEP) has set the  following guidelines (reference values) for LDL Cholesterol:  Optimal.......................<130 mg/dL  Borderline High...............130-159 mg/dL  High..........................160-189 mg/dL  Very High.....................>190 mg/dL      HDL/Cholesterol Ratio 02/19/2024 23.5  20.0 - 50.0 % Final    Total Cholesterol/HDL Ratio 02/19/2024 4.3  2.0 - 5.0 Final    Non-HDL Cholesterol 02/19/2024 117  mg/dL Final    Comment: Risk category and Non-HDL cholesterol goals:  Coronary heart disease (CHD)or equivalent (10-year risk of CHD >20%):  Non-HDL cholesterol goal     <130 mg/dL  Two or more CHD risk factors and 10-year risk of CHD <= 20%:  Non-HDL cholesterol goal     <160 mg/dL  0 to 1 CHD risk factor:  Non-HDL cholesterol goal     <190 mg/dL        Pathology     Specimen to Pathology, Surgery Breast 10/25/2023      Component 2 mo ago   Final Pathologic Diagnosis 1. Right breast, prophylactic nipple sparing mastectomy (467 grams):      -  Benign breast tissue with fibrocystic change including fibrosis, adenosis, usual ductal hyperplasia         (UDH) and duct ectasia with apocrine metaplasia and focal periductal chronic inflammation      -  Microcalcifications:  Present, associated with benign ductal tissue      -  Skin and nipple are surgically absent      -  Unremarkable skeletal muscle present      -  Negative for atypia or malignancy    2. Left breast, nipple sparing mastectomy (470 grams):      -  Benign breast tissue with focal atypical ductal hyperplasia (ADH, less than 2mm) in a background  of         fibrocystic change including fibrosis, adenosis, duct ectasia, focal columnar cell change/hyperplasia         and fibroadenomatoid change      -  Findings of biopsy cavity are NOT appreciated grossly or histologically, see case comment      -  Skin and nipple are surgically absent      -  Dumbbell shaped biopsy clip NOT IDENTIFIED at time of grossing    Comment:  Immunohistochemical stains with appropriate controls were performed on select tissue blocks.  Cytokeratin 5/6 demonstrates a mosaic staining pattern in areas of ductal hyperplasia, showing no evidence of atypia.  AE1/AE3 is utilized for duct  mapping.  P63 highlights intact myoepithelial cells throughout the specimen, showing no evidence of invasive carcinoma.    3. [place] lymph node #1 (hot and blue, background count 2), biopsy:      -  One lymph node, negative for metastatic carcinoma (0/1)      -  Immunohistochemical stains for CAM 5.2, AE1/AE3 and wide spectrum keratin are negative      4. Left breast axillary tail, excision (11 grams):      -  Benign mature fibroadipose tissue      -  Negative for atypia or malignancy    5. Additional lateral margin, excision (tissue submitted entirely for histologic evaluation):      -  Benign breast tissue with mild fibrocystic change including fibrosis, adenosis and         fibroadenomatoid change      -  Negative for atypia or malignancy      6. Additional anterior lateral margin, excision (tissue submitted entirely for histologic evaluation):      -  Benign breast tissue with fibrocystic change including fibrosis, adenosis, fibroadenomatoid         changes and duct ectasia with apocrine metaplasia      -  Microcalcifications:  Present, associated with benign breast tissue      -  Negative for atypia or malignancy     Comment: Interp By Dianelys Cardona M.D., Signed on 11/08/2023 at 09:29   Comment The patient's history of invasive carcinoma and ductal carcinoma in-situ in the upper outer quadrant is  noted.  The specimen is thoroughly searched for both the dumbbell biopsy clip and a biopsy cavity, neither of which are identified grossly or  histologically.  Numerous sections of tissue from specimen 2 (left breast mastectomy) were evaluated.  The patient's additional margins (specimens 5 and 6) were submitted entirely for histologic review and show no evidence of invasive or in-situ  carcinoma.  Specimen 4 (breast axillary tail) was also submitted entirely for histologic review and showed no evidence of invasive or in-situ carcinoma.  Dr. Bishop has reviewed the above case and agrees with the findings.    Dr. Josue was alerted to these findings via epic message on 11/08/2023 at 09:30.      All stains were performed with adequate positive and negative controls.              Specimen to Pathology, Surgery Breast 11/22/2023      Component 1 mo ago   Final Pathologic Diagnosis Breast, left, re-excision:  Invasive ductal carcinoma, poorly differentiated  [place] grade 3:  Tubule formation-3, nuclear pleomorphism-3 and mitotic count -3  Invasive carcinoma measures 19 x 9 x 8 mm  Positive superior margin, measuring 10 mm in greatest extent  Additional margins:  -Anterior margin:  1 mm  -Medial margin:  6 mm  -Posterior margin:  6.5 mm  -Lateral margin:  9 mm  -Inferior margin:  9 mm  No carcinoma in Situ or lymphovascular invasion is identified  Previous biopsy clip and reflector both grossly identified   Comment: Interp By Shelbie Bishop M.D., Signed on 11/28/2023 at 10:46   Gross Surgery ID:  [id number]    Pathology ID:  [id number]  1.  Received in formalin labeled &quot;left breast biopsy&quot; is a 4 g, 2.3 cm superior to inferior by 2.1 cm medial to lateral by 2.3 cm anterior to posterior portion of breast tissue received oriented by the surgeon as short stitch superior and long  stitch lateral.  A biopsy clip and a reflector clip is identified exposed at the anterior-superior margins (see attached image).  The specimen  is sectioned from anterior to posterior into 5 slices averaging 0.5 cm in thickness.  Sectioning reveals a 1.9  cm anterior to posterior by 0.9 cm medial to lateral by 0.8 cm superior to inferior well-circumscribed, tan-white, rubbery mass within slices 2-5.  The mass abuts the superior and medial margins and measures 0.9 cm from the lateral margin, 0.9 cm from  the inferior margin, 0.4 cm from the posterior margin, and 0.5 cm from the anterior margin.  There is an X shaped biopsy clip identified within the mass in slice 4 and a reflector clip identified adjacent to the mass in slice 1.  The specimen is inked as   follows:  Superior-blue, inferior-green, medial-red, lateral-orange, anterior-yellow, and posterior-black.  The specimen is submitted entirely and sequentially from anterior to posterior as follows:  RZR--1-A:  Slice 1, anterior margin, perpendicular  ZFB--1-B:  Slice 2, mass to include anterior, superior, inferior, medial, and lateral margins  FKC--1-C:  Slice 3, mass to superior, inferior, medial, and lateral margins  CKO--1-D:  Slice 4, mass to superior, inferior, medial, and lateral margins  BYO--1-E:  Slice 5, posterior margin, perpendicular        Grossed by: Carl Donovan MS, PA([facility])        Assessment and Plan   Stage IA (T1c N0 Mx) L Breast Invasive Ductal  Carcinoma, G3, +/+/-, ki67 of 90%   s/p b/l nipple sparing mastectomy and sentinel node biopsy on 10/25/23   s/p excision of primary tumor 11/22/23   Pathology report above  Genetic Testing 09/13/2023: VUS in MLH1  Oncotype Dx Score 38  TTE stable from prior  She has a history of heart disease; she has not required PCI however will precert for TC treatment to circumvent potential anthracycline cardiac effects  I discussed in detail the role of medical oncology in her care.  I informed her that my treatment recommendation is based on the NCCN Guidelines, her final pathology and Oncotype DX score  Because of  the high Oncotype DX score, chemotherapy is recommended; because of the hormonal make up of her tumor, endocrine therapy is recommended after she completes chemotherapy. I reviewed the major side effects of chemotherapy and  AIs.   s/p C1 TC 01/29/24 with good tolerance; severe arthralgias, likely from GCSF  Labs reviewed and will proceed with C2 today      Hypomagnesemia  PO mag prescribed      Bone Health  Given that she is post menopausal and endocrine therapy is recommended, she will be monitored for the need for bisphosphonate therapy.  BMD 08/2023 was normal  Start Calcium and vitamin D      Blood in stool, Resolved   Patient reports blood in her stool for over 1 year   Colonoscopy 01/02/2024: TA (4 fragments in ascending colon and 1 TA in sigmoid colon); recommend repeat in 5 years      Cancer Screening  PAP Smear 09/15/2023: Negative for intraepithelial lesion or malignancy   Colonoscopy 01/2024: TAs; repeat in 5 years      Chronic Medical Conditions  DM II  HTN  CAD:  She has not require PCI; stress test 12/26/2019 negative for ischemia and arrhythmias; last echo 06/2020 and showed preserved ejection fraction at 55%; concentric hypertrophy and normal left ventricular diastolic function  CHF  Hx of Colon Polyps  Peripheral neuropathy; intermittent        Med Onc Chart Routing      Follow up with physician 3 weeks.   Follow up with [facility]    Infusion scheduling note   TC+GCSF today and in 3 weeks   Injection scheduling note    Labs CBC, CMP, magnesium and phosphorus   Scheduling:  Preferred lab:  Lab interval:     Imaging    Pharmacy appointment    Other referrals                       The patient was seen, interviewed and examined. Pertinent lab and radiologic studies were reviewed. Pt instructed to call should they develop concerning signs/symptoms or have further questions.        Portions of the record may have been created with voice recognition software. Occasional wrong-word or sound-a-like  substitutions may have occurred due to the inherent limitations of voice recognition software. Read the chart carefully and recognize, using context, where substitutions have occurred.      Deloris Garcia MD    Hematology/Oncology

## 2024-02-20 NOTE — PLAN OF CARE
Patient tolerated Taxotere/Cytoxan well today; no adverse reaction noted.  POC reviewed with pt.  No questions or concerns voiced.  NAD noted upon discharge.  No significant new complaints voiced.   Has f/u appt(s) scheduled per MD request.      Problem: Adult Inpatient Plan of Care  Goal: Plan of Care Review  Outcome: Ongoing, Progressing  Flowsheets (Taken 2/20/2024 0922)  Plan of Care Reviewed With:   patient   spouse  Goal: Patient-Specific Goal (Individualized)  Outcome: Ongoing, Progressing  Flowsheets (Taken 2/20/2024 0922)  Anxieties, Fears or Concerns: pt denies  Individualized Care Needs: legs elevated, lights dimmed, refreshment provided.  @ chairside  Goal: Optimal Comfort and Wellbeing  Outcome: Ongoing, Progressing  Intervention: Provide Person-Centered Care  Flowsheets (Taken 2/20/2024 0922)  Trust Relationship/Rapport:   care explained   questions answered   thoughts/feelings acknowledged   choices provided   questions encouraged   emotional support provided   reassurance provided   empathic listening provided

## 2024-02-21 ENCOUNTER — INFUSION (OUTPATIENT)
Dept: INFUSION THERAPY | Facility: HOSPITAL | Age: 64
End: 2024-02-21
Attending: INTERNAL MEDICINE
Payer: MEDICARE

## 2024-02-21 VITALS
OXYGEN SATURATION: 98 % | DIASTOLIC BLOOD PRESSURE: 72 MMHG | TEMPERATURE: 98 F | HEART RATE: 103 BPM | SYSTOLIC BLOOD PRESSURE: 144 MMHG | RESPIRATION RATE: 16 BRPM

## 2024-02-21 DIAGNOSIS — C50.412 MALIGNANT NEOPLASM OF UPPER-OUTER QUADRANT OF LEFT BREAST IN FEMALE, ESTROGEN RECEPTOR POSITIVE: Primary | ICD-10-CM

## 2024-02-21 DIAGNOSIS — Z17.0 MALIGNANT NEOPLASM OF UPPER-OUTER QUADRANT OF LEFT BREAST IN FEMALE, ESTROGEN RECEPTOR POSITIVE: Primary | ICD-10-CM

## 2024-02-21 PROCEDURE — 96372 THER/PROPH/DIAG INJ SC/IM: CPT

## 2024-02-21 PROCEDURE — 63600175 PHARM REV CODE 636 W HCPCS: Mod: JZ,JG | Performed by: INTERNAL MEDICINE

## 2024-02-21 RX ADMIN — PEGFILGRASTIM-CBQV 6 MG: 6 INJECTION, SOLUTION SUBCUTANEOUS at 01:02

## 2024-02-21 NOTE — NURSING
Udenyca injection given w/o difficulties.  Bandaid applied.  Patient instructed to stay in the clinic for 15 minutes.  Patient verbalized understanding and will notify nurse with any complaints.  NAD upon discharge. Pt scheduled per provider request.  
self-care/home management

## 2024-03-05 ENCOUNTER — HOSPITAL ENCOUNTER (OUTPATIENT)
Dept: RADIOLOGY | Facility: HOSPITAL | Age: 64
Discharge: HOME | End: 2024-03-05
Payer: MEDICARE

## 2024-03-05 DIAGNOSIS — N85.2 ENLARGED UTERUS: ICD-10-CM

## 2024-03-05 DIAGNOSIS — D21.9 FIBROIDS: ICD-10-CM

## 2024-03-05 PROCEDURE — 76856 US EXAM PELVIC COMPLETE: CPT | Mod: 26,,, | Performed by: RADIOLOGY

## 2024-03-05 PROCEDURE — 76830 TRANSVAGINAL US NON-OB: CPT | Mod: 26,,, | Performed by: RADIOLOGY

## 2024-03-05 PROCEDURE — 76830 TRANSVAGINAL US NON-OB: CPT | Mod: TC

## 2024-03-08 ENCOUNTER — TELEPHONE (OUTPATIENT)
Dept: OBSTETRICS AND GYNECOLOGY | Facility: CLINIC | Age: 64
End: 2024-03-08
Payer: MEDICARE

## 2024-03-08 NOTE — TELEPHONE ENCOUNTER
----- Message from Darryl Mehta MD sent at 3/8/2024  9:01 AM CST -----  Contact: Patient  I recommend in person as an examination will be necessary.    ----- Message -----  From: Titi Reyes LPN  Sent: 3/7/2024   3:53 PM CST  To: Darryl Mehta MD    Good Afternoon,     Pt is calling to see if its okay for appt on 3/11 can be switch to virtual or if there is any further testing needed     Yadi  ----- Message -----  From: Sherie Jolley  Sent: 3/7/2024   3:42 PM CST  To: Tyson Andrews Staff    Demetrice Gaines would like a call back at [phone], in regards to her appt on 3/11/24. Pt would like to know if she can get it switched to a virtual visit.

## 2024-03-11 ENCOUNTER — OFFICE VISIT (OUTPATIENT)
Dept: OBSTETRICS AND GYNECOLOGY | Facility: CLINIC | Age: 64
End: 2024-03-11
Payer: MEDICARE

## 2024-03-11 VITALS
SYSTOLIC BLOOD PRESSURE: 132 MMHG | HEIGHT: 64 IN | DIASTOLIC BLOOD PRESSURE: 70 MMHG | WEIGHT: 194.25 LBS | BODY MASS INDEX: 33.16 KG/M2

## 2024-03-11 DIAGNOSIS — Z17.0 MALIGNANT NEOPLASM OF UPPER-OUTER QUADRANT OF LEFT BREAST IN FEMALE, ESTROGEN RECEPTOR POSITIVE: Chronic | ICD-10-CM

## 2024-03-11 DIAGNOSIS — D21.9 FIBROIDS: Primary | ICD-10-CM

## 2024-03-11 DIAGNOSIS — C50.412 MALIGNANT NEOPLASM OF UPPER-OUTER QUADRANT OF LEFT BREAST IN FEMALE, ESTROGEN RECEPTOR POSITIVE: Chronic | ICD-10-CM

## 2024-03-11 PROCEDURE — 99213 OFFICE O/P EST LOW 20 MIN: CPT | Mod: S$GLB,,, | Performed by: OBSTETRICS & GYNECOLOGY

## 2024-03-11 PROCEDURE — 99999 PR PBB SHADOW E&M-EST. PATIENT-LVL IV: CPT | Mod: PBBFAC,,, | Performed by: OBSTETRICS & GYNECOLOGY

## 2024-03-11 NOTE — PROGRESS NOTES
Subjective:      Patient ID: Demetrice Gaines is a 64 y.o. female.    Chief Complaint:  Fibroids      History of Present Illness  HPI  Uterine Fibroids  Patient was referred by NP with uterine fibroids.  Pt has known about fibroids for years but has noted a gradual worsening of lower back and lower abdominal discomforts that she attributes to her fibroids.  Denies any PMB.  Is currently undergoing chemotherapy for breast CA and has several months left of treatment.      GYN & OB History  No LMP recorded. Patient is postmenopausal.   Date of Last Pap: 9/15/2023    OB History    Para Term  AB Living   2 2 2     2   SAB IAB Ectopic Multiple Live Births           2      # Outcome Date GA Lbr Enrique/2nd Weight Sex Delivery Anes PTL Lv   2 Term     M CS-LTranv   MAGDA   1 Term     M Vag-Spont   MAGDA       Review of Systems  Review of Systems   Constitutional:  Negative for activity change, appetite change, chills, fatigue, fever and unexpected weight change.   Respiratory:  Negative for shortness of breath.    Cardiovascular:  Negative for chest pain, palpitations and leg swelling.   Gastrointestinal:  Positive for abdominal pain and constipation. Negative for bloating, blood in stool, diarrhea, nausea and vomiting.   Genitourinary:  Positive for pelvic pain. Negative for dysuria, flank pain, frequency, genital sores, hematuria, urgency, vaginal bleeding, vaginal discharge, vaginal pain, urinary incontinence, vaginal dryness and vaginal odor.   Musculoskeletal:  Positive for back pain.   Neurological:  Negative for syncope and headaches.          Objective:     Physical Exam:   Constitutional: She is oriented to person, place, and time. She appears well-developed and well-nourished. No distress.                           Neurological: She is alert and oriented to person, place, and time.     Psychiatric: She has a normal mood and affect. Her behavior is normal. Thought content normal.      US Pelvis Comp with  Transvag NON-OB (xpd  Narrative: EXAMINATION:  US PELVIS COMP WITH TRANSVAG NON-OB (XPD)    CLINICAL HISTORY:  Hypertrophy of uterus    TECHNIQUE:  Transabdominal sonography of the pelvis was performed, followed by transvaginal sonography to better evaluate the uterus and ovaries.    COMPARISON:  12/26/2019    FINDINGS:  The uterus measures 12.2 cm in length. There is a 7.9 cm fibroid seen projecting off the posterior aspect of the lower body/lower uterine segment.  There also 2 fibroids noted in the fundal region each of which measures up to 4.3 cm in size.  The endometrial stripe in this premenopausal patient measures 4.6 mm which is within normal limits.  The right ovary is not visualized.  The left ovary measures 1.8 x 0.9 x 1.7 cm.    No free fluid identified.  Impression: 1. Enlarged fibroid uterus.  2. Nonvisualization of the right ovary.    Electronically signed by: Karl Fitch DO  Date:    03/05/2024  Time:    14:11         Assessment:     1. Fibroids             Plan:     Fibroids  -     Pt was counseled on fibroids, including common signs and symptoms.  Symptoms are disruptive.  Pt is done with her fertility and has no desire for future childbearing.  Treatment options were reviewed with pt, including medical vs fibroid embolization vs myomectomy vs hysterectomy.  Recommend RALH/BSO, however, would recommedn awaiting completion of her chemotherapy regimen prior to surgery.  Pt will need clearance for surgery from her Oncologist and Cardiologist.  Pt voiced understanding and is interested in total hysterectomy  with BSO (including removal of uterus, cervix, fallopian tubes, ovaries) but will wait for completion of chemotherapy.    Malignant neoplasm of upper-outer quadrant of left breast in female, estrogen receptor positive  -     Followed by Oncology.      Follow up in about 3 months (around 6/11/2024).

## 2024-03-12 ENCOUNTER — PATIENT MESSAGE (OUTPATIENT)
Dept: OBSTETRICS AND GYNECOLOGY | Facility: CLINIC | Age: 64
End: 2024-03-12
Payer: MEDICARE

## 2024-03-13 ENCOUNTER — OFFICE VISIT (OUTPATIENT)
Dept: HEMATOLOGY/ONCOLOGY | Facility: CLINIC | Age: 64
End: 2024-03-13
Payer: MEDICARE

## 2024-03-13 ENCOUNTER — INFUSION (OUTPATIENT)
Dept: INFUSION THERAPY | Facility: HOSPITAL | Age: 64
End: 2024-03-13
Attending: INTERNAL MEDICINE
Payer: MEDICARE

## 2024-03-13 VITALS
DIASTOLIC BLOOD PRESSURE: 79 MMHG | HEART RATE: 97 BPM | SYSTOLIC BLOOD PRESSURE: 127 MMHG | TEMPERATURE: 98 F | WEIGHT: 196 LBS | RESPIRATION RATE: 18 BRPM | HEIGHT: 64 IN | OXYGEN SATURATION: 95 % | BODY MASS INDEX: 33.46 KG/M2

## 2024-03-13 DIAGNOSIS — C50.412 MALIGNANT NEOPLASM OF UPPER-OUTER QUADRANT OF LEFT BREAST IN FEMALE, ESTROGEN RECEPTOR POSITIVE: Primary | Chronic | ICD-10-CM

## 2024-03-13 DIAGNOSIS — Z17.0 MALIGNANT NEOPLASM OF UPPER-OUTER QUADRANT OF LEFT BREAST IN FEMALE, ESTROGEN RECEPTOR POSITIVE: Primary | Chronic | ICD-10-CM

## 2024-03-13 DIAGNOSIS — C50.412 MALIGNANT NEOPLASM OF UPPER-OUTER QUADRANT OF LEFT BREAST IN FEMALE, ESTROGEN RECEPTOR POSITIVE: Primary | ICD-10-CM

## 2024-03-13 DIAGNOSIS — D64.81 ANTINEOPLASTIC CHEMOTHERAPY INDUCED ANEMIA: ICD-10-CM

## 2024-03-13 DIAGNOSIS — T45.1X5A ANTINEOPLASTIC CHEMOTHERAPY INDUCED ANEMIA: ICD-10-CM

## 2024-03-13 DIAGNOSIS — Z17.0 MALIGNANT NEOPLASM OF UPPER-OUTER QUADRANT OF LEFT BREAST IN FEMALE, ESTROGEN RECEPTOR POSITIVE: Primary | ICD-10-CM

## 2024-03-13 PROCEDURE — 96417 CHEMO IV INFUS EACH ADDL SEQ: CPT

## 2024-03-13 PROCEDURE — 25000003 PHARM REV CODE 250: Performed by: INTERNAL MEDICINE

## 2024-03-13 PROCEDURE — 63600175 PHARM REV CODE 636 W HCPCS: Performed by: INTERNAL MEDICINE

## 2024-03-13 PROCEDURE — 96413 CHEMO IV INFUSION 1 HR: CPT

## 2024-03-13 PROCEDURE — 96367 TX/PROPH/DG ADDL SEQ IV INF: CPT

## 2024-03-13 PROCEDURE — 99215 OFFICE O/P EST HI 40 MIN: CPT | Mod: 95,,, | Performed by: INTERNAL MEDICINE

## 2024-03-13 RX ORDER — PROCHLORPERAZINE EDISYLATE 5 MG/ML
5 INJECTION INTRAMUSCULAR; INTRAVENOUS ONCE AS NEEDED
Status: CANCELLED | OUTPATIENT
Start: 2024-03-13

## 2024-03-13 RX ORDER — SODIUM CHLORIDE 0.9 % (FLUSH) 0.9 %
10 SYRINGE (ML) INJECTION
Status: CANCELLED | OUTPATIENT
Start: 2024-03-13

## 2024-03-13 RX ORDER — HEPARIN 100 UNIT/ML
500 SYRINGE INTRAVENOUS
Status: CANCELLED | OUTPATIENT
Start: 2024-03-13

## 2024-03-13 RX ORDER — DIPHENHYDRAMINE HYDROCHLORIDE 50 MG/ML
50 INJECTION INTRAMUSCULAR; INTRAVENOUS ONCE AS NEEDED
Status: CANCELLED | OUTPATIENT
Start: 2024-03-13

## 2024-03-13 RX ORDER — HEPARIN 100 UNIT/ML
500 SYRINGE INTRAVENOUS
Status: DISCONTINUED | OUTPATIENT
Start: 2024-03-13 | End: 2024-03-13 | Stop reason: HOSPADM

## 2024-03-13 RX ORDER — EPINEPHRINE 0.3 MG/.3ML
0.3 INJECTION SUBCUTANEOUS ONCE AS NEEDED
Status: CANCELLED | OUTPATIENT
Start: 2024-03-13

## 2024-03-13 RX ADMIN — DOCETAXEL 150 MG: 20 INJECTION, SOLUTION, CONCENTRATE INTRAVENOUS at 11:03

## 2024-03-13 RX ADMIN — DEXAMETHASONE SODIUM PHOSPHATE 0.25 MG: 4 INJECTION, SOLUTION INTRA-ARTICULAR; INTRALESIONAL; INTRAMUSCULAR; INTRAVENOUS; SOFT TISSUE at 10:03

## 2024-03-13 RX ADMIN — CYCLOPHOSPHAMIDE 1200 MG: 200 INJECTION, SOLUTION INTRAVENOUS at 12:03

## 2024-03-13 RX ADMIN — HEPARIN 500 UNITS: 100 SYRINGE at 01:03

## 2024-03-13 NOTE — PLAN OF CARE
Problem: Adult Inpatient Plan of Care  Goal: Plan of Care Review  3/13/2024 1158 by Peggy Nunez RN  Outcome: Ongoing, Progressing  3/13/2024 1158 by Peggy Nunez RN  Outcome: Ongoing, Progressing  Goal: Patient-Specific Goal (Individualized)  3/13/2024 1158 by ePggy Nunez RN  Outcome: Ongoing, Progressing  3/13/2024 1158 by Peggy Nunez RN  Outcome: Ongoing, Progressing  Goal: Optimal Comfort and Wellbeing  3/13/2024 1158 by Peggy Nunez RN  Outcome: Ongoing, Progressing  3/13/2024 1158 by Peggy Nunez RN  Outcome: Ongoing, Progressing     Problem: Infection  Goal: Absence of Infection Signs and Symptoms  3/13/2024 1158 by Peggy Nunez RN  Outcome: Ongoing, Progressing  3/13/2024 1158 by Peggy Nunez RN  Outcome: Ongoing, Progressing     Problem: Anemia (Chemotherapy Effects)  Goal: Anemia Symptom Improvement  Outcome: Ongoing, Progressing     Problem: Urinary Bleeding Risk or Actual (Chemotherapy Effects)  Goal: Absence of Hematuria  Outcome: Ongoing, Progressing     Problem: Nausea and Vomiting (Chemotherapy Effects)  Goal: Fluid and Electrolyte Balance  Outcome: Ongoing, Progressing     Problem: Neurotoxicity (Chemotherapy Effects)  Goal: Neurotoxicity Symptom Control  Outcome: Ongoing, Progressing     Problem: Neutropenia (Chemotherapy Effects)  Goal: Absence of Infection  Outcome: Ongoing, Progressing     Problem: Oral Mucositis (Chemotherapy Effects)  Goal: Improved Oral Mucous Membrane Integrity  Outcome: Ongoing, Progressing     Problem: Thrombocytopenia Bleeding Risk (Chemotherapy Effects)  Goal: Absence of Bleeding  Outcome: Ongoing, Progressing

## 2024-03-13 NOTE — NURSING
Infusion: Taxotere 150mg & Cytoxan 1200mg  Last dose- 2/21/24    Recent labs? 3/13/24  Last Hematology provider visit- Seen by Evan on 3/13/24     Premeds-Aloxi 0.25/Dex 20 mg IVPB     Taxotere 150 mg administered IV at a 60 minute rate per orders; Cytoxan 1200 mg administered IV at a 60 minute rate per orders; see MAR and vitals for more details. Tolerated well without adverse events, discharged and ambulatory out of clinic. To return to clinic tomorrow for Udenyca.

## 2024-03-13 NOTE — PROGRESS NOTES
Patient ID: Demetrice Gaines   Chief Complaint: Follow-up (/)  MRN:  [id number]     Oncologic Diagnosis:  Stage IA (T1c N0 Mx) L Breast Invasive Ductal  Carcinoma, G3, +/+/-, ki67 of 90%; Oncotype 38  Previous Treatment:    Bilateral Mastectomies 10/25/2023  s/p Excision of Primary Tumor 11/22/23   Current Treatment:  TC (01/29/24 - )    The patient location is: [facility] [place]  The chief complaint leading to consultation is: Follow up and treatment    Visit type: audiovisual    Face to Face time with patient: 15  20 minutes of total time spent on the encounter, which includes face to face time and non-face to face time preparing to see the patient (eg, review of tests), Obtaining and/or reviewing separately obtained history, Documenting clinical information in the electronic or other health record, Independently interpreting results (not separately reported) and communicating results to the patient/family/caregiver, or Care coordination (not separately reported).         Each patient to whom he or she provides medical services by telemedicine is:  (1) informed of the relationship between the physician and patient and the respective role of any other health care provider with respect to management of the patient; and (2) notified that he or she may decline to receive medical services by telemedicine and may withdraw from such care at any time.    Notes:   Subjective   The patient presents for follow up and is accompanied by her .     She is tolerating chemo very well overall.  She has some fatigue and has started to notice discoloration in her hands in feet.  Otherwise, no issues.  She has no acute complaints today.      Review of Systems:  Review of Systems   Constitutional:  Positive for fatigue. Negative for activity change, appetite change, chills, diaphoresis, fever and unexpected weight change.   HENT:  Negative for nosebleeds.    Respiratory:  Negative for shortness of breath.    Cardiovascular:   Negative for chest pain.   Gastrointestinal:  Negative for abdominal distention, abdominal pain, anal bleeding, blood in stool, constipation, diarrhea, nausea and vomiting.   Genitourinary:  Negative for difficulty urinating, hematuria and vaginal bleeding.   Musculoskeletal:  Negative for arthralgias, back pain and myalgias.   Skin:  Positive for color change (hands and feets). Negative for rash.   Neurological:  Negative for dizziness, weakness, light-headedness and headaches.   Hematological:  Does not bruise/bleed easily.   Psychiatric/Behavioral:  The patient is not nervous/anxious.      History     Oncology History   Breast neoplasm, Tis (DCIS), left (Resolved)   9/8/2023 Initial Diagnosis    Breast neoplasm, Tis (DCIS), left     9/8/2023 Cancer Staged    Staging form: Breast, AJCC 8th Edition  - Clinical stage from 9/8/2023: Stage 0 (cTis (DCIS), cN0, cM0, G3, ER+, KY+, HER2: Not Assessed)      Genetic Testing    Patient has genetic testing done for [employer] STAT breast cancer panel and 84 gene multicancer panel.                                              Results revealed patient has the following mutation(s):negative breast cancer panel- no mutation noted- MLH1- variant of undetermined significance      Chemotherapy    Treatment Summary   Plan Name: OP BREAST TC (DOCEtaxel cycloPHOSphamide) Q3W  Treatment Goal: Curative  Status: Active  Start Date: 1/10/2024 (Planned)  End Date: 3/14/2024 (Planned)  Provider: Deloris Gordon MD  Chemotherapy: cycloPHOSphamide 600 mg/m2 = 1,180 mg in sodium chloride 0.9% 255.9 mL chemo infusion, 600 mg/m2, Intravenous, Clinic/HOD 1 time, 0 of 4 cycles    DOCEtaxel (TAXOTERE) 75 mg/m2 = 148 mg in sodium chloride 0.9% 257.4 mL chemo infusion, 75 mg/m2, Intravenous, Clinic/HOD 1 time, 0 of 4 cycles       Malignant neoplasm of upper-outer quadrant of left breast in female, estrogen receptor positive   10/17/2023 Initial Diagnosis    Malignant neoplasm of upper-outer quadrant  of left breast in female, estrogen receptor positive     10/18/2023 Cancer Staged    Staging form: Breast, AJCC 8th Edition  - Clinical stage from 10/18/2023: Stage IA (cT1c, cN0(f), cM0, G3, ER+, TN+, HER2-)     12/4/2023 Cancer Staged    Staging form: Breast, AJCC 8th Edition  - Pathologic stage from 12/4/2023: Stage IA (pT1c, pN0(sn), cM0, G3, ER+, TN+, HER2-)     1/29/2024 -  Chemotherapy    Treatment Summary   Plan Name: OP BREAST TC (DOCEtaxel cycloPHOSphamide) Q3W  Treatment Goal: Curative  Status: Active  Start Date: 1/29/2024  End Date: 4/4/2024 (Planned)  Provider: Deloris Gordon MD  Chemotherapy: cycloPHOSphamide 600 mg/m2 = 1,200 mg in sodium chloride 0.9% 291 mL chemo infusion, 600 mg/m2 = 1,200 mg, Intravenous, Clinic/HOD 1 time, 2 of 4 cycles  Administration: 1,200 mg (1/29/2024), 1,200 mg (2/20/2024)  DOCEtaxel (TAXOTERE) 75 mg/m2 = 150 mg in sodium chloride 0.9% 292.5 mL chemo infusion, 75 mg/m2 = 150 mg, Intravenous, Clinic/HOD 1 time, 2 of 4 cycles  Administration: 150 mg (1/29/2024), 150 mg (2/20/2024)      Chemotherapy    Treatment Summary   Plan Name: OP BREAST TC (DOCEtaxel cycloPHOSphamide) Q3W  Treatment Goal: Curative  Status: Active  Start Date: 1/10/2024 (Planned)  End Date: 3/14/2024 (Planned)  Provider: Deloris Gordon MD  Chemotherapy: cycloPHOSphamide 600 mg/m2 = 1,180 mg in sodium chloride 0.9% 255.9 mL chemo infusion, 600 mg/m2, Intravenous, Clinic/HOD 1 time, 0 of 4 cycles    DOCEtaxel (TAXOTERE) 75 mg/m2 = 148 mg in sodium chloride 0.9% 257.4 mL chemo infusion, 75 mg/m2, Intravenous, Clinic/HOD 1 time, 0 of 4 cycles           HPI:  Demetrice Gaines is a pleasant 63 y.o. female with history of CAD not requiring PCI, HTN, DM II, CHF and colon polyps who presents to clinic to establish care on referral for breast cancer.    The patient reports that she has been recovering well from surgery.  She has no acute complaints.  She has been having blood in her stool for over a year as  well as constipation.  I recommended that we have this moved up to rule out any colonic lesions.  I will message the gastroenterologist to see if they can possibly do a colonoscopy on her prior to starting chemotherapy     I reviewed her Oncotype DX with her.  Unfortunately her recurrence score is 38 indicating a clear benefit for adjuvant chemotherapy.  She is understanding of this.  I reviewed the most common side effects of chemotherapy.  Initially considered giving her AC-T however giving her cardiac history I think that it would be better for the patient unless toxic if we administered TC.  Otherwise she does have some baseline intermittent neuropathy which will need to be monitored.    FHx significant for Mother with primary bone cancer and paternal grandmother with breast cancer      Past Medical History:   Diagnosis Date    Bilateral pleural effusion 2020    CAD (coronary artery disease)     CHF (congestive heart failure)     Colon polyp     Diabetes mellitus, type 2     Hypertension     Hyponatremia 2020    Malignant neoplasm of upper-outer quadrant of left breast in female, estrogen receptor positive 10/17/2023    Myocardial infarction        Past Surgical History:   Procedure Laterality Date    Benign Cyst Right     BREAST CYST EXCISION Right     pt states benign     SECTION      COLONOSCOPY      COLONOSCOPY N/A 2019    Procedure: COLONOSCOPY;  Surgeon: Ileana Holcomb MD;  Location: [facility] ENDO;  Service: Endoscopy;  Laterality: N/A;    COLONOSCOPY N/A 2024    Procedure: COLONOSCOPY;  Surgeon: Lobo Mitchell MD;  Location: [facility] ENDO;  Service: Endoscopy;  Laterality: N/A;    EXCISION, MASS, BREAST, USING RADIOLOGICAL MARKER Left 2023    Procedure: EXCISION,MASS,BREAST,USING RADIOLOGICAL MARKER;  Surgeon: Hemalatha Josue MD;  Location: [facility] OR;  Service: General;  Laterality: Left;  radhika  needed    FLUOROSCOPY N/A 2024    Procedure:  Fluoroscopy/Mediport placement;  Surgeon: Sylvester Zazueta MD;  Location: [facility] CATH LAB;  Service: General;  Laterality: N/A;    INJECTION FOR SENTINEL NODE IDENTIFICATION Left 10/25/2023    Procedure: INJECTION, FOR SENTINEL NODE IDENTIFICATION;  Surgeon: Hemalatha Josue MD;  Location: [facility];  Service: General;  Laterality: Left;    INSERTION OF BREAST TISSUE EXPANDER Bilateral 10/25/2023    Procedure: INSERTION, TISSUE EXPANDER, BREAST;  Surgeon: Keron Lynn MD;  Location: [facility];  Service: Plastics;  Laterality: Bilateral;    MASTECTOMY, NIPPLE SPARING Bilateral 10/25/2023    Procedure: MASTECTOMY, NIPPLE SPARING;  Surgeon: Hemalatha Josue MD;  Location: [facility];  Service: General;  Laterality: Bilateral;    SENTINEL LYMPH NODE BIOPSY Left 10/25/2023    Procedure: BIOPSY, LYMPH NODE, SENTINEL;  Surgeon: Hemalatha Josue MD;  Location: [facility];  Service: General;  Laterality: Left;  nuc med needed 1 hour before start    ULTRASOUND GUIDANCE Left 10/25/2023    Procedure: ULTRASOUND GUIDANCE;  Surgeon: Hemalatha Josue MD;  Location: [facility];  Service: General;  Laterality: Left;       Family History   Problem Relation Age of Onset    Cancer Mother     Pacemaker/defibrilator Mother     Glaucoma Mother     Arthritis Mother     Heart disease Mother         It seems that this condition runs in my family on my mother's side    Hypertension Mother     Diabetes Father     Hypertension Sister     Glaucoma Sister     Breast cancer Paternal Grandmother     Cancer Paternal Grandmother     Hypertension Brother     COPD Sister     COPD Brother     Hypertension Brother     Hypertension Sister        Review of patient's allergies indicates:   Allergen Reactions    Aldactone [spironolactone]      Memory impair and breast soreness    Coreg [carvedilol]      Hair loss    Lisinopril-hydrochlorothiazide      Other reaction(s): Reaction:Throat swelling;       Social History     Tobacco Use    Smoking status:  Never     Passive exposure: Never    Smokeless tobacco: Never    Tobacco comments:     NEVER   Substance Use Topics    Alcohol use: Never    Drug use: Never            Labs   Labs:  Lab Visit on 03/13/2024   Component Date Value Ref Range Status    Phosphorus 03/13/2024 3.2  2.7 - 4.5 mg/dL Final    Magnesium 03/13/2024 1.5 (L)  1.6 - 2.6 mg/dL Final    Sodium 03/13/2024 135 (L)  136 - 145 mmol/L Final    Potassium 03/13/2024 3.9  3.5 - 5.1 mmol/L Final    Chloride 03/13/2024 102  95 - 110 mmol/L Final    CO2 03/13/2024 25  23 - 29 mmol/L Final    Glucose 03/13/2024 154 (H)  70 - 110 mg/dL Final    BUN 03/13/2024 16  8 - 23 mg/dL Final    Creatinine 03/13/2024 0.8  0.5 - 1.4 mg/dL Final    Calcium 03/13/2024 9.0  8.7 - 10.5 mg/dL Final    Total Protein 03/13/2024 6.8  6.0 - 8.4 g/dL Final    Albumin 03/13/2024 3.7  3.5 - 5.2 g/dL Final    Total Bilirubin 03/13/2024 0.4  0.1 - 1.0 mg/dL Final    Comment: For infants and newborns, interpretation of results should be based  on gestational age, weight and in agreement with clinical  observations.    Premature Infant recommended reference ranges:  Up to 24 hours.............<8.0 mg/dL  Up to 48 hours............<12.0 mg/dL  3-5 days..................<15.0 mg/dL  6-29 days.................<15.0 mg/dL      Alkaline Phosphatase 03/13/2024 56  55 - 135 U/L Final    AST 03/13/2024 19  10 - 40 U/L Final    ALT 03/13/2024 18  10 - 44 U/L Final    eGFR 03/13/2024 >60  >60 mL/min/1.73 m^2 Final    Anion Gap 03/13/2024 8  8 - 16 mmol/L Final    WBC 03/13/2024 6.32  3.90 - 12.70 K/uL Final    RBC 03/13/2024 3.95 (L)  4.00 - 5.40 M/uL Final    Hemoglobin 03/13/2024 11.4 (L)  12.0 - 16.0 g/dL Final    Hematocrit 03/13/2024 34.1 (L)  37.0 - 48.5 % Final    MCV 03/13/2024 86  82 - 98 fL Final    MCH 03/13/2024 28.9  27.0 - 31.0 pg Final    MCHC 03/13/2024 33.4  32.0 - 36.0 g/dL Final    RDW 03/13/2024 16.9 (H)  11.5 - 14.5 % Final    Platelets 03/13/2024 327  150 - 450 K/uL Final     MPV 03/13/2024 8.5 (L)  9.2 - 12.9 fL Final    Immature Granulocytes 03/13/2024 0.5  0.0 - 0.5 % Final    Gran # (ANC) 03/13/2024 3.7  1.8 - 7.7 K/uL Final    Immature Grans (Abs) 03/13/2024 0.03  0.00 - 0.04 K/uL Final    Comment: Mild elevation in immature granulocytes is non specific and   can be seen in a variety of conditions including stress response,   acute inflammation, trauma and pregnancy. Correlation with other   laboratory and clinical findings is essential.      Lymph # 03/13/2024 1.6  1.0 - 4.8 K/uL Final    Mono # 03/13/2024 0.9  0.3 - 1.0 K/uL Final    Eos # 03/13/2024 0.0  0.0 - 0.5 K/uL Final    Baso # 03/13/2024 0.07  0.00 - 0.20 K/uL Final    nRBC 03/13/2024 0  0 /100 WBC Final    Gran % 03/13/2024 58.6  38.0 - 73.0 % Final    Lymph % 03/13/2024 25.2  18.0 - 48.0 % Final    Mono % 03/13/2024 14.1  4.0 - 15.0 % Final    Eosinophil % 03/13/2024 0.5  0.0 - 8.0 % Final    Basophil % 03/13/2024 1.1  0.0 - 1.9 % Final    Differential Method 03/13/2024 Automated   Final      Pathology     Specimen to Pathology, Surgery Breast 10/25/2023      Component 2 mo ago   Final Pathologic Diagnosis 1. Right breast, prophylactic nipple sparing mastectomy (467 grams):      -  Benign breast tissue with fibrocystic change including fibrosis, adenosis, usual ductal hyperplasia         (UDH) and duct ectasia with apocrine metaplasia and focal periductal chronic inflammation      -  Microcalcifications:  Present, associated with benign ductal tissue      -  Skin and nipple are surgically absent      -  Unremarkable skeletal muscle present      -  Negative for atypia or malignancy    2. Left breast, nipple sparing mastectomy (470 grams):      -  Benign breast tissue with focal atypical ductal hyperplasia (ADH, less than 2mm) in a background of         fibrocystic change including fibrosis, adenosis, duct ectasia, focal columnar cell change/hyperplasia         and fibroadenomatoid change      -  Findings of biopsy cavity  are NOT appreciated grossly or histologically, see case comment      -  Skin and nipple are surgically absent      -  Dumbbell shaped biopsy clip NOT IDENTIFIED at time of grossing    Comment:  Immunohistochemical stains with appropriate controls were performed on select tissue blocks.  Cytokeratin 5/6 demonstrates a mosaic staining pattern in areas of ductal hyperplasia, showing no evidence of atypia.  AE1/AE3 is utilized for duct  mapping.  P63 highlights intact myoepithelial cells throughout the specimen, showing no evidence of invasive carcinoma.    3. [place] lymph node #1 (hot and blue, background count 2), biopsy:      -  One lymph node, negative for metastatic carcinoma (0/1)      -  Immunohistochemical stains for CAM 5.2, AE1/AE3 and wide spectrum keratin are negative      4. Left breast axillary tail, excision (11 grams):      -  Benign mature fibroadipose tissue      -  Negative for atypia or malignancy    5. Additional lateral margin, excision (tissue submitted entirely for histologic evaluation):      -  Benign breast tissue with mild fibrocystic change including fibrosis, adenosis and         fibroadenomatoid change      -  Negative for atypia or malignancy      6. Additional anterior lateral margin, excision (tissue submitted entirely for histologic evaluation):      -  Benign breast tissue with fibrocystic change including fibrosis, adenosis, fibroadenomatoid         changes and duct ectasia with apocrine metaplasia      -  Microcalcifications:  Present, associated with benign breast tissue      -  Negative for atypia or malignancy     Comment: Interp By Dianelys Cardona M.D., Signed on 11/08/2023 at 09:29   Comment The patient's history of invasive carcinoma and ductal carcinoma in-situ in the upper outer quadrant is noted.  The specimen is thoroughly searched for both the dumbbell biopsy clip and a biopsy cavity, neither of which are identified grossly or  histologically.  Numerous sections of  tissue from specimen 2 (left breast mastectomy) were evaluated.  The patient's additional margins (specimens 5 and 6) were submitted entirely for histologic review and show no evidence of invasive or in-situ  carcinoma.  Specimen 4 (breast axillary tail) was also submitted entirely for histologic review and showed no evidence of invasive or in-situ carcinoma.  Dr. Bishop has reviewed the above case and agrees with the findings.    Dr. Josue was alerted to these findings via epic message on 11/08/2023 at 09:30.      All stains were performed with adequate positive and negative controls.              Specimen to Pathology, Surgery Breast 11/22/2023      Component 1 mo ago   Final Pathologic Diagnosis Breast, left, re-excision:  Invasive ductal carcinoma, poorly differentiated  Renata grade 3:  Tubule formation-3, nuclear pleomorphism-3 and mitotic count -3  Invasive carcinoma measures 19 x 9 x 8 mm  Positive superior margin, measuring 10 mm in greatest extent  Additional margins:  -Anterior margin:  1 mm  -Medial margin:  6 mm  -Posterior margin:  6.5 mm  -Lateral margin:  9 mm  -Inferior margin:  9 mm  No carcinoma in Situ or lymphovascular invasion is identified  Previous biopsy clip and reflector both grossly identified   Comment: Interp By Shelbie Bishop M.D., Signed on 11/28/2023 at 10:46   Gross Surgery ID:  [id number]    Pathology ID:  [id number]  1.  Received in formalin labeled &quot;left breast biopsy&quot; is a 4 g, 2.3 cm superior to inferior by 2.1 cm medial to lateral by 2.3 cm anterior to posterior portion of breast tissue received oriented by the surgeon as short stitch superior and long  stitch lateral.  A biopsy clip and a reflector clip is identified exposed at the anterior-superior margins (see attached image).  The specimen is sectioned from anterior to posterior into 5 slices averaging 0.5 cm in thickness.  Sectioning reveals a 1.9  cm anterior to posterior by 0.9 cm medial to lateral by 0.8 cm  superior to inferior well-circumscribed, tan-white, rubbery mass within slices 2-5.  The mass abuts the superior and medial margins and measures 0.9 cm from the lateral margin, 0.9 cm from  the inferior margin, 0.4 cm from the posterior margin, and 0.5 cm from the anterior margin.  There is an X shaped biopsy clip identified within the mass in slice 4 and a reflector clip identified adjacent to the mass in slice 1.  The specimen is inked as   follows:  Superior-blue, inferior-green, medial-red, lateral-orange, anterior-yellow, and posterior-black.  The specimen is submitted entirely and sequentially from anterior to posterior as follows:  QWB--1-A:  Slice 1, anterior margin, perpendicular  FDG--1-B:  Slice 2, mass to include anterior, superior, inferior, medial, and lateral margins  XHX--1-C:  Slice 3, mass to superior, inferior, medial, and lateral margins  LGK--1-D:  Slice 4, mass to superior, inferior, medial, and lateral margins  NFT--1-E:  Slice 5, posterior margin, perpendicular        Grossed by: Carl Donovan MS, PA([facility])        Assessment and Plan   Stage IA (T1c N0 Mx) L Breast Invasive Ductal  Carcinoma, G3, +/+/-, ki67 of 90%   s/p b/l nipple sparing mastectomy and sentinel node biopsy on 10/25/23   s/p excision of primary tumor 11/22/23   Pathology report above  Genetic Testing 09/13/2023: VUS in MLH1  Oncotype Dx Score 38  TTE stable from prior  She has a history of heart disease; she has not required PCI however will precert for TC treatment to circumvent potential anthracycline cardiac effects  I discussed in detail the role of medical oncology in her care.  I informed her that my treatment recommendation is based on the NCCN Guidelines, her final pathology and Oncotype DX score  Because of the high Oncotype DX score, chemotherapy is recommended; because of the hormonal make up of her tumor, endocrine therapy is recommended after she completes chemotherapy. I  reviewed the major side effects of chemotherapy and  AIs.   s/p C1 TC 01/29/24 with good tolerance; severe arthralgias, likely from GCSF  Labs reviewed and will proceed with C3 today      Hypomagnesemia  Continue PO mag prescribed      Bone Health  Given that she is post menopausal and endocrine therapy is recommended, she will be monitored for the need for bisphosphonate therapy.  BMD 08/2023 was normal  Continue Calcium and vitamin D      Blood in stool, Resolved   Patient reports blood in her stool for over 1 year   Colonoscopy 01/02/2024: TA (4 fragments in ascending colon and 1 TA in sigmoid colon); recommend repeat in 5 years      Cancer Screening  PAP Smear 09/15/2023: Negative for intraepithelial lesion or malignancy   Colonoscopy 01/2024: TAs; repeat in 5 years      Chronic Medical Conditions  DM II  HTN  CAD:  She has not require PCI; stress test 12/26/2019 negative for ischemia and arrhythmias; last echo 06/2020 and showed preserved ejection fraction at 55%; concentric hypertrophy and normal left ventricular diastolic function  CHF  Hx of Colon Polyps  Peripheral neuropathy; intermittent        Med Onc Chart Routing      Follow up with physician 3 weeks.   Follow up with [facility]    Infusion scheduling note   TC+GCSF today and in 3 weeks   Injection scheduling note    Labs CMP, CBC, phosphorus and magnesium   Scheduling:  Preferred lab:  Lab interval:     Imaging    Pharmacy appointment    Other referrals                       The patient was seen, interviewed and examined. Pertinent lab and radiologic studies were reviewed. Pt instructed to call should they develop concerning signs/symptoms or have further questions.        Portions of the record may have been created with voice recognition software. Occasional wrong-word or sound-a-like substitutions may have occurred due to the inherent limitations of voice recognition software. Read the chart carefully and recognize, using context, where substitutions  have occurred.      Deloris Garcia MD    Hematology/Oncology

## 2024-03-14 ENCOUNTER — INFUSION (OUTPATIENT)
Dept: INFUSION THERAPY | Facility: HOSPITAL | Age: 64
End: 2024-03-14
Attending: INTERNAL MEDICINE
Payer: MEDICARE

## 2024-03-14 VITALS
RESPIRATION RATE: 16 BRPM | HEART RATE: 113 BPM | OXYGEN SATURATION: 98 % | DIASTOLIC BLOOD PRESSURE: 85 MMHG | SYSTOLIC BLOOD PRESSURE: 133 MMHG | TEMPERATURE: 98 F

## 2024-03-14 DIAGNOSIS — C50.412 MALIGNANT NEOPLASM OF UPPER-OUTER QUADRANT OF LEFT BREAST IN FEMALE, ESTROGEN RECEPTOR POSITIVE: Primary | ICD-10-CM

## 2024-03-14 DIAGNOSIS — Z17.0 MALIGNANT NEOPLASM OF UPPER-OUTER QUADRANT OF LEFT BREAST IN FEMALE, ESTROGEN RECEPTOR POSITIVE: Primary | ICD-10-CM

## 2024-03-14 PROCEDURE — 96372 THER/PROPH/DIAG INJ SC/IM: CPT

## 2024-03-14 PROCEDURE — 63600175 PHARM REV CODE 636 W HCPCS: Mod: JZ,JG | Performed by: INTERNAL MEDICINE

## 2024-03-14 RX ADMIN — PEGFILGRASTIM-CBQV 6 MG: 6 INJECTION, SOLUTION SUBCUTANEOUS at 03:03

## 2024-03-14 NOTE — PLAN OF CARE
Patient tolerated Udenyca well today; no adverse reaction noted.  POC reviewed with pt.  NAD noted upon discharge.   Has f/u appt(s) scheduled per MD request.    Problem: Adult Inpatient Plan of Care  Goal: Plan of Care Review  Outcome: Ongoing, Progressing  Goal: Patient-Specific Goal (Individualized)  Outcome: Ongoing, Progressing  Goal: Optimal Comfort and Wellbeing  Outcome: Ongoing, Progressing  Intervention: Provide Person-Centered Care  Flowsheets (Taken 3/14/2024 0510)  Trust Relationship/Rapport:   care explained   questions encouraged   choices provided   reassurance provided   thoughts/feelings acknowledged   emotional support provided   empathic listening provided   questions answered

## 2024-04-03 ENCOUNTER — OFFICE VISIT (OUTPATIENT)
Dept: HEMATOLOGY/ONCOLOGY | Facility: CLINIC | Age: 64
End: 2024-04-03
Payer: MEDICARE

## 2024-04-03 ENCOUNTER — PATIENT MESSAGE (OUTPATIENT)
Dept: HEMATOLOGY/ONCOLOGY | Facility: CLINIC | Age: 64
End: 2024-04-03
Payer: MEDICARE

## 2024-04-03 ENCOUNTER — INFUSION (OUTPATIENT)
Dept: INFUSION THERAPY | Facility: HOSPITAL | Age: 64
End: 2024-04-03
Attending: INTERNAL MEDICINE
Payer: MEDICARE

## 2024-04-03 VITALS
RESPIRATION RATE: 16 BRPM | TEMPERATURE: 97 F | OXYGEN SATURATION: 97 % | DIASTOLIC BLOOD PRESSURE: 76 MMHG | SYSTOLIC BLOOD PRESSURE: 125 MMHG | HEART RATE: 95 BPM

## 2024-04-03 VITALS
HEIGHT: 64 IN | SYSTOLIC BLOOD PRESSURE: 130 MMHG | DIASTOLIC BLOOD PRESSURE: 78 MMHG | HEART RATE: 109 BPM | TEMPERATURE: 99 F | BODY MASS INDEX: 32.67 KG/M2 | RESPIRATION RATE: 20 BRPM | OXYGEN SATURATION: 99 % | WEIGHT: 191.38 LBS

## 2024-04-03 DIAGNOSIS — C50.412 MALIGNANT NEOPLASM OF UPPER-OUTER QUADRANT OF LEFT BREAST IN FEMALE, ESTROGEN RECEPTOR POSITIVE: Primary | ICD-10-CM

## 2024-04-03 DIAGNOSIS — Z17.0 MALIGNANT NEOPLASM OF UPPER-OUTER QUADRANT OF LEFT BREAST IN FEMALE, ESTROGEN RECEPTOR POSITIVE: Primary | ICD-10-CM

## 2024-04-03 DIAGNOSIS — Z17.0 MALIGNANT NEOPLASM OF UPPER-OUTER QUADRANT OF LEFT BREAST IN FEMALE, ESTROGEN RECEPTOR POSITIVE: Chronic | ICD-10-CM

## 2024-04-03 DIAGNOSIS — C50.412 MALIGNANT NEOPLASM OF UPPER-OUTER QUADRANT OF LEFT BREAST IN FEMALE, ESTROGEN RECEPTOR POSITIVE: Chronic | ICD-10-CM

## 2024-04-03 PROCEDURE — 1101F PT FALLS ASSESS-DOCD LE1/YR: CPT | Mod: CPTII,S$GLB,, | Performed by: INTERNAL MEDICINE

## 2024-04-03 PROCEDURE — 99215 OFFICE O/P EST HI 40 MIN: CPT | Mod: S$GLB,,, | Performed by: INTERNAL MEDICINE

## 2024-04-03 PROCEDURE — 25000003 PHARM REV CODE 250: Performed by: INTERNAL MEDICINE

## 2024-04-03 PROCEDURE — 3288F FALL RISK ASSESSMENT DOCD: CPT | Mod: CPTII,S$GLB,, | Performed by: INTERNAL MEDICINE

## 2024-04-03 PROCEDURE — 1126F AMNT PAIN NOTED NONE PRSNT: CPT | Mod: CPTII,S$GLB,, | Performed by: INTERNAL MEDICINE

## 2024-04-03 PROCEDURE — 96413 CHEMO IV INFUSION 1 HR: CPT

## 2024-04-03 PROCEDURE — 99999 PR PBB SHADOW E&M-EST. PATIENT-LVL III: CPT | Mod: PBBFAC,,, | Performed by: INTERNAL MEDICINE

## 2024-04-03 PROCEDURE — 4010F ACE/ARB THERAPY RXD/TAKEN: CPT | Mod: CPTII,S$GLB,, | Performed by: INTERNAL MEDICINE

## 2024-04-03 PROCEDURE — 3075F SYST BP GE 130 - 139MM HG: CPT | Mod: CPTII,S$GLB,, | Performed by: INTERNAL MEDICINE

## 2024-04-03 PROCEDURE — 96417 CHEMO IV INFUS EACH ADDL SEQ: CPT

## 2024-04-03 PROCEDURE — 3008F BODY MASS INDEX DOCD: CPT | Mod: CPTII,S$GLB,, | Performed by: INTERNAL MEDICINE

## 2024-04-03 PROCEDURE — 3044F HG A1C LEVEL LT 7.0%: CPT | Mod: CPTII,S$GLB,, | Performed by: INTERNAL MEDICINE

## 2024-04-03 PROCEDURE — 3072F LOW RISK FOR RETINOPATHY: CPT | Mod: CPTII,S$GLB,, | Performed by: INTERNAL MEDICINE

## 2024-04-03 PROCEDURE — 63600175 PHARM REV CODE 636 W HCPCS: Performed by: INTERNAL MEDICINE

## 2024-04-03 PROCEDURE — 3078F DIAST BP <80 MM HG: CPT | Mod: CPTII,S$GLB,, | Performed by: INTERNAL MEDICINE

## 2024-04-03 PROCEDURE — 96367 TX/PROPH/DG ADDL SEQ IV INF: CPT

## 2024-04-03 RX ORDER — SODIUM CHLORIDE 0.9 % (FLUSH) 0.9 %
10 SYRINGE (ML) INJECTION
Status: CANCELLED | OUTPATIENT
Start: 2024-04-03

## 2024-04-03 RX ORDER — PROCHLORPERAZINE EDISYLATE 5 MG/ML
5 INJECTION INTRAMUSCULAR; INTRAVENOUS ONCE AS NEEDED
Status: DISCONTINUED | OUTPATIENT
Start: 2024-04-03 | End: 2024-04-03 | Stop reason: HOSPADM

## 2024-04-03 RX ORDER — EPINEPHRINE 0.3 MG/.3ML
0.3 INJECTION SUBCUTANEOUS ONCE AS NEEDED
Status: CANCELLED | OUTPATIENT
Start: 2024-04-03

## 2024-04-03 RX ORDER — SODIUM CHLORIDE 0.9 % (FLUSH) 0.9 %
10 SYRINGE (ML) INJECTION
Status: DISCONTINUED | OUTPATIENT
Start: 2024-04-03 | End: 2024-04-03 | Stop reason: HOSPADM

## 2024-04-03 RX ORDER — OLANZAPINE 5 MG/1
TABLET ORAL
Qty: 3 TABLET | Refills: 0 | Status: SHIPPED | OUTPATIENT
Start: 2024-04-03

## 2024-04-03 RX ORDER — DIPHENHYDRAMINE HYDROCHLORIDE 50 MG/ML
50 INJECTION INTRAMUSCULAR; INTRAVENOUS ONCE AS NEEDED
Status: CANCELLED | OUTPATIENT
Start: 2024-04-03

## 2024-04-03 RX ORDER — ANASTROZOLE 1 MG/1
1 TABLET ORAL DAILY
Qty: 90 TABLET | Refills: 3 | Status: SHIPPED | OUTPATIENT
Start: 2024-04-03 | End: 2025-04-03

## 2024-04-03 RX ORDER — PROCHLORPERAZINE EDISYLATE 5 MG/ML
5 INJECTION INTRAMUSCULAR; INTRAVENOUS ONCE AS NEEDED
Status: CANCELLED | OUTPATIENT
Start: 2024-04-03

## 2024-04-03 RX ORDER — HEPARIN 100 UNIT/ML
500 SYRINGE INTRAVENOUS
Status: CANCELLED | OUTPATIENT
Start: 2024-04-03

## 2024-04-03 RX ORDER — HEPARIN 100 UNIT/ML
500 SYRINGE INTRAVENOUS
Status: DISCONTINUED | OUTPATIENT
Start: 2024-04-03 | End: 2024-04-03 | Stop reason: HOSPADM

## 2024-04-03 RX ADMIN — HEPARIN 500 UNITS: 100 SYRINGE at 12:04

## 2024-04-03 RX ADMIN — DOCETAXEL 148 MG: 20 INJECTION, SOLUTION, CONCENTRATE INTRAVENOUS at 10:04

## 2024-04-03 RX ADMIN — CYCLOPHOSPHAMIDE 1180 MG: 200 INJECTION, SOLUTION INTRAVENOUS at 11:04

## 2024-04-03 RX ADMIN — DEXAMETHASONE SODIUM PHOSPHATE 0.25 MG: 4 INJECTION, SOLUTION INTRA-ARTICULAR; INTRALESIONAL; INTRAMUSCULAR; INTRAVENOUS; SOFT TISSUE at 10:04

## 2024-04-03 NOTE — PLAN OF CARE
Problem: Adult Inpatient Plan of Care  Goal: Plan of Care Review  Outcome: Ongoing, Progressing  Flowsheets (Taken 4/3/2024 1033)  Plan of Care Reviewed With: patient  Goal: Patient-Specific Goal (Individualized)  Outcome: Ongoing, Progressing  Flowsheets (Taken 4/3/2024 1033)  Anxieties, Fears or Concerns: denies  Individualized Care Needs: feet elevated, pillow warm blanket and snack provided  Goal: Optimal Comfort and Wellbeing  Outcome: Ongoing, Progressing  Intervention: Provide Person-Centered Care  Flowsheets (Taken 4/3/2024 1033)  Trust Relationship/Rapport:   care explained   reassurance provided   choices provided   thoughts/feelings acknowledged   emotional support provided   empathic listening provided   questions answered   questions encouraged     Problem: Anemia (Chemotherapy Effects)  Goal: Anemia Symptom Improvement  Outcome: Ongoing, Progressing  Intervention: Monitor and Manage Anemia  Flowsheets (Taken 4/3/2024 1033)  Safety Promotion/Fall Prevention:   assistive device/personal item within reach   Fall Risk reviewed with patient/family   in recliner, wheels locked   medications reviewed   instructed to call staff for mobility  Fatigue Management: frequent rest breaks encouraged     Problem: Nausea and Vomiting (Chemotherapy Effects)  Goal: Fluid and Electrolyte Balance  Outcome: Ongoing, Progressing  Intervention: Prevent and Manage Nausea and Vomiting  Flowsheets (Taken 4/3/2024 1033)  Nausea/Vomiting Interventions: (premedicated) other (see comments)  Environmental Support: calm environment promoted     Problem: Neurotoxicity (Chemotherapy Effects)  Goal: Neurotoxicity Symptom Control  Outcome: Ongoing, Progressing     Problem: Neutropenia (Chemotherapy Effects)  Goal: Absence of Infection  Outcome: Ongoing, Progressing  Intervention: Prevent Infection and Maximize Resistance  Flowsheets (Taken 4/3/2024 1033)  Infection Prevention:   environmental surveillance performed   equipment surfaces  disinfected   hand hygiene promoted   personal protective equipment utilized     Problem: Oral Mucositis (Chemotherapy Effects)  Goal: Improved Oral Mucous Membrane Integrity  Outcome: Ongoing, Progressing     Problem: Thrombocytopenia Bleeding Risk (Chemotherapy Effects)  Goal: Absence of Bleeding  Outcome: Ongoing, Progressing  Intervention: Minimize Bleeding Risk  Flowsheets (Taken 4/3/2024 1033)  Bleeding Precautions:   blood pressure closely monitored   monitored for signs of bleeding

## 2024-04-03 NOTE — PROGRESS NOTES
Patient ID: Demetrice Gaines   Chief Complaint: Follow-up  MRN:  [id number]     Oncologic Diagnosis:  Stage IA (T1c N0 Mx) L Breast Invasive Ductal  Carcinoma, G3, +/+/-, ki67 of 90%; Oncotype 38  Previous Treatment:    Bilateral Mastectomies 10/25/2023  s/p Excision of Primary Tumor 11/22/23   Current Treatment:  TC (01/29/24 - 04/03/2024); Arimidex prescribed    Subjective   The patient presents for follow up and is accompanied by her .     She is tolerating chemo very well overall.  After the last cycle she did not have Zyprexa and had more nausea and vomiting.  She didn't realize she had refills on Zyprexa.  She will start AI in a few weeks.  Side effects reviewed again.  Otherwise, no issues.  She has no acute complaints today.    Review of Systems   Constitutional:  Positive for fatigue. Negative for activity change, appetite change, chills, diaphoresis, fever and unexpected weight change.   HENT:  Negative for nosebleeds.    Respiratory:  Negative for shortness of breath.    Cardiovascular:  Negative for chest pain.   Gastrointestinal:  Negative for abdominal distention, abdominal pain, anal bleeding, blood in stool, constipation, diarrhea, nausea and vomiting.   Genitourinary:  Negative for difficulty urinating, hematuria and vaginal bleeding.   Musculoskeletal:  Negative for arthralgias, back pain and myalgias.   Skin:  Positive for color change (hands and feets). Negative for rash.   Neurological:  Negative for dizziness, weakness, light-headedness and headaches.   Hematological:  Does not bruise/bleed easily.   Psychiatric/Behavioral:  The patient is not nervous/anxious.      History     Oncology History   Breast neoplasm, Tis (DCIS), left (Resolved)   9/8/2023 Initial Diagnosis    Breast neoplasm, Tis (DCIS), left     9/8/2023 Cancer Staged    Staging form: Breast, AJCC 8th Edition  - Clinical stage from 9/8/2023: Stage 0 (cTis (DCIS), cN0, cM0, G3, ER+, NE+, HER2: Not Assessed)      Genetic  Testing    Patient has genetic testing done for [employer] STAT breast cancer panel and 84 gene multicancer panel.                                              Results revealed patient has the following mutation(s):negative breast cancer panel- no mutation noted- MLH1- variant of undetermined significance      Chemotherapy    Treatment Summary   Plan Name: OP BREAST TC (DOCEtaxel cycloPHOSphamide) Q3W  Treatment Goal: Curative  Status: Active  Start Date: 1/10/2024 (Planned)  End Date: 3/14/2024 (Planned)  Provider: Deloris Gordon MD  Chemotherapy: cycloPHOSphamide 600 mg/m2 = 1,180 mg in sodium chloride 0.9% 255.9 mL chemo infusion, 600 mg/m2, Intravenous, Clinic/HOD 1 time, 0 of 4 cycles    DOCEtaxel (TAXOTERE) 75 mg/m2 = 148 mg in sodium chloride 0.9% 257.4 mL chemo infusion, 75 mg/m2, Intravenous, Clinic/HOD 1 time, 0 of 4 cycles       Malignant neoplasm of upper-outer quadrant of left breast in female, estrogen receptor positive   10/17/2023 Initial Diagnosis    Malignant neoplasm of upper-outer quadrant of left breast in female, estrogen receptor positive     10/18/2023 Cancer Staged    Staging form: Breast, AJCC 8th Edition  - Clinical stage from 10/18/2023: Stage IA (cT1c, cN0(f), cM0, G3, ER+, CO+, HER2-)     12/4/2023 Cancer Staged    Staging form: Breast, AJCC 8th Edition  - Pathologic stage from 12/4/2023: Stage IA (pT1c, pN0(sn), cM0, G3, ER+, CO+, HER2-)     1/29/2024 -  Chemotherapy    Treatment Summary   Plan Name: OP BREAST TC (DOCEtaxel cycloPHOSphamide) Q3W  Treatment Goal: Curative  Status: Active  Start Date: 1/29/2024  End Date: 4/4/2024 (Planned)  Provider: Deloris Gordon MD  Chemotherapy: cycloPHOSphamide 600 mg/m2 = 1,200 mg in sodium chloride 0.9% 291 mL chemo infusion, 600 mg/m2 = 1,200 mg, Intravenous, Clinic/HOD 1 time, 3 of 4 cycles  Administration: 1,200 mg (1/29/2024), 1,200 mg (2/20/2024), 1,200 mg (3/13/2024)  DOCEtaxel (TAXOTERE) 75 mg/m2 = 150 mg in sodium chloride 0.9% 292.5  mL chemo infusion, 75 mg/m2 = 150 mg, Intravenous, Clinic/HOD 1 time, 3 of 4 cycles  Administration: 150 mg (1/29/2024), 150 mg (2/20/2024), 150 mg (3/13/2024)      Chemotherapy    Treatment Summary   Plan Name: OP BREAST TC (DOCEtaxel cycloPHOSphamide) Q3W  Treatment Goal: Curative  Status: Active  Start Date: 1/10/2024 (Planned)  End Date: 3/14/2024 (Planned)  Provider: Deloris Gordon MD  Chemotherapy: cycloPHOSphamide 600 mg/m2 = 1,180 mg in sodium chloride 0.9% 255.9 mL chemo infusion, 600 mg/m2, Intravenous, Clinic/HOD 1 time, 0 of 4 cycles    DOCEtaxel (TAXOTERE) 75 mg/m2 = 148 mg in sodium chloride 0.9% 257.4 mL chemo infusion, 75 mg/m2, Intravenous, Clinic/HOD 1 time, 0 of 4 cycles           HPI:  Demetrice Gaines is a pleasant 63 y.o. female with history of CAD not requiring PCI, HTN, DM II, CHF and colon polyps who presents to clinic to establish care on referral for breast cancer.    The patient reports that she has been recovering well from surgery.  She has no acute complaints.  She has been having blood in her stool for over a year as well as constipation.  I recommended that we have this moved up to rule out any colonic lesions.  I will message the gastroenterologist to see if they can possibly do a colonoscopy on her prior to starting chemotherapy     I reviewed her Oncotype DX with her.  Unfortunately her recurrence score is 38 indicating a clear benefit for adjuvant chemotherapy.  She is understanding of this.  I reviewed the most common side effects of chemotherapy.  Initially considered giving her AC-T however giving her cardiac history I think that it would be better for the patient unless toxic if we administered TC.  Otherwise she does have some baseline intermittent neuropathy which will need to be monitored.    FHx significant for Mother with primary bone cancer and paternal grandmother with breast cancer      Past Medical History:   Diagnosis Date    Bilateral pleural effusion  06/20/2020    CAD (coronary artery disease)     CHF (congestive heart failure)     Colon polyp     Diabetes mellitus, type 2     Hypertension     Hyponatremia 06/20/2020    Malignant neoplasm of upper-outer quadrant of left breast in female, estrogen receptor positive 10/17/2023    Myocardial infarction        Physical Exam     Vitals:    04/03/24 0925   BP: 130/78   Pulse: 109   Resp: 20   Temp: 98.9 °F (37.2 °C)       Physical Exam  Constitutional:       Appearance: Normal appearance. She is normal weight.   HENT:      Head: Normocephalic and atraumatic.   Eyes:      Extraocular Movements: Extraocular movements intact.      Conjunctiva/sclera: Conjunctivae normal.   Cardiovascular:      Rate and Rhythm: Normal rate.   Pulmonary:      Effort: Pulmonary effort is normal. No respiratory distress.   Skin:     General: Skin is warm.   Neurological:      General: No focal deficit present.      Mental Status: She is alert and oriented to person, place, and time.   Psychiatric:         Mood and Affect: Mood normal.         Behavior: Behavior normal.       Labs   Labs:  Lab Visit on 04/03/2024   Component Date Value Ref Range Status    Phosphorus 04/03/2024 3.5  2.7 - 4.5 mg/dL Final    Magnesium 04/03/2024 1.4 (L)  1.6 - 2.6 mg/dL Final    Sodium 04/03/2024 138  136 - 145 mmol/L Final    Potassium 04/03/2024 3.7  3.5 - 5.1 mmol/L Final    Chloride 04/03/2024 102  95 - 110 mmol/L Final    CO2 04/03/2024 23  23 - 29 mmol/L Final    Glucose 04/03/2024 109  70 - 110 mg/dL Final    BUN 04/03/2024 11  8 - 23 mg/dL Final    Creatinine 04/03/2024 0.8  0.5 - 1.4 mg/dL Final    Calcium 04/03/2024 9.4  8.7 - 10.5 mg/dL Final    Total Protein 04/03/2024 6.8  6.0 - 8.4 g/dL Final    Albumin 04/03/2024 4.1  3.5 - 5.2 g/dL Final    Total Bilirubin 04/03/2024 0.5  0.1 - 1.0 mg/dL Final    Comment: For infants and newborns, interpretation of results should be based  on gestational age, weight and in agreement with  clinical  observations.    Premature Infant recommended reference ranges:  Up to 24 hours.............<8.0 mg/dL  Up to 48 hours............<12.0 mg/dL  3-5 days..................<15.0 mg/dL  6-29 days.................<15.0 mg/dL      Alkaline Phosphatase 04/03/2024 72  55 - 135 U/L Final    AST 04/03/2024 16  10 - 40 U/L Final    ALT 04/03/2024 15  10 - 44 U/L Final    eGFR 04/03/2024 >60  >60 mL/min/1.73 m^2 Final    Anion Gap 04/03/2024 13  8 - 16 mmol/L Final    WBC 04/03/2024 6.27  3.90 - 12.70 K/uL Final    RBC 04/03/2024 4.18  4.00 - 5.40 M/uL Final    Hemoglobin 04/03/2024 12.1  12.0 - 16.0 g/dL Final    Hematocrit 04/03/2024 36.2 (L)  37.0 - 48.5 % Final    MCV 04/03/2024 87  82 - 98 fL Final    MCH 04/03/2024 28.9  27.0 - 31.0 pg Final    MCHC 04/03/2024 33.4  32.0 - 36.0 g/dL Final    RDW 04/03/2024 18.8 (H)  11.5 - 14.5 % Final    Platelets 04/03/2024 286  150 - 450 K/uL Final    MPV 04/03/2024 8.1 (L)  9.2 - 12.9 fL Final      Pathology     Specimen to Pathology, Surgery Breast 10/25/2023      Component 2 mo ago   Final Pathologic Diagnosis 1. Right breast, prophylactic nipple sparing mastectomy (467 grams):      -  Benign breast tissue with fibrocystic change including fibrosis, adenosis, usual ductal hyperplasia         (UDH) and duct ectasia with apocrine metaplasia and focal periductal chronic inflammation      -  Microcalcifications:  Present, associated with benign ductal tissue      -  Skin and nipple are surgically absent      -  Unremarkable skeletal muscle present      -  Negative for atypia or malignancy    2. Left breast, nipple sparing mastectomy (470 grams):      -  Benign breast tissue with focal atypical ductal hyperplasia (ADH, less than 2mm) in a background of         fibrocystic change including fibrosis, adenosis, duct ectasia, focal columnar cell change/hyperplasia         and fibroadenomatoid change      -  Findings of biopsy cavity are NOT appreciated grossly or histologically,  see case comment      -  Skin and nipple are surgically absent      -  Dumbbell shaped biopsy clip NOT IDENTIFIED at time of grossing    Comment:  Immunohistochemical stains with appropriate controls were performed on select tissue blocks.  Cytokeratin 5/6 demonstrates a mosaic staining pattern in areas of ductal hyperplasia, showing no evidence of atypia.  AE1/AE3 is utilized for duct  mapping.  P63 highlights intact myoepithelial cells throughout the specimen, showing no evidence of invasive carcinoma.    3. [place] lymph node #1 (hot and blue, background count 2), biopsy:      -  One lymph node, negative for metastatic carcinoma (0/1)      -  Immunohistochemical stains for CAM 5.2, AE1/AE3 and wide spectrum keratin are negative      4. Left breast axillary tail, excision (11 grams):      -  Benign mature fibroadipose tissue      -  Negative for atypia or malignancy    5. Additional lateral margin, excision (tissue submitted entirely for histologic evaluation):      -  Benign breast tissue with mild fibrocystic change including fibrosis, adenosis and         fibroadenomatoid change      -  Negative for atypia or malignancy      6. Additional anterior lateral margin, excision (tissue submitted entirely for histologic evaluation):      -  Benign breast tissue with fibrocystic change including fibrosis, adenosis, fibroadenomatoid         changes and duct ectasia with apocrine metaplasia      -  Microcalcifications:  Present, associated with benign breast tissue      -  Negative for atypia or malignancy     Comment: Interp By Dianelys Cardona M.D., Signed on 11/08/2023 at 09:29   Comment The patient's history of invasive carcinoma and ductal carcinoma in-situ in the upper outer quadrant is noted.  The specimen is thoroughly searched for both the dumbbell biopsy clip and a biopsy cavity, neither of which are identified grossly or  histologically.  Numerous sections of tissue from specimen 2 (left breast mastectomy)  were evaluated.  The patient's additional margins (specimens 5 and 6) were submitted entirely for histologic review and show no evidence of invasive or in-situ  carcinoma.  Specimen 4 (breast axillary tail) was also submitted entirely for histologic review and showed no evidence of invasive or in-situ carcinoma.  Dr. Bishop has reviewed the above case and agrees with the findings.    Dr. Josue was alerted to these findings via epic message on 11/08/2023 at 09:30.      All stains were performed with adequate positive and negative controls.              Specimen to Pathology, Surgery Breast 11/22/2023      Component 1 mo ago   Final Pathologic Diagnosis Breast, left, re-excision:  Invasive ductal carcinoma, poorly differentiated  Renata grade 3:  Tubule formation-3, nuclear pleomorphism-3 and mitotic count -3  Invasive carcinoma measures 19 x 9 x 8 mm  Positive superior margin, measuring 10 mm in greatest extent  Additional margins:  -Anterior margin:  1 mm  -Medial margin:  6 mm  -Posterior margin:  6.5 mm  -Lateral margin:  9 mm  -Inferior margin:  9 mm  No carcinoma in Situ or lymphovascular invasion is identified  Previous biopsy clip and reflector both grossly identified   Comment: Interp By Shelbie Bishop M.D., Signed on 11/28/2023 at 10:46   Gross Surgery ID:  [id number]    Pathology ID:  [id number]  1.  Received in formalin labeled &quot;left breast biopsy&quot; is a 4 g, 2.3 cm superior to inferior by 2.1 cm medial to lateral by 2.3 cm anterior to posterior portion of breast tissue received oriented by the surgeon as short stitch superior and long  stitch lateral.  A biopsy clip and a reflector clip is identified exposed at the anterior-superior margins (see attached image).  The specimen is sectioned from anterior to posterior into 5 slices averaging 0.5 cm in thickness.  Sectioning reveals a 1.9  cm anterior to posterior by 0.9 cm medial to lateral by 0.8 cm superior to inferior well-circumscribed, tan-white,  rubbery mass within slices 2-5.  The mass abuts the superior and medial margins and measures 0.9 cm from the lateral margin, 0.9 cm from  the inferior margin, 0.4 cm from the posterior margin, and 0.5 cm from the anterior margin.  There is an X shaped biopsy clip identified within the mass in slice 4 and a reflector clip identified adjacent to the mass in slice 1.  The specimen is inked as   follows:  Superior-blue, inferior-green, medial-red, lateral-orange, anterior-yellow, and posterior-black.  The specimen is submitted entirely and sequentially from anterior to posterior as follows:  CKV--1-A:  Slice 1, anterior margin, perpendicular  PBG--1-B:  Slice 2, mass to include anterior, superior, inferior, medial, and lateral margins  SZF--1-C:  Slice 3, mass to superior, inferior, medial, and lateral margins  RMC--1-D:  Slice 4, mass to superior, inferior, medial, and lateral margins  LPG--1-E:  Slice 5, posterior margin, perpendicular        Grossed by: Carl Donovan MS, PA(ASCP)        Assessment and Plan   Stage IA (T1c N0 Mx) L Breast Invasive Ductal  Carcinoma, G3, +/+/-, ki67 of 90%   s/p b/l nipple sparing mastectomy and sentinel node biopsy on 10/25/23   s/p excision of primary tumor 11/22/23   Pathology report above  Genetic Testing 09/13/2023: VUS in MLH1  Oncotype Dx Score 38  TTE stable from prior  She has a history of heart disease; she has not required PCI however will precert for TC treatment to circumvent potential anthracycline cardiac effects  I discussed in detail the role of medical oncology in her care.  I informed her that my treatment recommendation is based on the NCCN Guidelines, her final pathology and Oncotype DX score  Because of the high Oncotype DX score, chemotherapy is recommended; because of the hormonal make up of her tumor, endocrine therapy is recommended after she completes chemotherapy. I reviewed the major side effects of chemotherapy and  AIs.    s/p C1 TC 01/29/24 with good tolerance; severe arthralgias, likely from GCSF  Labs reviewed and will proceed with C4 today  Arimidex prescribed      Hypomagnesemia  Continue PO mag prescribed      Bone Health  Given that she is post menopausal and endocrine therapy is recommended, she will be monitored for the need for bisphosphonate therapy.  BMD 08/2023 was normal  Continue Calcium and vitamin D      Blood in stool, Resolved   Patient reports blood in her stool for over 1 year   Colonoscopy 01/02/2024: TA (4 fragments in ascending colon and 1 TA in sigmoid colon); recommend repeat in 5 years      Cancer Screening  PAP Smear 09/15/2023: Negative for intraepithelial lesion or malignancy   Colonoscopy 01/2024: TAs; repeat in 5 years      Chronic Medical Conditions  DM II  HTN  CAD:  She has not require PCI; stress test 12/26/2019 negative for ischemia and arrhythmias; last echo 06/2020 and showed preserved ejection fraction at 55%; concentric hypertrophy and normal left ventricular diastolic function  CHF  Hx of Colon Polyps  Peripheral neuropathy; intermittent        Med Onc Chart Routing      Follow up with physician 3 months. 4 1/2 weeks virtual appointment   Follow up with [facility]    Infusion scheduling note   TC today and GCSF tomorrow   Injection scheduling note    Labs CBC, CMP, phosphorus, magnesium, ferritin and iron and TIBC   Scheduling:  Preferred lab:  Lab interval:     Imaging    Pharmacy appointment    Other referrals                       The patient was seen, interviewed and examined. Pertinent lab and radiologic studies were reviewed. Pt instructed to call should they develop concerning signs/symptoms or have further questions.        Portions of the record may have been created with voice recognition software. Occasional wrong-word or sound-a-like substitutions may have occurred due to the inherent limitations of voice recognition software. Read the chart carefully and recognize, using context,  where substitutions have occurred.      Deloris Garcia MD    Hematology/Oncology

## 2024-04-03 NOTE — NURSING
Discussed mag of 1.4 with Dr. Gordon, she does not want to replace at this time.  She did say pt could take OTC mag supplement if she wishes, but mag level will come up.  Information relayed to pt- verbalizes understanding.

## 2024-04-04 ENCOUNTER — INFUSION (OUTPATIENT)
Dept: INFUSION THERAPY | Facility: HOSPITAL | Age: 64
End: 2024-04-04
Attending: INTERNAL MEDICINE
Payer: MEDICARE

## 2024-04-04 VITALS
HEART RATE: 96 BPM | SYSTOLIC BLOOD PRESSURE: 142 MMHG | TEMPERATURE: 98 F | DIASTOLIC BLOOD PRESSURE: 82 MMHG | RESPIRATION RATE: 16 BRPM | OXYGEN SATURATION: 98 %

## 2024-04-04 DIAGNOSIS — C50.412 MALIGNANT NEOPLASM OF UPPER-OUTER QUADRANT OF LEFT BREAST IN FEMALE, ESTROGEN RECEPTOR POSITIVE: Primary | ICD-10-CM

## 2024-04-04 DIAGNOSIS — Z17.0 MALIGNANT NEOPLASM OF UPPER-OUTER QUADRANT OF LEFT BREAST IN FEMALE, ESTROGEN RECEPTOR POSITIVE: Primary | ICD-10-CM

## 2024-04-04 PROCEDURE — 96372 THER/PROPH/DIAG INJ SC/IM: CPT

## 2024-04-04 PROCEDURE — 63600175 PHARM REV CODE 636 W HCPCS: Mod: JZ,JG | Performed by: INTERNAL MEDICINE

## 2024-04-04 RX ADMIN — PEGFILGRASTIM-CBQV 6 MG: 6 INJECTION, SOLUTION SUBCUTANEOUS at 02:04

## 2024-04-08 PROBLEM — K62.5 RECTAL BLEEDING: Status: RESOLVED | Noted: 2024-01-02 | Resolved: 2024-04-08

## 2024-04-10 ENCOUNTER — TELEPHONE (OUTPATIENT)
Dept: HEMATOLOGY/ONCOLOGY | Facility: CLINIC | Age: 64
End: 2024-04-10
Payer: MEDICARE

## 2024-04-10 NOTE — NURSING
Called pt back in response to questions about having port removed. Pt states MD told her to keep port 6-12 months which she is fine with. States she saw MD at [facility] for a cyst on ovary. Scheduled to have u/s, a little concerned b/c it could be ovarian cancer. States she will keep us updated as things go along. Told her I'm praying for her and to please reach out if she needs anything.   Oncology Navigation   Intake  Date of Diagnosis: 23  Cancer Type: Breast  Type of Referral: Internal  Date of Referral: 23  Initial Nurse Navigator Contact: 23  Referral to Initial Contact Timeline (days): 0  Date Worked: 04/10/24  First Appointment Available: 23  Appointment Date: 23  First Available Date vs. Scheduled Date (days): 0  Multiple appointments: No     Treatment  Current Status: Staging work-up  Pending: Done    Surgery: Planned  Surgical Oncologist: Hemalatha Josue MD  Plastic Surgeon: Keron Lynn MD  Type of Surgery: Bilateral mastectomy with left sentinel lymph node biopsy  Consult Date: 23          Procedures: MRI; Genetic test  Genetic Testing Date Sent: 23    General Referrals: Physical Therapy; Plastic Surgery; Home Health  Physical Therapy Referral Date: 23    ER: Positive  KS: Positive       Support Systems: Spouse/significant other  Barriers of Care: Dietary / Nutrition     Acuity  Stage: 0  Surgical Procedure Complexity: 2  Treatment Tolerability: Has not started treatment yet/treatment fully completed and side effects resolved  ECO  Comorbidities in Medical History: 1  Hospitalization Within the Past Month: 0   Needed: 0  Support: 0  Verbalizes Financial Concerns: 0  Transportation: 0  Psychological Factors (+1 each): Emotional during conversation  History of noncompliance/frequent no shows and cancellations: 0  Verbalizes the need for more education: 1  Other Factors (+1 for Each): 0  Navigation Acuity: 5     Follow Up  No follow-ups on  file.

## 2024-04-29 ENCOUNTER — PATIENT MESSAGE (OUTPATIENT)
Dept: INTERNAL MEDICINE | Facility: CLINIC | Age: 64
End: 2024-04-29
Payer: MEDICARE

## 2024-04-29 DIAGNOSIS — E11.59 TYPE 2 DIABETES MELLITUS WITH OTHER CIRCULATORY COMPLICATION, WITHOUT LONG-TERM CURRENT USE OF INSULIN: ICD-10-CM

## 2024-04-29 DIAGNOSIS — I15.2 HYPERTENSION ASSOCIATED WITH DIABETES: ICD-10-CM

## 2024-04-29 DIAGNOSIS — E11.59 HYPERTENSION ASSOCIATED WITH DIABETES: ICD-10-CM

## 2024-04-29 NOTE — TELEPHONE ENCOUNTER
Care Due:                  Date            Visit Type   Department     Provider  --------------------------------------------------------------------------------                                EP -                              PRIMARY      [facility] INTERNAL  Last Visit: 01-      CARE ([facility])   MEDICINE       Lul Alvarez                              EP -                              PRIMARY      [facility] INTERNAL  Next Visit: 07-      CARE ([facility])   MEDICINE       Lul Alvarez                                                            Last  Test          Frequency    Reason                     Performed    Due Date  --------------------------------------------------------------------------------    HBA1C.......  6 months...  semaglutide..............  01- 07-    [facility] Embedded Care Due Messages. Reference number: [id number].   4/29/2024 10:04:24 AM CDT

## 2024-05-01 ENCOUNTER — LAB VISIT (OUTPATIENT)
Dept: LAB | Facility: HOSPITAL | Age: 64
End: 2024-05-01
Attending: INTERNAL MEDICINE
Payer: MEDICARE

## 2024-05-01 DIAGNOSIS — C50.412 MALIGNANT NEOPLASM OF UPPER-OUTER QUADRANT OF LEFT BREAST IN FEMALE, ESTROGEN RECEPTOR POSITIVE: Chronic | ICD-10-CM

## 2024-05-01 DIAGNOSIS — Z17.0 MALIGNANT NEOPLASM OF UPPER-OUTER QUADRANT OF LEFT BREAST IN FEMALE, ESTROGEN RECEPTOR POSITIVE: Chronic | ICD-10-CM

## 2024-05-01 LAB
ALBUMIN SERPL BCP-MCNC: 4 G/DL (ref 3.5–5.2)
ALP SERPL-CCNC: 66 U/L (ref 55–135)
ALT SERPL W/O P-5'-P-CCNC: 14 U/L (ref 10–44)
ANION GAP SERPL CALC-SCNC: 8 MMOL/L (ref 8–16)
AST SERPL-CCNC: 18 U/L (ref 10–40)
BASOPHILS # BLD AUTO: 0.04 K/UL (ref 0–0.2)
BASOPHILS NFR BLD: 0.8 % (ref 0–1.9)
BILIRUB SERPL-MCNC: 0.4 MG/DL (ref 0.1–1)
BUN SERPL-MCNC: 14 MG/DL (ref 8–23)
CALCIUM SERPL-MCNC: 9.4 MG/DL (ref 8.7–10.5)
CHLORIDE SERPL-SCNC: 103 MMOL/L (ref 95–110)
CO2 SERPL-SCNC: 26 MMOL/L (ref 23–29)
CREAT SERPL-MCNC: 0.8 MG/DL (ref 0.5–1.4)
DIFFERENTIAL METHOD BLD: ABNORMAL
EOSINOPHIL # BLD AUTO: 0.2 K/UL (ref 0–0.5)
EOSINOPHIL NFR BLD: 3.4 % (ref 0–8)
ERYTHROCYTE [DISTWIDTH] IN BLOOD BY AUTOMATED COUNT: 19.1 % (ref 11.5–14.5)
EST. GFR  (NO RACE VARIABLE): >60 ML/MIN/1.73 M^2
FERRITIN SERPL-MCNC: 268 NG/ML (ref 20–300)
GLUCOSE SERPL-MCNC: 98 MG/DL (ref 70–110)
HCT VFR BLD AUTO: 35.4 % (ref 37–48.5)
HGB BLD-MCNC: 11.8 G/DL (ref 12–16)
IMM GRANULOCYTES # BLD AUTO: 0.04 K/UL (ref 0–0.04)
IMM GRANULOCYTES NFR BLD AUTO: 0.8 % (ref 0–0.5)
IRON SERPL-MCNC: 54 UG/DL (ref 30–160)
LYMPHOCYTES # BLD AUTO: 1.7 K/UL (ref 1–4.8)
LYMPHOCYTES NFR BLD: 32.8 % (ref 18–48)
MAGNESIUM SERPL-MCNC: 1.8 MG/DL (ref 1.6–2.6)
MCH RBC QN AUTO: 30.5 PG (ref 27–31)
MCHC RBC AUTO-ENTMCNC: 33.3 G/DL (ref 32–36)
MCV RBC AUTO: 92 FL (ref 82–98)
MONOCYTES # BLD AUTO: 0.6 K/UL (ref 0.3–1)
MONOCYTES NFR BLD: 10.9 % (ref 4–15)
NEUTROPHILS # BLD AUTO: 2.7 K/UL (ref 1.8–7.7)
NEUTROPHILS NFR BLD: 51.3 % (ref 38–73)
NRBC BLD-RTO: 0 /100 WBC
PHOSPHATE SERPL-MCNC: 3 MG/DL (ref 2.7–4.5)
PLATELET # BLD AUTO: 365 K/UL (ref 150–450)
PMV BLD AUTO: 8.2 FL (ref 9.2–12.9)
POTASSIUM SERPL-SCNC: 4 MMOL/L (ref 3.5–5.1)
PROT SERPL-MCNC: 7.4 G/DL (ref 6–8.4)
RBC # BLD AUTO: 3.87 M/UL (ref 4–5.4)
SATURATED IRON: 15 % (ref 20–50)
SODIUM SERPL-SCNC: 137 MMOL/L (ref 136–145)
TOTAL IRON BINDING CAPACITY: 358 UG/DL (ref 250–450)
TRANSFERRIN SERPL-MCNC: 242 MG/DL (ref 200–375)
WBC # BLD AUTO: 5.25 K/UL (ref 3.9–12.7)

## 2024-05-01 PROCEDURE — 85025 COMPLETE CBC W/AUTO DIFF WBC: CPT | Performed by: INTERNAL MEDICINE

## 2024-05-01 PROCEDURE — 83540 ASSAY OF IRON: CPT | Performed by: INTERNAL MEDICINE

## 2024-05-01 PROCEDURE — 82728 ASSAY OF FERRITIN: CPT | Performed by: INTERNAL MEDICINE

## 2024-05-01 PROCEDURE — 36415 COLL VENOUS BLD VENIPUNCTURE: CPT | Performed by: INTERNAL MEDICINE

## 2024-05-01 PROCEDURE — 84100 ASSAY OF PHOSPHORUS: CPT | Performed by: INTERNAL MEDICINE

## 2024-05-01 PROCEDURE — 80053 COMPREHEN METABOLIC PANEL: CPT | Performed by: INTERNAL MEDICINE

## 2024-05-01 PROCEDURE — 83735 ASSAY OF MAGNESIUM: CPT | Performed by: INTERNAL MEDICINE

## 2024-05-02 ENCOUNTER — OFFICE VISIT (OUTPATIENT)
Dept: HEMATOLOGY/ONCOLOGY | Facility: CLINIC | Age: 64
End: 2024-05-02
Payer: MEDICARE

## 2024-05-02 DIAGNOSIS — N94.89 ADNEXAL MASS: ICD-10-CM

## 2024-05-02 DIAGNOSIS — D50.9 IRON DEFICIENCY ANEMIA, UNSPECIFIED IRON DEFICIENCY ANEMIA TYPE: Primary | ICD-10-CM

## 2024-05-02 PROCEDURE — 99442 PR PHYSICIAN TELEPHONE EVALUATION 11-20 MIN: CPT | Mod: 95,,, | Performed by: INTERNAL MEDICINE

## 2024-05-02 PROCEDURE — 4010F ACE/ARB THERAPY RXD/TAKEN: CPT | Mod: CPTII,95,, | Performed by: INTERNAL MEDICINE

## 2024-05-02 PROCEDURE — 3044F HG A1C LEVEL LT 7.0%: CPT | Mod: CPTII,95,, | Performed by: INTERNAL MEDICINE

## 2024-05-02 PROCEDURE — 3072F LOW RISK FOR RETINOPATHY: CPT | Mod: CPTII,95,, | Performed by: INTERNAL MEDICINE

## 2024-05-02 RX ORDER — SEMAGLUTIDE 1.34 MG/ML
1 INJECTION, SOLUTION SUBCUTANEOUS
Qty: 9 ML | Refills: 3 | Status: SHIPPED | OUTPATIENT
Start: 2024-05-02 | End: 2025-05-02

## 2024-05-02 RX ORDER — CHLORTHALIDONE 25 MG/1
25 TABLET ORAL DAILY
Qty: 90 TABLET | Refills: 3 | Status: SHIPPED | OUTPATIENT
Start: 2024-05-02 | End: 2025-05-02

## 2024-05-02 NOTE — PROGRESS NOTES
Patient ID: Demetrice Gaines   Chief Complaint: No chief complaint on file.  MRN:  [id number]     Oncologic Diagnosis:  Stage IA (T1c N0 Mx) L Breast Invasive Ductal  Carcinoma, G3, +/+/-, ki67 of 90%; Oncotype 38  Previous Treatment:    Bilateral Mastectomies 10/25/2023  s/p Excision of Primary Tumor 11/22/23   TC x 4 (01/29/24 - 04/03/2024)  Current Treatment: Arimidex 04/20/24 -         The patient location is: Home  The chief complaint leading to consultation is: follow up    Visit type: Audio    Face to Face time with patient: 15  45 minutes of total time spent on the encounter, which includes face to face time and non-face to face time preparing to see the patient (eg, review of tests), Obtaining and/or reviewing separately obtained history, Documenting clinical information in the electronic or other health record, Independently interpreting results (not separately reported) and communicating results to the patient/family/caregiver, or Care coordination (not separately reported).         Each patient to whom he or she provides medical services by telemedicine is:  (1) informed of the relationship between the physician and patient and the respective role of any other health care provider with respect to management of the patient; and (2) notified that he or she may decline to receive medical services by telemedicine and may withdraw from such care at any time.    Notes:   Subjective   The patient presents for follow up.    She started Arimidex 04/2024 and has good tolerance.  States she has not noticed any side effects at all.  Given her cardiac history counseled on low risk of ischemic events aromatase inhibitors.  She expressed understanding.  Since her last visit she has had gynecologic workup for adnexal mass in his scheduled for hysterectomy 05/07/2024.  She does have a mild elevation in her .  I will follow up with her after surgery to review her pathology.  She is now on surveillance for  breast cancer.        Review of Systems   Constitutional:  Positive for fatigue. Negative for activity change, appetite change, chills, diaphoresis, fever and unexpected weight change.   HENT:  Negative for nosebleeds.    Respiratory:  Negative for shortness of breath.    Cardiovascular:  Negative for chest pain.   Neurological:  Negative for dizziness and weakness.   Psychiatric/Behavioral:  The patient is not nervous/anxious.      History     Oncology History   Breast neoplasm, Tis (DCIS), left (Resolved)   9/8/2023 Initial Diagnosis    Breast neoplasm, Tis (DCIS), left     9/8/2023 Cancer Staged    Staging form: Breast, AJCC 8th Edition  - Clinical stage from 9/8/2023: Stage 0 (cTis (DCIS), cN0, cM0, G3, ER+, KY+, HER2: Not Assessed)      Genetic Testing    Patient has genetic testing done for [employer] STAT breast cancer panel and 84 gene multicancer panel.                                              Results revealed patient has the following mutation(s):negative breast cancer panel- no mutation noted- MLH1- variant of undetermined significance      Chemotherapy    Treatment Summary   Plan Name: OP BREAST TC (DOCEtaxel cycloPHOSphamide) Q3W  Treatment Goal: Curative  Status: Active  Start Date: 1/10/2024 (Planned)  End Date: 3/14/2024 (Planned)  Provider: Deloris Gordon MD  Chemotherapy: cycloPHOSphamide 600 mg/m2 = 1,180 mg in sodium chloride 0.9% 255.9 mL chemo infusion, 600 mg/m2, Intravenous, Clinic/HOD 1 time, 0 of 4 cycles    DOCEtaxel (TAXOTERE) 75 mg/m2 = 148 mg in sodium chloride 0.9% 257.4 mL chemo infusion, 75 mg/m2, Intravenous, Clinic/HOD 1 time, 0 of 4 cycles       Malignant neoplasm of upper-outer quadrant of left breast in female, estrogen receptor positive   10/17/2023 Initial Diagnosis    Malignant neoplasm of upper-outer quadrant of left breast in female, estrogen receptor positive     10/18/2023 Cancer Staged    Staging form: Breast, AJCC 8th Edition  - Clinical stage from 10/18/2023: Stage  IA (cT1c, cN0(f), cM0, G3, ER+, OH+, HER2-)     12/4/2023 Cancer Staged    Staging form: Breast, AJCC 8th Edition  - Pathologic stage from 12/4/2023: Stage IA (pT1c, pN0(sn), cM0, G3, ER+, OH+, HER2-)     1/29/2024 -  Chemotherapy    Treatment Summary   Plan Name: OP BREAST TC (DOCEtaxel cycloPHOSphamide) Q3W  Treatment Goal: Curative  Status: Active  Start Date: 1/29/2024  End Date: 4/4/2024  Provider: Deloris Gordon MD  Chemotherapy: cycloPHOSphamide 600 mg/m2 = 1,200 mg in sodium chloride 0.9% 291 mL chemo infusion, 600 mg/m2 = 1,200 mg, Intravenous, Clinic/HOD 1 time, 4 of 4 cycles  Administration: 1,200 mg (1/29/2024), 1,200 mg (2/20/2024), 1,200 mg (3/13/2024), 1,180 mg (4/3/2024)  DOCEtaxel (TAXOTERE) 75 mg/m2 = 150 mg in sodium chloride 0.9% 292.5 mL chemo infusion, 75 mg/m2 = 150 mg, Intravenous, Clinic/HOD 1 time, 4 of 4 cycles  Administration: 150 mg (1/29/2024), 150 mg (2/20/2024), 150 mg (3/13/2024), 148 mg (4/3/2024)      Chemotherapy    Treatment Summary   Plan Name: OP BREAST TC (DOCEtaxel cycloPHOSphamide) Q3W  Treatment Goal: Curative  Status: Active  Start Date: 1/10/2024 (Planned)  End Date: 3/14/2024 (Planned)  Provider: Deloris Gordon MD  Chemotherapy: cycloPHOSphamide 600 mg/m2 = 1,180 mg in sodium chloride 0.9% 255.9 mL chemo infusion, 600 mg/m2, Intravenous, Clinic/HOD 1 time, 0 of 4 cycles    DOCEtaxel (TAXOTERE) 75 mg/m2 = 148 mg in sodium chloride 0.9% 257.4 mL chemo infusion, 75 mg/m2, Intravenous, Clinic/HOD 1 time, 0 of 4 cycles           HPI:  Demetrice Gaines is a pleasant 63 y.o. female with history of CAD not requiring PCI, HTN, DM II, CHF and colon polyps who presents to clinic to establish care on referral for breast cancer.    The patient reports that she has been recovering well from surgery.  She has no acute complaints.  She has been having blood in her stool for over a year as well as constipation.  I recommended that we have this moved up to rule out any colonic  lesions.  I will message the gastroenterologist to see if they can possibly do a colonoscopy on her prior to starting chemotherapy     I reviewed her Oncotype DX with her.  Unfortunately her recurrence score is 38 indicating a clear benefit for adjuvant chemotherapy.  She is understanding of this.  I reviewed the most common side effects of chemotherapy.  Initially considered giving her AC-T however giving her cardiac history I think that it would be better for the patient unless toxic if we administered TC.  Otherwise she does have some baseline intermittent neuropathy which will need to be monitored.    FHx significant for Mother with primary bone cancer and paternal grandmother with breast cancer      Past Medical History:   Diagnosis Date    Bilateral pleural effusion 06/20/2020    CAD (coronary artery disease)     CHF (congestive heart failure)     Colon polyp     Diabetes mellitus, type 2     Hypertension     Hyponatremia 06/20/2020    Malignant neoplasm of upper-outer quadrant of left breast in female, estrogen receptor positive 10/17/2023    Myocardial infarction        Physical Exam     There were no vitals filed for this visit.      Physical Exam  Constitutional:       Appearance: Normal appearance. She is normal weight.   HENT:      Head: Normocephalic and atraumatic.   Eyes:      Extraocular Movements: Extraocular movements intact.      Conjunctiva/sclera: Conjunctivae normal.   Cardiovascular:      Rate and Rhythm: Normal rate.   Pulmonary:      Effort: Pulmonary effort is normal. No respiratory distress.   Skin:     General: Skin is warm.   Neurological:      General: No focal deficit present.      Mental Status: She is alert and oriented to person, place, and time.   Psychiatric:         Mood and Affect: Mood normal.         Behavior: Behavior normal.       Labs   Labs:  Lab Visit on 05/01/2024   Component Date Value Ref Range Status    Phosphorus 05/01/2024 3.0  2.7 - 4.5 mg/dL Final    Magnesium  05/01/2024 1.8  1.6 - 2.6 mg/dL Final    Sodium 05/01/2024 137  136 - 145 mmol/L Final    Potassium 05/01/2024 4.0  3.5 - 5.1 mmol/L Final    Chloride 05/01/2024 103  95 - 110 mmol/L Final    CO2 05/01/2024 26  23 - 29 mmol/L Final    Glucose 05/01/2024 98  70 - 110 mg/dL Final    BUN 05/01/2024 14  8 - 23 mg/dL Final    Creatinine 05/01/2024 0.8  0.5 - 1.4 mg/dL Final    Calcium 05/01/2024 9.4  8.7 - 10.5 mg/dL Final    Total Protein 05/01/2024 7.4  6.0 - 8.4 g/dL Final    Albumin 05/01/2024 4.0  3.5 - 5.2 g/dL Final    Total Bilirubin 05/01/2024 0.4  0.1 - 1.0 mg/dL Final    Comment: For infants and newborns, interpretation of results should be based  on gestational age, weight and in agreement with clinical  observations.    Premature Infant recommended reference ranges:  Up to 24 hours.............<8.0 mg/dL  Up to 48 hours............<12.0 mg/dL  3-5 days..................<15.0 mg/dL  6-29 days.................<15.0 mg/dL      Alkaline Phosphatase 05/01/2024 66  55 - 135 U/L Final    AST 05/01/2024 18  10 - 40 U/L Final    ALT 05/01/2024 14  10 - 44 U/L Final    eGFR 05/01/2024 >60  >60 mL/min/1.73 m^2 Final    Anion Gap 05/01/2024 8  8 - 16 mmol/L Final    WBC 05/01/2024 5.25  3.90 - 12.70 K/uL Final    RBC 05/01/2024 3.87 (L)  4.00 - 5.40 M/uL Final    Hemoglobin 05/01/2024 11.8 (L)  12.0 - 16.0 g/dL Final    Hematocrit 05/01/2024 35.4 (L)  37.0 - 48.5 % Final    MCV 05/01/2024 92  82 - 98 fL Final    MCH 05/01/2024 30.5  27.0 - 31.0 pg Final    MCHC 05/01/2024 33.3  32.0 - 36.0 g/dL Final    RDW 05/01/2024 19.1 (H)  11.5 - 14.5 % Final    Platelets 05/01/2024 365  150 - 450 K/uL Final    MPV 05/01/2024 8.2 (L)  9.2 - 12.9 fL Final    Immature Granulocytes 05/01/2024 0.8 (H)  0.0 - 0.5 % Final    Gran # (ANC) 05/01/2024 2.7  1.8 - 7.7 K/uL Final    Immature Grans (Abs) 05/01/2024 0.04  0.00 - 0.04 K/uL Final    Comment: Mild elevation in immature granulocytes is non specific and   can be seen in a variety of  conditions including stress response,   acute inflammation, trauma and pregnancy. Correlation with other   laboratory and clinical findings is essential.      Lymph # 05/01/2024 1.7  1.0 - 4.8 K/uL Final    Mono # 05/01/2024 0.6  0.3 - 1.0 K/uL Final    Eos # 05/01/2024 0.2  0.0 - 0.5 K/uL Final    Baso # 05/01/2024 0.04  0.00 - 0.20 K/uL Final    nRBC 05/01/2024 0  0 /100 WBC Final    Gran % 05/01/2024 51.3  38.0 - 73.0 % Final    Lymph % 05/01/2024 32.8  18.0 - 48.0 % Final    Mono % 05/01/2024 10.9  4.0 - 15.0 % Final    Eosinophil % 05/01/2024 3.4  0.0 - 8.0 % Final    Basophil % 05/01/2024 0.8  0.0 - 1.9 % Final    Differential Method 05/01/2024 Automated   Final    Ferritin 05/01/2024 268  20.0 - 300.0 ng/mL Final    Iron 05/01/2024 54  30 - 160 ug/dL Final    Transferrin 05/01/2024 242  200 - 375 mg/dL Final    TIBC 05/01/2024 358  250 - 450 ug/dL Final    Saturated Iron 05/01/2024 15 (L)  20 - 50 % Final      Pathology     Specimen to Pathology, Surgery Breast 10/25/2023      Component 2 mo ago   Final Pathologic Diagnosis 1. Right breast, prophylactic nipple sparing mastectomy (467 grams):      -  Benign breast tissue with fibrocystic change including fibrosis, adenosis, usual ductal hyperplasia         (UDH) and duct ectasia with apocrine metaplasia and focal periductal chronic inflammation      -  Microcalcifications:  Present, associated with benign ductal tissue      -  Skin and nipple are surgically absent      -  Unremarkable skeletal muscle present      -  Negative for atypia or malignancy    2. Left breast, nipple sparing mastectomy (470 grams):      -  Benign breast tissue with focal atypical ductal hyperplasia (ADH, less than 2mm) in a background of         fibrocystic change including fibrosis, adenosis, duct ectasia, focal columnar cell change/hyperplasia         and fibroadenomatoid change      -  Findings of biopsy cavity are NOT appreciated grossly or histologically, see case comment      -   Skin and nipple are surgically absent      -  Dumbbell shaped biopsy clip NOT IDENTIFIED at time of grossing    Comment:  Immunohistochemical stains with appropriate controls were performed on select tissue blocks.  Cytokeratin 5/6 demonstrates a mosaic staining pattern in areas of ductal hyperplasia, showing no evidence of atypia.  AE1/AE3 is utilized for duct  mapping.  P63 highlights intact myoepithelial cells throughout the specimen, showing no evidence of invasive carcinoma.    3. [place] lymph node #1 (hot and blue, background count 2), biopsy:      -  One lymph node, negative for metastatic carcinoma (0/1)      -  Immunohistochemical stains for CAM 5.2, AE1/AE3 and wide spectrum keratin are negative      4. Left breast axillary tail, excision (11 grams):      -  Benign mature fibroadipose tissue      -  Negative for atypia or malignancy    5. Additional lateral margin, excision (tissue submitted entirely for histologic evaluation):      -  Benign breast tissue with mild fibrocystic change including fibrosis, adenosis and         fibroadenomatoid change      -  Negative for atypia or malignancy      6. Additional anterior lateral margin, excision (tissue submitted entirely for histologic evaluation):      -  Benign breast tissue with fibrocystic change including fibrosis, adenosis, fibroadenomatoid         changes and duct ectasia with apocrine metaplasia      -  Microcalcifications:  Present, associated with benign breast tissue      -  Negative for atypia or malignancy     Comment: Interp By Dianelys Cardona M.D., Signed on 11/08/2023 at 09:29   Comment The patient's history of invasive carcinoma and ductal carcinoma in-situ in the upper outer quadrant is noted.  The specimen is thoroughly searched for both the dumbbell biopsy clip and a biopsy cavity, neither of which are identified grossly or  histologically.  Numerous sections of tissue from specimen 2 (left breast mastectomy) were evaluated.  The  patient's additional margins (specimens 5 and 6) were submitted entirely for histologic review and show no evidence of invasive or in-situ  carcinoma.  Specimen 4 (breast axillary tail) was also submitted entirely for histologic review and showed no evidence of invasive or in-situ carcinoma.  Dr. Bishop has reviewed the above case and agrees with the findings.    Dr. Josue was alerted to these findings via epic message on 11/08/2023 at 09:30.      All stains were performed with adequate positive and negative controls.              Specimen to Pathology, Surgery Breast 11/22/2023      Component 1 mo ago   Final Pathologic Diagnosis Breast, left, re-excision:  Invasive ductal carcinoma, poorly differentiated  Renata grade 3:  Tubule formation-3, nuclear pleomorphism-3 and mitotic count -3  Invasive carcinoma measures 19 x 9 x 8 mm  Positive superior margin, measuring 10 mm in greatest extent  Additional margins:  -Anterior margin:  1 mm  -Medial margin:  6 mm  -Posterior margin:  6.5 mm  -Lateral margin:  9 mm  -Inferior margin:  9 mm  No carcinoma in Situ or lymphovascular invasion is identified  Previous biopsy clip and reflector both grossly identified   Comment: Interp By Shelbie Bishop M.D., Signed on 11/28/2023 at 10:46   Gross Surgery ID:  [id number]    Pathology ID:  [id number]  1.  Received in formalin labeled &quot;left breast biopsy&quot; is a 4 g, 2.3 cm superior to inferior by 2.1 cm medial to lateral by 2.3 cm anterior to posterior portion of breast tissue received oriented by the surgeon as short stitch superior and long  stitch lateral.  A biopsy clip and a reflector clip is identified exposed at the anterior-superior margins (see attached image).  The specimen is sectioned from anterior to posterior into 5 slices averaging 0.5 cm in thickness.  Sectioning reveals a 1.9  cm anterior to posterior by 0.9 cm medial to lateral by 0.8 cm superior to inferior well-circumscribed, tan-white, rubbery mass within  slices 2-5.  The mass abuts the superior and medial margins and measures 0.9 cm from the lateral margin, 0.9 cm from  the inferior margin, 0.4 cm from the posterior margin, and 0.5 cm from the anterior margin.  There is an X shaped biopsy clip identified within the mass in slice 4 and a reflector clip identified adjacent to the mass in slice 1.  The specimen is inked as   follows:  Superior-blue, inferior-green, medial-red, lateral-orange, anterior-yellow, and posterior-black.  The specimen is submitted entirely and sequentially from anterior to posterior as follows:  XOZ--1-A:  Slice 1, anterior margin, perpendicular  OZQ--1-B:  Slice 2, mass to include anterior, superior, inferior, medial, and lateral margins  USN--1-C:  Slice 3, mass to superior, inferior, medial, and lateral margins  FTR--1-D:  Slice 4, mass to superior, inferior, medial, and lateral margins  PGO--1-E:  Slice 5, posterior margin, perpendicular        Grossed by: Carl Donovan MS, PA([facility])        Assessment and Plan   Stage IA (T1c N0 Mx) L Breast Invasive Ductal  Carcinoma, G3, +/+/-, ki67 of 90%   s/p b/l nipple sparing mastectomy and sentinel node biopsy on 10/25/23   s/p excision of primary tumor 11/22/23   Pathology report above  Genetic Testing 09/13/2023: VUS in MLH1  Oncotype Dx Score 38  TTE stable from prior  She has a history of heart disease; she has not required PCI however will precert for TC treatment to circumvent potential anthracycline cardiac effects  I discussed in detail the role of medical oncology in her care.  I informed her that my treatment recommendation is based on the NCCN Guidelines, her final pathology and Oncotype DX score  Because of the high Oncotype DX score, chemotherapy is recommended; because of the hormonal make up of her tumor, endocrine therapy is recommended after she completes chemotherapy. I reviewed the major side effects of chemotherapy and  AIs.   s/p TC x4  completed 04/03/24 ; severe arthralgias, likely from GCSF  Arimidex started 04/20/24 - good tolerance with no side effects      Right Adnexal Mass  CT Abd/Pelv 04/16/2024:  6.4 cm indeterminate cystic and solid right adnexal mass  Following with gyn/onc at [facility]  Hysterectomy scheduled for 05/07/2024      TARA  Continue PO iron  Repeat iron studies in 3 months      Hypomagnesemia, Resolved  Previous magnesium supplementation      Bone Health  Given that she is post menopausal and endocrine therapy is recommended, she will be monitored for the need for bisphosphonate therapy.  BMD 08/2023 was normal  Continue Calcium and vitamin D      Blood in stool, Resolved   Patient reports blood in her stool for over 1 year   Colonoscopy 01/02/2024: TA (4 fragments in ascending colon and 1 TA in sigmoid colon); recommend repeat in 5 years      Cancer Screening  PAP Smear 09/15/2023: Negative for intraepithelial lesion or malignancy   Colonoscopy 01/2024: TAs; repeat in 5 years      Chronic Medical Conditions  DM II  HTN  CAD:  She has not require PCI; stress test 12/26/2019 negative for ischemia and arrhythmias; last echo 06/2020 and showed preserved ejection fraction at 55%; concentric hypertrophy and normal left ventricular diastolic function  CHF  Hx of Colon Polyps  Peripheral neuropathy; intermittent        Med Onc Chart Routing      Follow up with physician 2 weeks. Schedule same day as appointment with Dr. Josue to review hysterectomy pathology   Follow up with [facility]    Infusion scheduling note   Port Flush   Injection scheduling note    Labs    Imaging    Pharmacy appointment    Other referrals                       The patient was seen, interviewed and examined. Pertinent lab and radiologic studies were reviewed. Pt instructed to call should they develop concerning signs/symptoms or have further questions.        Portions of the record may have been created with voice recognition software. Occasional  wrong-word or sound-a-like substitutions may have occurred due to the inherent limitations of voice recognition software. Read the chart carefully and recognize, using context, where substitutions have occurred.      Deloris Garcia MD    Hematology/Oncology

## 2024-05-09 RX ORDER — SODIUM CHLORIDE 0.9 % (FLUSH) 0.9 %
10 SYRINGE (ML) INJECTION
Status: CANCELLED | OUTPATIENT
Start: 2024-05-09

## 2024-05-09 RX ORDER — HEPARIN 100 UNIT/ML
500 SYRINGE INTRAVENOUS
Status: CANCELLED | OUTPATIENT
Start: 2024-05-09

## 2024-05-20 NOTE — PROGRESS NOTES
Breast Surgical Oncology  [place]    Date of Service: 05/20/2024    DIAGNOSIS:   64 y.o. female with a history of left breast cancer.    Date of Diagnosis: 8/29/23  Pathology: IDC, G3, ER 70%, CA 80%, HER2 0, Ki67 90%  Clinical Stage: IA (cT1c cN0 Mx)  Pathologic Stage: IA (pT1c pN0 Mx)    TREATMENT:   Surgery: bilateral nipple sparing mastectomy with sentinel node biopsy on 10/25/23 with return for re-excision on 11/22/23. Hemalatha Josue M.D. Breast Surgical Oncology; tissue expander reconstruction by Dr. Lynn  Systemic Therapy: Chemotherapy  adjuvant TC from 1/10/24  To 3/14/24  Medical Oncologist: Deloris Gordon M.D.    Radiation Treatment Summary: N/A  Radiation Oncologist: None  Genetics: negative  Physical Therapy: prehabilitation     HISTORY OF PRESENT ILLNESS:   Demetrice Gaines is here for oncological follow up.  Today, she denies new breast concerns such as pain, masses, skin changes, nipple discharge, nipple retraction or lumps under the arm.  She also denies bone pain, shortness of breath, abdominal pain and neurologic symptoms.     Her port was placed by IR. She reports a headache following her first chemotherapy cycle. Her tissue expanders are fully inflated.     IMAGING:   No new     MEDICATIONS/ALLERGIES:     Current Outpatient Medications:     amlodipine-olmesartan (LEATHA) 10-40 mg per tablet, Take 1 tablet by mouth once daily., Disp: 90 tablet, Rfl: 3    anastrozole (ARIMIDEX) 1 mg Tab, Take 1 tablet (1 mg total) by mouth once daily., Disp: 90 tablet, Rfl: 3    aspirin (ECOTRIN) 81 MG EC tablet, Take 81 mg by mouth once daily., Disp: , Rfl:     chlorthalidone (HYGROTEN) 25 MG Tab, Take 1 tablet (25 mg total) by mouth once daily., Disp: 90 tablet, Rfl: 3    OLANZapine (ZYPREXA) 5 MG tablet, Take 1 tablet by mouth nightly on days 1-3 of each chemotherapy cycle., Disp: 3 tablet, Rfl: 0    ondansetron (ZOFRAN-ODT) 4 MG TbDL, Take 1 tablet (4 mg total) by mouth every 8 (eight) hours as  needed., Disp: 90 tablet, Rfl: 3    oxyCODONE-acetaminophen (PERCOCET)  mg per tablet, Take 1 tablet by mouth every 4 (four) hours as needed for Pain., Disp: 60 tablet, Rfl: 0    polyethylene glycol (GLYCOLAX) 17 gram/dose powder, Take 17 g by mouth once daily., Disp: 510 g, Rfl: 11    prochlorperazine (COMPAZINE) 5 MG tablet, Take 1 tablet (5 mg total) by mouth every 6 (six) hours as needed for Nausea., Disp: 20 tablet, Rfl: 11    semaglutide (OZEMPIC) 1 mg/dose (4 mg/3 mL), Inject 1 mg into the skin every 7 days., Disp: 9 mL, Rfl: 3  Review of patient's allergies indicates:   Allergen Reactions    Aldactone [spironolactone]      Memory impair and breast soreness    Coreg [carvedilol]      Hair loss    Lisinopril-hydrochlorothiazide      Other reaction(s): Reaction:Throat swelling;       PHYSICAL EXAM:   General: The patient appears well and is in no acute distress.     Chaperone is present for examination.   BREAST EXAM  No Asymmetry  Bilateral tissue expanders in place  Right:  - Mass: No  - Skin change: breast edema to lower pole  - Nipple Discharge: No  - Nipple retraction: No  - Axillary LAD: No  Left:   - Mass: No  - Skin change: breast edema to lower pole  - Nipple Discharge: No  - Nipple retraction: No  - Axillary LAD: No    ASSESSMENT:   Diagnoses and all orders for this visit:    Malignant neoplasm of upper-outer quadrant of left breast in female, estrogen receptor positive         PLAN:   Demetrice has a benign exam today without evidence of local or distant relapse.    She will return in May for her 6 month follow up visit, or sooner should she develop breast concerns.      The patient is in agreement with the plan. Questions were encouraged and answered to patient's satisfaction. Demetrice will call our office with any questions or concerns.     I spent a total of 30 minutes on this visit. This includes face to face time and non-face to face time preparing to see the patient (eg, review of tests),  obtaining and/or reviewing separately obtained history, documenting clinical information in the electronic or other health record, independently interpreting results and communicating results to the patient/family/caregiver, or care coordinator.       Hemalatha Josue M.D.

## 2024-05-21 ENCOUNTER — OFFICE VISIT (OUTPATIENT)
Dept: SURGERY | Facility: CLINIC | Age: 64
End: 2024-05-21
Payer: MEDICARE

## 2024-05-21 ENCOUNTER — INFUSION (OUTPATIENT)
Dept: INFUSION THERAPY | Facility: HOSPITAL | Age: 64
End: 2024-05-21
Attending: INTERNAL MEDICINE
Payer: MEDICARE

## 2024-05-21 ENCOUNTER — OFFICE VISIT (OUTPATIENT)
Dept: HEMATOLOGY/ONCOLOGY | Facility: CLINIC | Age: 64
End: 2024-05-21
Payer: MEDICARE

## 2024-05-21 VITALS — WEIGHT: 191.38 LBS | BODY MASS INDEX: 32.85 KG/M2

## 2024-05-21 DIAGNOSIS — D05.12 BREAST NEOPLASM, TIS (DCIS), LEFT: ICD-10-CM

## 2024-05-21 DIAGNOSIS — Z17.0 MALIGNANT NEOPLASM OF UPPER-OUTER QUADRANT OF LEFT BREAST IN FEMALE, ESTROGEN RECEPTOR POSITIVE: Primary | Chronic | ICD-10-CM

## 2024-05-21 DIAGNOSIS — C50.412 MALIGNANT NEOPLASM OF UPPER-OUTER QUADRANT OF LEFT BREAST IN FEMALE, ESTROGEN RECEPTOR POSITIVE: Primary | Chronic | ICD-10-CM

## 2024-05-21 DIAGNOSIS — Z17.0 MALIGNANT NEOPLASM OF UPPER-OUTER QUADRANT OF LEFT BREAST IN FEMALE, ESTROGEN RECEPTOR POSITIVE: Primary | ICD-10-CM

## 2024-05-21 DIAGNOSIS — C56.1 OVARIAN CANCER ON RIGHT: ICD-10-CM

## 2024-05-21 DIAGNOSIS — C50.412 MALIGNANT NEOPLASM OF UPPER-OUTER QUADRANT OF LEFT BREAST IN FEMALE, ESTROGEN RECEPTOR POSITIVE: Primary | ICD-10-CM

## 2024-05-21 PROCEDURE — 4010F ACE/ARB THERAPY RXD/TAKEN: CPT | Mod: CPTII,S$GLB,, | Performed by: SURGERY

## 2024-05-21 PROCEDURE — 99214 OFFICE O/P EST MOD 30 MIN: CPT | Mod: S$GLB,,, | Performed by: SURGERY

## 2024-05-21 PROCEDURE — 3044F HG A1C LEVEL LT 7.0%: CPT | Mod: CPTII,S$GLB,, | Performed by: SURGERY

## 2024-05-21 PROCEDURE — A4216 STERILE WATER/SALINE, 10 ML: HCPCS | Performed by: INTERNAL MEDICINE

## 2024-05-21 PROCEDURE — 3072F LOW RISK FOR RETINOPATHY: CPT | Mod: CPTII,95,, | Performed by: INTERNAL MEDICINE

## 2024-05-21 PROCEDURE — 3072F LOW RISK FOR RETINOPATHY: CPT | Mod: CPTII,S$GLB,, | Performed by: SURGERY

## 2024-05-21 PROCEDURE — 3044F HG A1C LEVEL LT 7.0%: CPT | Mod: CPTII,95,, | Performed by: INTERNAL MEDICINE

## 2024-05-21 PROCEDURE — 25000003 PHARM REV CODE 250: Performed by: INTERNAL MEDICINE

## 2024-05-21 PROCEDURE — 63600175 PHARM REV CODE 636 W HCPCS: Performed by: INTERNAL MEDICINE

## 2024-05-21 PROCEDURE — 99442 PR PHYSICIAN TELEPHONE EVALUATION 11-20 MIN: CPT | Mod: 95,,, | Performed by: INTERNAL MEDICINE

## 2024-05-21 PROCEDURE — 4010F ACE/ARB THERAPY RXD/TAKEN: CPT | Mod: CPTII,95,, | Performed by: INTERNAL MEDICINE

## 2024-05-21 PROCEDURE — 96523 IRRIG DRUG DELIVERY DEVICE: CPT

## 2024-05-21 RX ORDER — SODIUM CHLORIDE 0.9 % (FLUSH) 0.9 %
10 SYRINGE (ML) INJECTION
Status: DISCONTINUED | OUTPATIENT
Start: 2024-05-21 | End: 2024-05-21 | Stop reason: HOSPADM

## 2024-05-21 RX ORDER — HEPARIN 100 UNIT/ML
500 SYRINGE INTRAVENOUS
Status: DISCONTINUED | OUTPATIENT
Start: 2024-05-21 | End: 2024-05-21 | Stop reason: HOSPADM

## 2024-05-21 RX ORDER — HEPARIN 100 UNIT/ML
500 SYRINGE INTRAVENOUS
OUTPATIENT
Start: 2024-05-21

## 2024-05-21 RX ORDER — SODIUM CHLORIDE 0.9 % (FLUSH) 0.9 %
10 SYRINGE (ML) INJECTION
OUTPATIENT
Start: 2024-05-21

## 2024-05-21 RX ADMIN — HEPARIN 500 UNITS: 100 SYRINGE at 10:05

## 2024-05-21 RX ADMIN — SODIUM CHLORIDE, PRESERVATIVE FREE 10 ML: 5 INJECTION INTRAVENOUS at 10:05

## 2024-05-21 NOTE — NURSING
"1006: Pt here for Mediport Flush. Right chest wall mediport accessed with a 20g 1" arellano via sterile technique.  Excellent blood return noted.  Flushed with 10ml NS and 5 ml heparin solution.  Needle D/C, site without redness, swelling, or drainage noted.  Dressing applied.  Patient tolerated well.    "

## 2024-05-21 NOTE — PROGRESS NOTES
Patient ID: Demetrice Gaines   Chief Complaint: Follow-up  MRN:  [id number]     Oncologic Diagnosis:  Stage IA (T1c N0 Mx) L Breast Invasive Ductal  Carcinoma, G3, +/+/-, ki67 of 90%; Oncotype 38  Previous Treatment:    Bilateral Mastectomies 10/25/2023  s/p Excision of Primary Tumor 11/22/23   TC x 4 (01/29/24 - 04/03/2024)  Current Treatment: Arimidex 04/20/24 -       The patient location is: Home  The chief complaint leading to consultation is: follow up    Visit type: Audio    Face to Face time with patient: 15  45 minutes of total time spent on the encounter, which includes face to face time and non-face to face time preparing to see the patient (eg, review of tests), Obtaining and/or reviewing separately obtained history, Documenting clinical information in the electronic or other health record, Independently interpreting results (not separately reported) and communicating results to the patient/family/caregiver, or Care coordination (not separately reported).         Each patient to whom he or she provides medical services by telemedicine is:  (1) informed of the relationship between the physician and patient and the respective role of any other health care provider with respect to management of the patient; and (2) notified that he or she may decline to receive medical services by telemedicine and may withdraw from such care at any time.    Notes:   Subjective   The patient presents for follow up.    She started Arimidex 04/2024 and has good tolerance.  She underwent right salpingo-oophorectomy 05/07/2024.  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  She is recovering well after surgery. She is now on surveillance for breast cancer and the ovarian cancer.      Review of Systems   Constitutional:  Negative for activity change, appetite change, chills, diaphoresis, fatigue, fever and unexpected weight change.   HENT:  Negative for nosebleeds.     Respiratory:  Negative for shortness of breath.    Cardiovascular:  Negative for chest pain.   Neurological:  Negative for dizziness and weakness.   Psychiatric/Behavioral:  The patient is not nervous/anxious.      History     Oncology History   Breast neoplasm, Tis (DCIS), left (Resolved)   9/8/2023 Initial Diagnosis    Breast neoplasm, Tis (DCIS), left     9/8/2023 Cancer Staged    Staging form: Breast, AJCC 8th Edition  - Clinical stage from 9/8/2023: Stage 0 (cTis (DCIS), cN0, cM0, G3, ER+, WI+, HER2: Not Assessed)      Genetic Testing    Patient has genetic testing done for [employer] STAT breast cancer panel and 84 gene multicancer panel.                                              Results revealed patient has the following mutation(s):negative breast cancer panel- no mutation noted- MLH1- variant of undetermined significance      Chemotherapy    Treatment Summary   Plan Name: OP BREAST TC (DOCEtaxel cycloPHOSphamide) Q3W  Treatment Goal: Curative  Status: Active  Start Date: 1/10/2024 (Planned)  End Date: 3/14/2024 (Planned)  Provider: Deloris Gordon MD  Chemotherapy: cycloPHOSphamide 600 mg/m2 = 1,180 mg in sodium chloride 0.9% 255.9 mL chemo infusion, 600 mg/m2, Intravenous, Clinic/HOD 1 time, 0 of 4 cycles    DOCEtaxel (TAXOTERE) 75 mg/m2 = 148 mg in sodium chloride 0.9% 257.4 mL chemo infusion, 75 mg/m2, Intravenous, Clinic/HOD 1 time, 0 of 4 cycles       Malignant neoplasm of upper-outer quadrant of left breast in female, estrogen receptor positive   10/17/2023 Initial Diagnosis    Malignant neoplasm of upper-outer quadrant of left breast in female, estrogen receptor positive     10/18/2023 Cancer Staged    Staging form: Breast, AJCC 8th Edition  - Clinical stage from 10/18/2023: Stage IA (cT1c, cN0(f), cM0, G3, ER+, WI+, HER2-)     12/4/2023 Cancer Staged    Staging form: Breast, AJCC 8th Edition  - Pathologic stage from 12/4/2023: Stage IA (pT1c, pN0(sn), cM0, G3, ER+, WI+, HER2-)     1/29/2024 -   Chemotherapy    Treatment Summary   Plan Name: OP BREAST TC (DOCEtaxel cycloPHOSphamide) Q3W  Treatment Goal: Curative  Status: Active  Start Date: 1/29/2024  End Date: 4/4/2024  Provider: Deloris Gordon MD  Chemotherapy: cycloPHOSphamide 600 mg/m2 = 1,200 mg in sodium chloride 0.9% 291 mL chemo infusion, 600 mg/m2 = 1,200 mg, Intravenous, Clinic/HOD 1 time, 4 of 4 cycles  Administration: 1,200 mg (1/29/2024), 1,200 mg (2/20/2024), 1,200 mg (3/13/2024), 1,180 mg (4/3/2024)  DOCEtaxel (TAXOTERE) 75 mg/m2 = 150 mg in sodium chloride 0.9% 292.5 mL chemo infusion, 75 mg/m2 = 150 mg, Intravenous, Clinic/HOD 1 time, 4 of 4 cycles  Administration: 150 mg (1/29/2024), 150 mg (2/20/2024), 150 mg (3/13/2024), 148 mg (4/3/2024)      Chemotherapy    Treatment Summary   Plan Name: OP BREAST TC (DOCEtaxel cycloPHOSphamide) Q3W  Treatment Goal: Curative  Status: Active  Start Date: 1/10/2024 (Planned)  End Date: 3/14/2024 (Planned)  Provider: Deloris Gordon MD  Chemotherapy: cycloPHOSphamide 600 mg/m2 = 1,180 mg in sodium chloride 0.9% 255.9 mL chemo infusion, 600 mg/m2, Intravenous, Clinic/HOD 1 time, 0 of 4 cycles    DOCEtaxel (TAXOTERE) 75 mg/m2 = 148 mg in sodium chloride 0.9% 257.4 mL chemo infusion, 75 mg/m2, Intravenous, Clinic/HOD 1 time, 0 of 4 cycles           HPI:  Demetrice Gaines is a pleasant 63 y.o. female with history of CAD not requiring PCI, HTN, DM II, CHF and colon polyps who presents to clinic to establish care on referral for breast cancer.    The patient reports that she has been recovering well from surgery.  She has no acute complaints.  She has been having blood in her stool for over a year as well as constipation.  I recommended that we have this moved up to rule out any colonic lesions.  I will message the gastroenterologist to see if they can possibly do a colonoscopy on her prior to starting chemotherapy     I reviewed her Oncotype DX with her.  Unfortunately her recurrence score is 38  indicating a clear benefit for adjuvant chemotherapy.  She is understanding of this.  I reviewed the most common side effects of chemotherapy.  Initially considered giving her AC-T however giving her cardiac history I think that it would be better for the patient unless toxic if we administered TC.  Otherwise she does have some baseline intermittent neuropathy which will need to be monitored.    FHx significant for Mother with primary bone cancer and paternal grandmother with breast cancer      Past Medical History:   Diagnosis Date    Bilateral pleural effusion 06/20/2020    CAD (coronary artery disease)     CHF (congestive heart failure)     Colon polyp     Diabetes mellitus, type 2     Hypertension     Hyponatremia 06/20/2020    Malignant neoplasm of upper-outer quadrant of left breast in female, estrogen receptor positive 10/17/2023    Myocardial infarction     Ovarian cancer on right 5/21/2024       Labs   Labs:  No visits with results within 2 Day(s) from this visit.   Latest known visit with results is:   Lab Visit on 05/01/2024   Component Date Value Ref Range Status    Phosphorus 05/01/2024 3.0  2.7 - 4.5 mg/dL Final    Magnesium 05/01/2024 1.8  1.6 - 2.6 mg/dL Final    Sodium 05/01/2024 137  136 - 145 mmol/L Final    Potassium 05/01/2024 4.0  3.5 - 5.1 mmol/L Final    Chloride 05/01/2024 103  95 - 110 mmol/L Final    CO2 05/01/2024 26  23 - 29 mmol/L Final    Glucose 05/01/2024 98  70 - 110 mg/dL Final    BUN 05/01/2024 14  8 - 23 mg/dL Final    Creatinine 05/01/2024 0.8  0.5 - 1.4 mg/dL Final    Calcium 05/01/2024 9.4  8.7 - 10.5 mg/dL Final    Total Protein 05/01/2024 7.4  6.0 - 8.4 g/dL Final    Albumin 05/01/2024 4.0  3.5 - 5.2 g/dL Final    Total Bilirubin 05/01/2024 0.4  0.1 - 1.0 mg/dL Final    Comment: For infants and newborns, interpretation of results should be based  on gestational age, weight and in agreement with clinical  observations.    Premature Infant recommended reference ranges:  Up to  24 hours.............<8.0 mg/dL  Up to 48 hours............<12.0 mg/dL  3-5 days..................<15.0 mg/dL  6-29 days.................<15.0 mg/dL      Alkaline Phosphatase 05/01/2024 66  55 - 135 U/L Final    AST 05/01/2024 18  10 - 40 U/L Final    ALT 05/01/2024 14  10 - 44 U/L Final    eGFR 05/01/2024 >60  >60 mL/min/1.73 m^2 Final    Anion Gap 05/01/2024 8  8 - 16 mmol/L Final    WBC 05/01/2024 5.25  3.90 - 12.70 K/uL Final    RBC 05/01/2024 3.87 (L)  4.00 - 5.40 M/uL Final    Hemoglobin 05/01/2024 11.8 (L)  12.0 - 16.0 g/dL Final    Hematocrit 05/01/2024 35.4 (L)  37.0 - 48.5 % Final    MCV 05/01/2024 92  82 - 98 fL Final    MCH 05/01/2024 30.5  27.0 - 31.0 pg Final    MCHC 05/01/2024 33.3  32.0 - 36.0 g/dL Final    RDW 05/01/2024 19.1 (H)  11.5 - 14.5 % Final    Platelets 05/01/2024 365  150 - 450 K/uL Final    MPV 05/01/2024 8.2 (L)  9.2 - 12.9 fL Final    Immature Granulocytes 05/01/2024 0.8 (H)  0.0 - 0.5 % Final    Gran # (ANC) 05/01/2024 2.7  1.8 - 7.7 K/uL Final    Immature Grans (Abs) 05/01/2024 0.04  0.00 - 0.04 K/uL Final    Comment: Mild elevation in immature granulocytes is non specific and   can be seen in a variety of conditions including stress response,   acute inflammation, trauma and pregnancy. Correlation with other   laboratory and clinical findings is essential.      Lymph # 05/01/2024 1.7  1.0 - 4.8 K/uL Final    Mono # 05/01/2024 0.6  0.3 - 1.0 K/uL Final    Eos # 05/01/2024 0.2  0.0 - 0.5 K/uL Final    Baso # 05/01/2024 0.04  0.00 - 0.20 K/uL Final    nRBC 05/01/2024 0  0 /100 WBC Final    Gran % 05/01/2024 51.3  38.0 - 73.0 % Final    Lymph % 05/01/2024 32.8  18.0 - 48.0 % Final    Mono % 05/01/2024 10.9  4.0 - 15.0 % Final    Eosinophil % 05/01/2024 3.4  0.0 - 8.0 % Final    Basophil % 05/01/2024 0.8  0.0 - 1.9 % Final    Differential Method 05/01/2024 Automated   Final    Ferritin 05/01/2024 268  20.0 - 300.0 ng/mL Final    Iron 05/01/2024 54  30 - 160 ug/dL Final    Transferrin  05/01/2024 242  200 - 375 mg/dL Final    TIBC 05/01/2024 358  250 - 450 ug/dL Final    Saturated Iron 05/01/2024 15 (L)  20 - 50 % Final      Pathology     Specimen to Pathology, Surgery Breast 10/25/2023      Component 2 mo ago   Final Pathologic Diagnosis 1. Right breast, prophylactic nipple sparing mastectomy (467 grams):      -  Benign breast tissue with fibrocystic change including fibrosis, adenosis, usual ductal hyperplasia         (UDH) and duct ectasia with apocrine metaplasia and focal periductal chronic inflammation      -  Microcalcifications:  Present, associated with benign ductal tissue      -  Skin and nipple are surgically absent      -  Unremarkable skeletal muscle present      -  Negative for atypia or malignancy    2. Left breast, nipple sparing mastectomy (470 grams):      -  Benign breast tissue with focal atypical ductal hyperplasia (ADH, less than 2mm) in a background of         fibrocystic change including fibrosis, adenosis, duct ectasia, focal columnar cell change/hyperplasia         and fibroadenomatoid change      -  Findings of biopsy cavity are NOT appreciated grossly or histologically, see case comment      -  Skin and nipple are surgically absent      -  Dumbbell shaped biopsy clip NOT IDENTIFIED at time of grossing    Comment:  Immunohistochemical stains with appropriate controls were performed on select tissue blocks.  Cytokeratin 5/6 demonstrates a mosaic staining pattern in areas of ductal hyperplasia, showing no evidence of atypia.  AE1/AE3 is utilized for duct  mapping.  P63 highlights intact myoepithelial cells throughout the specimen, showing no evidence of invasive carcinoma.    3. [place] lymph node #1 (hot and blue, background count 2), biopsy:      -  One lymph node, negative for metastatic carcinoma (0/1)      -  Immunohistochemical stains for CAM 5.2, AE1/AE3 and wide spectrum keratin are negative      4. Left breast axillary tail, excision (11 grams):      -  Benign  mature fibroadipose tissue      -  Negative for atypia or malignancy    5. Additional lateral margin, excision (tissue submitted entirely for histologic evaluation):      -  Benign breast tissue with mild fibrocystic change including fibrosis, adenosis and         fibroadenomatoid change      -  Negative for atypia or malignancy      6. Additional anterior lateral margin, excision (tissue submitted entirely for histologic evaluation):      -  Benign breast tissue with fibrocystic change including fibrosis, adenosis, fibroadenomatoid         changes and duct ectasia with apocrine metaplasia      -  Microcalcifications:  Present, associated with benign breast tissue      -  Negative for atypia or malignancy     Comment: Interp By Dianelys Cardona M.D., Signed on 11/08/2023 at 09:29   Comment The patient's history of invasive carcinoma and ductal carcinoma in-situ in the upper outer quadrant is noted.  The specimen is thoroughly searched for both the dumbbell biopsy clip and a biopsy cavity, neither of which are identified grossly or  histologically.  Numerous sections of tissue from specimen 2 (left breast mastectomy) were evaluated.  The patient's additional margins (specimens 5 and 6) were submitted entirely for histologic review and show no evidence of invasive or in-situ  carcinoma.  Specimen 4 (breast axillary tail) was also submitted entirely for histologic review and showed no evidence of invasive or in-situ carcinoma.  Dr. Bishop has reviewed the above case and agrees with the findings.    Dr. Josue was alerted to these findings via epic message on 11/08/2023 at 09:30.      All stains were performed with adequate positive and negative controls.              Specimen to Pathology, Surgery Breast 11/22/2023      Component 1 mo ago   Final Pathologic Diagnosis Breast, left, re-excision:  Invasive ductal carcinoma, poorly differentiated  Geneva grade 3:  Tubule formation-3, nuclear pleomorphism-3 and mitotic  count -3  Invasive carcinoma measures 19 x 9 x 8 mm  Positive superior margin, measuring 10 mm in greatest extent  Additional margins:  -Anterior margin:  1 mm  -Medial margin:  6 mm  -Posterior margin:  6.5 mm  -Lateral margin:  9 mm  -Inferior margin:  9 mm  No carcinoma in Situ or lymphovascular invasion is identified  Previous biopsy clip and reflector both grossly identified   Comment: Interp By Shelbie Bishop M.D., Signed on 11/28/2023 at 10:46   Gross Surgery ID:  [id number]    Pathology ID:  [id number]  1.  Received in formalin labeled &quot;left breast biopsy&quot; is a 4 g, 2.3 cm superior to inferior by 2.1 cm medial to lateral by 2.3 cm anterior to posterior portion of breast tissue received oriented by the surgeon as short stitch superior and long  stitch lateral.  A biopsy clip and a reflector clip is identified exposed at the anterior-superior margins (see attached image).  The specimen is sectioned from anterior to posterior into 5 slices averaging 0.5 cm in thickness.  Sectioning reveals a 1.9  cm anterior to posterior by 0.9 cm medial to lateral by 0.8 cm superior to inferior well-circumscribed, tan-white, rubbery mass within slices 2-5.  The mass abuts the superior and medial margins and measures 0.9 cm from the lateral margin, 0.9 cm from  the inferior margin, 0.4 cm from the posterior margin, and 0.5 cm from the anterior margin.  There is an X shaped biopsy clip identified within the mass in slice 4 and a reflector clip identified adjacent to the mass in slice 1.  The specimen is inked as   follows:  Superior-blue, inferior-green, medial-red, lateral-orange, anterior-yellow, and posterior-black.  The specimen is submitted entirely and sequentially from anterior to posterior as follows:  JQP--1-A:  Slice 1, anterior margin, perpendicular  FMA--1-B:  Slice 2, mass to include anterior, superior, inferior, medial, and lateral margins  EPL--1-C:  Slice 3, mass to superior, inferior,  medial, and lateral margins  KRB--1-D:  Slice 4, mass to superior, inferior, medial, and lateral margins  JOU--1-E:  Slice 5, posterior margin, perpendicular        Grossed by: Carl Donovan MS, PA([facility])        Assessment and Plan   Stage IA (T1c N0 Mx) L Breast Invasive Ductal  Carcinoma, G3, +/+/-, ki67 of 90%   s/p b/l nipple sparing mastectomy and sentinel node biopsy on 10/25/23   s/p excision of primary tumor 11/22/23   Pathology report above  Genetic Testing 09/13/2023: VUS in MLH1  Oncotype Dx Score 38  TTE stable from prior  She has a history of heart disease; she has not required PCI however will precert for TC treatment to circumvent potential anthracycline cardiac effects  I discussed in detail the role of medical oncology in her care.  I informed her that my treatment recommendation is based on the NCCN Guidelines, her final pathology and Oncotype DX score  Because of the high Oncotype DX score, chemotherapy is recommended; because of the hormonal make up of her tumor, endocrine therapy is recommended after she completes chemotherapy. I reviewed the major side effects of chemotherapy and  AIs.   s/p TC x4 completed 04/03/24 ; AE of severe arthralgias, likely from GCSF  Arimidex started 04/20/24 - good tolerance with no side effects      Stage IC, Ovarian Cancer, Right (Granulosa cell tumor)  CT Abd/Pelv 04/16/2024:  6.4 cm indeterminate cystic and solid right adnexal mass  s/p RALH/BSO 05/07/2024 with Dr. Person, Gyn/Onc at [facility] - pathology positive for granulosa cell tumor with negative peritoneal washings  Continue surveillance with [facility]      TARA  Continue PO iron  Repeat iron studies in 3 months      Hypomagnesemia, Resolved  Previous magnesium supplementation      Bone Health  Given that she is post menopausal and endocrine therapy is recommended, she will be monitored for the need for bisphosphonate therapy.  BMD 08/2023 was normal  Continue Calcium and vitamin  D      Blood in stool, Resolved   Patient reports blood in her stool for over 1 year   Colonoscopy 01/02/2024: TA (4 fragments in ascending colon and 1 TA in sigmoid colon); recommend repeat in 5 years      Cancer Screening  PAP Smear 09/15/2023: Negative for intraepithelial lesion or malignancy   Colonoscopy 01/2024: TAs; repeat in 5 years      Chronic Medical Conditions  DM II  HTN  CAD:  She has not require PCI; stress test 12/26/2019 negative for ischemia and arrhythmias; last echo 06/2020 and showed preserved ejection fraction at 55%; concentric hypertrophy and normal left ventricular diastolic function  CHF  Hx of Colon Polyps  Peripheral neuropathy; intermittent        Med Onc Chart Routing      Follow up with physician 3 months.   Follow up with [facility]    Infusion scheduling note    Injection scheduling note    Labs CBC, CMP, phosphorus and magnesium   Scheduling:  Preferred lab:  Lab interval:     Imaging    Pharmacy appointment    Other referrals                       The patient was seen, interviewed and examined. Pertinent lab and radiologic studies were reviewed. Pt instructed to call should they develop concerning signs/symptoms or have further questions.        Portions of the record may have been created with voice recognition software. Occasional wrong-word or sound-a-like substitutions may have occurred due to the inherent limitations of voice recognition software. Read the chart carefully and recognize, using context, where substitutions have occurred.      Deloris Garcia MD    Hematology/Oncology

## 2024-05-21 NOTE — DISCHARGE INSTRUCTIONS
Thank you for allowing me to care for you today,  ESAU RauschN, RN    [facility]  [address] [address]  [phone] phone     [phone] fax  Hours of Operation: Monday- Friday 7:00am- 5:30pm  After hours phone  [phone]  Hematology / Oncology Physicians on call      DEBBIE Talavera Dr., Dr., Dr., Dr., Dr., Dr., Dr., Dr., Dr., Dr., Dr., Dr., Dr., Dr., Dr., ALIYAH Gutiérrez, ALIYAH Bryant, ALIYAH Daley, NP  Please call with any concerns regarding your appointment today.   FALL PREVENTION   Falls often occur due to slipping, tripping or losing your balance. Here are ways to reduce your risk of falling again.   Was there anything that caused your fall that can be fixed, removed or replaced?   Make your home safe by keeping walkways clear of objects you may trip over.   Use non-slip pads under rugs.   Do not walk in poorly lit areas.   Do not stand on chairs or wobbly ladders.   Use caution when reaching overhead or looking upward. This position can cause a loss of balance.   Be sure your shoes fit properly, have non-slip bottoms and are in good condition.   Be cautious when going up and down stairs, curbs, and when walking on uneven sidewalks.   If your balance is poor, consider using a cane or walker.   If your fall was related to alcohol use, stop or limit alcohol intake.   If your fall was related to use of sleeping medicines, talk to your doctor about this. You may need to reduce your dosage at bedtime if you awaken during the night to go to the bathroom.   To reduce the need for nighttime bathroom trips:   Avoid drinking fluids for several hours before going to bed   Empty your bladder before going to bed   Men can keep a urinal at the bedside   © 8725-9468 [employer], [address] [place] [address], [place] [address]. All rights reserved. This information is not intended as a  substitute for professional medical care. Always follow your healthcare professional's instructions.

## 2024-06-19 ENCOUNTER — TELEPHONE (OUTPATIENT)
Dept: HEMATOLOGY/ONCOLOGY | Facility: CLINIC | Age: 64
End: 2024-06-19
Payer: MEDICARE

## 2024-06-19 NOTE — TELEPHONE ENCOUNTER
----- Message from Mary Jane Jacobs sent at 6/19/2024 10:06 AM CDT -----  Contact: Demetrice  .Type:  Sooner Apoointment Request    Caller is requesting a sooner appointment.  .    Name of Caller: Demetrice   When is the first available appointment? 07/16  Symptoms: Pt states she found a lump on right leg  Would the patient rather a call back or a response via [facility]?  Call back   Best Call Back Number: .[phone]    Additional Information:        Thanks

## 2024-06-19 NOTE — TELEPHONE ENCOUNTER
Nurse placed a return call to patient. Patient stated that she have a painful lump on her right leg that she would like Dr. Gordon to assess. Appointment offered. Patient accepted. Call ended well.

## 2024-06-21 ENCOUNTER — DOCUMENTATION ONLY (OUTPATIENT)
Dept: HEMATOLOGY/ONCOLOGY | Facility: CLINIC | Age: 64
End: 2024-06-21
Payer: MEDICARE

## 2024-06-21 NOTE — PROGRESS NOTES
MORRISR received email from patient on yesterday afternoon requesting transportation to her appt on Tuesday. SWER scheduled transportation on today via [employer]. Pt asked of other requests for recliners and shane palomino for her grandchildren. Pt states its through a leti but she cannot remember the name of the grants. SWER will research and follow up with patient.

## 2024-06-25 ENCOUNTER — OFFICE VISIT (OUTPATIENT)
Dept: HEMATOLOGY/ONCOLOGY | Facility: CLINIC | Age: 64
End: 2024-06-25
Payer: MEDICARE

## 2024-06-25 ENCOUNTER — DOCUMENTATION ONLY (OUTPATIENT)
Dept: HEMATOLOGY/ONCOLOGY | Facility: CLINIC | Age: 64
End: 2024-06-25

## 2024-06-25 VITALS
HEART RATE: 98 BPM | TEMPERATURE: 98 F | WEIGHT: 180.56 LBS | OXYGEN SATURATION: 97 % | SYSTOLIC BLOOD PRESSURE: 119 MMHG | DIASTOLIC BLOOD PRESSURE: 78 MMHG | BODY MASS INDEX: 30.83 KG/M2 | HEIGHT: 64 IN

## 2024-06-25 DIAGNOSIS — M79.89 RIGHT LEG SWELLING: ICD-10-CM

## 2024-06-25 DIAGNOSIS — R60.0 LOCALIZED EDEMA: ICD-10-CM

## 2024-06-25 DIAGNOSIS — C56.1 OVARIAN CANCER ON RIGHT: ICD-10-CM

## 2024-06-25 DIAGNOSIS — Z71.9 HEALTH EDUCATION/COUNSELING: ICD-10-CM

## 2024-06-25 DIAGNOSIS — C50.412 MALIGNANT NEOPLASM OF UPPER-OUTER QUADRANT OF LEFT BREAST IN FEMALE, ESTROGEN RECEPTOR POSITIVE: Primary | Chronic | ICD-10-CM

## 2024-06-25 DIAGNOSIS — R91.1 PULMONARY NODULE: ICD-10-CM

## 2024-06-25 DIAGNOSIS — Z17.0 MALIGNANT NEOPLASM OF UPPER-OUTER QUADRANT OF LEFT BREAST IN FEMALE, ESTROGEN RECEPTOR POSITIVE: Primary | Chronic | ICD-10-CM

## 2024-06-25 PROCEDURE — 3288F FALL RISK ASSESSMENT DOCD: CPT | Mod: CPTII,S$GLB,, | Performed by: INTERNAL MEDICINE

## 2024-06-25 PROCEDURE — 99214 OFFICE O/P EST MOD 30 MIN: CPT | Mod: S$GLB,,, | Performed by: INTERNAL MEDICINE

## 2024-06-25 PROCEDURE — 3078F DIAST BP <80 MM HG: CPT | Mod: CPTII,S$GLB,, | Performed by: INTERNAL MEDICINE

## 2024-06-25 PROCEDURE — 3008F BODY MASS INDEX DOCD: CPT | Mod: CPTII,S$GLB,, | Performed by: INTERNAL MEDICINE

## 2024-06-25 PROCEDURE — 3074F SYST BP LT 130 MM HG: CPT | Mod: CPTII,S$GLB,, | Performed by: INTERNAL MEDICINE

## 2024-06-25 PROCEDURE — 1101F PT FALLS ASSESS-DOCD LE1/YR: CPT | Mod: CPTII,S$GLB,, | Performed by: INTERNAL MEDICINE

## 2024-06-25 PROCEDURE — 4010F ACE/ARB THERAPY RXD/TAKEN: CPT | Mod: CPTII,S$GLB,, | Performed by: INTERNAL MEDICINE

## 2024-06-25 PROCEDURE — 1126F AMNT PAIN NOTED NONE PRSNT: CPT | Mod: CPTII,S$GLB,, | Performed by: INTERNAL MEDICINE

## 2024-06-25 PROCEDURE — G2211 COMPLEX E/M VISIT ADD ON: HCPCS | Mod: S$GLB,,, | Performed by: INTERNAL MEDICINE

## 2024-06-25 PROCEDURE — 3072F LOW RISK FOR RETINOPATHY: CPT | Mod: CPTII,S$GLB,, | Performed by: INTERNAL MEDICINE

## 2024-06-25 PROCEDURE — 99999 PR PBB SHADOW E&M-EST. PATIENT-LVL III: CPT | Mod: PBBFAC,,, | Performed by: INTERNAL MEDICINE

## 2024-06-25 PROCEDURE — 1159F MED LIST DOCD IN RCRD: CPT | Mod: CPTII,S$GLB,, | Performed by: INTERNAL MEDICINE

## 2024-06-25 PROCEDURE — 3044F HG A1C LEVEL LT 7.0%: CPT | Mod: CPTII,S$GLB,, | Performed by: INTERNAL MEDICINE

## 2024-06-25 RX ORDER — LETROZOLE 2.5 MG/1
2.5 TABLET, FILM COATED ORAL DAILY
Qty: 30 TABLET | Refills: 11 | Status: SHIPPED | OUTPATIENT
Start: 2024-06-25

## 2024-06-25 NOTE — PROGRESS NOTES
Patient ID: Demetrice Gaines   Chief Complaint: Follow-up  MRN:  [id number]     Oncologic Diagnosis:  Stage IA (T1c N0 Mx) L Breast Invasive Ductal  Carcinoma, G3, +/+/-, ki67 of 90%; Oncotype 38  Previous Treatment:    Bilateral Mastectomies 10/25/2023  s/p Excision of Primary Tumor 11/22/23   TC x 4 (01/29/24 - 04/03/2024)  Arimidex (04/20/24 - 06/25/2024)    Stage IC, Ovarian Cancer, Right (Granulosa cell tumor)  s/p RALH/BSO 05/07/2024 with Dr. Person, Gyn/Onc at [facility] - pathology positive for granulosa cell tumor with negative peritoneal washings    Current Treatment: Letrazole; surveillance at [facility] for ovarian cancer    Subjective   The patient presents for follow up.    She is compliant with Armidex however is now having some severe side effects of diffuse arthralgias and hot flashes.  I recommend we try Letrazole and she is in agreement.  She also notes right leg swelling that has become painful; the swelling has been there for years and stable in size but never painful.  I will order a soft tissue ultrasound; feels like a lipoma on exam.      Review of Systems   Constitutional:  Negative for activity change, appetite change, chills, diaphoresis, fatigue, fever and unexpected weight change.   HENT:  Negative for nosebleeds.    Respiratory:  Negative for shortness of breath.    Cardiovascular:  Positive for leg swelling. Negative for chest pain.   Musculoskeletal:  Positive for arthralgias.   Neurological:  Negative for dizziness and weakness.   Psychiatric/Behavioral:  The patient is not nervous/anxious.      History     Oncology History   Breast neoplasm, Tis (DCIS), left (Resolved)   9/8/2023 Initial Diagnosis    Breast neoplasm, Tis (DCIS), left     9/8/2023 Cancer Staged    Staging form: Breast, AJCC 8th Edition  - Clinical stage from 9/8/2023: Stage 0 (cTis (DCIS), cN0, cM0, G3, ER+, AR+, HER2: Not Assessed)      Genetic Testing    Patient has genetic testing done for [employer]  STAT breast cancer panel and 84 gene multicancer panel.                                              Results revealed patient has the following mutation(s):negative breast cancer panel- no mutation noted- MLH1- variant of undetermined significance      Chemotherapy    Treatment Summary   Plan Name: OP BREAST TC (DOCEtaxel cycloPHOSphamide) Q3W  Treatment Goal: Curative  Status: Active  Start Date: 1/10/2024 (Planned)  End Date: 3/14/2024 (Planned)  Provider: Deloris Gordon MD  Chemotherapy: cycloPHOSphamide 600 mg/m2 = 1,180 mg in sodium chloride 0.9% 255.9 mL chemo infusion, 600 mg/m2, Intravenous, Clinic/HOD 1 time, 0 of 4 cycles    DOCEtaxel (TAXOTERE) 75 mg/m2 = 148 mg in sodium chloride 0.9% 257.4 mL chemo infusion, 75 mg/m2, Intravenous, Clinic/HOD 1 time, 0 of 4 cycles       Malignant neoplasm of upper-outer quadrant of left breast in female, estrogen receptor positive   10/17/2023 Initial Diagnosis    Malignant neoplasm of upper-outer quadrant of left breast in female, estrogen receptor positive     10/18/2023 Cancer Staged    Staging form: Breast, AJCC 8th Edition  - Clinical stage from 10/18/2023: Stage IA (cT1c, cN0(f), cM0, G3, ER+, KY+, HER2-)     12/4/2023 Cancer Staged    Staging form: Breast, AJCC 8th Edition  - Pathologic stage from 12/4/2023: Stage IA (pT1c, pN0(sn), cM0, G3, ER+, KY+, HER2-)     1/29/2024 -  Chemotherapy    Treatment Summary   Plan Name: OP BREAST TC (DOCEtaxel cycloPHOSphamide) Q3W  Treatment Goal: Curative  Status: Active  Start Date: 1/29/2024  End Date: 4/4/2024  Provider: Deloris Gordon MD  Chemotherapy: cycloPHOSphamide 600 mg/m2 = 1,200 mg in sodium chloride 0.9% 291 mL chemo infusion, 600 mg/m2 = 1,200 mg, Intravenous, Clinic/HOD 1 time, 4 of 4 cycles  Administration: 1,200 mg (1/29/2024), 1,200 mg (2/20/2024), 1,200 mg (3/13/2024), 1,180 mg (4/3/2024)  DOCEtaxel (TAXOTERE) 75 mg/m2 = 150 mg in sodium chloride 0.9% 292.5 mL chemo infusion, 75 mg/m2 = 150 mg,  Intravenous, Clinic/HOD 1 time, 4 of 4 cycles  Administration: 150 mg (1/29/2024), 150 mg (2/20/2024), 150 mg (3/13/2024), 148 mg (4/3/2024)      Chemotherapy    Treatment Summary   Plan Name: OP BREAST TC (DOCEtaxel cycloPHOSphamide) Q3W  Treatment Goal: Curative  Status: Active  Start Date: 1/10/2024 (Planned)  End Date: 3/14/2024 (Planned)  Provider: Deloris Gordon MD  Chemotherapy: cycloPHOSphamide 600 mg/m2 = 1,180 mg in sodium chloride 0.9% 255.9 mL chemo infusion, 600 mg/m2, Intravenous, Clinic/HOD 1 time, 0 of 4 cycles    DOCEtaxel (TAXOTERE) 75 mg/m2 = 148 mg in sodium chloride 0.9% 257.4 mL chemo infusion, 75 mg/m2, Intravenous, Clinic/HOD 1 time, 0 of 4 cycles           HPI:  Demetrice Gaines is a pleasant 63 y.o. female with history of CAD not requiring PCI, HTN, DM II, CHF and colon polyps who presents to clinic to establish care on referral for breast cancer.    The patient reports that she has been recovering well from surgery.  She has no acute complaints.  She has been having blood in her stool for over a year as well as constipation.  I recommended that we have this moved up to rule out any colonic lesions.  I will message the gastroenterologist to see if they can possibly do a colonoscopy on her prior to starting chemotherapy     I reviewed her Oncotype DX with her.  Unfortunately her recurrence score is 38 indicating a clear benefit for adjuvant chemotherapy.  She is understanding of this.  I reviewed the most common side effects of chemotherapy.  Initially considered giving her AC-T however giving her cardiac history I think that it would be better for the patient unless toxic if we administered TC.  Otherwise she does have some baseline intermittent neuropathy which will need to be monitored.    FHx significant for Mother with primary bone cancer and paternal grandmother with breast cancer      Past Medical History:   Diagnosis Date    Bilateral pleural effusion 06/20/2020    CAD (coronary  artery disease)     CHF (congestive heart failure)     Colon polyp     Diabetes mellitus, type 2     Hypertension     Hyponatremia 06/20/2020    Malignant neoplasm of upper-outer quadrant of left breast in female, estrogen receptor positive 10/17/2023    Myocardial infarction     Ovarian cancer on right 5/21/2024     Physical Exam     ECOG SCORE    0 - Fully active-able to carry on all pre-disease performance without restriction         Vitals:    06/25/24 1054   BP: 119/78   Pulse: 98   Temp: 98 °F (36.7 °C)     Physical Exam  Constitutional:       General: She is not in acute distress.     Appearance: Normal appearance. She is normal weight. She is not ill-appearing or toxic-appearing.   HENT:      Head: Normocephalic and atraumatic.   Eyes:      Extraocular Movements: Extraocular movements intact.      Conjunctiva/sclera: Conjunctivae normal.   Cardiovascular:      Rate and Rhythm: Normal rate.   Pulmonary:      Effort: Pulmonary effort is normal.   Musculoskeletal:         General: Swelling (right medial just inferior to the knee joint) present.   Skin:     General: Skin is warm.   Neurological:      General: No focal deficit present.      Mental Status: She is alert and oriented to person, place, and time.   Psychiatric:         Mood and Affect: Mood normal.         Behavior: Behavior normal.       Labs   Labs:  No visits with results within 2 Day(s) from this visit.   Latest known visit with results is:   Lab Visit on 05/01/2024   Component Date Value Ref Range Status    Phosphorus 05/01/2024 3.0  2.7 - 4.5 mg/dL Final    Magnesium 05/01/2024 1.8  1.6 - 2.6 mg/dL Final    Sodium 05/01/2024 137  136 - 145 mmol/L Final    Potassium 05/01/2024 4.0  3.5 - 5.1 mmol/L Final    Chloride 05/01/2024 103  95 - 110 mmol/L Final    CO2 05/01/2024 26  23 - 29 mmol/L Final    Glucose 05/01/2024 98  70 - 110 mg/dL Final    BUN 05/01/2024 14  8 - 23 mg/dL Final    Creatinine 05/01/2024 0.8  0.5 - 1.4 mg/dL Final    Calcium  05/01/2024 9.4  8.7 - 10.5 mg/dL Final    Total Protein 05/01/2024 7.4  6.0 - 8.4 g/dL Final    Albumin 05/01/2024 4.0  3.5 - 5.2 g/dL Final    Total Bilirubin 05/01/2024 0.4  0.1 - 1.0 mg/dL Final    Comment: For infants and newborns, interpretation of results should be based  on gestational age, weight and in agreement with clinical  observations.    Premature Infant recommended reference ranges:  Up to 24 hours.............<8.0 mg/dL  Up to 48 hours............<12.0 mg/dL  3-5 days..................<15.0 mg/dL  6-29 days.................<15.0 mg/dL      Alkaline Phosphatase 05/01/2024 66  55 - 135 U/L Final    AST 05/01/2024 18  10 - 40 U/L Final    ALT 05/01/2024 14  10 - 44 U/L Final    eGFR 05/01/2024 >60  >60 mL/min/1.73 m^2 Final    Anion Gap 05/01/2024 8  8 - 16 mmol/L Final    WBC 05/01/2024 5.25  3.90 - 12.70 K/uL Final    RBC 05/01/2024 3.87 (L)  4.00 - 5.40 M/uL Final    Hemoglobin 05/01/2024 11.8 (L)  12.0 - 16.0 g/dL Final    Hematocrit 05/01/2024 35.4 (L)  37.0 - 48.5 % Final    MCV 05/01/2024 92  82 - 98 fL Final    MCH 05/01/2024 30.5  27.0 - 31.0 pg Final    MCHC 05/01/2024 33.3  32.0 - 36.0 g/dL Final    RDW 05/01/2024 19.1 (H)  11.5 - 14.5 % Final    Platelets 05/01/2024 365  150 - 450 K/uL Final    MPV 05/01/2024 8.2 (L)  9.2 - 12.9 fL Final    Immature Granulocytes 05/01/2024 0.8 (H)  0.0 - 0.5 % Final    Gran # (ANC) 05/01/2024 2.7  1.8 - 7.7 K/uL Final    Immature Grans (Abs) 05/01/2024 0.04  0.00 - 0.04 K/uL Final    Comment: Mild elevation in immature granulocytes is non specific and   can be seen in a variety of conditions including stress response,   acute inflammation, trauma and pregnancy. Correlation with other   laboratory and clinical findings is essential.      Lymph # 05/01/2024 1.7  1.0 - 4.8 K/uL Final    Mono # 05/01/2024 0.6  0.3 - 1.0 K/uL Final    Eos # 05/01/2024 0.2  0.0 - 0.5 K/uL Final    Baso # 05/01/2024 0.04  0.00 - 0.20 K/uL Final    nRBC 05/01/2024 0  0 /100 WBC  Final    Gran % 05/01/2024 51.3  38.0 - 73.0 % Final    Lymph % 05/01/2024 32.8  18.0 - 48.0 % Final    Mono % 05/01/2024 10.9  4.0 - 15.0 % Final    Eosinophil % 05/01/2024 3.4  0.0 - 8.0 % Final    Basophil % 05/01/2024 0.8  0.0 - 1.9 % Final    Differential Method 05/01/2024 Automated   Final    Ferritin 05/01/2024 268  20.0 - 300.0 ng/mL Final    Iron 05/01/2024 54  30 - 160 ug/dL Final    Transferrin 05/01/2024 242  200 - 375 mg/dL Final    TIBC 05/01/2024 358  250 - 450 ug/dL Final    Saturated Iron 05/01/2024 15 (L)  20 - 50 % Final      Pathology     Specimen to Pathology, Surgery Breast 10/25/2023      Component 2 mo ago   Final Pathologic Diagnosis 1. Right breast, prophylactic nipple sparing mastectomy (467 grams):      -  Benign breast tissue with fibrocystic change including fibrosis, adenosis, usual ductal hyperplasia         (UDH) and duct ectasia with apocrine metaplasia and focal periductal chronic inflammation      -  Microcalcifications:  Present, associated with benign ductal tissue      -  Skin and nipple are surgically absent      -  Unremarkable skeletal muscle present      -  Negative for atypia or malignancy    2. Left breast, nipple sparing mastectomy (470 grams):      -  Benign breast tissue with focal atypical ductal hyperplasia (ADH, less than 2mm) in a background of         fibrocystic change including fibrosis, adenosis, duct ectasia, focal columnar cell change/hyperplasia         and fibroadenomatoid change      -  Findings of biopsy cavity are NOT appreciated grossly or histologically, see case comment      -  Skin and nipple are surgically absent      -  Dumbbell shaped biopsy clip NOT IDENTIFIED at time of grossing    Comment:  Immunohistochemical stains with appropriate controls were performed on select tissue blocks.  Cytokeratin 5/6 demonstrates a mosaic staining pattern in areas of ductal hyperplasia, showing no evidence of atypia.  AE1/AE3 is utilized for duct  mapping.  P63  highlights intact myoepithelial cells throughout the specimen, showing no evidence of invasive carcinoma.    3. [place] lymph node #1 (hot and blue, background count 2), biopsy:      -  One lymph node, negative for metastatic carcinoma (0/1)      -  Immunohistochemical stains for CAM 5.2, AE1/AE3 and wide spectrum keratin are negative      4. Left breast axillary tail, excision (11 grams):      -  Benign mature fibroadipose tissue      -  Negative for atypia or malignancy    5. Additional lateral margin, excision (tissue submitted entirely for histologic evaluation):      -  Benign breast tissue with mild fibrocystic change including fibrosis, adenosis and         fibroadenomatoid change      -  Negative for atypia or malignancy      6. Additional anterior lateral margin, excision (tissue submitted entirely for histologic evaluation):      -  Benign breast tissue with fibrocystic change including fibrosis, adenosis, fibroadenomatoid         changes and duct ectasia with apocrine metaplasia      -  Microcalcifications:  Present, associated with benign breast tissue      -  Negative for atypia or malignancy     Comment: Interp By Dianelys Cardona M.D., Signed on 11/08/2023 at 09:29   Comment The patient's history of invasive carcinoma and ductal carcinoma in-situ in the upper outer quadrant is noted.  The specimen is thoroughly searched for both the dumbbell biopsy clip and a biopsy cavity, neither of which are identified grossly or  histologically.  Numerous sections of tissue from specimen 2 (left breast mastectomy) were evaluated.  The patient's additional margins (specimens 5 and 6) were submitted entirely for histologic review and show no evidence of invasive or in-situ  carcinoma.  Specimen 4 (breast axillary tail) was also submitted entirely for histologic review and showed no evidence of invasive or in-situ carcinoma.  Dr. Bishop has reviewed the above case and agrees with the findings.    Dr. Josue was  alerted to these findings via epic message on 11/08/2023 at 09:30.      All stains were performed with adequate positive and negative controls.              Specimen to Pathology, Surgery Breast 11/22/2023      Component 1 mo ago   Final Pathologic Diagnosis Breast, left, re-excision:  Invasive ductal carcinoma, poorly differentiated  Renata grade 3:  Tubule formation-3, nuclear pleomorphism-3 and mitotic count -3  Invasive carcinoma measures 19 x 9 x 8 mm  Positive superior margin, measuring 10 mm in greatest extent  Additional margins:  -Anterior margin:  1 mm  -Medial margin:  6 mm  -Posterior margin:  6.5 mm  -Lateral margin:  9 mm  -Inferior margin:  9 mm  No carcinoma in Situ or lymphovascular invasion is identified  Previous biopsy clip and reflector both grossly identified   Comment: Interp By Shelbie Bishop M.D., Signed on 11/28/2023 at 10:46   Gross Surgery ID:  [id number]    Pathology ID:  [id number]  1.  Received in formalin labeled &quot;left breast biopsy&quot; is a 4 g, 2.3 cm superior to inferior by 2.1 cm medial to lateral by 2.3 cm anterior to posterior portion of breast tissue received oriented by the surgeon as short stitch superior and long  stitch lateral.  A biopsy clip and a reflector clip is identified exposed at the anterior-superior margins (see attached image).  The specimen is sectioned from anterior to posterior into 5 slices averaging 0.5 cm in thickness.  Sectioning reveals a 1.9  cm anterior to posterior by 0.9 cm medial to lateral by 0.8 cm superior to inferior well-circumscribed, tan-white, rubbery mass within slices 2-5.  The mass abuts the superior and medial margins and measures 0.9 cm from the lateral margin, 0.9 cm from  the inferior margin, 0.4 cm from the posterior margin, and 0.5 cm from the anterior margin.  There is an X shaped biopsy clip identified within the mass in slice 4 and a reflector clip identified adjacent to the mass in slice 1.  The specimen is inked as    follows:  Superior-blue, inferior-green, medial-red, lateral-orange, anterior-yellow, and posterior-black.  The specimen is submitted entirely and sequentially from anterior to posterior as follows:  EEC--1-A:  Slice 1, anterior margin, perpendicular  YJT--1-B:  Slice 2, mass to include anterior, superior, inferior, medial, and lateral margins  YKB--1-C:  Slice 3, mass to superior, inferior, medial, and lateral margins  LXI--1-D:  Slice 4, mass to superior, inferior, medial, and lateral margins  FRJ--1-E:  Slice 5, posterior margin, perpendicular        Grossed by: Carl Donovan MS, PA([facility])        Assessment and Plan   Stage IA (T1c N0 Mx) L Breast Invasive Ductal  Carcinoma, G3, +/+/-, ki67 of 90%   s/p b/l nipple sparing mastectomy and sentinel node biopsy on 10/25/23   s/p excision of primary tumor 11/22/23   Pathology report above  Genetic Testing 09/13/2023: VUS in MLH1  Oncotype Dx Score 38  TTE stable from prior  She has a history of heart disease; she has not required PCI however will precert for TC treatment to circumvent potential anthracycline cardiac effects  I discussed in detail the role of medical oncology in her care.  I informed her that my treatment recommendation is based on the NCCN Guidelines, her final pathology and Oncotype DX score  Because of the high Oncotype DX score, chemotherapy is recommended; because of the hormonal make up of her tumor, endocrine therapy is recommended after she completes chemotherapy. I reviewed the major side effects of chemotherapy and  AIs.   s/p TC x4 completed 04/03/24 ; AE of severe arthralgias, likely from GCSF  Arimidex discontinued due to severe arthralgias and hot flashes 06/25/24; changing over to Letrazole; patient will let me know if there is no improvement in 4 weeks via patient portal      Right Leg Focal Swelling  On medial aspect of right leg; present for years; not increasing in size  Check right LE  ultrasound      Pulmonary Nodule   Noted on CT Abd 05/12/2024 measuring 3 mm    Will repeat non-con CT Chest in 05/2025        Stage IC, Ovarian Cancer, Right (Granulosa cell tumor)  CT Abd/Pelv 04/16/2024:  6.4 cm indeterminate cystic and solid right adnexal mass  s/p RALH/BSO 05/07/2024 with Dr. Person, Gyn/Onc at [facility] - pathology positive for granulosa cell tumor with negative peritoneal washings  Continue surveillance with [facility]      TARA  Continue PO iron  Repeat iron studies in 3 months      Hypomagnesemia, Resolved  Previous magnesium supplementation      Bone Health  Given that she is post menopausal and endocrine therapy is recommended, she will be monitored for the need for bisphosphonate therapy.  BMD 08/2023 was normal  Continue Calcium and vitamin D      Blood in stool, Resolved   Patient reports blood in her stool for over 1 year   Colonoscopy 01/02/2024: TA (4 fragments in ascending colon and 1 TA in sigmoid colon); recommend repeat in 5 years      Cancer Screening  PAP Smear 09/15/2023: Negative for intraepithelial lesion or malignancy   Colonoscopy 01/2024: TAs; repeat in 5 years      Chronic Medical Conditions  DM II  HTN  CAD:  She has not require PCI; stress test 12/26/2019 negative for ischemia and arrhythmias; last echo 06/2020 and showed preserved ejection fraction at 55%; concentric hypertrophy and normal left ventricular diastolic function  CHF  Hx of Colon Polyps  Peripheral neuropathy; intermittent        Med Onc Chart Routing      Follow up with physician 3 months.   Follow up with [facility]    Infusion scheduling note    Injection scheduling note    Labs CBC, CMP, phosphorus and magnesium   Scheduling:  Preferred lab:  Lab interval:     Imaging    Pharmacy appointment    Other referrals                       The patient was seen, interviewed and examined. Pertinent lab and radiologic studies were reviewed. Pt instructed to call should they develop concerning signs/symptoms  or have further questions.        Portions of the record may have been created with voice recognition software. Occasional wrong-word or sound-a-like substitutions may have occurred due to the inherent limitations of voice recognition software. Read the chart carefully and recognize, using context, where substitutions have occurred.      Deloris Garcia MD    Hematology/Oncology

## 2024-06-25 NOTE — PROGRESS NOTES
GENESIS met with pt who requested ensure samples. SW gave pt a variety of strawberry, chocolate, and vanilla Ensure as well as Ensure coupons. Pt expressed no other concerns and SW will remain available.

## 2024-07-09 DIAGNOSIS — E11.59 HYPERTENSION ASSOCIATED WITH DIABETES: ICD-10-CM

## 2024-07-09 DIAGNOSIS — I15.2 HYPERTENSION ASSOCIATED WITH DIABETES: ICD-10-CM

## 2024-07-09 RX ORDER — AMLODIPINE AND OLMESARTAN MEDOXOMIL 10; 40 MG/1; MG/1
1 TABLET ORAL DAILY
Qty: 90 TABLET | Refills: 3 | Status: SHIPPED | OUTPATIENT
Start: 2024-07-09 | End: 2025-07-04

## 2024-07-26 ENCOUNTER — PATIENT OUTREACH (OUTPATIENT)
Dept: EMERGENCY MEDICINE | Facility: HOSPITAL | Age: 64
End: 2024-07-26

## 2024-07-29 ENCOUNTER — OFFICE VISIT (OUTPATIENT)
Dept: INTERNAL MEDICINE | Facility: CLINIC | Age: 64
End: 2024-07-29
Payer: MEDICARE

## 2024-07-29 VITALS
SYSTOLIC BLOOD PRESSURE: 124 MMHG | OXYGEN SATURATION: 97 % | WEIGHT: 175.5 LBS | RESPIRATION RATE: 18 BRPM | DIASTOLIC BLOOD PRESSURE: 76 MMHG | HEIGHT: 64 IN | TEMPERATURE: 97 F | HEART RATE: 100 BPM | BODY MASS INDEX: 29.96 KG/M2

## 2024-07-29 DIAGNOSIS — Z53.20 REFUSAL OF STATIN MEDICATION BY PATIENT: ICD-10-CM

## 2024-07-29 DIAGNOSIS — I51.7 CARDIOMEGALY: Chronic | ICD-10-CM

## 2024-07-29 DIAGNOSIS — Z00.00 ANNUAL PHYSICAL EXAM: Primary | ICD-10-CM

## 2024-07-29 DIAGNOSIS — I70.0 AORTIC ATHEROSCLEROSIS: Chronic | ICD-10-CM

## 2024-07-29 DIAGNOSIS — D39.10 GRANULOSA CELL TUMOR: ICD-10-CM

## 2024-07-29 DIAGNOSIS — D50.9 IRON DEFICIENCY ANEMIA, UNSPECIFIED IRON DEFICIENCY ANEMIA TYPE: ICD-10-CM

## 2024-07-29 DIAGNOSIS — C56.1 OVARIAN CANCER ON RIGHT: ICD-10-CM

## 2024-07-29 DIAGNOSIS — Z86.010 HISTORY OF COLON POLYPS: ICD-10-CM

## 2024-07-29 DIAGNOSIS — I15.2 HYPERTENSION ASSOCIATED WITH DIABETES: Chronic | ICD-10-CM

## 2024-07-29 DIAGNOSIS — E11.59 HYPERTENSION ASSOCIATED WITH DIABETES: Chronic | ICD-10-CM

## 2024-07-29 DIAGNOSIS — Z17.0 MALIGNANT NEOPLASM OF UPPER-OUTER QUADRANT OF LEFT BREAST IN FEMALE, ESTROGEN RECEPTOR POSITIVE: Chronic | ICD-10-CM

## 2024-07-29 DIAGNOSIS — T45.1X5A ANTINEOPLASTIC CHEMOTHERAPY INDUCED ANEMIA: ICD-10-CM

## 2024-07-29 DIAGNOSIS — I25.10 CORONARY ARTERY DISEASE INVOLVING NATIVE CORONARY ARTERY OF NATIVE HEART WITHOUT ANGINA PECTORIS: ICD-10-CM

## 2024-07-29 DIAGNOSIS — C50.412 MALIGNANT NEOPLASM OF UPPER-OUTER QUADRANT OF LEFT BREAST IN FEMALE, ESTROGEN RECEPTOR POSITIVE: Chronic | ICD-10-CM

## 2024-07-29 DIAGNOSIS — D64.81 ANTINEOPLASTIC CHEMOTHERAPY INDUCED ANEMIA: ICD-10-CM

## 2024-07-29 DIAGNOSIS — E11.59 TYPE 2 DIABETES MELLITUS WITH OTHER CIRCULATORY COMPLICATION, WITHOUT LONG-TERM CURRENT USE OF INSULIN: Chronic | ICD-10-CM

## 2024-07-29 DIAGNOSIS — I50.32 CHRONIC DIASTOLIC CONGESTIVE HEART FAILURE: Chronic | ICD-10-CM

## 2024-07-29 DIAGNOSIS — I25.2 OLD MYOCARDIAL INFARCTION: Chronic | ICD-10-CM

## 2024-07-29 PROBLEM — N94.89 ADNEXAL MASS: Status: RESOLVED | Noted: 2024-05-02 | Resolved: 2024-07-29

## 2024-07-29 PROBLEM — Z71.9 ENCOUNTER FOR EDUCATION: Status: RESOLVED | Noted: 2023-10-03 | Resolved: 2024-07-29

## 2024-07-29 PROBLEM — M25.60 DECREASED RANGE OF MOTION: Status: RESOLVED | Noted: 2023-11-10 | Resolved: 2024-07-29

## 2024-07-29 PROBLEM — Z91.89 AT RISK FOR SELF CARE DEFICIT: Status: RESOLVED | Noted: 2023-10-03 | Resolved: 2024-07-29

## 2024-07-29 PROBLEM — Z01.818 PRE-OP EXAM: Status: RESOLVED | Noted: 2023-10-17 | Resolved: 2024-07-29

## 2024-07-29 PROBLEM — M62.81 DECREASED MUSCLE STRENGTH: Status: RESOLVED | Noted: 2023-11-10 | Resolved: 2024-07-29

## 2024-07-29 PROBLEM — K63.9 ABNORMALITY OF COLON: Status: RESOLVED | Noted: 2024-01-02 | Resolved: 2024-07-29

## 2024-07-29 PROBLEM — K63.5 COLON POLYPS: Status: RESOLVED | Noted: 2024-01-02 | Resolved: 2024-07-29

## 2024-07-29 PROBLEM — Z71.9 HEALTH EDUCATION/COUNSELING: Status: RESOLVED | Noted: 2024-06-25 | Resolved: 2024-07-29

## 2024-07-29 PROBLEM — Z78.9 DECREASED ACTIVITIES OF DAILY LIVING (ADL): Status: RESOLVED | Noted: 2023-11-10 | Resolved: 2024-07-29

## 2024-07-29 PROBLEM — R52 PAIN: Status: RESOLVED | Noted: 2023-11-10 | Resolved: 2024-07-29

## 2024-07-29 PROCEDURE — 1126F AMNT PAIN NOTED NONE PRSNT: CPT | Mod: CPTII,S$GLB,, | Performed by: FAMILY MEDICINE

## 2024-07-29 PROCEDURE — 1160F RVW MEDS BY RX/DR IN RCRD: CPT | Mod: CPTII,S$GLB,, | Performed by: FAMILY MEDICINE

## 2024-07-29 PROCEDURE — 3072F LOW RISK FOR RETINOPATHY: CPT | Mod: CPTII,S$GLB,, | Performed by: FAMILY MEDICINE

## 2024-07-29 PROCEDURE — 99214 OFFICE O/P EST MOD 30 MIN: CPT | Mod: 25,S$GLB,, | Performed by: FAMILY MEDICINE

## 2024-07-29 PROCEDURE — 3044F HG A1C LEVEL LT 7.0%: CPT | Mod: CPTII,S$GLB,, | Performed by: FAMILY MEDICINE

## 2024-07-29 PROCEDURE — 3074F SYST BP LT 130 MM HG: CPT | Mod: CPTII,S$GLB,, | Performed by: FAMILY MEDICINE

## 2024-07-29 PROCEDURE — 4010F ACE/ARB THERAPY RXD/TAKEN: CPT | Mod: CPTII,S$GLB,, | Performed by: FAMILY MEDICINE

## 2024-07-29 PROCEDURE — 99397 PER PM REEVAL EST PAT 65+ YR: CPT | Mod: S$GLB,,, | Performed by: FAMILY MEDICINE

## 2024-07-29 PROCEDURE — 1159F MED LIST DOCD IN RCRD: CPT | Mod: CPTII,S$GLB,, | Performed by: FAMILY MEDICINE

## 2024-07-29 PROCEDURE — 1101F PT FALLS ASSESS-DOCD LE1/YR: CPT | Mod: CPTII,S$GLB,, | Performed by: FAMILY MEDICINE

## 2024-07-29 PROCEDURE — 3008F BODY MASS INDEX DOCD: CPT | Mod: CPTII,S$GLB,, | Performed by: FAMILY MEDICINE

## 2024-07-29 PROCEDURE — 3078F DIAST BP <80 MM HG: CPT | Mod: CPTII,S$GLB,, | Performed by: FAMILY MEDICINE

## 2024-07-29 PROCEDURE — 99999 PR PBB SHADOW E&M-EST. PATIENT-LVL IV: CPT | Mod: PBBFAC,,, | Performed by: FAMILY MEDICINE

## 2024-07-29 PROCEDURE — 3288F FALL RISK ASSESSMENT DOCD: CPT | Mod: CPTII,S$GLB,, | Performed by: FAMILY MEDICINE

## 2024-07-29 RX ORDER — SEMAGLUTIDE 1.34 MG/ML
1 INJECTION, SOLUTION SUBCUTANEOUS
Qty: 3 ML | Refills: 0 | Status: SHIPPED | OUTPATIENT
Start: 2024-07-29

## 2024-07-29 NOTE — PROGRESS NOTES
Subjective:   Patient ID: Demetrice Gaines is a 64 y.o. female.  Chief Complaint:  Follow-up    Presents for annual physical exam   Since last visit diagnosed and treated for ovarian cancer    Medical history:   - Diabetes.  On Ozempic 1 mg weekly injection.  Reports compliance.  Denies side effects.  Last A1c less than 7%.  Eye exam and foot exam up-to-date.  Needs repeat microalbumin and A1c.    - Hypertension with CHF and cardiomegaly.  On Elisabet 10/40 mg daily and chlorthalidone 25 mg daily.  Reports compliance.  Denies side effects.  Blood pressure controlled.  Denies any shortness a breath.  Has had some increased right great swelling ever since surgery for her ovarian cancer.  - Coronary artery disease with aortic atherosclerosis, history of MI statin refusal.  Continues on aspirin 81 mg daily.  Continues to decline statin.  Denies any chest pain or claudication.  Needs repeat lipid panel.  - Breast cancer.  Up-to-date on follow-up surveillance with Hematology/Oncology   - Ovarian cancer.  Up-to-date on follow-up surveillance with gynecology   - Colon polyps.  Due for repeat colonoscopy January 20, 2029     Health maintenance up-to-date except for multiple vaccines which she has declined/refused in the past     No new complaints today    Review of Systems   Constitutional:  Negative for chills, diaphoresis, fatigue and fever.   Eyes:  Negative for visual disturbance.   Respiratory:  Negative for cough, chest tightness, shortness of breath and wheezing.    Cardiovascular:  Positive for leg swelling. Negative for chest pain and palpitations.   Gastrointestinal:  Negative for abdominal distention, abdominal pain, constipation, diarrhea, nausea and vomiting.   Endocrine: Negative for polydipsia, polyphagia and polyuria.   Genitourinary:  Negative for difficulty urinating, dysuria, flank pain, frequency, hematuria and urgency.   Musculoskeletal:  Negative for myalgias.   Skin:  Negative for rash.   Neurological:   "Negative for dizziness, syncope, weakness, light-headedness, numbness and headaches.   Psychiatric/Behavioral:  Negative for sleep disturbance. The patient is not nervous/anxious.        Objective:   /76 (BP Location: Left arm, Patient Position: Sitting, BP Method: Medium (Manual))   Pulse 100   Temp 96.9 °F (36.1 °C) (Tympanic)   Resp 18   Ht 5' 4" (1.626 m)   Wt 79.6 kg (175 lb 7.8 oz)   SpO2 97%   BMI 30.12 kg/m²     Physical Exam  Vitals and nursing note reviewed.   Constitutional:       Appearance: Normal appearance. She is well-developed. She is obese.   Eyes:      General: No scleral icterus.     Conjunctiva/sclera: Conjunctivae normal.   Neck:      Vascular: No JVD.   Cardiovascular:      Rate and Rhythm: Normal rate and regular rhythm.      Heart sounds: Normal heart sounds.   Pulmonary:      Effort: Pulmonary effort is normal.      Breath sounds: Normal breath sounds.   Abdominal:      General: There is no distension.      Palpations: Abdomen is soft. There is no hepatomegaly.      Tenderness: There is no abdominal tenderness. There is no guarding or rebound.   Musculoskeletal:      Right lower leg: Edema present.      Left lower leg: No edema.   Skin:     Findings: No rash.   Neurological:      Mental Status: She is alert.   Psychiatric:         Mood and Affect: Mood and affect normal.       Assessment:       ICD-10-CM ICD-9-CM   1. Annual physical exam  Z00.00 V70.0   2. Type 2 diabetes mellitus with other circulatory complication, without long-term current use of insulin  E11.59 250.70   3. Hypertension associated with diabetes  E11.59 250.80    I15.2 401.9   4. Chronic diastolic congestive heart failure  I50.32 428.32     428.0   5. Cardiomegaly  I51.7 429.3   6. Coronary artery disease involving native coronary artery of native heart without angina pectoris  I25.10 414.01   7. Aortic atherosclerosis  I70.0 440.0   8. Old myocardial infarction  I25.2 412   9. Refusal of statin medication " by patient  Z53.20 V64.2   10. Malignant neoplasm of upper-outer quadrant of left breast in female, estrogen receptor positive  C50.412 174.4    Z17.0 V86.0   11. 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 Antineoplastic chemotherapy induced anemia  D64.81 285.3    T45.1X5A E933.1   14. Iron deficiency anemia, unspecified iron deficiency anemia type  D50.9 280.9   15. History of colon polyps  Z86.010 V12.72     Plan:   Annual physical exam  -     Lipid Panel; Future; Expected date: 07/29/2024  -     TSH; Future; Expected date: 07/29/2024  -     Hemoglobin A1C; Future; Expected date: 07/29/2024  -     Microalbumin/Creatinine Ratio, Urine; Future; Expected date: 07/29/2024  Blood pressure normal.  BMI 30.  Overall exam stable.    Check labs.  Treat as indicated.    Continues to decline all recommended vaccines     Type 2 diabetes mellitus with other circulatory complication, without long-term current use of insulin  -     Hemoglobin A1C; Future; Expected date: 07/29/2024  -     Microalbumin/Creatinine Ratio, Urine; Future; Expected date: 07/29/2024  -     semaglutide (OZEMPIC) 1 mg/dose (4 mg/3 mL); Inject 1 mg into the skin every 7 days.  Dispense: 3 mL; Refill: 0  Check A1c   Adjust Ozempic 1 mg weekly dose if indicated   Eye exam up-to-date   Check microalbumin.  If positive on Arb.    Foot exam up-to-date     Hypertension associated with diabetes  Chronic diastolic congestive heart failure  Cardiomegaly  Controlled.  Stable.  Asymptomatic.  BP at goal.    Continue Elisabet 10/40 mg daily   Continue chlorthalidone 25 mg daily     Coronary artery disease involving native coronary artery of native heart without angina pectoris  Aortic atherosclerosis  Old myocardial infarction  Refusal of statin medication by patient  -     Lipid Panel; Future; Expected date: 07/29/2024  Asymptomatic   Continues to decline statin treatment   Continue aspirin 81 mg daily     Malignant neoplasm of  upper-outer quadrant of left breast in female, estrogen receptor positive  Ovarian cancer on right  Granulosa cell tumor  Antineoplastic chemotherapy induced anemia  Iron deficiency anemia, unspecified iron deficiency anemia type  Follow up with Hematology/Oncology in gynecology as scheduled     History of colon polyps  Repeat colonoscopy in January 20, 2029     Return to clinic 6 months or sooner as needed

## 2024-07-31 NOTE — PROGRESS NOTES
Breast Surgical Oncology  [place]    Date of Service: 2024    DIAGNOSIS:   64 y.o. female with a history of left breast cancer.    Date of Diagnosis: 23  Pathology: IDC, G3, ER 70%, VT 80%, HER2 0, Ki67 90%  Clinical Stage: IA (cT1c cN0 Mx)  Pathologic Stage: IA (pT1c pN0 Mx)    TREATMENT:   Surgery: bilateral nipple sparing mastectomy with sentinel node biopsy on 10/25/23 with return for re-excision on 23. Hemalatha Josue M.D. Breast Surgical Oncology; tissue expander reconstruction by Dr. Lynn  Systemic Therapy: Chemotherapy  adjuvant TC from 1/10/24  To 3/14/24- Arimidex 2024  to 2024- changed to letrozole due to arthralgias and myalgias 2024-   Medical Oncologist: Deloris Gordon M.D.    Radiation Treatment Summary: N/A  Radiation Oncologist: None  Genetics: negative  Physical Therapy: prehabilitation     HISTORY OF PRESENT ILLNESS:   Demetrice Gaines is here for oncological follow up.      Today:   Onset- 1 week ago noted mass in the epigastric area  Location- epigastric - upper abdomen  Quality- not painful- tender to palpation intermittently  Change over time- denies any change in the area over the past week  Exacerbating factors-none  Relieving factors-none  Change in medication, caffeine, supplements, physical activity- none  Trauma to area or bruising - none    Interval History:     Persistent symptoms/side effects of treatment:    Functional changes: ROM, neuropathy, weakness or fatigue- fatigue and hot flashes    Psychosocial challenges- denies    Lymphedema- denies    Diet/Nutrition- maintaining wt    Sexual Dysfunction or challenges- denies    IMAGIN2024- CT Abd and pelvis- no abnormality     MEDICATIONS/ALLERGIES:     Current Outpatient Medications:     amlodipine-olmesartan (LEATHA) 10-40 mg per tablet, Take 1 tablet by mouth once daily, Disp: 90 tablet, Rfl: 3    aspirin (ECOTRIN) 81 MG EC tablet, Take 81 mg by mouth once daily., Disp: , Rfl:      chlorthalidone (HYGROTEN) 25 MG Tab, Take 1 tablet (25 mg total) by mouth once daily., Disp: 90 tablet, Rfl: 3    letrozole (FEMARA) 2.5 mg Tab, Take 1 tablet (2.5 mg total) by mouth once daily., Disp: 30 tablet, Rfl: 11    OLANZapine (ZYPREXA) 5 MG tablet, Take 1 tablet by mouth nightly on days 1-3 of each chemotherapy cycle., Disp: 3 tablet, Rfl: 0    ondansetron (ZOFRAN-ODT) 4 MG TbDL, Take 1 tablet (4 mg total) by mouth every 8 (eight) hours as needed., Disp: 90 tablet, Rfl: 3    oxyCODONE-acetaminophen (PERCOCET)  mg per tablet, Take 1 tablet by mouth every 4 (four) hours as needed for Pain., Disp: 60 tablet, Rfl: 0    polyethylene glycol (GLYCOLAX) 17 gram/dose powder, Take 17 g by mouth once daily., Disp: 510 g, Rfl: 11    prochlorperazine (COMPAZINE) 5 MG tablet, Take 1 tablet (5 mg total) by mouth every 6 (six) hours as needed for Nausea., Disp: 20 tablet, Rfl: 11    semaglutide (OZEMPIC) 1 mg/dose (4 mg/3 mL), Inject 1 mg into the skin every 7 days., Disp: 9 mL, Rfl: 3    semaglutide (OZEMPIC) 1 mg/dose (4 mg/3 mL), Inject 1 mg into the skin every 7 days., Disp: 3 mL, Rfl: 0  No current facility-administered medications for this visit.  Review of patient's allergies indicates:   Allergen Reactions    Lisinopril Anaphylaxis    Aldactone [spironolactone]      Memory impair and breast soreness    Coreg [carvedilol]      Hair loss    Lisinopril-hydrochlorothiazide      Other reaction(s): Reaction:Throat swelling;       PHYSICAL EXAM:   General: The patient appears well and is in no acute distress.       BREAST EXAM  No Asymmetry  Bilateral tissue expanders in place  Right:  - Mass: No  - Skin change: breast edema to lower pole  - Nipple Discharge: No  - Nipple retraction: No  - Axillary LAD: No  Left:   - Mass: No  - Skin change: breast edema to lower pole  - Nipple Discharge: No  - Nipple retraction: No  - Axillary LAD: No    Upper central abdomen is a 2 cm firm prominence that is firm and isolated. No  crepitus. No skin change or bruising    ASSESSMENT:   Diagnoses and all orders for this visit:    Malignant neoplasm of upper-outer quadrant of left breast in female, estrogen receptor positive  -     X-Ray Chest PA And Lateral; Future  -     US Abdomen Limited; Future    Breast neoplasm, Tis (DCIS), left    Long term (current) use of aromatase inhibitors    Encounter for follow-up surveillance of breast cancer    Counseling and coordination of care    Counseling on health promotion and disease prevention    Abdominal wall mass of epigastric region  -     X-Ray Chest PA And Lateral; Future  -     US Abdomen Limited; Future       PLAN:     Exam reveals- palpable epigastric- upper abd mass- ? If xyphoid process vs mass- had recent CT of abdomen 5/12/2024 that was wnl    Recommend CXR and ultrasound of upper epigastric region for superficial palpable mass further characterization    has a benign breast exam today without evidence of local or distant relapse.      I will relay imaging results to pt over the pt portal- if no abnormality will either recheck her in 6 weeks for a change vs repeat her CT of the abdomen to rule out an abnormality due to her history of ovarian and breast cancer    The patient is in agreement with the plan. Questions were encouraged and answered to patient's satisfaction. Demetrice will call our office with any questions or concerns.     I spent a total of 30 minutes on this visit. This includes face to face time and non-face to face time preparing to see the patient (eg, review of tests), obtaining and/or reviewing separately obtained history, documenting clinical information in the electronic or other health record, independently interpreting results and communicating results to the patient/family/caregiver, or care coordinator.       Krysta Roland NP

## 2024-08-01 ENCOUNTER — PATIENT MESSAGE (OUTPATIENT)
Dept: PHARMACY | Facility: CLINIC | Age: 64
End: 2024-08-01
Payer: MEDICARE

## 2024-08-01 ENCOUNTER — INFUSION (OUTPATIENT)
Dept: INFUSION THERAPY | Facility: HOSPITAL | Age: 64
End: 2024-08-01
Attending: INTERNAL MEDICINE
Payer: MEDICARE

## 2024-08-01 ENCOUNTER — PATIENT OUTREACH (OUTPATIENT)
Dept: ADMINISTRATIVE | Facility: OTHER | Age: 64
End: 2024-08-01
Payer: MEDICARE

## 2024-08-01 ENCOUNTER — PATIENT OUTREACH (OUTPATIENT)
Dept: ADMINISTRATIVE | Facility: HOSPITAL | Age: 64
End: 2024-08-01
Payer: MEDICARE

## 2024-08-01 ENCOUNTER — OFFICE VISIT (OUTPATIENT)
Dept: SURGERY | Facility: CLINIC | Age: 64
End: 2024-08-01
Payer: MEDICARE

## 2024-08-01 ENCOUNTER — TELEPHONE (OUTPATIENT)
Dept: PHARMACY | Facility: CLINIC | Age: 64
End: 2024-08-01
Payer: MEDICARE

## 2024-08-01 VITALS — RESPIRATION RATE: 16 BRPM | WEIGHT: 175.06 LBS | HEIGHT: 64 IN | BODY MASS INDEX: 29.89 KG/M2

## 2024-08-01 DIAGNOSIS — Z79.811 LONG TERM (CURRENT) USE OF AROMATASE INHIBITORS: ICD-10-CM

## 2024-08-01 DIAGNOSIS — R19.06 ABDOMINAL WALL MASS OF EPIGASTRIC REGION: ICD-10-CM

## 2024-08-01 DIAGNOSIS — D05.12 BREAST NEOPLASM, TIS (DCIS), LEFT: ICD-10-CM

## 2024-08-01 DIAGNOSIS — Z17.0 MALIGNANT NEOPLASM OF UPPER-OUTER QUADRANT OF LEFT BREAST IN FEMALE, ESTROGEN RECEPTOR POSITIVE: Primary | Chronic | ICD-10-CM

## 2024-08-01 DIAGNOSIS — Z08 ENCOUNTER FOR FOLLOW-UP SURVEILLANCE OF BREAST CANCER: ICD-10-CM

## 2024-08-01 DIAGNOSIS — C50.412 MALIGNANT NEOPLASM OF UPPER-OUTER QUADRANT OF LEFT BREAST IN FEMALE, ESTROGEN RECEPTOR POSITIVE: Primary | ICD-10-CM

## 2024-08-01 DIAGNOSIS — C50.412 MALIGNANT NEOPLASM OF UPPER-OUTER QUADRANT OF LEFT BREAST IN FEMALE, ESTROGEN RECEPTOR POSITIVE: Primary | Chronic | ICD-10-CM

## 2024-08-01 DIAGNOSIS — Z85.3 ENCOUNTER FOR FOLLOW-UP SURVEILLANCE OF BREAST CANCER: ICD-10-CM

## 2024-08-01 DIAGNOSIS — Z17.0 MALIGNANT NEOPLASM OF UPPER-OUTER QUADRANT OF LEFT BREAST IN FEMALE, ESTROGEN RECEPTOR POSITIVE: Primary | ICD-10-CM

## 2024-08-01 DIAGNOSIS — Z71.89 COUNSELING ON HEALTH PROMOTION AND DISEASE PREVENTION: ICD-10-CM

## 2024-08-01 DIAGNOSIS — Z71.89 COUNSELING AND COORDINATION OF CARE: ICD-10-CM

## 2024-08-01 DIAGNOSIS — E11.59 TYPE 2 DIABETES MELLITUS WITH OTHER CIRCULATORY COMPLICATION, WITHOUT LONG-TERM CURRENT USE OF INSULIN: Primary | Chronic | ICD-10-CM

## 2024-08-01 PROCEDURE — 63600175 PHARM REV CODE 636 W HCPCS: Performed by: INTERNAL MEDICINE

## 2024-08-01 PROCEDURE — 99214 OFFICE O/P EST MOD 30 MIN: CPT | Mod: S$GLB,,, | Performed by: NURSE PRACTITIONER

## 2024-08-01 PROCEDURE — 25000003 PHARM REV CODE 250: Performed by: INTERNAL MEDICINE

## 2024-08-01 PROCEDURE — 4010F ACE/ARB THERAPY RXD/TAKEN: CPT | Mod: CPTII,S$GLB,, | Performed by: NURSE PRACTITIONER

## 2024-08-01 PROCEDURE — 3044F HG A1C LEVEL LT 7.0%: CPT | Mod: CPTII,S$GLB,, | Performed by: NURSE PRACTITIONER

## 2024-08-01 PROCEDURE — 1159F MED LIST DOCD IN RCRD: CPT | Mod: CPTII,S$GLB,, | Performed by: NURSE PRACTITIONER

## 2024-08-01 PROCEDURE — 3288F FALL RISK ASSESSMENT DOCD: CPT | Mod: CPTII,S$GLB,, | Performed by: NURSE PRACTITIONER

## 2024-08-01 PROCEDURE — 3066F NEPHROPATHY DOC TX: CPT | Mod: CPTII,S$GLB,, | Performed by: NURSE PRACTITIONER

## 2024-08-01 PROCEDURE — 3072F LOW RISK FOR RETINOPATHY: CPT | Mod: CPTII,S$GLB,, | Performed by: NURSE PRACTITIONER

## 2024-08-01 PROCEDURE — 1126F AMNT PAIN NOTED NONE PRSNT: CPT | Mod: CPTII,S$GLB,, | Performed by: NURSE PRACTITIONER

## 2024-08-01 PROCEDURE — 3008F BODY MASS INDEX DOCD: CPT | Mod: CPTII,S$GLB,, | Performed by: NURSE PRACTITIONER

## 2024-08-01 PROCEDURE — 1160F RVW MEDS BY RX/DR IN RCRD: CPT | Mod: CPTII,S$GLB,, | Performed by: NURSE PRACTITIONER

## 2024-08-01 PROCEDURE — 3061F NEG MICROALBUMINURIA REV: CPT | Mod: CPTII,S$GLB,, | Performed by: NURSE PRACTITIONER

## 2024-08-01 PROCEDURE — 99999 PR PBB SHADOW E&M-EST. PATIENT-LVL III: CPT | Mod: PBBFAC,,, | Performed by: NURSE PRACTITIONER

## 2024-08-01 PROCEDURE — A4216 STERILE WATER/SALINE, 10 ML: HCPCS | Performed by: INTERNAL MEDICINE

## 2024-08-01 PROCEDURE — 1101F PT FALLS ASSESS-DOCD LE1/YR: CPT | Mod: CPTII,S$GLB,, | Performed by: NURSE PRACTITIONER

## 2024-08-01 PROCEDURE — 96523 IRRIG DRUG DELIVERY DEVICE: CPT

## 2024-08-01 RX ORDER — HEPARIN 100 UNIT/ML
500 SYRINGE INTRAVENOUS
Status: DISCONTINUED | OUTPATIENT
Start: 2024-08-01 | End: 2024-08-01 | Stop reason: HOSPADM

## 2024-08-01 RX ORDER — HEPARIN 100 UNIT/ML
500 SYRINGE INTRAVENOUS
OUTPATIENT
Start: 2024-08-01

## 2024-08-01 RX ORDER — SODIUM CHLORIDE 0.9 % (FLUSH) 0.9 %
10 SYRINGE (ML) INJECTION
Status: DISCONTINUED | OUTPATIENT
Start: 2024-08-01 | End: 2024-08-01 | Stop reason: HOSPADM

## 2024-08-01 RX ORDER — SODIUM CHLORIDE 0.9 % (FLUSH) 0.9 %
10 SYRINGE (ML) INJECTION
OUTPATIENT
Start: 2024-08-01

## 2024-08-01 RX ADMIN — HEPARIN 500 UNITS: 100 SYRINGE at 08:08

## 2024-08-01 RX ADMIN — SODIUM CHLORIDE, PRESERVATIVE FREE 10 ML: 5 INJECTION INTRAVENOUS at 08:08

## 2024-08-01 NOTE — PROGRESS NOTES
CHW - Initial Contact    This Community Health Worker completed the Social Determinant of Health questionnaire with patient via telephone today.    Pt identified barriers of most importance are: medical transportation    Referrals to community agencies completed with patient/caregiver consent outside of [facility] include:   Referrals were put through [facility] - no:   Support and Services: I receive a referral from [facility] care coordination for assistance with patients medical transportation. Ms. Suresh is currently battling cancer and her son often brings her to her doctors appointments. Ms. Suresh stated she ans her  are currently two months behind on their utility bills but have not received a disconnect notice. Her insurance does not have transportation assistance with her plan. I have informed her of [employer],which she has used twice, for transportation assistance and utility assistance as well as [employer].  Other information discussed the patient needs / wants help with:  utility assistance   Follow up required: yes   Follow-up Outreach - Due: 8/8/2024

## 2024-08-01 NOTE — PLAN OF CARE
Patient tolerated port flush well today; no adverse reaction noted.  POC reviewed with pt.  NAD noted upon discharge.   Has f/u appt(s) scheduled per MD request.    Problem: Adult Inpatient Plan of Care  Goal: Plan of Care Review  Outcome: Progressing  Goal: Patient-Specific Goal (Individualized)  Outcome: Progressing  Goal: Absence of Hospital-Acquired Illness or Injury  Outcome: Progressing  Intervention: Identify and Manage Fall Risk  Flowsheets (Taken 8/1/2024 0851)  Safety Promotion/Fall Prevention: in recliner, wheels locked  Goal: Optimal Comfort and Wellbeing  Outcome: Progressing  Intervention: Provide Person-Centered Care  Flowsheets (Taken 8/1/2024 0851)  Trust Relationship/Rapport:   care explained   reassurance provided   choices provided   thoughts/feelings acknowledged   emotional support provided   empathic listening provided   questions answered   questions encouraged

## 2024-08-01 NOTE — PROGRESS NOTES
Called and spoke to pt  today about overdue HM topics and OPCM programs available, pt wife asked if I could put in a request for her related to pharm assist with Ozempic and medical transportation.

## 2024-08-01 NOTE — Clinical Note
Hello  This pt is asking for assistance with applying for medical transportation.  Thanks Kianna GARCIA [facility] Care Coordination Department [facility]

## 2024-08-01 NOTE — TELEPHONE ENCOUNTER
Demetrice JORDAN Gaines has been informed of the [employer] application process for Ozempic 1mg and what's required to apply.  She will provide the following documents: Proof of household Income( such as social security statement, 1099 form, pension statement or 3 consecutive pay stubs, Copy of all Insurance cards( front and back), and ·Completed Medication [facility] Authorization Forms        Follow-up will be made in 5 business days.     Imelda Anders  Pharmacy Patient Assistance Team

## 2024-08-06 ENCOUNTER — PATIENT OUTREACH (OUTPATIENT)
Dept: ADMINISTRATIVE | Facility: OTHER | Age: 64
End: 2024-08-06
Payer: MEDICARE

## 2024-08-07 ENCOUNTER — PATIENT OUTREACH (OUTPATIENT)
Dept: ADMINISTRATIVE | Facility: OTHER | Age: 64
End: 2024-08-07
Payer: MEDICARE

## 2024-08-09 ENCOUNTER — HOSPITAL ENCOUNTER (OUTPATIENT)
Dept: RADIOLOGY | Facility: HOSPITAL | Age: 64
Discharge: HOME | End: 2024-08-09
Attending: NURSE PRACTITIONER
Payer: MEDICARE

## 2024-08-09 ENCOUNTER — PATIENT MESSAGE (OUTPATIENT)
Dept: PHARMACY | Facility: CLINIC | Age: 64
End: 2024-08-09
Payer: MEDICARE

## 2024-08-09 ENCOUNTER — TELEPHONE (OUTPATIENT)
Dept: INTERNAL MEDICINE | Facility: CLINIC | Age: 64
End: 2024-08-09
Payer: MEDICARE

## 2024-08-09 ENCOUNTER — PATIENT MESSAGE (OUTPATIENT)
Dept: HEMATOLOGY/ONCOLOGY | Facility: CLINIC | Age: 64
End: 2024-08-09
Payer: MEDICARE

## 2024-08-09 DIAGNOSIS — R19.06 ABDOMINAL WALL MASS OF EPIGASTRIC REGION: ICD-10-CM

## 2024-08-09 DIAGNOSIS — C50.412 MALIGNANT NEOPLASM OF UPPER-OUTER QUADRANT OF LEFT BREAST IN FEMALE, ESTROGEN RECEPTOR POSITIVE: Chronic | ICD-10-CM

## 2024-08-09 DIAGNOSIS — Z17.0 MALIGNANT NEOPLASM OF UPPER-OUTER QUADRANT OF LEFT BREAST IN FEMALE, ESTROGEN RECEPTOR POSITIVE: Chronic | ICD-10-CM

## 2024-08-09 PROCEDURE — 71046 X-RAY EXAM CHEST 2 VIEWS: CPT | Mod: 26,,, | Performed by: RADIOLOGY

## 2024-08-09 PROCEDURE — 76705 ECHO EXAM OF ABDOMEN: CPT | Mod: 26,,, | Performed by: RADIOLOGY

## 2024-08-09 PROCEDURE — 71046 X-RAY EXAM CHEST 2 VIEWS: CPT | Mod: TC

## 2024-08-09 PROCEDURE — 76705 ECHO EXAM OF ABDOMEN: CPT | Mod: TC

## 2024-08-14 ENCOUNTER — OFFICE VISIT (OUTPATIENT)
Dept: INTERNAL MEDICINE | Facility: CLINIC | Age: 64
End: 2024-08-14
Payer: MEDICARE

## 2024-08-14 ENCOUNTER — HOSPITAL ENCOUNTER (OUTPATIENT)
Dept: CARDIOLOGY | Facility: HOSPITAL | Age: 64
Discharge: HOME | End: 2024-08-14
Attending: FAMILY MEDICINE
Payer: MEDICARE

## 2024-08-14 VITALS
WEIGHT: 170 LBS | HEIGHT: 64 IN | OXYGEN SATURATION: 97 % | DIASTOLIC BLOOD PRESSURE: 80 MMHG | SYSTOLIC BLOOD PRESSURE: 116 MMHG | TEMPERATURE: 97 F | HEART RATE: 92 BPM | BODY MASS INDEX: 29.02 KG/M2

## 2024-08-14 DIAGNOSIS — I51.7 CARDIOMEGALY: ICD-10-CM

## 2024-08-14 DIAGNOSIS — Z53.20 REFUSAL OF STATIN MEDICATION BY PATIENT: ICD-10-CM

## 2024-08-14 DIAGNOSIS — I25.10 CORONARY ARTERY DISEASE INVOLVING NATIVE CORONARY ARTERY OF NATIVE HEART WITHOUT ANGINA PECTORIS: ICD-10-CM

## 2024-08-14 DIAGNOSIS — Z01.818 PREOPERATIVE EXAMINATION: Primary | ICD-10-CM

## 2024-08-14 DIAGNOSIS — E11.59 HYPERTENSION ASSOCIATED WITH DIABETES: ICD-10-CM

## 2024-08-14 DIAGNOSIS — I25.2 OLD MYOCARDIAL INFARCTION: ICD-10-CM

## 2024-08-14 DIAGNOSIS — I15.2 HYPERTENSION ASSOCIATED WITH DIABETES: ICD-10-CM

## 2024-08-14 DIAGNOSIS — E11.59 TYPE 2 DIABETES MELLITUS WITH OTHER CIRCULATORY COMPLICATION, WITHOUT LONG-TERM CURRENT USE OF INSULIN: ICD-10-CM

## 2024-08-14 DIAGNOSIS — Z01.818 PREOPERATIVE EXAMINATION: ICD-10-CM

## 2024-08-14 DIAGNOSIS — I70.0 AORTIC ATHEROSCLEROSIS: ICD-10-CM

## 2024-08-14 DIAGNOSIS — I50.32 CHRONIC DIASTOLIC CONGESTIVE HEART FAILURE: ICD-10-CM

## 2024-08-14 PROCEDURE — 3008F BODY MASS INDEX DOCD: CPT | Mod: CPTII,S$GLB,, | Performed by: FAMILY MEDICINE

## 2024-08-14 PROCEDURE — 3072F LOW RISK FOR RETINOPATHY: CPT | Mod: CPTII,S$GLB,, | Performed by: FAMILY MEDICINE

## 2024-08-14 PROCEDURE — 1160F RVW MEDS BY RX/DR IN RCRD: CPT | Mod: CPTII,S$GLB,, | Performed by: FAMILY MEDICINE

## 2024-08-14 PROCEDURE — 1159F MED LIST DOCD IN RCRD: CPT | Mod: CPTII,S$GLB,, | Performed by: FAMILY MEDICINE

## 2024-08-14 PROCEDURE — 1101F PT FALLS ASSESS-DOCD LE1/YR: CPT | Mod: CPTII,S$GLB,, | Performed by: FAMILY MEDICINE

## 2024-08-14 PROCEDURE — G2211 COMPLEX E/M VISIT ADD ON: HCPCS | Mod: S$GLB,,, | Performed by: FAMILY MEDICINE

## 2024-08-14 PROCEDURE — 99215 OFFICE O/P EST HI 40 MIN: CPT | Mod: S$GLB,,, | Performed by: FAMILY MEDICINE

## 2024-08-14 PROCEDURE — 4010F ACE/ARB THERAPY RXD/TAKEN: CPT | Mod: CPTII,S$GLB,, | Performed by: FAMILY MEDICINE

## 2024-08-14 PROCEDURE — 3074F SYST BP LT 130 MM HG: CPT | Mod: CPTII,S$GLB,, | Performed by: FAMILY MEDICINE

## 2024-08-14 PROCEDURE — 3288F FALL RISK ASSESSMENT DOCD: CPT | Mod: CPTII,S$GLB,, | Performed by: FAMILY MEDICINE

## 2024-08-14 PROCEDURE — 3066F NEPHROPATHY DOC TX: CPT | Mod: CPTII,S$GLB,, | Performed by: FAMILY MEDICINE

## 2024-08-14 PROCEDURE — 1126F AMNT PAIN NOTED NONE PRSNT: CPT | Mod: CPTII,S$GLB,, | Performed by: FAMILY MEDICINE

## 2024-08-14 PROCEDURE — 3044F HG A1C LEVEL LT 7.0%: CPT | Mod: CPTII,S$GLB,, | Performed by: FAMILY MEDICINE

## 2024-08-14 PROCEDURE — 3061F NEG MICROALBUMINURIA REV: CPT | Mod: CPTII,S$GLB,, | Performed by: FAMILY MEDICINE

## 2024-08-14 PROCEDURE — 93005 ELECTROCARDIOGRAM TRACING: CPT

## 2024-08-14 PROCEDURE — 93010 ELECTROCARDIOGRAM REPORT: CPT | Mod: ,,, | Performed by: INTERNAL MEDICINE

## 2024-08-14 PROCEDURE — 3079F DIAST BP 80-89 MM HG: CPT | Mod: CPTII,S$GLB,, | Performed by: FAMILY MEDICINE

## 2024-08-14 PROCEDURE — 99999 PR PBB SHADOW E&M-EST. PATIENT-LVL III: CPT | Mod: PBBFAC,,, | Performed by: FAMILY MEDICINE

## 2024-08-16 LAB
OHS QRS DURATION: 84 MS
OHS QTC CALCULATION: 386 MS

## 2024-08-28 ENCOUNTER — PATIENT OUTREACH (OUTPATIENT)
Dept: EMERGENCY MEDICINE | Facility: HOSPITAL | Age: 64
End: 2024-08-28

## 2024-09-16 ENCOUNTER — INFUSION (OUTPATIENT)
Dept: INFUSION THERAPY | Facility: HOSPITAL | Age: 64
End: 2024-09-16
Attending: INTERNAL MEDICINE
Payer: MEDICARE

## 2024-09-16 DIAGNOSIS — C50.412 MALIGNANT NEOPLASM OF UPPER-OUTER QUADRANT OF LEFT BREAST IN FEMALE, ESTROGEN RECEPTOR POSITIVE: Primary | ICD-10-CM

## 2024-09-16 DIAGNOSIS — Z17.0 MALIGNANT NEOPLASM OF UPPER-OUTER QUADRANT OF LEFT BREAST IN FEMALE, ESTROGEN RECEPTOR POSITIVE: Primary | ICD-10-CM

## 2024-09-16 PROCEDURE — 25000003 PHARM REV CODE 250: Performed by: INTERNAL MEDICINE

## 2024-09-16 PROCEDURE — A4216 STERILE WATER/SALINE, 10 ML: HCPCS | Performed by: INTERNAL MEDICINE

## 2024-09-16 PROCEDURE — 96523 IRRIG DRUG DELIVERY DEVICE: CPT

## 2024-09-16 PROCEDURE — 63600175 PHARM REV CODE 636 W HCPCS: Performed by: INTERNAL MEDICINE

## 2024-09-16 RX ORDER — HEPARIN 100 UNIT/ML
500 SYRINGE INTRAVENOUS
OUTPATIENT
Start: 2024-09-16

## 2024-09-16 RX ORDER — SODIUM CHLORIDE 0.9 % (FLUSH) 0.9 %
10 SYRINGE (ML) INJECTION
OUTPATIENT
Start: 2024-09-16

## 2024-09-16 RX ORDER — SODIUM CHLORIDE 0.9 % (FLUSH) 0.9 %
10 SYRINGE (ML) INJECTION
Status: DISCONTINUED | OUTPATIENT
Start: 2024-09-16 | End: 2024-09-16 | Stop reason: HOSPADM

## 2024-09-16 RX ORDER — HEPARIN 100 UNIT/ML
500 SYRINGE INTRAVENOUS
Status: DISCONTINUED | OUTPATIENT
Start: 2024-09-16 | End: 2024-09-16 | Stop reason: HOSPADM

## 2024-09-16 RX ADMIN — HEPARIN 500 UNITS: 100 SYRINGE at 12:09

## 2024-09-16 RX ADMIN — SODIUM CHLORIDE, PRESERVATIVE FREE 10 ML: 5 INJECTION INTRAVENOUS at 12:09

## 2024-09-16 NOTE — NURSING
"Pt here for Mediport Flush. Right chestwall mediport accessed with a 20g 1" arellano via sterile technique.  Excellent blood return noted. Flushed with 10ml NS and 5 ml heparin solution. Needle D/C, site without redness, swelling, or drainage noted. Dressing applied. Patient tolerated well. Patient to return to clinic on November 11, 2024.  "

## 2024-09-16 NOTE — DISCHARGE INSTRUCTIONS
[facility]  [address] [facility]  [address] [address]  [phone] phone     [phone] fax  Hours of Operation: Monday- Friday 8:00am- 5:00pm  After hours phone  [phone]  Hematology / Oncology Physicians on call      OBED Mo Dr., NP Phaon Dunbar, NP Khelsea Conley, FNP    Please call with any concerns regarding your appointment today.

## 2024-09-20 ENCOUNTER — PATIENT OUTREACH (OUTPATIENT)
Dept: ADMINISTRATIVE | Facility: HOSPITAL | Age: 64
End: 2024-09-20
Payer: MEDICARE

## 2024-09-20 NOTE — PROGRESS NOTES
Statin CVD DM Report: patient is not on statin nor does she have allergy listed. Per chart she declines statin every time. Patient does have upcoming PCP appt 1.29.25. will set remind me to notify Dr with sticky note prior to that visit about statin.      Dapsone Counseling: I discussed with the patient the risks of dapsone including but not limited to hemolytic anemia, agranulocytosis, rashes, methemoglobinemia, kidney failure, peripheral neuropathy, headaches, GI upset, and liver toxicity.  Patients who start dapsone require monitoring including baseline LFTs and weekly CBCs for the first month, then every month thereafter.  The patient verbalized understanding of the proper use and possible adverse effects of dapsone.  All of the patient's questions and concerns were addressed.

## 2024-10-30 ENCOUNTER — TELEPHONE (OUTPATIENT)
Dept: INTERNAL MEDICINE | Facility: CLINIC | Age: 64
End: 2024-10-30
Payer: MEDICARE

## 2024-11-07 ENCOUNTER — TELEPHONE (OUTPATIENT)
Dept: INFUSION THERAPY | Facility: HOSPITAL | Age: 64
End: 2024-11-07
Payer: MEDICARE

## 2024-11-07 NOTE — TELEPHONE ENCOUNTER
returned call rg rescheduling appt    ----- Message from Eric sent at 11/6/2024  4:45 PM CST -----  Contact: [phone]  Type:  Patient Returning Call    Who Called:SHAY CAST [[id number]]  Who Left Message for Patient:  Does the patient know what this is regarding?:patient want to reschedule for 11/12  Would the patient rather a call back or a response via [facility]? Call back  Best Call Back Number: [phone]  Additional Information: [id number]

## 2024-11-10 ENCOUNTER — PATIENT MESSAGE (OUTPATIENT)
Dept: INTERNAL MEDICINE | Facility: CLINIC | Age: 64
End: 2024-11-10
Payer: MEDICARE

## 2024-11-12 ENCOUNTER — INFUSION (OUTPATIENT)
Dept: INFUSION THERAPY | Facility: HOSPITAL | Age: 64
End: 2024-11-12
Attending: INTERNAL MEDICINE
Payer: MEDICARE

## 2024-11-12 DIAGNOSIS — Z17.0 MALIGNANT NEOPLASM OF UPPER-OUTER QUADRANT OF LEFT BREAST IN FEMALE, ESTROGEN RECEPTOR POSITIVE: Primary | ICD-10-CM

## 2024-11-12 DIAGNOSIS — C50.412 MALIGNANT NEOPLASM OF UPPER-OUTER QUADRANT OF LEFT BREAST IN FEMALE, ESTROGEN RECEPTOR POSITIVE: Primary | ICD-10-CM

## 2024-11-12 PROCEDURE — 25000003 PHARM REV CODE 250: Performed by: INTERNAL MEDICINE

## 2024-11-12 PROCEDURE — A4216 STERILE WATER/SALINE, 10 ML: HCPCS | Performed by: INTERNAL MEDICINE

## 2024-11-12 PROCEDURE — 63600175 PHARM REV CODE 636 W HCPCS: Performed by: INTERNAL MEDICINE

## 2024-11-12 PROCEDURE — 96523 IRRIG DRUG DELIVERY DEVICE: CPT

## 2024-11-12 RX ORDER — SODIUM CHLORIDE 0.9 % (FLUSH) 0.9 %
10 SYRINGE (ML) INJECTION
Status: DISCONTINUED | OUTPATIENT
Start: 2024-11-12 | End: 2024-11-12 | Stop reason: HOSPADM

## 2024-11-12 RX ORDER — HEPARIN 100 UNIT/ML
500 SYRINGE INTRAVENOUS
Status: DISCONTINUED | OUTPATIENT
Start: 2024-11-12 | End: 2024-11-12 | Stop reason: HOSPADM

## 2024-11-12 RX ORDER — HEPARIN 100 UNIT/ML
500 SYRINGE INTRAVENOUS
OUTPATIENT
Start: 2024-11-12

## 2024-11-12 RX ORDER — SODIUM CHLORIDE 0.9 % (FLUSH) 0.9 %
10 SYRINGE (ML) INJECTION
OUTPATIENT
Start: 2024-11-12

## 2024-11-12 RX ADMIN — Medication 10 ML: at 08:11

## 2024-11-12 RX ADMIN — HEPARIN 500 UNITS: 100 SYRINGE at 08:11

## 2024-11-12 NOTE — NURSING
"Pt here for Mediport Flush. Right chestwall mediport accessed with a 20g 1" arellano via sterile technique.  Excellent blood return noted.  Flushed with 10ml NS and 5 ml heparin solution.  Needle D/C, site without redness, swelling, or drainage noted.  Dressing applied.  Patient tolerated well.     "

## 2024-11-22 ENCOUNTER — PATIENT MESSAGE (OUTPATIENT)
Dept: INTERNAL MEDICINE | Facility: CLINIC | Age: 64
End: 2024-11-22
Payer: MEDICARE

## 2024-11-22 DIAGNOSIS — E11.59 TYPE 2 DIABETES MELLITUS WITH OTHER CIRCULATORY COMPLICATION, WITHOUT LONG-TERM CURRENT USE OF INSULIN: Primary | Chronic | ICD-10-CM

## 2025-01-02 ENCOUNTER — LAB VISIT (OUTPATIENT)
Dept: LAB | Facility: HOSPITAL | Age: 65
End: 2025-01-02
Attending: INTERNAL MEDICINE
Payer: MEDICARE

## 2025-01-02 DIAGNOSIS — D05.12 BREAST NEOPLASM, TIS (DCIS), LEFT: ICD-10-CM

## 2025-01-02 DIAGNOSIS — C50.412 MALIGNANT NEOPLASM OF UPPER-OUTER QUADRANT OF LEFT BREAST IN FEMALE, ESTROGEN RECEPTOR POSITIVE: Chronic | ICD-10-CM

## 2025-01-02 DIAGNOSIS — Z17.0 MALIGNANT NEOPLASM OF UPPER-OUTER QUADRANT OF LEFT BREAST IN FEMALE, ESTROGEN RECEPTOR POSITIVE: Chronic | ICD-10-CM

## 2025-01-02 LAB
ALBUMIN SERPL BCP-MCNC: 4.4 G/DL (ref 3.5–5.2)
ALP SERPL-CCNC: 91 U/L (ref 40–150)
ALT SERPL W/O P-5'-P-CCNC: 16 U/L (ref 10–44)
ANION GAP SERPL CALC-SCNC: 14 MMOL/L (ref 8–16)
AST SERPL-CCNC: 17 U/L (ref 10–40)
BASOPHILS # BLD AUTO: 0.06 K/UL (ref 0–0.2)
BASOPHILS NFR BLD: 1.1 % (ref 0–1.9)
BILIRUB SERPL-MCNC: 0.4 MG/DL (ref 0.1–1)
BUN SERPL-MCNC: 10 MG/DL (ref 8–23)
CALCIUM SERPL-MCNC: 10.4 MG/DL (ref 8.7–10.5)
CHLORIDE SERPL-SCNC: 99 MMOL/L (ref 95–110)
CO2 SERPL-SCNC: 26 MMOL/L (ref 23–29)
CREAT SERPL-MCNC: 0.8 MG/DL (ref 0.5–1.4)
DIFFERENTIAL METHOD BLD: ABNORMAL
EOSINOPHIL # BLD AUTO: 0.1 K/UL (ref 0–0.5)
EOSINOPHIL NFR BLD: 1.3 % (ref 0–8)
ERYTHROCYTE [DISTWIDTH] IN BLOOD BY AUTOMATED COUNT: 13.3 % (ref 11.5–14.5)
EST. GFR  (NO RACE VARIABLE): >60 ML/MIN/1.73 M^2
GLUCOSE SERPL-MCNC: 154 MG/DL (ref 70–110)
HCT VFR BLD AUTO: 39.5 % (ref 37–48.5)
HGB BLD-MCNC: 13.4 G/DL (ref 12–16)
IMM GRANULOCYTES # BLD AUTO: 0.01 K/UL (ref 0–0.04)
IMM GRANULOCYTES NFR BLD AUTO: 0.2 % (ref 0–0.5)
LYMPHOCYTES # BLD AUTO: 1.8 K/UL (ref 1–4.8)
LYMPHOCYTES NFR BLD: 33.2 % (ref 18–48)
MAGNESIUM SERPL-MCNC: 1.6 MG/DL (ref 1.6–2.6)
MCH RBC QN AUTO: 28.8 PG (ref 27–31)
MCHC RBC AUTO-ENTMCNC: 33.9 G/DL (ref 32–36)
MCV RBC AUTO: 85 FL (ref 82–98)
MONOCYTES # BLD AUTO: 0.4 K/UL (ref 0.3–1)
MONOCYTES NFR BLD: 7.1 % (ref 4–15)
NEUTROPHILS # BLD AUTO: 3.1 K/UL (ref 1.8–7.7)
NEUTROPHILS NFR BLD: 57.1 % (ref 38–73)
NRBC BLD-RTO: 0 /100 WBC
PHOSPHATE SERPL-MCNC: 3.7 MG/DL (ref 2.7–4.5)
PLATELET # BLD AUTO: 317 K/UL (ref 150–450)
PMV BLD AUTO: 8.6 FL (ref 9.2–12.9)
POTASSIUM SERPL-SCNC: 3.7 MMOL/L (ref 3.5–5.1)
PROT SERPL-MCNC: 8.4 G/DL (ref 6–8.4)
RBC # BLD AUTO: 4.66 M/UL (ref 4–5.4)
SODIUM SERPL-SCNC: 139 MMOL/L (ref 136–145)
WBC # BLD AUTO: 5.36 K/UL (ref 3.9–12.7)

## 2025-01-02 PROCEDURE — 36415 COLL VENOUS BLD VENIPUNCTURE: CPT | Performed by: INTERNAL MEDICINE

## 2025-01-02 PROCEDURE — 80053 COMPREHEN METABOLIC PANEL: CPT | Performed by: INTERNAL MEDICINE

## 2025-01-02 PROCEDURE — 84100 ASSAY OF PHOSPHORUS: CPT | Performed by: INTERNAL MEDICINE

## 2025-01-02 PROCEDURE — 85025 COMPLETE CBC W/AUTO DIFF WBC: CPT | Performed by: INTERNAL MEDICINE

## 2025-01-02 PROCEDURE — 83735 ASSAY OF MAGNESIUM: CPT | Performed by: INTERNAL MEDICINE

## 2025-01-03 ENCOUNTER — OFFICE VISIT (OUTPATIENT)
Dept: OPHTHALMOLOGY | Facility: CLINIC | Age: 65
End: 2025-01-03
Payer: MEDICARE

## 2025-01-03 DIAGNOSIS — H35.371 EPIRETINAL MEMBRANE (ERM) OF RIGHT EYE: ICD-10-CM

## 2025-01-03 DIAGNOSIS — E11.9 TYPE 2 DIABETES MELLITUS WITHOUT COMPLICATION, WITHOUT LONG-TERM CURRENT USE OF INSULIN: Primary | ICD-10-CM

## 2025-01-03 PROCEDURE — 99999 PR PBB SHADOW E&M-EST. PATIENT-LVL II: CPT | Mod: PBBFAC,,, | Performed by: OPHTHALMOLOGY

## 2025-01-03 NOTE — PROGRESS NOTES
HPI     Eye Exam            Comments: Pt reports for yearly diab. Denies any pain or irritation. Va   stable. No drops.           Comments    NP  + PRE DM 2019 approx.  +FamHx-glaucoma- mom &Sis             Last edited by King Cardoza on 1/3/2025  7:46 AM.            Assessment /Plan     For exam results, see Encounter Report.    There are no diagnoses linked to this encounter.          ===============================  Date today is 1/3/2025  Demetrice Gaines is a 64 y.o. female  Last visit [facility]: :8/22/2023   Last visit eye dept. Visit date not found    Uncorrected distance visual acuity was 20/25 -2 in the right eye and 20/20 -1 in the left eye. Uncorrected near visual acuity was J4 in the right eye and J4 in the left eye.  Tonometry       Tonometry (Applanation, 8:06 AM)         Right Left    Pressure 23 22                  Not recorded       Manifest Refraction       Manifest Refraction         Sphere Cylinder Axis Dist VA    Right -0.25 +0.50 005 20/20    Left -0.25 +0.25 015 20/20                  Not recorded       Chief Complaint   Patient presents with    Eye Exam     Pt reports for yearly diab. Denies any pain or irritation. Va stable. No drops.      HPI     Eye Exam            Comments: Pt reports for yearly diab. Denies any pain or irritation. Va   stable. No drops.           Comments    NP  + PRE DM 2019 approx.  +FamHx-glaucoma- mom &Sis             Last edited by King Cardoza on 1/3/2025  7:46 AM.      Problem List Items Addressed This Visit    None    Instructed to call 24/7 for any worsening of vision, visual distortion or pain.  Check OU independently daily.    Gave my office and personal cell phone number.  ________________  1/3/2025 today  Demetrice Gaines    OCT no DMA   Slight increase CD   ERM OD   Readers recommended. No MRX  =============================    RTC 1 year with RNFL

## 2025-01-07 ENCOUNTER — OFFICE VISIT (OUTPATIENT)
Dept: HEMATOLOGY/ONCOLOGY | Facility: CLINIC | Age: 65
End: 2025-01-07
Payer: MEDICARE

## 2025-01-07 ENCOUNTER — INFUSION (OUTPATIENT)
Dept: INFUSION THERAPY | Facility: HOSPITAL | Age: 65
End: 2025-01-07
Attending: INTERNAL MEDICINE
Payer: MEDICARE

## 2025-01-07 ENCOUNTER — TELEPHONE (OUTPATIENT)
Dept: SURGERY | Facility: CLINIC | Age: 65
End: 2025-01-07
Payer: MEDICARE

## 2025-01-07 VITALS
HEART RATE: 93 BPM | OXYGEN SATURATION: 98 % | WEIGHT: 176.13 LBS | OXYGEN SATURATION: 98 % | SYSTOLIC BLOOD PRESSURE: 135 MMHG | TEMPERATURE: 98 F | TEMPERATURE: 98 F | HEIGHT: 64 IN | RESPIRATION RATE: 18 BRPM | BODY MASS INDEX: 30.07 KG/M2 | HEART RATE: 101 BPM | BODY MASS INDEX: 30.11 KG/M2 | HEIGHT: 64 IN | SYSTOLIC BLOOD PRESSURE: 139 MMHG | DIASTOLIC BLOOD PRESSURE: 91 MMHG | DIASTOLIC BLOOD PRESSURE: 90 MMHG | RESPIRATION RATE: 20 BRPM | WEIGHT: 176.38 LBS

## 2025-01-07 DIAGNOSIS — D50.9 IRON DEFICIENCY ANEMIA, UNSPECIFIED IRON DEFICIENCY ANEMIA TYPE: ICD-10-CM

## 2025-01-07 DIAGNOSIS — C50.412 MALIGNANT NEOPLASM OF UPPER-OUTER QUADRANT OF LEFT BREAST IN FEMALE, ESTROGEN RECEPTOR POSITIVE: Primary | ICD-10-CM

## 2025-01-07 DIAGNOSIS — Z17.0 MALIGNANT NEOPLASM OF UPPER-OUTER QUADRANT OF LEFT BREAST IN FEMALE, ESTROGEN RECEPTOR POSITIVE: Primary | ICD-10-CM

## 2025-01-07 PROBLEM — R07.89 CHEST WALL PAIN: Status: ACTIVE | Noted: 2025-01-07

## 2025-01-07 PROCEDURE — 25000003 PHARM REV CODE 250: Performed by: INTERNAL MEDICINE

## 2025-01-07 PROCEDURE — 99999 PR PBB SHADOW E&M-EST. PATIENT-LVL III: CPT | Mod: PBBFAC,,, | Performed by: INTERNAL MEDICINE

## 2025-01-07 PROCEDURE — 99214 OFFICE O/P EST MOD 30 MIN: CPT | Mod: S$GLB,,, | Performed by: INTERNAL MEDICINE

## 2025-01-07 PROCEDURE — 63600175 PHARM REV CODE 636 W HCPCS: Performed by: INTERNAL MEDICINE

## 2025-01-07 PROCEDURE — 3080F DIAST BP >= 90 MM HG: CPT | Mod: CPTII,S$GLB,, | Performed by: INTERNAL MEDICINE

## 2025-01-07 PROCEDURE — 3288F FALL RISK ASSESSMENT DOCD: CPT | Mod: CPTII,S$GLB,, | Performed by: INTERNAL MEDICINE

## 2025-01-07 PROCEDURE — 3075F SYST BP GE 130 - 139MM HG: CPT | Mod: CPTII,S$GLB,, | Performed by: INTERNAL MEDICINE

## 2025-01-07 PROCEDURE — 1125F AMNT PAIN NOTED PAIN PRSNT: CPT | Mod: CPTII,S$GLB,, | Performed by: INTERNAL MEDICINE

## 2025-01-07 PROCEDURE — 1101F PT FALLS ASSESS-DOCD LE1/YR: CPT | Mod: CPTII,S$GLB,, | Performed by: INTERNAL MEDICINE

## 2025-01-07 PROCEDURE — 96523 IRRIG DRUG DELIVERY DEVICE: CPT

## 2025-01-07 PROCEDURE — 1159F MED LIST DOCD IN RCRD: CPT | Mod: CPTII,S$GLB,, | Performed by: INTERNAL MEDICINE

## 2025-01-07 PROCEDURE — A4216 STERILE WATER/SALINE, 10 ML: HCPCS | Performed by: INTERNAL MEDICINE

## 2025-01-07 PROCEDURE — 3008F BODY MASS INDEX DOCD: CPT | Mod: CPTII,S$GLB,, | Performed by: INTERNAL MEDICINE

## 2025-01-07 RX ORDER — HEPARIN 100 UNIT/ML
500 SYRINGE INTRAVENOUS
Status: DISCONTINUED | OUTPATIENT
Start: 2025-01-07 | End: 2025-01-07 | Stop reason: HOSPADM

## 2025-01-07 RX ORDER — HEPARIN 100 UNIT/ML
500 SYRINGE INTRAVENOUS
OUTPATIENT
Start: 2025-01-07

## 2025-01-07 RX ORDER — SODIUM CHLORIDE 0.9 % (FLUSH) 0.9 %
10 SYRINGE (ML) INJECTION
OUTPATIENT
Start: 2025-01-07

## 2025-01-07 RX ORDER — SODIUM CHLORIDE 0.9 % (FLUSH) 0.9 %
10 SYRINGE (ML) INJECTION
Status: DISCONTINUED | OUTPATIENT
Start: 2025-01-07 | End: 2025-01-07 | Stop reason: HOSPADM

## 2025-01-07 RX ORDER — EXEMESTANE 25 MG/1
25 TABLET ORAL DAILY
Qty: 90 TABLET | Refills: 3 | Status: SHIPPED | OUTPATIENT
Start: 2025-01-07 | End: 2026-01-07

## 2025-01-07 RX ADMIN — HEPARIN 500 UNITS: 100 SYRINGE at 08:01

## 2025-01-07 RX ADMIN — SODIUM CHLORIDE, PRESERVATIVE FREE 10 ML: 5 INJECTION INTRAVENOUS at 08:01

## 2025-01-07 NOTE — TELEPHONE ENCOUNTER
----- Message from Tisha sent at 1/6/2025  3:58 PM CST -----  Contact: Demetrice  .Patient is calling to speak with the nurse regarding appt  . Reports needing to schedule np deirdre due to the patient having breast pain . Please give patient a call back at   [phone]

## 2025-01-07 NOTE — DISCHARGE INSTRUCTIONS
Thank you for allowing me to care for you today,  Perlita Christianson, ESAUN, RN    [facility]  [address] [address]  [phone] phone     [phone] fax  Hours of Operation: Monday- Friday 7:00am- 5:30pm  After hours phone  [phone]  Hematology / Oncology Physicians on call      DEBBIE Talavera Dr., Dr., Dr., Dr., Dr., Dr., Dr., Dr., Dr., Dr., Dr., Dr., Dr., Dr., Dr., NP Phaon Dunbar, ALIYAH Bryant, ALIYAH Daley, ALIYAH  Please call with any concerns regarding your appointment today.

## 2025-01-07 NOTE — ASSESSMENT & PLAN NOTE
No palpable masses noted. She may have pain from time to time due to her bilateral mastectomy.  I did reassure her that 99.5 % breast cancer does not hurt and some pain is normal from time to time. She has also had all of her breast tissue removed but 2-3%. She will use ibuprofen and wear a compression top/bra as needed and her symptoms should resolve. I'm happy to send a referral to physical therapy if she would like. She will try these things then let me know if she would like to go to therapy and I will send the order at that time.

## 2025-01-07 NOTE — NURSING
"0805: Pt here for Mediport Flush. Right chest wall mediport accessed with a 20g 1" arellano via sterile technique.  Excellent blood return noted.  Flushed with 10ml NS and 5 ml heparin solution.  Needle D/C, site without redness, swelling, or drainage noted.  Dressing applied.  Patient tolerated well.  Patient to return to clinic in six weeks.  "

## 2025-01-07 NOTE — PROGRESS NOTES
Patient ID: Demetrice Gaines   Chief Complaint: Follow-up  MRN:  [id number]     Oncologic Diagnosis:  Stage IA (T1c N0 Mx) L Breast Invasive Ductal  Carcinoma, G3, +/+/-, ki67 of 90%; Oncotype 38  Previous Treatment:    Bilateral Mastectomies 10/25/2023  s/p Excision of Primary Tumor 11/22/23   TC x 4 (01/29/24 - 04/03/2024)  Arimidex (04/20/24 - 06/25/2024); tried letrazole however felt sx worse and resumed Arimidex Aug 2024    Stage IC, Ovarian Cancer, Right (Granulosa cell tumor)  s/p RALH/BSO 05/07/2024 with Dr. Person, Gyn/Onc at [facility] - pathology positive for granulosa cell tumor with negative peritoneal washings    Current Treatment: Letrazole; surveillance at [facility] for ovarian cancer    Subjective   The patient presents for follow up.    She is compliant with Armidex. She has some arthralgias and hot flashes.  She is taking Estroven; counseled to try vitamin E for hot flashes.   She reports that she has been having persistent pain in both breast but more pronounced in the outer, lower quadrant of the left breast.  She denies nipple discharge and skin changes.  She denies vaginal discharge and bleeding.  Her appetite and energy levels are good.  She has called the breast surgeon to follow up on breast pain.  Otherwise, she has no acute complaints.     Review of Systems   Constitutional:  Positive for diaphoresis. Negative for activity change, appetite change, chills, fatigue, fever and unexpected weight change.   HENT:  Negative for nosebleeds.    Respiratory:  Negative for shortness of breath.    Cardiovascular:  Negative for chest pain and leg swelling.   Musculoskeletal:  Positive for arthralgias.   Neurological:  Negative for dizziness and weakness.   Psychiatric/Behavioral:  The patient is not nervous/anxious.      History     Oncology History   Breast neoplasm, Tis (DCIS), left (Resolved)   9/8/2023 Initial Diagnosis    Breast neoplasm, Tis (DCIS), left     9/8/2023 Cancer  Staged    Staging form: Breast, AJCC 8th Edition  - Clinical stage from 9/8/2023: Stage 0 (cTis (DCIS), cN0, cM0, G3, ER+, ME+, HER2: Not Assessed)      Genetic Testing    Patient has genetic testing done for [employer] STAT breast cancer panel and 84 gene multicancer panel.                                              Results revealed patient has the following mutation(s):negative breast cancer panel- no mutation noted- MLH1- variant of undetermined significance      Chemotherapy    Treatment Summary   Plan Name: OP BREAST TC (DOCEtaxel cycloPHOSphamide) Q3W  Treatment Goal: Curative  Status: Active  Start Date: 1/10/2024 (Planned)  End Date: 3/14/2024 (Planned)  Provider: Deloris Gordon MD  Chemotherapy: cycloPHOSphamide 600 mg/m2 = 1,180 mg in sodium chloride 0.9% 255.9 mL chemo infusion, 600 mg/m2, Intravenous, Clinic/HOD 1 time, 0 of 4 cycles    DOCEtaxel (TAXOTERE) 75 mg/m2 = 148 mg in sodium chloride 0.9% 257.4 mL chemo infusion, 75 mg/m2, Intravenous, Clinic/HOD 1 time, 0 of 4 cycles       Malignant neoplasm of upper-outer quadrant of left breast in female, estrogen receptor positive   10/17/2023 Initial Diagnosis    Malignant neoplasm of upper-outer quadrant of left breast in female, estrogen receptor positive     10/18/2023 Cancer Staged    Staging form: Breast, AJCC 8th Edition  - Clinical stage from 10/18/2023: Stage IA (cT1c, cN0(f), cM0, G3, ER+, ME+, HER2-)     12/4/2023 Cancer Staged    Staging form: Breast, AJCC 8th Edition  - Pathologic stage from 12/4/2023: Stage IA (pT1c, pN0(sn), cM0, G3, ER+, ME+, HER2-)     1/29/2024 -  Chemotherapy    Treatment Summary   Plan Name: OP BREAST TC (DOCEtaxel cycloPHOSphamide) Q3W  Treatment Goal: Curative  Status: Active  Start Date: 1/29/2024  End Date: 4/4/2024  Provider: Deloris Gordon MD  Chemotherapy: cycloPHOSphamide 600 mg/m2 = 1,200 mg in sodium chloride 0.9% 291 mL chemo infusion, 600 mg/m2 = 1,200 mg, Intravenous, Clinic/HOD 1 time, 4 of 4  cycles  Administration: 1,200 mg (1/29/2024), 1,200 mg (2/20/2024), 1,200 mg (3/13/2024), 1,180 mg (4/3/2024)  DOCEtaxel (TAXOTERE) 75 mg/m2 = 150 mg in sodium chloride 0.9% 292.5 mL chemo infusion, 75 mg/m2 = 150 mg, Intravenous, Clinic/HOD 1 time, 4 of 4 cycles  Administration: 150 mg (1/29/2024), 150 mg (2/20/2024), 150 mg (3/13/2024), 148 mg (4/3/2024)      Chemotherapy    Treatment Summary   Plan Name: OP BREAST TC (DOCEtaxel cycloPHOSphamide) Q3W  Treatment Goal: Curative  Status: Active  Start Date: 1/10/2024 (Planned)  End Date: 3/14/2024 (Planned)  Provider: Deloris Gordon MD  Chemotherapy: cycloPHOSphamide 600 mg/m2 = 1,180 mg in sodium chloride 0.9% 255.9 mL chemo infusion, 600 mg/m2, Intravenous, Clinic/HOD 1 time, 0 of 4 cycles    DOCEtaxel (TAXOTERE) 75 mg/m2 = 148 mg in sodium chloride 0.9% 257.4 mL chemo infusion, 75 mg/m2, Intravenous, Clinic/HOD 1 time, 0 of 4 cycles           HPI:  Demetrice Gaines is a pleasant 63 y.o. female with history of CAD not requiring PCI, HTN, DM II, CHF and colon polyps who presents to clinic to establish care on referral for breast cancer.    The patient reports that she has been recovering well from surgery.  She has no acute complaints.  She has been having blood in her stool for over a year as well as constipation.  I recommended that we have this moved up to rule out any colonic lesions.  I will message the gastroenterologist to see if they can possibly do a colonoscopy on her prior to starting chemotherapy     I reviewed her Oncotype DX with her.  Unfortunately her recurrence score is 38 indicating a clear benefit for adjuvant chemotherapy.  She is understanding of this.  I reviewed the most common side effects of chemotherapy.  Initially considered giving her AC-T however giving her cardiac history I think that it would be better for the patient unless toxic if we administered TC.  Otherwise she does have some baseline intermittent neuropathy which will need  to be monitored.    FHx significant for Mother with primary bone cancer and paternal grandmother with breast cancer      Past Medical History:   Diagnosis Date    Bilateral pleural effusion 06/20/2020    CAD (coronary artery disease)     Chest wall pain 01/07/2025    CHF (congestive heart failure)     Colon polyp     Diabetes mellitus, type 2     Hypertension     Hyponatremia 06/20/2020    Malignant neoplasm of upper-outer quadrant of left breast in female, estrogen receptor positive 10/17/2023    Myocardial infarction     Ovarian cancer on right 05/21/2024     Physical Exam     ECOG SCORE    0 - Fully active-able to carry on all pre-disease performance without restriction         Vitals:    01/07/25 0819   BP: (!) 139/91   Pulse: 93   Resp: 20   Temp: 98.2 °F (36.8 °C)     Physical Exam  Constitutional:       General: She is not in acute distress.     Appearance: Normal appearance. She is normal weight. She is not ill-appearing or toxic-appearing.   HENT:      Head: Normocephalic and atraumatic.   Eyes:      Extraocular Movements: Extraocular movements intact.      Conjunctiva/sclera: Conjunctivae normal.   Cardiovascular:      Rate and Rhythm: Normal rate.   Pulmonary:      Effort: Pulmonary effort is normal.   Chest:      Chest wall: No mass.   Breasts:     Right: No swelling, bleeding, inverted nipple, mass, nipple discharge or tenderness.      Left: No swelling, bleeding, inverted nipple, mass, nipple discharge or tenderness.      Comments: Bilateral reconstructed beast; scar tissue palpated beneath scars; no discreet lumps/masses palpated; no tenderness  Musculoskeletal:         General: No swelling.   Lymphadenopathy:      Upper Body:      Right upper body: No supraclavicular or axillary adenopathy.      Left upper body: No supraclavicular or axillary adenopathy.   Skin:     General: Skin is warm.   Neurological:      General: No focal deficit present.      Mental Status: She is alert and oriented to person,  place, and time.   Psychiatric:         Mood and Affect: Mood normal.         Behavior: Behavior normal.       Labs   Labs:  No visits with results within 2 Day(s) from this visit.   Latest known visit with results is:   Lab Visit on 01/02/2025   Component Date Value Ref Range Status    Phosphorus 01/02/2025 3.7  2.7 - 4.5 mg/dL Final    Magnesium 01/02/2025 1.6  1.6 - 2.6 mg/dL Final    Sodium 01/02/2025 139  136 - 145 mmol/L Final    Potassium 01/02/2025 3.7  3.5 - 5.1 mmol/L Final    Chloride 01/02/2025 99  95 - 110 mmol/L Final    CO2 01/02/2025 26  23 - 29 mmol/L Final    Glucose 01/02/2025 154 (H)  70 - 110 mg/dL Final    BUN 01/02/2025 10  8 - 23 mg/dL Final    Creatinine 01/02/2025 0.8  0.5 - 1.4 mg/dL Final    Calcium 01/02/2025 10.4  8.7 - 10.5 mg/dL Final    Total Protein 01/02/2025 8.4  6.0 - 8.4 g/dL Final    Albumin 01/02/2025 4.4  3.5 - 5.2 g/dL Final    Total Bilirubin 01/02/2025 0.4  0.1 - 1.0 mg/dL Final    Comment: For infants and newborns, interpretation of results should be based  on gestational age, weight and in agreement with clinical  observations.    Premature Infant recommended reference ranges:  Up to 24 hours.............<8.0 mg/dL  Up to 48 hours............<12.0 mg/dL  3-5 days..................<15.0 mg/dL  6-29 days.................<15.0 mg/dL      Alkaline Phosphatase 01/02/2025 91  40 - 150 U/L Final    AST 01/02/2025 17  10 - 40 U/L Final    ALT 01/02/2025 16  10 - 44 U/L Final    eGFR 01/02/2025 >60  >60 mL/min/1.73 m^2 Final    Anion Gap 01/02/2025 14  8 - 16 mmol/L Final    WBC 01/02/2025 5.36  3.90 - 12.70 K/uL Final    RBC 01/02/2025 4.66  4.00 - 5.40 M/uL Final    Hemoglobin 01/02/2025 13.4  12.0 - 16.0 g/dL Final    Hematocrit 01/02/2025 39.5  37.0 - 48.5 % Final    MCV 01/02/2025 85  82 - 98 fL Final    MCH 01/02/2025 28.8  27.0 - 31.0 pg Final    MCHC 01/02/2025 33.9  32.0 - 36.0 g/dL Final    RDW 01/02/2025 13.3  11.5 - 14.5 % Final    Platelets 01/02/2025 317  150 - 450  K/uL Final    MPV 01/02/2025 8.6 (L)  9.2 - 12.9 fL Final    Immature Granulocytes 01/02/2025 0.2  0.0 - 0.5 % Final    Gran # (ANC) 01/02/2025 3.1  1.8 - 7.7 K/uL Final    Immature Grans (Abs) 01/02/2025 0.01  0.00 - 0.04 K/uL Final    Comment: Mild elevation in immature granulocytes is non specific and   can be seen in a variety of conditions including stress response,   acute inflammation, trauma and pregnancy. Correlation with other   laboratory and clinical findings is essential.      Lymph # 01/02/2025 1.8  1.0 - 4.8 K/uL Final    Mono # 01/02/2025 0.4  0.3 - 1.0 K/uL Final    Eos # 01/02/2025 0.1  0.0 - 0.5 K/uL Final    Baso # 01/02/2025 0.06  0.00 - 0.20 K/uL Final    nRBC 01/02/2025 0  0 /100 WBC Final    Gran % 01/02/2025 57.1  38.0 - 73.0 % Final    Lymph % 01/02/2025 33.2  18.0 - 48.0 % Final    Mono % 01/02/2025 7.1  4.0 - 15.0 % Final    Eosinophil % 01/02/2025 1.3  0.0 - 8.0 % Final    Basophil % 01/02/2025 1.1  0.0 - 1.9 % Final    Differential Method 01/02/2025 Automated   Final      Pathology     Specimen to Pathology, Surgery Breast 10/25/2023      Component 2 mo ago   Final Pathologic Diagnosis 1. Right breast, prophylactic nipple sparing mastectomy (467 grams):      -  Benign breast tissue with fibrocystic change including fibrosis, adenosis, usual ductal hyperplasia         (UDH) and duct ectasia with apocrine metaplasia and focal periductal chronic inflammation      -  Microcalcifications:  Present, associated with benign ductal tissue      -  Skin and nipple are surgically absent      -  Unremarkable skeletal muscle present      -  Negative for atypia or malignancy    2. Left breast, nipple sparing mastectomy (470 grams):      -  Benign breast tissue with focal atypical ductal hyperplasia (ADH, less than 2mm) in a background of         fibrocystic change including fibrosis, adenosis, duct ectasia, focal columnar cell change/hyperplasia         and fibroadenomatoid change      -  Findings of  biopsy cavity are NOT appreciated grossly or histologically, see case comment      -  Skin and nipple are surgically absent      -  Dumbbell shaped biopsy clip NOT IDENTIFIED at time of grossing    Comment:  Immunohistochemical stains with appropriate controls were performed on select tissue blocks.  Cytokeratin 5/6 demonstrates a mosaic staining pattern in areas of ductal hyperplasia, showing no evidence of atypia.  AE1/AE3 is utilized for duct  mapping.  P63 highlights intact myoepithelial cells throughout the specimen, showing no evidence of invasive carcinoma.    3. Folcroft lymph node #1 (hot and blue, background count 2), biopsy:      -  One lymph node, negative for metastatic carcinoma (0/1)      -  Immunohistochemical stains for CAM 5.2, AE1/AE3 and wide spectrum keratin are negative      4. Left breast axillary tail, excision (11 grams):      -  Benign mature fibroadipose tissue      -  Negative for atypia or malignancy    5. Additional lateral margin, excision (tissue submitted entirely for histologic evaluation):      -  Benign breast tissue with mild fibrocystic change including fibrosis, adenosis and         fibroadenomatoid change      -  Negative for atypia or malignancy      6. Additional anterior lateral margin, excision (tissue submitted entirely for histologic evaluation):      -  Benign breast tissue with fibrocystic change including fibrosis, adenosis, fibroadenomatoid         changes and duct ectasia with apocrine metaplasia      -  Microcalcifications:  Present, associated with benign breast tissue      -  Negative for atypia or malignancy     Comment: Interp By Dianelys Cardona M.D., Signed on 11/08/2023 at 09:29   Comment The patient's history of invasive carcinoma and ductal carcinoma in-situ in the upper outer quadrant is noted.  The specimen is thoroughly searched for both the dumbbell biopsy clip and a biopsy cavity, neither of which are identified grossly or  histologically.  Numerous  sections of tissue from specimen 2 (left breast mastectomy) were evaluated.  The patient's additional margins (specimens 5 and 6) were submitted entirely for histologic review and show no evidence of invasive or in-situ  carcinoma.  Specimen 4 (breast axillary tail) was also submitted entirely for histologic review and showed no evidence of invasive or in-situ carcinoma.  Dr. Bishop has reviewed the above case and agrees with the findings.    Dr. Josue was alerted to these findings via epic message on 11/08/2023 at 09:30.      All stains were performed with adequate positive and negative controls.              Specimen to Pathology, Surgery Breast 11/22/2023      Component 1 mo ago   Final Pathologic Diagnosis Breast, left, re-excision:  Invasive ductal carcinoma, poorly differentiated  Gentry grade 3:  Tubule formation-3, nuclear pleomorphism-3 and mitotic count -3  Invasive carcinoma measures 19 x 9 x 8 mm  Positive superior margin, measuring 10 mm in greatest extent  Additional margins:  -Anterior margin:  1 mm  -Medial margin:  6 mm  -Posterior margin:  6.5 mm  -Lateral margin:  9 mm  -Inferior margin:  9 mm  No carcinoma in Situ or lymphovascular invasion is identified  Previous biopsy clip and reflector both grossly identified   Comment: Interp By Shelbie Bishop M.D., Signed on 11/28/2023 at 10:46   Gross Surgery ID:  [id number]    Pathology ID:  [id number]  1.  Received in formalin labeled &quot;left breast biopsy&quot; is a 4 g, 2.3 cm superior to inferior by 2.1 cm medial to lateral by 2.3 cm anterior to posterior portion of breast tissue received oriented by the surgeon as short stitch superior and long  stitch lateral.  A biopsy clip and a reflector clip is identified exposed at the anterior-superior margins (see attached image).  The specimen is sectioned from anterior to posterior into 5 slices averaging 0.5 cm in thickness.  Sectioning reveals a 1.9  cm anterior to posterior by 0.9 cm medial to lateral by  0.8 cm superior to inferior well-circumscribed, tan-white, rubbery mass within slices 2-5.  The mass abuts the superior and medial margins and measures 0.9 cm from the lateral margin, 0.9 cm from  the inferior margin, 0.4 cm from the posterior margin, and 0.5 cm from the anterior margin.  There is an X shaped biopsy clip identified within the mass in slice 4 and a reflector clip identified adjacent to the mass in slice 1.  The specimen is inked as   follows:  Superior-blue, inferior-green, medial-red, lateral-orange, anterior-yellow, and posterior-black.  The specimen is submitted entirely and sequentially from anterior to posterior as follows:  QZS--1-A:  Slice 1, anterior margin, perpendicular  PUZ--1-B:  Slice 2, mass to include anterior, superior, inferior, medial, and lateral margins  ZJU--1-C:  Slice 3, mass to superior, inferior, medial, and lateral margins  OBN--1-D:  Slice 4, mass to superior, inferior, medial, and lateral margins  AFG--1-E:  Slice 5, posterior margin, perpendicular        Grossed by: Carl Donovan MS, PA(ASCP)        Assessment and Plan   Stage IA (T1c N0 Mx) L Breast Invasive Ductal  Carcinoma, G3, +/+/-, ki67 of 90%   s/p b/l nipple sparing mastectomy and sentinel node biopsy on 10/25/23   s/p excision of primary tumor 11/22/23   Pathology report above  Genetic Testing 09/13/2023: VUS in MLH1  Oncotype Dx Score 38  TTE stable from prior  She has a history of heart disease; she has not required PCI however will precert for TC treatment to circumvent potential anthracycline cardiac effects  I discussed in detail the role of medical oncology in her care.  I informed her that my treatment recommendation is based on the NCCN Guidelines, her final pathology and Oncotype DX score  Because of the high Oncotype DX score, chemotherapy is recommended; because of the hormonal make up of her tumor, endocrine therapy is recommended after she completes chemotherapy.  I reviewed the major side effects of chemotherapy and  AIs.   s/p TC x4 completed 04/03/24 ; AE of severe arthralgias, likely from GCSF  Plan   Continue AI; on discussion of SE, will try Exemestane   Continue with surveillance exams q3m      Pulmonary Nodule   Noted on CT Abd 05/12/2024 measuring 3 mm    Will repeat non-con CT Chest in 05/2025        Stage IC, Ovarian Cancer, Right (Granulosa cell tumor)  CT Abd/Pelv 04/16/2024:  6.4 cm indeterminate cystic and solid right adnexal mass  s/p RALH/BSO 05/07/2024 with Dr. Person, Gyn/Onc at [facility] - pathology positive for granulosa cell tumor with negative peritoneal washings  Continue surveillance with [facility]      TARA  Continue PO iron  Repeat iron studies in 3 months      Bone Health  Given that she is post menopausal and endocrine therapy is recommended, she will be monitored for the need for bisphosphonate therapy.  BMD 08/2023 was normal  Continue Calcium and vitamin D      Cancer Screening  PAP Smear 09/15/2023: Negative for intraepithelial lesion or malignancy   Colonoscopy 01/2024: TAs; repeat in 5 years      Chronic Medical Conditions  DM II  HTN  CAD:  She has not require PCI; stress test 12/26/2019 negative for ischemia and arrhythmias; last echo 06/2020 and showed preserved ejection fraction at 55%; concentric hypertrophy and normal left ventricular diastolic function  CHF  Hx of Colon Polyps  Peripheral neuropathy; intermittent  Hypomagnesemia, Resolved  Blood in stool, Resolved         Med Onc Chart Routing      Follow up with physician 3 months.   Follow up with [facility]    Infusion scheduling note    Injection scheduling note    Labs CBC, CMP, magnesium, phosphorus, ferritin and iron and TIBC   Scheduling:  Preferred lab:  Lab interval:     Imaging    Pharmacy appointment    Other referrals                     The patient was seen, interviewed and examined. Pertinent lab and radiologic studies were reviewed. Pt instructed to call should  they develop concerning signs/symptoms or have further questions.        Portions of the record may have been created with voice recognition software. Occasional wrong-word or sound-a-like substitutions may have occurred due to the inherent limitations of voice recognition software. Read the chart carefully and recognize, using context, where substitutions have occurred.      Deloris Garcia MD    Hematology/Oncology

## 2025-01-07 NOTE — PROGRESS NOTES
[facility]  MD Yoseph Yoo, NP-C    Date of Service: 2025    Chief Complaint:   Demetrice Gaines is a 64 y.o. female presenting today for bilateral breast pain. She has a history of bilateral nipple sparing mastectomy with reconstruction( tissue expander) 10/25/2023 with re-excision on 2023. The pain comes and goes but for the last two weeks it's been  more prominent.    History of Present Illness:   Mrs. Demetrice Gaines  presents on 2025 due to bilateral breast pain. She has a  history of left breast cancer(IDC) diagnosed in 2023  @ age 63. Stage 1A(T1c N0 Mx)) Grade 3. ER/AR positive. HER2-Blaine negative. KI-67 90%.  She elected bilateral nipple sparing mastectomy with left SLN biopsy(Dr. Josue).  She completed adjuvant chemotherapy. She started Arimidex in 2024 but switched to Letrozole in 2024 due to arthralgias and myalgias In 2024 she switched back to Arimidex 2024 and will start Aromasin this month(2025).   MD:::Llu Alvarez MD, Keron Lynn MD; Deloris Gordon MD    Past Medical History:   Diagnosis Date    Bilateral pleural effusion 2020    CAD (coronary artery disease)     Chest wall pain 2025    CHF (congestive heart failure)     Colon polyp     Diabetes mellitus, type 2     Hypertension     Hyponatremia 2020    Malignant neoplasm of upper-outer quadrant of left breast in female, estrogen receptor positive 10/17/2023    Myocardial infarction     Ovarian cancer on right 2024      Past Surgical History:   Procedure Laterality Date    Benign Cyst Right     BREAST CYST EXCISION Right     pt states benign     SECTION      COLONOSCOPY      COLONOSCOPY N/A 2019    Procedure: COLONOSCOPY;  Surgeon: Ileana Holcomb MD;  Location: [facility];  Service: Endoscopy;  Laterality: N/A;    COLONOSCOPY N/A 2024    Procedure: COLONOSCOPY;  Surgeon:  Lobo Mitchell MD;  Location: [facility];  Service: Endoscopy;  Laterality: N/A;    EXCISION, MASS, BREAST, USING RADIOLOGICAL MARKER Left 11/22/2023    Procedure: EXCISION,MASS,BREAST,USING RADIOLOGICAL MARKER;  Surgeon: Hemalatha Josue MD;  Location: [facility] OR;  Service: General;  Laterality: Left;  radhika  needed    Expanders removed and implants placed 8/23/2024      FLUOROSCOPY N/A 01/08/2024    Procedure: Fluoroscopy/Mediport placement;  Surgeon: Sylvester Zazueta MD;  Location: [facility];  Service: General;  Laterality: N/A;    HYSTERECTOMY  05/2024    INJECTION FOR SENTINEL NODE IDENTIFICATION Left 10/25/2023    Procedure: INJECTION, FOR SENTINEL NODE IDENTIFICATION;  Surgeon: Hemalatha Josue MD;  Location: [facility];  Service: General;  Laterality: Left;    INSERTION OF BREAST IMPLANT  08/23/2024    INSERTION OF BREAST TISSUE EXPANDER Bilateral 10/25/2023    Procedure: INSERTION, TISSUE EXPANDER, BREAST;  Surgeon: Keron Lynn MD;  Location: [facility];  Service: Plastics;  Laterality: Bilateral;    MASTECTOMY, NIPPLE SPARING Bilateral 10/25/2023    Procedure: MASTECTOMY, NIPPLE SPARING;  Surgeon: Hemalatha Josue MD;  Location: [facility];  Service: General;  Laterality: Bilateral;    SENTINEL LYMPH NODE BIOPSY Left 10/25/2023    Procedure: BIOPSY, LYMPH NODE, SENTINEL;  Surgeon: Hemalatha Josue MD;  Location: [facility] OR;  Service: General;  Laterality: Left;  nuc med needed 1 hour before start    ULTRASOUND GUIDANCE Left 10/25/2023    Procedure: ULTRASOUND GUIDANCE;  Surgeon: Hemalatha Josue MD;  Location: [facility] OR;  Service: General;  Laterality: Left;        Current Outpatient Medications:     amlodipine-olmesartan (LEATHA) 10-40 mg per tablet, Take 1 tablet by mouth once daily, Disp: 90 tablet, Rfl: 3    aspirin (ECOTRIN) 81 MG EC tablet, Take 81 mg by mouth once daily., Disp: , Rfl:     chlorthalidone (HYGROTEN) 25 MG Tab, Take 1 tablet (25 mg total) by mouth once daily., Disp:  90 tablet, Rfl: 3    exemestane (AROMASIN) 25 mg tablet, Take 1 tablet (25 mg total) by mouth once daily., Disp: 90 tablet, Rfl: 3    semaglutide (OZEMPIC) 1 mg/dose (4 mg/3 mL), Inject 1 mg into the skin every 7 days., Disp: 9 mL, Rfl: 3    semaglutide (OZEMPIC) 1 mg/dose (4 mg/3 mL), Inject 1 mg into the skin every 7 days., Disp: 3 mL, Rfl: 0   Review of patient's allergies indicates:   Allergen Reactions    Lisinopril Anaphylaxis    Aldactone [spironolactone]      Memory impair and breast soreness    Coreg [carvedilol]      Hair loss    Lisinopril-hydrochlorothiazide      Other reaction(s): Reaction:Throat swelling;      Social History     Tobacco Use    Smoking status: Never     Passive exposure: Never    Smokeless tobacco: Never    Tobacco comments:     NEVER   Substance Use Topics    Alcohol use: Never      Family History   Problem Relation Name Age of Onset    Cancer Mother Caryl Morales     Pacemaker/defibrilator Mother Caryl Morales     Glaucoma Mother Caryl Morales     Arthritis Mother Caryl Morales     Heart disease Mother Caryl Morales         It seems that this condition runs in my family on my mother's side    Hypertension Mother Caryl Morales     Diabetes Father Paulino Morales     Hypertension Sister      Glaucoma Sister      Breast cancer Paternal Grandmother Che Morales (paternal grandmother)     Cancer Paternal Grandmother Che Morales (paternal grandmother)     Hypertension Brother      COPD Sister Guera Cantu     COPD Brother Paulino Morales     Hypertension Brother Keyur Morales     Hypertension Sister Kierrarobert Oneillsanjana         Review of Systems   Integumentary:  Positive for breast tenderness. Negative for color change, rash, mole/lesion, breast mass and breast discharge.   Breast: Positive for tenderness.Negative for mass       Physical Exam   Constitutional: She is cooperative.   Pulmonary/Chest: She exhibits no mass.  Right breast exhibits no mass, no skin change and no tenderness. Left breast exhibits no mass, no skin change and no tenderness. No breast swelling.   Left- Surgically absent with NEM/Recurrence. Right- Surgically absent with NEM/Recurrence.        Abdominal: Normal appearance.   Genitourinary: No breast swelling.   Musculoskeletal: Lymphadenopathy:      Upper Body:      Right upper body: No supraclavicular or axillary adenopathy.      Left upper body: No supraclavicular or axillary adenopathy.     Neurological: She is alert.   Skin: No rash noted.          1. Chest wall pain  Assessment & Plan:  No palpable masses noted. She may have pain from time to time due to her bilateral mastectomy.  I did reassure her that 99.5 % breast cancer does not hurt and some pain is normal from time to time. She has also had all of her breast tissue removed but 2-3%. She will use ibuprofen and wear a compression top/bra as needed and her symptoms should resolve. I'm happy to send a referral to physical therapy if she would like. She will try these things then let me know if she would like to go to therapy and I will send the order at that time.       2. Malignant neoplasm of upper-outer quadrant of left breast in female, estrogen receptor positive  Assessment & Plan:  She will continue to be followed according to NCCN abena           Medical Decision Making:  It is my impression that this patient suffers all conditions contained in this medical document.  Each of these conditions did affect our plan of care and my medical decision making today.  It is my opinion that the medical decision making concerning this patient was of moderate difficulty based on the aforementioned conditions.  Any further recommendations will be communicated to the patient by me.  I have reviewed and verified her allergies, list of medications, medical and surgical histories, social history, and a pertinent review of symptoms.      Follow up:  6 months  and prn    For:  Physical Examination

## 2025-01-08 ENCOUNTER — OFFICE VISIT (OUTPATIENT)
Dept: INTERNAL MEDICINE | Facility: CLINIC | Age: 65
End: 2025-01-08
Payer: MEDICARE

## 2025-01-08 ENCOUNTER — LAB VISIT (OUTPATIENT)
Dept: LAB | Facility: HOSPITAL | Age: 65
End: 2025-01-08
Attending: FAMILY MEDICINE
Payer: MEDICARE

## 2025-01-08 ENCOUNTER — OFFICE VISIT (OUTPATIENT)
Dept: SURGERY | Facility: CLINIC | Age: 65
End: 2025-01-08
Payer: MEDICARE

## 2025-01-08 VITALS
BODY MASS INDEX: 30.41 KG/M2 | HEIGHT: 64 IN | OXYGEN SATURATION: 98 % | DIASTOLIC BLOOD PRESSURE: 78 MMHG | SYSTOLIC BLOOD PRESSURE: 130 MMHG | WEIGHT: 178.13 LBS | TEMPERATURE: 98 F | HEART RATE: 103 BPM

## 2025-01-08 VITALS — BODY MASS INDEX: 30.34 KG/M2 | HEIGHT: 64 IN | WEIGHT: 177.69 LBS

## 2025-01-08 DIAGNOSIS — C50.412 MALIGNANT NEOPLASM OF UPPER-OUTER QUADRANT OF LEFT BREAST IN FEMALE, ESTROGEN RECEPTOR POSITIVE: Chronic | ICD-10-CM

## 2025-01-08 DIAGNOSIS — E11.59 TYPE 2 DIABETES MELLITUS WITH OTHER CIRCULATORY COMPLICATION, WITHOUT LONG-TERM CURRENT USE OF INSULIN: Chronic | ICD-10-CM

## 2025-01-08 DIAGNOSIS — Z17.0 MALIGNANT NEOPLASM OF UPPER-OUTER QUADRANT OF LEFT BREAST IN FEMALE, ESTROGEN RECEPTOR POSITIVE: Chronic | ICD-10-CM

## 2025-01-08 DIAGNOSIS — I70.0 AORTIC ATHEROSCLEROSIS: ICD-10-CM

## 2025-01-08 DIAGNOSIS — E11.59 HYPERTENSION ASSOCIATED WITH DIABETES: Chronic | ICD-10-CM

## 2025-01-08 DIAGNOSIS — R07.89 CHEST WALL PAIN: Primary | ICD-10-CM

## 2025-01-08 DIAGNOSIS — C56.1 OVARIAN CANCER ON RIGHT: Chronic | ICD-10-CM

## 2025-01-08 DIAGNOSIS — I25.10 CORONARY ARTERY DISEASE INVOLVING NATIVE CORONARY ARTERY OF NATIVE HEART WITHOUT ANGINA PECTORIS: ICD-10-CM

## 2025-01-08 DIAGNOSIS — I15.2 HYPERTENSION ASSOCIATED WITH DIABETES: Chronic | ICD-10-CM

## 2025-01-08 DIAGNOSIS — I25.2 OLD MYOCARDIAL INFARCTION: ICD-10-CM

## 2025-01-08 DIAGNOSIS — I51.7 CARDIOMEGALY: Chronic | ICD-10-CM

## 2025-01-08 DIAGNOSIS — I50.32 CHRONIC DIASTOLIC CONGESTIVE HEART FAILURE: Chronic | ICD-10-CM

## 2025-01-08 DIAGNOSIS — Z53.20 REFUSAL OF STATIN MEDICATION BY PATIENT: ICD-10-CM

## 2025-01-08 DIAGNOSIS — E11.59 TYPE 2 DIABETES MELLITUS WITH OTHER CIRCULATORY COMPLICATION, WITHOUT LONG-TERM CURRENT USE OF INSULIN: Primary | Chronic | ICD-10-CM

## 2025-01-08 PROBLEM — M79.89 RIGHT LEG SWELLING: Status: RESOLVED | Noted: 2024-06-25 | Resolved: 2025-01-08

## 2025-01-08 LAB
ESTIMATED AVG GLUCOSE: 180 MG/DL (ref 68–131)
HBA1C MFR BLD: 7.9 % (ref 4–5.6)

## 2025-01-08 PROCEDURE — 36415 COLL VENOUS BLD VENIPUNCTURE: CPT | Performed by: FAMILY MEDICINE

## 2025-01-08 PROCEDURE — 99999 PR PBB SHADOW E&M-EST. PATIENT-LVL III: CPT | Mod: PBBFAC,,, | Performed by: NURSE PRACTITIONER

## 2025-01-08 PROCEDURE — 83036 HEMOGLOBIN GLYCOSYLATED A1C: CPT | Performed by: FAMILY MEDICINE

## 2025-01-08 PROCEDURE — 99214 OFFICE O/P EST MOD 30 MIN: CPT | Mod: S$GLB,,, | Performed by: NURSE PRACTITIONER

## 2025-01-08 PROCEDURE — 1159F MED LIST DOCD IN RCRD: CPT | Mod: CPTII,S$GLB,, | Performed by: FAMILY MEDICINE

## 2025-01-08 PROCEDURE — 1126F AMNT PAIN NOTED NONE PRSNT: CPT | Mod: CPTII,S$GLB,, | Performed by: NURSE PRACTITIONER

## 2025-01-08 PROCEDURE — 1159F MED LIST DOCD IN RCRD: CPT | Mod: CPTII,S$GLB,, | Performed by: NURSE PRACTITIONER

## 2025-01-08 PROCEDURE — 1101F PT FALLS ASSESS-DOCD LE1/YR: CPT | Mod: CPTII,S$GLB,, | Performed by: FAMILY MEDICINE

## 2025-01-08 PROCEDURE — 1126F AMNT PAIN NOTED NONE PRSNT: CPT | Mod: CPTII,S$GLB,, | Performed by: FAMILY MEDICINE

## 2025-01-08 PROCEDURE — 99999 PR PBB SHADOW E&M-EST. PATIENT-LVL III: CPT | Mod: PBBFAC,,, | Performed by: FAMILY MEDICINE

## 2025-01-08 PROCEDURE — 1101F PT FALLS ASSESS-DOCD LE1/YR: CPT | Mod: CPTII,S$GLB,, | Performed by: NURSE PRACTITIONER

## 2025-01-08 PROCEDURE — 3288F FALL RISK ASSESSMENT DOCD: CPT | Mod: CPTII,S$GLB,, | Performed by: FAMILY MEDICINE

## 2025-01-08 PROCEDURE — 3008F BODY MASS INDEX DOCD: CPT | Mod: CPTII,S$GLB,, | Performed by: FAMILY MEDICINE

## 2025-01-08 PROCEDURE — G2211 COMPLEX E/M VISIT ADD ON: HCPCS | Mod: S$GLB,,, | Performed by: FAMILY MEDICINE

## 2025-01-08 PROCEDURE — 99214 OFFICE O/P EST MOD 30 MIN: CPT | Mod: S$GLB,,, | Performed by: FAMILY MEDICINE

## 2025-01-08 PROCEDURE — 3078F DIAST BP <80 MM HG: CPT | Mod: CPTII,S$GLB,, | Performed by: FAMILY MEDICINE

## 2025-01-08 PROCEDURE — 3008F BODY MASS INDEX DOCD: CPT | Mod: CPTII,S$GLB,, | Performed by: NURSE PRACTITIONER

## 2025-01-08 PROCEDURE — 3288F FALL RISK ASSESSMENT DOCD: CPT | Mod: CPTII,S$GLB,, | Performed by: NURSE PRACTITIONER

## 2025-01-08 PROCEDURE — 1160F RVW MEDS BY RX/DR IN RCRD: CPT | Mod: CPTII,S$GLB,, | Performed by: NURSE PRACTITIONER

## 2025-01-08 PROCEDURE — 3075F SYST BP GE 130 - 139MM HG: CPT | Mod: CPTII,S$GLB,, | Performed by: FAMILY MEDICINE

## 2025-01-08 PROCEDURE — 1160F RVW MEDS BY RX/DR IN RCRD: CPT | Mod: CPTII,S$GLB,, | Performed by: FAMILY MEDICINE

## 2025-01-08 RX ORDER — ANASTROZOLE 1 MG/1
1 TABLET ORAL DAILY
COMMUNITY

## 2025-01-08 RX ORDER — SEMAGLUTIDE 1.34 MG/ML
1 INJECTION, SOLUTION SUBCUTANEOUS
Qty: 3 ML | Refills: 11 | Status: SHIPPED | OUTPATIENT
Start: 2025-01-08 | End: 2026-01-08

## 2025-01-08 NOTE — PROGRESS NOTES
Subjective:   Patient ID: Demetrice Gaines is a 64 y.o. female.  Chief Complaint:  Follow-up    Presents for six-month follow-up chronic medical conditions     Last visit August 2024 for preoperative evaluation  Breast expander removal   Did well without any complications     Medical History:   - Diabetes.  On Ozempic 1 mg weekly injection.  Reports compliance.  Denies side effects.  Last A1 6.8%.  Denies any symptoms hypo or hyperglycemia.  Eye exam and microalbumin up-to-date.  Needs A1c and foot exam.  - Hypertension with CHF and cardiomegaly.  On Elisabet 10/40 mg daily and chlorthalidone 25 mg daily.  Reports compliance.  Denies side effects.  Blood pressure controlled.  Denies any shortness a breath or swelling.  - Coronary artery disease with Aortic atherosclerosis, history of MI and Statin refusal. Continues to decline statin.  Also continues to decline treatment with Repatha or Nexletol.  Denies any chest pain or claudication.   - Breast cancer.  Up-to-date on follow-up surveillance with Hematology/Oncology.    - Ovarian cancer.  Up-to-date on follow-up surveillance with gynecology.   - Colon polyps.  Due for repeat colonoscopy January 20, 2029     Health maintenance needs include multiple vaccines which she has refused in the past     No new complaints today    Review of Systems   Constitutional:  Negative for chills, diaphoresis, fatigue and fever.   Eyes:  Negative for visual disturbance.   Respiratory:  Negative for cough, chest tightness, shortness of breath and wheezing.    Cardiovascular:  Negative for chest pain, palpitations and leg swelling.   Gastrointestinal:  Negative for abdominal distention, abdominal pain, constipation, diarrhea, nausea and vomiting.   Endocrine: Negative for polydipsia, polyphagia and polyuria.   Genitourinary:  Negative for difficulty urinating, dysuria, flank pain, frequency, hematuria and urgency.   Musculoskeletal:  Negative for myalgias.   Skin:  Negative for rash.  "  Neurological:  Negative for dizziness, syncope, weakness, light-headedness, numbness and headaches.   Psychiatric/Behavioral:  Negative for sleep disturbance. The patient is not nervous/anxious.        Objective:   /78 (BP Location: Right arm, Patient Position: Sitting)   Pulse 103   Temp 98.1 °F (36.7 °C) (Oral)   Ht 5' 4" (1.626 m)   Wt 80.8 kg (178 lb 2.1 oz)   SpO2 98%   BMI 30.58 kg/m²     Physical Exam  Vitals and nursing note reviewed.   Constitutional:       Appearance: Normal appearance. She is well-developed. She is obese.   Eyes:      General: No scleral icterus.     Conjunctiva/sclera: Conjunctivae normal.   Neck:      Vascular: No carotid bruit or JVD.   Cardiovascular:      Rate and Rhythm: Normal rate and regular rhythm.      Pulses:           Dorsalis pedis pulses are 2+ on the right side and 2+ on the left side.        Posterior tibial pulses are 2+ on the right side and 2+ on the left side.      Heart sounds: Normal heart sounds.   Pulmonary:      Effort: Pulmonary effort is normal.      Breath sounds: Normal breath sounds.   Abdominal:      General: There is no distension.      Palpations: Abdomen is soft. There is no hepatomegaly.      Tenderness: There is no abdominal tenderness. There is no guarding or rebound.   Musculoskeletal:      Right lower leg: No edema.      Left lower leg: No edema.      Right foot: Normal range of motion. Deformity (2nd toe overriding neighboring digit) present. No bunion.      Left foot: Normal range of motion. No deformity or bunion.   Feet:      Right foot:      Protective Sensation: 5 sites tested.  5 sites sensed.      Skin integrity: Dry skin present. No ulcer, blister, skin breakdown, erythema, warmth, callus or fissure.      Toenail Condition: Right toenails are normal.      Left foot:      Protective Sensation: 5 sites tested.  5 sites sensed.      Skin integrity: Dry skin present. No ulcer, blister, skin breakdown, erythema, warmth, callus or " fissure.      Toenail Condition: Fungal disease present.  Skin:     Findings: No rash.   Neurological:      Mental Status: She is alert.   Psychiatric:         Mood and Affect: Mood and affect normal.       Assessment:       ICD-10-CM ICD-9-CM   1. Type 2 diabetes mellitus with other circulatory complication, without long-term current use of insulin  E11.59 250.70   2. Hypertension associated with diabetes  E11.59 250.80    I15.2 401.9   3. Chronic diastolic congestive heart failure  I50.32 428.32     428.0   4. Cardiomegaly  I51.7 429.3   5. Coronary artery disease involving native coronary artery of native heart without angina pectoris  I25.10 414.01   6. Aortic atherosclerosis  I70.0 440.0   7. Old myocardial infarction  I25.2 412   8. Refusal of statin medication by patient  Z53.20 V64.2   9. Malignant neoplasm of upper-outer quadrant of left breast in female, estrogen receptor positive  C50.412 174.4    Z17.0 V86.0   10. Ovarian cancer on right  C56.1 183.0     Plan:   Type 2 diabetes mellitus with other circulatory complication, without long-term current use of insulin  -     Hemoglobin A1C; Future; Expected date: 01/08/2025  -     semaglutide (OZEMPIC) 1 mg/dose (4 mg/3 mL); Inject 1 mg into the skin every 7 days.  Dispense: 3 mL; Refill: 11  Asymptomatic   Check A1c   Adjust Ozempic 1 mg weekly dose if indicated   Eye exam up-to-date   Microalbumin up-to-date   Foot exam stable     Hypertension associated with diabetes  Chronic diastolic congestive heart failure  Cardiomegaly  Controlled.  Stable.  Asymptomatic.  BP at goal.    Continue Elisabet 10/40 mg daily   Continue Hygroton 25 mg daily     Coronary artery disease involving native coronary artery of native heart without angina pectoris  Aortic atherosclerosis  Old myocardial infarction  Refusal of statin medication by patient  Asymptomatic   Continues to decline/refuse any medication treatment     Malignant neoplasm of upper-outer quadrant of left breast in  female, estrogen receptor positive  Up-to-date on follow-up/surveillance with breast specialists and oncologist     Ovarian cancer on right  Up-to-date on follow-up/surveillance with gynecologist and oncologist     Health maintenance   Continues to decline all recommended vaccines     Return to clinic 6 months for annual exam or sooner as needed    Visit today included increased complexity associated with managing the longitudinal care of the patient due to the serious and/or complex managed problem(s) listed above.

## 2025-01-14 ENCOUNTER — OFFICE VISIT (OUTPATIENT)
Dept: CARDIOLOGY | Facility: CLINIC | Age: 65
End: 2025-01-14
Payer: MEDICARE

## 2025-01-14 VITALS
HEART RATE: 84 BPM | BODY MASS INDEX: 30.45 KG/M2 | DIASTOLIC BLOOD PRESSURE: 76 MMHG | OXYGEN SATURATION: 99 % | HEIGHT: 64 IN | WEIGHT: 178.38 LBS | SYSTOLIC BLOOD PRESSURE: 128 MMHG

## 2025-01-14 DIAGNOSIS — E11.59 TYPE 2 DIABETES MELLITUS WITH OTHER CIRCULATORY COMPLICATION, WITHOUT LONG-TERM CURRENT USE OF INSULIN: Chronic | ICD-10-CM

## 2025-01-14 DIAGNOSIS — Z17.0 MALIGNANT NEOPLASM OF UPPER-OUTER QUADRANT OF LEFT BREAST IN FEMALE, ESTROGEN RECEPTOR POSITIVE: Chronic | ICD-10-CM

## 2025-01-14 DIAGNOSIS — I15.2 HYPERTENSION ASSOCIATED WITH DIABETES: Chronic | ICD-10-CM

## 2025-01-14 DIAGNOSIS — I25.10 CORONARY ARTERY DISEASE INVOLVING NATIVE CORONARY ARTERY OF NATIVE HEART WITHOUT ANGINA PECTORIS: Chronic | ICD-10-CM

## 2025-01-14 DIAGNOSIS — C50.412 MALIGNANT NEOPLASM OF UPPER-OUTER QUADRANT OF LEFT BREAST IN FEMALE, ESTROGEN RECEPTOR POSITIVE: Chronic | ICD-10-CM

## 2025-01-14 DIAGNOSIS — E11.59 HYPERTENSION ASSOCIATED WITH DIABETES: Chronic | ICD-10-CM

## 2025-01-14 DIAGNOSIS — I25.2 OLD MYOCARDIAL INFARCTION: Chronic | ICD-10-CM

## 2025-01-14 DIAGNOSIS — I50.32 CHRONIC DIASTOLIC CONGESTIVE HEART FAILURE: Chronic | ICD-10-CM

## 2025-01-14 DIAGNOSIS — I70.0 AORTIC ATHEROSCLEROSIS: Primary | Chronic | ICD-10-CM

## 2025-01-14 PROCEDURE — 99214 OFFICE O/P EST MOD 30 MIN: CPT | Mod: S$GLB,,, | Performed by: INTERNAL MEDICINE

## 2025-01-14 PROCEDURE — 3074F SYST BP LT 130 MM HG: CPT | Mod: CPTII,S$GLB,, | Performed by: INTERNAL MEDICINE

## 2025-01-14 PROCEDURE — 1159F MED LIST DOCD IN RCRD: CPT | Mod: CPTII,S$GLB,, | Performed by: INTERNAL MEDICINE

## 2025-01-14 PROCEDURE — 3051F HG A1C>EQUAL 7.0%<8.0%: CPT | Mod: CPTII,S$GLB,, | Performed by: INTERNAL MEDICINE

## 2025-01-14 PROCEDURE — 3288F FALL RISK ASSESSMENT DOCD: CPT | Mod: CPTII,S$GLB,, | Performed by: INTERNAL MEDICINE

## 2025-01-14 PROCEDURE — 1160F RVW MEDS BY RX/DR IN RCRD: CPT | Mod: CPTII,S$GLB,, | Performed by: INTERNAL MEDICINE

## 2025-01-14 PROCEDURE — 1101F PT FALLS ASSESS-DOCD LE1/YR: CPT | Mod: CPTII,S$GLB,, | Performed by: INTERNAL MEDICINE

## 2025-01-14 PROCEDURE — 1126F AMNT PAIN NOTED NONE PRSNT: CPT | Mod: CPTII,S$GLB,, | Performed by: INTERNAL MEDICINE

## 2025-01-14 PROCEDURE — 3078F DIAST BP <80 MM HG: CPT | Mod: CPTII,S$GLB,, | Performed by: INTERNAL MEDICINE

## 2025-01-14 PROCEDURE — 3008F BODY MASS INDEX DOCD: CPT | Mod: CPTII,S$GLB,, | Performed by: INTERNAL MEDICINE

## 2025-01-14 PROCEDURE — 99999 PR PBB SHADOW E&M-EST. PATIENT-LVL III: CPT | Mod: PBBFAC,,, | Performed by: INTERNAL MEDICINE

## 2025-01-14 NOTE — PROGRESS NOTES
Subjective:   Patient ID:  Demetrice Gaines is a 64 y.o. female who presents for follow up of Dizziness      61 yo female, came in for 12 months  PMH CHFpEF, DM type II, CAD history of prior MI s/p [facility] which showed normal coronaries in  OSH,  and HTN.    admitted for weakness and palpitation. BNP > 1,000, troponin of 1.804, creatinine of 1.5, and Tbili of 3.6. CTA of chest negative for PE.Stress test in 12/19 which was negative for ischemia/injury. Echo 12/19 showed normal EF. COVID 19 negative. Rx as CHF exacerbation and demand ischemia.     ECHO normal EF, LVH mild AI and small pericardial effusion   ETT METS 9 and peak  mmHG and no ischemia  States that has had chest pain twice after discharged in . Occurred at rest  Lasted seconds. Dull and pressure. No radiating pain. No exercise related pain. NO SOB palpitation and orthopnea  No smoking/dronking  BP A1c LDL controlled     visit  Not taking the med this morning. BP controlled at home  No chest pain dyspnea faint, dizziness and syncope.  Foot OA  No regular exercise. Sedentary style.    03/22 visit  BP high in the office, and rechecked 170/110 mmHg, pt denied chest pain dyspnea headache palpitation and dizziness  Not took med this morning  ekg NSR  Lisinopril caused throat edema and Coreg caused hair loss    08/23 visit  BP high. On ozempic Rx for 1 m. Weight loss 3 lbs  Allergic to multiple HTN meds.,  No chest pain dyspnea dizziness faint and palpitation    Interval history  H/o breast Ca, s/p implant in 08/24, s/p mastectomy in 08/23 and h/o chemo and XRT  Occasional dizziness. No faint chest pain dyspnea palpitation orthopnea leg swelling.  08/24 done EKG reviewed by myself today NSR Q wave in inferior leads and poor R progression on precordial leads. No change compared to prior one.   and A1c 7.5  BP C. Declined statin                    Past Medical History:   Diagnosis Date    Bilateral pleural  effusion 2020    CAD (coronary artery disease)     Chest wall pain 2025    CHF (congestive heart failure)     Colon polyp     Diabetes mellitus, type 2     Hypertension     Hyponatremia 2020    Malignant neoplasm of upper-outer quadrant of left breast in female, estrogen receptor positive 10/17/2023    Myocardial infarction     Ovarian cancer on right 2024       Past Surgical History:   Procedure Laterality Date    Benign Cyst Right     BREAST CYST EXCISION Right 1986    pt states benign     SECTION      COLONOSCOPY      COLONOSCOPY N/A 2019    Procedure: COLONOSCOPY;  Surgeon: Ileana Holcomb MD;  [facility];  Service: Endoscopy;  Laterality: N/A;    COLONOSCOPY N/A 2024    Procedure: COLONOSCOPY;  Surgeon: Lobo Mitchell MD;  Location: [facility] ENDO;  Service: Endoscopy;  Laterality: N/A;    EXCISION, MASS, BREAST, USING RADIOLOGICAL MARKER Left 2023    Procedure: EXCISION,MASS,BREAST,USING RADIOLOGICAL MARKER;  Surgeon: Hemalatha Josue MD;  Location: [facility];  Service: General;  Laterality: Left;  radhika  needed    Expanders removed and implants placed 2024      FLUOROSCOPY N/A 2024    Procedure: Fluoroscopy/Mediport placement;  Surgeon: Sylvester Zazueta MD;  Location: [facility] CATH LAB;  Service: General;  Laterality: N/A;    HYSTERECTOMY  2024    INJECTION FOR SENTINEL NODE IDENTIFICATION Left 10/25/2023    Procedure: INJECTION, FOR SENTINEL NODE IDENTIFICATION;  Surgeon: Hemalatha Josue MD;  Location: [facility];  Service: General;  Laterality: Left;    INSERTION OF BREAST IMPLANT  2024    INSERTION OF BREAST TISSUE EXPANDER Bilateral 10/25/2023    Procedure: INSERTION, TISSUE EXPANDER, BREAST;  Surgeon: Keron Lynn MD;  Location: [facility] OR;  Service: Plastics;  Laterality: Bilateral;    MASTECTOMY, NIPPLE SPARING Bilateral 10/25/2023    Procedure: MASTECTOMY, NIPPLE SPARING;  Surgeon: Hemalatha Josue MD;  Location:  [facility] OR;  Service: General;  Laterality: Bilateral;    SENTINEL LYMPH NODE BIOPSY Left 10/25/2023    Procedure: BIOPSY, LYMPH NODE, SENTINEL;  Surgeon: Hemalatha Josue MD;  Location: [facility] OR;  Service: General;  Laterality: Left;  nuc med needed 1 hour before start    ULTRASOUND GUIDANCE Left 10/25/2023    Procedure: ULTRASOUND GUIDANCE;  Surgeon: Hemalatha Josue MD;  Location: [facility] OR;  Service: General;  Laterality: Left;       Social History     Tobacco Use    Smoking status: Never     Passive exposure: Never    Smokeless tobacco: Never    Tobacco comments:     NEVER   Substance Use Topics    Alcohol use: Never    Drug use: Never       Family History   Problem Relation Name Age of Onset    Cancer Mother Caryl Morales     Pacemaker/defibrilator Mother Caryl Morales     Glaucoma Mother Caryl Morales     Arthritis Mother Caryl Morales     Heart disease Mother Caryl Morales         It seems that this condition runs in my family on my mother's side    Hypertension Mother Caryl Morales     Diabetes Father Paulino Morales     Hypertension Sister      Glaucoma Sister      Breast cancer Paternal Grandmother Che Morales (paternal grandmother)     Cancer Paternal Grandmother Che Morales (paternal grandmother)     Hypertension Brother      COPD Sister Guera Andrew Cantu     COPD Brother Paulino Morales     Hypertension Brother Keyur Kalyani Morales     Hypertension Sister Kierra OLEARY    Objective:   Physical Exam  HENT:      Head: Normocephalic.   Eyes:      Pupils: Pupils are equal, round, and reactive to light.   Neck:      Thyroid: No thyromegaly.      Vascular: Normal carotid pulses. No carotid bruit or JVD.   Cardiovascular:      Rate and Rhythm: Normal rate and regular rhythm. No extrasystoles are present.     Chest Wall: PMI is not displaced.      Pulses: Normal pulses.      Heart sounds: Normal heart sounds. No murmur heard.      No gallop. No S3 sounds.   Pulmonary:      Effort: No respiratory distress.      Breath sounds: Normal breath sounds. No stridor.   Abdominal:      General: Bowel sounds are normal.      Palpations: Abdomen is soft.      Tenderness: There is no abdominal tenderness. There is no rebound.   Skin:     Findings: No rash.   Neurological:      Mental Status: She is alert and oriented to person, place, and time.   Psychiatric:         Behavior: Behavior normal.         Lab Results   Component Value Date    CHOL 186 08/01/2024    CHOL 153 02/19/2024    CHOL 166 03/20/2023     Lab Results   Component Value Date    HDL 42 08/01/2024    HDL 36 (L) 02/19/2024    HDL 47 03/20/2023     Lab Results   Component Value Date    LDLCALC 107.0 08/01/2024    LDLCALC 91.0 02/19/2024    LDLCALC 101.2 03/20/2023     Lab Results   Component Value Date    TRIG 185 (H) 08/01/2024    TRIG 130 02/19/2024    TRIG 89 03/20/2023     Lab Results   Component Value Date    CHOLHDL 22.6 08/01/2024    CHOLHDL 23.5 02/19/2024    CHOLHDL 28.3 03/20/2023       Chemistry        Component Value Date/Time     01/02/2025 1018    K 3.7 01/02/2025 1018    CL 99 01/02/2025 1018    CO2 26 01/02/2025 1018    BUN 10 01/02/2025 1018    CREATININE 0.8 01/02/2025 1018     (H) 01/02/2025 1018        Component Value Date/Time    CALCIUM 10.4 01/02/2025 1018    ALKPHOS 91 01/02/2025 1018    AST 17 01/02/2025 1018    ALT 16 01/02/2025 1018    BILITOT 0.4 01/02/2025 1018    ESTGFRAFRICA 42 (A) 04/04/2022 2105    EGFRNONAA 37 (A) 04/04/2022 2105          Lab Results   Component Value Date    HGBA1C 7.9 (H) 01/08/2025     Lab Results   Component Value Date    TSH 0.603 08/01/2024     Lab Results   Component Value Date    INR 1.0 01/08/2024    INR 1.3 (H) 06/13/2020     Lab Results   Component Value Date    WBC 5.36 01/02/2025    HGB 13.4 01/02/2025    HCT 39.5 01/02/2025    MCV 85 01/02/2025     01/02/2025     BMP  Sodium   Date Value Ref Range Status    01/02/2025 139 136 - 145 mmol/L Final     Potassium   Date Value Ref Range Status   01/02/2025 3.7 3.5 - 5.1 mmol/L Final     Chloride   Date Value Ref Range Status   01/02/2025 99 95 - 110 mmol/L Final     CO2   Date Value Ref Range Status   01/02/2025 26 23 - 29 mmol/L Final     BUN   Date Value Ref Range Status   01/02/2025 10 8 - 23 mg/dL Final     Creatinine   Date Value Ref Range Status   01/02/2025 0.8 0.5 - 1.4 mg/dL Final     Calcium   Date Value Ref Range Status   01/02/2025 10.4 8.7 - 10.5 mg/dL Final     Anion Gap   Date Value Ref Range Status   01/02/2025 14 8 - 16 mmol/L Final     eGFR if    Date Value Ref Range Status   04/04/2022 42 (A) >60 mL/min/1.73 m^2 Final     eGFR if non    Date Value Ref Range Status   04/04/2022 37 (A) >60 mL/min/1.73 m^2 Final     Comment:     Calculation used to obtain the estimated glomerular filtration  rate (eGFR) is the CKD-EPI equation.        BNP  @LABRCNTIP(BNP,BNPTRIAGEBLO)@  @LABRCNTIP(troponini)@  CrCl cannot be calculated (Patient's most recent lab result is older than the maximum 7 days allowed.).  No results found in the last 24 hours.  No results found in the last 24 hours.  No results found in the last 24 hours.    Assessment:      1. Aortic atherosclerosis    2. Chronic diastolic congestive heart failure    3. Coronary artery disease involving native coronary artery of native heart without angina pectoris    4. Hypertension associated with diabetes    5. Old myocardial infarction    6. Malignant neoplasm of upper-outer quadrant of left breast in female, estrogen receptor positive    7. Type 2 diabetes mellitus with other circulatory complication, without long-term current use of insulin        Plan:   Echo for h/o breast ca h/o chemo  Continue olmesartan amlodipine chlorthalidone asa   DM Rx per PCP  Counseled DASH  Check Lipid profile with PCP in 6 months  Recommend heart-healthy diet, weight control and regular  exercise.  Fabiola. Risk modification.   I have reviewed all pertinent labs and cardiac studies independently. Plans and recommendations have been formulated under my direct supervision. All questions answered and patient voiced understanding.   If symptoms persist go to the ED  RTC in 12 months

## 2025-01-22 ENCOUNTER — PATIENT MESSAGE (OUTPATIENT)
Dept: CARDIOLOGY | Facility: HOSPITAL | Age: 65
End: 2025-01-22
Payer: MEDICARE

## 2025-01-30 ENCOUNTER — PATIENT MESSAGE (OUTPATIENT)
Dept: CARDIOLOGY | Facility: HOSPITAL | Age: 65
End: 2025-01-30
Payer: MEDICARE

## 2025-02-12 ENCOUNTER — PATIENT MESSAGE (OUTPATIENT)
Dept: CARDIOLOGY | Facility: HOSPITAL | Age: 65
End: 2025-02-12
Payer: MEDICARE

## 2025-02-19 ENCOUNTER — HOSPITAL ENCOUNTER (OUTPATIENT)
Dept: CARDIOLOGY | Facility: HOSPITAL | Age: 65
Discharge: HOME | End: 2025-02-19
Attending: INTERNAL MEDICINE
Payer: MEDICARE

## 2025-03-17 ENCOUNTER — INFUSION (OUTPATIENT)
Dept: INFUSION THERAPY | Facility: HOSPITAL | Age: 65
End: 2025-03-17
Attending: INTERNAL MEDICINE
Payer: MEDICARE

## 2025-03-17 ENCOUNTER — OFFICE VISIT (OUTPATIENT)
Dept: ORTHOPEDICS | Facility: CLINIC | Age: 65
End: 2025-03-17
Payer: MEDICARE

## 2025-03-17 ENCOUNTER — HOSPITAL ENCOUNTER (OUTPATIENT)
Dept: RADIOLOGY | Facility: HOSPITAL | Age: 65
Discharge: HOME | End: 2025-03-17
Attending: ORTHOPAEDIC SURGERY
Payer: MEDICARE

## 2025-03-17 VITALS — HEIGHT: 64 IN | WEIGHT: 178.38 LBS | BODY MASS INDEX: 30.45 KG/M2

## 2025-03-17 DIAGNOSIS — C50.412 MALIGNANT NEOPLASM OF UPPER-OUTER QUADRANT OF LEFT BREAST IN FEMALE, ESTROGEN RECEPTOR POSITIVE: Primary | ICD-10-CM

## 2025-03-17 DIAGNOSIS — M25.532 LEFT WRIST PAIN: ICD-10-CM

## 2025-03-17 DIAGNOSIS — M65.30 TRIGGER FINGER, ACQUIRED: ICD-10-CM

## 2025-03-17 DIAGNOSIS — Z17.0 MALIGNANT NEOPLASM OF UPPER-OUTER QUADRANT OF LEFT BREAST IN FEMALE, ESTROGEN RECEPTOR POSITIVE: Primary | ICD-10-CM

## 2025-03-17 DIAGNOSIS — M79.642 LEFT HAND PAIN: Primary | ICD-10-CM

## 2025-03-17 DIAGNOSIS — M79.642 LEFT HAND PAIN: ICD-10-CM

## 2025-03-17 DIAGNOSIS — M67.432 GANGLION OF LEFT WRIST: Primary | ICD-10-CM

## 2025-03-17 PROCEDURE — 1125F AMNT PAIN NOTED PAIN PRSNT: CPT | Mod: CPTII,S$GLB,, | Performed by: ORTHOPAEDIC SURGERY

## 2025-03-17 PROCEDURE — 73130 X-RAY EXAM OF HAND: CPT | Mod: TC,LT

## 2025-03-17 PROCEDURE — 3051F HG A1C>EQUAL 7.0%<8.0%: CPT | Mod: CPTII,S$GLB,, | Performed by: ORTHOPAEDIC SURGERY

## 2025-03-17 PROCEDURE — 3288F FALL RISK ASSESSMENT DOCD: CPT | Mod: CPTII,S$GLB,, | Performed by: ORTHOPAEDIC SURGERY

## 2025-03-17 PROCEDURE — 96523 IRRIG DRUG DELIVERY DEVICE: CPT

## 2025-03-17 PROCEDURE — 63600175 PHARM REV CODE 636 W HCPCS: Performed by: INTERNAL MEDICINE

## 2025-03-17 PROCEDURE — 1160F RVW MEDS BY RX/DR IN RCRD: CPT | Mod: CPTII,S$GLB,, | Performed by: ORTHOPAEDIC SURGERY

## 2025-03-17 PROCEDURE — 1159F MED LIST DOCD IN RCRD: CPT | Mod: CPTII,S$GLB,, | Performed by: ORTHOPAEDIC SURGERY

## 2025-03-17 PROCEDURE — 99999 PR PBB SHADOW E&M-EST. PATIENT-LVL II: CPT | Mod: PBBFAC,,, | Performed by: ORTHOPAEDIC SURGERY

## 2025-03-17 PROCEDURE — 1101F PT FALLS ASSESS-DOCD LE1/YR: CPT | Mod: CPTII,S$GLB,, | Performed by: ORTHOPAEDIC SURGERY

## 2025-03-17 PROCEDURE — 73110 X-RAY EXAM OF WRIST: CPT | Mod: 26,LT,, | Performed by: RADIOLOGY

## 2025-03-17 PROCEDURE — 99204 OFFICE O/P NEW MOD 45 MIN: CPT | Mod: S$GLB,,, | Performed by: ORTHOPAEDIC SURGERY

## 2025-03-17 PROCEDURE — 73130 X-RAY EXAM OF HAND: CPT | Mod: 26,LT,, | Performed by: RADIOLOGY

## 2025-03-17 PROCEDURE — 3008F BODY MASS INDEX DOCD: CPT | Mod: CPTII,S$GLB,, | Performed by: ORTHOPAEDIC SURGERY

## 2025-03-17 PROCEDURE — 73110 X-RAY EXAM OF WRIST: CPT | Mod: TC,LT

## 2025-03-17 RX ORDER — SODIUM CHLORIDE 0.9 % (FLUSH) 0.9 %
10 SYRINGE (ML) INJECTION
Status: DISCONTINUED | OUTPATIENT
Start: 2025-03-17 | End: 2025-03-17 | Stop reason: HOSPADM

## 2025-03-17 RX ORDER — HEPARIN 100 UNIT/ML
500 SYRINGE INTRAVENOUS
OUTPATIENT
Start: 2025-03-17

## 2025-03-17 RX ORDER — SODIUM CHLORIDE 0.9 % (FLUSH) 0.9 %
10 SYRINGE (ML) INJECTION
OUTPATIENT
Start: 2025-03-17

## 2025-03-17 RX ORDER — HEPARIN 100 UNIT/ML
500 SYRINGE INTRAVENOUS
Status: DISCONTINUED | OUTPATIENT
Start: 2025-03-17 | End: 2025-03-17 | Stop reason: HOSPADM

## 2025-03-17 RX ADMIN — HEPARIN SODIUM (PORCINE) LOCK FLUSH IV SOLN 100 UNIT/ML 500 UNITS: 100 SOLUTION at 03:03

## 2025-03-17 NOTE — PROGRESS NOTES
Subjective:     Patient ID: Demetrice Gaines is a 65 y.o. female.    Chief Complaint: Pain of the Left Wrist      HPI:  The patient is a 65-year-old female with a left wrist radial volar ganglion cyst as well as left thumb A1 pulley flexor tendon cyst she wishes to have excised and left trigger thumb release..    Past Medical History:   Diagnosis Date    Bilateral pleural effusion 2020    CAD (coronary artery disease)     Chest wall pain 2025    CHF (congestive heart failure)     Colon polyp     Diabetes mellitus, type 2     Hypertension     Hyponatremia 2020    Malignant neoplasm of upper-outer quadrant of left breast in female, estrogen receptor positive 10/17/2023    Myocardial infarction     Ovarian cancer on right 2024     Past Surgical History:   Procedure Laterality Date    Benign Cyst Right     BREAST CYST EXCISION Right     pt states benign     SECTION      COLONOSCOPY      COLONOSCOPY N/A 2019    Procedure: COLONOSCOPY;  Surgeon: Ileana Holcomb MD;  Location: [facility];  Service: Endoscopy;  Laterality: N/A;    COLONOSCOPY N/A 2024    Procedure: COLONOSCOPY;  Surgeon: Lobo Mitchell MD;  Location: [facility] ENDO;  Service: Endoscopy;  Laterality: N/A;    EXCISION, MASS, BREAST, USING RADIOLOGICAL MARKER Left 2023    Procedure: EXCISION,MASS,BREAST,USING RADIOLOGICAL MARKER;  Surgeon: Hemalatha Josue MD;  Location: [facility] OR;  Service: General;  Laterality: Left;  radhika  needed    Expanders removed and implants placed 2024      FLUOROSCOPY N/A 2024    Procedure: Fluoroscopy/Mediport placement;  Surgeon: Sylvester Zazueta MD;  Location: [facility] CATH LAB;  Service: General;  Laterality: N/A;    HYSTERECTOMY  2024    INJECTION FOR SENTINEL NODE IDENTIFICATION Left 10/25/2023    Procedure: INJECTION, FOR SENTINEL NODE IDENTIFICATION;  Surgeon: Hemalatha Josue MD;  Location: [facility] OR;  Service: General;  Laterality: Left;    INSERTION  OF BREAST IMPLANT  08/23/2024    INSERTION OF BREAST TISSUE EXPANDER Bilateral 10/25/2023    Procedure: INSERTION, TISSUE EXPANDER, BREAST;  Surgeon: Keron Lynn MD;  Location: [facility] OR;  Service: Plastics;  Laterality: Bilateral;    MASTECTOMY, NIPPLE SPARING Bilateral 10/25/2023    Procedure: MASTECTOMY, NIPPLE SPARING;  Surgeon: Hemalatha Josue MD;  Location: [facility] OR;  Service: General;  Laterality: Bilateral;    SENTINEL LYMPH NODE BIOPSY Left 10/25/2023    Procedure: BIOPSY, LYMPH NODE, SENTINEL;  Surgeon: Hemalatha Josue MD;  Location: [facility] OR;  Service: General;  Laterality: Left;  nuc med needed 1 hour before start    ULTRASOUND GUIDANCE Left 10/25/2023    Procedure: ULTRASOUND GUIDANCE;  Surgeon: Hemalatha Josue MD;  Location: [facility] OR;  Service: General;  Laterality: Left;     Family History   Problem Relation Name Age of Onset    Cancer Mother Caryl Morales     Pacemaker/defibrilator Mother Caryl Morales     Glaucoma Mother Caryl Morales     Arthritis Mother Caryl Morales     Heart disease Mother Caryl Morales         It seems that this condition runs in my family on my mother's side    Hypertension Mother Caryl Morales     Diabetes Father Paulino Harshad Morales     Hypertension Sister      Glaucoma Sister      Breast cancer Paternal Grandmother Che Morales (paternal grandmother)     Cancer Paternal Grandmother Che Morales (paternal grandmother)     Hypertension Brother      COPD Sister Guera Cantu     COPD Brother Paulino Morales     Hypertension Brother Keyur Kalyani Morales     Hypertension Sister [facility]obert [facility]      Social History[1]  Medication List with Changes/Refills   Current Medications    AMLODIPINE-OLMESARTAN (LEATHA) 10-40 MG PER TABLET    Take 1 tablet by mouth once daily    ANASTROZOLE (ARIMIDEX) 1 MG TAB    Take 1 mg by mouth once daily.    ASPIRIN (ECOTRIN) 81 MG EC TABLET    Take 81 mg by mouth once daily.     CHLORTHALIDONE (HYGROTEN) 25 MG TAB    Take 1 tablet (25 mg total) by mouth once daily.    EXEMESTANE (AROMASIN) 25 MG TABLET    Take 1 tablet (25 mg total) by mouth once daily.     Review of patient's allergies indicates:   Allergen Reactions    Lisinopril Anaphylaxis    Aldactone [spironolactone]      Memory impair and breast soreness    Coreg [carvedilol]      Hair loss    Lisinopril-hydrochlorothiazide      Other reaction(s): Reaction:Throat swelling;     Review of Systems   Constitutional: Negative for malaise/fatigue.   HENT:  Negative for hearing loss.    Eyes:  Positive for visual disturbance. Negative for double vision.   Cardiovascular:  Positive for chest pain.   Respiratory:  Negative for shortness of breath.    Endocrine: Negative for cold intolerance.   Hematologic/Lymphatic: Does not bruise/bleed easily.   Skin:  Negative for poor wound healing and suspicious lesions.   Musculoskeletal:  Negative for gout, joint pain and joint swelling.   Gastrointestinal:  Negative for nausea and vomiting.   Genitourinary:  Negative for dysuria.   Neurological:  Negative for numbness, paresthesias and sensory change.   Psychiatric/Behavioral:  Negative for depression, memory loss and substance abuse. The patient is not nervous/anxious.    Allergic/Immunologic: Negative for persistent infections.       Objective:   Body mass index is 30.61 kg/m².  There were no vitals filed for this visit.             General    Constitutional: She is oriented to person, place, and time. She appears well-developed and well-nourished. No distress.   HENT:   Head: Normocephalic. Mouth/Throat: Oropharynx is clear and moist.   Eyes: EOM are normal.   Cardiovascular:  Normal rate.            Pulmonary/Chest: Effort normal.   Abdominal: Soft.   Neurological: She is alert and oriented to person, place, and time. No cranial nerve deficit.   Psychiatric: She has a normal mood and affect.         Left Hand/Wrist Exam     Inspection   Scars:  Wrist - absent Hand -  absent  Effusion: Wrist - absent Hand -  absent    Pain   Hand - The patient exhibits pain of the thumb MCP.  Wrist - The patient exhibits pain of the scapholunate/lunate ECU.    Other     Sensory Exam  Median Distribution: normal  Ulnar Distribution: normal  Radial Distribution: normal    Comments:  The patient is a 2 cm left wrist radial volar ganglion cyst.  She also has a 1 cm ganglion cyst flexor tendon sheath A1 pulley left thumb.  The patient also has a mass that moves with tendon excursion and subclinical triggering noted          Vascular Exam       Capillary Refill  Left Hand: normal capillary refill          Relevant imaging results reviewed and interpreted by me, discussed with the patient and / or family today radiographs left hand and wrist were normal  Assessment:     Encounter Diagnosis   Name Primary?    Ganglion of left wrist Yes        Plan:     The patient was counseled regarding excision left wrist radial volar ganglion cyst and left thumb flexor tendon sheath ganglion cyst and left trigger thumb release..  Risk complications and alternatives were discussed including the risk of infection, anesthetic risk, injury to nerves and vessels, loss of motion, and possible need for additional surgeries were discussed.  She seems to understand and agree to that surgery.  All questions were answered.                Disclaimer: This note was prepared using a voice recognition system and is likely to have sound alike errors within the text.          [1]   Social History  Socioeconomic History    Marital status:    Tobacco Use    Smoking status: Never     Passive exposure: Never    Smokeless tobacco: Never    Tobacco comments:     NEVER   Substance and Sexual Activity    Alcohol use: Never    Drug use: Never    Sexual activity: Not Currently     Comment: Menopausal     Social Drivers of Health     Financial Resource Strain: Medium Risk (8/1/2024)    Overall Financial Resource  Strain (CARDIA)     Difficulty of Paying Living Expenses: Somewhat hard   Food Insecurity: No Food Insecurity (8/16/2024)    Received from [facility]    Hunger Vital Sign     Worried About Running Out of Food in the Last Year: Never true     Ran Out of Food in the Last Year: Never true   Recent Concern: Food Insecurity - Food Insecurity Present (8/1/2024)    Hunger Vital Sign     Worried About Running Out of Food in the Last Year: Often true     Ran Out of Food in the Last Year: Sometimes true   Transportation Needs: Unmet Transportation Needs (8/16/2024)    Received from [facility]    PRAPARE - Transportation     Lack of Transportation (Medical): Yes     Lack of Transportation (Non-Medical): Yes   Physical Activity: Inactive (8/1/2024)    Exercise Vital Sign     Days of Exercise per Week: 0 days     Minutes of Exercise per Session: 0 min   Stress: No Stress Concern Present (8/1/2024)    [employer] [place] [employer] - Occupational Stress Questionnaire     Feeling of Stress : Not at all   Housing Stability: Unknown (8/1/2024)    Housing Stability Vital Sign     Unable to Pay for Housing in the Last Year: No     Homeless in the Last Year: No

## 2025-03-17 NOTE — NURSING
"Pt here for Mediport Flush. Right chestwall mediport accessed with a 20g 1" arellano via sterile technique.  Excellent blood return noted.  Flushed with 10ml NS and 5 ml heparin solution.  Needle D/C, site without redness, swelling, or drainage noted.  Dressing applied.  Patient tolerated well.  Patient to return to clinic in 6 weeks.  "

## 2025-03-19 ENCOUNTER — DOCUMENTATION ONLY (OUTPATIENT)
Dept: HEMATOLOGY/ONCOLOGY | Facility: CLINIC | Age: 65
End: 2025-03-19
Payer: MEDICARE

## 2025-03-19 ENCOUNTER — PATIENT MESSAGE (OUTPATIENT)
Dept: INTERNAL MEDICINE | Facility: CLINIC | Age: 65
End: 2025-03-19
Payer: MEDICARE

## 2025-03-19 DIAGNOSIS — I25.10 CORONARY ARTERY DISEASE INVOLVING NATIVE CORONARY ARTERY OF NATIVE HEART WITHOUT ANGINA PECTORIS: ICD-10-CM

## 2025-03-19 DIAGNOSIS — I70.0 AORTIC ATHEROSCLEROSIS: ICD-10-CM

## 2025-03-19 DIAGNOSIS — E11.59 TYPE 2 DIABETES MELLITUS WITH OTHER CIRCULATORY COMPLICATION, WITHOUT LONG-TERM CURRENT USE OF INSULIN: Chronic | ICD-10-CM

## 2025-03-19 RX ORDER — SEMAGLUTIDE 1.34 MG/ML
1 INJECTION, SOLUTION SUBCUTANEOUS
Qty: 3 ML | Refills: 11 | Status: SHIPPED | OUTPATIENT
Start: 2025-03-19 | End: 2026-03-19

## 2025-03-20 ENCOUNTER — TELEPHONE (OUTPATIENT)
Dept: DERMATOLOGY | Facility: CLINIC | Age: 65
End: 2025-03-20
Payer: MEDICARE

## 2025-03-20 ENCOUNTER — TELEPHONE (OUTPATIENT)
Dept: PHARMACY | Facility: CLINIC | Age: 65
End: 2025-03-20
Payer: MEDICARE

## 2025-03-20 NOTE — TELEPHONE ENCOUNTER
Patient informed of the application process for Ozempic 1mg and what's required to apply. Follow-up will be made in 5 business days.       Pharmacy Patient Assistance Team

## 2025-03-20 NOTE — TELEPHONE ENCOUNTER
Call bk placed to pt on yesterday. Informed pt that Krysta Roland is out on medical leave at this time, and her schedule is closed at this time. Pt appt arranged. Per pt request to send msg to  to make known of her appt for transportation purposes.

## 2025-03-20 NOTE — PROGRESS NOTES
MORRISR received email from patient regarding her nipple bleeding and asking for advice. SWER asked MD and she states patient needs to see Breast Surgery. Pt states she seen Dr. Josue. Pt states she will call [facility] and see can she get in with Dr. Josue. SWER states she will call their office as well to have them f/u with patient. SWER will remain available.

## 2025-03-24 DIAGNOSIS — Z00.00 ENCOUNTER FOR MEDICARE ANNUAL WELLNESS EXAM: ICD-10-CM

## 2025-04-07 ENCOUNTER — PATIENT MESSAGE (OUTPATIENT)
Dept: ORTHOPEDICS | Facility: CLINIC | Age: 65
End: 2025-04-07
Payer: MEDICARE

## 2025-04-08 DIAGNOSIS — M67.432 GANGLION OF LEFT WRIST: Primary | ICD-10-CM

## 2025-04-08 DIAGNOSIS — M25.532 LEFT WRIST PAIN: ICD-10-CM

## 2025-04-08 DIAGNOSIS — Z01.818 PREOPERATIVE EXAMINATION: ICD-10-CM

## 2025-04-08 DIAGNOSIS — M65.30 TRIGGER FINGER, ACQUIRED: ICD-10-CM

## 2025-04-08 DIAGNOSIS — M79.642 LEFT HAND PAIN: ICD-10-CM

## 2025-04-10 ENCOUNTER — PATIENT MESSAGE (OUTPATIENT)
Dept: INTERNAL MEDICINE | Facility: CLINIC | Age: 65
End: 2025-04-10
Payer: MEDICARE

## 2025-04-10 ENCOUNTER — TELEPHONE (OUTPATIENT)
Dept: HEMATOLOGY/ONCOLOGY | Facility: CLINIC | Age: 65
End: 2025-04-10
Payer: MEDICARE

## 2025-04-10 NOTE — TELEPHONE ENCOUNTER
SWER received message to call patient back. SWER returned patient's call. [facility] on demand application. SWER printed out CATS application and hand delivered to the surgery center for patient. SWER will remain available.

## 2025-04-17 ENCOUNTER — OFFICE VISIT (OUTPATIENT)
Dept: HEMATOLOGY/ONCOLOGY | Facility: CLINIC | Age: 65
End: 2025-04-17
Payer: MEDICARE

## 2025-04-17 ENCOUNTER — LAB VISIT (OUTPATIENT)
Dept: LAB | Facility: HOSPITAL | Age: 65
End: 2025-04-17
Attending: INTERNAL MEDICINE
Payer: MEDICARE

## 2025-04-17 VITALS
TEMPERATURE: 98 F | DIASTOLIC BLOOD PRESSURE: 86 MMHG | SYSTOLIC BLOOD PRESSURE: 137 MMHG | BODY MASS INDEX: 31.62 KG/M2 | HEIGHT: 64 IN | WEIGHT: 185.19 LBS | HEART RATE: 92 BPM

## 2025-04-17 DIAGNOSIS — Z85.3 HISTORY OF INVASIVE BREAST CANCER: Primary | ICD-10-CM

## 2025-04-17 DIAGNOSIS — Z17.0 MALIGNANT NEOPLASM OF UPPER-OUTER QUADRANT OF LEFT BREAST IN FEMALE, ESTROGEN RECEPTOR POSITIVE: ICD-10-CM

## 2025-04-17 DIAGNOSIS — I15.2 HYPERTENSION ASSOCIATED WITH DIABETES: ICD-10-CM

## 2025-04-17 DIAGNOSIS — C50.412 MALIGNANT NEOPLASM OF UPPER-OUTER QUADRANT OF LEFT BREAST IN FEMALE, ESTROGEN RECEPTOR POSITIVE: ICD-10-CM

## 2025-04-17 DIAGNOSIS — K66.8 FREE INTRAPERITONEAL AIR: ICD-10-CM

## 2025-04-17 DIAGNOSIS — D50.9 IRON DEFICIENCY ANEMIA, UNSPECIFIED IRON DEFICIENCY ANEMIA TYPE: ICD-10-CM

## 2025-04-17 DIAGNOSIS — C56.1 OVARIAN CANCER ON RIGHT: Chronic | ICD-10-CM

## 2025-04-17 DIAGNOSIS — R91.1 PULMONARY NODULE: ICD-10-CM

## 2025-04-17 DIAGNOSIS — E11.59 HYPERTENSION ASSOCIATED WITH DIABETES: ICD-10-CM

## 2025-04-17 LAB
ABSOLUTE EOSINOPHIL (OHS): 0.06 K/UL
ABSOLUTE MONOCYTE (OHS): 0.54 K/UL (ref 0.3–1)
ABSOLUTE NEUTROPHIL COUNT (OHS): 3.58 K/UL (ref 1.8–7.7)
ALBUMIN SERPL BCP-MCNC: 4.5 G/DL (ref 3.5–5.2)
ALP SERPL-CCNC: 89 UNIT/L (ref 40–150)
ALT SERPL W/O P-5'-P-CCNC: 20 UNIT/L (ref 10–44)
ANION GAP (OHS): 14 MMOL/L (ref 8–16)
AST SERPL-CCNC: 25 UNIT/L (ref 11–45)
BASOPHILS # BLD AUTO: 0.04 K/UL
BASOPHILS NFR BLD AUTO: 0.8 %
BILIRUB SERPL-MCNC: 0.4 MG/DL (ref 0.1–1)
BUN SERPL-MCNC: 11 MG/DL (ref 8–23)
CALCIUM SERPL-MCNC: 9.8 MG/DL (ref 8.7–10.5)
CHLORIDE SERPL-SCNC: 100 MMOL/L (ref 95–110)
CO2 SERPL-SCNC: 24 MMOL/L (ref 23–29)
CREAT SERPL-MCNC: 0.8 MG/DL (ref 0.5–1.4)
ERYTHROCYTE [DISTWIDTH] IN BLOOD BY AUTOMATED COUNT: 14.1 % (ref 11.5–14.5)
FERRITIN SERPL-MCNC: 70 NG/ML (ref 20–300)
GFR SERPLBLD CREATININE-BSD FMLA CKD-EPI: >60 ML/MIN/1.73/M2
GLUCOSE SERPL-MCNC: 191 MG/DL (ref 70–110)
HCT VFR BLD AUTO: 38.4 % (ref 37–48.5)
HGB BLD-MCNC: 13 GM/DL (ref 12–16)
IMM GRANULOCYTES # BLD AUTO: 0.02 K/UL (ref 0–0.04)
IMM GRANULOCYTES NFR BLD AUTO: 0.4 % (ref 0–0.5)
IRON SATN MFR SERPL: 22 % (ref 20–50)
IRON SERPL-MCNC: 87 UG/DL (ref 30–160)
LYMPHOCYTES # BLD AUTO: 0.77 K/UL (ref 1–4.8)
MAGNESIUM SERPL-MCNC: 1.6 MG/DL (ref 1.6–2.6)
MCH RBC QN AUTO: 28.4 PG (ref 27–31)
MCHC RBC AUTO-ENTMCNC: 33.9 G/DL (ref 32–36)
MCV RBC AUTO: 84 FL (ref 82–98)
NUCLEATED RBC (/100WBC) (OHS): 0 /100 WBC
PHOSPHATE SERPL-MCNC: 3.7 MG/DL (ref 2.7–4.5)
PLATELET # BLD AUTO: 297 K/UL (ref 150–450)
PMV BLD AUTO: 9 FL (ref 9.2–12.9)
POTASSIUM SERPL-SCNC: 4.2 MMOL/L (ref 3.5–5.1)
PROT SERPL-MCNC: 8.5 GM/DL (ref 6–8.4)
RBC # BLD AUTO: 4.58 M/UL (ref 4–5.4)
RELATIVE EOSINOPHIL (OHS): 1.2 %
RELATIVE LYMPHOCYTE (OHS): 15.4 % (ref 18–48)
RELATIVE MONOCYTE (OHS): 10.8 % (ref 4–15)
RELATIVE NEUTROPHIL (OHS): 71.4 % (ref 38–73)
SODIUM SERPL-SCNC: 138 MMOL/L (ref 136–145)
TIBC SERPL-MCNC: 389 UG/DL (ref 250–450)
TRANSFERRIN SERPL-MCNC: 263 MG/DL (ref 200–375)
WBC # BLD AUTO: 5.01 K/UL (ref 3.9–12.7)

## 2025-04-17 PROCEDURE — 83540 ASSAY OF IRON: CPT

## 2025-04-17 PROCEDURE — 80053 COMPREHEN METABOLIC PANEL: CPT

## 2025-04-17 PROCEDURE — 85025 COMPLETE CBC W/AUTO DIFF WBC: CPT

## 2025-04-17 PROCEDURE — 84100 ASSAY OF PHOSPHORUS: CPT

## 2025-04-17 PROCEDURE — 82728 ASSAY OF FERRITIN: CPT

## 2025-04-17 PROCEDURE — 36415 COLL VENOUS BLD VENIPUNCTURE: CPT

## 2025-04-17 PROCEDURE — 99999 PR PBB SHADOW E&M-EST. PATIENT-LVL III: CPT | Mod: PBBFAC,,, | Performed by: INTERNAL MEDICINE

## 2025-04-17 PROCEDURE — 83735 ASSAY OF MAGNESIUM: CPT

## 2025-04-17 RX ORDER — ANASTROZOLE 1 MG/1
1 TABLET ORAL DAILY
Qty: 1 TABLET | Refills: 11 | Status: SHIPPED | OUTPATIENT
Start: 2025-04-17 | End: 2025-04-17

## 2025-04-17 RX ORDER — ANASTROZOLE 1 MG/1
1 TABLET ORAL DAILY
Qty: 30 TABLET | Refills: 11 | Status: SHIPPED | OUTPATIENT
Start: 2025-04-17

## 2025-04-17 RX ORDER — CHLORTHALIDONE 25 MG/1
25 TABLET ORAL
Qty: 90 TABLET | Refills: 2 | Status: SHIPPED | OUTPATIENT
Start: 2025-04-17

## 2025-04-17 NOTE — TELEPHONE ENCOUNTER
Care Due:                  Date            Visit Type   Department     Provider  --------------------------------------------------------------------------------                                EP -                              PRIMARY      [facility] INTERNAL  Last Visit: 01-      CARE ([facility])   MEDICINE       Lul Alvarez                              EP -                              PRIMARY      [facility] INTER[facility]  Next Visit: 07-      CARE ([facility])   MEDICINE       Lul Alvarez                                                            Last  Test          Frequency    Reason                     Performed    Due Date  --------------------------------------------------------------------------------    HBA1C.......  6 months...  semaglutide..............  01- 07-    Health [facility] Embedded Care Due Messages. Reference number: [id number].   4/17/2025 8:48:03 AM CDT

## 2025-04-17 NOTE — PROGRESS NOTES
Patient ID: Demetrice Gaines   Reason for Visit: Follow-up (Hx of breast cancer)  MRN:  [id number]     Oncologic Diagnosis:  Stage IA (T1c N0 Mx) L Breast Invasive Ductal  Carcinoma, G3, +/+/-, ki67 of 90%; Oncotype 38  Previous Treatment:    Bilateral Mastectomies 10/25/2023; s/p Re-excision 11/22/23 - . Hemalatha Josue M.D.   TC x 4 (01/29/24 - 04/03/2024)  Breast Surgical Oncology; tissue expander reconstruction by Dr. Lynn   Arimidex (04/20/24 - 06/25/2024); tried letrazole however felt sx worse and resumed Arimidex Aug 2024    Stage IC, Ovarian Cancer, Right (Granulosa cell tumor)  s/p RALH/BSO 05/07/2024 with Dr. Person, Gyn/Onc at [facility] - pathology positive for granulosa cell tumor with negative peritoneal washings    Current Treatment: Arimidex; surveillance at [facility] for ovarian cancer    Subjective   The patient presents for follow up.    She states that she had some bleeding in her left nipple in March 2025 for about 3 days; she saw Dr. Josue and had a benign breast exam in the clinic.  She had her port removed 04/08/25.    She has been having sharp pains in both breasts that sounds like nerve pain; she denies any further bloody nipple discharge, skin changes, lumps or itching.   She is compliant with anastrozole.  She has intermittent join pain that doesn't last long.  She also has night sweats.  Counseled to resume vitamin E.  Otherwise, she is doing well and has no acute complaints.  She will continues with self breast exams and follow up at [facility] for GYN exams.    Review of Systems   Constitutional:  Positive for diaphoresis. Negative for activity change, appetite change, chills, fatigue, fever and unexpected weight change.   HENT:  Negative for nosebleeds.    Respiratory:  Negative for shortness of breath.    Cardiovascular:  Negative for chest pain and leg swelling.   Musculoskeletal:  Positive for arthralgias.   Neurological:  Negative for dizziness and weakness.    Psychiatric/Behavioral:  The patient is not nervous/anxious.      History     Oncology History   Breast neoplasm, Tis (DCIS), left (Resolved)   9/8/2023 Initial Diagnosis    Breast neoplasm, Tis (DCIS), left     9/8/2023 Cancer Staged    Staging form: Breast, AJCC 8th Edition  - Clinical stage from 9/8/2023: Stage 0 (cTis (DCIS), cN0, cM0, G3, ER+, WY+, HER2: Not Assessed)      Genetic Testing    Patient has genetic testing done for [employer] STAT breast cancer panel and 84 gene multicancer panel.                                              Results revealed patient has the following mutation(s):negative breast cancer panel- no mutation noted- MLH1- variant of undetermined significance      Chemotherapy    Treatment Summary   Plan Name: OP BREAST TC (DOCEtaxel cycloPHOSphamide) Q3W  Treatment Goal: Curative  Status: Active  Start Date: 1/10/2024 (Planned)  End Date: 3/14/2024 (Planned)  Provider: Deloris Gordon MD  Chemotherapy: cycloPHOSphamide 600 mg/m2 = 1,180 mg in sodium chloride 0.9% 255.9 mL chemo infusion, 600 mg/m2, Intravenous, Clinic/HOD 1 time, 0 of 4 cycles    DOCEtaxel (TAXOTERE) 75 mg/m2 = 148 mg in sodium chloride 0.9% 257.4 mL chemo infusion, 75 mg/m2, Intravenous, Clinic/HOD 1 time, 0 of 4 cycles       Malignant neoplasm of upper-outer quadrant of left breast in female, estrogen receptor positive   10/17/2023 Initial Diagnosis    Malignant neoplasm of upper-outer quadrant of left breast in female, estrogen receptor positive     10/18/2023 Cancer Staged    Staging form: Breast, AJCC 8th Edition  - Clinical stage from 10/18/2023: Stage IA (cT1c, cN0(f), cM0, G3, ER+, WY+, HER2-)     12/4/2023 Cancer Staged    Staging form: Breast, AJCC 8th Edition  - Pathologic stage from 12/4/2023: Stage IA (pT1c, pN0(sn), cM0, G3, ER+, WY+, HER2-)     1/29/2024 - 4/4/2024 Chemotherapy    Treatment Summary   Plan Name: OP BREAST TC (DOCEtaxel cycloPHOSphamide) Q3W  Treatment Goal: Curative  Status:  Inactive  Start Date: 1/29/2024  End Date: 4/4/2024  Provider: Deloris Gordon MD  Chemotherapy: cycloPHOSphamide 600 mg/m2 = 1,200 mg in sodium chloride 0.9% 291 mL chemo infusion, 600 mg/m2 = 1,200 mg, Intravenous, Clinic/HOD 1 time, 4 of 4 cycles  Administration: 1,200 mg (1/29/2024), 1,200 mg (2/20/2024), 1,200 mg (3/13/2024), 1,180 mg (4/3/2024)  DOCEtaxel (TAXOTERE) 75 mg/m2 = 150 mg in sodium chloride 0.9% 292.5 mL chemo infusion, 75 mg/m2 = 150 mg, Intravenous, Clinic/HOD 1 time, 4 of 4 cycles  Administration: 150 mg (1/29/2024), 150 mg (2/20/2024), 150 mg (3/13/2024), 148 mg (4/3/2024)      Chemotherapy    Treatment Summary   Plan Name: OP BREAST TC (DOCEtaxel cycloPHOSphamide) Q3W  Treatment Goal: Curative  Status: Active  Start Date: 1/10/2024 (Planned)  End Date: 3/14/2024 (Planned)  Provider: Deloris Gordon MD  Chemotherapy: cycloPHOSphamide 600 mg/m2 = 1,180 mg in sodium chloride 0.9% 255.9 mL chemo infusion, 600 mg/m2, Intravenous, Clinic/HOD 1 time, 0 of 4 cycles    DOCEtaxel (TAXOTERE) 75 mg/m2 = 148 mg in sodium chloride 0.9% 257.4 mL chemo infusion, 75 mg/m2, Intravenous, Clinic/HOD 1 time, 0 of 4 cycles           HPI:  Demetrice Gaines is a pleasant 63 y.o. female with history of CAD not requiring PCI, HTN, DM II, CHF and colon polyps who presents to clinic to establish care on referral for breast cancer.    The patient reports that she has been recovering well from surgery.  She has no acute complaints.  She has been having blood in her stool for over a year as well as constipation.  I recommended that we have this moved up to rule out any colonic lesions.  I will message the gastroenterologist to see if they can possibly do a colonoscopy on her prior to starting chemotherapy     I reviewed her Oncotype DX with her.  Unfortunately her recurrence score is 38 indicating a clear benefit for adjuvant chemotherapy.  She is understanding of this.  I reviewed the most common side effects of  chemotherapy.  Initially considered giving her AC-T however giving her cardiac history I think that it would be better for the patient unless toxic if we administered TC.  Otherwise she does have some baseline intermittent neuropathy which will need to be monitored.    FHx significant for Mother with primary bone cancer and paternal grandmother with breast cancer      Past Medical History:   Diagnosis Date    Bilateral pleural effusion 06/20/2020    CAD (coronary artery disease)     Chest wall pain 01/07/2025    CHF (congestive heart failure)     Colon polyp     Diabetes mellitus, type 2     Hypertension     Hyponatremia 06/20/2020    Malignant neoplasm of upper-outer quadrant of left breast in female, estrogen receptor positive 10/17/2023    Myocardial infarction     Ovarian cancer on right 05/21/2024     Physical Exam     ECOG SCORE    0 - Fully active-able to carry on all pre-disease performance without restriction         There were no vitals filed for this visit.    Physical Exam  Constitutional:       General: She is not in acute distress.     Appearance: Normal appearance. She is normal weight. She is not ill-appearing or toxic-appearing.   HENT:      Head: Normocephalic and atraumatic.   Eyes:      Extraocular Movements: Extraocular movements intact.      Conjunctiva/sclera: Conjunctivae normal.   Cardiovascular:      Rate and Rhythm: Normal rate.   Pulmonary:      Effort: Pulmonary effort is normal.   Chest:      Chest wall: No mass.   Breasts:     Right: No swelling, bleeding, inverted nipple, mass, nipple discharge or tenderness.      Left: No swelling, bleeding, inverted nipple, mass, nipple discharge or tenderness.   Musculoskeletal:         General: No swelling.   Lymphadenopathy:      Upper Body:      Right upper body: No supraclavicular or axillary adenopathy.      Left upper body: No supraclavicular or axillary adenopathy.   Skin:     General: Skin is warm.   Neurological:      General: No focal  deficit present.      Mental Status: She is alert and oriented to person, place, and time.   Psychiatric:         Mood and Affect: Mood normal.         Behavior: Behavior normal.       Breast exam deferred  Labs   Labs:  No visits with results within 2 Day(s) from this visit.   Latest known visit with results is:   Lab Visit on 01/08/2025   Component Date Value Ref Range Status    Hemoglobin A1C 01/08/2025 7.9 (H)  4.0 - 5.6 % Final    Comment: ADA Screening Guidelines:  5.7-6.4%  Consistent with prediabetes  >or=6.5%  Consistent with diabetes    High levels of fetal hemoglobin interfere with the HbA1C  assay. Heterozygous hemoglobin variants (HbS, HgC, etc)do  not significantly interfere with this assay.   However, presence of multiple variants may affect accuracy.      Estimated Avg Glucose 01/08/2025 180 (H)  68 - 131 mg/dL Final      Pathology     Specimen to Pathology, Surgery Breast 10/25/2023      Component 2 mo ago   Final Pathologic Diagnosis 1. Right breast, prophylactic nipple sparing mastectomy (467 grams):      -  Benign breast tissue with fibrocystic change including fibrosis, adenosis, usual ductal hyperplasia         (UDH) and duct ectasia with apocrine metaplasia and focal periductal chronic inflammation      -  Microcalcifications:  Present, associated with benign ductal tissue      -  Skin and nipple are surgically absent      -  Unremarkable skeletal muscle present      -  Negative for atypia or malignancy    2. Left breast, nipple sparing mastectomy (470 grams):      -  Benign breast tissue with focal atypical ductal hyperplasia (ADH, less than 2mm) in a background of         fibrocystic change including fibrosis, adenosis, duct ectasia, focal columnar cell change/hyperplasia         and fibroadenomatoid change      -  Findings of biopsy cavity are NOT appreciated grossly or histologically, see case comment      -  Skin and nipple are surgically absent      -  Dumbbell shaped biopsy clip NOT  IDENTIFIED at time of grossing    Comment:  Immunohistochemical stains with appropriate controls were performed on select tissue blocks.  Cytokeratin 5/6 demonstrates a mosaic staining pattern in areas of ductal hyperplasia, showing no evidence of atypia.  AE1/AE3 is utilized for duct  mapping.  P63 highlights intact myoepithelial cells throughout the specimen, showing no evidence of invasive carcinoma.    3. [place] lymph node #1 (hot and blue, background count 2), biopsy:      -  One lymph node, negative for metastatic carcinoma (0/1)      -  Immunohistochemical stains for CAM 5.2, AE1/AE3 and wide spectrum keratin are negative      4. Left breast axillary tail, excision (11 grams):      -  Benign mature fibroadipose tissue      -  Negative for atypia or malignancy    5. Additional lateral margin, excision (tissue submitted entirely for histologic evaluation):      -  Benign breast tissue with mild fibrocystic change including fibrosis, adenosis and         fibroadenomatoid change      -  Negative for atypia or malignancy      6. Additional anterior lateral margin, excision (tissue submitted entirely for histologic evaluation):      -  Benign breast tissue with fibrocystic change including fibrosis, adenosis, fibroadenomatoid         changes and duct ectasia with apocrine metaplasia      -  Microcalcifications:  Present, associated with benign breast tissue      -  Negative for atypia or malignancy     Comment: Interp By Dianelys Cardona M.D., Signed on 11/08/2023 at 09:29   Comment The patient's history of invasive carcinoma and ductal carcinoma in-situ in the upper outer quadrant is noted.  The specimen is thoroughly searched for both the dumbbell biopsy clip and a biopsy cavity, neither of which are identified grossly or  histologically.  Numerous sections of tissue from specimen 2 (left breast mastectomy) were evaluated.  The patient's additional margins (specimens 5 and 6) were submitted entirely for  histologic review and show no evidence of invasive or in-situ  carcinoma.  Specimen 4 (breast axillary tail) was also submitted entirely for histologic review and showed no evidence of invasive or in-situ carcinoma.  Dr. Bishop has reviewed the above case and agrees with the findings.    Dr. Josue was alerted to these findings via epic message on 11/08/2023 at 09:30.      All stains were performed with adequate positive and negative controls.              Specimen to Pathology, Surgery Breast 11/22/2023      Component 1 mo ago   Final Pathologic Diagnosis Breast, left, re-excision:  Invasive ductal carcinoma, poorly differentiated  Renata grade 3:  Tubule formation-3, nuclear pleomorphism-3 and mitotic count -3  Invasive carcinoma measures 19 x 9 x 8 mm  Positive superior margin, measuring 10 mm in greatest extent  Additional margins:  -Anterior margin:  1 mm  -Medial margin:  6 mm  -Posterior margin:  6.5 mm  -Lateral margin:  9 mm  -Inferior margin:  9 mm  No carcinoma in Situ or lymphovascular invasion is identified  Previous biopsy clip and reflector both grossly identified   Comment: Interp By Shelbie Bishop M.D., Signed on 11/28/2023 at 10:46   Gross Surgery ID:  [id number]    Pathology ID:  [id number]  1.  Received in formalin labeled &quot;left breast biopsy&quot; is a 4 g, 2.3 cm superior to inferior by 2.1 cm medial to lateral by 2.3 cm anterior to posterior portion of breast tissue received oriented by the surgeon as short stitch superior and long  stitch lateral.  A biopsy clip and a reflector clip is identified exposed at the anterior-superior margins (see attached image).  The specimen is sectioned from anterior to posterior into 5 slices averaging 0.5 cm in thickness.  Sectioning reveals a 1.9  cm anterior to posterior by 0.9 cm medial to lateral by 0.8 cm superior to inferior well-circumscribed, tan-white, rubbery mass within slices 2-5.  The mass abuts the superior and medial margins and measures 0.9 cm  from the lateral margin, 0.9 cm from  the inferior margin, 0.4 cm from the posterior margin, and 0.5 cm from the anterior margin.  There is an X shaped biopsy clip identified within the mass in slice 4 and a reflector clip identified adjacent to the mass in slice 1.  The specimen is inked as   follows:  Superior-blue, inferior-green, medial-red, lateral-orange, anterior-yellow, and posterior-black.  The specimen is submitted entirely and sequentially from anterior to posterior as follows:  GZM--1-A:  Slice 1, anterior margin, perpendicular  NSD--1-B:  Slice 2, mass to include anterior, superior, inferior, medial, and lateral margins  PKH--1-C:  Slice 3, mass to superior, inferior, medial, and lateral margins  IRP--1-D:  Slice 4, mass to superior, inferior, medial, and lateral margins  SHA--1-E:  Slice 5, posterior margin, perpendicular        Grossed by: Carl Donovan MS, PA([facility])        Assessment and Plan   Stage IA (T1c N0 Mx) L Breast Invasive Ductal  Carcinoma, G3, +/+/-, ki67 of 90%   s/p b/l nipple sparing mastectomy and sentinel node biopsy on 10/25/23   s/p excision of primary tumor 11/22/23   Pathology report above  Genetic Testing 09/13/2023: VUS in MLH1  Oncotype Dx Score 38  TTE stable from prior  She has a history of heart disease; she has not required PCI however will precert for TC treatment to circumvent potential anthracycline cardiac effects  I discussed in detail the role of medical oncology in her care.  I informed her that my treatment recommendation is based on the NCCN Guidelines, her final pathology and Oncotype DX score  Because of the high Oncotype DX score, chemotherapy is recommended; because of the hormonal make up of her tumor, endocrine therapy is recommended after she completes chemotherapy. I reviewed the major side effects of chemotherapy and  AIs.   s/p TC x4 completed 04/03/24 ; AE of severe arthralgias, likely from GCSF  Plan   Continue AI; on  discussion of SE, she was to try Exemestane however went back to Arimidex  Counseled on exercise and healthy diet for bone health and breast cancer risk reduction  Continue with surveillance exams q3m      Pulmonary Nodule   Noted on CT Abd 05/12/2024 measuring 3 mm    Will repeat non-con CT Chest in 05/2025        Stage IC, Ovarian Cancer, Right (Granulosa cell tumor)  CT Abd/Pelv 04/16/2024:  6.4 cm indeterminate cystic and solid right adnexal mass  s/p RALH/BSO 05/07/2024 with Dr. Person, Gyn/Onc at [facility] - pathology positive for granulosa cell tumor with negative peritoneal washings  Continue surveillance with [facility]      TARA  Continue PO iron  Iron labs pending      Bone Health  Given that she is post menopausal and endocrine therapy is recommended, she will be monitored for the need for bisphosphonate therapy.  BMD 08/2023 was normal  Repeat DXA in Aug 2025  Continue Calcium and vitamin D      Cancer Screening  PAP Smear 09/15/2023: Negative for intraepithelial lesion or malignancy   Colonoscopy 01/2024: TAs; repeat in 5 years      Chronic Medical Conditions  DM II  HTN  CAD:  She has not require PCI; stress test 12/26/2019 negative for ischemia and arrhythmias; last echo 06/2020 and showed preserved ejection fraction at 55%; concentric hypertrophy and normal left ventricular diastolic function  CHF  Hx of Colon Polyps  Peripheral neuropathy; intermittent  Hypomagnesemia, Resolved  Blood in stool, Resolved         Med Onc Chart Routing      Follow up with physician 3 months. review imaging   Follow up with [facility]    Infusion scheduling note    Injection scheduling note    Labs    Imaging DXA scan and CT chest abdomen pelvis      Pharmacy appointment    Other referrals                     The patient was seen, interviewed and examined. Pertinent lab and radiologic studies were reviewed. Pt instructed to call should they develop concerning signs/symptoms or have further questions.         Portions of the record may have been created with voice recognition software. Occasional wrong-word or sound-a-like substitutions may have occurred due to the inherent limitations of voice recognition software. Read the chart carefully and recognize, using context, where substitutions have occurred.      Deloris Gracia MD    Hematology/Oncology

## 2025-04-18 NOTE — TELEPHONE ENCOUNTER
Provider Staff:  Action required for this patient    Requires labs      Please see care gap opportunities below in Care Due Message.    Thanks!  [facility]     Appointments      Date Provider   Last Visit   1/8/2025 Lul Alvarez MD   Next Visit   7/8/2025 Lul Alvarez MD     Refill Decision Note   Demetrice Gaines  is requesting a refill authorization.  Brief Assessment and Rationale for Refill:  Approve     Medication Therapy Plan:        Comments:     Note composed:10:10 PM 04/17/2025

## 2025-04-21 ENCOUNTER — RESULTS FOLLOW-UP (OUTPATIENT)
Dept: HEMATOLOGY/ONCOLOGY | Facility: CLINIC | Age: 65
End: 2025-04-21

## 2025-04-24 ENCOUNTER — OFFICE VISIT (OUTPATIENT)
Dept: INTERNAL MEDICINE | Facility: CLINIC | Age: 65
End: 2025-04-24
Payer: MEDICARE

## 2025-04-24 ENCOUNTER — HOSPITAL ENCOUNTER (OUTPATIENT)
Dept: CARDIOLOGY | Facility: HOSPITAL | Age: 65
Discharge: HOME | End: 2025-04-24
Attending: ANESTHESIOLOGY
Payer: MEDICARE

## 2025-04-24 ENCOUNTER — E-CONSULT (OUTPATIENT)
Dept: CARDIOLOGY | Facility: CLINIC | Age: 65
End: 2025-04-24
Payer: MEDICARE

## 2025-04-24 VITALS
OXYGEN SATURATION: 97 % | DIASTOLIC BLOOD PRESSURE: 83 MMHG | TEMPERATURE: 98 F | HEART RATE: 91 BPM | SYSTOLIC BLOOD PRESSURE: 124 MMHG

## 2025-04-24 DIAGNOSIS — E11.59 TYPE 2 DIABETES MELLITUS WITH OTHER CIRCULATORY COMPLICATION, WITHOUT LONG-TERM CURRENT USE OF INSULIN: Chronic | ICD-10-CM

## 2025-04-24 DIAGNOSIS — Z01.810 PREOP CARDIOVASCULAR EXAM: ICD-10-CM

## 2025-04-24 DIAGNOSIS — Z01.818 PREOPERATIVE EXAMINATION: Primary | ICD-10-CM

## 2025-04-24 DIAGNOSIS — M67.432 GANGLION OF LEFT WRIST: ICD-10-CM

## 2025-04-24 DIAGNOSIS — Z01.818 PREOP TESTING: ICD-10-CM

## 2025-04-24 DIAGNOSIS — Z01.810 PREOP CARDIOVASCULAR EXAM: Primary | ICD-10-CM

## 2025-04-24 DIAGNOSIS — I50.32 CHRONIC DIASTOLIC CONGESTIVE HEART FAILURE: Chronic | ICD-10-CM

## 2025-04-24 LAB
OHS QRS DURATION: 86 MS
OHS QTC CALCULATION: 411 MS

## 2025-04-24 PROCEDURE — 93005 ELECTROCARDIOGRAM TRACING: CPT

## 2025-04-24 PROCEDURE — 93010 ELECTROCARDIOGRAM REPORT: CPT | Mod: ,,, | Performed by: INTERNAL MEDICINE

## 2025-04-24 PROCEDURE — 99999 PR PBB SHADOW E&M-EST. PATIENT-LVL III: CPT | Mod: PBBFAC,,,

## 2025-04-24 NOTE — CONSULTS
The [facility]  Response for E-Consult     Patient Name: Demetrice Gaines  MRN: [id number]  Primary Care Provider: Lul Alvarez MD   Requesting Provider: Hayde Almeida FNP  E-Consult to General Cardiology  Consult performed by: Antwon Coffman MD  Consult ordered by: Hayde Almeida FNP          E consult for preop clearance of removal of ganglion cyst on finger  The chart reviewed.  PMH chf cad htn dm  04/25 EKG reviewed by myself today NSR and poor R progression on precordial leads. No change compared to prior one.     Plan  Elevated periop risk of CV events for non-high risk procedure.  Ok to proceed the scheduled procedure without further cardiac study.  OK to hold ASA 5 days before the procedure and resume postop once hemodynamically stable      Total time of Consultation: 10 minute    I did not speak to the requesting provider verbally about this.     *This eConsult is based on the clinical data available to me and is furnished without benefit of a physical examination. The eConsult will need to be interpreted in light of any clinical issues or changes in patient status not available to me at the time of filing this eConsults. Significant changes in patient condition or level of acuity should result in immediate formal consultation and reevaluation. Please alert me if you have further questions.    Thank you for this eConsult referral.     Antwon Coffman MD  The [address]

## 2025-04-24 NOTE — PRE ADMISSION SCREENING
Pre op instructions reviewed with patient during Clinic Visit with Provider.    To confirm, Surgery is scheduled on 5/01/25. We will call you late afternoon the business day prior to surgery with your arrival time.    *Please report to the [facility] (1st Floor) located off of [facility] (2nd Entrance/Building on the left, in front of the flag pole). Address: [address] [place] [address] [place]. [address]      INSTRUCTIONS IMPORTANT!!!  DO NOT Eat, Drink, or Smoke after 12 midnight unless instructed otherwise by your Surgeon. OK to brush teeth, no gum, candy or mints!    MORNING OF SURGERY, drink small sip of water with the following medications instructed by Pre-Admit Provider:  Anastrozole     *Additional Medication Instructions: CONTINUE TO HOLD YOUR OZEMPIC PRIOR TO SURGERY    *Blood Thinners: Continue to hold ASPIRIN  prior to surgery per Physician Instructions! Call your Surgeon office to inquire about any questions regarding your blood thinner medication.     Stop taking *ADHD Medication 48 hrs prior to surgery, as this can affect the anesthesia used. Bariatric surgeries must HOLD 7 Days prior to surgery!    Diabetic Patients: If you take diabetic or weight loss medication, Do NOT take morning of surgery unless instructed by Doctor. Metformin to be stopped 24 hrs prior to surgery.     DO NOT take long-acting insulin the evening before surgery. Blood sugars will be checked in pre-op by Nurse.    If you take Ozempic/ Mounjaro / Wegovy / Trulicity / Semaglutide, any weight loss injections OR PHENTERMINE --->>> PLEASE LET US KNOW IMMEDIATELY, as these medications need to be stopped 7 days prior to surgery!    *Patients should HOLD all vitamins, herbal supplements, weight loss medication, aspirin products & NSAIDS 7 days prior to surgery, as these can thin the blood. Ok to take Tylenol.    ____  Avoid Alcoholic beverages 3 days prior to surgery, as it can thin the blood.  ____  NO Acrylic/fake nails  or nail polish worn day of surgery (specifically hand/arm & foot surgeries).  ____  NO powder, lotions, deodorants, oils or cream on body.  ____  Remove all jewelry, piercings, & foreign objects prior to arrival and leave at home.  ____  Remove Dentures, Hearing Aids & Contact Lens prior to surgery.  ____  Bring photo ID and insurance information to hospital (Leave Valuables at Home).  ____  If going home the same day, arrange for a ride home. You will not be able to drive for 24 hrs if Anesthesia was used.   ____  Wear clean, loose fitting clothing to allow for dressings/ bandages.    Bathing Instructions:    -Shower with anti-bacterial Soap (ex: Hibiclens or Dial) the night before surgery and the morning of.   -Do not use Hibiclens on your face or genitals.   -Apply clean clothes after shower.  -Do not shave your face morning of surgery   -Do not shave your body 3 days prior to surgery unless instructed otherwise by your Surgeon.    [facility]/Ride Policy:  Only 2 adults allowed in pre op/recovery area during your procedure. You MUST HAVE A RIDE HOME from a responsible adult that you know and trust. Medical Transport, Uber or Lyft can ONLY be used if patient has a responsible adult to accompany them during ride home.       *Signs and symptoms of Infection Before or After Surgery:               !!!If you experience any fever, chills, nausea/ vomiting, foul odor/ excessive drainage from surgical site, flu-like symptoms, new wounds or cuts, PLEASE CALL THE SURGEON OFFICE at [phone] or SEND MESSAGE THROUGH [employer] PORTAL!!!     *If you are running late day of surgery, please call the Surgery Dept @ [phone].    *Billing question, please call  [phone]     Thank you,  -[facility] Pre Ad[facility].  [phone]   or [phone]  M-F 7:30 am-4:00 pm (Closed Major Holidays)    Additional Tests Scheduled Today:  EKG (4TH Floor) Check in at the

## 2025-04-24 NOTE — H&P
Subjective:     Patient ID: Demetrice Gaines is a 65 y.o. female.    Chief Complaint: No chief complaint on file.      HPI:  The patient is a 65-year-old female with a left wrist radial volar ganglion cyst as well as left thumb A1 pulley flexor tendon cyst she wishes to have excised and left trigger thumb release..    Past Medical History:   Diagnosis Date    Bilateral pleural effusion 2020    CAD (coronary artery disease)     Chest wall pain 2025    CHF (congestive heart failure)     Colon polyp     Diabetes mellitus, type 2     Hypertension     Hyponatremia 2020    Malignant neoplasm of upper-outer quadrant of left breast in female, estrogen receptor positive 10/17/2023    Myocardial infarction     Ovarian cancer on right 2024     Past Surgical History:   Procedure Laterality Date    Benign Cyst Right     BREAST CYST EXCISION Right     pt states benign     SECTION      COLONOSCOPY      COLONOSCOPY N/A 2019    Procedure: COLONOSCOPY;  Surgeon: Ileana Holcomb MD;  Location: [facility];  Service: Endoscopy;  Laterality: N/A;    COLONOSCOPY N/A 2024    Procedure: COLONOSCOPY;  Surgeon: Lboo Mitchell MD;  Location: [facility] ENDO;  Service: Endoscopy;  Laterality: N/A;    EXCISION, MASS, BREAST, USING RADIOLOGICAL MARKER Left 2023    Procedure: EXCISION,MASS,BREAST,USING RADIOLOGICAL MARKER;  Surgeon: Hemalatha Josue MD;  Location: [facility] OR;  Service: General;  Laterality: Left;  radhika  needed    Expanders removed and implants placed 2024      FLUOROSCOPY N/A 2024    Procedure: Fluoroscopy/Mediport placement;  Surgeon: Sylvester Zazueta MD;  Location: [facility] CATH LAB;  Service: General;  Laterality: N/A;    HYSTERECTOMY  2024    INJECTION FOR SENTINEL NODE IDENTIFICATION Left 10/25/2023    Procedure: INJECTION, FOR SENTINEL NODE IDENTIFICATION;  Surgeon: Hemalatha Josue MD;  Location: [facility] OR;  Service: General;  Laterality: Left;     INSERTION OF BREAST IMPLANT  08/23/2024    INSERTION OF BREAST TISSUE EXPANDER Bilateral 10/25/2023    Procedure: INSERTION, TISSUE EXPANDER, BREAST;  Surgeon: Keron Lynn MD;  Location: [facility] OR;  Service: Plastics;  Laterality: Bilateral;    MASTECTOMY, NIPPLE SPARING Bilateral 10/25/2023    Procedure: MASTECTOMY, NIPPLE SPARING;  Surgeon: Hemalatha Josue MD;  Location: [facility] OR;  Service: General;  Laterality: Bilateral;    SENTINEL LYMPH NODE BIOPSY Left 10/25/2023    Procedure: BIOPSY, LYMPH NODE, SENTINEL;  Surgeon: Hemalatha Josue MD;  Location: [facility] OR;  Service: General;  Laterality: Left;  nuc med needed 1 hour before start    ULTRASOUND GUIDANCE Left 10/25/2023    Procedure: ULTRASOUND GUIDANCE;  Surgeon: Hemalatha Josue MD;  Location: [facility] OR;  Service: General;  Laterality: Left;     Family History   Problem Relation Name Age of Onset    Cancer Mother Caryl Morales     Pacemaker/defibrilator Mother Caryl Morales     Glaucoma Mother Caryl Morales     Arthritis Mother Caryl Morales     Heart disease Mother Caryl Morales         It seems that this condition runs in my family on my mother's side    Hypertension Mother Caryl Morales     Diabetes Father Paulino Harshad Morales     Hypertension Sister      Glaucoma Sister      Breast cancer Paternal Grandmother Che Morales (paternal grandmother)     Cancer Paternal Grandmother Che Morales (paternal grandmother)     Hypertension Brother      COPD Sister Guera Cantu     COPD Brother Paulino Morales     Hypertension Brother Keyur Kalyani Morales     Hypertension Sister [facility]      Social History[1]  Medication List with Changes/Refills   Current Medications    AMLODIPINE-OLMESARTAN (LEATHA) 10-40 MG PER TABLET    Take 1 tablet by mouth once daily    ANASTROZOLE (ARIMIDEX) 1 MG TAB    Take 1 tablet (1 mg total) by mouth once daily.    ASPIRIN (ECOTRIN) 81 MG EC TABLET    Take  81 mg by mouth once daily.    CHLORTHALIDONE (HYGROTEN) 25 MG TAB    Take 1 tablet by mouth once daily    SEMAGLUTIDE (OZEMPIC) 1 MG/DOSE (4 MG/3 ML)    Inject 1 mg into the skin every 7 days.     Review of patient's allergies indicates:   Allergen Reactions    Lisinopril Anaphylaxis    Aldactone [spironolactone]      Memory impair and breast soreness    Coreg [carvedilol]      Hair loss    Lisinopril-hydrochlorothiazide      Other reaction(s): Reaction:Throat swelling;     Review of Systems   Constitutional: Negative for malaise/fatigue.   HENT:  Negative for hearing loss.    Eyes:  Positive for visual disturbance. Negative for double vision.   Cardiovascular:  Positive for chest pain.   Respiratory:  Negative for shortness of breath.    Endocrine: Negative for cold intolerance.   Hematologic/Lymphatic: Does not bruise/bleed easily.   Skin:  Negative for poor wound healing and suspicious lesions.   Musculoskeletal:  Negative for gout, joint pain and joint swelling.   Gastrointestinal:  Negative for nausea and vomiting.   Genitourinary:  Negative for dysuria.   Neurological:  Negative for numbness, paresthesias and sensory change.   Psychiatric/Behavioral:  Negative for depression, memory loss and substance abuse. The patient is not nervous/anxious.    Allergic/Immunologic: Negative for persistent infections.       Objective:   There is no height or weight on file to calculate BMI.  There were no vitals filed for this visit.             General    Constitutional: She is oriented to person, place, and time. She appears well-developed and well-nourished. No distress.   HENT:   Head: Normocephalic. Mouth/Throat: Oropharynx is clear and moist.   Eyes: EOM are normal.   Cardiovascular:  Normal rate.            Pulmonary/Chest: Effort normal.   Abdominal: Soft.   Neurological: She is alert and oriented to person, place, and time. No cranial nerve deficit.   Psychiatric: She has a normal mood and affect.         Left  Hand/Wrist Exam     Inspection   Scars: Wrist - absent Hand -  absent  Effusion: Wrist - absent Hand -  absent    Pain   Hand - The patient exhibits pain of the thumb MCP.  Wrist - The patient exhibits pain of the scapholunate/lunate ECU.    Other     Sensory Exam  Median Distribution: normal  Ulnar Distribution: normal  Radial Distribution: normal    Comments:  The patient is a 2 cm left wrist radial volar ganglion cyst.  She also has a 1 cm ganglion cyst flexor tendon sheath A1 pulley left thumb.  The patient also has a mass that moves with tendon excursion and subclinical triggering noted          Vascular Exam       Capillary Refill  Left Hand: normal capillary refill          Relevant imaging results reviewed and interpreted by me, discussed with the patient and / or family today radiographs left hand and wrist were normal  Assessment:     Encounter Diagnosis   Name Primary?    Ganglion of left wrist Yes        Plan:     The patient was counseled regarding excision left wrist radial volar ganglion cyst and left thumb flexor tendon sheath ganglion cyst and left trigger thumb release..  Risk complications and alternatives were discussed including the risk of infection, anesthetic risk, injury to nerves and vessels, loss of motion, and possible need for additional surgeries were discussed.  She seems to understand and agree to that surgery.  All questions were answered.                Disclaimer: This note was prepared using a voice recognition system and is likely to have sound alike errors within the text.          [1]   Social History  Socioeconomic History    Marital status:    Tobacco Use    Smoking status: Never     Passive exposure: Never    Smokeless tobacco: Never    Tobacco comments:     NEVER   Substance and Sexual Activity    Alcohol use: Never    Drug use: Never    Sexual activity: Not Currently     Comment: Menopausal     Social Drivers of Health     Financial Resource Strain: High Risk  (3/26/2025)    Received from [facility]    Overall Financial Resource Strain (CARDIA)     Difficulty of Paying Living Expenses: Very hard   Food Insecurity: Food Insecurity Present (3/26/2025)    Received from [facility]    Hunger Vital Sign     Worried About Running Out of Food in the Last Year: Often true     Ran Out of Food in the Last Year: Often true   Transportation Needs: Unmet Transportation Needs (3/26/2025)    Received from [facility]    PRAPARE - Transportation     Lack of Transportation (Medical): Yes     Lack of Transportation (Non-Medical): Yes   Physical Activity: Inactive (8/1/2024)    Exercise Vital Sign     Days of Exercise per Week: 0 days     Minutes of Exercise per Session: 0 min   Stress: No Stress Concern Present (3/26/2025)    Received from [facility]    [employer] - Occupational Stress Questionnaire     Feeling of Stress : Not at all   Housing Stability: Low Risk  (3/26/2025)    Received from [facility]    Housing Stability Vital Sign     Unable to Pay for Housing in the Last Year: No     Number of Times Moved in the Last Year: 0     Homeless in the Last Year: No

## 2025-04-24 NOTE — PROGRESS NOTES
Preoperative History and Physical                                                             Hospital Medicine      Chief Complaint: Preoperative evaluation    History of Present Illness:      Demetrice Gaines is a 65 y.o. female who  has a past medical history of Bilateral pleural effusion, CAD (coronary artery disease), Chest wall pain, CHF (congestive heart failure), Colon polyp, Diabetes mellitus, type 2, Hypertension, Hyponatremia, Malignant neoplasm of upper-outer quadrant of left breast in female, estrogen receptor positive, Myocardial infarction, and Ovarian cancer on right who presents to the office today for a preoperative consultation at the request of Dr. Tsang. She has a left wrist radial volar ganglion cyst as well as left thumb A1 pulley flexor tendon cyst she wishes to have excised and left trigger thumb release. He plans on performing left wrist ganglion cyst excision on May 1, 2025.      Functional Status:      The patient is able to climb a flight of stairs. The patient is able to ambulate without difficulty. The patient's functional status is not affected by their joint pain. The patient's functional status is not affected by shortness of breath, chest pain, dyspnea on exertion and fatigue.      Past Medical History:      Past Medical History:   Diagnosis Date    Bilateral pleural effusion 2020    CAD (coronary artery disease)     Chest wall pain 2025    CHF (congestive heart failure)     Colon polyp     Diabetes mellitus, type 2     Hypertension     Hyponatremia 2020    Malignant neoplasm of upper-outer quadrant of left breast in female, estrogen receptor positive 10/17/2023    Myocardial infarction     Ovarian cancer on right 2024        Past Surgical History:      Past Surgical History:   Procedure Laterality Date    Benign Cyst Right     BREAST CYST EXCISION Right     pt states benign     SECTION      COLONOSCOPY      COLONOSCOPY N/A 2019     Procedure: COLONOSCOPY;  Surgeon: Ileana Holcomb MD;  Location: [facility] ENDO;  Service: Endoscopy;  Laterality: N/A;    COLONOSCOPY N/A 01/02/2024    Procedure: COLONOSCOPY;  Surgeon: Lobo Mitchell MD;  Location: [facility] ENDO;  Service: Endoscopy;  Laterality: N/A;    EXCISION, MASS, BREAST, USING RADIOLOGICAL MARKER Left 11/22/2023    Procedure: EXCISION,MASS,BREAST,USING RADIOLOGICAL MARKER;  Surgeon: Hemalatha Josue MD;  Location: [facility] OR;  Service: General;  Laterality: Left;  radhika  needed    Expanders removed and implants placed 8/23/2024      FLUOROSCOPY N/A 01/08/2024    Procedure: Fluoroscopy/Mediport placement;  Surgeon: Sylvester Zazueta MD;  Location: [facility] CATH LAB;  Service: General;  Laterality: N/A;    HYSTERECTOMY  05/2024    INJECTION FOR SENTINEL NODE IDENTIFICATION Left 10/25/2023    Procedure: INJECTION, FOR SENTINEL NODE IDENTIFICATION;  Surgeon: Hemalatha Josue MD;  Location: [facility];  Service: General;  Laterality: Left;    INSERTION OF BREAST IMPLANT  08/23/2024    INSERTION OF BREAST TISSUE EXPANDER Bilateral 10/25/2023    Procedure: INSERTION, TISSUE EXPANDER, BREAST;  Surgeon: Keron Lynn MD;  Location: [facility];  Service: Plastics;  Laterality: Bilateral;    MASTECTOMY, NIPPLE SPARING Bilateral 10/25/2023    Procedure: MASTECTOMY, NIPPLE SPARING;  Surgeon: Hemalatha Josue MD;  Location: [facility] OR;  Service: General;  Laterality: Bilateral;    SENTINEL LYMPH NODE BIOPSY Left 10/25/2023    Procedure: BIOPSY, LYMPH NODE, SENTINEL;  Surgeon: Hemalatha Josue MD;  Location: [facility] OR;  Service: General;  Laterality: Left;  nuc med needed 1 hour before start    ULTRASOUND GUIDANCE Left 10/25/2023    Procedure: ULTRASOUND GUIDANCE;  Surgeon: Hemalatha Josue MD;  Location: [facility];  Service: General;  Laterality: Left;        Social History:      Social History     Socioeconomic History    Marital status:    Tobacco Use    Smoking status: Never     Passive  exposure: Never    Smokeless tobacco: Never    Tobacco comments:     NEVER   Substance and Sexual Activity    Alcohol use: Never    Drug use: Never    Sexual activity: Not Currently     Comment: Menopausal     Social Drivers of Health     Financial Resource Strain: High Risk (3/26/2025)    Received from [facility]    Overall Financial Resource Strain (CARDIA)     Difficulty of Paying Living Expenses: Very hard   Food Insecurity: Food Insecurity Present (3/26/2025)    Received from [facility]    Hunger Vital Sign     Worried About Running Out of Food in the Last Year: Often true     Ran Out of Food in the Last Year: Often true   Transportation Needs: Unmet Transportation Needs (3/26/2025)    Received from [facility]    PRAPARE - Transportation     Lack of Transportation (Medical): Yes     Lack of Transportation (Non-Medical): Yes   Physical Activity: Inactive (8/1/2024)    Exercise Vital Sign     Days of Exercise per Week: 0 days     Minutes of Exercise per Session: 0 min   Stress: No Stress Concern Present (3/26/2025)    Received from [facility]    [employer] [place] [employer] - Occupational Stress Questionnaire     Feeling of Stress : Not at all   Housing Stability: Low Risk  (3/26/2025)    Received from [facility]    Housing Stability Vital Sign     Unable to Pay for Housing in the Last Year: No     Number of Times Moved in the Last Year: 0     Homeless in the Last Year: No        Family History:      Family History   Problem Relation Name Age of Onset    Cancer Mother Caryl Morales     Pacemaker/defibrilator Mother Caryl Morales     Glaucoma Mother Caryl Morales     Arthritis Mother Caryl Morales     Heart disease Mother Caryl Morales         It seems that this condition runs in my family on my mother's side    Hypertension Mother Caryl Morales     Diabetes Father Paulino Morales     Hypertension Sister      Glaucoma Sister      Breast  cancer Paternal Grandmother Che Morales (paternal grandmother)     Cancer Paternal Grandmother Che Morales (paternal grandmother)     Hypertension Brother      COPD Sister Guera Cantu     COPD Brother Paulino Morales     Hypertension Brother Keyur Morales     Hypertension Sister Kierra Martinez        Allergies:      Review of patient's allergies indicates:   Allergen Reactions    Lisinopril Anaphylaxis    Aldactone [spironolactone]      Memory impair and breast soreness    Coreg [carvedilol]      Hair loss    Lisinopril-hydrochlorothiazide      Other reaction(s): Reaction:Throat swelling;       Medications:      Current Outpatient Medications   Medication Sig    amlodipine-olmesartan (LEATHA) 10-40 mg per tablet Take 1 tablet by mouth once daily    anastrozole (ARIMIDEX) 1 mg Tab Take 1 tablet (1 mg total) by mouth once daily.    aspirin (ECOTRIN) 81 MG EC tablet Take 81 mg by mouth once daily.    chlorthalidone (HYGROTEN) 25 MG Tab Take 1 tablet by mouth once daily    HYDROcodone-acetaminophen (NORCO) 5-325 mg per tablet Take 1 tablet by mouth every 6 (six) hours as needed for Pain.    semaglutide (OZEMPIC) 1 mg/dose (4 mg/3 mL) Inject 1 mg into the skin every 7 days.     No current facility-administered medications for this visit.       Vitals:      BP:  124/83  HR:  91   RR:  17  Temp:  98.2  SpO2:  97% room air    Review of Systems:        Constitutional: Negative for fever, chills, weight loss, malaise/fatigue and diaphoresis.   HENT: Negative for hearing loss, ear pain, nosebleeds, congestion, sore throat, neck pain, tinnitus and ear discharge.    Eyes: Negative for blurred vision, double vision, photophobia, pain, discharge and redness.   Respiratory: Negative for cough, hemoptysis, sputum production, shortness of breath, wheezing and stridor.    Cardiovascular: Negative for chest pain, palpitations, orthopnea, claudication, leg swelling and PND.   Gastrointestinal:  Negative for heartburn, nausea, vomiting, abdominal pain, diarrhea, constipation, blood in stool and melena.   Genitourinary: Negative for dysuria, urgency, frequency, hematuria and flank pain.   Musculoskeletal: Negative for myalgias, back pain (chronic), joint pain and falls.   Skin: Negative for itching and rash.   Neurological: Negative for dizziness, tingling, tremors, sensory change, speech change, focal weakness, seizures, loss of consciousness, weakness and headaches.   Endo/Heme/Allergies: Negative for environmental allergies and polydipsia. Does not bruise/bleed easily.   Psychiatric/Behavioral: Negative for depression, suicidal ideas, hallucinations, memory loss and substance abuse. The patient is not nervous/anxious and does not have insomnia.    All 14 systems reviewed and negative except as noted above.    Physical Exam:      Constitutional: Appears well-developed, well-nourished and in no acute distress.  Pt is oriented to person, place, and time.   Head: Normocephalic and atraumatic. Mucous membranes moist.  Neck: Neck supple no mass.   Cardiovascular: Normal rate and regular rhythm.  S1 S2 appreciated by ascultation.  Pulmonary/Chest: Effort normal and clear to auscultation bilaterally. No respiratory distress.   Abdomen: Soft. Non-tender and non-distended. Bowel sounds are normal.   Neurological: Pt is alert and oriented to person, place, and time. Moves all extremities.  Skin: Warm and dry. No lesions.  Extremities: No clubbing cyanosis or edema.    Laboratory data:      Reviewed and noted in plan where applicable. Please see chart for full laboratory data.    Lab Results   Component Value Date    INR 1.0 01/08/2024    INR 1.3 (H) 06/13/2020       Lab Results   Component Value Date    WBC 5.01 04/17/2025    HGB 13.0 04/17/2025    HCT 38.4 04/17/2025    MCV 84 04/17/2025     04/17/2025       Predictors of intubation difficulty:       Morbid obesity? no   Anatomically abnormal facies? no    Prominent incisors? no   Receding mandible? no   Short, thick neck? no   Neck range of motion: normal   Dentition: No chipped, loose, or missing teeth.    Cardiographics:      ECG (dated 4/24/25):   Vent. Rate :  81 BPM     Atrial Rate :  81 BPM      P-R Int : 180 ms          QRS Dur :  86 ms       QT Int : 354 ms       P-R-T Axes :  50  34  79 degrees     QTcB Int : 411 ms     Normal sinus rhythm   Poor R-wave progression ; consider anterior infarct, lead placement, or   normal variant   Abnormal ECG   When compared with ECG of 14-Aug-2024 12:03,   No significant change was found   Confirmed by Cameron Ferrera ([id number]) on 4/24/2025 5:19:07 PM     Echocardiogram (dated 10/27/23):     Left Ventricle: The left ventricle is normal in size. Ventricular mass is normal. Normal wall thickness. There is concentric remodeling. Normal wall motion. There is normal systolic function with a visually estimated ejection fraction of 55 - 60%. Biplane (2D) method of discs ejection fraction is 57%. Global longitudinal strain is -14.4%. There is normal diastolic function.    Right Ventricle: Normal right ventricular cavity size. Wall thickness is normal. Right ventricle wall motion  is normal. Systolic function is normal.    IVC/SVC: Normal venous pressure at 3 mmHg.    Overall the study quality was technically difficult.    Imaging:      Chest x-ray (dated 8/1/24):   EXAM:  XR CHEST PA AND LATERAL     CLINICAL HISTORY: Malignant neoplasm of upper-outer quadrant of left female breast     COMPARISON: 11/16/2023     FINDINGS: This examination consists of frontal and lateral views of the chest.  There has been interval placement of a right subclavian venous line.  Its tip is in the superior vena cava.  The size and contour of the heart are normal. The lungs are clear. There is no pneumothorax or pleural effusion.  There is an implant in each breast.        Impression:     1.  There has been interval placement of a right subclavian venous line.   Its tip is in the superior vena cava.  There is no pneumothorax.  2.  The lungs are clear.    Assessment:      65 y.o. female with planned surgery as above.    Known risk factors for perioperative complications: Coronary disease  Congestive heart failure    Difficulty with intubation is not anticipated.    Plan:      Preoperative examination  Assessment & Plan:  Known risk factors for perioperative complications: Coronary disease  Congestive heart failure    Difficulty with intubation is not anticipated.    Dr. Coffman (cardiology) evaluated patient on 4/24/25 for cardiac clearance:  Elevated periop risk of CV events for non-high risk procedure.  Ok to proceed the scheduled procedure without further cardiac study.  OK to hold ASA 5 days before the procedure and resume postop once hemodynamically stable    Cardiac Risk Estimation:     Revised Cardiac Risk Index for Pre-Operative Risk from [email or web address]  on 5/1/2025  ** All calculations should be rechecked by clinician prior to use **    RESULT SUMMARY:  1 points  RCRI Score    6.0 %  Risk of major cardiac event      INPUTS:  High-risk surgery --> 0 = No  History of ischemic heart disease --> 0 = No  History of congestive heart failure --> 1 = Yes  History of cerebrovascular disease --> 0 = No  Pre-operative treatment with insulin --> 0 = No  Pre-operative creatinine >2 mg/dL / 176.8 µmol/L --> 0 = No      1.) Preoperative workup as follows: chest x-ray, ECG, hemoglobin, hematocrit, electrolytes, creatinine, glucose, liver function studies.  2.) Change in medication regimen before surgery: Hold ASA 5 days before scheduled procedure. Hold amlodipine-olmesartan and chlorthalidone the morning of surgery. Ozempic should be held 7 days before procedure.  3.) Prophylaxis for cardiac events with perioperative beta-blockers: not indicated.  4.) Invasive hemodynamic monitoring perioperatively: at the discretion of anesthesiologist.  5.) Deep vein thrombosis prophylaxis  postoperatively: regimen to be chosen by surgical team.  6.) Surveillance for postoperative MI with ECG immediately postoperatively and on postoperati ve days 1 and 2 AND troponin levels 24 hours postoperatively and on day 4 or hospital discharge (whichever comes first): at the discretion of anesthesiologist.  7.) Current medications which may produce withdrawal symptoms if withheld perioperatively: N/A  8.) Other measures: Postoperative hypertension management with IV hydralazine until able to take oral medications.  Postoperative incentive spirometry to prevent pneumonia.  She can continue the morning of surgery, anastrozole     Orders:  -     Ambulatory referral/consult to Pre-Admit  -     E-Consult to General Cardiology    Ganglion of left wrist  Assessment & Plan:  -She has a left wrist radial volar ganglion cyst as well as left thumb A1 pulley flexor tendon cyst she wishes to have excised and left trigger thumb release. He plans on performing left wrist ganglion cyst excision on May 1, 2025.      Chronic diastolic congestive heart failure  Assessment & Plan:  -Currently compensated  -Be cautious of IV fluids pre and intraop      Type 2 diabetes mellitus with other circulatory complication, without long-term current use of insulin  Assessment & Plan:  Hemoglobin A1C   Date Value Ref Range Status   01/08/2025 7.9 (H) 4.0 - 5.6 % Final     Comment:     ADA Screening Guidelines:  5.7-6.4%  Consistent with prediabetes  >or=6.5%  Consistent with diabetes    High levels of fetal hemoglobin interfere with the HbA1C  assay. Heterozygous hemoglobin variants (HbS, HgC, etc)do  not significantly interfere with this assay.   However, presence of multiple variants may affect accuracy.     08/14/2024 6.8 (H) 4.0 - 5.6 % Final     Comment:     ADA Screening Guidelines:  5.7-6.4%  Consistent with prediabetes  >or=6.5%  Consistent with diabetes    High levels of fetal hemoglobin interfere with the HbA1C  assay. Heterozygous  hemoglobin variants (HbS, HgC, etc)do  not significantly interfere with this assay.   However, presence of multiple variants may affect accuracy.     08/01/2024 6.9 (H) 4.0 - 5.6 % Final     Comment:     ADA Screening Guidelines:  5.7-6.4%  Consistent with prediabetes  >or=6.5%  Consistent with diabetes    High levels of fetal hemoglobin interfere with the HbA1C  assay. Heterozygous hemoglobin variants (HbS, HgC, etc)do  not significantly interfere with this assay.   However, presence of multiple variants may affect accuracy.        -Currently takes Ozempic  -Ozempic will need to be held at least 7 days prior to procedure. May resume postop

## 2025-04-24 NOTE — PRE ADMISSION SCREENING
Pre op instructions reviewed with patient during Clinic Visit with Provider.    To confirm, Surgery is scheduled on 5/01/25. We will call you late afternoon the business day prior to surgery with your arrival time.    *Please report to the [facility] Lobby (1st Floor) located off of [facility] (2nd Entrance/Building on the left, in front of the flag pole). Address: [address] [place] [address] [place]. [address]      INSTRUCTIONS IMPORTANT!!!  DO NOT Eat, Drink, or Smoke after 12 midnight unless instructed otherwise by your Surgeon. OK to brush teeth, no gum, candy or mints!    MORNING OF SURGERY, drink small sip of water with the following medications instructed by Pre-Admit Provider:  Anastrozole     *Additional Medication Instructions: CONTINUE TO HOLD YOUR OZEMPIC PRIOR TO SURGERY    *Blood Thinners: Continue to hold ASPIRIN  prior to surgery per Physician Instructions! Call your Surgeon office to inquire about any questions regarding your blood thinner medication.     Stop taking *ADHD Medication 48 hrs prior to surgery, as this can affect the anesthesia used. Bariatric surgeries must HOLD 7 Days prior to surgery!    Diabetic Patients: If you take diabetic or weight loss medication, Do NOT take morning of surgery unless instructed by Doctor. Metformin to be stopped 24 hrs prior to surgery.     DO NOT take long-acting insulin the evening before surgery. Blood sugars will be checked in pre-op by Nurse.    If you take Ozempic/ Mounjaro / Wegovy / Trulicity / Semaglutide, any weight loss injections OR PHENTERMINE --->>> PLEASE LET US KNOW IMMEDIATELY, as these medications need to be stopped 7 days prior to surgery!    *Patients should HOLD all vitamins, herbal supplements, weight loss medication, aspirin products & NSAIDS 7 days prior to surgery, as these can thin the blood. Ok to take Tylenol.    ____  Avoid Alcoholic beverages 3 days prior to surgery, as it can thin the blood.  ____  NO Acrylic/fake nails  or nail polish worn day of surgery (specifically hand/arm & foot surgeries).  ____  NO powder, lotions, deodorants, oils or cream on body.  ____  Remove all jewelry, piercings, & foreign objects prior to arrival and leave at home.  ____  Remove Dentures, Hearing Aids & Contact Lens prior to surgery.  ____  Bring photo ID and insurance information to hospital (Leave Valuables at Home).  ____  If going home the same day, arrange for a ride home. You will not be able to drive for 24 hrs if Anesthesia was used.   ____  Wear clean, loose fitting clothing to allow for dressings/ bandages.    Bathing Instructions:    -Shower with anti-bacterial Soap (ex: Hibiclens or Dial) the night before surgery and the morning of.   -Do not use Hibiclens on your face or genitals.   -Apply clean clothes after shower.  -Do not shave your face morning of surgery   -Do not shave your body 3 days prior to surgery unless instructed otherwise by your Surgeon.    [facility]/Ride Policy:  Only 2 adults allowed in pre op/recovery area during your procedure. You MUST HAVE A RIDE HOME from a responsible adult that you know and trust. Medical Transport, Uber or Lyft can ONLY be used if patient has a responsible adult to accompany them during ride home.       *Signs and symptoms of Infection Before or After Surgery:               !!!If you experience any fever, chills, nausea/ vomiting, foul odor/ excessive drainage from surgical site, flu-like symptoms, new wounds or cuts, PLEASE CALL THE SURGEON OFFICE at [phone] or SEND MESSAGE THROUGH [employer] PORTAL!!!     *If you are running late day of surgery, please call the Surgery Dept @ [phone].    *Billing question, please call  [phone]     Thank you,  -[facility] Pre Admit Dept.  [phone]   or [phone]  M-F 7:30 am-4:00 pm (Closed Major Holidays)    Additional Tests Scheduled Today:  EKG (4TH Floor) Check in at the

## 2025-04-30 NOTE — PRE-PROCEDURE INSTRUCTIONS
Called and spoke with Demetrice about the following:     Please arrive to [facility] ([facility]) at 0815 on 5/1/25 for your scheduled procedure.  Address: [address] [place] [address] [place]. [address] (2nd Building on left, 1st Floor Lobby)    !!!NO FOOD after midnight!     Thank you,  -[facility] Pre [facility]  Mon-Fri 7:30 am - 4 pm [phone]

## 2025-05-01 ENCOUNTER — ANESTHESIA EVENT (OUTPATIENT)
Dept: SURGERY | Facility: HOSPITAL | Age: 65
End: 2025-05-01
Payer: MEDICARE

## 2025-05-01 ENCOUNTER — HOSPITAL ENCOUNTER (OUTPATIENT)
Facility: HOSPITAL | Age: 65
Discharge: HOME | End: 2025-05-01
Attending: ORTHOPAEDIC SURGERY | Admitting: ORTHOPAEDIC SURGERY
Payer: MEDICARE

## 2025-05-01 ENCOUNTER — ANESTHESIA (OUTPATIENT)
Dept: SURGERY | Facility: HOSPITAL | Age: 65
End: 2025-05-01
Payer: MEDICARE

## 2025-05-01 VITALS
BODY MASS INDEX: 30.88 KG/M2 | SYSTOLIC BLOOD PRESSURE: 136 MMHG | TEMPERATURE: 97 F | OXYGEN SATURATION: 96 % | RESPIRATION RATE: 15 BRPM | HEIGHT: 64 IN | DIASTOLIC BLOOD PRESSURE: 86 MMHG | HEART RATE: 66 BPM | WEIGHT: 180.88 LBS

## 2025-05-01 DIAGNOSIS — Z01.818 PREOP TESTING: ICD-10-CM

## 2025-05-01 DIAGNOSIS — M67.40 GANGLION CYST: Primary | ICD-10-CM

## 2025-05-01 DIAGNOSIS — M79.642 LEFT HAND PAIN: ICD-10-CM

## 2025-05-01 DIAGNOSIS — M65.312 TRIGGER FINGER OF LEFT THUMB: ICD-10-CM

## 2025-05-01 DIAGNOSIS — M67.432 GANGLION OF LEFT WRIST: ICD-10-CM

## 2025-05-01 DIAGNOSIS — M25.532 LEFT WRIST PAIN: ICD-10-CM

## 2025-05-01 DIAGNOSIS — M65.30 TRIGGER FINGER, ACQUIRED: ICD-10-CM

## 2025-05-01 DIAGNOSIS — M67.439 GANGLION CYST OF WRIST: ICD-10-CM

## 2025-05-01 LAB
POCT GLUCOSE: 188 MG/DL (ref 70–110)
POCT GLUCOSE: 189 MG/DL (ref 70–110)

## 2025-05-01 PROCEDURE — 88304 TISSUE EXAM BY PATHOLOGIST: CPT | Mod: TC,91 | Performed by: ORTHOPAEDIC SURGERY

## 2025-05-01 PROCEDURE — 63600175 PHARM REV CODE 636 W HCPCS: Performed by: ORTHOPAEDIC SURGERY

## 2025-05-01 PROCEDURE — 37000008 HC ANESTHESIA 1ST 15 MINUTES: Performed by: ORTHOPAEDIC SURGERY

## 2025-05-01 PROCEDURE — 63600175 PHARM REV CODE 636 W HCPCS: Performed by: NURSE ANESTHETIST, CERTIFIED REGISTERED

## 2025-05-01 PROCEDURE — 26055 INCISE FINGER TENDON SHEATH: CPT | Mod: 51,FA,, | Performed by: ORTHOPAEDIC SURGERY

## 2025-05-01 PROCEDURE — 25000003 PHARM REV CODE 250: Performed by: ORTHOPAEDIC SURGERY

## 2025-05-01 PROCEDURE — 71000016 HC POSTOP RECOV ADDL HR: Performed by: ORTHOPAEDIC SURGERY

## 2025-05-01 PROCEDURE — 25071 EXC FOREARM LES SC 3 CM/>: CPT | Mod: LT,,, | Performed by: ORTHOPAEDIC SURGERY

## 2025-05-01 PROCEDURE — 37000009 HC ANESTHESIA EA ADD 15 MINS: Performed by: ORTHOPAEDIC SURGERY

## 2025-05-01 PROCEDURE — 71000015 HC POSTOP RECOV 1ST HR: Performed by: ORTHOPAEDIC SURGERY

## 2025-05-01 PROCEDURE — 36000706: Performed by: ORTHOPAEDIC SURGERY

## 2025-05-01 PROCEDURE — 26160 REMOVE TENDON SHEATH LESION: CPT | Mod: 51,FA,, | Performed by: ORTHOPAEDIC SURGERY

## 2025-05-01 PROCEDURE — 36000707: Performed by: ORTHOPAEDIC SURGERY

## 2025-05-01 PROCEDURE — 71000033 HC RECOVERY, INTIAL HOUR: Performed by: ORTHOPAEDIC SURGERY

## 2025-05-01 PROCEDURE — 25000003 PHARM REV CODE 250: Performed by: ANESTHESIOLOGY

## 2025-05-01 RX ORDER — OXYCODONE AND ACETAMINOPHEN 5; 325 MG/1; MG/1
1 TABLET ORAL
Status: DISCONTINUED | OUTPATIENT
Start: 2025-05-01 | End: 2025-05-01 | Stop reason: HOSPADM

## 2025-05-01 RX ORDER — LIDOCAINE HYDROCHLORIDE 20 MG/ML
INJECTION, SOLUTION EPIDURAL; INFILTRATION; INTRACAUDAL; PERINEURAL
Status: DISCONTINUED | OUTPATIENT
Start: 2025-05-01 | End: 2025-05-01 | Stop reason: HOSPADM

## 2025-05-01 RX ORDER — SODIUM CHLORIDE 0.9 % (FLUSH) 0.9 %
10 SYRINGE (ML) INJECTION
Status: DISCONTINUED | OUTPATIENT
Start: 2025-05-01 | End: 2025-05-01 | Stop reason: HOSPADM

## 2025-05-01 RX ORDER — CHLORHEXIDINE GLUCONATE ORAL RINSE 1.2 MG/ML
10 SOLUTION DENTAL 2 TIMES DAILY
Status: CANCELLED | OUTPATIENT
Start: 2025-05-01 | End: 2025-05-06

## 2025-05-01 RX ORDER — MIDAZOLAM HYDROCHLORIDE 1 MG/ML
INJECTION INTRAMUSCULAR; INTRAVENOUS
Status: DISCONTINUED | OUTPATIENT
Start: 2025-05-01 | End: 2025-05-01

## 2025-05-01 RX ORDER — FENTANYL CITRATE 50 UG/ML
INJECTION, SOLUTION INTRAMUSCULAR; INTRAVENOUS
Status: DISCONTINUED | OUTPATIENT
Start: 2025-05-01 | End: 2025-05-01

## 2025-05-01 RX ORDER — CHLORHEXIDINE GLUCONATE ORAL RINSE 1.2 MG/ML
10 SOLUTION DENTAL
Status: DISCONTINUED | OUTPATIENT
Start: 2025-05-01 | End: 2025-05-01 | Stop reason: HOSPADM

## 2025-05-01 RX ORDER — HYDROCODONE BITARTRATE AND ACETAMINOPHEN 5; 325 MG/1; MG/1
1 TABLET ORAL EVERY 6 HOURS PRN
Qty: 15 TABLET | Refills: 0 | Status: SHIPPED | OUTPATIENT
Start: 2025-05-01

## 2025-05-01 RX ORDER — ONDANSETRON HYDROCHLORIDE 2 MG/ML
4 INJECTION, SOLUTION INTRAVENOUS DAILY PRN
Status: DISCONTINUED | OUTPATIENT
Start: 2025-05-01 | End: 2025-05-01 | Stop reason: HOSPADM

## 2025-05-01 RX ORDER — LIDOCAINE HYDROCHLORIDE 20 MG/ML
INJECTION INTRAVENOUS
Status: DISCONTINUED | OUTPATIENT
Start: 2025-05-01 | End: 2025-05-01

## 2025-05-01 RX ORDER — CEFAZOLIN 2 G/1
2 INJECTION, POWDER, FOR SOLUTION INTRAMUSCULAR; INTRAVENOUS
Status: COMPLETED | OUTPATIENT
Start: 2025-05-01 | End: 2025-05-01

## 2025-05-01 RX ORDER — FENTANYL CITRATE 50 UG/ML
25 INJECTION, SOLUTION INTRAMUSCULAR; INTRAVENOUS EVERY 5 MIN PRN
Status: DISCONTINUED | OUTPATIENT
Start: 2025-05-01 | End: 2025-05-01 | Stop reason: HOSPADM

## 2025-05-01 RX ORDER — ONDANSETRON HYDROCHLORIDE 2 MG/ML
4 INJECTION, SOLUTION INTRAVENOUS ONCE AS NEEDED
Status: DISCONTINUED | OUTPATIENT
Start: 2025-05-01 | End: 2025-05-01 | Stop reason: HOSPADM

## 2025-05-01 RX ORDER — PROPOFOL 10 MG/ML
VIAL (ML) INTRAVENOUS CONTINUOUS PRN
Status: DISCONTINUED | OUTPATIENT
Start: 2025-05-01 | End: 2025-05-01

## 2025-05-01 RX ORDER — HYDROCODONE BITARTRATE AND ACETAMINOPHEN 5; 325 MG/1; MG/1
1 TABLET ORAL EVERY 4 HOURS PRN
Refills: 0 | Status: CANCELLED | OUTPATIENT
Start: 2025-05-01

## 2025-05-01 RX ADMIN — SODIUM CHLORIDE, SODIUM LACTATE, POTASSIUM CHLORIDE, AND CALCIUM CHLORIDE: .6; .31; .03; .02 INJECTION, SOLUTION INTRAVENOUS at 09:05

## 2025-05-01 RX ADMIN — PROPOFOL 50 MCG/KG/MIN: 10 INJECTION, EMULSION INTRAVENOUS at 10:05

## 2025-05-01 RX ADMIN — LIDOCAINE HYDROCHLORIDE 100 MG: 20 INJECTION INTRAVENOUS at 10:05

## 2025-05-01 RX ADMIN — CEFAZOLIN 2 G: 2 INJECTION, POWDER, FOR SOLUTION INTRAMUSCULAR; INTRAVENOUS at 09:05

## 2025-05-01 RX ADMIN — OXYCODONE HYDROCHLORIDE AND ACETAMINOPHEN 1 TABLET: 5; 325 TABLET ORAL at 11:05

## 2025-05-01 RX ADMIN — MIDAZOLAM HYDROCHLORIDE 2 MG: 1 INJECTION, SOLUTION INTRAMUSCULAR; INTRAVENOUS at 09:05

## 2025-05-01 RX ADMIN — FENTANYL CITRATE 100 MCG: 50 INJECTION, SOLUTION INTRAMUSCULAR; INTRAVENOUS at 09:05

## 2025-05-01 RX ADMIN — CHLORHEXIDINE GLUCONATE 0.12% ORAL RINSE 10 ML: 1.2 LIQUID ORAL at 09:05

## 2025-05-01 NOTE — ASSESSMENT & PLAN NOTE
Known risk factors for perioperative complications: Coronary disease  Congestive heart failure    Difficulty with intubation is not anticipated.    Dr. Coffman (cardiology) evaluated patient on 4/24/25 for cardiac clearance:  Elevated periop risk of CV events for non-high risk procedure.  Ok to proceed the scheduled procedure without further cardiac study.  OK to hold ASA 5 days before the procedure and resume postop once hemodynamically stable    Cardiac Risk Estimation:     Revised Cardiac Risk Index for Pre-Operative Risk from [email or web address]  on 5/1/2025  ** All calculations should be rechecked by clinician prior to use **    RESULT SUMMARY:  1 points  RCRI Score    6.0 %  Risk of major cardiac event      INPUTS:  High-risk surgery --> 0 = No  History of ischemic heart disease --> 0 = No  History of congestive heart failure --> 1 = Yes  History of cerebrovascular disease --> 0 = No  Pre-operative treatment with insulin --> 0 = No  Pre-operative creatinine >2 mg/dL / 176.8 µmol/L --> 0 = No      1.) Preoperative workup as follows: chest x-ray, ECG, hemoglobin, hematocrit, electrolytes, creatinine, glucose, liver function studies.  2.) Change in medication regimen before surgery: Hold ASA 5 days before scheduled procedure. Hold amlodipine-olmesartan and chlorthalidone the morning of surgery. Ozempic should be held 7 days before procedure.  3.) Prophylaxis for cardiac events with perioperative beta-blockers: not indicated.  4.) Invasive hemodynamic monitoring perioperatively: at the discretion of anesthesiologist.  5.) Deep vein thrombosis prophylaxis postoperatively: regimen to be chosen by surgical team.  6.) Surveillance for postoperative MI with ECG immediately postoperatively and on postoperati ve days 1 and 2 AND troponin levels 24 hours postoperatively and on day 4 or hospital discharge (whichever comes first): at the discretion of anesthesiologist.  7.) Current medications which may produce withdrawal symptoms if  withheld perioperatively: N/A  8.) Other measures: Postoperative hypertension management with IV hydralazine until able to take oral medications.  Postoperative incentive spirometry to prevent pneumonia.  She can continue the morning of surgery, anastrozole

## 2025-05-01 NOTE — OP NOTE
[facility] ([facility])  General Surgery  Operative Note    SUMMARY     Date of Procedure: 5/1/2025     Procedure:  Excision lipoma left radial volar wrist   Excision lipoma left thumb flexor tendon sheath   Left trigger thumb release       Surgeons and Role:     * Harshad Tsang MD - Primary    Assisting Surgeon: None    Pre-Operative Diagnosis: Ganglion of left wrist [M67.432]  Trigger finger, acquired [M65.30] thumb  Soft tissue mass left thumb flexor tendon sheath    Post-Operative Diagnosis:  Lipoma left radial volar wrist  Left trigger thumb  Lipoma left thumb flexor tendon sheath    Anesthesia: Local MAC    Description:  The patient was taken to the operating room where 10 cc of 2% plain lidocaine were divided between the left radial volar wrist and left thumb flexor tendon sheath.  Satisfactory anesthesia had been achieved the left hand was prepped and draped in the usual sterile fashion.  After exsanguination of the extremity a proximal tourniquet was inflated to 250 mmHg after patient received 2 g of Ancef intravenously preoperatively.  At this time a v-shaped incision was made over the radial volar wrist.  The skin was elevated.  Cutaneous neurovascular structures were identified and carefully retracted.  An apparent lipoma was identified and excised and submitted to pathology for examination.  Care was taken to preserve neurovascular structures including radial artery.  At this time attention was directed to the thumb soft tissue mass.  A v-shaped incision was made over the A1 pulley left thumb.  The incision extended using blunt and sharp dissection using 3.5 loupe magnification.  The radial and ulnar neurovascular bundles were identified and carefully retracted.  An apparent lipoma was identified in this area and also excised and submitted to pathology for examination.  A incision was made in the A1 pulley left thumb flexor tendon sheath.  No additional pathology was encountered  satisfactory excision had been achieved the skin was closed using horizontal mattress 4-0 nylon suture.  Xeroform gauze 4x4s and 2 ABD pads overwrapped with a 3 in gauze dressing.  The patient tolerated the procedure well was transferred recovery room in satisfactory condition.    Significant Surgical Tasks Conducted by the Assistant(s), if Applicable:  No assistant    Complications:  None     Estimated Blood Loss (EBL):  5 mL           Implants:  None    Specimens:  Lipoma left radial volar wrist and lipoma left thumb flexor tendon sheath A1 pulley area           Condition: Good    Disposition: PACU - hemodynamically stable.    Attestation: I was present and scrubbed for the entire procedure.

## 2025-05-01 NOTE — DISCHARGE SUMMARY
[facility] ([facility])  Discharge Note  Short Stay    Procedure(s) (LRB):  EXCISION, LIPOMA (Left) radial volar wrist  EXCISION, LESION, TENDON SHEATH, HAND (Left) apparent lipoma thumb flexor tendon sheath  RELEASE, TRIGGER FINGER (Left) thumb      OUTCOME: Patient tolerated treatment/procedure well without complication and is now ready for discharge.    DISPOSITION: Home or Self Care    FINAL DIAGNOSIS:  Lipoma left wrist radial volar wrist   Left thumb lipoma flexor tendon sheath left thumb   Left trigger thumb    FOLLOWUP: In clinic    DISCHARGE INSTRUCTIONS:    Discharge Procedure Orders   Diet general     Call MD for:  temperature >100.4     Call MD for:  persistent nausea and vomiting     Call MD for:  severe uncontrolled pain     Call MD for:  difficulty breathing, headache or visual disturbances     Call MD for:  redness, tenderness, or signs of infection (pain, swelling, redness, odor or green/yellow discharge around incision site)     Call MD for:  hives     Call MD for:  persistent dizziness or light-headedness     Call MD for:  extreme fatigue     EKG 12-lead   Standing Status: Future Number of Occurrences: 1 Standing Exp. Date: 04/24/26        TIME SPENT ON DISCHARGE:  20 minutes

## 2025-05-01 NOTE — ASSESSMENT & PLAN NOTE
-She has a left wrist radial volar ganglion cyst as well as left thumb A1 pulley flexor tendon cyst she wishes to have excised and left trigger thumb release. He plans on performing left wrist ganglion cyst excision on May 1, 2025.

## 2025-05-01 NOTE — ASSESSMENT & PLAN NOTE
Known risk factors for perioperative complications: Coronary disease  Congestive heart failure    Difficulty with intubation is not anticipated.    Dr. Coffman (cardiology) evaluated patient on 4/24/25 for cardiac clearance:  Elevated periop risk of CV events for non-high risk procedure.  Ok to proceed the scheduled procedure without further cardiac study.  OK to hold ASA 5 days before the procedure and resume postop once hemodynamically stable    Cardiac Risk Estimation:     Revised Cardiac Risk Index for Pre-Operative Risk from [employer]  on 5/1/2025  ** All calculations should be rechecked by clinician prior to use **    RESULT SUMMARY:  1 points  RCRI Score    6.0 %  Risk of major cardiac event      INPUTS:  High-risk surgery --> 0 = No  History of ischemic heart disease --> 0 = No  History of congestive heart failure --> 1 = Yes  History of cerebrovascular disease --> 0 = No  Pre-operative treatment with insulin --> 0 = No  Pre-operative creatinine >2 mg/dL / 176.8 µmol/L --> 0 = No      1.) Preoperative workup as follows: chest x-ray, ECG, hemoglobin, hematocrit, electrolytes, creatinine, glucose, liver function studies.  2.) Change in medication regimen before surgery:  Hold ASA 5 days before scheduled procedure. Hold amlodipine-olmesartan and chlorthalidone the morning of surgery. Ozempic should be held 7 days before procedure .  3.) Prophylaxis for cardiac events with perioperative beta-blockers: not indicated.  4.) Invasive hemodynamic monitoring perioperatively: at the discretion of anesthesiologist.  5.) Deep vein thrombosis prophylaxis postoperatively: regimen to be chosen by surgical team.  6.) Surveillance for postoperative MI with ECG immediately postoperatively and on postoperati ve days 1 and 2 AND troponin levels 24 hours postoperatively and on day 4 or hospital discharge (whichever comes first): at the discretion of anesthesiologist.  7.) Current medications which may produce withdrawal symptoms  if withheld perioperatively: N/A  8.) Other measures: Postoperative hypertension management with IV hydralazine until able to take oral medications.  Postoperative incentive spirometry to prevent pneumonia.  She can continue the morning of surgery, anastrozole

## 2025-05-01 NOTE — ASSESSMENT & PLAN NOTE
Hemoglobin A1C   Date Value Ref Range Status   01/08/2025 7.9 (H) 4.0 - 5.6 % Final     Comment:     ADA Screening Guidelines:  5.7-6.4%  Consistent with prediabetes  >or=6.5%  Consistent with diabetes    High levels of fetal hemoglobin interfere with the HbA1C  assay. Heterozygous hemoglobin variants (HbS, HgC, etc)do  not significantly interfere with this assay.   However, presence of multiple variants may affect accuracy.     08/14/2024 6.8 (H) 4.0 - 5.6 % Final     Comment:     ADA Screening Guidelines:  5.7-6.4%  Consistent with prediabetes  >or=6.5%  Consistent with diabetes    High levels of fetal hemoglobin interfere with the HbA1C  assay. Heterozygous hemoglobin variants (HbS, HgC, etc)do  not significantly interfere with this assay.   However, presence of multiple variants may affect accuracy.     08/01/2024 6.9 (H) 4.0 - 5.6 % Final     Comment:     ADA Screening Guidelines:  5.7-6.4%  Consistent with prediabetes  >or=6.5%  Consistent with diabetes    High levels of fetal hemoglobin interfere with the HbA1C  assay. Heterozygous hemoglobin variants (HbS, HgC, etc)do  not significantly interfere with this assay.   However, presence of multiple variants may affect accuracy.        -Currently takes Ozempic  -Ozempic will need to be held at least 7 days prior to procedure. May resume postop

## 2025-05-01 NOTE — ANESTHESIA PREPROCEDURE EVALUATION
"                                                                                                             05/01/2025  Demetrice Gaines is a 65 y.o., female.      Pre-op Assessment    I have reviewed the Patient Summary Reports.     I have reviewed the Nursing Notes. I have reviewed the NPO Status.      Review of Systems  Anesthesia Hx:  No problems with previous Anesthesia                Hematology/Oncology:  Hematology Normal                       --  Cancer in past history:       Breast              Cardiovascular:     Hypertension  Past MI CAD       CHF       ECG has been reviewed.     Evaluated by cards:  "E consult for preop clearance of removal of ganglion cyst on finger  The chart reviewed.  PMH chf cad htn dm  04/25 EKG reviewed by myself today NSR and poor R progression on precordial leads. No change compared to prior one.      Plan  Elevated periop risk of CV events for non-high risk procedure.  Ok to proceed the scheduled procedure without further cardiac study.  OK to hold ASA 5 days before the procedure and resume postop once hemodynamically stable"                                Renal/:  Renal/ Normal                 Hepatic/GI:  Hepatic/GI Normal                    Neurological:  Neurology Normal                                      Endocrine:  Diabetes           Dermatological:  Skin Normal    Psych:  Psychiatric Normal                    Physical Exam  General: Well nourished, Cooperative, Alert and Oriented    Airway:  Mallampati: II / II  Mouth Opening: Normal  TM Distance: Normal  Tongue: Normal  Neck ROM: Normal ROM    Dental:  Intact      Patient Active Problem List   Diagnosis    Old myocardial infarction    History of colon polyps    Type 2 diabetes mellitus with circulatory disorder, without long-term current use of insulin    Cardiomegaly    Coronary artery disease involving native coronary artery of native heart without angina pectoris    Chronic diastolic congestive heart failure "    Epiretinal membrane (ERM) of right eye    Hypertension associated with diabetes    Aortic atherosclerosis    Malignant neoplasm of upper-outer quadrant of left breast in female, estrogen receptor positive    Refusal of statin medication by patient    Antineoplastic chemotherapy induced anemia    Iron deficiency anemia    Ovarian cancer on right    Pulmonary nodule    Granulosa cell tumor    Preop cardiovascular exam   Results for orders placed during the hospital encounter of 01/22/24    Echo    Interpretation Summary    Left Ventricle: The left ventricle is normal in size. Ventricular mass is normal. Normal wall thickness. There is concentric remodeling. Normal wall motion. There is normal systolic function with a visually estimated ejection fraction of 55 - 60%. Biplane (2D) method of discs ejection fraction is 57%. Global longitudinal strain is -14.4%. There is normal diastolic function.    Right Ventricle: Normal right ventricular cavity size. Wall thickness is normal. Right ventricle wall motion  is normal. Systolic function is normal.    IVC/SVC: Normal venous pressure at 3 mmHg.    Overall the study quality was technically difficult.    *TDS due to breast expanders to not able to get quality imaging for accurate YOLI measurements.**        Anesthesia Plan  Type of Anesthesia, risks & benefits discussed:    Anesthesia Type: Gen ETT, Gen Supraglottic Airway, MAC  Intra-op Monitoring Plan: Standard ASA Monitors  Post Op Pain Control Plan: multimodal analgesia  Induction:  IV  Airway Plan: Direct  Informed Consent: Informed consent signed with the Patient and all parties understand the risks and agree with anesthesia plan.  All questions answered.   ASA Score: 2  Day of Surgery Review of History & Physical: H&P Update referred to the surgeon/provider.I have interviewed and examined the patient. I have reviewed the patient's H&P dated: There are no significant changes. H&P completed by Anesthesiologist.    Ready  For Surgery From Anesthesia Perspective.     .      Chemistry        Component Value Date/Time     04/17/2025 1141     01/02/2025 1018    K 4.2 04/17/2025 1141    K 3.7 01/02/2025 1018     04/17/2025 1141    CL 99 01/02/2025 1018    CO2 24 04/17/2025 1141    CO2 26 01/02/2025 1018    BUN 11 04/17/2025 1141    CREATININE 0.8 04/17/2025 1141     (H) 04/17/2025 1141     (H) 01/02/2025 1018        Component Value Date/Time    CALCIUM 9.8 04/17/2025 1141    CALCIUM 10.4 01/02/2025 1018    ALKPHOS 89 04/17/2025 1141    ALKPHOS 91 01/02/2025 1018    AST 25 04/17/2025 1141    AST 17 01/02/2025 1018    ALT 20 04/17/2025 1141    ALT 16 01/02/2025 1018    BILITOT 0.4 04/17/2025 1141    BILITOT 0.4 01/02/2025 1018    ESTGFRAFRICA 42 (A) 04/04/2022 2105    EGFRNONAA 37 (A) 04/04/2022 2105        Lab Results   Component Value Date    WBC 5.01 04/17/2025    HGB 13.0 04/17/2025    HCT 38.4 04/17/2025    MCV 84 04/17/2025     04/17/2025

## 2025-05-01 NOTE — TRANSFER OF CARE
"Anesthesia Transfer of Care Note    Patient: Demetrice Gaines    Procedure(s) Performed: Procedure(s) (LRB):  EXCISION, LIPOMA (Left)  EXCISION, LESION, TENDON SHEATH, HAND (Left)  RELEASE, TRIGGER FINGER (Left)    Patient location: PACU    Anesthesia Type: MAC    Transport from OR: Transported from OR on room air with adequate spontaneous ventilation    Post pain: adequate analgesia    Post assessment: no apparent anesthetic complications    Post vital signs: stable    Level of consciousness: awake and alert    Nausea/Vomiting: no nausea/vomiting    Complications: none    Transfer of care protocol was followed      Last vitals: Visit Vitals  BP (!) 152/91   Pulse 77   Temp 36.5 °C (97.7 °F) (Temporal)   Resp 16   Ht 5' 4" (1.626 m)   Wt 82.1 kg (180 lb 14.2 oz)   SpO2 98%   Breastfeeding No   BMI 31.05 kg/m²     "

## 2025-05-02 NOTE — ANESTHESIA POSTPROCEDURE EVALUATION
Anesthesia Post Evaluation    Patient: Demetrice Gaines    Procedure(s) Performed: Procedure(s) (LRB):  EXCISION, LIPOMA (Left)  EXCISION, LESION, TENDON SHEATH, HAND (Left)  RELEASE, TRIGGER FINGER (Left)    Final Anesthesia Type: MAC      Patient location during evaluation: PACU  Patient participation: Yes- Able to Participate  Level of consciousness: awake and alert  Post-procedure vital signs: reviewed and stable  Airway patency: patent      Anesthetic complications: no      Cardiovascular status: blood pressure returned to baseline  Respiratory status: unassisted and spontaneous ventilation  Hydration status: euvolemic  Follow-up not needed.              Vitals Value Taken Time   /86 05/01/25 11:16   Temp 36.2 °C (97.2 °F) 05/01/25 11:16   Pulse 73 05/01/25 11:17   Resp 41 05/01/25 11:16   SpO2 95 % 05/01/25 11:17   Vitals shown include unfiled device data.      Event Time   Out of Recovery 11:17:23         Pain/Bryce Score: Pain Rating Prior to Med Admin: 4 (5/1/2025 11:05 AM)  Bryce Score: 10 (5/1/2025 11:19 AM)

## 2025-05-05 LAB
ESTROGEN SERPL-MCNC: NORMAL PG/ML
INSULIN SERPL-ACNC: NORMAL U[IU]/ML
LAB AP CLINICAL INFORMATION: NORMAL
LAB AP GROSS DESCRIPTION: NORMAL
LAB AP PERFORMING LOCATION(S): NORMAL
LAB AP REPORT FOOTNOTES: NORMAL
T3RU NFR SERPL: NORMAL %

## 2025-05-06 ENCOUNTER — PATIENT OUTREACH (OUTPATIENT)
Dept: ADMINISTRATIVE | Facility: HOSPITAL | Age: 65
End: 2025-05-06
Payer: MEDICARE

## 2025-05-06 ENCOUNTER — OFFICE VISIT (OUTPATIENT)
Dept: ORTHOPEDICS | Facility: CLINIC | Age: 65
End: 2025-05-06
Payer: MEDICARE

## 2025-05-06 VITALS — WEIGHT: 180 LBS | HEIGHT: 64 IN | BODY MASS INDEX: 30.73 KG/M2

## 2025-05-06 DIAGNOSIS — D17.22 LIPOMA OF LEFT UPPER EXTREMITY: ICD-10-CM

## 2025-05-06 DIAGNOSIS — M65.30 TRIGGER FINGER, ACQUIRED: Primary | ICD-10-CM

## 2025-05-06 PROCEDURE — 1125F AMNT PAIN NOTED PAIN PRSNT: CPT | Mod: CPTII,S$GLB,, | Performed by: ORTHOPAEDIC SURGERY

## 2025-05-06 PROCEDURE — 3051F HG A1C>EQUAL 7.0%<8.0%: CPT | Mod: CPTII,S$GLB,, | Performed by: ORTHOPAEDIC SURGERY

## 2025-05-06 PROCEDURE — 1159F MED LIST DOCD IN RCRD: CPT | Mod: CPTII,S$GLB,, | Performed by: ORTHOPAEDIC SURGERY

## 2025-05-06 PROCEDURE — 1160F RVW MEDS BY RX/DR IN RCRD: CPT | Mod: CPTII,S$GLB,, | Performed by: ORTHOPAEDIC SURGERY

## 2025-05-06 PROCEDURE — 99999 PR PBB SHADOW E&M-EST. PATIENT-LVL II: CPT | Mod: PBBFAC,,, | Performed by: ORTHOPAEDIC SURGERY

## 2025-05-06 PROCEDURE — 1101F PT FALLS ASSESS-DOCD LE1/YR: CPT | Mod: CPTII,S$GLB,, | Performed by: ORTHOPAEDIC SURGERY

## 2025-05-06 PROCEDURE — 99024 POSTOP FOLLOW-UP VISIT: CPT | Mod: S$GLB,,, | Performed by: ORTHOPAEDIC SURGERY

## 2025-05-06 PROCEDURE — 3288F FALL RISK ASSESSMENT DOCD: CPT | Mod: CPTII,S$GLB,, | Performed by: ORTHOPAEDIC SURGERY

## 2025-05-06 NOTE — PROGRESS NOTES
Subjective:     Patient ID: Demetrice Gaines is a 65 y.o. female.    Chief Complaint: Post-op Evaluation of the Left Wrist      HPI:  The patient is a 65-year-old female status post left wrist lipoma excision radial volar wrist and left thumb A1 pulley area and left thumb trigger finger release date of surgery was 2025.  She seems to be doing well.    Past Medical History:   Diagnosis Date    Bilateral pleural effusion 2020    CAD (coronary artery disease)     Chest wall pain 2025    CHF (congestive heart failure)     Colon polyp     Diabetes mellitus, type 2     Hypertension     Hyponatremia 2020    Malignant neoplasm of upper-outer quadrant of left breast in female, estrogen receptor positive 10/17/2023    Myocardial infarction     Ovarian cancer on right 2024     Past Surgical History:   Procedure Laterality Date    Benign Cyst Right     BREAST CYST EXCISION Right     pt states benign     SECTION      COLONOSCOPY      COLONOSCOPY N/A 2019    Procedure: COLONOSCOPY;  Surgeon: Ileana Holcomb MD;  Location: [facility] ENDO;  Service: Endoscopy;  Laterality: N/A;    COLONOSCOPY N/A 2024    Procedure: COLONOSCOPY;  Surgeon: Lobo Mitchell MD;  Location: [facility] ENDO;  Service: Endoscopy;  Laterality: N/A;    EXCISION, LESION, TENDON SHEATH, HAND Left 2025    Procedure: EXCISION, LESION, TENDON SHEATH, HAND;  Surgeon: Harshad Tsang MD;  Location: [facility] OR;  Service: Orthopedics;  Laterality: Left;  left thumb flexor tendon sheath lipoma    EXCISION, MASS, BREAST, USING RADIOLOGICAL MARKER Left 2023    Procedure: EXCISION,MASS,BREAST,USING RADIOLOGICAL MARKER;  Surgeon: Hemalatha Josue MD;  Location: [facility] OR;  Service: General;  Laterality: Left;  radhika  needed    Expanders removed and implants placed 2024      FLUOROSCOPY N/A 2024    Procedure: Fluoroscopy/Mediport placement;  Surgeon: Sylvester Zazueta MD;  Location: [facility] CATH  LAB;  Service: General;  Laterality: N/A;    HYSTERECTOMY  05/2024    INJECTION FOR SENTINEL NODE IDENTIFICATION Left 10/25/2023    Procedure: INJECTION, FOR SENTINEL NODE IDENTIFICATION;  Surgeon: Hemalahta Josue MD;  Location: [facility] OR;  Service: General;  Laterality: Left;    INSERTION OF BREAST IMPLANT  08/23/2024    INSERTION OF BREAST TISSUE EXPANDER Bilateral 10/25/2023    Procedure: INSERTION, TISSUE EXPANDER, BREAST;  Surgeon: Keron Lynn MD;  Location: [facility] OR;  Service: Plastics;  Laterality: Bilateral;    LIPOMA RESECTION Left 5/1/2025    Procedure: EXCISION, LIPOMA;  Surgeon: Harshad Tsang MD;  Location: [facility] OR;  Service: Orthopedics;  Laterality: Left;  excision left wrist radial volar lipoma    MASTECTOMY, NIPPLE SPARING Bilateral 10/25/2023    Procedure: MASTECTOMY, NIPPLE SPARING;  Surgeon: Hemalatha Josue MD;  Location: [facility];  Service: General;  Laterality: Bilateral;    SENTINEL LYMPH NODE BIOPSY Left 10/25/2023    Procedure: BIOPSY, LYMPH NODE, SENTINEL;  Surgeon: Hemalatha Josue MD;  Location: [facility] OR;  Service: General;  Laterality: Left;  nuc med needed 1 hour before start    TRIGGER FINGER RELEASE Left 5/1/2025    Procedure: RELEASE, TRIGGER FINGER;  Surgeon: Harshad Tsang MD;  Location: [facility] OR;  Service: Orthopedics;  Laterality: Left;  and left trigger thumb release lipoma    ULTRASOUND GUIDANCE Left 10/25/2023    Procedure: ULTRASOUND GUIDANCE;  Surgeon: Hemalatha Josue MD;  Location: [facility];  Service: General;  Laterality: Left;     Family History   Problem Relation Name Age of Onset    Cancer Mother Caryl Morales     Pacemaker/defibrilator Mother Caryl Morales     Glaucoma Mother Caryl Morales     Arthritis Mother Caryl Morales     Heart disease Mother Caryl Morales         It seems that this condition runs in my family on my mother's side    Hypertension Mother Caryl Morales     Diabetes Father Paulino  Harshad Morales     Hypertension Sister      Glaucoma Sister      Breast cancer Paternal Grandmother Che Morales (paternal grandmother)     Cancer Paternal Grandmother Che Morales (paternal grandmother)     Hypertension Brother      COPD Sister Guera Cantu     COPD Brother Paulino Garciacarlos Morales     Hypertension Brother Keyur Morales     Hypertension Sister Kierra Martinez      Social History[1]  Medication List with Changes/Refills   Current Medications    AMLODIPINE-OLMESARTAN (LEATHA) 10-40 MG PER TABLET    Take 1 tablet by mouth once daily    ANASTROZOLE (ARIMIDEX) 1 MG TAB    Take 1 tablet (1 mg total) by mouth once daily.    ASPIRIN (ECOTRIN) 81 MG EC TABLET    Take 81 mg by mouth once daily.    CHLORTHALIDONE (HYGROTEN) 25 MG TAB    Take 1 tablet by mouth once daily    HYDROCODONE-ACETAMINOPHEN (NORCO) 5-325 MG PER TABLET    Take 1 tablet by mouth every 6 (six) hours as needed for Pain.    SEMAGLUTIDE (OZEMPIC) 1 MG/DOSE (4 MG/3 ML)    Inject 1 mg into the skin every 7 days.     Review of patient's allergies indicates:   Allergen Reactions    Lisinopril Anaphylaxis    Aldactone [spironolactone]      Memory impair and breast soreness    Coreg [carvedilol]      Hair loss    Lisinopril-hydrochlorothiazide      Other reaction(s): Reaction:Throat swelling;     Review of Systems   Constitutional: Negative for malaise/fatigue.   HENT:  Negative for hearing loss.    Eyes:  Positive for visual disturbance. Negative for double vision.   Cardiovascular:  Positive for chest pain.   Respiratory:  Negative for shortness of breath.    Endocrine: Negative for cold intolerance.   Hematologic/Lymphatic: Does not bruise/bleed easily.   Skin:  Negative for poor wound healing and suspicious lesions.   Musculoskeletal:  Negative for gout, joint pain and joint swelling.   Gastrointestinal:  Negative for nausea and vomiting.   Genitourinary:  Negative for dysuria.   Neurological:  Negative for  numbness, paresthesias and sensory change.   Psychiatric/Behavioral:  Negative for depression, memory loss and substance abuse. The patient is not nervous/anxious.    Allergic/Immunologic: Negative for persistent infections.       Objective:   Body mass index is 30.9 kg/m².  There were no vitals filed for this visit.             General    Constitutional: She is oriented to person, place, and time. She appears well-developed and well-nourished. No distress.   HENT:   Head: Normocephalic.   Eyes: EOM are normal.   Pulmonary/Chest: Effort normal.   Neurological: She is oriented to person, place, and time.   Psychiatric: She has a normal mood and affect.         Left Hand/Wrist Exam     Inspection   Scars: Wrist - present Hand -  present  Effusion: Wrist - absent Hand -  absent    Other     Sensory Exam  Median Distribution: normal  Ulnar Distribution: normal  Radial Distribution: normal    Comments:  The suture line is intact left radial volar wrist and left thumb A1 pulley area.  There is no sign of infection.  There is no triggering.  There are no motor or sensory deficits.          Vascular Exam       Capillary Refill  Left Hand: normal capillary refill       radiographs were not obtained today  Assessment:     Encounter Diagnoses   Name Primary?    Trigger finger, acquired Yes    Lipoma of left upper extremity         Plan:     The patient has had the wound cleaned with peroxide.  A Band-Aid was applied.  Wound care was discussed.  Pathology report did confirm lipomas.  A Band-Aid was applied.  She was given a wrist splint and will return in 1 week for suture removal.                Disclaimer: This note was prepared using a voice recognition system and is likely to have sound alike errors within the text.          [1]   Social History  Socioeconomic History    Marital status:    Tobacco Use    Smoking status: Never     Passive exposure: Never    Smokeless tobacco: Never    Tobacco comments:     NEVER    Substance and Sexual Activity    Alcohol use: Never    Drug use: Never    Sexual activity: Not Currently     Comment: Menopausal     Social Drivers of Health     Financial Resource Strain: High Risk (3/26/2025)    Received from [facility]    Overall Financial Resource Strain (CARDIA)     Difficulty of Paying Living Expenses: Very hard   Food Insecurity: Food Insecurity Present (3/26/2025)    Received from [facility]    Hunger Vital Sign     Worried About Running Out of Food in the Last Year: Often true     Ran Out of Food in the Last Year: Often true   Transportation Needs: Unmet Transportation Needs (3/26/2025)    Received from [facility]    PRAPARE - Transportation     Lack of Transportation (Medical): Yes     Lack of Transportation (Non-Medical): Yes   Physical Activity: Inactive (8/1/2024)    Exercise Vital Sign     Days of Exercise per Week: 0 days     Minutes of Exercise per Session: 0 min   Stress: No Stress Concern Present (3/26/2025)    Received from [facility]    [employer] [place] [employer] - Occupational Stress Questionnaire     Feeling of Stress : Not at all   Housing Stability: Low Risk  (3/26/2025)    Received from [facility]    Housing Stability Vital Sign     Unable to Pay for Housing in the Last Year: No     Number of Times Moved in the Last Year: 0     Homeless in the Last Year: No

## 2025-05-06 NOTE — PROGRESS NOTES
Working mammogram report:    Chart searched  Attempted to contact pt regarding overdue mammogram  Pt stated she had a Bilateral mastectomy in 2023  Verified surgical history is up to date  Updated HM

## 2025-05-07 ENCOUNTER — PATIENT MESSAGE (OUTPATIENT)
Dept: ORTHOPEDICS | Facility: CLINIC | Age: 65
End: 2025-05-07
Payer: MEDICARE

## 2025-05-13 ENCOUNTER — PATIENT MESSAGE (OUTPATIENT)
Dept: PODIATRY | Facility: CLINIC | Age: 65
End: 2025-05-13
Payer: MEDICARE

## 2025-05-14 ENCOUNTER — OFFICE VISIT (OUTPATIENT)
Dept: ORTHOPEDICS | Facility: CLINIC | Age: 65
End: 2025-05-14
Payer: MEDICARE

## 2025-05-14 VITALS — BODY MASS INDEX: 30.71 KG/M2 | WEIGHT: 179.88 LBS | HEIGHT: 64 IN

## 2025-05-14 DIAGNOSIS — D17.22 LIPOMA OF LEFT UPPER EXTREMITY: Primary | ICD-10-CM

## 2025-05-14 DIAGNOSIS — M65.30 TRIGGER FINGER, ACQUIRED: ICD-10-CM

## 2025-05-14 PROCEDURE — 1160F RVW MEDS BY RX/DR IN RCRD: CPT | Mod: CPTII,S$GLB,, | Performed by: ORTHOPAEDIC SURGERY

## 2025-05-14 PROCEDURE — 1126F AMNT PAIN NOTED NONE PRSNT: CPT | Mod: CPTII,S$GLB,, | Performed by: ORTHOPAEDIC SURGERY

## 2025-05-14 PROCEDURE — 1159F MED LIST DOCD IN RCRD: CPT | Mod: CPTII,S$GLB,, | Performed by: ORTHOPAEDIC SURGERY

## 2025-05-14 PROCEDURE — 1101F PT FALLS ASSESS-DOCD LE1/YR: CPT | Mod: CPTII,S$GLB,, | Performed by: ORTHOPAEDIC SURGERY

## 2025-05-14 PROCEDURE — 3288F FALL RISK ASSESSMENT DOCD: CPT | Mod: CPTII,S$GLB,, | Performed by: ORTHOPAEDIC SURGERY

## 2025-05-14 PROCEDURE — 99999 PR PBB SHADOW E&M-EST. PATIENT-LVL II: CPT | Mod: PBBFAC,,, | Performed by: ORTHOPAEDIC SURGERY

## 2025-05-14 PROCEDURE — 99024 POSTOP FOLLOW-UP VISIT: CPT | Mod: S$GLB,,, | Performed by: ORTHOPAEDIC SURGERY

## 2025-05-14 PROCEDURE — 3051F HG A1C>EQUAL 7.0%<8.0%: CPT | Mod: CPTII,S$GLB,, | Performed by: ORTHOPAEDIC SURGERY

## 2025-05-14 NOTE — PROGRESS NOTES
Subjective:     Patient ID: Demetrice Gaines is a 65 y.o. female.    Chief Complaint: Pain and Post-op Evaluation of the Left Wrist and Pain and Post-op Evaluation of the Left Hand      HPI:  The patient is a 65-year-old female seen in follow-up after a left wrist radial volar lipoma excision and lipoma excision left thumb flexor tendon sheath and trigger thumb release date of surgery was 2025.  Pathology report did confirm lipomatous both of the thumb and distal radius area.    Past Medical History:   Diagnosis Date    Bilateral pleural effusion 2020    CAD (coronary artery disease)     Chest wall pain 2025    CHF (congestive heart failure)     Colon polyp     Diabetes mellitus, type 2     Hypertension     Hyponatremia 2020    Malignant neoplasm of upper-outer quadrant of left breast in female, estrogen receptor positive 10/17/2023    Myocardial infarction     Ovarian cancer on right 2024     Past Surgical History:   Procedure Laterality Date    Benign Cyst Right     BREAST CYST EXCISION Right     pt states benign     SECTION      COLONOSCOPY      COLONOSCOPY N/A 2019    Procedure: COLONOSCOPY;  Surgeon: Ileana Holcomb MD;  Location: [facility] ENDO;  Service: Endoscopy;  Laterality: N/A;    COLONOSCOPY N/A 2024    Procedure: COLONOSCOPY;  Surgeon: Lobo Mitchell MD;  Location: [facility] ENDO;  Service: Endoscopy;  Laterality: N/A;    EXCISION, LESION, TENDON SHEATH, HAND Left 2025    Procedure: EXCISION, LESION, TENDON SHEATH, HAND;  Surgeon: Harshad Tsang MD;  Location: [facility] OR;  Service: Orthopedics;  Laterality: Left;  left thumb flexor tendon sheath lipoma    EXCISION, MASS, BREAST, USING RADIOLOGICAL MARKER Left 2023    Procedure: EXCISION,MASS,BREAST,USING RADIOLOGICAL MARKER;  Surgeon: Hemalatha Josue MD;  Location: [facility] OR;  Service: General;  Laterality: Left;  radhika  needed    Expanders removed and implants placed 2024       FLUOROSCOPY N/A 01/08/2024    Procedure: Fluoroscopy/Mediport placement;  Surgeon: Sylvester Zazueta MD;  Location: [facility] CATH LAB;  Service: General;  Laterality: N/A;    HYSTERECTOMY  05/2024    INJECTION FOR SENTINEL NODE IDENTIFICATION Left 10/25/2023    Procedure: INJECTION, FOR SENTINEL NODE IDENTIFICATION;  Surgeon: Hemalatha Josue MD;  Location: [facility] OR;  Service: General;  Laterality: Left;    INSERTION OF BREAST IMPLANT  08/23/2024    INSERTION OF BREAST TISSUE EXPANDER Bilateral 10/25/2023    Procedure: INSERTION, TISSUE EXPANDER, BREAST;  Surgeon: Keron Lynn MD;  Location: [facility] OR;  Service: Plastics;  Laterality: Bilateral;    LIPOMA RESECTION Left 05/01/2025    Procedure: EXCISION, LIPOMA;  Surgeon: Harshad Tsang MD;  Location: [facility] OR;  Service: Orthopedics;  Laterality: Left;  excision left wrist radial volar lipoma    MASTECTOMY, NIPPLE SPARING Bilateral 10/25/2023    Procedure: MASTECTOMY, NIPPLE SPARING;  Surgeon: Hemalatha Josue MD;  Location: [facility] OR;  Service: General;  Laterality: Bilateral;    SENTINEL LYMPH NODE BIOPSY Left 10/25/2023    Procedure: BIOPSY, LYMPH NODE, SENTINEL;  Surgeon: Hemalatha Josue MD;  Location: [facility] OR;  Service: General;  Laterality: Left;  nuc med needed 1 hour before start    TRIGGER FINGER RELEASE Left 05/01/2025    Procedure: RELEASE, TRIGGER FINGER;  Surgeon: Harshad Tsang MD;  Location: [facility] OR;  Service: Orthopedics;  Laterality: Left;  and left trigger thumb release lipoma    ULTRASOUND GUIDANCE Left 10/25/2023    Procedure: ULTRASOUND GUIDANCE;  Surgeon: Hemalatha Josue MD;  Location: [facility];  Service: General;  Laterality: Left;     Family History   Problem Relation Name Age of Onset    Cancer Mother Caryl Morales     Pacemaker/defibrilator Mother Caryl Morales     Glaucoma Mother Caryl Morales     Arthritis Mother Caryl Morales     Heart disease Mother Caryl Morales         It  seems that this condition runs in my family on my mother's side    Hypertension Mother Caryl Morales     Diabetes Father Paulino Morales     Hypertension Sister      Glaucoma Sister      Breast cancer Paternal Grandmother Che Morales (paternal grandmother)     Cancer Paternal Grandmother Che Morales (paternal grandmother)     Hypertension Brother      COPD Sister Guera Cantu     COPD Brother Paulino Morales     Hypertension Brother Keyur Morales     Hypertension Sister Kierra Martinez      Social History[1]  Medication List with Changes/Refills   Current Medications    AMLODIPINE-OLMESARTAN (LEATHA) 10-40 MG PER TABLET    Take 1 tablet by mouth once daily    ANASTROZOLE (ARIMIDEX) 1 MG TAB    Take 1 tablet (1 mg total) by mouth once daily.    ASPIRIN (ECOTRIN) 81 MG EC TABLET    Take 81 mg by mouth once daily.    CHLORTHALIDONE (HYGROTEN) 25 MG TAB    Take 1 tablet by mouth once daily    HYDROCODONE-ACETAMINOPHEN (NORCO) 5-325 MG PER TABLET    Take 1 tablet by mouth every 6 (six) hours as needed for Pain.    SEMAGLUTIDE (OZEMPIC) 1 MG/DOSE (4 MG/3 ML)    Inject 1 mg into the skin every 7 days.     Review of patient's allergies indicates:   Allergen Reactions    Lisinopril Anaphylaxis    Aldactone [spironolactone]      Memory impair and breast soreness    Coreg [carvedilol]      Hair loss    Lisinopril-hydrochlorothiazide      Other reaction(s): Reaction:Throat swelling;     Review of Systems   Constitutional: Negative for malaise/fatigue.   HENT:  Negative for hearing loss.    Eyes:  Negative for double vision and visual disturbance.   Cardiovascular:  Negative for chest pain.   Respiratory:  Negative for shortness of breath.    Endocrine: Negative for cold intolerance.   Hematologic/Lymphatic: Does not bruise/bleed easily.   Skin:  Negative for poor wound healing and suspicious lesions.   Musculoskeletal:  Negative for gout, joint pain and joint swelling.    Gastrointestinal:  Negative for nausea and vomiting.   Genitourinary:  Negative for dysuria.   Neurological:  Negative for numbness, paresthesias and sensory change.   Psychiatric/Behavioral:  Negative for depression, memory loss and substance abuse. The patient is not nervous/anxious.    Allergic/Immunologic: Negative for persistent infections.       Objective:   Body mass index is 30.88 kg/m².  There were no vitals filed for this visit.             General    Constitutional: She is oriented to person, place, and time. She appears well-developed and well-nourished. No distress.   HENT:   Head: Normocephalic.   Eyes: EOM are normal.   Pulmonary/Chest: Effort normal.   Neurological: She is oriented to person, place, and time.   Psychiatric: She has a normal mood and affect.         Left Hand/Wrist Exam     Inspection   Scars: Wrist - present Hand -  present  Effusion: Wrist - absent Hand -  absent    Other     Sensory Exam  Median Distribution: normal  Ulnar Distribution: normal  Radial Distribution: normal    Comments:  The patient has a incision with suture line intact left volar thumb A1 pulley area and left radial volar wrist.  There is no sign of infection.  There are no motor or sensory deficits.          Vascular Exam       Capillary Refill  Left Hand: normal capillary refill        radiographs were not obtained today  Assessment:     Encounter Diagnoses   Name Primary?    Lipoma of left upper extremity Yes    Trigger finger, acquired         Plan:       The patient had the sutures removed today.  Steri-Strips were applied.  Wound care was discussed.  She was shown the pathology report.  She will return on a as needed basis.              Disclaimer: This note was prepared using a voice recognition system and is likely to have sound alike errors within the text.          [1]   Social History  Socioeconomic History    Marital status:    Tobacco Use    Smoking status: Never     Passive exposure: Never     Smokeless tobacco: Never    Tobacco comments:     NEVER   Substance and Sexual Activity    Alcohol use: Never    Drug use: Never    Sexual activity: Not Currently     Comment: Menopausal     Social Drivers of Health     Financial Resource Strain: High Risk (3/26/2025)    Received from [facility]    Overall Financial Resource Strain (CARDIA)     Difficulty of Paying Living Expenses: Very hard   Food Insecurity: Food Insecurity Present (3/26/2025)    Received from [facility]    Hunger Vital Sign     Worried About Running Out of Food in the Last Year: Often true     Ran Out of Food in the Last Year: Often true   Transportation Needs: Unmet Transportation Needs (3/26/2025)    Received from [facility]    PRAPARE - Transportation     Lack of Transportation (Medical): Yes     Lack of Transportation (Non-Medical): Yes   Physical Activity: Inactive (8/1/2024)    Exercise Vital Sign     Days of Exercise per Week: 0 days     Minutes of Exercise per Session: 0 min   Stress: No Stress Concern Present (3/26/2025)    Received from [facility]    [employer] - Occupational Stress Questionnaire     Feeling of Stress : Not at all   Housing Stability: Low Risk  (3/26/2025)    Received from [facility]    Housing Stability Vital Sign     Unable to Pay for Housing in the Last Year: No     Number of Times Moved in the Last Year: 0     Homeless in the Last Year: No

## 2025-05-19 ENCOUNTER — OFFICE VISIT (OUTPATIENT)
Dept: PODIATRY | Facility: CLINIC | Age: 65
End: 2025-05-19
Payer: MEDICARE

## 2025-05-19 ENCOUNTER — HOSPITAL ENCOUNTER (OUTPATIENT)
Dept: RADIOLOGY | Facility: HOSPITAL | Age: 65
Discharge: HOME | End: 2025-05-19
Attending: PODIATRIST
Payer: MEDICARE

## 2025-05-19 VITALS — WEIGHT: 179.88 LBS | HEIGHT: 64 IN | BODY MASS INDEX: 30.71 KG/M2

## 2025-05-19 DIAGNOSIS — M20.5X2 HALLUX LIMITUS, LEFT: ICD-10-CM

## 2025-05-19 DIAGNOSIS — E11.8 TYPE 2 DIABETES WITH COMPLICATION: ICD-10-CM

## 2025-05-19 DIAGNOSIS — M79.675 PAIN OF LEFT GREAT TOE: ICD-10-CM

## 2025-05-19 DIAGNOSIS — M20.12 HALLUX VALGUS, LEFT: ICD-10-CM

## 2025-05-19 DIAGNOSIS — M20.12 HALLUX VALGUS, LEFT: Primary | ICD-10-CM

## 2025-05-19 PROCEDURE — 99999 PR PBB SHADOW E&M-EST. PATIENT-LVL III: CPT | Mod: PBBFAC,,, | Performed by: PODIATRIST

## 2025-05-19 PROCEDURE — 99203 OFFICE O/P NEW LOW 30 MIN: CPT | Mod: S$GLB,,, | Performed by: PODIATRIST

## 2025-05-19 PROCEDURE — 3051F HG A1C>EQUAL 7.0%<8.0%: CPT | Mod: CPTII,S$GLB,, | Performed by: PODIATRIST

## 2025-05-19 PROCEDURE — 1159F MED LIST DOCD IN RCRD: CPT | Mod: CPTII,S$GLB,, | Performed by: PODIATRIST

## 2025-05-19 PROCEDURE — 73630 X-RAY EXAM OF FOOT: CPT | Mod: TC,LT

## 2025-05-19 PROCEDURE — 3008F BODY MASS INDEX DOCD: CPT | Mod: CPTII,S$GLB,, | Performed by: PODIATRIST

## 2025-05-19 PROCEDURE — 73630 X-RAY EXAM OF FOOT: CPT | Mod: 26,LT,, | Performed by: RADIOLOGY

## 2025-05-19 PROCEDURE — 1101F PT FALLS ASSESS-DOCD LE1/YR: CPT | Mod: CPTII,S$GLB,, | Performed by: PODIATRIST

## 2025-05-19 PROCEDURE — 3288F FALL RISK ASSESSMENT DOCD: CPT | Mod: CPTII,S$GLB,, | Performed by: PODIATRIST

## 2025-05-19 PROCEDURE — 1125F AMNT PAIN NOTED PAIN PRSNT: CPT | Mod: CPTII,S$GLB,, | Performed by: PODIATRIST

## 2025-05-19 RX ORDER — TRIAMCINOLONE ACETONIDE 40 MG/ML
40 INJECTION, SUSPENSION INTRA-ARTICULAR; INTRAMUSCULAR ONCE
Status: DISCONTINUED | OUTPATIENT
Start: 2025-05-19 | End: 2025-05-19

## 2025-05-19 RX ORDER — DEXAMETHASONE SODIUM PHOSPHATE 4 MG/ML
4 INJECTION, SOLUTION INTRA-ARTICULAR; INTRALESIONAL; INTRAMUSCULAR; INTRAVENOUS; SOFT TISSUE ONCE
Status: DISCONTINUED | OUTPATIENT
Start: 2025-05-19 | End: 2025-05-19

## 2025-05-19 NOTE — PROGRESS NOTES
Subjective:       Patient ID: Demetrice Gaines is a 65 y.o. female.    Chief Complaint: Toe Pain (Pt reports pain in 1st metatarsal head pain. 10/10 pain. Pt wearts slides no socks. Lasty visit with Dr Lul Alvarez was 4/24/2025.)      HPI: Demetrice Gaines presents to the office today with complaints of Moderate to severe pains to the left foot at the great toe due to arthritis and/or bunion deformity. Patient states pains are recalcitrant to oral NSAID therapy and wider width shoe gear and high toe box shoe gear. Patient has had no injection therapy to the 1st MTPJ on the affected limb prior.  Patient's Primary Care Provider is Lul Alvarez MD.     Review of patient's allergies indicates:   Allergen Reactions    Lisinopril Anaphylaxis    Aldactone [spironolactone]      Memory impair and breast soreness    Coreg [carvedilol]      Hair loss    Lisinopril-hydrochlorothiazide      Other reaction(s): Reaction:Throat swelling;       Past Medical History:   Diagnosis Date    Bilateral pleural effusion 06/20/2020    CAD (coronary artery disease)     Chest wall pain 01/07/2025    CHF (congestive heart failure)     Colon polyp     Diabetes mellitus, type 2     Hypertension     Hyponatremia 06/20/2020    Malignant neoplasm of upper-outer quadrant of left breast in female, estrogen receptor positive 10/17/2023    Myocardial infarction     Ovarian cancer on right 05/21/2024       Family History   Problem Relation Name Age of Onset    Cancer Mother Caryl Morales     Pacemaker/defibrilator Mother Caryl Morales     Glaucoma Mother Caryl Morales     Arthritis Mother Caryl Morales     Heart disease Mother Caryl Morales         It seems that this condition runs in my family on my mother's side    Hypertension Mother Caryl Morales     Diabetes Father Paulino Harshad Andrew     Hypertension Sister      Glaucoma Sister      Breast cancer Paternal Grandmother Che Morales (paternal grandmother)      Cancer Paternal Grandmother Che Morales (paternal grandmother)     Hypertension Brother      COPD Sister Guera Cantu     COPD Brother Paulino Morales     Hypertension Brother Keyur Morales     Hypertension Sister Kierra Martinez        Social History[1]    Past Surgical History:   Procedure Laterality Date    Benign Cyst Right     BREAST CYST EXCISION Right     pt states benign     SECTION      COLONOSCOPY      COLONOSCOPY N/A 2019    Procedure: COLONOSCOPY;  Surgeon: Ileana Holcomb MD;  Location: [facility];  Service: Endoscopy;  Laterality: N/A;    COLONOSCOPY N/A 2024    Procedure: COLONOSCOPY;  Surgeon: Lobo Mitchell MD;  Location: [facility] ENDO;  Service: Endoscopy;  Laterality: N/A;    EXCISION, LESION, TENDON SHEATH, HAND Left 2025    Procedure: EXCISION, LESION, TENDON SHEATH, HAND;  Surgeon: Harshad Tsang MD;  Location: [facility];  Service: Orthopedics;  Laterality: Left;  left thumb flexor tendon sheath lipoma    EXCISION, MASS, BREAST, USING RADIOLOGICAL MARKER Left 2023    Procedure: EXCISION,MASS,BREAST,USING RADIOLOGICAL MARKER;  Surgeon: Hemalatha Josue MD;  Location: [facility];  Service: General;  Laterality: Left;  radhika  needed    Expanders removed and implants placed 2024      FLUOROSCOPY N/A 2024    Procedure: Fluoroscopy/Mediport placement;  Surgeon: Sylvester Zazueta MD;  Location: [facility] CATH LAB;  Service: General;  Laterality: N/A;    HYSTERECTOMY  2024    INJECTION FOR SENTINEL NODE IDENTIFICATION Left 10/25/2023    Procedure: INJECTION, FOR SENTINEL NODE IDENTIFICATION;  Surgeon: Hemalatha Josue MD;  Location: [facility];  Service: General;  Laterality: Left;    INSERTION OF BREAST IMPLANT  2024    INSERTION OF BREAST TISSUE EXPANDER Bilateral 10/25/2023    Procedure: INSERTION, TISSUE EXPANDER, BREAST;  Surgeon: Keron Lynn MD;  Location: [facility];  Service: Plastics;   "Laterality: Bilateral;    LIPOMA RESECTION Left 05/01/2025    Procedure: EXCISION, LIPOMA;  Surgeon: Harshad Tsang MD;  Location: [facility] OR;  Service: Orthopedics;  Laterality: Left;  excision left wrist radial volar lipoma    MASTECTOMY, NIPPLE SPARING Bilateral 10/25/2023    Procedure: MASTECTOMY, NIPPLE SPARING;  Surgeon: Hemalatha Josue MD;  Location: [facility] OR;  Service: General;  Laterality: Bilateral;    SENTINEL LYMPH NODE BIOPSY Left 10/25/2023    Procedure: BIOPSY, LYMPH NODE, SENTINEL;  Surgeon: Hemalatha Josue MD;  Location: [facility] OR;  Service: General;  Laterality: Left;  nuc med needed 1 hour before start    TRIGGER FINGER RELEASE Left 05/01/2025    Procedure: RELEASE, TRIGGER FINGER;  Surgeon: Harshad Tsang MD;  Location: [facility] OR;  Service: Orthopedics;  Laterality: Left;  and left trigger thumb release lipoma    ULTRASOUND GUIDANCE Left 10/25/2023    Procedure: ULTRASOUND GUIDANCE;  Surgeon: Hemalatha Josue MD;  Location: [facility] OR;  Service: General;  Laterality: Left;       Review of Systems   Constitutional:  Negative for chills, fatigue and fever.   HENT:  Negative for hearing loss.    Eyes:  Negative for photophobia and visual disturbance.   Respiratory:  Negative for cough, chest tightness, shortness of breath and wheezing.    Cardiovascular:  Negative for chest pain and palpitations.   Gastrointestinal:  Negative for constipation, diarrhea, nausea and vomiting.   Endocrine: Negative for cold intolerance and heat intolerance.   Genitourinary:  Negative for flank pain.   Musculoskeletal:  Positive for arthralgias and gait problem. Negative for neck pain and neck stiffness.   Neurological:  Negative for light-headedness and headaches.   Psychiatric/Behavioral:  Negative for sleep disturbance.        Objective:   Ht 5' 4" (1.626 m)   Wt 81.6 kg (179 lb 14.3 oz)   BMI 30.88 kg/m²       Physical Exam  LOWER EXTREMITY PHYSICAL EXAMINATION    ORTHOPEDIC (LLE): Mild to " moderate bunion deformity is noted to the left foot. Degenerative arthropathy; hallux limitus is noted. Upon ROM in the sagittal plan, there is moderate pains to the end ROM, dorsally. There are slight pains to the plantar aspect of the 1st MTPJ as well. No pains to palpation of the tibial or the fibular sesamoid. There is a medium medial eminence appreciated. Moderate to severe dorsal spurring noted. Palpation of the dorsal-lateral aspect of the 1st MTPJ is moderate to severely painful. There is joint crepitus noted. The joint is full and somewhat edematous.      Assessment:     1. Hallux valgus, left    2. Hallux limitus, left    3. Pain of left great toe    4. Type 2 diabetes with complication          Plan:     Hallux valgus, left  -     Discontinue: triamcinolone acetonide injection 40 mg  -     Discontinue: dexAMETHasone injection 4 mg  -     X-Ray Foot Complete Left; Future; Expected date: 05/19/2025    Hallux limitus, left  -     Discontinue: triamcinolone acetonide injection 40 mg  -     Discontinue: dexAMETHasone injection 4 mg  -     X-Ray Foot Complete Left; Future; Expected date: 05/19/2025    Pain of left great toe  -     Discontinue: triamcinolone acetonide injection 40 mg  -     Discontinue: dexAMETHasone injection 4 mg  -     X-Ray Foot Complete Left; Future; Expected date: 05/19/2025    Type 2 diabetes with complication      Thorough discussion is had with the patient today, concerning the diagnosis, its etiology, and the treatment algorithm at present.     Please obtain XR.    Discussed Rx PO NSAIDs vs CSI.    Patient would rather treat moreso conservative at this juncture.    If persistent symptoms in 2-3 weeks, follow up.            Future Appointments   Date Time Provider Department Center   5/19/2025 10:15 AM Gerardo Mcgee DPM [facility]   7/8/2025 10:20 AM Lul Alvarez MD [facility] [place]   7/8/2025 10:45 AM Yakelin Shukla FNP [facility] [place]   7/9/2025  9:30  AM Krysta Roland P, NP [facility] BREAST [facility]   7/17/2025 10:00 AM [facility] CT1 LIMIT 500 LBS [facility] CT SCAN [facility]   7/21/2025  3:30 PM Deloris Gordon MD [facility] HEM ONC [facility]   1/5/2026  9:15 AM BIBIANA Low MD [facility] OPHTHAL [facility]   1/6/2026  8:40 AM Antwon Coffman MD [facility] CARDIO [facility]                  [1]   Social History  Socioeconomic History    Marital status:    Tobacco Use    Smoking status: Never     Passive exposure: Never    Smokeless tobacco: Never    Tobacco comments:     NEVER   Substance and Sexual Activity    Alcohol use: Never    Drug use: Never    Sexual activity: Not Currently     Comment: Menopausal     Social Drivers of Health     Financial Resource Strain: High Risk (3/26/2025)    Received from [facility]    Overall Financial Resource Strain (CARDIA)     Difficulty of Paying Living Expenses: Very hard   Food Insecurity: Food Insecurity Present (3/26/2025)    Received from [facility]    Hunger Vital Sign     Worried About Running Out of Food in the Last Year: Often true     Ran Out of Food in the Last Year: Often true   Transportation Needs: Unmet Transportation Needs (3/26/2025)    Received from [facility]    PRAPARE - Transportation     Lack of Transportation (Medical): Yes     Lack of Transportation (Non-Medical): Yes   Physical Activity: Inactive (8/1/2024)    Exercise Vital Sign     Days of Exercise per Week: 0 days     Minutes of Exercise per Session: 0 min   Stress: No Stress Concern Present (3/26/2025)    Received from [facility]    [employer] - Occupational Stress Questionnaire     Feeling of Stress : Not at all   Housing Stability: Low Risk  (3/26/2025)    Received from [facility]    Housing Stability Vital Sign     Unable to Pay for Housing in the Last Year: No     Number of Times Moved in the Last Year: 0     Homeless in the Last Year: No

## 2025-06-04 ENCOUNTER — TELEPHONE (OUTPATIENT)
Dept: SURGERY | Facility: CLINIC | Age: 65
End: 2025-06-04
Payer: MEDICARE

## 2025-06-09 DIAGNOSIS — Z85.3 HISTORY OF INVASIVE BREAST CANCER: ICD-10-CM

## 2025-06-09 RX ORDER — ANASTROZOLE 1 MG/1
1 TABLET ORAL DAILY
Qty: 30 TABLET | Refills: 11 | Status: SHIPPED | OUTPATIENT
Start: 2025-06-09

## 2025-06-16 ENCOUNTER — PATIENT MESSAGE (OUTPATIENT)
Dept: ORTHOPEDICS | Facility: CLINIC | Age: 65
End: 2025-06-16
Payer: MEDICARE

## 2025-06-18 ENCOUNTER — OFFICE VISIT (OUTPATIENT)
Dept: ORTHOPEDICS | Facility: CLINIC | Age: 65
End: 2025-06-18
Payer: MEDICARE

## 2025-06-18 ENCOUNTER — OFFICE VISIT (OUTPATIENT)
Dept: UROLOGY | Facility: CLINIC | Age: 65
End: 2025-06-18
Payer: MEDICARE

## 2025-06-18 VITALS
SYSTOLIC BLOOD PRESSURE: 165 MMHG | DIASTOLIC BLOOD PRESSURE: 97 MMHG | WEIGHT: 179.88 LBS | WEIGHT: 179.88 LBS | HEIGHT: 64 IN | BODY MASS INDEX: 30.71 KG/M2 | BODY MASS INDEX: 30.71 KG/M2 | HEIGHT: 64 IN | HEART RATE: 79 BPM

## 2025-06-18 DIAGNOSIS — K59.09 OTHER CONSTIPATION: ICD-10-CM

## 2025-06-18 DIAGNOSIS — M65.30 TRIGGER FINGER, ACQUIRED: Primary | ICD-10-CM

## 2025-06-18 DIAGNOSIS — N32.81 OAB (OVERACTIVE BLADDER): Primary | ICD-10-CM

## 2025-06-18 DIAGNOSIS — D17.22 LIPOMA OF LEFT UPPER EXTREMITY: ICD-10-CM

## 2025-06-18 DIAGNOSIS — N39.41 URGE INCONTINENCE OF URINE: ICD-10-CM

## 2025-06-18 PROBLEM — Z01.818 PREOPERATIVE EXAMINATION: Status: RESOLVED | Noted: 2023-10-17 | Resolved: 2025-06-18

## 2025-06-18 PROBLEM — M67.432 GANGLION OF LEFT WRIST: Status: RESOLVED | Noted: 2025-05-01 | Resolved: 2025-06-18

## 2025-06-18 PROBLEM — Z01.810 PREOP CARDIOVASCULAR EXAM: Status: RESOLVED | Noted: 2025-04-24 | Resolved: 2025-06-18

## 2025-06-18 PROCEDURE — 1159F MED LIST DOCD IN RCRD: CPT | Mod: CPTII,S$GLB,, | Performed by: NURSE PRACTITIONER

## 2025-06-18 PROCEDURE — 99999 PR PBB SHADOW E&M-EST. PATIENT-LVL III: CPT | Mod: PBBFAC,,, | Performed by: NURSE PRACTITIONER

## 2025-06-18 PROCEDURE — 3080F DIAST BP >= 90 MM HG: CPT | Mod: CPTII,S$GLB,, | Performed by: NURSE PRACTITIONER

## 2025-06-18 PROCEDURE — 3008F BODY MASS INDEX DOCD: CPT | Mod: CPTII,S$GLB,, | Performed by: NURSE PRACTITIONER

## 2025-06-18 PROCEDURE — 99204 OFFICE O/P NEW MOD 45 MIN: CPT | Mod: S$GLB,,, | Performed by: NURSE PRACTITIONER

## 2025-06-18 PROCEDURE — 99999 PR PBB SHADOW E&M-EST. PATIENT-LVL II: CPT | Mod: PBBFAC,,, | Performed by: ORTHOPAEDIC SURGERY

## 2025-06-18 PROCEDURE — 1160F RVW MEDS BY RX/DR IN RCRD: CPT | Mod: CPTII,S$GLB,, | Performed by: NURSE PRACTITIONER

## 2025-06-18 PROCEDURE — 4010F ACE/ARB THERAPY RXD/TAKEN: CPT | Mod: CPTII,S$GLB,, | Performed by: NURSE PRACTITIONER

## 2025-06-18 PROCEDURE — 3077F SYST BP >= 140 MM HG: CPT | Mod: CPTII,S$GLB,, | Performed by: NURSE PRACTITIONER

## 2025-06-18 PROCEDURE — 3051F HG A1C>EQUAL 7.0%<8.0%: CPT | Mod: CPTII,S$GLB,, | Performed by: NURSE PRACTITIONER

## 2025-06-18 PROCEDURE — 1126F AMNT PAIN NOTED NONE PRSNT: CPT | Mod: CPTII,S$GLB,, | Performed by: NURSE PRACTITIONER

## 2025-06-18 NOTE — PROGRESS NOTES
Chief Complaint:   Overactive bladder  Urge incontinence    HPI:   Patient is extremely pleasant 65-year-old female that is presenting with an increase in urge incontinence, nocturia and daytime frequency after her hysterectomy last year.  Patient states she is now having accidents, unable to get to the restroom prior to having urge incontinence.  Urine in clinic is negative, initial PVR is 161 mL with a repeat of 132 mL.  States she drinks very little water throughout the day, mostly caffeinated coli us.  Denies gross hematuria or  cancers in her family.  Patient does have a history of breast cancer.  States that she feels that she does not completely empty her bowels, sometimes has diarrhea but mostly constipation.  Denies blood in stool.  Allergies:  Lisinopril, Aldactone [spironolactone], Coreg [carvedilol], and Lisinopril-hydrochlorothiazide    Medications:  has a current medication list which includes the following prescription(s): amlodipine-olmesartan, anastrozole, aspirin, chlorthalidone, hydrocodone-acetaminophen, and ozempic.    Review of Systems:  General: No fever, chills, fatigability, or weight loss.  Skin: No rashes, itching, or changes in color or texture of skin.  Chest: Denies YANG, cyanosis, wheezing, cough, and sputum production.  Abdomen: Appetite fine. No weight loss. Denies diarrhea, abdominal pain, hematemesis, or blood in stool.  Musculoskeletal: No joint stiffness or swelling. Denies back pain.  : As above.  All other review of systems negative.    PMH:   has a past medical history of Bilateral pleural effusion (06/20/2020), CAD (coronary artery disease), Chest wall pain (01/07/2025), CHF (congestive heart failure), Colon polyp, Diabetes mellitus, type 2, Hypertension, Hyponatremia (06/20/2020), Malignant neoplasm of upper-outer quadrant of left breast in female, estrogen receptor positive (10/17/2023), Myocardial infarction, and Ovarian cancer on right (05/21/2024).    PSH:   has a  past surgical history that includes Benign Cyst (Right);  section; Colonoscopy; Colonoscopy (N/A, 2019); Breast cyst excision (Right, ); mastectomy, nipple sparing (Bilateral, 10/25/2023); Norris lymph node biopsy (Left, 10/25/2023); Injection for sentinel node identification (Left, 10/25/2023); Ultrasound guidance (Left, 10/25/2023); Insertion of breast tissue expander (Bilateral, 10/25/2023); excision, mass, breast, using radiological marker (Left, 2023); Colonoscopy (N/A, 2024); Fluoroscopy (N/A, 2024); Insertion of breast implant (2024); Hysterectomy (2024); Expanders removed and implants placed 2024; Lipoma resection (Left, 2025); excision, lesion, tendon sheath, hand (Left, 2025); and Trigger finger release (Left, 2025).    FamHx: family history includes Arthritis in her mother; Breast cancer in her paternal grandmother; COPD in her brother and sister; Cancer in her mother and paternal grandmother; Diabetes in her father; Glaucoma in her mother and sister; Heart disease in her mother; Hypertension in her brother, brother, mother, sister, and sister; Pacemaker/defibrilator in her mother.    SocHx:  reports that she has never smoked. She has never been exposed to tobacco smoke. She has never used smokeless tobacco. She reports that she does not drink alcohol and does not use drugs.      Physical Exam:  General: A&Ox3, no apparent distress, no deformities  Neck: No masses, normal thyroid  Lungs: normal inspiration, no use of accessory muscles  Heart: normal pulse, no arrhythmias  Abdomen: Soft, NT, ND, no masses, no hernias, no hepatosplenomegaly  Lymphatic: Neck and groin nodes negative  Skin: The skin is warm and dry. No jaundice.  Ext: No c/c/e.    Labs/Studies:   See HPI  Impression/Plan:   Overactive bladder with urge incontinence  GI referral has been placed, see HPI  Patient was educated on behavior modifications needed to decrease  overactive bladder symptoms.  Patient was referred for PT pelvic floor training.  Will implement behavior modifications discussed at this visit and proceed with PT pelvic floor training, return to clinic for re-evaluation in 3 months.  Hopefully, prior to our follow-up appointment, her constipation issues will has been resolved.

## 2025-06-18 NOTE — PROGRESS NOTES
Subjective:     Patient ID: Demetrice Gaines is a 65 y.o. female.    Chief Complaint: Pain and Post-op Evaluation of the Left Hand and Pain and Post-op Evaluation of the Left Wrist      HPI:  The patient is a 65-year-old female status post left wrist radial volar lipoma excision and lipoma excision left thumb flexor tendon sheath with trigger thumb release date of surgery was 2025.  She has a tender scar in the wrist but also has basal joint arthritis but declines injection for that problem today.  She does not have a thumb spica splint.    Past Medical History:   Diagnosis Date    Bilateral pleural effusion 2020    CAD (coronary artery disease)     Chest wall pain 2025    CHF (congestive heart failure)     Colon polyp     Diabetes mellitus, type 2     Hypertension     Hyponatremia 2020    Malignant neoplasm of upper-outer quadrant of left breast in female, estrogen receptor positive 10/17/2023    Myocardial infarction     Ovarian cancer on right 2024     Past Surgical History:   Procedure Laterality Date    Benign Cyst Right     BREAST CYST EXCISION Right     pt states benign     SECTION      COLONOSCOPY      COLONOSCOPY N/A 2019    Procedure: COLONOSCOPY;  Surgeon: Ileana Holcomb MD;  [facility]: [facility];  Service: Endoscopy;  Laterality: N/A;    COLONOSCOPY N/A 2024    Procedure: COLONOSCOPY;  Surgeon: Lobo Mitchell MD;  Location: [facility] ENDO;  Service: Endoscopy;  Laterality: N/A;    EXCISION, LESION, TENDON SHEATH, HAND Left 2025    Procedure: EXCISION, LESION, TENDON SHEATH, HAND;  Surgeon: Harshad Tsang MD;  Location: [facility] OR;  Service: Orthopedics;  Laterality: Left;  left thumb flexor tendon sheath lipoma    EXCISION, MASS, BREAST, USING RADIOLOGICAL MARKER Left 2023    Procedure: EXCISION,MASS,BREAST,USING RADIOLOGICAL MARKER;  Surgeon: Hemalatha Josue MD;  Location: [facility] OR;  Service: General;  Laterality: Left;  radhika   needed    Expanders removed and implants placed 8/23/2024      FLUOROSCOPY N/A 01/08/2024    Procedure: Fluoroscopy/Mediport placement;  Surgeon: Sylvester Zazueta MD;  Location: [facility] CATH LAB;  Service: General;  Laterality: N/A;    HYSTERECTOMY  05/2024    INJECTION FOR SENTINEL NODE IDENTIFICATION Left 10/25/2023    Procedure: INJECTION, FOR SENTINEL NODE IDENTIFICATION;  Surgeon: Hemalatha Josue MD;  Location: [facility] OR;  Service: General;  Laterality: Left;    INSERTION OF BREAST IMPLANT  08/23/2024    INSERTION OF BREAST TISSUE EXPANDER Bilateral 10/25/2023    Procedure: INSERTION, TISSUE EXPANDER, BREAST;  Surgeon: Keron Lynn MD;  Location: [facility] OR;  Service: Plastics;  Laterality: Bilateral;    LIPOMA RESECTION Left 05/01/2025    Procedure: EXCISION, LIPOMA;  Surgeon: Harshad Tsang MD;  Location: [facility] OR;  Service: Orthopedics;  Laterality: Left;  excision left wrist radial volar lipoma    MASTECTOMY, NIPPLE SPARING Bilateral 10/25/2023    Procedure: MASTECTOMY, NIPPLE SPARING;  Surgeon: Hemalatha Josue MD;  Location: [facility];  Service: General;  Laterality: Bilateral;    SENTINEL LYMPH NODE BIOPSY Left 10/25/2023    Procedure: BIOPSY, LYMPH NODE, SENTINEL;  Surgeon: Hemalatha Josue MD;  Location: [facility] ER;  Service: General;  Laterality: Left;  nuc med needed 1 hour before start    TRIGGER FINGER RELEASE Left 05/01/2025    Procedure: RELEASE, TRIGGER FINGER;  Surgeon: Harshad Tsang MD;  Location: [facility] OR;  Service: Orthopedics;  Laterality: Left;  and left trigger thumb release lipoma    ULTRASOUND GUIDANCE Left 10/25/2023    Procedure: ULTRASOUND GUIDANCE;  Surgeon: Hemalatha Josue MD;  Location: [facility];  Service: General;  Laterality: Left;     Family History   Problem Relation Name Age of Onset    Cancer Mother Carylnasreen Morales     Pacemaker/defibrilator Mother Carylnasreen Morales     Glaucoma Mother Caryl Morales     Arthritis Mother Caryl ROSALES  Andrew     Heart disease Mother Caryl Morales         It seems that this condition runs in my family on my mother's side    Hypertension Mother Caryl Morales     Diabetes Father Paulino Morales     Hypertension Sister      Glaucoma Sister      Breast cancer Paternal Grandmother Che Morales (paternal grandmother)     Cancer Paternal Grandmother Che Morales (paternal grandmother)     Hypertension Brother      COPD Sister Guera Cantu     COPD Brother Paulino Morales     Hypertension Brother Keyur Morales     Hypertension Sister Kierra Martinez      Social History[1]  Medication List with Changes/Refills   Current Medications    AMLODIPINE-OLMESARTAN (LEATHA) 10-40 MG PER TABLET    Take 1 tablet by mouth once daily    ANASTROZOLE (ARIMIDEX) 1 MG TAB    Take 1 tablet (1 mg total) by mouth once daily.    ASPIRIN (ECOTRIN) 81 MG EC TABLET    Take 81 mg by mouth once daily.    CHLORTHALIDONE (HYGROTEN) 25 MG TAB    Take 1 tablet by mouth once daily    HYDROCODONE-ACETAMINOPHEN (NORCO) 5-325 MG PER TABLET    Take 1 tablet by mouth every 6 (six) hours as needed for Pain.    SEMAGLUTIDE (OZEMPIC) 1 MG/DOSE (4 MG/3 ML)    Inject 1 mg into the skin every 7 days.     Review of patient's allergies indicates:   Allergen Reactions    Lisinopril Anaphylaxis    Aldactone [spironolactone]      Memory impair and breast soreness    Coreg [carvedilol]      Hair loss    Lisinopril-hydrochlorothiazide      Other reaction(s): Reaction:Throat swelling;     Review of Systems   Constitutional: Negative for malaise/fatigue.   HENT:  Negative for hearing loss.    Eyes:  Positive for visual disturbance. Negative for double vision.   Cardiovascular:  Positive for chest pain.   Respiratory:  Negative for shortness of breath.    Endocrine: Negative for cold intolerance.   Hematologic/Lymphatic: Does not bruise/bleed easily.   Skin:  Negative for poor wound healing and suspicious lesions.    Musculoskeletal:  Positive for arthritis, joint pain and joint swelling. Negative for gout.   Gastrointestinal:  Negative for nausea and vomiting.   Genitourinary:  Negative for dysuria.   Neurological:  Negative for numbness, paresthesias and sensory change.   Psychiatric/Behavioral:  Negative for depression, memory loss and substance abuse. The patient is not nervous/anxious.    Allergic/Immunologic: Negative for persistent infections.       Objective:   Body mass index is 30.88 kg/m².  There were no vitals filed for this visit.             General    Constitutional: She is oriented to person, place, and time. She appears well-developed and well-nourished. No distress.   HENT:   Head: Normocephalic.   Eyes: EOM are normal.   Pulmonary/Chest: Effort normal.   Neurological: She is oriented to person, place, and time.   Psychiatric: She has a normal mood and affect.         Left Hand/Wrist Exam     Inspection   Scars: Wrist - present Hand -  present  Effusion: Wrist - absent Hand -  absent    Tenderness   The patient is tender to palpation of the sher area.     Other     Sensory Exam  Median Distribution: normal  Ulnar Distribution: normal  Radial Distribution: normal    Comments:  The patient has a well-healed hypertrophic scar left radial volar wrist and left thumb volar metacarpophalangeal joint area.  She is tender about the basal joint left thumb with a positive axial circumduction grind test          Vascular Exam       Capillary Refill  Left Hand: normal capillary refill          Relevant imaging results reviewed and interpreted by me, discussed with the patient and / or family today radiographs left wrist were reviewed which were normal other than basal joint arthritis and arthritic change in multiple small joints  Assessment:     Encounter Diagnoses   Name Primary?    Trigger finger, acquired Yes    Lipoma of left upper extremity         Plan:     Pathology report did confirm lipoma.  She declines  injection basal joint left thumb.  She was given a thumb spica splint.  She will return in 2 months to follow her progress.                Disclaimer: This note was prepared using a voice recognition system and is likely to have sound alike errors within the text.          [1]   Social History  Socioeconomic History    Marital status:    Tobacco Use    Smoking status: Never     Passive exposure: Never    Smokeless tobacco: Never    Tobacco comments:     NEVER   Substance and Sexual Activity    Alcohol use: Never    Drug use: Never    Sexual activity: Not Currently     Comment: Menopausal     Social Drivers of Health     Financial Resource Strain: High Risk (3/26/2025)    Received from [facility]    Overall Financial Resource Strain (CARDIA)     Difficulty of Paying Living Expenses: Very hard   Food Insecurity: Food Insecurity Present (3/26/2025)    Received from [facility]    Hunger Vital Sign     Worried About Running Out of Food in the Last Year: Often true     Ran Out of Food in the Last Year: Often true   Transportation Needs: Unmet Transportation Needs (3/26/2025)    Received from [facility]    PRAPARE - Transportation     Lack of Transportation (Medical): Yes     Lack of Transportation (Non-Medical): Yes   Physical Activity: Inactive (8/1/2024)    Exercise Vital Sign     Days of Exercise per Week: 0 days     Minutes of Exercise per Session: 0 min   Stress: No Stress Concern Present (3/26/2025)    Received from [facility]    [employer] - Occupational Stress Questionnaire     Feeling of Stress : Not at all   Housing Stability: Low Risk  (3/26/2025)    Received from [facility]    Housing Stability Vital Sign     Unable to Pay for Housing in the Last Year: No     Number of Times Moved in the Last Year: 0     Homeless in the Last Year: No

## 2025-07-07 PROBLEM — R80.9 DIABETES MELLITUS WITH MICROALBUMINURIA: Status: ACTIVE | Noted: 2025-07-07

## 2025-07-07 PROBLEM — E11.65 UNCONTROLLED TYPE 2 DIABETES MELLITUS WITH HYPERGLYCEMIA: Status: ACTIVE | Noted: 2025-07-07

## 2025-07-07 PROBLEM — E11.29 DIABETES MELLITUS WITH MICROALBUMINURIA: Status: ACTIVE | Noted: 2025-07-07

## 2025-07-07 PROBLEM — E11.8 DIABETES MELLITUS TYPE 2 WITH COMPLICATIONS: Status: ACTIVE | Noted: 2025-07-07

## 2025-07-08 ENCOUNTER — OFFICE VISIT (OUTPATIENT)
Dept: INTERNAL MEDICINE | Facility: CLINIC | Age: 65
End: 2025-07-08
Payer: MEDICARE

## 2025-07-08 ENCOUNTER — LAB VISIT (OUTPATIENT)
Dept: LAB | Facility: HOSPITAL | Age: 65
End: 2025-07-08
Attending: FAMILY MEDICINE
Payer: MEDICARE

## 2025-07-08 VITALS
HEIGHT: 64 IN | WEIGHT: 175.94 LBS | DIASTOLIC BLOOD PRESSURE: 70 MMHG | OXYGEN SATURATION: 99 % | HEIGHT: 64 IN | BODY MASS INDEX: 30.04 KG/M2 | BODY MASS INDEX: 30.04 KG/M2 | WEIGHT: 175.94 LBS | RESPIRATION RATE: 20 BRPM | SYSTOLIC BLOOD PRESSURE: 136 MMHG | HEART RATE: 108 BPM

## 2025-07-08 DIAGNOSIS — I25.2 OLD MYOCARDIAL INFARCTION: Chronic | ICD-10-CM

## 2025-07-08 DIAGNOSIS — I70.0 AORTIC ATHEROSCLEROSIS: ICD-10-CM

## 2025-07-08 DIAGNOSIS — E11.59 HYPERTENSION ASSOCIATED WITH DIABETES: Chronic | ICD-10-CM

## 2025-07-08 DIAGNOSIS — E11.59 TYPE 2 DIABETES MELLITUS WITH OTHER CIRCULATORY COMPLICATION, WITHOUT LONG-TERM CURRENT USE OF INSULIN: Chronic | ICD-10-CM

## 2025-07-08 DIAGNOSIS — D50.8 OTHER IRON DEFICIENCY ANEMIA: ICD-10-CM

## 2025-07-08 DIAGNOSIS — I25.10 CORONARY ARTERY DISEASE INVOLVING NATIVE CORONARY ARTERY OF NATIVE HEART WITHOUT ANGINA PECTORIS: Chronic | ICD-10-CM

## 2025-07-08 DIAGNOSIS — E11.29 TYPE 2 DIABETES MELLITUS WITH DIABETIC MICROALBUMINURIA, WITHOUT LONG-TERM CURRENT USE OF INSULIN: ICD-10-CM

## 2025-07-08 DIAGNOSIS — E11.29 DIABETES MELLITUS WITH MICROALBUMINURIA: ICD-10-CM

## 2025-07-08 DIAGNOSIS — Z86.39 PERSONAL HISTORY OF OTHER ENDOCRINE, NUTRITIONAL AND METABOLIC DISEASE: ICD-10-CM

## 2025-07-08 DIAGNOSIS — Z86.0100 HISTORY OF COLON POLYPS: ICD-10-CM

## 2025-07-08 DIAGNOSIS — Z00.00 ENCOUNTER FOR MEDICARE ANNUAL WELLNESS EXAM: Primary | ICD-10-CM

## 2025-07-08 DIAGNOSIS — C50.412 MALIGNANT NEOPLASM OF UPPER-OUTER QUADRANT OF LEFT BREAST IN FEMALE, ESTROGEN RECEPTOR POSITIVE: Chronic | ICD-10-CM

## 2025-07-08 DIAGNOSIS — I15.2 HYPERTENSION ASSOCIATED WITH DIABETES: Chronic | ICD-10-CM

## 2025-07-08 DIAGNOSIS — R80.9 TYPE 2 DIABETES MELLITUS WITH DIABETIC MICROALBUMINURIA, WITHOUT LONG-TERM CURRENT USE OF INSULIN: ICD-10-CM

## 2025-07-08 DIAGNOSIS — E11.8 DIABETES MELLITUS TYPE 2 WITH COMPLICATIONS: ICD-10-CM

## 2025-07-08 DIAGNOSIS — D39.10 GRANULOSA CELL TUMOR: ICD-10-CM

## 2025-07-08 DIAGNOSIS — E11.65 UNCONTROLLED TYPE 2 DIABETES MELLITUS WITH HYPERGLYCEMIA: ICD-10-CM

## 2025-07-08 DIAGNOSIS — Z00.00 ANNUAL PHYSICAL EXAM: Primary | ICD-10-CM

## 2025-07-08 DIAGNOSIS — T45.1X5A ANTINEOPLASTIC CHEMOTHERAPY INDUCED ANEMIA: ICD-10-CM

## 2025-07-08 DIAGNOSIS — K59.00 CONSTIPATION, UNSPECIFIED CONSTIPATION TYPE: ICD-10-CM

## 2025-07-08 DIAGNOSIS — R91.1 PULMONARY NODULE: ICD-10-CM

## 2025-07-08 DIAGNOSIS — Z53.20 REFUSAL OF STATIN MEDICATION BY PATIENT: ICD-10-CM

## 2025-07-08 DIAGNOSIS — I50.32 CHRONIC DIASTOLIC CONGESTIVE HEART FAILURE: Chronic | ICD-10-CM

## 2025-07-08 DIAGNOSIS — I25.10 CORONARY ARTERY DISEASE INVOLVING NATIVE CORONARY ARTERY OF NATIVE HEART WITHOUT ANGINA PECTORIS: ICD-10-CM

## 2025-07-08 DIAGNOSIS — I51.7 CARDIOMEGALY: Chronic | ICD-10-CM

## 2025-07-08 DIAGNOSIS — Z00.00 ANNUAL PHYSICAL EXAM: ICD-10-CM

## 2025-07-08 DIAGNOSIS — Z17.0 MALIGNANT NEOPLASM OF UPPER-OUTER QUADRANT OF LEFT BREAST IN FEMALE, ESTROGEN RECEPTOR POSITIVE: Chronic | ICD-10-CM

## 2025-07-08 DIAGNOSIS — I50.32 CHRONIC DIASTOLIC CONGESTIVE HEART FAILURE: ICD-10-CM

## 2025-07-08 DIAGNOSIS — Z85.43 HISTORY OF OVARIAN CANCER: Chronic | ICD-10-CM

## 2025-07-08 DIAGNOSIS — I70.0 AORTIC ATHEROSCLEROSIS: Chronic | ICD-10-CM

## 2025-07-08 DIAGNOSIS — D64.81 ANTINEOPLASTIC CHEMOTHERAPY INDUCED ANEMIA: ICD-10-CM

## 2025-07-08 DIAGNOSIS — K92.1 BLOOD IN STOOL: ICD-10-CM

## 2025-07-08 DIAGNOSIS — C56.1 OVARIAN CANCER ON RIGHT: Chronic | ICD-10-CM

## 2025-07-08 DIAGNOSIS — R80.9 DIABETES MELLITUS WITH MICROALBUMINURIA: ICD-10-CM

## 2025-07-08 DIAGNOSIS — H35.371 EPIRETINAL MEMBRANE (ERM) OF RIGHT EYE: ICD-10-CM

## 2025-07-08 PROBLEM — D50.9 IRON DEFICIENCY ANEMIA: Status: RESOLVED | Noted: 2024-05-02 | Resolved: 2025-07-08

## 2025-07-08 LAB
ALBUMIN SERPL BCP-MCNC: 4.9 G/DL (ref 3.5–5.2)
ALBUMIN/CREAT UR: 5 UG/MG
ALP SERPL-CCNC: 87 UNIT/L (ref 40–150)
ALT SERPL W/O P-5'-P-CCNC: 20 UNIT/L (ref 10–44)
ANION GAP (OHS): 13 MMOL/L (ref 8–16)
AST SERPL-CCNC: 24 UNIT/L (ref 11–45)
BILIRUB SERPL-MCNC: 0.4 MG/DL (ref 0.1–1)
BUN SERPL-MCNC: 17 MG/DL (ref 8–23)
CALCIUM SERPL-MCNC: 10.5 MG/DL (ref 8.7–10.5)
CHLORIDE SERPL-SCNC: 103 MMOL/L (ref 95–110)
CHOLEST SERPL-MCNC: 210 MG/DL (ref 120–199)
CHOLEST/HDLC SERPL: 5 {RATIO} (ref 2–5)
CO2 SERPL-SCNC: 26 MMOL/L (ref 23–29)
CREAT SERPL-MCNC: 1 MG/DL (ref 0.5–1.4)
CREAT UR-MCNC: 239 MG/DL (ref 15–325)
EAG (OHS): 154 MG/DL (ref 68–131)
ERYTHROCYTE [DISTWIDTH] IN BLOOD BY AUTOMATED COUNT: 15.2 % (ref 11.5–14.5)
GFR SERPLBLD CREATININE-BSD FMLA CKD-EPI: >60 ML/MIN/1.73/M2
GLUCOSE SERPL-MCNC: 107 MG/DL (ref 70–110)
HBA1C MFR BLD: 7 % (ref 4–5.6)
HCT VFR BLD AUTO: 42.3 % (ref 37–48.5)
HDLC SERPL-MCNC: 42 MG/DL (ref 40–75)
HDLC SERPL: 20 % (ref 20–50)
HGB BLD-MCNC: 13.7 GM/DL (ref 12–16)
LDLC SERPL CALC-MCNC: 130.6 MG/DL (ref 63–159)
MCH RBC QN AUTO: 28.4 PG (ref 27–31)
MCHC RBC AUTO-ENTMCNC: 32.4 G/DL (ref 32–36)
MCV RBC AUTO: 88 FL (ref 82–98)
MICROALBUMIN UR-MCNC: 12 UG/ML (ref ?–5000)
NONHDLC SERPL-MCNC: 168 MG/DL
PLATELET # BLD AUTO: 408 K/UL (ref 150–450)
PMV BLD AUTO: 9.6 FL (ref 9.2–12.9)
POTASSIUM SERPL-SCNC: 4.7 MMOL/L (ref 3.5–5.1)
PROT SERPL-MCNC: 8.9 GM/DL (ref 6–8.4)
RBC # BLD AUTO: 4.82 M/UL (ref 4–5.4)
SODIUM SERPL-SCNC: 142 MMOL/L (ref 136–145)
TRIGL SERPL-MCNC: 187 MG/DL (ref 30–150)
TSH SERPL-ACNC: 0.93 UIU/ML (ref 0.4–4)
WBC # BLD AUTO: 7.86 K/UL (ref 3.9–12.7)

## 2025-07-08 PROCEDURE — 3051F HG A1C>EQUAL 7.0%<8.0%: CPT | Mod: CPTII,S$GLB,, | Performed by: FAMILY MEDICINE

## 2025-07-08 PROCEDURE — 4010F ACE/ARB THERAPY RXD/TAKEN: CPT | Mod: CPTII,S$GLB,, | Performed by: NURSE PRACTITIONER

## 2025-07-08 PROCEDURE — 80061 LIPID PANEL: CPT

## 2025-07-08 PROCEDURE — G0439 PPPS, SUBSEQ VISIT: HCPCS | Mod: S$GLB,,, | Performed by: NURSE PRACTITIONER

## 2025-07-08 PROCEDURE — 3078F DIAST BP <80 MM HG: CPT | Mod: CPTII,S$GLB,, | Performed by: FAMILY MEDICINE

## 2025-07-08 PROCEDURE — 3288F FALL RISK ASSESSMENT DOCD: CPT | Mod: CPTII,S$GLB,, | Performed by: FAMILY MEDICINE

## 2025-07-08 PROCEDURE — 3051F HG A1C>EQUAL 7.0%<8.0%: CPT | Mod: CPTII,S$GLB,, | Performed by: NURSE PRACTITIONER

## 2025-07-08 PROCEDURE — 85027 COMPLETE CBC AUTOMATED: CPT

## 2025-07-08 PROCEDURE — 1159F MED LIST DOCD IN RCRD: CPT | Mod: CPTII,S$GLB,, | Performed by: FAMILY MEDICINE

## 2025-07-08 PROCEDURE — 80053 COMPREHEN METABOLIC PANEL: CPT

## 2025-07-08 PROCEDURE — 1160F RVW MEDS BY RX/DR IN RCRD: CPT | Mod: CPTII,S$GLB,, | Performed by: FAMILY MEDICINE

## 2025-07-08 PROCEDURE — 99397 PER PM REEVAL EST PAT 65+ YR: CPT | Mod: S$GLB,,, | Performed by: FAMILY MEDICINE

## 2025-07-08 PROCEDURE — 99214 OFFICE O/P EST MOD 30 MIN: CPT | Mod: 25,S$GLB,, | Performed by: FAMILY MEDICINE

## 2025-07-08 PROCEDURE — 99999 PR PBB SHADOW E&M-EST. PATIENT-LVL III: CPT | Mod: PBBFAC,,, | Performed by: FAMILY MEDICINE

## 2025-07-08 PROCEDURE — 1158F ADVNC CARE PLAN TLK DOCD: CPT | Mod: CPTII,S$GLB,, | Performed by: NURSE PRACTITIONER

## 2025-07-08 PROCEDURE — 3008F BODY MASS INDEX DOCD: CPT | Mod: CPTII,S$GLB,, | Performed by: FAMILY MEDICINE

## 2025-07-08 PROCEDURE — 82570 ASSAY OF URINE CREATININE: CPT

## 2025-07-08 PROCEDURE — 1160F RVW MEDS BY RX/DR IN RCRD: CPT | Mod: CPTII,S$GLB,, | Performed by: NURSE PRACTITIONER

## 2025-07-08 PROCEDURE — 1101F PT FALLS ASSESS-DOCD LE1/YR: CPT | Mod: CPTII,S$GLB,, | Performed by: NURSE PRACTITIONER

## 2025-07-08 PROCEDURE — 83036 HEMOGLOBIN GLYCOSYLATED A1C: CPT

## 2025-07-08 PROCEDURE — 1126F AMNT PAIN NOTED NONE PRSNT: CPT | Mod: CPTII,S$GLB,, | Performed by: FAMILY MEDICINE

## 2025-07-08 PROCEDURE — 99999 PR PBB SHADOW E&M-EST. PATIENT-LVL IV: CPT | Mod: PBBFAC,,, | Performed by: NURSE PRACTITIONER

## 2025-07-08 PROCEDURE — 1101F PT FALLS ASSESS-DOCD LE1/YR: CPT | Mod: CPTII,S$GLB,, | Performed by: FAMILY MEDICINE

## 2025-07-08 PROCEDURE — 84443 ASSAY THYROID STIM HORMONE: CPT

## 2025-07-08 PROCEDURE — 36415 COLL VENOUS BLD VENIPUNCTURE: CPT

## 2025-07-08 PROCEDURE — 3075F SYST BP GE 130 - 139MM HG: CPT | Mod: CPTII,S$GLB,, | Performed by: FAMILY MEDICINE

## 2025-07-08 PROCEDURE — 3288F FALL RISK ASSESSMENT DOCD: CPT | Mod: CPTII,S$GLB,, | Performed by: NURSE PRACTITIONER

## 2025-07-08 PROCEDURE — 1125F AMNT PAIN NOTED PAIN PRSNT: CPT | Mod: CPTII,S$GLB,, | Performed by: NURSE PRACTITIONER

## 2025-07-08 PROCEDURE — 1159F MED LIST DOCD IN RCRD: CPT | Mod: CPTII,S$GLB,, | Performed by: NURSE PRACTITIONER

## 2025-07-08 PROCEDURE — 4010F ACE/ARB THERAPY RXD/TAKEN: CPT | Mod: CPTII,S$GLB,, | Performed by: FAMILY MEDICINE

## 2025-07-08 PROCEDURE — 1170F FXNL STATUS ASSESSED: CPT | Mod: CPTII,S$GLB,, | Performed by: NURSE PRACTITIONER

## 2025-07-08 RX ORDER — AMLODIPINE BESYLATE AND OLMESARTAN MEDOXOMIL 10; 40 MG/1; MG/1
1 TABLET ORAL DAILY
Qty: 90 TABLET | Refills: 3 | Status: SHIPPED | OUTPATIENT
Start: 2025-07-08 | End: 2026-07-03

## 2025-07-08 NOTE — PROGRESS NOTES
"  Demetrice Gaines presented for a  Medicare AWV and comprehensive Health Risk Assessment today. The following components were reviewed and updated:    Medical history  Family History  Social history  Allergies and Current Medications  Health Risk Assessment  Health Maintenance  Care Team         ** See Completed Assessments for Annual Wellness Visit within the encounter summary.**         The following assessments were completed:  Living Situation  CAGE  Depression Screening  Timed Get Up and Go  Whisper Test  Cognitive Function Screening  Nutrition Screening  ADL Screening  PAQ Screening      Opioid documentation:      Patient does not have a current opioid prescription.        Vitals:    07/08/25 1058   Resp: 20   Weight: 79.8 kg (175 lb 14.8 oz)   Height: 5' 4" (1.626 m)     Body mass index is 30.2 kg/m².  Physical Exam  Constitutional:       General: She is not in acute distress.     Appearance: She is not ill-appearing or diaphoretic.   HENT:      Mouth/Throat:      Mouth: Mucous membranes are moist.   Pulmonary:      Effort: Pulmonary effort is normal. No respiratory distress.   Skin:     General: Skin is warm and dry.   Neurological:      Mental Status: She is alert and oriented to person, place, and time. Mental status is at baseline.   Psychiatric:         Mood and Affect: Mood normal.         Behavior: Behavior normal.         Thought Content: Thought content normal.         Judgment: Judgment normal.           Diagnoses and health risks identified today and associated recommendations/orders:    1. Encounter for Medicare annual wellness exam    Patient states he does not drink alcohol    I offered to discuss advanced care planning, including how to pick a person who would make decisions for you if you were unable to make them for yourself, called a health care power of , and what kind of decisions you might make such as use of life sustaining treatments such as ventilators and tube feeding when " faced with a life limiting illness recorded on a living will that they will need to know. (How you want to be cared for as you near the end of your natural life)     X Patient is interested in learning more about how to make advanced directives.  I provided them paperwork and offered to discuss this with them.  - Referral to Enhanced Annual Wellness Visit (eAWV) W+1  - Diabetes Digital Medicine (DDMP) Enrollment Order  - Hypertension Digital Medicine (HDMP) Enrollment Order    2. Malignant neoplasm of upper-outer quadrant of left breast in female, estrogen receptor positive, S/p Bilateral Mastectomy, Chemotherapy, Granulosa cell tumor, History of ovarian cancer S/p MARIO, Antineoplastic chemotherapy induced anemia  Monitor  Follow up as directed  Continue home arimidex  Chair yoga encouraged    3. Diabetes mellitus type 2 with complications, Uncontrolled type 2 diabetes mellitus with hyperglycemia, Type 2 diabetes mellitus with other circulatory complication, without long-term current use of insulin  Monitor  Follow up with PCP  Continue home ozempic  - Diabetes Digital Medicine (DDMP) Enrollment Order  Patient states she has not had a glucose monitor in a while and going to get one today  Lab Results   Component Value Date    HGBA1C 7.9 (H) 01/08/2025     4. Chronic diastolic congestive heart failure, Cardiomegaly  Monitor  Follow up with Cardiology as directed  Continue home [place], hygroten    5. Aortic atherosclerosis, Coronary artery disease involving native coronary artery of native heart without angina pectoris, Old myocardial infarction  Monitor  Follow up with Cardiology as directed  Continue home asa   Blood pressure control    6. History of colon polyps  Monitor  Follow up with GI as needed, directed     7. Diabetes mellitus with microalbuminuria  Monitor  Stable  Continue home [place], hygroten  - Hypertension Digital Medicine (HDMP) Enrollment Order    8. Epiretinal membrane (ERM) of right eye  Monitor  Follow  up with Ophtho as directed     9. Pulmonary nodule  Monitor  Follow up as directed                                      Provided Demetrice with a 5-10 year written screening schedule and personal prevention plan. Recommendations were developed using the USPSTF age appropriate recommendations. Education, counseling, and referrals were provided as needed. After Visit Summary printed and given to patient which includes a list of additional screenings\tests needed.    Follow up in about 1 year (around 7/8/2026) for Annual wellness visit.    CHEVY Gongora

## 2025-07-08 NOTE — PROGRESS NOTES
Subjective:   Patient ID: Demetrice Gaines is a 65 y.o. female.  Chief Complaint:  Annual Exam    Presents for annual exam and follow-up chronic medical conditions     Last clinic visit January 2025  A1c 7.9%     Medical History:   - Diabetes.  On Ozempic 1 mg weekly injection.  Reports compliance.  Denies side effects.  Last A1 7.9%.  Denies any symptoms hypo or hyperglycemia.  Eye exam and foot exam up-to-date.  Needs repeat A1c and microalbumin.  - Hypertension with CHF and Cardiomegaly.  On Elisabet 10/40 mg daily and Chlorthalidone 25 mg daily.  Reports compliance.  Denies side effects.  Blood pressure controlled.  Denies any shortness a breath or swelling.  - Coronary artery disease with Aortic atherosclerosis, history of MI and Statin refusal. Continues to decline statin.  Also continues to decline treatment with Repatha or Nexletol.  Denies any chest pain or claudication.  Needs repeat lipid panel.  - Breast cancer.  Up-to-date on follow-up surveillance with Oncology.    - Ovarian cancer and Granulosa tumor.  Up-to-date on follow-up surveillance with gynecology.   - iron-deficiency and chemotherapy-induced anemia.  Resolved on most recent CBC.  Iron stores normal.  Up-to-date on follow up with Hematology.  - overactive bladder.  Followed by Urology.  - Colon polyps.  Due for repeat colonoscopy January 2029      Health maintenance needs include multiple vaccines which she has refused in the past    Main complaints today include constipation, sour burps, and overactive bladder  Followed for overactive bladder by Urology   Failed treatment with MiraLax for constipation.  Was referred by another provider to Gastroenterology.  Has appointment next week.  Some blood in stool related constipation.  No true pain.  Does have history of colon polyps.  Last colonoscopy 2024.  Questions repeat colonoscopy sooner due to above GI related issue    Review of Systems   Constitutional:  Negative for chills, diaphoresis, fatigue  "and fever.   Eyes:  Negative for visual disturbance.   Respiratory:  Negative for cough, chest tightness, shortness of breath and wheezing.    Cardiovascular:  Negative for chest pain, palpitations and leg swelling.   Gastrointestinal:  Positive for constipation. Negative for abdominal distention, abdominal pain, diarrhea, nausea and vomiting.   Endocrine: Negative for polydipsia, polyphagia and polyuria.   Genitourinary:  Positive for difficulty urinating, frequency and urgency. Negative for decreased urine volume, dysuria, flank pain, hematuria and pelvic pain.   Musculoskeletal:  Negative for myalgias.   Skin:  Negative for rash.   Neurological:  Negative for dizziness, syncope, weakness, light-headedness, numbness and headaches.   Psychiatric/Behavioral:  Negative for sleep disturbance. The patient is not nervous/anxious.      Objective:   /70 (BP Location: Right arm, Patient Position: Sitting)   Pulse 108   Ht 5' 4" (1.626 m)   Wt 79.8 kg (175 lb 14.8 oz)   SpO2 99%   BMI 30.20 kg/m²     Physical Exam  Vitals and nursing note reviewed.   Constitutional:       Appearance: Normal appearance. She is well-developed. She is obese.   Eyes:      General: No scleral icterus.     Conjunctiva/sclera: Conjunctivae normal.   Neck:      Vascular: No JVD.   Cardiovascular:      Rate and Rhythm: Normal rate and regular rhythm.      Heart sounds: Normal heart sounds.   Pulmonary:      Effort: Pulmonary effort is normal.      Breath sounds: Normal breath sounds.   Abdominal:      General: There is no distension.      Palpations: Abdomen is soft. There is no hepatomegaly.      Tenderness: There is no abdominal tenderness. There is no guarding or rebound.   Musculoskeletal:      Right lower leg: No edema.      Left lower leg: No edema.   Skin:     Findings: No rash.   Neurological:      Mental Status: She is alert.   Psychiatric:         Mood and Affect: Mood and affect normal.       Assessment:       ICD-10-CM " ICD-9-CM   1. Annual physical exam  Z00.00 V70.0   2. Personal history of other endocrine, nutritional and metabolic disease  Z86.39 V12.29   3. Type 2 diabetes mellitus with diabetic microalbuminuria, without long-term current use of insulin  E11.29 250.40    R80.9 791.0   4. Hypertension associated with diabetes  E11.59 250.80    I15.2 401.9   5. Chronic diastolic congestive heart failure  I50.32 428.32     428.0   6. Coronary artery disease involving native coronary artery of native heart without angina pectoris  I25.10 414.01   7. Aortic atherosclerosis  I70.0 440.0   8. Old myocardial infarction  I25.2 412   9. Refusal of statin medication by patient  Z53.20 V64.2   10. Other iron deficiency anemia  D50.8 280.8   11. Antineoplastic chemotherapy induced anemia  D64.81 285.3    T45.1X5A E933.1   12. Malignant neoplasm of upper-outer quadrant of left breast in female, estrogen receptor positive  C50.412 174.4    Z17.0 V86.0   13. Granulosa cell tumor  D39.10 236.2   14. Ovarian cancer on right  C56.1 183.0   15. History of colon polyps  Z86.0100 V12.72     Plan:   Annual physical exam  Personal history of other endocrine, nutritional and metabolic disease  -     CBC Without Differential; Future; Expected date: 07/08/2025  -     Comprehensive Metabolic Panel; Future; Expected date: 07/08/2025  -     Lipid Panel; Future; Expected date: 07/08/2025  -     TSH; Future; Expected date: 07/08/2025  -     Hemoglobin A1C; Future; Expected date: 07/08/2025  -     Microalbumin/Creatinine Ratio, Urine; Future; Expected date: 07/08/2025  Blood pressure normal.  BMI 30.    Check labs.  Treat as indicated.  Colon cancer screening up-to-date   Breast cancer surveillance up-to-date   Continues to decline all recommended vaccines    Type 2 diabetes mellitus with diabetic microalbuminuria, without long-term current use of insulin  -     Hemoglobin A1C; Future; Expected date: 07/08/2025  -     Microalbumin/Creatinine Ratio, Urine;  Future; Expected date: 07/08/2025  Asymptomatic   Check A1c   Adjust Ozempic 1 mg weekly injection if indicated   Eye exam up-to-date   Previous microalbumin positive.  On Arb.    Foot exam up-to-date     Hypertension associated with diabetes  Chronic diastolic congestive heart failure  -     amlodipine-olmesartan (LEATHA) 10-40 mg per tablet; Take 1 tablet by mouth once daily.  Dispense: 90 tablet; Refill: 3  Controlled.  Stable.  Asymptomatic.  BP at goal.    Continue Leatha 10/40 mg daily   Continue chlorthalidone 25 mg daily    Coronary artery disease involving native coronary artery of native heart without angina pectoris  Aortic atherosclerosis  Old myocardial infarction  Refusal of statin medication by patient  -     Lipid Panel; Future; Expected date: 07/08/2025  Asymptomatic   Continues to refuse statin   Continue weekly Evelio P1 injection for cardiac benefit   Check lipid panel   Follow-up cardiology as scheduled     Other iron deficiency anemia  Antineoplastic chemotherapy induced anemia  -     CBC Without Differential; Future; Expected date: 07/08/2025  Asymptomatic   Resolved on most recent CBC     Malignant neoplasm of upper-outer quadrant of left breast in female, estrogen receptor positive  Granulosa cell tumor  Ovarian cancer on right  Follow up with Hematology, Oncology, and gynecology as scheduled     History of colon polyps  Constipation   Blood in stool  Establish with GI next week as planned   Likely consider treatment with Linzess or Amitiza   Possible repeat colonoscopy indicated     Follow up with any/all specialists as scheduled     Return to clinic 6 months or sooner as needed

## 2025-07-08 NOTE — PATIENT INSTRUCTIONS
Counseling and Referral of Other Preventative  (Italic type indicates deductible and co-insurance are waived)    Patient Name: Demetrice Gaines  Today's Date: 7/8/2025    Health Maintenance       Date Due Completion Date    Hemoglobin A1c 07/08/2025 1/8/2025    Diabetes Urine Screening 08/01/2025 8/1/2024    Lipid Panel 08/01/2025 8/1/2024    Shingles Vaccine (1 of 2) 07/29/2025 (Originally 2/17/1979) ---    Pneumococcal Vaccines (Age 50+) (2 of 2 - PCV) 07/29/2025 (Originally 11/16/2021) 11/16/2020    Influenza Vaccine (1) 09/01/2025 11/16/2020    Diabetic Eye Exam 01/03/2026 1/3/2025    Override on 8/22/2023: Done    Foot Exam 01/08/2026 1/8/2025    Override on 1/8/2025: Done    Override on 1/5/2022: Done    DEXA Scan 08/22/2028 8/22/2023    Colorectal Cancer Screening 01/02/2029 1/2/2024    TETANUS VACCINE 12/18/2029 12/18/2019        Orders Placed This Encounter   Procedures    Diabetes Digital Medicine (DDMP) Enrollment Order    Hypertension Digital Medicine (HDMP) Enrollment Order     The following information is provided to all patients.  This information is to help you find resources for any of the problems found today that may be affecting your health:                  Living healthy guide: www.[facility].[place].gov      Understanding Diabetes: www.diabetes.org      Eating healthy: www.cdc.gov/healthyweight      CDC home safety checklist: www.cdc.gov/steadi/patient.html      Agency on Aging: www.goea.[place].[facility]      Alcoholics anonymous (AA): www.aa.org      Physical Activity: www.[place].nih.gov/sh3tawe      Tobacco use: www.quitwithusla.org         Counseling and Referral of Other Preventative  (Italic type indicates deductible and co-insurance are waived)    Patient Name: Demetrice Gaines  Today's Date: 7/8/2025    Health Maintenance       Date Due Completion Date    Hemoglobin A1c 07/08/2025 1/8/2025    Diabetes Urine Screening 08/01/2025 8/1/2024    Lipid Panel 08/01/2025 8/1/2024    Shingles Vaccine (1 of  2) 07/29/2025 (Originally 2/17/1979) ---    Pneumococcal Vaccines (Age 50+) (2 of 2 - PCV) 07/29/2025 (Originally 11/16/2021) 11/16/2020    Influenza Vaccine (1) 09/01/2025 11/16/2020    Diabetic Eye Exam 01/03/2026 1/3/2025    Override on 8/22/2023: Done    Foot Exam 01/08/2026 1/8/2025    Override on 1/8/2025: Done    Override on 1/5/2022: Done    DEXA Scan 08/22/2028 8/22/2023    Colorectal Cancer Screening 01/02/2029 1/2/2024    TETANUS VACCINE 12/18/2029 12/18/2019        Orders Placed This Encounter   Procedures    Diabetes Digital Medicine (DDMP) Enrollment Order    Hypertension Digital Medicine (HDMP) Enrollment Order     The following information is provided to all patients.  This information is to help you find resources for any of the problems found today that may be affecting your health:                  Living healthy guide: www.[facility].[place].gov      Understanding Diabetes: www.diabetes.org      Eating healthy: www.cdc.gov/healthyweight      CDC home safety checklist: www.cdc.gov/steadi/patient.html      Agency on Aging: www.goea.[place].gov      Alcoholics anonymous (AA): www.aa.org      Physical Activity: www.[place].nih.gov/co7ricd      Tobacco use: www.quitwithusla.org

## 2025-07-17 ENCOUNTER — HOSPITAL ENCOUNTER (OUTPATIENT)
Dept: RADIOLOGY | Facility: HOSPITAL | Age: 65
Discharge: HOME | End: 2025-07-17
Attending: INTERNAL MEDICINE
Payer: MEDICARE

## 2025-07-17 DIAGNOSIS — R91.1 PULMONARY NODULE: ICD-10-CM

## 2025-07-17 DIAGNOSIS — C56.1 OVARIAN CANCER ON RIGHT: Chronic | ICD-10-CM

## 2025-07-17 DIAGNOSIS — K66.8 FREE INTRAPERITONEAL AIR: ICD-10-CM

## 2025-07-17 DIAGNOSIS — Z85.3 HISTORY OF INVASIVE BREAST CANCER: ICD-10-CM

## 2025-07-17 PROCEDURE — 71260 CT THORAX DX C+: CPT | Mod: TC

## 2025-07-17 PROCEDURE — A9698 NON-RAD CONTRAST MATERIALNOC: HCPCS | Performed by: INTERNAL MEDICINE

## 2025-07-17 PROCEDURE — 25500020 PHARM REV CODE 255: Performed by: INTERNAL MEDICINE

## 2025-07-17 RX ADMIN — IOHEXOL 1000 ML: 12 SOLUTION ORAL at 08:07

## 2025-07-17 RX ADMIN — IOHEXOL 75 ML: 350 INJECTION, SOLUTION INTRAVENOUS at 09:07

## 2025-07-21 ENCOUNTER — OFFICE VISIT (OUTPATIENT)
Dept: HEMATOLOGY/ONCOLOGY | Facility: CLINIC | Age: 65
End: 2025-07-21
Payer: MEDICARE

## 2025-07-21 VITALS
WEIGHT: 178.13 LBS | HEART RATE: 98 BPM | OXYGEN SATURATION: 97 % | BODY MASS INDEX: 30.41 KG/M2 | RESPIRATION RATE: 20 BRPM | TEMPERATURE: 98 F | SYSTOLIC BLOOD PRESSURE: 121 MMHG | DIASTOLIC BLOOD PRESSURE: 78 MMHG | HEIGHT: 64 IN

## 2025-07-21 DIAGNOSIS — R80.9 TYPE 2 DIABETES MELLITUS WITH DIABETIC MICROALBUMINURIA, WITHOUT LONG-TERM CURRENT USE OF INSULIN: Chronic | ICD-10-CM

## 2025-07-21 DIAGNOSIS — I15.2 HYPERTENSION ASSOCIATED WITH DIABETES: Chronic | ICD-10-CM

## 2025-07-21 DIAGNOSIS — Z85.3 HISTORY OF BREAST CANCER: Primary | ICD-10-CM

## 2025-07-21 DIAGNOSIS — R91.1 PULMONARY NODULE: ICD-10-CM

## 2025-07-21 DIAGNOSIS — E11.29 TYPE 2 DIABETES MELLITUS WITH DIABETIC MICROALBUMINURIA, WITHOUT LONG-TERM CURRENT USE OF INSULIN: Chronic | ICD-10-CM

## 2025-07-21 DIAGNOSIS — E11.59 HYPERTENSION ASSOCIATED WITH DIABETES: Chronic | ICD-10-CM

## 2025-07-21 PROCEDURE — 1125F AMNT PAIN NOTED PAIN PRSNT: CPT | Mod: CPTII,S$GLB,, | Performed by: INTERNAL MEDICINE

## 2025-07-21 PROCEDURE — 3074F SYST BP LT 130 MM HG: CPT | Mod: CPTII,S$GLB,, | Performed by: INTERNAL MEDICINE

## 2025-07-21 PROCEDURE — 3051F HG A1C>EQUAL 7.0%<8.0%: CPT | Mod: CPTII,S$GLB,, | Performed by: INTERNAL MEDICINE

## 2025-07-21 PROCEDURE — 3078F DIAST BP <80 MM HG: CPT | Mod: CPTII,S$GLB,, | Performed by: INTERNAL MEDICINE

## 2025-07-21 PROCEDURE — 3061F NEG MICROALBUMINURIA REV: CPT | Mod: CPTII,S$GLB,, | Performed by: INTERNAL MEDICINE

## 2025-07-21 PROCEDURE — 99214 OFFICE O/P EST MOD 30 MIN: CPT | Mod: S$GLB,,, | Performed by: INTERNAL MEDICINE

## 2025-07-21 PROCEDURE — 99999 PR PBB SHADOW E&M-EST. PATIENT-LVL III: CPT | Mod: PBBFAC,,, | Performed by: INTERNAL MEDICINE

## 2025-07-21 PROCEDURE — 1101F PT FALLS ASSESS-DOCD LE1/YR: CPT | Mod: CPTII,S$GLB,, | Performed by: INTERNAL MEDICINE

## 2025-07-21 PROCEDURE — 3288F FALL RISK ASSESSMENT DOCD: CPT | Mod: CPTII,S$GLB,, | Performed by: INTERNAL MEDICINE

## 2025-07-21 PROCEDURE — 3066F NEPHROPATHY DOC TX: CPT | Mod: CPTII,S$GLB,, | Performed by: INTERNAL MEDICINE

## 2025-07-21 PROCEDURE — 4010F ACE/ARB THERAPY RXD/TAKEN: CPT | Mod: CPTII,S$GLB,, | Performed by: INTERNAL MEDICINE

## 2025-07-21 PROCEDURE — G2211 COMPLEX E/M VISIT ADD ON: HCPCS | Mod: S$GLB,,, | Performed by: INTERNAL MEDICINE

## 2025-07-21 PROCEDURE — 1159F MED LIST DOCD IN RCRD: CPT | Mod: CPTII,S$GLB,, | Performed by: INTERNAL MEDICINE

## 2025-07-21 PROCEDURE — 3008F BODY MASS INDEX DOCD: CPT | Mod: CPTII,S$GLB,, | Performed by: INTERNAL MEDICINE

## 2025-07-21 NOTE — PROGRESS NOTES
Patient ID: Demetrice Gaines   Reason for Visit: Follow-up  MRN:  [id number]     Oncologic Diagnosis:  Stage IA (T1c N0 Mx) L Breast Invasive Ductal  Carcinoma, G3, +/+/-, ki67 of 90%; Oncotype 38  Previous Treatment:    Bilateral Mastectomies 10/25/2023; s/p Re-excision 11/22/23 - . Hemalatha Josue M.D.   TC x 4 (01/29/24 - 04/03/2024)  Breast Surgical Oncology; tissue expander reconstruction by Dr. Shane Wiggins (04/20/24 - 06/25/2024); tried letrazole however felt sx worse and resumed Arimidex Aug 2024    Stage IC, Ovarian Cancer, Right (Granulosa cell tumor)  s/p RALH/BSO 05/07/2024 with Dr. Person, Gyn/Onc at [facility] - pathology positive for granulosa cell tumor with negative peritoneal washings    Current Treatment: Arimidex; surveillance at [facility] for ovarian cancer    Subjective   The patient presents for follow up.    She reports that overall she feels well.  She is experiencing some hot flashes and sleeplessness from Arimidex.  Counseled to hold the medication for 2 weeks and resume.  If side effects not improved will trial Femara.    In May 2025, she had a wrist lipoma removed but reports some continued pain.  She denies any issues with her breasts; she has no skin changes or lumps.    I reviewed her labs with her in detail and her imaging which are stable.  Counseled on the potential cholesterol effects of Arimidex. Counseled to start exercising.      She has no acute complaints.     Review of Systems   Constitutional:  Positive for diaphoresis. Negative for activity change, appetite change, chills, fatigue, fever and unexpected weight change.   HENT:  Negative for nosebleeds.    Respiratory:  Negative for shortness of breath.    Cardiovascular:  Negative for chest pain and leg swelling.   Musculoskeletal:  Positive for arthralgias.   Neurological:  Negative for dizziness and weakness.   Psychiatric/Behavioral:  The patient is not nervous/anxious.      History     Oncology  History   Breast neoplasm, Tis (DCIS), left (Resolved)   9/8/2023 Initial Diagnosis    Breast neoplasm, Tis (DCIS), left     9/8/2023 Cancer Staged    Staging form: Breast, AJCC 8th Edition  - Clinical stage from 9/8/2023: Stage 0 (cTis (DCIS), cN0, cM0, G3, ER+, MI+, HER2: Not Assessed)      Genetic Testing    Patient has genetic testing done for [employer] STAT breast cancer panel and 84 gene multicancer panel.                                              Results revealed patient has the following mutation(s):negative breast cancer panel- no mutation noted- MLH1- variant of undetermined significance      Chemotherapy    Treatment Summary   Plan Name: OP BREAST TC (DOCEtaxel cycloPHOSphamide) Q3W  Treatment Goal: Curative  Status: Active  Start Date: 1/10/2024 (Planned)  End Date: 3/14/2024 (Planned)  Provider: Deloris Gordon MD  Chemotherapy: cycloPHOSphamide 600 mg/m2 = 1,180 mg in sodium chloride 0.9% 255.9 mL chemo infusion, 600 mg/m2, Intravenous, Clinic/HOD 1 time, 0 of 4 cycles    DOCEtaxel (TAXOTERE) 75 mg/m2 = 148 mg in sodium chloride 0.9% 257.4 mL chemo infusion, 75 mg/m2, Intravenous, Clinic/HOD 1 time, 0 of 4 cycles       Malignant neoplasm of upper-outer quadrant of left breast in female, estrogen receptor positive, S/p Bilateral Mastectomy, Chemotherapy   10/17/2023 Initial Diagnosis    Malignant neoplasm of upper-outer quadrant of left breast in female, estrogen receptor positive     10/18/2023 Cancer Staged    Staging form: Breast, AJCC 8th Edition  - Clinical stage from 10/18/2023: Stage IA (cT1c, cN0(f), cM0, G3, ER+, MI+, HER2-)     12/4/2023 Cancer Staged    Staging form: Breast, AJCC 8th Edition  - Pathologic stage from 12/4/2023: Stage IA (pT1c, pN0(sn), cM0, G3, ER+, MI+, HER2-)     1/29/2024 - 4/4/2024 Chemotherapy    Treatment Summary   Plan Name: OP BREAST TC (DOCEtaxel cycloPHOSphamide) Q3W  Treatment Goal: Curative  Status: Inactive  Start Date: 1/29/2024  End Date: 4/4/2024  Provider:  Deloris Gordon MD  Chemotherapy: cycloPHOSphamide 600 mg/m2 = 1,200 mg in sodium chloride 0.9% 291 mL chemo infusion, 600 mg/m2 = 1,200 mg, Intravenous, Clinic/HOD 1 time, 4 of 4 cycles  Administration: 1,200 mg (1/29/2024), 1,200 mg (2/20/2024), 1,200 mg (3/13/2024), 1,180 mg (4/3/2024)  DOCEtaxel (TAXOTERE) 75 mg/m2 = 150 mg in sodium chloride 0.9% 292.5 mL chemo infusion, 75 mg/m2 = 150 mg, Intravenous, Clinic/HOD 1 time, 4 of 4 cycles  Administration: 150 mg (1/29/2024), 150 mg (2/20/2024), 150 mg (3/13/2024), 148 mg (4/3/2024)      Chemotherapy    Treatment Summary   Plan Name: OP BREAST TC (DOCEtaxel cycloPHOSphamide) Q3W  Treatment Goal: Curative  Status: Active  Start Date: 1/10/2024 (Planned)  End Date: 3/14/2024 (Planned)  Provider: Deloris Gordon MD  Chemotherapy: cycloPHOSphamide 600 mg/m2 = 1,180 mg in sodium chloride 0.9% 255.9 mL chemo infusion, 600 mg/m2, Intravenous, Clinic/HOD 1 time, 0 of 4 cycles    DOCEtaxel (TAXOTERE) 75 mg/m2 = 148 mg in sodium chloride 0.9% 257.4 mL chemo infusion, 75 mg/m2, Intravenous, Clinic/HOD 1 time, 0 of 4 cycles           HPI:  Demetrice Gaines is a pleasant 63 y.o. female with history of CAD not requiring PCI, HTN, DM II, CHF and colon polyps who presents to clinic to establish care on referral for breast cancer.    The patient reports that she has been recovering well from surgery.  She has no acute complaints.  She has been having blood in her stool for over a year as well as constipation.  I recommended that we have this moved up to rule out any colonic lesions.  I will message the gastroenterologist to see if they can possibly do a colonoscopy on her prior to starting chemotherapy     I reviewed her Oncotype DX with her.  Unfortunately her recurrence score is 38 indicating a clear benefit for adjuvant chemotherapy.  She is understanding of this.  I reviewed the most common side effects of chemotherapy.  Initially considered giving her AC-T however giving  her cardiac history I think that it would be better for the patient unless toxic if we administered TC.  Otherwise she does have some baseline intermittent neuropathy which will need to be monitored.    FHx significant for Mother with primary bone cancer and paternal grandmother with breast cancer      Past Medical History:   Diagnosis Date    Bilateral pleural effusion 06/20/2020    CAD (coronary artery disease)     Chest wall pain 01/07/2025    CHF (congestive heart failure)     Colon polyp     Diabetes mellitus, type 2     Hypertension     Hyponatremia 06/20/2020    Malignant neoplasm of upper-outer quadrant of left breast in female, estrogen receptor positive 10/17/2023    Myocardial infarction     Ovarian cancer on right 05/21/2024     Physical Exam     ECOG SCORE    0 - Fully active-able to carry on all pre-disease performance without restriction         Vitals:    07/21/25 1448   BP: 121/78   Pulse: 98   Resp: 20   Temp: 97.6 °F (36.4 °C)       Physical Exam  Constitutional:       General: She is not in acute distress.     Appearance: Normal appearance. She is normal weight. She is not ill-appearing or toxic-appearing.   HENT:      Head: Normocephalic and atraumatic.   Eyes:      Extraocular Movements: Extraocular movements intact.      Conjunctiva/sclera: Conjunctivae normal.   Cardiovascular:      Rate and Rhythm: Normal rate.   Pulmonary:      Effort: Pulmonary effort is normal.   Chest:      Chest wall: No mass.   Musculoskeletal:         General: No swelling.   Lymphadenopathy:      Upper Body:      Right upper body: No supraclavicular or axillary adenopathy.      Left upper body: No supraclavicular or axillary adenopathy.   Skin:     General: Skin is warm.   Neurological:      General: No focal deficit present.      Mental Status: She is alert and oriented to person, place, and time.   Psychiatric:         Mood and Affect: Mood normal.         Behavior: Behavior normal.         Labs   Labs:  No visits  with results within 2 Day(s) from this visit.   Latest known visit with results is:   Lab Visit on 07/08/2025   Component Date Value Ref Range Status    WBC 07/08/2025 7.86  3.90 - 12.70 K/uL Final    RBC 07/08/2025 4.82  4.00 - 5.40 M/uL Final    HGB 07/08/2025 13.7  12.0 - 16.0 gm/dL Final    HCT 07/08/2025 42.3  37.0 - 48.5 % Final    MCV 07/08/2025 88  82 - 98 fL Final    MCHC 07/08/2025 32.4  32.0 - 36.0 g/dL Final    RDW 07/08/2025 15.2 (H)  11.5 - 14.5 % Final    Platelet Count 07/08/2025 408  150 - 450 K/uL Final    MCH 07/08/2025 28.4  27.0 - 31.0 pg Final    MPV 07/08/2025 9.6  9.2 - 12.9 fL Final    Sodium 07/08/2025 142  136 - 145 mmol/L Final    Potassium 07/08/2025 4.7  3.5 - 5.1 mmol/L Final    Chloride 07/08/2025 103  95 - 110 mmol/L Final    CO2 07/08/2025 26  23 - 29 mmol/L Final    Glucose 07/08/2025 107  70 - 110 mg/dL Final    BUN 07/08/2025 17  8 - 23 mg/dL Final    Creatinine 07/08/2025 1.0  0.5 - 1.4 mg/dL Final    Calcium 07/08/2025 10.5  8.7 - 10.5 mg/dL Final    Protein Total 07/08/2025 8.9 (H)  6.0 - 8.4 gm/dL Final    Albumin 07/08/2025 4.9  3.5 - 5.2 g/dL Final    Bilirubin Total 07/08/2025 0.4  0.1 - 1.0 mg/dL Final    For infants and newborns, interpretation of results should be based   on gestational age, weight and in agreement with clinical   observations.    Premature Infant recommended reference ranges:   0-24 hours:  <8.0 mg/dL   24-48 hours: <12.0 mg/dL   3-5 days:    <15.0 mg/dL   6-29 days:   <15.0 mg/dL    ALP 07/08/2025 87  40 - 150 unit/L Final    AST 07/08/2025 24  11 - 45 unit/L Final    ALT 07/08/2025 20  10 - 44 unit/L Final    Anion Gap 07/08/2025 13  8 - 16 mmol/L Final    eGFR 07/08/2025 >60  >60 mL/min/1.73/m2 Final    Estimated GFR calculated using the CKD-EPI creatinine (2021) equation.    Cholesterol Total 07/08/2025 210 (H)  120 - 199 mg/dL Final    The National Cholesterol Education Program (NCEP) has set the  following guidelines (reference ranges) for  Cholesterol:  Optimal.....................<200 mg/dL  Borderline High.............200-239 mg/dL  High........................> or = 240 mg/dL    Triglyceride 07/08/2025 187 (H)  30 - 150 mg/dL Final    The National Cholesterol Education Program (NCEP) has set the  following guidelines (reference values) for triglycerides:  Normal......................<150 mg/dL  Borderline High.............150-199 mg/dL  High........................200-499 mg/dL    HDL Cholesterol 07/08/2025 42  40 - 75 mg/dL Final    The National Cholesterol Education Program (NCEP) has set the   following guidelines (reference values) for HDL Cholesterol:   Low...............<40 mg/dL   Optimal...........>60 mg/dL    LDL Cholesterol 07/08/2025 130.6  63.0 - 159.0 mg/dL Final    The National Cholesterol Education Program (NCEP) has set the  following guidelines (reference values) for LDL Cholesterol:  Optimal.......................<130 mg/dL  Borderline High...............130-159 mg/dL  High..........................160-189 mg/dL  Very High.....................>190 mg/dL  LDL calculated using the Friedewald equation.    HDL/Cholesterol Ratio 07/08/2025 20.0  20.0 - 50.0 % Final    Cholesterol/HDL Ratio 07/08/2025 5.0  2.0 - 5.0 Final    Non HDL Cholesterol 07/08/2025 168  mg/dL Final    Risk category and Non-HDL cholesterol goals:  Coronary heart disease (CHD)or equivalent (10-year risk of CHD >20%):  Non-HDL cholesterol goal     <130 mg/dL  Two or more CHD risk factors and 10-year risk of CHD <= 20%:  Non-HDL cholesterol goal     <160 mg/dL  0 to 1 CHD risk factor:  Non-HDL cholesterol goal     <190 mg/dL    TSH 07/08/2025 0.929  0.400 - 4.000 uIU/mL Final    Hemoglobin A1c 07/08/2025 7.0 (H)  4.0 - 5.6 % Final    ADA Screening Guidelines:  5.7-6.4%  Consistent with prediabetes  >=6.5%  Consistent with diabetes    High levels of fetal hemoglobin interfere with the HbA1C  assay. Heterozygous hemoglobin variants (HbS, HgC, etc)do  not significantly  interfere with this assay.   However, presence of multiple variants may affect accuracy.    Estimated Average Glucose 07/08/2025 154 (H)  68 - 131 mg/dL Final    Urine Microalbumin 07/08/2025 12.0    ug/mL Final    Urine Creatinine 07/08/2025 239.0  15.0 - 325.0 mg/dL Final    Microalbumin/Creatinine Ratio Urine 07/08/2025 5.0  <=30.0 ug/mg Final      Pathology     Specimen to Pathology, Surgery Breast 10/25/2023      Component 2 mo ago   Final Pathologic Diagnosis 1. Right breast, prophylactic nipple sparing mastectomy (467 grams):      -  Benign breast tissue with fibrocystic change including fibrosis, adenosis, usual ductal hyperplasia         (UDH) and duct ectasia with apocrine metaplasia and focal periductal chronic inflammation      -  Microcalcifications:  Present, associated with benign ductal tissue      -  Skin and nipple are surgically absent      -  Unremarkable skeletal muscle present      -  Negative for atypia or malignancy    2. Left breast, nipple sparing mastectomy (470 grams):      -  Benign breast tissue with focal atypical ductal hyperplasia (ADH, less than 2mm) in a background of         fibrocystic change including fibrosis, adenosis, duct ectasia, focal columnar cell change/hyperplasia         and fibroadenomatoid change      -  Findings of biopsy cavity are NOT appreciated grossly or histologically, see case comment      -  Skin and nipple are surgically absent      -  Dumbbell shaped biopsy clip NOT IDENTIFIED at time of grossing    Comment:  Immunohistochemical stains with appropriate controls were performed on select tissue blocks.  Cytokeratin 5/6 demonstrates a mosaic staining pattern in areas of ductal hyperplasia, showing no evidence of atypia.  AE1/AE3 is utilized for duct  mapping.  P63 highlights intact myoepithelial cells throughout the specimen, showing no evidence of invasive carcinoma.    3. [place] lymph node #1 (hot and blue, background count 2), biopsy:      -  One lymph  node, negative for metastatic carcinoma (0/1)      -  Immunohistochemical stains for CAM 5.2, AE1/AE3 and wide spectrum keratin are negative      4. Left breast axillary tail, excision (11 grams):      -  Benign mature fibroadipose tissue      -  Negative for atypia or malignancy    5. Additional lateral margin, excision (tissue submitted entirely for histologic evaluation):      -  Benign breast tissue with mild fibrocystic change including fibrosis, adenosis and         fibroadenomatoid change      -  Negative for atypia or malignancy      6. Additional anterior lateral margin, excision (tissue submitted entirely for histologic evaluation):      -  Benign breast tissue with fibrocystic change including fibrosis, adenosis, fibroadenomatoid         changes and duct ectasia with apocrine metaplasia      -  Microcalcifications:  Present, associated with benign breast tissue      -  Negative for atypia or malignancy     Comment: Interp By Dianelys Cardona M.D., Signed on 11/08/2023 at 09:29   Comment The patient's history of invasive carcinoma and ductal carcinoma in-situ in the upper outer quadrant is noted.  The specimen is thoroughly searched for both the dumbbell biopsy clip and a biopsy cavity, neither of which are identified grossly or  histologically.  Numerous sections of tissue from specimen 2 (left breast mastectomy) were evaluated.  The patient's additional margins (specimens 5 and 6) were submitted entirely for histologic review and show no evidence of invasive or in-situ  carcinoma.  Specimen 4 (breast axillary tail) was also submitted entirely for histologic review and showed no evidence of invasive or in-situ carcinoma.  Dr. Bishop has reviewed the above case and agrees with the findings.    Dr. Josue was alerted to these findings via epic message on 11/08/2023 at 09:30.      All stains were performed with adequate positive and negative controls.              Specimen to Pathology, Surgery Breast  11/22/2023      Component 1 mo ago   Final Pathologic Diagnosis Breast, left, re-excision:  Invasive ductal carcinoma, poorly differentiated  Renata grade 3:  Tubule formation-3, nuclear pleomorphism-3 and mitotic count -3  Invasive carcinoma measures 19 x 9 x 8 mm  Positive superior margin, measuring 10 mm in greatest extent  Additional margins:  -Anterior margin:  1 mm  -Medial margin:  6 mm  -Posterior margin:  6.5 mm  -Lateral margin:  9 mm  -Inferior margin:  9 mm  No carcinoma in Situ or lymphovascular invasion is identified  Previous biopsy clip and reflector both grossly identified   Comment: Interp By Shelbie Bishop M.D., Signed on 11/28/2023 at 10:46   Gross Surgery ID:  [id number]    Pathology ID:  [id number]  1.  Received in formalin labeled &quot;left breast biopsy&quot; is a 4 g, 2.3 cm superior to inferior by 2.1 cm medial to lateral by 2.3 cm anterior to posterior portion of breast tissue received oriented by the surgeon as short stitch superior and long  stitch lateral.  A biopsy clip and a reflector clip is identified exposed at the anterior-superior margins (see attached image).  The specimen is sectioned from anterior to posterior into 5 slices averaging 0.5 cm in thickness.  Sectioning reveals a 1.9  cm anterior to posterior by 0.9 cm medial to lateral by 0.8 cm superior to inferior well-circumscribed, tan-white, rubbery mass within slices 2-5.  The mass abuts the superior and medial margins and measures 0.9 cm from the lateral margin, 0.9 cm from  the inferior margin, 0.4 cm from the posterior margin, and 0.5 cm from the anterior margin.  There is an X shaped biopsy clip identified within the mass in slice 4 and a reflector clip identified adjacent to the mass in slice 1.  The specimen is inked as   follows:  Superior-blue, inferior-green, medial-red, lateral-orange, anterior-yellow, and posterior-black.  The specimen is submitted entirely and sequentially from anterior to posterior as  follows:  XOC--1-A:  Slice 1, anterior margin, perpendicular  BPM--1-B:  Slice 2, mass to include anterior, superior, inferior, medial, and lateral margins  GQO--1-C:  Slice 3, mass to superior, inferior, medial, and lateral margins  VHU--1-D:  Slice 4, mass to superior, inferior, medial, and lateral margins  PLD--1-E:  Slice 5, posterior margin, perpendicular        Grossed by: Carl Donovan, MS, PA([facility])        Assessment and Plan   Stage IA (T1c N0 Mx) L Breast Invasive Ductal  Carcinoma, G3, +/+/-, ki67 of 90%   s/p b/l nipple sparing mastectomy and sentinel node biopsy on 10/25/23   s/p excision of primary tumor 11/22/23   Pathology report above  Genetic Testing 09/13/2023: VUS in MLH1  Oncotype Dx Score 38  TTE stable from prior  She has a history of heart disease; she has not required PCI however will precert for TC treatment to circumvent potential anthracycline cardiac effects  I discussed in detail the role of medical oncology in her care.  I informed her that my treatment recommendation is based on the NCCN Guidelines, her final pathology and Oncotype DX score  Because of the high Oncotype DX score, chemotherapy is recommended; because of the hormonal make up of her tumor, endocrine therapy is recommended after she completes chemotherapy. I reviewed the major side effects of chemotherapy and  AIs.   s/p TC x4 completed 04/03/24 ; AE of severe arthralgias, likely from GCSF  Plan   Continue AI; on discussion of SE, she will do a brief washout of Arimidex and retrial; if side effects persistent, will switch to Femara  CT CAP 07/17/25 no evidence of recurrence or metastatic disease;   Counseled on exercise and healthy diet for bone health and breast cancer risk reduction  Continue with surveillance exams q3m (next exam with breast surgery; will have exam with oncology in 6 months); patient to RTC in 6 months unless she needs to return sooner; pt provided with NN  information      Pulmonary Nodule   Noted on CT Abd 05/12/2024 measuring 3 mm    Will repeat non-con CT Chest in 05/2025        Stage IC, Ovarian Cancer, Right (Granulosa cell tumor)  CT Abd/Pelv 04/16/2024:  6.4 cm indeterminate cystic and solid right adnexal mass  s/p RALH/BSO 05/07/2024 with Dr. Person, Gyn/Onc at [facility] - pathology positive for granulosa cell tumor with negative peritoneal washings  Last surveillance visit 06/2025 with no S/S recurrence  Continue surveillance with [facility] - next visit Dec 2025        Bone Health  Given that she is post menopausal and endocrine therapy is recommended, she will be monitored for the need for bisphosphonate therapy.  BMD 08/2023 was normal  Repeat DXA in Aug 2025  Continue Calcium and vitamin D      Cancer Screening  PAP Smear 09/15/2023: Negative for intraepithelial lesion or malignancy   Colonoscopy 01/2024: TAs; repeat in 5 years      Chronic Medical Conditions  DM II - continue Ozempic  HTN - continue amlodipine-olmesartan  CAD:  She has not require PCI; stress test 12/26/2019 negative for ischemia and arrhythmias; last echo 06/2020 and showed preserved ejection fraction at 55%; concentric hypertrophy and normal left ventricular diastolic function  CHF  Hx of Colon Polyps  Peripheral neuropathy; intermittent  Hypomagnesemia, Resolved  Blood in stool, Resolved   TARA, Resolved  Pulmonary Nodules - CTM; stable on most recent CT; repeat CT in 1 year        Med Onc Chart Routing      Follow up with physician 6 months.   Follow up with [facility]    Infusion scheduling note    Injection scheduling note    Labs   Scheduling:  Preferred lab:  Lab interval:  No labs   Imaging    Pharmacy appointment    Other referrals                   The patient was seen, interviewed and examined. Pertinent lab and radiologic studies were reviewed. Pt instructed to call should they develop concerning signs/symptoms or have further questions.        Portions of the record may  have been created with voice recognition software. Occasional wrong-word or sound-a-like substitutions may have occurred due to the inherent limitations of voice recognition software. Read the chart carefully and recognize, using context, where substitutions have occurred.      Deloris Garcia MD    Hematology/Oncology

## 2025-08-11 ENCOUNTER — HOSPITAL ENCOUNTER (OUTPATIENT)
Dept: RADIOLOGY | Facility: HOSPITAL | Age: 65
Discharge: HOME | End: 2025-08-11
Attending: NURSE PRACTITIONER
Payer: MEDICARE

## 2025-08-11 ENCOUNTER — OFFICE VISIT (OUTPATIENT)
Dept: GASTROENTEROLOGY | Facility: CLINIC | Age: 65
End: 2025-08-11
Payer: MEDICARE

## 2025-08-11 VITALS
WEIGHT: 173.94 LBS | HEART RATE: 89 BPM | HEIGHT: 64 IN | BODY MASS INDEX: 29.7 KG/M2 | DIASTOLIC BLOOD PRESSURE: 75 MMHG | SYSTOLIC BLOOD PRESSURE: 126 MMHG

## 2025-08-11 DIAGNOSIS — K62.89 RECTAL PAIN: ICD-10-CM

## 2025-08-11 DIAGNOSIS — K62.5 RECTAL BLEEDING: ICD-10-CM

## 2025-08-11 DIAGNOSIS — K59.09 OTHER CONSTIPATION: ICD-10-CM

## 2025-08-11 DIAGNOSIS — R19.4 ALTERED BOWEL HABITS: ICD-10-CM

## 2025-08-11 DIAGNOSIS — R19.4 ALTERED BOWEL HABITS: Primary | ICD-10-CM

## 2025-08-11 PROCEDURE — 3008F BODY MASS INDEX DOCD: CPT | Mod: CPTII,S$GLB,, | Performed by: NURSE PRACTITIONER

## 2025-08-11 PROCEDURE — 3066F NEPHROPATHY DOC TX: CPT | Mod: CPTII,S$GLB,, | Performed by: NURSE PRACTITIONER

## 2025-08-11 PROCEDURE — 74019 RADEX ABDOMEN 2 VIEWS: CPT | Mod: TC

## 2025-08-11 PROCEDURE — 3074F SYST BP LT 130 MM HG: CPT | Mod: CPTII,S$GLB,, | Performed by: NURSE PRACTITIONER

## 2025-08-11 PROCEDURE — 1160F RVW MEDS BY RX/DR IN RCRD: CPT | Mod: CPTII,S$GLB,, | Performed by: NURSE PRACTITIONER

## 2025-08-11 PROCEDURE — 99213 OFFICE O/P EST LOW 20 MIN: CPT | Mod: S$GLB,,, | Performed by: NURSE PRACTITIONER

## 2025-08-11 PROCEDURE — 74019 RADEX ABDOMEN 2 VIEWS: CPT | Mod: 26,,, | Performed by: RADIOLOGY

## 2025-08-11 PROCEDURE — 1159F MED LIST DOCD IN RCRD: CPT | Mod: CPTII,S$GLB,, | Performed by: NURSE PRACTITIONER

## 2025-08-11 PROCEDURE — 3078F DIAST BP <80 MM HG: CPT | Mod: CPTII,S$GLB,, | Performed by: NURSE PRACTITIONER

## 2025-08-11 PROCEDURE — 3051F HG A1C>EQUAL 7.0%<8.0%: CPT | Mod: CPTII,S$GLB,, | Performed by: NURSE PRACTITIONER

## 2025-08-11 PROCEDURE — 1126F AMNT PAIN NOTED NONE PRSNT: CPT | Mod: CPTII,S$GLB,, | Performed by: NURSE PRACTITIONER

## 2025-08-11 PROCEDURE — 99999 PR PBB SHADOW E&M-EST. PATIENT-LVL IV: CPT | Mod: PBBFAC,,, | Performed by: NURSE PRACTITIONER

## 2025-08-11 PROCEDURE — 4010F ACE/ARB THERAPY RXD/TAKEN: CPT | Mod: CPTII,S$GLB,, | Performed by: NURSE PRACTITIONER

## 2025-08-11 PROCEDURE — 3061F NEG MICROALBUMINURIA REV: CPT | Mod: CPTII,S$GLB,, | Performed by: NURSE PRACTITIONER

## (undated) DEVICE — SUT PDS II 0 CT-1 VIL MONO

## (undated) DEVICE — SPONGE LAP 18X18 PREWASHED

## (undated) DEVICE — SKIN MARKER DEVON 160

## (undated) DEVICE — DRAPE UTILITY W/ TAPE 20X30IN

## (undated) DEVICE — SYR B-D DISP CONTROL 10CC100/C

## (undated) DEVICE — DRAIN CHANNEL ROUND 15FR

## (undated) DEVICE — SOL NACL IRR 1000ML BTL

## (undated) DEVICE — TOWEL OR DISP STRL BLUE 4/PK

## (undated) DEVICE — BLADE SURG #15 CARBON STEEL

## (undated) DEVICE — SPONGE DERMACEA GAUZE 4X4

## (undated) DEVICE — SYR 10CC LUER LOCK

## (undated) DEVICE — UNDERGLOVES BIOGEL PI SIZE 8.5

## (undated) DEVICE — HEADREST ROUND DISP FOAM 9IN

## (undated) DEVICE — DRAPE THREE-QTR REINF 53X77IN

## (undated) DEVICE — PACK DRAPE UNIVERSAL CONVERTOR

## (undated) DEVICE — HANDSWITCH SUCTION COAG

## (undated) DEVICE — SET SECONDARY UNIVERSAL

## (undated) DEVICE — PACK BASIC SETUP SC BR

## (undated) DEVICE — SUT VICRYL 3-0 27 SH

## (undated) DEVICE — PAD ABD 8X10 STERILE

## (undated) DEVICE — GLOVE BIOGEL SZ 8 1/2

## (undated) DEVICE — APPLICATOR CHLORAPREP ORN 26ML

## (undated) DEVICE — GLOVE SURG BIOGEL LATEX SZ 7.5

## (undated) DEVICE — EVACUATOR PENCIL SMOKE NEPTUNE

## (undated) DEVICE — NDL SAFETY 22G X 1.5 ECLIPSE

## (undated) DEVICE — ELECTRODE BLD EXT 6.50 ST DISP

## (undated) DEVICE — SUT MONOCRYL PLUS UD 3-0 27

## (undated) DEVICE — NDL SAFETY 25G X 1.5 ECLIPSE

## (undated) DEVICE — SUT VICRYL PLUS 0 CT1 18IN

## (undated) DEVICE — ELECTRODE REM PLYHSV RETURN 9

## (undated) DEVICE — SUT 4-0 ETHILON 18 PS-2

## (undated) DEVICE — SYR 50CC LL

## (undated) DEVICE — COVER CAMERA OPERATING ROOM

## (undated) DEVICE — GOWN POLY REINF BRTH SLV XL

## (undated) DEVICE — GAUZE SPONGE BULKEE 6X6.75IN

## (undated) DEVICE — EVACUATOR WOUND BULB 100CC

## (undated) DEVICE — SPONGE COTTON TRAY 4X4IN

## (undated) DEVICE — UNDERGLOVES BIOGEL PI SIZE 7.5

## (undated) DEVICE — BANDAGE ESMARK ELASTIC ST 4X9

## (undated) DEVICE — SUT VICRYL 4-0 18 P-3

## (undated) DEVICE — WAVEGUIDE BRITEFIELD DISP

## (undated) DEVICE — DRAPE ULTRASOUND PROBE HEAD

## (undated) DEVICE — COVER LIGHT HANDLE 80/CA

## (undated) DEVICE — MANIFOLD 4 PORT

## (undated) DEVICE — ADHESIVE DERMABOND ADVANCED

## (undated) DEVICE — Device

## (undated) DEVICE — PAD ABDOMINAL STERILE 8X10IN

## (undated) DEVICE — CLIP LIGATING TITANIUM SMALL

## (undated) DEVICE — DRESSING XEROFORM NONADH 1X8IN

## (undated) DEVICE — DRAPE SURG W/TWL 17 5/8X23

## (undated) DEVICE — SUT ETHILON 4-0 PS2 18 BLK

## (undated) DEVICE — ILLUMINATOR PHOTONBLADE 8/11IN

## (undated) DEVICE — SOL 9P NACL IRR PIC IL

## (undated) DEVICE — DRAPE U SPLIT SHEET 54X76IN

## (undated) DEVICE — STAPLER SKIN PROXIMATE WIDE

## (undated) DEVICE — ELECTRODE BLADE W/SLEEVE 2.75

## (undated) DEVICE — BOWL STERILE LARGE 32OZ

## (undated) DEVICE — GLOVE BIOGEL ECLIPSE SZ 6.5

## (undated) DEVICE — SUT MONOCYRL 4-0 PS2 UND

## (undated) DEVICE — SUT 2/0 30IN SILK BLK BRAI

## (undated) DEVICE — SCRUB DYNA-HEX LIQ 4% CHG 4OZ

## (undated) DEVICE — DRESSING GAUZE XEROFORM 5X9

## (undated) DEVICE — BANDAGE ELASTIC 3X5 VELCRO ST

## (undated) DEVICE — GOWN NONREINF SET-IN SLV 2XL

## (undated) DEVICE — SUT VICRYL 4-0 RB1 27IN UD

## (undated) DEVICE — DRAPE HAND STERILE

## (undated) DEVICE — PAD CAST SPECIALIST STRL 3

## (undated) DEVICE — ALCOHOL 70% ANTISEPTIC ISO 4OZ

## (undated) DEVICE — NDL ECLIPSE SAF REG 25GX1.5IN

## (undated) DEVICE — SYR 3CC LUER LOC

## (undated) DEVICE — APPLIER LIGACLIP MED 11IN

## (undated) DEVICE — DRESSING TRANS 8X12 TEGADERM

## (undated) DEVICE — SUT VICRYL 4-0 27 SH

## (undated) DEVICE — DRAPE CHEST FEN 15X10IN

## (undated) DEVICE — SET IV PRIMARY

## (undated) DEVICE — DRESSING CURITY WOUND 10X30IN

## (undated) DEVICE — SHEATH GUIDE SCOUT SURG RADAR

## (undated) DEVICE — COVER PROBE US 5.5X58L NON LTX

## (undated) DEVICE — PACK HEART CATH BR

## (undated) DEVICE — SPONGE GAUZE 16PLY 4X4

## (undated) DEVICE — SUT VICRYL 2-0 36 CT-1

## (undated) DEVICE — BRA MAMMARY SUPPORT XLARGE